# Patient Record
Sex: MALE | Employment: OTHER | URBAN - METROPOLITAN AREA
[De-identification: names, ages, dates, MRNs, and addresses within clinical notes are randomized per-mention and may not be internally consistent; named-entity substitution may affect disease eponyms.]

---

## 2017-01-27 ENCOUNTER — OFFICE VISIT (OUTPATIENT)
Dept: FAMILY MEDICINE | Facility: CLINIC | Age: 67
End: 2017-01-27
Payer: COMMERCIAL

## 2017-01-27 ENCOUNTER — TELEPHONE (OUTPATIENT)
Dept: INTERNAL MEDICINE | Facility: CLINIC | Age: 67
End: 2017-01-27

## 2017-01-27 VITALS
DIASTOLIC BLOOD PRESSURE: 70 MMHG | OXYGEN SATURATION: 98 % | HEART RATE: 68 BPM | WEIGHT: 235 LBS | SYSTOLIC BLOOD PRESSURE: 118 MMHG | HEIGHT: 67 IN | TEMPERATURE: 97.1 F | BODY MASS INDEX: 36.88 KG/M2

## 2017-01-27 DIAGNOSIS — E11.8 TYPE 2 DIABETES MELLITUS WITH COMPLICATION, WITH LONG-TERM CURRENT USE OF INSULIN (H): Primary | ICD-10-CM

## 2017-01-27 DIAGNOSIS — I25.10 CAD, MULTIPLE VESSEL: ICD-10-CM

## 2017-01-27 DIAGNOSIS — Z79.4 TYPE 2 DIABETES MELLITUS WITH COMPLICATION, WITH LONG-TERM CURRENT USE OF INSULIN (H): Primary | ICD-10-CM

## 2017-01-27 DIAGNOSIS — E11.8 TYPE 2 DIABETES MELLITUS WITH COMPLICATION, WITH LONG-TERM CURRENT USE OF INSULIN (H): ICD-10-CM

## 2017-01-27 DIAGNOSIS — E78.5 HYPERLIPIDEMIA LDL GOAL <100: ICD-10-CM

## 2017-01-27 DIAGNOSIS — K21.9 GASTROESOPHAGEAL REFLUX DISEASE WITHOUT ESOPHAGITIS: ICD-10-CM

## 2017-01-27 DIAGNOSIS — Z79.4 TYPE 2 DIABETES MELLITUS WITH COMPLICATION, WITH LONG-TERM CURRENT USE OF INSULIN (H): ICD-10-CM

## 2017-01-27 DIAGNOSIS — I10 ESSENTIAL HYPERTENSION WITH GOAL BLOOD PRESSURE LESS THAN 140/90: ICD-10-CM

## 2017-01-27 DIAGNOSIS — Z23 NEED FOR PROPHYLACTIC VACCINATION AND INOCULATION AGAINST INFLUENZA: Primary | ICD-10-CM

## 2017-01-27 PROCEDURE — 90686 IIV4 VACC NO PRSV 0.5 ML IM: CPT | Performed by: FAMILY MEDICINE

## 2017-01-27 PROCEDURE — 90471 IMMUNIZATION ADMIN: CPT | Performed by: FAMILY MEDICINE

## 2017-01-27 PROCEDURE — 99204 OFFICE O/P NEW MOD 45 MIN: CPT | Mod: 25 | Performed by: FAMILY MEDICINE

## 2017-01-27 RX ORDER — ATORVASTATIN CALCIUM 80 MG/1
80 TABLET, FILM COATED ORAL DAILY
Qty: 90 TABLET | Refills: 3 | Status: SHIPPED | OUTPATIENT
Start: 2017-01-27 | End: 2018-04-03

## 2017-01-27 RX ORDER — INSULIN GLARGINE 100 [IU]/ML
INJECTION, SOLUTION SUBCUTANEOUS
Qty: 27 ML | Refills: 11 | Status: SHIPPED | OUTPATIENT
Start: 2017-01-27 | End: 2018-03-18

## 2017-01-27 RX ORDER — FLURBIPROFEN SODIUM 0.3 MG/ML
SOLUTION/ DROPS OPHTHALMIC
Refills: 2 | COMMUNITY
Start: 2016-06-22 | End: 2017-01-27

## 2017-01-27 RX ORDER — ISOSORBIDE MONONITRATE 30 MG/1
TABLET, EXTENDED RELEASE ORAL
Refills: 3 | COMMUNITY
Start: 2016-12-18 | End: 2017-01-27

## 2017-01-27 RX ORDER — LANCETS
1 EACH MISCELLANEOUS 3 TIMES DAILY
Qty: 6 BOX | Refills: 3 | Status: SHIPPED | OUTPATIENT
Start: 2017-01-27 | End: 2019-01-07

## 2017-01-27 RX ORDER — METOPROLOL SUCCINATE 100 MG/1
TABLET, EXTENDED RELEASE ORAL
Refills: 2 | COMMUNITY
Start: 2016-12-12 | End: 2017-01-27

## 2017-01-27 RX ORDER — BLOOD SUGAR DIAGNOSTIC
STRIP MISCELLANEOUS
Refills: 3 | COMMUNITY
Start: 2016-12-23 | End: 2017-01-27

## 2017-01-27 RX ORDER — LANCETS
EACH MISCELLANEOUS
Refills: 11 | COMMUNITY
Start: 2016-12-21 | End: 2017-01-27

## 2017-01-27 RX ORDER — BLOOD SUGAR DIAGNOSTIC
1 STRIP MISCELLANEOUS 3 TIMES DAILY
Qty: 300 STRIP | Refills: 3 | Status: SHIPPED | OUTPATIENT
Start: 2017-01-27 | End: 2018-03-07

## 2017-01-27 RX ORDER — AMLODIPINE BESYLATE 10 MG/1
TABLET ORAL
Refills: 3 | COMMUNITY
Start: 2016-12-18 | End: 2017-01-27

## 2017-01-27 RX ORDER — ISOSORBIDE MONONITRATE 30 MG/1
30 TABLET, EXTENDED RELEASE ORAL DAILY
Qty: 90 TABLET | Refills: 3 | Status: SHIPPED | OUTPATIENT
Start: 2017-01-27 | End: 2018-04-03

## 2017-01-27 RX ORDER — INSULIN ASPART 100 [IU]/ML
INJECTION, SOLUTION INTRAVENOUS; SUBCUTANEOUS
Qty: 54 ML | Refills: 11 | Status: SHIPPED | OUTPATIENT
Start: 2017-01-27 | End: 2018-01-29

## 2017-01-27 RX ORDER — METFORMIN HCL 500 MG
500 TABLET, EXTENDED RELEASE 24 HR ORAL
Qty: 360 TABLET | Refills: 3 | Status: SHIPPED | OUTPATIENT
Start: 2017-01-27 | End: 2017-01-27

## 2017-01-27 RX ORDER — INSULIN ASPART 100 [IU]/ML
INJECTION, SOLUTION INTRAVENOUS; SUBCUTANEOUS
Refills: 11 | COMMUNITY
Start: 2016-12-08 | End: 2017-01-27

## 2017-01-27 RX ORDER — LIRAGLUTIDE 6 MG/ML
0.6 INJECTION SUBCUTANEOUS DAILY
Qty: 9 ML | Refills: 11 | Status: SHIPPED | OUTPATIENT
Start: 2017-01-27 | End: 2018-04-03

## 2017-01-27 RX ORDER — FLURBIPROFEN SODIUM 0.3 MG/ML
SOLUTION/ DROPS OPHTHALMIC
Qty: 100 EACH | Refills: 2 | Status: SHIPPED | OUTPATIENT
Start: 2017-01-27 | End: 2017-06-12

## 2017-01-27 RX ORDER — ATORVASTATIN CALCIUM 80 MG/1
TABLET, FILM COATED ORAL
Refills: 3 | COMMUNITY
Start: 2016-12-28 | End: 2017-01-27

## 2017-01-27 RX ORDER — INSULIN GLARGINE 100 [IU]/ML
INJECTION, SOLUTION SUBCUTANEOUS
Refills: 11 | COMMUNITY
Start: 2016-12-08 | End: 2017-01-27

## 2017-01-27 RX ORDER — LOSARTAN POTASSIUM 50 MG/1
50 TABLET ORAL DAILY
Qty: 90 TABLET | Refills: 3 | Status: SHIPPED | OUTPATIENT
Start: 2017-01-27 | End: 2018-04-10

## 2017-01-27 RX ORDER — AMLODIPINE BESYLATE 10 MG/1
10 TABLET ORAL DAILY
Qty: 90 TABLET | Refills: 3 | Status: SHIPPED | OUTPATIENT
Start: 2017-01-27 | End: 2018-06-18

## 2017-01-27 RX ORDER — METOPROLOL SUCCINATE 100 MG/1
100 TABLET, EXTENDED RELEASE ORAL DAILY
Qty: 90 TABLET | Refills: 3 | Status: SHIPPED | OUTPATIENT
Start: 2017-01-27 | End: 2018-07-22

## 2017-01-27 RX ORDER — METFORMIN HCL 500 MG
2000 TABLET, EXTENDED RELEASE 24 HR ORAL
Qty: 360 TABLET | Refills: 3 | Status: SHIPPED | OUTPATIENT
Start: 2017-01-27 | End: 2018-01-06

## 2017-01-27 RX ORDER — NITROGLYCERIN 0.4 MG/1
TABLET SUBLINGUAL
Qty: 25 TABLET | Refills: 0 | Status: SHIPPED | OUTPATIENT
Start: 2017-01-27 | End: 2019-04-30

## 2017-01-27 RX ORDER — BUPROPION HYDROCHLORIDE 300 MG/1
TABLET ORAL
Refills: 3 | COMMUNITY
Start: 2017-01-15 | End: 2018-06-07

## 2017-01-27 RX ORDER — LIRAGLUTIDE 6 MG/ML
INJECTION SUBCUTANEOUS
Refills: 11 | COMMUNITY
Start: 2017-01-18 | End: 2017-01-27

## 2017-01-27 RX ORDER — LOSARTAN POTASSIUM 50 MG/1
TABLET ORAL
Refills: 3 | COMMUNITY
Start: 2016-12-18 | End: 2017-01-27

## 2017-01-27 RX ORDER — METFORMIN HCL 500 MG
TABLET, EXTENDED RELEASE 24 HR ORAL
Refills: 3 | COMMUNITY
Start: 2016-11-06 | End: 2017-01-27

## 2017-01-27 ASSESSMENT — PAIN SCALES - GENERAL: PAINLEVEL: NO PAIN (0)

## 2017-01-27 NOTE — PROGRESS NOTES
SUBJECTIVE:     CC: Darian Engle is an 66 year old male who presents for preventative health visit.     Insurance change and so he is changing clinic     Healthy Habits:    Do you get at least three servings of calcium containing foods daily (dairy, green leafy vegetables, etc.)? yes    Amount of exercise or daily activities, outside of work: 1-2  day(s) per week for 20 min    Problems taking medications regularly No    Medication side effects: No    Have you had an eye exam in the past two years? yes    Do you see a dentist twice per year? yes    Do you have sleep apnea, excessive snoring or daytime drowsiness?yes- Sleep Apnea          Today's PHQ-2 Score: No flowsheet data found.    Abuse: Current or Past(Physical, Sexual or Emotional)- No  Do you feel safe in your environment - Yes    Social History   Substance Use Topics     Smoking status: Not on file     Smokeless tobacco: Not on file     Alcohol Use: Not on file     Does not drink    Last PSA: No results found for: PSA    No results for input(s): CHOL, HDL, LDL, TRIG, CHOLHDLRATIO, NHDL in the last 52166 hours.        All Histories reviewed and updated in [facility].      Current Outpatient Prescriptions   Medication Sig Dispense Refill     metFORMIN (GLUCOPHAGE-XR) 500 MG 24 hr tablet TAKE 4 TABS BY MOUTH DAILY WITH BREAKFAST. PENDING MAILORDER DELIVERY  3     losartan (COZAAR) 50 MG tablet TAKE 1 TAB BY MOUTH DAILY.  3     amLODIPine (NORVASC) 10 MG tablet TAKE 1 TABLET BY MOUTH DAILY. PENDING MAILORDER DELIVERY  3     metoprolol (TOPROL-XL) 100 MG 24 hr tablet TAKE 1 TAB BY MOUTH DAILY.  2     isosorbide mononitrate (IMDUR) 30 MG 24 hr tablet TAKE 1 TAB BY MOUTH ONCE DAILY.  3     atorvastatin (LIPITOR) 80 MG tablet TAKE 1 TAB BY MOUTH DAILY.  3     omeprazole (PRILOSEC) 20 MG CR capsule TAKE 1 CAP BY MOUTH DAILY. TAKE 1 HOUR BEFORE A MEAL.  3     buPROPion (WELLBUTRIN XL) 300 MG 24 hr tablet TAKE 1 TAB BY MOUTH DAILY. START AFTER 2 WEEKS ON  MG  "DOSE  3     NOVOLOG FLEXPEN 100 UNIT/ML soln INJECT 3 TIMES DAILY. DOSE: 2UNITS/15 GRAMS AND 1 UNIT FOR EVERY 25 OVER 150.  11     VICTOZA PEN 18 MG/3ML soln INJECT 1.8 MG SUBCUTANEOUSLY DAILY.  11     LANTUS SOLOSTAR 100 UNIT/ML soln INJECT 41 UNITS SUBCUTANEOUSLY. DAILY  11     blood glucose monitoring (ACCU-CHEK FASTCLIX) lancets TESTING BLOOD SUGARS 3 TIME(S) A DAY.  11     B-D U/F insulin pen needle WITH VICTOZA AND INSULIN 5 TIMES DAILY.  2     ACCU-CHEK SMARTVIEW test strip THREE TIMES A DAY. USE AS DIRECTED. PHARMACY DISPENSE BRAND BASED ON INSURANCE.  3         ROS:  C: NEGATIVE for fever, chills, change in weight  I: NEGATIVE for worrisome rashes, moles or lesions  E: NEGATIVE for vision changes or irritation  ENT: NEGATIVE for ear, mouth and throat problems  R: NEGATIVE for significant cough or SOB  CV: NEGATIVE for chest pain, palpitations or peripheral edema  Several visits for chest pain   2004 had CABG   Last stent done for heart 3 years ago   GI: NEGATIVE for nausea, abdominal pain, heartburn, or change in bowel habits   male: negative for dysuria, hematuria, decreased urinary stream, erectile dysfunction, urethral discharge  History of GERD   Had endoscopy with biopsies and was normal ; [facility]   M: NEGATIVE for significant arthralgias or myalgia  N: NEGATIVE for weakness, dizziness or paresthesias  P: NEGATIVE for changes in mood or affect    Last Hemoglobin A1C was 7.2\    No physical examination was done today.  We had to renew all the patient's medications and go through them and go through his basic past medical history which was updated in chart.  As a result of this patient will have to come back for a physical at another time.  His blood pressure is normal.      OBJECTIVE:     /70 mmHg  Pulse 68  Temp(Src) 97.1  F (36.2  C) (Oral)  Ht 5' 6.5\" (1.689 m)  Wt 235 lb (106.595 kg)  BMI 37.37 kg/m2  SpO2 98%  EXAM:      ASSESSMENT/PLAN:         ICD-10-CM    1. Need for " prophylactic vaccination and inoculation against influenza Z23 FLU VAC, SPLIT VIRUS IM > 3 YO (QUADRIVALENT) [33032]     Vaccine Administration, Initial [78991]   2. CAD, multiple vessel I25.10 metoprolol (TOPROL-XL) 100 MG 24 hr tablet     isosorbide mononitrate (IMDUR) 30 MG 24 hr tablet     atorvastatin (LIPITOR) 80 MG tablet     nitroglycerin (NITROSTAT) 0.4 MG sublingual tablet   3. Essential hypertension with goal blood pressure less than 140/90 I10 losartan (COZAAR) 50 MG tablet     amLODIPine (NORVASC) 10 MG tablet     metoprolol (TOPROL-XL) 100 MG 24 hr tablet   4. Type 2 diabetes mellitus with complication, with long-term current use of insulin (H) E11.8 metFORMIN (GLUCOPHAGE-XR) 500 MG 24 hr tablet    Z79.4 NOVOLOG FLEXPEN 100 UNIT/ML soln     VICTOZA PEN 18 MG/3ML soln     LANTUS SOLOSTAR 100 UNIT/ML soln     blood glucose monitoring (ACCU-CHEK FASTCLIX) lancets     B-D U/F insulin pen needle     ACCU-CHEK SMARTVIEW test strip   5. Hyperlipidemia LDL goal <100 E78.5 atorvastatin (LIPITOR) 80 MG tablet   6. Gastroesophageal reflux disease without esophagitis K21.9 omeprazole (PRILOSEC) 20 MG CR capsule       We don't have access the [facility].  As a result of that I do not know when to set up his next testing.  At this point I told him that any minimal malleolar do these in 3 months and that he would have tablet basic testing that we would do we have no records.  All meds were renewed as noted.  Currently patient is not active with any of his symptoms of chest pain he has no GERD symptoms right now with his current dose of medication he says were very well controlled on his current doses of penicillin therapy.      Total time spent face-to-face with the patient: 45  minutes.  Greater than 50% of time was spent in counseling.       Counseling Resources:  ATP IV Guidelines  Pooled Cohorts Equation Calculator  FRAX Risk Assessment  ICSI Preventive Guidelines  Dietary Guidelines for  Americans, 2010  USDA's MyPlate  ASA Prophylaxis  Lung CA Screening    Eh Matos MD  [facility]

## 2017-01-27 NOTE — TELEPHONE ENCOUNTER
Called pharmacy. Patient used to take Metformin 4 x a day.    Ordered for 1 x day.  quantity 360.    Routed to provider to please clarify.    Shelly Jones RN [facility] Triage.

## 2017-01-27 NOTE — TELEPHONE ENCOUNTER
Reason for Call:  Other prescription    Detailed comments: metFORMIN (GLUCOPHAGE-XR) 500 MG 24 hr tablet, dosage questions    Phone Number Patient can be reached at: Other phone number:  [facility], [phone]    Best Time: any    Can we leave a detailed message on this number? YES    Call taken on 1/27/2017 at 11:30 AM by Evelyn Salgado

## 2017-01-27 NOTE — PROGRESS NOTES
Injectable Influenza Immunization Documentation    1.  Is the person to be vaccinated sick today?  No    2. Does the person to be vaccinated have an allergy to eggs or to a component of the vaccine?  No    3. Has the person to be vaccinated today ever had a serious reaction to influenza vaccine in the past?  No    4. Has the person to be vaccinated ever had Guillain-Houston syndrome?  No     Form completed by Vania SCHUMACHER    Per orders of Dr. MCCLAIN, injection of Influenza given by [facility]. Patient instructed to remain in clinic for 20 minutes afterwards, and to report any adverse reaction to me immediately.

## 2017-01-27 NOTE — MR AVS SNAPSHOT
After Visit Summary   1/27/2017    Darian Engle    MRN: [id number]           Patient Information     Date Of Birth          1950        Visit Information        Provider Department      1/27/2017 9:20 AM Eh Matos MD [facility]        Today's Diagnoses     CAD, multiple vessel         Essential hypertension with goal blood pressure less than 140/90         Type 2 diabetes mellitus with complication, with long-term current use of insulin (H)         Hyperlipidemia LDL goal <100         Gastroesophageal reflux disease without esophagitis           Care Instructions      Preventive Health Recommendations  Male Ages 50 - 64    Yearly exam:             See your health care provider every year in order to  o   Review health changes.   o   Discuss preventive care.    o   Review your medicines if your doctor has prescribed any.     Have a cholesterol test every 5 years, or more frequently if you are at risk for high cholesterol/heart disease.     Have a diabetes test (fasting glucose) every three years. If you are at risk for diabetes, you should have this test more often.     Have a colonoscopy at age 50, or have a yearly FIT test (stool test). These exams will check for colon cancer.      Talk with your health care provider about whether or not a prostate cancer screening test (PSA) is right for you.    You should be tested each year for STDs (sexually transmitted diseases), if you re at risk.     Shots: Get a flu shot each year. Get a tetanus shot every 10 years.     Nutrition:    Eat at least 5 servings of fruits and vegetables daily.     Eat whole-grain bread, whole-wheat pasta and brown rice instead of white grains and rice.     Talk to your provider about Calcium and Vitamin D.     Lifestyle    Exercise for at least 150 minutes a week (30 minutes a day, 5 days a week). This will help you control your weight and prevent disease.     Limit alcohol to one drink  "per day.     No smoking.     Wear sunscreen to prevent skin cancer.     See your dentist every six months for an exam and cleaning.     See your eye doctor every 1 to 2 years.            Follow-ups after your visit        Who to contact     If you have questions or need follow up information about today's clinic visit or your schedule please contact [facility] directly at [phone].  Normal or non-critical lab and imaging results will be communicated to you by MyChart, letter or phone within 4 business days after the clinic has received the results. If you do not hear from us within 7 days, please contact the clinic through [facility] or phone. If you have a critical or abnormal lab result, we will notify you by phone as soon as possible.  Submit refill requests through [facility] or call your pharmacy and they will forward the refill request to us. Please allow 3 business days for your refill to be completed.          Additional Information About Your Visit        [email or web address] Information     Bluetest lets you send messages to your doctor, view your test results, renew your prescriptions, schedule appointments and more. To sign up, go to [email or web address] . Click on \"Log in\" on the left side of the screen, which will take you to the Welcome page. Then click on \"Sign up Now\" on the right side of the page.     You will be asked to enter the access code listed below, as well as some personal information. Please follow the directions to create your username and password.     Your access code is: [email or web address]  Expires: 2017 10:36 AM     Your access code will  in 90 days. If you need help or a new code, please call your [facility] or [phone].        Care EveryWhere ID     This is your Care EveryWhere ID. This could be used by other organizations to access your [facility] medical records  [id number]        Your Vitals Were     Pulse Temperature Height BMI (Body Mass Index) Pulse " "Oximetry       68 97.1  F (36.2  C) (Oral) 5' 6.5\" (1.689 m) 37.37 kg/m2 98%        Blood Pressure from Last 3 Encounters:   01/27/17 118/70    Weight from Last 3 Encounters:   01/27/17 235 lb (106.595 kg)              Today, you had the following     No orders found for display         Today's Medication Changes          These changes are accurate as of: 1/27/17 10:36 AM.  If you have any questions, ask your nurse or doctor.               Start taking these medicines.        Dose/Directions    nitroglycerin 0.4 MG sublingual tablet   Commonly known as:  NITROSTAT   Used for:  CAD, multiple vessel   Started by:  Eh Matos MD        For chest pain place 1 tablet under the tongue every 5 minutes for 3 doses. If symptoms persist 5 minutes after 1st dose call 911.   Quantity:  25 tablet   Refills:  0         These medicines have changed or have updated prescriptions.        Dose/Directions    ACCU-CHEK SMARTVIEW test strip   This may have changed:  See the new instructions.   Used for:  Type 2 diabetes mellitus with complication, with long-term current use of insulin (H)   Generic drug:  blood glucose monitoring   Changed by:  Eh Matos MD        Dose:  1 strip   1 strip by In Vitro route 3 times daily Use to test blood sugar 3 times daily or as directed.   Quantity:  300 strip   Refills:  3       amLODIPine 10 MG tablet   Commonly known as:  NORVASC   This may have changed:  See the new instructions.   Used for:  Essential hypertension with goal blood pressure less than 140/90   Changed by:  Eh Matos MD        Dose:  10 mg   Take 1 tablet (10 mg) by mouth daily   Quantity:  90 tablet   Refills:  3       atorvastatin 80 MG tablet   Commonly known as:  LIPITOR   This may have changed:  See the new instructions.   Used for:  CAD, multiple vessel, Hyperlipidemia LDL goal <100   Changed by:  Eh Matos MD        Dose:  80 mg   Take 1 tablet (80 mg) by mouth daily   Quantity:  " 90 tablet   Refills:  3       B-D U/F 31G X 5 MM   This may have changed:  See the new instructions.   Used for:  Type 2 diabetes mellitus with complication, with long-term current use of insulin (H)   Generic drug:  insulin pen needle   Changed by:  Eh Matos MD        Use tid pen needles daily or as directed.   Quantity:  100 each   Refills:  2       blood glucose monitoring lancets   This may have changed:  See the new instructions.   Used for:  Type 2 diabetes mellitus with complication, with long-term current use of insulin (H)   Changed by:  Eh Matos MD        Dose:  1 each   1 each by In Vitro route 3 times daily Use to test blood sugar tid times daily or as directed.   Quantity:  6 Box   Refills:  3       isosorbide mononitrate 30 MG 24 hr tablet   Commonly known as:  IMDUR   This may have changed:  See the new instructions.   Used for:  CAD, multiple vessel   Changed by:  Eh Matos MD        Dose:  30 mg   Take 1 tablet (30 mg) by mouth daily   Quantity:  90 tablet   Refills:  3       LANTUS SOLOSTAR 100 UNIT/ML injection   This may have changed:  See the new instructions.   Used for:  Type 2 diabetes mellitus with complication, with long-term current use of insulin (H)   Generic drug:  insulin glargine   Changed by:  Eh Matos MD        Patient takes 50 units daily   Quantity:  27 mL   Refills:  11       losartan 50 MG tablet   Commonly known as:  COZAAR   This may have changed:  See the new instructions.   Used for:  Essential hypertension with goal blood pressure less than 140/90   Changed by:  Eh Matos MD        Dose:  50 mg   Take 1 tablet (50 mg) by mouth daily   Quantity:  90 tablet   Refills:  3       metFORMIN 500 MG 24 hr tablet   Commonly known as:  GLUCOPHAGE-XR   This may have changed:  See the new instructions.   Used for:  Type 2 diabetes mellitus with complication, with long-term current use of insulin (H)   Changed by:   Eh Matos MD        Dose:  500 mg   Take 1 tablet (500 mg) by mouth daily (with breakfast)   Quantity:  360 tablet   Refills:  3       metoprolol 100 MG 24 hr tablet   Commonly known as:  TOPROL-XL   This may have changed:  See the new instructions.   Used for:  CAD, multiple vessel, Essential hypertension with goal blood pressure less than 140/90   Changed by:  Eh Matos MD        Dose:  100 mg   Take 1 tablet (100 mg) by mouth daily   Quantity:  90 tablet   Refills:  3       NovoLOG FLEXPEN 100 UNIT/ML injection   This may have changed:  See the new instructions.   Used for:  Type 2 diabetes mellitus with complication, with long-term current use of insulin (H)   Generic drug:  insulin aspart   Changed by:  Eh Matos MD        Patient uses 60 units per day   Quantity:  54 mL   Refills:  11       omeprazole 20 MG CR capsule   Commonly known as:  priLOSEC   This may have changed:  See the new instructions.   Used for:  Gastroesophageal reflux disease without esophagitis   Changed by:  Eh Matos MD        Dose:  20 mg   Take 1 capsule (20 mg) by mouth daily   Quantity:  90 capsule   Refills:  3       VICTOZA PEN 18 MG/3ML soln   This may have changed:  See the new instructions.   Used for:  Type 2 diabetes mellitus with complication, with long-term current use of insulin (H)   Generic drug:  liraglutide   Changed by:  Eh Matos MD        Dose:  0.6 mg   Inject 0.6 mg Subcutaneous daily   Quantity:  9 mL   Refills:  11            Where to get your medicines      These medications were sent to [facility]/pharmacy #[id number] - [facility] [address]  [address] [facility] [address]     Phone:  [phone]    - ACCU-CHEK SMARTVIEW test strip  - amLODIPine 10 MG tablet  - atorvastatin 80 MG tablet  - B-D U/F 31G X 5 MM  - blood glucose monitoring lancets  - isosorbide mononitrate 30 MG 24 hr tablet  - LANTUS SOLOSTAR 100 UNIT/ML  injection  - losartan 50 MG tablet  - metFORMIN 500 MG 24 hr tablet  - metoprolol 100 MG 24 hr tablet  - nitroglycerin 0.4 MG sublingual tablet  - NovoLOG FLEXPEN 100 UNIT/ML injection  - omeprazole 20 MG CR capsule  - VICTOZA PEN 18 MG/3ML soln             Primary Care Provider    None Specified       No primary provider on file.        Thank you!     Thank you for choosing [facility]  for your care. Our goal is always to provide you with excellent care. Hearing back from our patients is one way we can continue to improve our services. Please take a few minutes to complete the written survey that you may receive in the mail after your visit with us. Thank you!             Your Updated Medication List - Protect others around you: Learn how to safely use, store and throw away your medicines at www.disposemymeds.org.          This list is accurate as of: 1/27/17 10:36 AM.  Always use your most recent med list.                   Brand Name Dispense Instructions for use    ACCU-CHEK SMARTVIEW test strip   Generic drug:  blood glucose monitoring     300 strip    1 strip by In Vitro route 3 times daily Use to test blood sugar 3 times daily or as directed.       amLODIPine 10 MG tablet    NORVASC    90 tablet    Take 1 tablet (10 mg) by mouth daily       atorvastatin 80 MG tablet    LIPITOR    90 tablet    Take 1 tablet (80 mg) by mouth daily       B-D U/F 31G X 5 MM   Generic drug:  insulin pen needle     100 each    Use tid pen needles daily or as directed.       blood glucose monitoring lancets     6 Box    1 each by In Vitro route 3 times daily Use to test blood sugar tid times daily or as directed.       buPROPion 300 MG 24 hr tablet    WELLBUTRIN XL     TAKE 1 TAB BY MOUTH DAILY. START AFTER 2 WEEKS ON  MG DOSE       isosorbide mononitrate 30 MG 24 hr tablet    IMDUR    90 tablet    Take 1 tablet (30 mg) by mouth daily       LANTUS SOLOSTAR 100 UNIT/ML injection   Generic drug:  insulin  glargine     27 mL    Patient takes 50 units daily       losartan 50 MG tablet    COZAAR    90 tablet    Take 1 tablet (50 mg) by mouth daily       metFORMIN 500 MG 24 hr tablet    GLUCOPHAGE-XR    360 tablet    Take 1 tablet (500 mg) by mouth daily (with breakfast)       metoprolol 100 MG 24 hr tablet    TOPROL-XL    90 tablet    Take 1 tablet (100 mg) by mouth daily       nitroglycerin 0.4 MG sublingual tablet    NITROSTAT    25 tablet    For chest pain place 1 tablet under the tongue every 5 minutes for 3 doses. If symptoms persist 5 minutes after 1st dose call 911.       NovoLOG FLEXPEN 100 UNIT/ML injection   Generic drug:  insulin aspart     54 mL    Patient uses 60 units per day       omeprazole 20 MG CR capsule    priLOSEC    90 capsule    Take 1 capsule (20 mg) by mouth daily       VICTOZA PEN 18 MG/3ML soln   Generic drug:  liraglutide     9 mL    Inject 0.6 mg Subcutaneous daily

## 2017-01-27 NOTE — NURSING NOTE
"Chief Complaint   Patient presents with     Physical       Initial /70 mmHg  Pulse 68  Temp(Src) 97.1  F (36.2  C) (Oral)  Ht 5' 6.5\" (1.689 m)  Wt 235 lb (106.595 kg)  BMI 37.37 kg/m2  SpO2 98% Estimated body mass index is 37.37 kg/(m^2) as calculated from the following:    Height as of this encounter: 5' 6.5\" (1.689 m).    Weight as of this encounter: 235 lb (106.595 kg).  BP completed using cuff size: danuta Caro MA      "

## 2017-02-03 ENCOUNTER — OFFICE VISIT (OUTPATIENT)
Dept: FAMILY MEDICINE | Facility: CLINIC | Age: 67
End: 2017-02-03
Payer: COMMERCIAL

## 2017-02-03 VITALS
TEMPERATURE: 97 F | HEIGHT: 67 IN | DIASTOLIC BLOOD PRESSURE: 71 MMHG | HEART RATE: 61 BPM | SYSTOLIC BLOOD PRESSURE: 141 MMHG | OXYGEN SATURATION: 99 % | WEIGHT: 229 LBS | BODY MASS INDEX: 35.94 KG/M2

## 2017-02-03 DIAGNOSIS — E78.5 HYPERLIPIDEMIA LDL GOAL <100: ICD-10-CM

## 2017-02-03 DIAGNOSIS — Z00.00 ROUTINE GENERAL MEDICAL EXAMINATION AT A HEALTH CARE FACILITY: Primary | ICD-10-CM

## 2017-02-03 DIAGNOSIS — I25.10 CAD, MULTIPLE VESSEL: ICD-10-CM

## 2017-02-03 DIAGNOSIS — Z79.4 TYPE 2 DIABETES MELLITUS WITH COMPLICATION, WITH LONG-TERM CURRENT USE OF INSULIN (H): ICD-10-CM

## 2017-02-03 DIAGNOSIS — I10 ESSENTIAL HYPERTENSION WITH GOAL BLOOD PRESSURE LESS THAN 140/90: ICD-10-CM

## 2017-02-03 DIAGNOSIS — Z11.59 NEED FOR HEPATITIS C SCREENING TEST: ICD-10-CM

## 2017-02-03 DIAGNOSIS — H90.8 MIXED HEARING LOSS: ICD-10-CM

## 2017-02-03 DIAGNOSIS — Z23 NEED FOR PROPHYLACTIC VACCINATION WITH TETANUS-DIPHTHERIA (TD): ICD-10-CM

## 2017-02-03 DIAGNOSIS — Z12.11 SCREEN FOR COLON CANCER: ICD-10-CM

## 2017-02-03 DIAGNOSIS — E11.8 TYPE 2 DIABETES MELLITUS WITH COMPLICATION, WITH LONG-TERM CURRENT USE OF INSULIN (H): ICD-10-CM

## 2017-02-03 DIAGNOSIS — Z13.89 SCREENING FOR DIABETIC PERIPHERAL NEUROPATHY: ICD-10-CM

## 2017-02-03 LAB
ANION GAP SERPL CALCULATED.3IONS-SCNC: 11 MMOL/L (ref 3–14)
BASOPHILS # BLD AUTO: 0 10E9/L (ref 0–0.2)
BASOPHILS NFR BLD AUTO: 0.4 %
BUN SERPL-MCNC: 19 MG/DL (ref 7–30)
CALCIUM SERPL-MCNC: 9.3 MG/DL (ref 8.5–10.1)
CHLORIDE SERPL-SCNC: 106 MMOL/L (ref 94–109)
CHOLEST SERPL-MCNC: 99 MG/DL
CO2 SERPL-SCNC: 24 MMOL/L (ref 20–32)
CREAT SERPL-MCNC: 1.15 MG/DL (ref 0.66–1.25)
CREAT UR-MCNC: 224 MG/DL
DIFFERENTIAL METHOD BLD: NORMAL
EOSINOPHIL # BLD AUTO: 0.2 10E9/L (ref 0–0.7)
EOSINOPHIL NFR BLD AUTO: 1.8 %
ERYTHROCYTE [DISTWIDTH] IN BLOOD BY AUTOMATED COUNT: 12.5 % (ref 10–15)
GFR SERPL CREATININE-BSD FRML MDRD: 64 ML/MIN/1.7M2
GLUCOSE SERPL-MCNC: 83 MG/DL (ref 70–99)
HCT VFR BLD AUTO: 41.3 % (ref 40–53)
HDLC SERPL-MCNC: 28 MG/DL
HGB BLD-MCNC: 13.9 G/DL (ref 13.3–17.7)
LDLC SERPL CALC-MCNC: 46 MG/DL
LYMPHOCYTES # BLD AUTO: 2.6 10E9/L (ref 0.8–5.3)
LYMPHOCYTES NFR BLD AUTO: 26.7 %
MCH RBC QN AUTO: 30.2 PG (ref 26.5–33)
MCHC RBC AUTO-ENTMCNC: 33.7 G/DL (ref 31.5–36.5)
MCV RBC AUTO: 90 FL (ref 78–100)
MICROALBUMIN UR-MCNC: 744 MG/L
MICROALBUMIN/CREAT UR: 332.14 MG/G CR (ref 0–17)
MONOCYTES # BLD AUTO: 0.9 10E9/L (ref 0–1.3)
MONOCYTES NFR BLD AUTO: 9.6 %
NEUTROPHILS # BLD AUTO: 6 10E9/L (ref 1.6–8.3)
NEUTROPHILS NFR BLD AUTO: 61.5 %
NONHDLC SERPL-MCNC: 71 MG/DL
PLATELET # BLD AUTO: 256 10E9/L (ref 150–450)
POTASSIUM SERPL-SCNC: 4.5 MMOL/L (ref 3.4–5.3)
RBC # BLD AUTO: 4.6 10E12/L (ref 4.4–5.9)
SODIUM SERPL-SCNC: 141 MMOL/L (ref 133–144)
TRIGL SERPL-MCNC: 124 MG/DL
TSH SERPL DL<=0.005 MIU/L-ACNC: 3.59 MU/L (ref 0.4–4)
WBC # BLD AUTO: 9.8 10E9/L (ref 4–11)

## 2017-02-03 PROCEDURE — 36415 COLL VENOUS BLD VENIPUNCTURE: CPT | Performed by: FAMILY MEDICINE

## 2017-02-03 PROCEDURE — 85025 COMPLETE CBC W/AUTO DIFF WBC: CPT | Performed by: FAMILY MEDICINE

## 2017-02-03 PROCEDURE — 84443 ASSAY THYROID STIM HORMONE: CPT | Performed by: FAMILY MEDICINE

## 2017-02-03 PROCEDURE — 92552 PURE TONE AUDIOMETRY AIR: CPT | Performed by: FAMILY MEDICINE

## 2017-02-03 PROCEDURE — 80061 LIPID PANEL: CPT | Performed by: FAMILY MEDICINE

## 2017-02-03 PROCEDURE — 82043 UR ALBUMIN QUANTITATIVE: CPT | Performed by: FAMILY MEDICINE

## 2017-02-03 PROCEDURE — 80048 BASIC METABOLIC PNL TOTAL CA: CPT | Performed by: FAMILY MEDICINE

## 2017-02-03 PROCEDURE — 86803 HEPATITIS C AB TEST: CPT | Performed by: FAMILY MEDICINE

## 2017-02-03 PROCEDURE — 99396 PREV VISIT EST AGE 40-64: CPT | Mod: 25 | Performed by: FAMILY MEDICINE

## 2017-02-03 PROCEDURE — 99207 C FOOT EXAM  NO CHARGE: CPT | Mod: 25 | Performed by: FAMILY MEDICINE

## 2017-02-03 ASSESSMENT — PAIN SCALES - GENERAL: PAINLEVEL: NO PAIN (0)

## 2017-02-03 NOTE — PROGRESS NOTES
SUBJECTIVE:     CC: Darian Engle is an 66 year old male who presents for preventative health visit.     Healthy Habits:    Do you get at least three servings of calcium containing foods daily (dairy, green leafy vegetables, etc.)? No    Amount of exercise or daily activities, outside of work: 3 day(s) per week 20 min each    Problems taking medications regularly No    Medication side effects: No    Have you had an eye exam in the past two years? yes    Do you see a dentist twice per year? yes    Do you have sleep apnea, excessive snoring or daytime drowsiness?yes        Today's PHQ-2 Score:   PHQ-2 ( 1999 Pfizer) 1/27/2017   Q1: Little interest or pleasure in doing things 0   Q2: Feeling down, depressed or hopeless 0   PHQ-2 Score 0       Abuse: Current or Past(Physical, Sexual or Emotional)- No  Do you feel safe in your environment - Yes    Social History   Substance Use Topics     Smoking status: Former Smoker     Types: Cigarettes     Quit date: 07/15/2016     Smokeless tobacco: Not on file     Alcohol Use: No     The patient does not drink >3 drinks per day nor >7 drinks per week. Does not drink    Last PSA: No results found for: PSA    No results for input(s): CHOL, HDL, LDL, TRIG, CHOLHDLRATIO, NHDL in the last 54331 hours.    Reviewed orders with patient. Reviewed health maintenance and updated orders accordingly - Yes    All Histories reviewed and updated in Epic.  History reviewed. No pertinent past medical history.   History reviewed. No pertinent past surgical history.    ROS:  C: NEGATIVE for fever, chills, change in weight  I: NEGATIVE for worrisome rashes, moles or lesions  E: NEGATIVE for vision changes or irritation  ENT: NEGATIVE for ear, mouth and throat problems  R: NEGATIVE for significant cough or SOB  CV: NEGATIVE for chest pain, palpitations or peripheral edema  GI: NEGATIVE for nausea, abdominal pain, heartburn, or change in bowel habits   male: negative for dysuria, hematuria,  decreased urinary stream, erectile dysfunction, urethral discharge  M: NEGATIVE for significant arthralgias or myalgia  N: NEGATIVE for weakness, dizziness or paresthesias  P: NEGATIVE for changes in mood or affect    Problem list, Medication list, Allergies, and Medical/Social/Surgical histories reviewed in [facility] and updated as appropriate.  Labs reviewed in EPIC  BP Readings from Last 3 Encounters:   02/03/17 141/71   01/27/17 118/70    Wt Readings from Last 3 Encounters:   02/03/17 229 lb (103.874 kg)   01/27/17 235 lb (106.595 kg)                  Patient Active Problem List   Diagnosis     CAD, multiple vessel     Essential hypertension with goal blood pressure less than 140/90     Type 2 diabetes mellitus with complication, with long-term current use of insulin (H)     Hyperlipidemia LDL goal <100     Gastroesophageal reflux disease without esophagitis     History reviewed. No pertinent past surgical history.    Social History   Substance Use Topics     Smoking status: Former Smoker     Types: Cigarettes     Quit date: 07/15/2016     Smokeless tobacco: Not on file     Alcohol Use: No     History reviewed. No pertinent family history.      Current Outpatient Prescriptions   Medication Sig Dispense Refill     buPROPion (WELLBUTRIN XL) 300 MG 24 hr tablet TAKE 1 TAB BY MOUTH DAILY. START AFTER 2 WEEKS ON  MG DOSE  3     losartan (COZAAR) 50 MG tablet Take 1 tablet (50 mg) by mouth daily 90 tablet 3     amLODIPine (NORVASC) 10 MG tablet Take 1 tablet (10 mg) by mouth daily 90 tablet 3     metoprolol (TOPROL-XL) 100 MG 24 hr tablet Take 1 tablet (100 mg) by mouth daily 90 tablet 3     isosorbide mononitrate (IMDUR) 30 MG 24 hr tablet Take 1 tablet (30 mg) by mouth daily 90 tablet 3     atorvastatin (LIPITOR) 80 MG tablet Take 1 tablet (80 mg) by mouth daily 90 tablet 3     omeprazole (PRILOSEC) 20 MG CR capsule Take 1 capsule (20 mg) by mouth daily 90 capsule 3     NOVOLOG FLEXPEN 100 UNIT/ML soln Patient  uses 60 units per day 54 mL 11     VICTOZA PEN 18 MG/3ML soln Inject 0.6 mg Subcutaneous daily 9 mL 11     LANTUS SOLOSTAR 100 UNIT/ML soln Patient takes 50 units daily 27 mL 11     blood glucose monitoring (ACCU-CHEK FASTCLIX) lancets 1 each by In Vitro route 3 times daily Use to test blood sugar tid times daily or as directed. 6 Box 3     B-D U/F insulin pen needle Use tid pen needles daily or as directed. 100 each 2     ACCU-CHEK SMARTVIEW test strip 1 strip by In Vitro route 3 times daily Use to test blood sugar 3 times daily or as directed. 300 strip 3     nitroglycerin (NITROSTAT) 0.4 MG sublingual tablet For chest pain place 1 tablet under the tongue every 5 minutes for 3 doses. If symptoms persist 5 minutes after 1st dose call 911. 25 tablet 0     metFORMIN (GLUCOPHAGE-XR) 500 MG 24 hr tablet Take 4 tablets (2,000 mg) by mouth daily (with breakfast) 360 tablet 3     No Known Allergies  OBJECTIVE:     There were no vitals taken for this visit.  EXAM:  GENERAL: healthy, alert and no distress; overweight   EYES: Eyes grossly normal to inspection, PERRL and conjunctivae and sclerae normal  HENT: ear canals and TM's normal, nose and mouth without ulcers or lesions; no bruit in the neck   NECK: no adenopathy, no asymmetry, masses, or scars and thyroid normal to palpation  RESP: lungs clear to auscultation - no rales, rhonchi or wheezes  CV: regular rate and rhythm, normal S1 S2, no S3 or S4, no murmur, click or rub, no peripheral edema and peripheral pulses strong  ABDOMEN: soft, nontender, no hepatosplenomegaly, no masses and bowel sounds normal  MS: no gross musculoskeletal defects noted, no edema  SKIN: no suspicious lesions or rashes  NEURO: Normal strength and tone, mentation intact and speech normal  PSYCH: mentation appears normal, affect normal/bright    ASSESSMENT/PLAN:         ICD-10-CM    1. Routine general medical examination at a health care facility Z00.00    2. Essential hypertension with goal blood  "pressure less than 140/90 I10    3. Type 2 diabetes mellitus with complication, with long-term current use of insulin (H) E11.8 Albumin Random Urine Quantitative    Z79.4 TSH WITH FREE T4 REFLEX   4. Hyperlipidemia LDL goal <100 E78.5 Lipid panel reflex to direct LDL   5. CAD, multiple vessel I25.10    6. Screen for colon cancer Z12.11    7. Need for hepatitis C screening test Z11.59 Hepatitis C Screen Reflex to HCV RNA Quant and Genotype   8. Screening for diabetic peripheral neuropathy Z13.89 FOOT EXAM  NO CHARGE [75074.114]   9. Need for prophylactic vaccination with tetanus-diphtheria (TD) Z23        COUNSELING:  Reviewed preventive health counseling, as reflected in patient instructions       Regular exercise       Healthy diet/nutrition         reports that he quit smoking about 6 months ago. His smoking use included Cigarettes. He does not have any smokeless tobacco history on file.    Estimated body mass index is 37.37 kg/(m^2) as calculated from the following:    Height as of 1/27/17: 5' 6.5\" (1.689 m).    Weight as of 1/27/17: 235 lb (106.595 kg).   Weight management plan: Discussed healthy diet and exercise guidelines and patient will follow up in 12 months in clinic to re-evaluate.    Counseling Resources:  ATP IV Guidelines  Pooled Cohorts Equation Calculator  FRAX Risk Assessment  ICSI Preventive Guidelines  Dietary Guidelines for Americans, 2010  USDA's MyPlate  ASA Prophylaxis  Lung CA Screening    Eh Matos MD  [facility]  "

## 2017-02-03 NOTE — NURSING NOTE
"Chief Complaint   Patient presents with     Physical       Initial /71 mmHg  Pulse 61  Temp(Src) 97  F (36.1  C) (Oral)  Ht 5' 6.5\" (1.689 m)  Wt 229 lb (103.874 kg)  BMI 36.41 kg/m2  SpO2 99% Estimated body mass index is 36.41 kg/(m^2) as calculated from the following:    Height as of this encounter: 5' 6.5\" (1.689 m).    Weight as of this encounter: 229 lb (103.874 kg).  BP completed using cuff size: danuta Caro MA      "

## 2017-02-03 NOTE — MR AVS SNAPSHOT
After Visit Summary   2/3/2017    Darian Engle    MRN: [id number]           Patient Information     Date Of Birth          1950        Visit Information        Provider Department      2/3/2017 2:40 PM Eh Matos MD [facility]        Today's Diagnoses     Routine general medical examination at a health care facility    -  1     Essential hypertension with goal blood pressure less than 140/90         Type 2 diabetes mellitus with complication, with long-term current use of insulin (H)         Hyperlipidemia LDL goal <100         CAD, multiple vessel         Screen for colon cancer         Need for hepatitis C screening test         Screening for diabetic peripheral neuropathy         Need for prophylactic vaccination with tetanus-diphtheria (TD)         Mixed hearing loss           Care Instructions      Preventive Health Recommendations  Male Ages 50 - 64    Yearly exam:             See your health care provider every year in order to  o   Review health changes.   o   Discuss preventive care.    o   Review your medicines if your doctor has prescribed any.     Have a cholesterol test every 5 years, or more frequently if you are at risk for high cholesterol/heart disease.     Have a diabetes test (fasting glucose) every three years. If you are at risk for diabetes, you should have this test more often.     Have a colonoscopy at age 50, or have a yearly FIT test (stool test). These exams will check for colon cancer.      Talk with your health care provider about whether or not a prostate cancer screening test (PSA) is right for you.    You should be tested each year for STDs (sexually transmitted diseases), if you re at risk.     Shots: Get a flu shot each year. Get a tetanus shot every 10 years.     Nutrition:    Eat at least 5 servings of fruits and vegetables daily.     Eat whole-grain bread, whole-wheat pasta and brown rice instead of white grains and rice.      Talk to your provider about Calcium and Vitamin D.     Lifestyle    Exercise for at least 150 minutes a week (30 minutes a day, 5 days a week). This will help you control your weight and prevent disease.     Limit alcohol to one drink per day.     No smoking.     Wear sunscreen to prevent skin cancer.     See your dentist every six months for an exam and cleaning.     See your eye doctor every 1 to 2 years.      Hearing program is called [employer]             Follow-ups after your visit        Additional Services     AUDIOLOGY ADULT REFERRAL       Your provider has referred you to: [facility]: [facility] [phone]   [email or web address]    Treatment:  Evaluation & Treatment  Specialty Testing:  Audiogram only    Please be aware that coverage of these services is subject to the terms and limitations of your health insurance plan.  Call member services at your health plan with any benefit or coverage questions.      Please bring the following to your appointment:  >>   Any x-rays, CTs or MRIs which have been performed.  Contact the facility where they were done to arrange for  prior to your scheduled appointment.   >>   List of current medications  >>   This referral request   >>   Any documents/labs given to you for this referral                  Who to contact     If you have questions or need follow up information about today's clinic visit or your schedule please contact [facility] directly at [phone].  Normal or non-critical lab and imaging results will be communicated to you by MyChart, letter or phone within 4 business days after the clinic has received the results. If you do not hear from us within 7 days, please contact the clinic through MyChart or phone. If you have a critical or abnormal lab result, we will notify you by phone as soon as possible.  Submit refill requests through [employer] or call your pharmacy and they will forward  "the refill request to us. Please allow 3 business days for your refill to be completed.          Additional Information About Your Visit        MyChart Information     [facility] lets you send messages to your doctor, view your test results, renew your prescriptions, schedule appointments and more. To sign up, go to [email or web address] . Click on \"Log in\" on the left side of the screen, which will take you to the Welcome page. Then click on \"Sign up Now\" on the right side of the page.     You will be asked to enter the access code listed below, as well as some personal information. Please follow the directions to create your username and password.     Your access code is: [email or web address]  Expires: 2017 10:36 AM     Your access code will  in 90 days. If you need help or a new code, please call your [facility] clinic or [phone].        Care EveryWhere ID     This is your Care EveryWhere ID. This could be used by other organizations to access your [facility] medical records  [id number]        Your Vitals Were     Pulse Temperature Height BMI (Body Mass Index) Pulse Oximetry       61 97  F (36.1  C) (Oral) 5' 6.5\" (1.689 m) 36.41 kg/m2 99%        Blood Pressure from Last 3 Encounters:   17 141/71   17 118/70    Weight from Last 3 Encounters:   17 229 lb (103.874 kg)   17 235 lb (106.595 kg)              We Performed the Following     Albumin Random Urine Quantitative     AUDIOLOGY ADULT REFERRAL     Basic metabolic panel     CBC with platelets differential     FOOT EXAM  NO CHARGE [29656.114]     Hepatitis C Screen Reflex to HCV RNA Quant and Genotype     Lipid panel reflex to direct LDL     PURE TONE AUDIOMETRY,AIR     TSH WITH FREE T4 REFLEX        Primary Care Provider    None Specified       No primary provider on file.        Thank you!     Thank you for choosing [facility]  for your care. Our goal is always to provide you with excellent care. Hearing back " from our patients is one way we can continue to improve our services. Please take a few minutes to complete the written survey that you may receive in the mail after your visit with us. Thank you!             Your Updated Medication List - Protect others around you: Learn how to safely use, store and throw away your medicines at www.disposemymeds.org.          This list is accurate as of: 2/3/17  4:08 PM.  Always use your most recent med list.                   Brand Name Dispense Instructions for use    ACCU-CHEK SMARTVIEW test strip   Generic drug:  blood glucose monitoring     300 strip    1 strip by In Vitro route 3 times daily Use to test blood sugar 3 times daily or as directed.       amLODIPine 10 MG tablet    NORVASC    90 tablet    Take 1 tablet (10 mg) by mouth daily       atorvastatin 80 MG tablet    LIPITOR    90 tablet    Take 1 tablet (80 mg) by mouth daily       B-D U/F 31G X 5 MM   Generic drug:  insulin pen needle     100 each    Use tid pen needles daily or as directed.       blood glucose monitoring lancets     6 Box    1 each by In Vitro route 3 times daily Use to test blood sugar tid times daily or as directed.       buPROPion 300 MG 24 hr tablet    WELLBUTRIN XL     TAKE 1 TAB BY MOUTH DAILY. START AFTER 2 WEEKS ON  MG DOSE       isosorbide mononitrate 30 MG 24 hr tablet    IMDUR    90 tablet    Take 1 tablet (30 mg) by mouth daily       LANTUS SOLOSTAR 100 UNIT/ML injection   Generic drug:  insulin glargine     27 mL    Patient takes 50 units daily       losartan 50 MG tablet    COZAAR    90 tablet    Take 1 tablet (50 mg) by mouth daily       metFORMIN 500 MG 24 hr tablet    GLUCOPHAGE-XR    360 tablet    Take 4 tablets (2,000 mg) by mouth daily (with breakfast)       metoprolol 100 MG 24 hr tablet    TOPROL-XL    90 tablet    Take 1 tablet (100 mg) by mouth daily       nitroglycerin 0.4 MG sublingual tablet    NITROSTAT    25 tablet    For chest pain place 1 tablet under the tongue  every 5 minutes for 3 doses. If symptoms persist 5 minutes after 1st dose call 911.       NovoLOG FLEXPEN 100 UNIT/ML injection   Generic drug:  insulin aspart     54 mL    Patient uses 60 units per day       omeprazole 20 MG CR capsule    priLOSEC    90 capsule    Take 1 capsule (20 mg) by mouth daily       VICTOZA PEN 18 MG/3ML soln   Generic drug:  liraglutide     9 mL    Inject 0.6 mg Subcutaneous daily

## 2017-02-03 NOTE — PATIENT INSTRUCTIONS
Preventive Health Recommendations  Male Ages 50   64    Yearly exam:             See your health care provider every year in order to  o   Review health changes.   o   Discuss preventive care.    o   Review your medicines if your doctor has prescribed any.     Have a cholesterol test every 5 years, or more frequently if you are at risk for high cholesterol/heart disease.     Have a diabetes test (fasting glucose) every three years. If you are at risk for diabetes, you should have this test more often.     Have a colonoscopy at age 50, or have a yearly FIT test (stool test). These exams will check for colon cancer.      Talk with your health care provider about whether or not a prostate cancer screening test (PSA) is right for you.    You should be tested each year for STDs (sexually transmitted diseases), if you re at risk.     Shots: Get a flu shot each year. Get a tetanus shot every 10 years.     Nutrition:    Eat at least 5 servings of fruits and vegetables daily.     Eat whole-grain bread, whole-wheat pasta and brown rice instead of white grains and rice.     Talk to your provider about Calcium and Vitamin D.     Lifestyle    Exercise for at least 150 minutes a week (30 minutes a day, 5 days a week). This will help you control your weight and prevent disease.     Limit alcohol to one drink per day.     No smoking.     Wear sunscreen to prevent skin cancer.     See your dentist every six months for an exam and cleaning.     See your eye doctor every 1 to 2 years.      Hearing program is called [facility]

## 2017-02-05 LAB — HCV AB SERPL QL IA: NORMAL

## 2017-02-07 ENCOUNTER — OFFICE VISIT (OUTPATIENT)
Dept: AUDIOLOGY | Facility: CLINIC | Age: 67
End: 2017-02-07
Payer: COMMERCIAL

## 2017-02-07 DIAGNOSIS — H93.13 TINNITUS, BILATERAL: ICD-10-CM

## 2017-02-07 DIAGNOSIS — H90.3 SENSORINEURAL HEARING LOSS, BILATERAL: Primary | ICD-10-CM

## 2017-02-07 PROCEDURE — 92557 COMPREHENSIVE HEARING TEST: CPT | Performed by: AUDIOLOGIST

## 2017-02-07 PROCEDURE — 92567 TYMPANOMETRY: CPT | Performed by: AUDIOLOGIST

## 2017-02-07 PROCEDURE — 99207 ZZC NO CHARGE LOS: CPT | Performed by: AUDIOLOGIST

## 2017-02-07 NOTE — PROGRESS NOTES
AUDIOLOGY REPORT    SUBJECTIVE:  Darian Engle is a 66 year old male who was seen in the Audiology Clinic at [facility] for an audiologic evaluation, referred by Dr. Matos. The patient reports that he recently failed a hearing screening with his primary care physician. He also reports that his wife has concerns regarding Darian's hearing sensitivity. Finally, Darian reports bilateral tinnitus that lateralizes to the left ear. The patient denies bilateral hearing loss, bilateral otalgia, bilateral drainage, bilateral aural fullness, history of noise exposure, tracy-surgeries, and vertigo.     OBJECTIVE:  Otoscopic exam indicates ears are clear of cerumen bilaterally     Pure Tone Thresholds assessed using conventional audiometry with good reliability from 250-8000 Hz bilaterally using insert earphones and circumaural headphones     RIGHT:  mild to severe sensorineural hearing loss    LEFT:    mild to severe sensorineural hearing loss    Tympanogram:    RIGHT: hypercompliant eardrum mobility (Type Ad)    LEFT:   hypercompliant eardrum mobility (Type Ad)    Reflexes (reported by stimulus ear): could not accurately test due to unstable pneumatic baseline     Speech Reception Threshold:    RIGHT: 35 dB HL    LEFT:   40 dB HL    Word Recognition Score:     RIGHT: 100% at 70 dB HL using NU-6 recorded word list.    LEFT:   96% at 70 dB HL using NU-6 recorded word list.      ASSESSMENT:   Mild to severe sensorineural hearing loss, bilaterally.     Today s results were discussed with the patient in detail.     PLAN: It is recommended that the patient follow up with ENT regarding tinnitus, and schedule a hearing aid evaluation if interested. Retest per ENT, if symptoms worsen, or in 1-2 years. Patient was counseled regarding hearing loss and impact on communication. Patient is a good candidate for amplification at this time. A [facility] information packet was given to the patient. Please call this clinic  with questions regarding these results or recommendations.      Lance Pritchard, F-AAA   Clinical Audiologist, [place] #[id number]   2/7/2017

## 2017-02-15 ENCOUNTER — OFFICE VISIT (OUTPATIENT)
Dept: OTOLARYNGOLOGY | Facility: CLINIC | Age: 67
End: 2017-02-15
Payer: COMMERCIAL

## 2017-02-15 VITALS — HEIGHT: 67 IN | RESPIRATION RATE: 18 BRPM | BODY MASS INDEX: 36.51 KG/M2 | WEIGHT: 232.6 LBS

## 2017-02-15 DIAGNOSIS — H90.5 SNHL (SENSORINEURAL HEARING LOSS): ICD-10-CM

## 2017-02-15 DIAGNOSIS — H93.19 TINNITUS, UNSPECIFIED LATERALITY: Primary | ICD-10-CM

## 2017-02-15 PROCEDURE — 99203 OFFICE O/P NEW LOW 30 MIN: CPT | Performed by: OTOLARYNGOLOGY

## 2017-02-15 ASSESSMENT — PAIN SCALES - GENERAL: PAINLEVEL: NO PAIN (0)

## 2017-02-15 NOTE — PROGRESS NOTES
Chief Complaint - Tinnitus and hearing loss    History of Present Illness - Darian Engle is a 66 year old male who presents to me today with ringing in the ears. Also has hearing loss. It has been present and noticeable for approximately quite some time.  It was not sudden in onset.  Listening to wife is difficult and TV is louder. There is no history of chronic ear disease or ear surgery. With regards to recreational, , and work-related noise exposure has none. No family history of hearing loss at a young age.    Past Medical History -   Patient Active Problem List   Diagnosis     CAD, multiple vessel     Essential hypertension with goal blood pressure less than 140/90     Type 2 diabetes mellitus with complication, with long-term current use of insulin (H)     Hyperlipidemia LDL goal <100     Gastroesophageal reflux disease without esophagitis       Current Medications -   Current Outpatient Prescriptions:      buPROPion (WELLBUTRIN XL) 300 MG 24 hr tablet, TAKE 1 TAB BY MOUTH DAILY. START AFTER 2 WEEKS ON  MG DOSE, Disp: , Rfl: 3     losartan (COZAAR) 50 MG tablet, Take 1 tablet (50 mg) by mouth daily, Disp: 90 tablet, Rfl: 3     amLODIPine (NORVASC) 10 MG tablet, Take 1 tablet (10 mg) by mouth daily, Disp: 90 tablet, Rfl: 3     metoprolol (TOPROL-XL) 100 MG 24 hr tablet, Take 1 tablet (100 mg) by mouth daily, Disp: 90 tablet, Rfl: 3     isosorbide mononitrate (IMDUR) 30 MG 24 hr tablet, Take 1 tablet (30 mg) by mouth daily, Disp: 90 tablet, Rfl: 3     atorvastatin (LIPITOR) 80 MG tablet, Take 1 tablet (80 mg) by mouth daily, Disp: 90 tablet, Rfl: 3     omeprazole (PRILOSEC) 20 MG CR capsule, Take 1 capsule (20 mg) by mouth daily, Disp: 90 capsule, Rfl: 3     NOVOLOG FLEXPEN 100 UNIT/ML soln, Patient uses 60 units per day, Disp: 54 mL, Rfl: 11     VICTOZA PEN 18 MG/3ML soln, Inject 0.6 mg Subcutaneous daily, Disp: 9 mL, Rfl: 11     LANTUS SOLOSTAR 100 UNIT/ML soln, Patient takes 50 units  "daily, Disp: 27 mL, Rfl: 11     blood glucose monitoring (ACCU-CHEK FASTCLIX) lancets, 1 each by In Vitro route 3 times daily Use to test blood sugar tid times daily or as directed., Disp: 6 Box, Rfl: 3     B-D U/F insulin pen needle, Use tid pen needles daily or as directed., Disp: 100 each, Rfl: 2     ACCU-CHEK SMARTVIEW test strip, 1 strip by In Vitro route 3 times daily Use to test blood sugar 3 times daily or as directed., Disp: 300 strip, Rfl: 3     nitroglycerin (NITROSTAT) 0.4 MG sublingual tablet, For chest pain place 1 tablet under the tongue every 5 minutes for 3 doses. If symptoms persist 5 minutes after 1st dose call 911., Disp: 25 tablet, Rfl: 0     metFORMIN (GLUCOPHAGE-XR) 500 MG 24 hr tablet, Take 4 tablets (2,000 mg) by mouth daily (with breakfast), Disp: 360 tablet, Rfl: 3    Allergies - No Known Allergies    Social History -   Social History     Social History     Marital status:      Spouse name: N/A     Number of children: N/A     Years of education: N/A     Social History Main Topics     Smoking status: Former Smoker     Types: Cigarettes     Quit date: 7/15/2016     Smokeless tobacco: Not on file     Alcohol use No     Drug use: No     Sexual activity: Not on file     Other Topics Concern     Not on file     Social History Narrative       Family History - No family history on file.    Review of Systems - As per HPI and PMHx, otherwise 7 system review of the head and neck negative.    Physical Exam  Resp 18  Ht 1.689 m (5' 6.5\")  Wt 105.5 kg (232 lb 9.6 oz)  BMI 36.98 kg/m2  General - The patient is in no distress. Alert and oriented to person and place, answers questions and cooperates with examination appropriately.   Neurologic - CN II-XII are grossly intact. No focal neurologic deficits.   Voice and Breathing - The patient was breathing comfortably without the use of accessory muscles. There was no wheezing, stridor, or stertor.  The patients voice was clear and strong.  Ears - The " tympanic membranes are normal in appearance, bony landmarks are intact.  No retraction, perforation, or masses. No fluid or purulence was seen in the external canal or the middle ear. No evidence of infection of the middle ear or external canal, cerumen was normal in appearance.  Eyes - Extraocular movements intact, and the pupils were reactive to light.  Sclera were not icteric or injected, conjunctiva were pink and moist.  Mouth - Examination of the oral cavity showed pink, healthy oral mucosa. No lesions or ulcerations noted.  The tongue was mobile and midline.  Throat - The walls of the oropharynx were smooth, symmetric, and had no lesions or ulcerations.  The uvula was midline on elevation.    Neck - Palpation of the occipital, submental, submandibular, internal jugular chain, and supraclavicular nodes did not demonstrate any abnormal lymph nodes or masses. No parotid masses. Palpation of the thyroid was soft and smooth, with no nodules or goiter appreciated.  The trachea was mobile and midline.      Audiologic Studies - An audiogram and tympanogram were performed 2/7/2017 as part of the evaluation and personally reviewed. The tympanogram shows a normal Type A curve, with normal canal volume and middle ear pressure.  There is no sign of eustachian tube dysfunction or middle ear effusion.  The audiogram showed a significant symmetric high frequency sensorineural hearing loss.  There was no evidence of conductive hearing loss or significant asymmetry.      Assessment and Plan - Darian Engle is a 66 year old male who presents to me today with subjective tinnitus and hearing loss.  I can find no evidence of serious CNS disorders or other complicating factors that could be causing this.  We spent the remainder of today's visit on education. Discussed were steps that can be taken to mask the noise, such as a low volume de-tuned radio, a fan in the background, and/or hearing aids. I have also recommended yearly  audiograms, and consideration of a hearing aid evaluation. He will pursue this.      Lewis Grande MD  Otolaryngology  [facility]

## 2017-02-15 NOTE — NURSING NOTE
"Chief Complaint   Patient presents with     Tinnitus     Discuss recent hearing test       Initial Resp 18  Ht 1.689 m (5' 6.5\")  Wt 105.5 kg (232 lb 9.6 oz)  BMI 36.98 kg/m2 Estimated body mass index is 36.98 kg/(m^2) as calculated from the following:    Height as of this encounter: 1.689 m (5' 6.5\").    Weight as of this encounter: 105.5 kg (232 lb 9.6 oz).  Medication Reconciliation: complete     Vernell Honeycutt MA    "

## 2017-02-15 NOTE — PATIENT INSTRUCTIONS
General Scheduling Information  To schedule your CT/MRI scan, please contact [place] [facility] at [phone]   [address] [place] [address]    To schedule your Surgery, please contact our Specialty Schedulers at [phone]    ENT Clinic Locations Clinic Hours Telephone Number     [facility]  [address] [place] [address]   Tuesday:       8:00am -- 4:00pm    Wednesday:  8:00am - 4:00pm   To schedule an appointment with   Dr. Grande,   please contact our   Specialty Scheduling Department at:     [phone]       [facility]astian [facility]reia  [address] [place] [address]   Friday:          8:00am - 4:00pm         Urgent Care Locations Clinic Hours Telephone Numbers     Sebastian Carbajal  [address] [place] [address]     Monday-Friday:     11:00pm - 9:00pm    Saturday-Sunday:  9:00am - 5:00pm   [phone]     Sebastian Correia  [address] [place] [address]     Monday-Friday:      5:00pm - 9:00pm     Saturday-Sunday:  9:00am - 5:00pm   [phone]

## 2017-02-15 NOTE — MR AVS SNAPSHOT
After Visit Summary   2/15/2017    Darian Engle    MRN: [id number]           Patient Information     Date Of Birth          1950        Visit Information        Provider Department      2/15/2017 11:30 AM Lewis Grande MD [facility]        Today's Diagnoses     Tinnitus, unspecified laterality    -  1    SNHL (sensorineural hearing loss)          Care Instructions    General Scheduling Information  To schedule your CT/MRI scan, please contact [place] [facility] at [phone] [address] [place] [address]    To schedule your Surgery, please contact our Specialty Schedulers at [phone]    ENT Clinic Locations Clinic Hours Telephone Number     Sebastian Chun  [address]  [place] [address]   Tuesday:       8:00am -- 4:00pm    Wednesday:  8:00am - 4:00pm   To schedule an appointment with   Dr. Grande,   please contact our   Specialty Scheduling Department at:     [phone]       Sebastian Correia  [address] [facility] [place] [address]   Friday:          8:00am - 4:00pm         Urgent Care Locations Clinic Hours Telephone Numbers     Sebastian Carbajal  [address] [facility] [place] [address]     Monday-Friday:     11:00pm - 9:00pm    Saturday-Sunday:  9:00am - 5:00pm   [phone]     Sebastian Correia  [address]   [place] [address]     Monday-Friday:      5:00pm - 9:00pm     Saturday-Sunday:  9:00am - 5:00pm   [phone]             Follow-ups after your visit        Your next 10 appointments already scheduled     Mar 08, 2017  8:30 AM CST   New Visit with Lance Pritchard   [facility] [place] ([facility])    [address] [place] [address]  [place] [address] [phone]              Who to contact     If you have questions or need follow up information about today's clinic visit or your schedule please contact [facility] directly at [phone].  Normal or non-critical lab and  "imaging results will be communicated to you by MyChart, letter or phone within 4 business days after the clinic has received the results. If you do not hear from us within 7 days, please contact the clinic through [employer] or phone. If you have a critical or abnormal lab result, we will notify you by phone as soon as possible.  Submit refill requests through [employer] or call your pharmacy and they will forward the refill request to us. Please allow 3 business days for your refill to be completed.          Additional Information About Your Visit        [employer] Information     [employer] gives you secure access to your electronic health record. If you see a primary care provider, you can also send messages to your care team and make appointments. If you have questions, please call your primary care clinic.  If you do not have a primary care provider, please call [phone] and they will assist you.        Care EveryWhere ID     This is your Care EveryWhere ID. This could be used by other organizations to access your [facility] medical records  [id number]        Your Vitals Were     Respirations Height BMI (Body Mass Index)             18 1.689 m (5' 6.5\") 36.98 kg/m2          Blood Pressure from Last 3 Encounters:   02/03/17 141/71   01/27/17 118/70    Weight from Last 3 Encounters:   02/15/17 105.5 kg (232 lb 9.6 oz)   02/03/17 103.9 kg (229 lb)   01/27/17 106.6 kg (235 lb)              Today, you had the following     No orders found for display       Primary Care Provider    None Specified       No primary provider on file.        Thank you!     Thank you for choosing [facility]  for your care. Our goal is always to provide you with excellent care. Hearing back from our patients is one way we can continue to improve our services. Please take a few minutes to complete the written survey that you may receive in the mail after your visit with us. Thank you!             Your Updated Medication List - Protect " others around you: Learn how to safely use, store and throw away your medicines at www.disposemymeds.org.          This list is accurate as of: 2/15/17 12:47 PM.  Always use your most recent med list.                   Brand Name Dispense Instructions for use    ACCU-CHEK SMARTVIEW test strip   Generic drug:  blood glucose monitoring     300 strip    1 strip by In Vitro route 3 times daily Use to test blood sugar 3 times daily or as directed.       amLODIPine 10 MG tablet    NORVASC    90 tablet    Take 1 tablet (10 mg) by mouth daily       atorvastatin 80 MG tablet    LIPITOR    90 tablet    Take 1 tablet (80 mg) by mouth daily       B-D U/F 31G X 5 MM   Generic drug:  insulin pen needle     100 each    Use tid pen needles daily or as directed.       blood glucose monitoring lancets     6 Box    1 each by In Vitro route 3 times daily Use to test blood sugar tid times daily or as directed.       buPROPion 300 MG 24 hr tablet    WELLBUTRIN XL     TAKE 1 TAB BY MOUTH DAILY. START AFTER 2 WEEKS ON  MG DOSE       isosorbide mononitrate 30 MG 24 hr tablet    IMDUR    90 tablet    Take 1 tablet (30 mg) by mouth daily       LANTUS SOLOSTAR 100 UNIT/ML injection   Generic drug:  insulin glargine     27 mL    Patient takes 50 units daily       losartan 50 MG tablet    COZAAR    90 tablet    Take 1 tablet (50 mg) by mouth daily       metFORMIN 500 MG 24 hr tablet    GLUCOPHAGE-XR    360 tablet    Take 4 tablets (2,000 mg) by mouth daily (with breakfast)       metoprolol 100 MG 24 hr tablet    TOPROL-XL    90 tablet    Take 1 tablet (100 mg) by mouth daily       nitroglycerin 0.4 MG sublingual tablet    NITROSTAT    25 tablet    For chest pain place 1 tablet under the tongue every 5 minutes for 3 doses. If symptoms persist 5 minutes after 1st dose call 911.       NovoLOG FLEXPEN 100 UNIT/ML injection   Generic drug:  insulin aspart     54 mL    Patient uses 60 units per day       omeprazole 20 MG CR capsule    priLOSEC     90 capsule    Take 1 capsule (20 mg) by mouth daily       VICTOZA PEN 18 MG/3ML soln   Generic drug:  liraglutide     9 mL    Inject 0.6 mg Subcutaneous daily

## 2017-03-08 ENCOUNTER — OFFICE VISIT (OUTPATIENT)
Dept: AUDIOLOGY | Facility: CLINIC | Age: 67
End: 2017-03-08
Payer: COMMERCIAL

## 2017-03-08 DIAGNOSIS — H90.3 SENSORINEURAL HEARING LOSS, BILATERAL: Primary | ICD-10-CM

## 2017-03-08 PROCEDURE — 92591 HC HEARING AID EXAM BINAURAL: CPT | Performed by: AUDIOLOGIST

## 2017-03-08 PROCEDURE — 99207 ZZC NO CHARGE LOS: CPT | Performed by: AUDIOLOGIST

## 2017-03-08 NOTE — PROGRESS NOTES
AUDIOLOGY REPORT    SUBJECTIVE: Darian Engle is a 66 year old male was seen in the Audiology Clinic at  [facility] on 3/08/17 to discuss concerns with hearing and functional communication difficulties. Darian has been seen previously on 2/7/2017, and results revealed a mild to severe sensorineural hearing loss. The patient was medically evaluated and determined to be cleared for binaural hearing aids by Dr. Grande. Darian notes difficulty with communication in a variety of listening situations.    OBJECTIVE:  Patient is a hearing aid candidate. Patient would like to move forward with a hearing aid evaluation today. Therefore, the patient was presented with different options for amplification to help aid in communication. Discussed styles, levels of technology, monaural vs. binaural fitting, and 45-day trial period.     After our discussion the patient decided he would like to wait on purchasing hearing aids until a later time.       ASSESSMENT:   Bilateral sensorineural hearing loss.       PLAN: Darian will return in 1-2 years for an updated audiogram.       Lance Pritchard, F-AAA   Clinical Audiologist, [place] #6299   3/8/2017

## 2017-03-08 NOTE — MR AVS SNAPSHOT
After Visit Summary   3/8/2017    aDrian Engle    MRN: [id number]           Patient Information     Date Of Birth          1950        Visit Information        Provider Department      3/8/2017 8:30 AM Toya Gonsales AuD [facility] [place]        Today's Diagnoses     Sensorineural hearing loss, bilateral    -  1       Follow-ups after your visit        Who to contact     If you have questions or need follow up information about today's clinic visit or your schedule please [facility] at [phone].  Normal or non-critical lab and imaging results will be communicated to you by [employer], letter or phone within 4 business days after the clinic has received the results. If you do not hear from us within 7 days, please contact the clinic through [employer] or phone. If you have a critical or abnormal lab result, we will notify you by phone as soon as possible.  Submit refill requests through [employer] or call your pharmacy and they will forward the refill request to us. Please allow 3 business days for your refill to be completed.          Additional Information About Your Visit        MyChart Information     [employer] gives you secure access to your electronic health record. If you see a primary care provider, you can also send messages to your care team and make appointments. If you have questions, please call your primary care clinic.  If you do not have a primary care provider, please call [phone] and they will assist you.        Care EveryWhere ID     This is your Care EveryWhere ID. This could be used by other organizations to access your [facility] medical records  [id number]         Blood Pressure from Last 3 Encounters:   02/03/17 141/71   01/27/17 118/70    Weight from Last 3 Encounters:   02/15/17 232 lb 9.6 oz (105.5 kg)   02/03/17 229 lb (103.9 kg)   01/27/17 235 lb (106.6 kg)              We Performed the Following     HEARING AID EXAM  BINAURAL        Primary Care Provider    None Specified       No primary provider on file.        Thank you!     [facility]. Our goal is always to provide you with excellent care. Hearing back from our patients is one way we can continue to improve our services. Please take a few minutes to complete the written survey that you may receive in the mail after your visit with us. Thank you!             Your Updated Medication List - Protect others around you: Learn how to safely use, store and throw away your medicines at www.disposemymeds.org.          This list is accurate as of: 3/8/17  8:56 AM.  Always use your most recent med list.                   Brand Name Dispense Instructions for use    ACCU-CHEK SMARTVIEW test strip   Generic drug:  blood glucose monitoring     300 strip    1 strip by In Vitro route 3 times daily Use to test blood sugar 3 times daily or as directed.       amLODIPine 10 MG tablet    NORVASC    90 tablet    Take 1 tablet (10 mg) by mouth daily       atorvastatin 80 MG tablet    LIPITOR    90 tablet    Take 1 tablet (80 mg) by mouth daily       B-D U/F 31G X 5 MM   Generic drug:  insulin pen needle     100 each    Use tid pen needles daily or as directed.       blood glucose monitoring lancets     6 Box    1 each by In Vitro route 3 times daily Use to test blood sugar tid times daily or as directed.       buPROPion 300 MG 24 hr tablet    WELLBUTRIN XL     TAKE 1 TAB BY MOUTH DAILY. START AFTER 2 WEEKS ON  MG DOSE       isosorbide mononitrate 30 MG 24 hr tablet    IMDUR    90 tablet    Take 1 tablet (30 mg) by mouth daily       LANTUS SOLOSTAR 100 UNIT/ML injection   Generic drug:  insulin glargine     27 mL    Patient takes 50 units daily       losartan 50 MG tablet    COZAAR    90 tablet    Take 1 tablet (50 mg) by mouth daily       metFORMIN 500 MG 24 hr tablet    GLUCOPHAGE-XR    360 tablet    Take 4 tablets (2,000 mg) by mouth daily  (with breakfast)       metoprolol 100 MG 24 hr tablet    TOPROL-XL    90 tablet    Take 1 tablet (100 mg) by mouth daily       nitroglycerin 0.4 MG sublingual tablet    NITROSTAT    25 tablet    For chest pain place 1 tablet under the tongue every 5 minutes for 3 doses. If symptoms persist 5 minutes after 1st dose call 911.       NovoLOG FLEXPEN 100 UNIT/ML injection   Generic drug:  insulin aspart     54 mL    Patient uses 60 units per day       omeprazole 20 MG CR capsule    priLOSEC    90 capsule    Take 1 capsule (20 mg) by mouth daily       VICTOZA PEN 18 MG/3ML soln   Generic drug:  liraglutide     9 mL    Inject 0.6 mg Subcutaneous daily

## 2017-03-13 ENCOUNTER — TELEPHONE (OUTPATIENT)
Dept: OTHER | Facility: CLINIC | Age: 67
End: 2017-03-13

## 2017-03-13 NOTE — TELEPHONE ENCOUNTER
3/13/2017    Call Regarding Onboarding P1 [employer]    Attempt 1    Message on voicemail     Comments: 0 dep          Outreach   maryjane

## 2017-05-19 ENCOUNTER — OFFICE VISIT (OUTPATIENT)
Dept: FAMILY MEDICINE | Facility: CLINIC | Age: 67
End: 2017-05-19
Payer: COMMERCIAL

## 2017-05-19 VITALS
WEIGHT: 230 LBS | BODY MASS INDEX: 36.57 KG/M2 | HEART RATE: 72 BPM | SYSTOLIC BLOOD PRESSURE: 99 MMHG | TEMPERATURE: 97.5 F | DIASTOLIC BLOOD PRESSURE: 54 MMHG | OXYGEN SATURATION: 98 %

## 2017-05-19 DIAGNOSIS — E11.8 TYPE 2 DIABETES MELLITUS WITH COMPLICATION, WITH LONG-TERM CURRENT USE OF INSULIN (H): ICD-10-CM

## 2017-05-19 DIAGNOSIS — I21.3 ST ELEVATION MYOCARDIAL INFARCTION (STEMI), UNSPECIFIED ARTERY (H): Primary | ICD-10-CM

## 2017-05-19 DIAGNOSIS — I10 ESSENTIAL HYPERTENSION WITH GOAL BLOOD PRESSURE LESS THAN 140/90: ICD-10-CM

## 2017-05-19 DIAGNOSIS — Z79.4 TYPE 2 DIABETES MELLITUS WITH COMPLICATION, WITH LONG-TERM CURRENT USE OF INSULIN (H): ICD-10-CM

## 2017-05-19 PROCEDURE — 99214 OFFICE O/P EST MOD 30 MIN: CPT | Performed by: FAMILY MEDICINE

## 2017-05-19 RX ORDER — NICOTINE 21 MG/24HR
1 PATCH, TRANSDERMAL 24 HOURS TRANSDERMAL
COMMUNITY
Start: 2017-04-19 | End: 2020-09-16

## 2017-05-19 RX ORDER — ATORVASTATIN CALCIUM 80 MG/1
80 TABLET, FILM COATED ORAL
COMMUNITY
End: 2017-06-30

## 2017-05-19 ASSESSMENT — PAIN SCALES - GENERAL: PAINLEVEL: NO PAIN (0)

## 2017-05-19 NOTE — MR AVS SNAPSHOT
After Visit Summary   5/19/2017    Darian Engle    MRN: [id number]           Patient Information     Date Of Birth          1950        Visit Information        Provider Department      5/19/2017 10:20 AM Eh Matos MD [facility]         Follow-ups after your visit        Who to contact     If you have questions or need follow up information about today's clinic visit or your schedule please contact [facility] directly at [phone].  Normal or non-critical lab and imaging results will be communicated to you by MyChart, letter or phone within 4 business days after the clinic has received the results. If you do not hear from us within 7 days, please contact the clinic through [employer] or phone. If you have a critical or abnormal lab result, we will notify you by phone as soon as possible.  Submit refill requests through [employer] or call your pharmacy and they will forward the refill request to us. Please allow 3 business days for your refill to be completed.          Additional Information About Your Visit        MyChart Information     [employer] gives you secure access to your electronic health record. If you see a primary care provider, you can also send messages to your care team and make appointments. If you have questions, please call your primary care clinic.  If you do not have a primary care provider, please call [phone] and they will assist you.        Care EveryWhere ID     This is your Care EveryWhere ID. This could be used by other organizations to access your [place] medical records  [id number]        Your Vitals Were     Pulse Temperature Pulse Oximetry BMI (Body Mass Index)          72 97.5  F (36.4  C) (Oral) 98% 36.57 kg/m2         Blood Pressure from Last 3 Encounters:   05/19/17 99/54   02/03/17 141/71   01/27/17 118/70    Weight from Last 3 Encounters:   05/19/17 230 lb (104.3 kg)   02/15/17 232 lb 9.6 oz (105.5  kg)   02/03/17 229 lb (103.9 kg)              Today, you had the following     No orders found for display       Primary Care Provider    None Specified       No primary provider on file.        Thank you!     Thank you for choosing [facility]  for your care. Our goal is always to provide you with excellent care. Hearing back from our patients is one way we can continue to improve our services. Please take a few minutes to complete the written survey that you may receive in the mail after your visit with us. Thank you!             Your Updated Medication List - Protect others around you: Learn how to safely use, store and throw away your medicines at www.disposemymeds.org.          This list is accurate as of: 5/19/17 11:30 AM.  Always use your most recent med list.                   Brand Name Dispense Instructions for use    ACCU-CHEK SMARTVIEW test strip   Generic drug:  blood glucose monitoring     300 strip    1 strip by In Vitro route 3 times daily Use to test blood sugar 3 times daily or as directed.       amLODIPine 10 MG tablet    NORVASC    90 tablet    Take 1 tablet (10 mg) by mouth daily       * atorvastatin 80 MG tablet    LIPITOR     Take 80 mg by mouth       * atorvastatin 80 MG tablet    LIPITOR    90 tablet    Take 1 tablet (80 mg) by mouth daily       B-D U/F 31G X 5 MM   Generic drug:  insulin pen needle     100 each    Use tid pen needles daily or as directed.       blood glucose monitoring lancets     6 Box    1 each by In Vitro route 3 times daily Use to test blood sugar tid times daily or as directed.       buPROPion 300 MG 24 hr tablet    WELLBUTRIN XL     TAKE 1 TAB BY MOUTH DAILY. START AFTER 2 WEEKS ON  MG DOSE       isosorbide mononitrate 30 MG 24 hr tablet    IMDUR    90 tablet    Take 1 tablet (30 mg) by mouth daily       LANTUS SOLOSTAR 100 UNIT/ML injection   Generic drug:  insulin glargine     27 mL    Patient takes 50 units daily       losartan 50 MG  tablet    COZAAR    90 tablet    Take 1 tablet (50 mg) by mouth daily       metFORMIN 500 MG 24 hr tablet    GLUCOPHAGE-XR    360 tablet    Take 4 tablets (2,000 mg) by mouth daily (with breakfast)       metoprolol 100 MG 24 hr tablet    TOPROL-XL    90 tablet    Take 1 tablet (100 mg) by mouth daily       nicotine 14 MG/24HR 24 hr patch    NICODERM CQ     1 patch       nitroglycerin 0.4 MG sublingual tablet    NITROSTAT    25 tablet    For chest pain place 1 tablet under the tongue every 5 minutes for 3 doses. If symptoms persist 5 minutes after 1st dose call 911.       NovoLOG FLEXPEN 100 UNIT/ML injection   Generic drug:  insulin aspart     54 mL    Patient uses 60 units per day       omeprazole 20 MG CR capsule    priLOSEC    90 capsule    Take 1 capsule (20 mg) by mouth daily       ticagrelor 90 MG tablet    BRILINTA     Take 90 mg by mouth       VICTOZA PEN 18 MG/3ML soln   Generic drug:  liraglutide     9 mL    Inject 0.6 mg Subcutaneous daily       * Notice:  This list has 2 medication(s) that are the same as other medications prescribed for you. Read the directions carefully, and ask your doctor or other care provider to review them with you.

## 2017-05-19 NOTE — NURSING NOTE
"Chief Complaint   Patient presents with     Hospital F/U       Initial BP 99/54  Pulse 72  Temp 97.5  F (36.4  C) (Oral)  Wt 230 lb (104.3 kg)  SpO2 98%  BMI 36.57 kg/m2 Estimated body mass index is 36.57 kg/(m^2) as calculated from the following:    Height as of 2/15/17: 5' 6.5\" (1.689 m).    Weight as of this encounter: 230 lb (104.3 kg).  Medication Reconciliation: complete   Vania SCHUMACHER      "

## 2017-05-19 NOTE — PROGRESS NOTES
SUBJECTIVE:                                                    Darian Engle is a 67 year old male who presents to clinic today for the following health issues:      Hospital Follow-up Visit:    Hospital/Nursing Home/IP Rehab Facility: [facility]  Date of Admission: 5/8/17  Date of Discharge: 5/10/17  Reason(s) for Admission: Chest Pain    Took morning pills and got nauseated and chest pain    Transported to [facility]   He had a 90% blocked   They stated they thought his nausea related to his meds   They thought it was the Victoza   He stopped the naltrexone          He eats with his Victoza     Patient had a STEMI  Diabetes has been good control   bp is around 130-170    Checking blood sugar three to four times a day   He uses Accucheck Rubi machine     Only new med is the Brilinta which is not on the formulary   Follow up appointment with Cardiology is in 1 week     Ros: no chest pain, chest tightness   No sob   No leg edema   No abdominal pain     Denies fever or chills   Weight stable        Wt Readings from Last 2 Encounters:   05/19/17 230 lb (104.3 kg)   02/15/17 232 lb 9.6 oz (105.5 kg)                Problems taking medications regularly:  None       Medication changes since discharge: None       Problems adhering to non-medication therapy:  None    Summary of hospitalization:  [facility] discharge summary reviewed  Diagnostic Tests/Treatments reviewed.  Follow up needed: none  Other Healthcare Providers Involved in Patient s Care:         None  Update since discharge: improved.     Post Discharge Medication Reconciliation: discharge medications reconciled, continue medications without change.  Plan of care communicated with patient and family     Coding guidelines for this visit:  Type of Medical   Decision Making Face-to-Face Visit       within 7 Days of discharge Face-to-Face Visit        within 14 days of discharge   Moderate Complexity [id number]   High Complexity [id number]                 Problem list and histories reviewed & adjusted, as indicated.  Additional history: as documented    Patient Active Problem List   Diagnosis     CAD, multiple vessel     Essential hypertension with goal blood pressure less than 140/90     Type 2 diabetes mellitus with complication, with long-term current use of insulin (H)     Hyperlipidemia LDL goal <100     Gastroesophageal reflux disease without esophagitis     History reviewed. No pertinent surgical history.    Social History   Substance Use Topics     Smoking status: Former Smoker     Types: Cigarettes     Quit date: 7/15/2016     Smokeless tobacco: Not on file     Alcohol use No     History reviewed. No pertinent family history.      Current Outpatient Prescriptions   Medication Sig Dispense Refill     ticagrelor (BRILINTA) 90 MG tablet Take 90 mg by mouth       atorvastatin (LIPITOR) 80 MG tablet Take 80 mg by mouth       nicotine (NICODERM CQ) 14 MG/24HR 24 hr patch 1 patch       buPROPion (WELLBUTRIN XL) 300 MG 24 hr tablet TAKE 1 TAB BY MOUTH DAILY. START AFTER 2 WEEKS ON  MG DOSE  3     losartan (COZAAR) 50 MG tablet Take 1 tablet (50 mg) by mouth daily 90 tablet 3     amLODIPine (NORVASC) 10 MG tablet Take 1 tablet (10 mg) by mouth daily 90 tablet 3     metoprolol (TOPROL-XL) 100 MG 24 hr tablet Take 1 tablet (100 mg) by mouth daily 90 tablet 3     isosorbide mononitrate (IMDUR) 30 MG 24 hr tablet Take 1 tablet (30 mg) by mouth daily 90 tablet 3     atorvastatin (LIPITOR) 80 MG tablet Take 1 tablet (80 mg) by mouth daily 90 tablet 3     omeprazole (PRILOSEC) 20 MG CR capsule Take 1 capsule (20 mg) by mouth daily 90 capsule 3     NOVOLOG FLEXPEN 100 UNIT/ML soln Patient uses 60 units per day 54 mL 11     VICTOZA PEN 18 MG/3ML soln Inject 0.6 mg Subcutaneous daily 9 mL 11     LANTUS SOLOSTAR 100 UNIT/ML soln Patient takes 50 units daily 27 mL 11     blood glucose monitoring (ACCU-CHEK FASTCLIX) lancets 1 each by In Vitro route 3 times daily  Use to test blood sugar tid times daily or as directed. 6 Box 3     B-D U/F insulin pen needle Use tid pen needles daily or as directed. 100 each 2     ACCU-CHEK SMARTVIEW test strip 1 strip by In Vitro route 3 times daily Use to test blood sugar 3 times daily or as directed. 300 strip 3     nitroglycerin (NITROSTAT) 0.4 MG sublingual tablet For chest pain place 1 tablet under the tongue every 5 minutes for 3 doses. If symptoms persist 5 minutes after 1st dose call 911. 25 tablet 0     metFORMIN (GLUCOPHAGE-XR) 500 MG 24 hr tablet Take 4 tablets (2,000 mg) by mouth daily (with breakfast) 360 tablet 3     Allergies   Allergen Reactions     Lidocaine Other (See Comments)     Lungs filled with fluid. ? Anaphylaxis     Lisinopril Cough     cough     Recent Labs   Lab Test  02/03/17   1551   LDL  46   HDL  28*   TRIG  124   CR  1.15   GFRESTIMATED  64   GFRESTBLACK  77   POTASSIUM  4.5   TSH  3.59      BP Readings from Last 3 Encounters:   05/19/17 99/54   02/03/17 141/71   01/27/17 118/70    Wt Readings from Last 3 Encounters:   05/19/17 230 lb (104.3 kg)   02/15/17 232 lb 9.6 oz (105.5 kg)   02/03/17 229 lb (103.9 kg)          patient is still taking nicotine patch   Currently on 14 mg             Reviewed and updated as needed this visit by clinical staff       Reviewed and updated as needed this visit by Provider         ICD-10-CM    1. ST elevation myocardial infarction (STEMI), unspecified artery (H) I21.3    2. Essential hypertension with goal blood pressure less than 140/90 I10    3. Type 2 diabetes mellitus with complication, with long-term current use of insulin (H) E11.8     Z79.4

## 2017-06-05 ENCOUNTER — TRANSFERRED RECORDS (OUTPATIENT)
Dept: HEALTH INFORMATION MANAGEMENT | Facility: CLINIC | Age: 67
End: 2017-06-05

## 2017-06-05 LAB — EJECTION FRACTION: 63

## 2017-06-12 DIAGNOSIS — Z79.4 TYPE 2 DIABETES MELLITUS WITH COMPLICATION, WITH LONG-TERM CURRENT USE OF INSULIN (H): ICD-10-CM

## 2017-06-12 DIAGNOSIS — E11.8 TYPE 2 DIABETES MELLITUS WITH COMPLICATION, WITH LONG-TERM CURRENT USE OF INSULIN (H): ICD-10-CM

## 2017-06-12 NOTE — TELEPHONE ENCOUNTER
No results found for: A1C  Creatinine   Date Value Ref Range Status   02/03/2017 1.15 0.66 - 1.25 mg/dL Final       Routing refill request to provider for review/approval because:  Labs not completed for diabetes.    Shelly Jones, IFEOMA [facility] Triage.

## 2017-06-12 NOTE — TELEPHONE ENCOUNTER
B-D U/F insulin pen needle         Last Written Prescription Date: 1/27/17  Last Fill Quantity: 100, # refills: 2  Last Office Visit with [facility] or [facility] prescribing provider:  5/19/17        BP Readings from Last 3 Encounters:   05/19/17 99/54   02/03/17 141/71   01/27/17 118/70     Lab Results   Component Value Date    MICROL 744 02/03/2017     Lab Results   Component Value Date    UMALCR 332.14 02/03/2017     Creatinine   Date Value Ref Range Status   02/03/2017 1.15 0.66 - 1.25 mg/dL Final   ]  GFR Estimate   Date Value Ref Range Status   02/03/2017 64 >60 mL/min/1.7m2 Final     Comment:     Non  GFR Calc     GFR Estimate If Black   Date Value Ref Range Status   02/03/2017 77 >60 mL/min/1.7m2 Final     Comment:      GFR Calc     Lab Results   Component Value Date    CHOL 99 02/03/2017     Lab Results   Component Value Date    HDL 28 02/03/2017     Lab Results   Component Value Date    LDL 46 02/03/2017     Lab Results   Component Value Date    TRIG 124 02/03/2017     No results found for: CHOLHDLRATIO  No results found for: AST  No results found for: ALT  No results found for: A1C  Potassium   Date Value Ref Range Status   02/03/2017 4.5 3.4 - 5.3 mmol/L Final

## 2017-06-13 RX ORDER — FLURBIPROFEN SODIUM 0.3 MG/ML
SOLUTION/ DROPS OPHTHALMIC
Qty: 270 EACH | Refills: 3 | Status: SHIPPED | OUTPATIENT
Start: 2017-06-13 | End: 2018-10-07

## 2017-06-22 ENCOUNTER — OFFICE VISIT (OUTPATIENT)
Dept: OPTOMETRY | Facility: CLINIC | Age: 67
End: 2017-06-22
Payer: COMMERCIAL

## 2017-06-22 DIAGNOSIS — H26.9 CATARACTS, BOTH EYES: ICD-10-CM

## 2017-06-22 DIAGNOSIS — E11.9 TYPE 2 DIABETES MELLITUS WITHOUT RETINOPATHY (H): ICD-10-CM

## 2017-06-22 DIAGNOSIS — H52.4 ASTIGMATISM OF RIGHT EYE WITH PRESBYOPIA: Primary | ICD-10-CM

## 2017-06-22 DIAGNOSIS — H52.02 HYPEROPIA WITH PRESBYOPIA OF LEFT EYE: ICD-10-CM

## 2017-06-22 DIAGNOSIS — H52.4 HYPEROPIA WITH PRESBYOPIA OF LEFT EYE: ICD-10-CM

## 2017-06-22 DIAGNOSIS — H52.201 ASTIGMATISM OF RIGHT EYE WITH PRESBYOPIA: Primary | ICD-10-CM

## 2017-06-22 PROCEDURE — 92015 DETERMINE REFRACTIVE STATE: CPT | Performed by: OPTOMETRIST

## 2017-06-22 PROCEDURE — 92004 COMPRE OPH EXAM NEW PT 1/>: CPT | Performed by: OPTOMETRIST

## 2017-06-22 ASSESSMENT — VISUAL ACUITY
OD_SC: 20/40
OD_CC: 20/20
CORRECTION_TYPE: GLASSES
OD_SC: 20/40
OS_CC: 20/20
OS_CC: 20/60
OS_SC: 20/70
OD_CC: 20/60
METHOD: SNELLEN - LINEAR
OS_SC: 20/200

## 2017-06-22 ASSESSMENT — REFRACTION_WEARINGRX
OD_CYLINDER: +1.00
OD_SPHERE: -1.25
OS_CYLINDER: +0.50
OD_ADD: +2.75
OS_ADD: +2.75
OS_AXIS: 163
OS_SPHERE: +1.00
OD_AXIS: 172
SPECS_TYPE: PAL

## 2017-06-22 ASSESSMENT — SLIT LAMP EXAM - LIDS
COMMENTS: NORMAL
COMMENTS: NORMAL

## 2017-06-22 ASSESSMENT — CUP TO DISC RATIO
OS_RATIO: 0.3
OD_RATIO: 0.3

## 2017-06-22 ASSESSMENT — TONOMETRY
OS_IOP_MMHG: 16
IOP_METHOD: APPLANATION
OD_IOP_MMHG: 18

## 2017-06-22 ASSESSMENT — EXTERNAL EXAM - RIGHT EYE: OD_EXAM: NORMAL

## 2017-06-22 ASSESSMENT — REFRACTION_MANIFEST
OD_SPHERE: -1.00
OS_SPHERE: +2.00
OD_CYLINDER: +1.00
OS_ADD: +2.50
OS_CYLINDER: SPHERE
OD_AXIS: 175
OD_ADD: +2.50

## 2017-06-22 ASSESSMENT — EXTERNAL EXAM - LEFT EYE: OS_EXAM: NORMAL

## 2017-06-22 ASSESSMENT — CONF VISUAL FIELD
OD_NORMAL: 1
OS_NORMAL: 1

## 2017-06-22 NOTE — LETTER
[facility]  Optometry Department      6/22/2017    Re:  Darian Engle  1950   [id number]    Dear Dr. Matos,    Your patient was seen in our office on 6/22/2017 for a dilated diabetic eye exam.      Findings:    No Retinopathy  OU - Both  Mild Cataracts both eyes     Comments:  Darian should return for a comprehensive eye exam in 1 year.    Sincerely,        Lillian Rock O.D  [address]  [place]  [address]    [phone]

## 2017-06-22 NOTE — PROGRESS NOTES
Chief Complaint   Patient presents with     Diabetic Eye Exam     Accompanied by self  No results found for: A1C    Last Eye Exam: 1 year ago  Dilated Previously: Yes    What are you currently using to see?  glasses    Distance Vision Acuity: Satisfied with vision    Near Vision Acuity: Satisfied with vision while reading  with glasses    Eye Comfort: dry and itchy  Do you use eye drops? : Yes: refresh when needed  Occupation or Hobbies: financial case aide    Rosa Isela Alegre, Optometric Tech     Medical, surgical and family histories reviewed and updated 6/22/2017.       OBJECTIVE: See Ophthalmology exam    ASSESSMENT:    ICD-10-CM    1. Astigmatism of right eye with presbyopia H52.201 EYE EXAM (SIMPLE-NONBILLABLE)    H52.4 REFRACTION   2. Hyperopia with presbyopia of left eye H52.02 EYE EXAM (SIMPLE-NONBILLABLE)    H52.4 REFRACTION   3. Type 2 diabetes mellitus without retinopathy (H) E11.9 EYE EXAM (SIMPLE-NONBILLABLE)   4. Cataracts, both eyes H26.9 EYE EXAM (SIMPLE-NONBILLABLE)    Mild      PLAN:  Monitor, Keep blood sugar under control  Sent letter to primary care provider regarding diabetes.  Darian Engle aware  eye exam results will be sent to Dr. Matos  Monitor cataracts by having yearly exams.  Wear sunglasses when outside.  A final glasses prescription was given.  Allow time for adaptation.  The glasses may cause dizziness and affect depth perception for awhile.  Return to clinic 1 year for Comprehensive Vision Exam      Lillian Rock O.D  [address] [place]  [address]    [phone]

## 2017-06-22 NOTE — MR AVS SNAPSHOT
After Visit Summary   6/22/2017    Darian Engle    MRN: [id number]           Patient Information     Date Of Birth          1950        Visit Information        Provider Department      6/22/2017 8:30 AM Lillian Rock OD [facility]        Today's Diagnoses     Astigmatism of right eye with presbyopia    -  1    Hyperopia with presbyopia of left eye        Type 2 diabetes mellitus without retinopathy (H)        Cataracts, both eyes          Care Instructions        Monitor, Keep blood sugar under control  Sent letter to primary care provider regarding diabetes  Monitor cataracts by having yearly exams.  Wear sunglasses when outside.  A final glasses prescription was given.  Allow time for adaptation.  The glasses may cause dizziness and affect depth perception for awhile.  Return to clinic 1 year for Comprehensive Vision Exam      Lillian Rock O.D  [address] [place]  [id number] [phone]                Follow-ups after your visit        Follow-up notes from your care team     Return in about 1 year (around 6/22/2018) for Eye Exam.      Your next 10 appointments already scheduled     Jun 30, 2017  7:15 AM CDT   [facility] with Rosendo Redding DPM   [facility]    [address] [place] [id number] [phone]              Who to contact     If you have questions or need follow up information about today's clinic visit or your schedule please contact [facility] directly at [phone].  Normal or non-critical lab and imaging results will be communicated to you by MyChart, letter or phone within 4 business days after the clinic has received the results. If you do not hear from us within 7 days, please contact the clinic through MyChart or phone. If you have a critical or abnormal lab result, we will notify you by  phone as soon as possible.  Submit refill requests through [employer] or call your pharmacy and they will forward the refill request to us. Please allow 3 business days for your refill to be completed.          Additional Information About Your Visit        [employer] Information     [employer] gives you secure access to your electronic health record. If you see a primary care provider, you can also send messages to your care team and make appointments. If you have questions, please call your primary care clinic.  If you do not have a primary care provider, please call [phone] and they will assist you.        Care EveryWhere ID     This is your Care EveryWhere ID. This could be used by other organizations to access your [place] medical records  [id number]         Blood Pressure from Last 3 Encounters:   05/19/17 99/54   02/03/17 141/71   01/27/17 118/70    Weight from Last 3 Encounters:   05/19/17 104.3 kg (230 lb)   02/15/17 105.5 kg (232 lb 9.6 oz)   02/03/17 103.9 kg (229 lb)              We Performed the Following     EYE EXAM (SIMPLE-NONBILLABLE)     REFRACTION        Primary Care Provider    None Specified       No primary provider on file.        Equal Access to Services     [facility] Center[facility] : Hadii maria esther Gallagher, wafaithda jolanta, qacristinata camelia beckford, kristian zacarias . So [facility] [phone].    ATENCIÓN: Si habla español, tiene a velasco disposición servicios gratuitos de asistencia lingüística. Llame al [phone].    We comply with applicable federal civil rights laws and [place] laws. We do not discriminate on the basis of race, color, national origin, age, disability sex, sexual orientation or gender identity.            Thank you!     Thank you for choosing [facility]  for your care. Our goal is always to provide you with excellent care. Hearing back from our patients is one way we can continue to improve our services. Please take a few minutes to complete  the written survey that you may receive in the mail after your visit with us. Thank you!             Your Updated Medication List - Protect others around you: Learn how to safely use, store and throw away your medicines at www.disposemymeds.org.          This list is accurate as of: 6/22/17  9:40 AM.  Always use your most recent med list.                   Brand Name Dispense Instructions for use Diagnosis    ACCU-CHEK SMARTVIEW test strip   Generic drug:  blood glucose monitoring     300 strip    1 strip by In Vitro route 3 times daily Use to test blood sugar 3 times daily or as directed.    Type 2 diabetes mellitus with complication, with long-term current use of insulin (H)       amLODIPine 10 MG tablet    NORVASC    90 tablet    Take 1 tablet (10 mg) by mouth daily    Essential hypertension with goal blood pressure less than 140/90       * atorvastatin 80 MG tablet    LIPITOR     Take 80 mg by mouth        * atorvastatin 80 MG tablet    LIPITOR    90 tablet    Take 1 tablet (80 mg) by mouth daily    CAD, multiple vessel, Hyperlipidemia LDL goal <100       B-D U/F 31G X 5 MM   Generic drug:  insulin pen needle     270 each    USE 3 TIMES A DAY WITH INSULIN    Type 2 diabetes mellitus with complication, with long-term current use of insulin (H)       blood glucose monitoring lancets     6 Box    1 each by In Vitro route 3 times daily Use to test blood sugar tid times daily or as directed.    Type 2 diabetes mellitus with complication, with long-term current use of insulin (H)       buPROPion 300 MG 24 hr tablet    WELLBUTRIN XL     TAKE 1 TAB BY MOUTH DAILY. START AFTER 2 WEEKS ON  MG DOSE        isosorbide mononitrate 30 MG 24 hr tablet    IMDUR    90 tablet    Take 1 tablet (30 mg) by mouth daily    CAD, multiple vessel       LANTUS SOLOSTAR 100 UNIT/ML injection   Generic drug:  insulin glargine     27 mL    Patient takes 50 units daily    Type 2 diabetes mellitus with complication, with long-term  current use of insulin (H)       losartan 50 MG tablet    COZAAR    90 tablet    Take 1 tablet (50 mg) by mouth daily    Essential hypertension with goal blood pressure less than 140/90       metFORMIN 500 MG 24 hr tablet    GLUCOPHAGE-XR    360 tablet    Take 4 tablets (2,000 mg) by mouth daily (with breakfast)    Type 2 diabetes mellitus with complication, with long-term current use of insulin (H)       metoprolol 100 MG 24 hr tablet    TOPROL-XL    90 tablet    Take 1 tablet (100 mg) by mouth daily    CAD, multiple vessel, Essential hypertension with goal blood pressure less than 140/90       nicotine 14 MG/24HR 24 hr patch    NICODERM CQ     1 patch        nitroglycerin 0.4 MG sublingual tablet    NITROSTAT    25 tablet    For chest pain place 1 tablet under the tongue every 5 minutes for 3 doses. If symptoms persist 5 minutes after 1st dose call 911.    CAD, multiple vessel       NovoLOG FLEXPEN 100 UNIT/ML injection   Generic drug:  insulin aspart     54 mL    Patient uses 60 units per day    Type 2 diabetes mellitus with complication, with long-term current use of insulin (H)       omeprazole 20 MG CR capsule    priLOSEC    90 capsule    Take 1 capsule (20 mg) by mouth daily    Gastroesophageal reflux disease without esophagitis       ticagrelor 90 MG tablet    BRILINTA     Take 90 mg by mouth        VICTOZA PEN 18 MG/3ML soln   Generic drug:  liraglutide     9 mL    Inject 0.6 mg Subcutaneous daily    Type 2 diabetes mellitus with complication, with long-term current use of insulin (H)       * Notice:  This list has 2 medication(s) that are the same as other medications prescribed for you. Read the directions carefully, and ask your doctor or other care provider to review them with you.

## 2017-06-22 NOTE — PATIENT INSTRUCTIONS
Monitor, Keep blood sugar under control  Sent letter to primary care provider regarding diabetes  Monitor cataracts by having yearly exams.  Wear sunglasses when outside.  A final glasses prescription was given.  Allow time for adaptation.  The glasses may cause dizziness and affect depth perception for awhile.  Return to clinic 1 year for Comprehensive Vision Exam      Lillian Rock O.D  [address] [place]  [address]    [phone]

## 2017-06-30 ENCOUNTER — OFFICE VISIT (OUTPATIENT)
Dept: PODIATRY | Facility: CLINIC | Age: 67
End: 2017-06-30
Payer: COMMERCIAL

## 2017-06-30 VITALS — HEART RATE: 71 BPM | BODY MASS INDEX: 36.57 KG/M2 | WEIGHT: 230 LBS | OXYGEN SATURATION: 99 %

## 2017-06-30 DIAGNOSIS — L60.0 INGROWING NAIL: Primary | ICD-10-CM

## 2017-06-30 PROCEDURE — 99203 OFFICE O/P NEW LOW 30 MIN: CPT | Mod: 25 | Performed by: PODIATRIST

## 2017-06-30 PROCEDURE — 11730 AVULSION NAIL PLATE SIMPLE 1: CPT | Mod: T5 | Performed by: PODIATRIST

## 2017-06-30 RX ORDER — CLOPIDOGREL BISULFATE 75 MG/1
75 TABLET ORAL
Refills: 11 | COMMUNITY
Start: 2017-05-26 | End: 2022-03-15

## 2017-06-30 NOTE — MR AVS SNAPSHOT
After Visit Summary   6/30/2017    Darian Engle    MRN: [id number]           Patient Information     Date Of Birth          1950        Visit Information        Provider Department      6/30/2017 7:15 AM Rosendo Redding, S[facility]        Today's Diagnoses     Ingrowing nail    -  1      Care Instructions    We wish you continued good healing. If you have any questions or concerns, please do not hesitate to contact us at [phone]      Please remember to call and schedule a follow up appointment if one was recommended at your earliest convenience.   PODIATRY CLINIC HOURS  TELEPHONE NUMBER    Dr. Rosendo MARREROPGENEVIEVE FAC FAS    Clinics:  [facility]        Gayle Holcomb MA  Medical Assistant  Tuesday 1PM-6PM  [place]  Wednesday 7AM-2PM  [place]  Thursday 10AM-6PM  [place] Friday 7AM-345PM  [place]  Specialty schedulers:   [phone] to make an appointment with any Specialty Provider.        Urgent Care locations:    [place] Monday-Friday 5 pm - 9 pm. Saturday-Sunday 9 am -5pm    Monday-Friday 11 am - 9 pm Saturday 9 am - 5 pm     Monday-Sunday 12 noon-8PM [phone]     If you need a medication refill, please contact us you may need lab work and/or a follow up visit prior to your refill (i.e. Antifungal medications).    [employer] (secure e-mail communication and access to your chart) to send a message or to make an appointment.    If MRI needed please call [place] [facility] at [phone]        Weight management plan: Patient was referred to their PCP to discuss a diet and exercise plan.            Follow-ups after your visit        Who to contact     If you have questions or need follow up information about today's clinic visit or your schedule please contact [facility] directly at  [phone].  Normal or non-critical lab and imaging results will be communicated to you by MyChart, letter or phone within 4 business days after the clinic has received the results. If you do not hear from us within 7 days, please contact the clinic through [employer] or phone. If you have a critical or abnormal lab result, we will notify you by phone as soon as possible.  Submit refill requests through [employer] or call your pharmacy and they will forward the refill request to us. Please allow 3 business days for your refill to be completed.          Additional Information About Your Visit        [employer] Information     [employer] gives you secure access to your electronic health record. If you see a primary care provider, you can also send messages to your care team and make appointments. If you have questions, please call your primary care clinic.  If you do not have a primary care provider, please call [phone] and they will assist you.        Care EveryWhere ID     This is your Care EveryWhere ID. This could be used by other organizations to access your [facility] medical records  [id number]        Your Vitals Were     Pulse Pulse Oximetry BMI (Body Mass Index)             71 99% 36.57 kg/m2          Blood Pressure from Last 3 Encounters:   05/19/17 99/54   02/03/17 141/71   01/27/17 118/70    Weight from Last 3 Encounters:   06/30/17 104.3 kg (230 lb)   05/19/17 104.3 kg (230 lb)   02/15/17 105.5 kg (232 lb 9.6 oz)              We Performed the Following     REMOVAL OF NAIL PLATE SIMPLE SINGLE        Primary Care Provider    None Specified       No primary provider on file.        Equal Access to Services     ANAYELI CROW : Hadleo kirby Somirela, waaxda luqadaha, qaybta kaalmada kristian beckford. So [facility] [phone].    ATENCIÓN: Si habla español, tiene a velasco disposición servicios gratuitos de asistencia lingüística. Llame al [phone].    We comply with applicable  federal civil rights laws and [place] laws. We do not discriminate on the basis of race, color, national origin, age, disability sex, sexual orientation or gender identity.            Thank you!     Thank you for choosing [facility]  for your care. Our goal is always to provide you with excellent care. Hearing back from our patients is one way we can continue to improve our services. Please take a few minutes to complete the written survey that you may receive in the mail after your visit with us. Thank you!             Your Updated Medication List - Protect others around you: Learn how to safely use, store and throw away your medicines at www.disposemymeds.org.          This list is accurate as of: 6/30/17  7:56 AM.  Always use your most recent med list.                   Brand Name Dispense Instructions for use Diagnosis    ACCU-CHEK SMARTVIEW test strip   Generic drug:  blood glucose monitoring     300 strip    1 strip by In Vitro route 3 times daily Use to test blood sugar 3 times daily or as directed.    Type 2 diabetes mellitus with complication, with long-term current use of insulin (H)       amLODIPine 10 MG tablet    NORVASC    90 tablet    Take 1 tablet (10 mg) by mouth daily    Essential hypertension with goal blood pressure less than 140/90       atorvastatin 80 MG tablet    LIPITOR    90 tablet    Take 1 tablet (80 mg) by mouth daily    CAD, multiple vessel, Hyperlipidemia LDL goal <100       B-D U/F 31G X 5 MM   Generic drug:  insulin pen needle     270 each    USE 3 TIMES A DAY WITH INSULIN    Type 2 diabetes mellitus with complication, with long-term current use of insulin (H)       blood glucose monitoring lancets     6 Box    1 each by In Vitro route 3 times daily Use to test blood sugar tid times daily or as directed.    Type 2 diabetes mellitus with complication, with long-term current use of insulin (H)       buPROPion 300 MG 24 hr tablet    WELLBUTRIN XL     TAKE 1 TAB  BY MOUTH DAILY. START AFTER 2 WEEKS ON  MG DOSE        clopidogrel 75 MG tablet    PLAVIX     75 mg        isosorbide mononitrate 30 MG 24 hr tablet    IMDUR    90 tablet    Take 1 tablet (30 mg) by mouth daily    CAD, multiple vessel       LANTUS SOLOSTAR 100 UNIT/ML injection   Generic drug:  insulin glargine     27 mL    Patient takes 50 units daily    Type 2 diabetes mellitus with complication, with long-term current use of insulin (H)       losartan 50 MG tablet    COZAAR    90 tablet    Take 1 tablet (50 mg) by mouth daily    Essential hypertension with goal blood pressure less than 140/90       metFORMIN 500 MG 24 hr tablet    GLUCOPHAGE-XR    360 tablet    Take 4 tablets (2,000 mg) by mouth daily (with breakfast)    Type 2 diabetes mellitus with complication, with long-term current use of insulin (H)       metoprolol 100 MG 24 hr tablet    TOPROL-XL    90 tablet    Take 1 tablet (100 mg) by mouth daily    CAD, multiple vessel, Essential hypertension with goal blood pressure less than 140/90       nicotine 14 MG/24HR 24 hr patch    NICODERM CQ     1 patch        nitroGLYcerin 0.4 MG sublingual tablet    NITROSTAT    25 tablet    For chest pain place 1 tablet under the tongue every 5 minutes for 3 doses. If symptoms persist 5 minutes after 1st dose call 911.    CAD, multiple vessel       NovoLOG FLEXPEN 100 UNIT/ML injection   Generic drug:  insulin aspart     54 mL    Patient uses 60 units per day    Type 2 diabetes mellitus with complication, with long-term current use of insulin (H)       omeprazole 20 MG CR capsule    priLOSEC    90 capsule    Take 1 capsule (20 mg) by mouth daily    Gastroesophageal reflux disease without esophagitis       ticagrelor 90 MG tablet    BRILINTA     Take 90 mg by mouth        VICTOZA PEN 18 MG/3ML soln   Generic drug:  liraglutide     9 mL    Inject 0.6 mg Subcutaneous daily    Type 2 diabetes mellitus with complication, with long-term current use of insulin (H)

## 2017-06-30 NOTE — PATIENT INSTRUCTIONS
We wish you continued good healing. If you have any questions or concerns, please do not hesitate to contact us at [phone]      Please remember to call and schedule a follow up appointment if one was recommended at your earliest convenience.   PODIATRY CLINIC HOURS  TELEPHONE NUMBER    Dr. Rosendo Redding D.P.M [facility]    Clinics:  [facility]        Gayle Holcomb MA  Medical Assistant  Tuesday 1PM-6PM  [facility]  Wednesday 7AM-2PM  [place]  Thursday 10AM-6PM  [place] Friday 7AM-345PM  [place]  Specialty schedulers:   [phone] to make an appointment with any Specialty Provider.        Urgent Care locations:    [facility] Monday-Friday 5 pm - 9 pm. Saturday-Sunday 9 am -5pm    Monday-Friday 11 am - 9 pm Saturday 9 am - 5 pm     Monday-Sunday 12 noon-8PM [phone]     If you need a medication refill, please contact us you may need lab work and/or a follow up visit prior to your refill (i.e. Antifungal medications).    [employer] (secure e-mail communication and access to your chart) to send a message or to make an appointment.    If MRI needed please call [facility] at [phone]        Weight management plan: Patient was referred to their PCP to discuss a diet and exercise plan.

## 2017-06-30 NOTE — PROGRESS NOTES
Subjective:    Pt is seen today as a new pt referral w/ the c/c of a painful ingrown right great nail distal medial border.  This has been problematic for 1 month(s).  negativehistory of drainage from the site. This is slowly getting worse.  Aggravated by activity and relieved by rest.  Has tried soaking which has not helped.   denies history of trauma to the area.  He has diabetes.  Retired.  Quit smoking 1 year ago.      ROS:  Denies fever and chill, denies numbness and tingling, denies erythema on dorsum of foot.    Past Medical History:   Diagnosis Date     Diabetes (H)      Hypertension        Past Surgical History:   Procedure Laterality Date     ENHANCE LASER REFRACTIVE BILATERAL EXISTING PT IN PARAMETERS  2000       Family History   Problem Relation Age of Onset     Glaucoma Mother      Macular Degeneration Mother      Macular Degeneration Other        Social History   Substance Use Topics     Smoking status: Former Smoker     Types: Cigarettes     Quit date: 7/15/2016     Smokeless tobacco: Not on file     Alcohol use No         Current Outpatient Prescriptions:      clopidogrel (PLAVIX) 75 MG tablet, 75 mg, Disp: , Rfl: 11     B-D U/F insulin pen needle, USE 3 TIMES A DAY WITH INSULIN, Disp: 270 each, Rfl: 3     ticagrelor (BRILINTA) 90 MG tablet, Take 90 mg by mouth, Disp: , Rfl:      nicotine (NICODERM CQ) 14 MG/24HR 24 hr patch, 1 patch, Disp: , Rfl:      buPROPion (WELLBUTRIN XL) 300 MG 24 hr tablet, TAKE 1 TAB BY MOUTH DAILY. START AFTER 2 WEEKS ON  MG DOSE, Disp: , Rfl: 3     losartan (COZAAR) 50 MG tablet, Take 1 tablet (50 mg) by mouth daily, Disp: 90 tablet, Rfl: 3     amLODIPine (NORVASC) 10 MG tablet, Take 1 tablet (10 mg) by mouth daily, Disp: 90 tablet, Rfl: 3     metoprolol (TOPROL-XL) 100 MG 24 hr tablet, Take 1 tablet (100 mg) by mouth daily, Disp: 90 tablet, Rfl: 3     isosorbide mononitrate (IMDUR) 30 MG 24 hr tablet, Take 1 tablet (30 mg) by mouth daily, Disp: 90 tablet, Rfl: 3      atorvastatin (LIPITOR) 80 MG tablet, Take 1 tablet (80 mg) by mouth daily, Disp: 90 tablet, Rfl: 3     omeprazole (PRILOSEC) 20 MG CR capsule, Take 1 capsule (20 mg) by mouth daily, Disp: 90 capsule, Rfl: 3     NOVOLOG FLEXPEN 100 UNIT/ML soln, Patient uses 60 units per day, Disp: 54 mL, Rfl: 11     VICTOZA PEN 18 MG/3ML soln, Inject 0.6 mg Subcutaneous daily, Disp: 9 mL, Rfl: 11     LANTUS SOLOSTAR 100 UNIT/ML soln, Patient takes 50 units daily, Disp: 27 mL, Rfl: 11     blood glucose monitoring (ACCU-CHEK FASTCLIX) lancets, 1 each by In Vitro route 3 times daily Use to test blood sugar tid times daily or as directed., Disp: 6 Box, Rfl: 3     ACCU-CHEK SMARTVIEW test strip, 1 strip by In Vitro route 3 times daily Use to test blood sugar 3 times daily or as directed., Disp: 300 strip, Rfl: 3     nitroglycerin (NITROSTAT) 0.4 MG sublingual tablet, For chest pain place 1 tablet under the tongue every 5 minutes for 3 doses. If symptoms persist 5 minutes after 1st dose call 911., Disp: 25 tablet, Rfl: 0     metFORMIN (GLUCOPHAGE-XR) 500 MG 24 hr tablet, Take 4 tablets (2,000 mg) by mouth daily (with breakfast), Disp: 360 tablet, Rfl: 3     [DISCONTINUED] atorvastatin (LIPITOR) 80 MG tablet, Take 80 mg by mouth, Disp: , Rfl:        Allergies   Allergen Reactions     Lidocaine Other (See Comments)     Lungs filled with fluid. ? Anaphylaxis     Lisinopril Cough     cough       Pulse 71  Wt 104.3 kg (230 lb)  SpO2 99%  BMI 36.57 kg/m2.  A&O X 3.  Good historian.  Pulses DP 1/4, PT 0/4 b/l.  CRT < 3 seconds X 10 digits.  Mild ankle edema or varicosities noted.  Sensation to light touch intact b/l.  Reflexes 2/4 b/l.  Skin has normal texture and turgor b/l.  Normal arch with weightbearing.  No forefoot or rear foot deformities noted.  MS 5/5 all compartments.  Normal ROM all fore foot and rearfoot joints.  No equinus.  right great toe nail distal and medial border shows soft tissue impingement with localized erythema.   Nail mycotic here   negative active drainage/purulence at this time.  No sinus tracts.  No nailbed masses or exostosis.  No pain with range of motion of IPJ or MTPJ.  No ascending cellulitis.    ASSESSMENT:    Onychocryptosis with paronychia right toe.    Discussed etiology and treatment options in detail w/ the pt.  The potential causes and nature of an ingrown toenail were discussed with the patient.  We reviewed the natural history/prognosis of the condition and potential risks if no treatment is provided.      Treatment options discussed included conservative management (oral antibiotics, soaking of foot, adequate width shoes)  as well as surgical management (partial or total nail removal).  The pros and cons of both forms of treatment were reviewed.  Handout given to patient.      After thorough discussion and answering all questions, the patient elected to have nail avulsion.  Obtained consent, used 3cc of 1% lidocaine plain to block right first toe.  Sterile prep, then avulsed the affected border(s).  No evidence of deep abscess noted.  Pt tolerated procedure well.  Sterile bandage placed, gave wound care instruction.  Return to clinic prn.    Rosendo Redding DPM, FACFAS

## 2017-07-17 ENCOUNTER — TRANSFERRED RECORDS (OUTPATIENT)
Dept: HEALTH INFORMATION MANAGEMENT | Facility: CLINIC | Age: 67
End: 2017-07-17

## 2017-08-02 ENCOUNTER — TRANSFERRED RECORDS (OUTPATIENT)
Dept: HEALTH INFORMATION MANAGEMENT | Facility: CLINIC | Age: 67
End: 2017-08-02

## 2017-08-14 ENCOUNTER — TRANSFERRED RECORDS (OUTPATIENT)
Dept: HEALTH INFORMATION MANAGEMENT | Facility: CLINIC | Age: 67
End: 2017-08-14

## 2017-08-23 ENCOUNTER — TRANSFERRED RECORDS (OUTPATIENT)
Dept: HEALTH INFORMATION MANAGEMENT | Facility: CLINIC | Age: 67
End: 2017-08-23

## 2017-08-30 ENCOUNTER — TRANSFERRED RECORDS (OUTPATIENT)
Dept: HEALTH INFORMATION MANAGEMENT | Facility: CLINIC | Age: 67
End: 2017-08-30

## 2017-09-22 ENCOUNTER — TRANSFERRED RECORDS (OUTPATIENT)
Dept: HEALTH INFORMATION MANAGEMENT | Facility: CLINIC | Age: 67
End: 2017-09-22

## 2017-10-05 ENCOUNTER — TELEPHONE (OUTPATIENT)
Dept: FAMILY MEDICINE | Facility: CLINIC | Age: 67
End: 2017-10-05

## 2017-10-05 NOTE — TELEPHONE ENCOUNTER
Panel Management Review      Patient has the following on his problem list:     Diabetes    ASA: Failed    Last A1C  No results found for: A1C  A1C tested: No results found    Last LDL:    Lab Results   Component Value Date    CHOL 99 02/03/2017     Lab Results   Component Value Date    HDL 28 02/03/2017     Lab Results   Component Value Date    LDL 46 02/03/2017     Lab Results   Component Value Date    TRIG 124 02/03/2017     No results found for: CHOLNATALYO  Lab Results   Component Value Date    NHDL 71 02/03/2017       Is the patient on a Statin? YES             Is the patient on Aspirin? NO    Medications     HMG CoA Reductase Inhibitors    atorvastatin (LIPITOR) 80 MG tablet          Last three blood pressure readings:  BP Readings from Last 3 Encounters:   05/19/17 99/54   02/03/17 141/71   01/27/17 118/70       Date of last diabetes office visit: 5/19/17     Tobacco History:     History   Smoking Status     Former Smoker     Types: Cigarettes     Quit date: 7/15/2016   Smokeless Tobacco     Not on file           IVD   ASA: FAILED    Last LDL:    Lab Results   Component Value Date    CHOL 99 02/03/2017     Lab Results   Component Value Date    HDL 28 02/03/2017     Lab Results   Component Value Date    LDL 46 02/03/2017     Lab Results   Component Value Date    TRIG 124 02/03/2017      No results found for: CHOLHDLRIZABELO     Is the patient on a Statin? YES   Is the patient on Aspirin? NO                  Medications     HMG CoA Reductase Inhibitors    atorvastatin (LIPITOR) 80 MG tablet          Last three blood pressure readings:  BP Readings from Last 3 Encounters:   05/19/17 99/54   02/03/17 141/71   01/27/17 118/70        Tobacco History:     History   Smoking Status     Former Smoker     Types: Cigarettes     Quit date: 7/15/2016   Smokeless Tobacco     Not on file       Hypertension   Last three blood pressure readings:  BP Readings from Last 3 Encounters:   05/19/17 99/54   02/03/17 141/71    01/27/17 118/70     Blood pressure: Passed    HTN Guidelines:  Age 18-59 BP range:  Less than 140/90  Age 60-85 with Diabetes:  Less than 140/90  Age 60-85 without Diabetes:  less than 150/90            Composite cancer screening  Chart review shows that this patient is due/due soon for the following Colonoscopy  Summary:    Patient is due/failing the following:   A1C and COLONOSCOPY    Action needed:   Patient needs office visit for HTN, diabetes and hyperlipidemia and colonoscopy.    Type of outreach:    Sent frooly message.    Questions for provider review:    None                                                                                                                                    Hawa Brandt MA     Chart routed to Care Team .

## 2017-10-19 ENCOUNTER — TRANSFERRED RECORDS (OUTPATIENT)
Dept: HEALTH INFORMATION MANAGEMENT | Facility: CLINIC | Age: 67
End: 2017-10-19

## 2017-11-02 NOTE — TELEPHONE ENCOUNTER
Called pt and spoke to him about scheduling an office visit appointment for his HTN, diabetes, hyperlipidemia. Pt states he is not able to pull up his schedule on his computer and will call later to schedule.  Hawa See WINSOME Brandt

## 2017-12-06 ENCOUNTER — TRANSFERRED RECORDS (OUTPATIENT)
Dept: HEALTH INFORMATION MANAGEMENT | Facility: CLINIC | Age: 67
End: 2017-12-06

## 2017-12-18 ENCOUNTER — OFFICE VISIT (OUTPATIENT)
Dept: FAMILY MEDICINE | Facility: CLINIC | Age: 67
End: 2017-12-18
Payer: COMMERCIAL

## 2017-12-18 VITALS
BODY MASS INDEX: 36.41 KG/M2 | HEART RATE: 79 BPM | TEMPERATURE: 98.6 F | DIASTOLIC BLOOD PRESSURE: 64 MMHG | OXYGEN SATURATION: 98 % | WEIGHT: 229 LBS | SYSTOLIC BLOOD PRESSURE: 118 MMHG

## 2017-12-18 DIAGNOSIS — Z12.11 ENCOUNTER FOR SCREENING COLONOSCOPY: ICD-10-CM

## 2017-12-18 DIAGNOSIS — I73.9 PAD (PERIPHERAL ARTERY DISEASE) (H): ICD-10-CM

## 2017-12-18 DIAGNOSIS — F17.200 SMOKER: ICD-10-CM

## 2017-12-18 DIAGNOSIS — E11.8 TYPE 2 DIABETES MELLITUS WITH COMPLICATION, WITH LONG-TERM CURRENT USE OF INSULIN (H): ICD-10-CM

## 2017-12-18 DIAGNOSIS — I25.10 CAD, MULTIPLE VESSEL: Primary | ICD-10-CM

## 2017-12-18 DIAGNOSIS — Z23 NEED FOR PNEUMOCOCCAL VACCINATION: ICD-10-CM

## 2017-12-18 DIAGNOSIS — Z79.4 TYPE 2 DIABETES MELLITUS WITH COMPLICATION, WITH LONG-TERM CURRENT USE OF INSULIN (H): ICD-10-CM

## 2017-12-18 LAB
ANION GAP SERPL CALCULATED.3IONS-SCNC: 11 MMOL/L (ref 3–14)
BUN SERPL-MCNC: 14 MG/DL (ref 7–30)
CALCIUM SERPL-MCNC: 9.7 MG/DL (ref 8.5–10.1)
CHLORIDE SERPL-SCNC: 107 MMOL/L (ref 94–109)
CO2 SERPL-SCNC: 21 MMOL/L (ref 20–32)
CREAT SERPL-MCNC: 0.99 MG/DL (ref 0.66–1.25)
GFR SERPL CREATININE-BSD FRML MDRD: 76 ML/MIN/1.7M2
GLUCOSE SERPL-MCNC: 163 MG/DL (ref 70–99)
HBA1C MFR BLD: 8.3 % (ref 4.3–6)
POTASSIUM SERPL-SCNC: 4.5 MMOL/L (ref 3.4–5.3)
SODIUM SERPL-SCNC: 139 MMOL/L (ref 133–144)

## 2017-12-18 PROCEDURE — 90670 PCV13 VACCINE IM: CPT | Performed by: FAMILY MEDICINE

## 2017-12-18 PROCEDURE — 80048 BASIC METABOLIC PNL TOTAL CA: CPT | Performed by: FAMILY MEDICINE

## 2017-12-18 PROCEDURE — 36415 COLL VENOUS BLD VENIPUNCTURE: CPT | Performed by: FAMILY MEDICINE

## 2017-12-18 PROCEDURE — 83036 HEMOGLOBIN GLYCOSYLATED A1C: CPT | Performed by: FAMILY MEDICINE

## 2017-12-18 PROCEDURE — 90471 IMMUNIZATION ADMIN: CPT | Performed by: FAMILY MEDICINE

## 2017-12-18 PROCEDURE — 99214 OFFICE O/P EST MOD 30 MIN: CPT | Mod: 25 | Performed by: FAMILY MEDICINE

## 2017-12-18 RX ORDER — CILOSTAZOL 100 MG/1
100 TABLET ORAL 2 TIMES DAILY
COMMUNITY

## 2017-12-18 NOTE — NURSING NOTE
"Chief Complaint   Patient presents with     Smoking Cessation     Diabetes     Health Maintenance     A1C       Initial /64 (BP Location: Right arm, Patient Position: Chair, Cuff Size: Adult Large)  Pulse 79  Temp 98.6  F (37  C) (Oral)  Wt 229 lb (103.9 kg)  SpO2 98%  BMI 36.41 kg/m2 Estimated body mass index is 36.41 kg/(m^2) as calculated from the following:    Height as of 2/15/17: 5' 6.5\" (1.689 m).    Weight as of this encounter: 229 lb (103.9 kg).  Medication Reconciliation: complete   Hawa See WINSOME Brandt      "

## 2017-12-18 NOTE — PROGRESS NOTES
SUBJECTIVE:   Darian Engle is a 67 year old male who presents to clinic today for the following health issues:      Diabetes Follow-up    Patient is checking blood sugars: 5-6 times daily.    Blood sugar testing frequency justification: Uncontrolled diabetes  Results are as follows:       This morning was 209    Diabetic concerns: None     Symptoms of hypoglycemia (low blood sugar): shaky     Paresthesias (numbness or burning in feet) or sores: No   Date of last diabetic eye exam: Unknown    Current Outpatient Prescriptions   Medication Sig Dispense Refill     cilostazol (PLETAL) 50 MG tablet Take 50 mg by mouth 2 times daily       clopidogrel (PLAVIX) 75 MG tablet 75 mg  11     B-D U/F insulin pen needle USE 3 TIMES A DAY WITH INSULIN 270 each 3     nicotine (NICODERM CQ) 14 MG/24HR 24 hr patch 1 patch       buPROPion (WELLBUTRIN XL) 300 MG 24 hr tablet TAKE 1 TAB BY MOUTH DAILY. START AFTER 2 WEEKS ON  MG DOSE  3     losartan (COZAAR) 50 MG tablet Take 1 tablet (50 mg) by mouth daily 90 tablet 3     amLODIPine (NORVASC) 10 MG tablet Take 1 tablet (10 mg) by mouth daily 90 tablet 3     metoprolol (TOPROL-XL) 100 MG 24 hr tablet Take 1 tablet (100 mg) by mouth daily 90 tablet 3     isosorbide mononitrate (IMDUR) 30 MG 24 hr tablet Take 1 tablet (30 mg) by mouth daily 90 tablet 3     atorvastatin (LIPITOR) 80 MG tablet Take 1 tablet (80 mg) by mouth daily 90 tablet 3     omeprazole (PRILOSEC) 20 MG CR capsule Take 1 capsule (20 mg) by mouth daily 90 capsule 3     NOVOLOG FLEXPEN 100 UNIT/ML soln Patient uses 60 units per day 54 mL 11     VICTOZA PEN 18 MG/3ML soln Inject 0.6 mg Subcutaneous daily 9 mL 11     LANTUS SOLOSTAR 100 UNIT/ML soln Patient takes 50 units daily 27 mL 11     blood glucose monitoring (ACCU-CHEK FASTCLIX) lancets 1 each by In Vitro route 3 times daily Use to test blood sugar tid times daily or as directed. 6 Box 3     ACCU-CHEK SMARTVIEW test strip 1 strip by In Vitro route 3 times  daily Use to test blood sugar 3 times daily or as directed. 300 strip 3     nitroglycerin (NITROSTAT) 0.4 MG sublingual tablet For chest pain place 1 tablet under the tongue every 5 minutes for 3 doses. If symptoms persist 5 minutes after 1st dose call 911. 25 tablet 0     metFORMIN (GLUCOPHAGE-XR) 500 MG 24 hr tablet Take 4 tablets (2,000 mg) by mouth daily (with breakfast) 360 tablet 3             Another stent in 6/2017     No results found for: A1C        Hyperlipidemia Follow-Up      Rate your low fat/cholesterol diet?: Unsure    Taking statin?  Yes, no muscle aches from statin    Other lipid medications/supplements?:  none    Hypertension Follow-up      Outpatient blood pressures are not being checked.    Low Salt Diet: low salt  BP Readings from Last 2 Encounters:   12/18/17 118/64   05/19/17 99/54     LDL Cholesterol Calculated (mg/dL)   Date Value   02/03/2017 46            Amount of exercise or physical activity: 6-7 days/week    Problems taking medications regularly: No    Medication side effects: none  Diet: low salt    Ros: no chest pain, chest tightness   No sob   No leg edema   No abdominal pain     Denies fever or chills   Weight stable     Ran out of patches       O; /64 (BP Location: Right arm, Patient Position: Chair, Cuff Size: Adult Large)  Pulse 79  Temp 98.6  F (37  C) (Oral)  Wt 229 lb (103.9 kg)  SpO2 98%  BMI 36.41 kg/m2    Wt Readings from Last 4 Encounters:   12/18/17 229 lb (103.9 kg)   06/30/17 230 lb (104.3 kg)   05/19/17 230 lb (104.3 kg)   02/15/17 232 lb 9.6 oz (105.5 kg)     Patient has had a stent in his right leg   He is still having rehab for this     He can walk unlimited amount now.   Pain is almost gone     Head: Normocephalic, atraumatic.  Eyes: Conjunctiva clear, non icteric. PERRLA.  Ears: External ears and TMs normal BL.  Nose: Septum midline, nasal mucosa pink and moist. No discharge.  Mouth / Throat: Normal dentition.  No oral lesions. Pharynx non  erythematous, tonsils without hypertrophy.  Neck: Supple, no enlarged LN, trachea midline.    Chest wall normal to inspection and palpation. Good excursion bilaterally. Lungs clear to auscultation. Good air movement bilaterally without rales, wheezes, or rhonchi.   Regular rate and  rhythm. S1 and S2 normal, no murmurs, clicks, gallops or rubs. No edema or JVD.    The abdomen is soft without tenderness, guarding, mass, rebound or organomegaly. Bowel sounds are normal. No CVA tenderness or inguinal adenopathy noted.      ICD-10-CM    1. CAD, multiple vessel I25.10 aspirin 81 MG tablet   2. Type 2 diabetes mellitus with complication, with long-term current use of insulin (H) E11.8 Hemoglobin A1c    Z79.4 Basic metabolic panel   3. PAD (peripheral artery disease) (H) I73.9          Patient is switching to a new pharmacy so they can call us when he needs rx   Probably next month   No change in meds

## 2017-12-18 NOTE — NURSING NOTE
Screening Questionnaire for Adult Immunization    Are you sick today?   Yes   Do you have allergies to medications, food, a vaccine component or latex?   No   Have you ever had a serious reaction after receiving a vaccination?   No   Do you have a long-term health problem with heart disease, lung disease, asthma, kidney disease, metabolic disease (e.g. diabetes), anemia, or other blood disorder?   Yes   Do you have cancer, leukemia, HIV/AIDS, or any other immune system problem?   No   In the past 3 months, have you taken medications that affect  your immune system, such as prednisone, other steroids, or anticancer drugs; drugs for the treatment of rheumatoid arthritis, Crohn s disease, or psoriasis; or have you had radiation treatments?   No   Have you had a seizure, or a brain or other nervous system problem?   No   During the past year, have you received a transfusion of blood or blood     products, or been given immune (gamma) globulin or antiviral drug?   No   For women: Are you pregnant or is there a chance you could become        pregnant during the next month?   No   Have you received any vaccinations in the past 4 weeks?   No     Immunization questionnaire was positive for at least one answer.  Notified Dr. MCCLAIN.        Per orders of Dr. MCCLAIN, injection of Prevnar 13 given by Hawa Brandt. Patient instructed to remain in clinic for 15 minutes afterwards, and to report any adverse reaction to me immediately.  Prior to injection verified patient identity using patient's name and date of birth.  Vaccine information supplied.     Screening performed by Hawa Brandt on 12/18/2017 at 11:43 AM.

## 2017-12-18 NOTE — MR AVS SNAPSHOT
After Visit Summary   12/18/2017    Darian Engle    MRN: [id number]           Patient Information     Date Of Birth          1950        Visit Information        Provider Department      12/18/2017 10:20 AM Eh Matos MD [facility]        Today's Diagnoses     CAD, multiple vessel    -  1    Type 2 diabetes mellitus with complication, with long-term current use of insulin (H)        PAD (peripheral artery disease) (H)        Smoker        Encounter for screening colonoscopy           Follow-ups after your visit        Future tests that were ordered for you today     Open Future Orders        Priority Expected Expires Ordered    Fecal colorectal cancer screen (FIT) Routine 1/8/2018 3/12/2018 12/18/2017            Who to contact     If you have questions or need follow up information about today's clinic visit or your schedule please contact [facility] directly at [phone].  Normal or non-critical lab and imaging results will be communicated to you by MyChart, letter or phone within 4 business days after the clinic has received the results. If you do not hear from us within 7 days, please contact the clinic through MyVRhart or phone. If you have a critical or abnormal lab result, we will notify you by phone as soon as possible.  Submit refill requests through [employer] or call your pharmacy and they will forward the refill request to us. Please allow 3 business days for your refill to be completed.          Additional Information About Your Visit        MyChart Information     [employer] gives you secure access to your electronic health record. If you see a primary care provider, you can also send messages to your care team and make appointments. If you have questions, please call your primary care clinic.  If you do not have a primary care provider, please call [phone] and they will assist you.        Care EveryWhere ID     This is  your Care EveryWhere ID. This could be used by other organizations to access your [place] medical records  [id number]        Your Vitals Were     Pulse Temperature Pulse Oximetry BMI (Body Mass Index)          79 98.6  F (37  C) (Oral) 98% 36.41 kg/m2         Blood Pressure from Last 3 Encounters:   12/18/17 118/64   05/19/17 99/54   02/03/17 141/71    Weight from Last 3 Encounters:   12/18/17 229 lb (103.9 kg)   06/30/17 230 lb (104.3 kg)   05/19/17 230 lb (104.3 kg)              We Performed the Following     Basic metabolic panel     Hemoglobin A1c          Today's Medication Changes          These changes are accurate as of: 12/18/17 11:31 AM.  If you have any questions, ask your nurse or doctor.               These medicines have changed or have updated prescriptions.        Dose/Directions    * nicotine 14 MG/24HR 24 hr patch   Commonly known as:  NICODERM CQ   This may have changed:  Another medication with the same name was added. Make sure you understand how and when to take each.   Changed by:  Eh Matos MD        Dose:  1 patch   1 patch   Refills:  0       * nicotine 10 MG Inhaler   Commonly known as:  NICOTROL   This may have changed:  You were already taking a medication with the same name, and this prescription was added. Make sure you understand how and when to take each.   Used for:  CAD, multiple vessel, PAD (peripheral artery disease) (H), Smoker   Changed by:  Eh Matos MD        Dose:  6-16 Cartridge   Inhale 6-16 Cartridges into the lungs daily as needed for smoking cessation   Quantity:  180 each   Refills:  1       * Notice:  This list has 2 medication(s) that are the same as other medications prescribed for you. Read the directions carefully, and ask your doctor or other care provider to review them with you.         Where to get your medicines      These medications were sent to [facility]/pharmacy #[id number] - [place] [id number]  [facility] [address]     Phone:  [phone]     nicotine 10 MG Inhaler                Primary Care Provider Office Phone # Fax #    Eh Matos [phone] [address] [facility] [address]        Equal Access to Services     ANAYELI CROW : Hadii aad ku hadasho Soomaali, waaxda luqadaha, qaybta kaalmada adeegyada, waxay idiin hayaan ademyesha nicoagapito lacielo wilson. So [facility] [phone].    ATENCIÓN: Si habla español, tiene a velasco disposición servicios gratuitos de asistencia lingüística. Llame al [phone].    We comply with applicable federal civil rights laws and [place] laws. We do not discriminate on the basis of race, color, national origin, age, disability, sex, sexual orientation, or gender identity.            Thank you!     Thank you for choosing [facility]  for your care. Our goal is always to provide you with excellent care. Hearing back from our patients is one way we can continue to improve our services. Please take a few minutes to complete the written survey that you may receive in the mail after your visit with us. Thank you!             Your Updated Medication List - Protect others around you: Learn how to safely use, store and throw away your medicines at www.disposemymeds.org.          This list is accurate as of: 12/18/17 11:31 AM.  Always use your most recent med list.                   Brand Name Dispense Instructions for use Diagnosis    ACCU-CHEK SMARTVIEW test strip   Generic drug:  blood glucose monitoring     300 strip    1 strip by In Vitro route 3 times daily Use to test blood sugar 3 times daily or as directed.    Type 2 diabetes mellitus with complication, with long-term current use of insulin (H)       amLODIPine 10 MG tablet    NORVASC    90 tablet    Take 1 tablet (10 mg) by mouth daily    Essential hypertension with goal blood pressure less than 140/90       aspirin 81 MG tablet     30 tablet    Take by  mouth daily    CAD, multiple vessel       atorvastatin 80 MG tablet    LIPITOR    90 tablet    Take 1 tablet (80 mg) by mouth daily    CAD, multiple vessel, Hyperlipidemia LDL goal <100       B-D U/F 31G X 5 MM   Generic drug:  insulin pen needle     270 each    USE 3 TIMES A DAY WITH INSULIN    Type 2 diabetes mellitus with complication, with long-term current use of insulin (H)       blood glucose monitoring lancets     6 Box    1 each by In Vitro route 3 times daily Use to test blood sugar tid times daily or as directed.    Type 2 diabetes mellitus with complication, with long-term current use of insulin (H)       buPROPion 300 MG 24 hr tablet    WELLBUTRIN XL     TAKE 1 TAB BY MOUTH DAILY. START AFTER 2 WEEKS ON  MG DOSE        cilostazol 50 MG tablet    PLETAL     Take 50 mg by mouth 2 times daily        clopidogrel 75 MG tablet    PLAVIX     75 mg        isosorbide mononitrate 30 MG 24 hr tablet    IMDUR    90 tablet    Take 1 tablet (30 mg) by mouth daily    CAD, multiple vessel       LANTUS SOLOSTAR 100 UNIT/ML injection   Generic drug:  insulin glargine     27 mL    Patient takes 50 units daily    Type 2 diabetes mellitus with complication, with long-term current use of insulin (H)       losartan 50 MG tablet    COZAAR    90 tablet    Take 1 tablet (50 mg) by mouth daily    Essential hypertension with goal blood pressure less than 140/90       metFORMIN 500 MG 24 hr tablet    GLUCOPHAGE-XR    360 tablet    Take 4 tablets (2,000 mg) by mouth daily (with breakfast)    Type 2 diabetes mellitus with complication, with long-term current use of insulin (H)       metoprolol 100 MG 24 hr tablet    TOPROL-XL    90 tablet    Take 1 tablet (100 mg) by mouth daily    CAD, multiple vessel, Essential hypertension with goal blood pressure less than 140/90       * nicotine 14 MG/24HR 24 hr patch    NICODERM CQ     1 patch        * nicotine 10 MG Inhaler    NICOTROL    180 each    Inhale 6-16 Cartridges into the  lungs daily as needed for smoking cessation    CAD, multiple vessel, PAD (peripheral artery disease) (H), Smoker       nitroGLYcerin 0.4 MG sublingual tablet    NITROSTAT    25 tablet    For chest pain place 1 tablet under the tongue every 5 minutes for 3 doses. If symptoms persist 5 minutes after 1st dose call 911.    CAD, multiple vessel       NovoLOG FLEXPEN 100 UNIT/ML injection   Generic drug:  insulin aspart     54 mL    Patient uses 60 units per day    Type 2 diabetes mellitus with complication, with long-term current use of insulin (H)       omeprazole 20 MG CR capsule    priLOSEC    90 capsule    Take 1 capsule (20 mg) by mouth daily    Gastroesophageal reflux disease without esophagitis       ticagrelor 90 MG tablet    BRILINTA     Take 90 mg by mouth        VICTOZA PEN 18 MG/3ML soln   Generic drug:  liraglutide     9 mL    Inject 0.6 mg Subcutaneous daily    Type 2 diabetes mellitus with complication, with long-term current use of insulin (H)       * Notice:  This list has 2 medication(s) that are the same as other medications prescribed for you. Read the directions carefully, and ask your doctor or other care provider to review them with you.

## 2017-12-22 DIAGNOSIS — Z12.11 ENCOUNTER FOR SCREENING COLONOSCOPY: ICD-10-CM

## 2017-12-22 PROCEDURE — 82274 ASSAY TEST FOR BLOOD FECAL: CPT | Performed by: FAMILY MEDICINE

## 2017-12-22 NOTE — PROGRESS NOTES
Your Hemoglobin A1C is elevated moderately     .Your basic metabolic panel which includes electrolytes,kidney function is normal and  -Glucose (diabetic screening test) is elevated but better     Follow up in 3 month(s)

## 2017-12-24 LAB — HEMOCCULT STL QL IA: NEGATIVE

## 2018-01-06 DIAGNOSIS — Z79.4 TYPE 2 DIABETES MELLITUS WITH COMPLICATION, WITH LONG-TERM CURRENT USE OF INSULIN (H): ICD-10-CM

## 2018-01-06 DIAGNOSIS — E11.8 TYPE 2 DIABETES MELLITUS WITH COMPLICATION, WITH LONG-TERM CURRENT USE OF INSULIN (H): ICD-10-CM

## 2018-01-08 RX ORDER — METFORMIN HCL 500 MG
TABLET, EXTENDED RELEASE 24 HR ORAL
Qty: 360 TABLET | Refills: 0 | Status: SHIPPED | OUTPATIENT
Start: 2018-01-08 | End: 2018-05-23

## 2018-01-08 NOTE — TELEPHONE ENCOUNTER
Requested Prescriptions   Pending Prescriptions Disp Refills     metFORMIN (GLUCOPHAGE-XR) 500 MG 24 hr tablet [Pharmacy Med Name: METFORMIN ER 500MG 24HR TABS] 360 tablet 0    Last Written Prescription Date:  1/27/17  Last Fill Quantity: 360,  # refills: 3   Last Office Visit with FMBLANCA, JOSE ANGEL or [facility] prescribing provider:  12/18/17   Future Office Visit:      Sig: TAKE 4 TABLETS BY MOUTH DAILY WITH BREAKFAST    Biguanide Agents Passed    1/6/2018  2:41 PM       Passed - Patient's BP is less than 140/90    BP Readings from Last 3 Encounters:   12/18/17 118/64   05/19/17 99/54   02/03/17 141/71                Passed - Patient has documented LDL within the past 12 mos.    Recent Labs   Lab Test  02/03/17   1551   LDL  46            Passed - Patient has had a Microalbumin in the past 12 mos.    Recent Labs   Lab Test  02/03/17   1557   MICROL  744   UMALCR  332.14*            Passed - Patient is age 10 or older       Passed - Patient has documented A1c within the specified period of time.    Recent Labs   Lab Test  12/18/17   1142   A1C  8.3*            Passed - Patient's CR is NOT>1.4 OR Patient's EGFR is NOT<45 within past 12 mos.    Recent Labs   Lab Test  12/18/17   1142   GFRESTIMATED  76   GFRESTBLACK  >90       Recent Labs   Lab Test  12/18/17   1142   CR  0.99            Passed - Patient does NOT have a diagnosis of CHF.       Passed - Recent (6 mos) or future visit with authorizing provider's specialty    Patient had office visit in the last 6 months or has a visit in the next 30 days with authorizing provider.  See chart review.

## 2018-01-08 NOTE — TELEPHONE ENCOUNTER
3 month follow up advised at 12/18/17 visit.    Prescription approved per [facility] Refill Protocol.    Marichuy Barnett RN  [facility]

## 2018-01-29 DIAGNOSIS — E11.8 TYPE 2 DIABETES MELLITUS WITH COMPLICATION, WITH LONG-TERM CURRENT USE OF INSULIN (H): ICD-10-CM

## 2018-01-29 DIAGNOSIS — Z79.4 TYPE 2 DIABETES MELLITUS WITH COMPLICATION, WITH LONG-TERM CURRENT USE OF INSULIN (H): ICD-10-CM

## 2018-01-29 NOTE — TELEPHONE ENCOUNTER
Requested Prescriptions   Pending Prescriptions Disp Refills     NOVOLOG FLEXPEN 100 UNIT/ML soln 54 mL 11     Sig: Patient uses 60 units per day    There is no refill protocol information for this order   Last Written Prescription Date:  1-27-17  Last Fill Quantity: 54ml,  # refills: 11   [facility] Visit with [facility]:  12-18-17   Future Office Visit:

## 2018-02-01 RX ORDER — INSULIN ASPART 100 [IU]/ML
INJECTION, SOLUTION INTRAVENOUS; SUBCUTANEOUS
Qty: 54 ML | Refills: 11 | Status: SHIPPED | OUTPATIENT
Start: 2018-02-01 | End: 2019-08-27

## 2018-02-01 NOTE — TELEPHONE ENCOUNTER
Lab Results   Component Value Date    A1C 8.3 12/18/2017     Recent Labs   Lab Test  02/03/17   1551   CHOL  99   HDL  28*   LDL  46   TRIG  124     Creatinine   Date Value Ref Range Status   12/18/2017 0.99 0.66 - 1.25 mg/dL Final       BP Readings from Last 3 Encounters:   12/18/17 118/64   05/19/17 99/54   02/03/17 141/71

## 2018-03-07 DIAGNOSIS — Z79.4 TYPE 2 DIABETES MELLITUS WITH COMPLICATION, WITH LONG-TERM CURRENT USE OF INSULIN (H): ICD-10-CM

## 2018-03-07 DIAGNOSIS — E11.8 TYPE 2 DIABETES MELLITUS WITH COMPLICATION, WITH LONG-TERM CURRENT USE OF INSULIN (H): ICD-10-CM

## 2018-03-07 RX ORDER — BLOOD SUGAR DIAGNOSTIC
1 STRIP MISCELLANEOUS 3 TIMES DAILY
Qty: 300 STRIP | Refills: 0 | Status: SHIPPED | OUTPATIENT
Start: 2018-03-07 | End: 2018-04-10

## 2018-03-07 NOTE — TELEPHONE ENCOUNTER
Requested Prescriptions   Pending Prescriptions Disp Refills     ACCU-CHEK SMARTVIEW test strip 300 strip 3     Si strip by In Vitro route 3 times daily Use to test blood sugar 3 times daily or as directed.    There is no refill protocol information for this order   Last Written Prescription Date:  17  Last Fill Quantity: 300,  # refills: 3   Last office visit: 2017 with prescribing provider:     Future Office Visit:

## 2018-03-07 NOTE — TELEPHONE ENCOUNTER
Prescription approved per [facility] [place].  Shelly Jones, RN [facility] Triage.

## 2018-04-02 ENCOUNTER — DOCUMENTATION ONLY (OUTPATIENT)
Dept: LAB | Facility: CLINIC | Age: 68
End: 2018-04-02

## 2018-04-02 DIAGNOSIS — E78.5 HYPERLIPIDEMIA LDL GOAL <100: ICD-10-CM

## 2018-04-02 DIAGNOSIS — I10 ESSENTIAL HYPERTENSION WITH GOAL BLOOD PRESSURE LESS THAN 140/90: ICD-10-CM

## 2018-04-02 DIAGNOSIS — E11.8 TYPE 2 DIABETES MELLITUS WITH COMPLICATION, WITH LONG-TERM CURRENT USE OF INSULIN (H): Primary | ICD-10-CM

## 2018-04-02 DIAGNOSIS — Z79.4 TYPE 2 DIABETES MELLITUS WITH COMPLICATION, WITH LONG-TERM CURRENT USE OF INSULIN (H): Primary | ICD-10-CM

## 2018-04-03 DIAGNOSIS — I25.10 CAD, MULTIPLE VESSEL: ICD-10-CM

## 2018-04-03 DIAGNOSIS — E78.5 HYPERLIPIDEMIA LDL GOAL <100: ICD-10-CM

## 2018-04-03 DIAGNOSIS — E11.8 TYPE 2 DIABETES MELLITUS WITH COMPLICATION, WITH LONG-TERM CURRENT USE OF INSULIN (H): ICD-10-CM

## 2018-04-03 DIAGNOSIS — Z79.4 TYPE 2 DIABETES MELLITUS WITH COMPLICATION, WITH LONG-TERM CURRENT USE OF INSULIN (H): ICD-10-CM

## 2018-04-03 LAB
CHOLEST SERPL-MCNC: 82 MG/DL
CREAT UR-MCNC: 115 MG/DL
HBA1C MFR BLD: 7.8 % (ref 0–6.4)
HDLC SERPL-MCNC: 27 MG/DL
LDLC SERPL CALC-MCNC: 37 MG/DL
MICROALBUMIN UR-MCNC: 1140 MG/L
MICROALBUMIN/CREAT UR: 991.3 MG/G CR (ref 0–17)
NONHDLC SERPL-MCNC: 55 MG/DL
TRIGL SERPL-MCNC: 89 MG/DL

## 2018-04-03 PROCEDURE — 36415 COLL VENOUS BLD VENIPUNCTURE: CPT | Performed by: FAMILY MEDICINE

## 2018-04-03 PROCEDURE — 83036 HEMOGLOBIN GLYCOSYLATED A1C: CPT | Performed by: FAMILY MEDICINE

## 2018-04-03 PROCEDURE — 80061 LIPID PANEL: CPT | Performed by: FAMILY MEDICINE

## 2018-04-03 PROCEDURE — 82043 UR ALBUMIN QUANTITATIVE: CPT | Performed by: FAMILY MEDICINE

## 2018-04-03 RX ORDER — LIRAGLUTIDE 6 MG/ML
INJECTION SUBCUTANEOUS
Qty: 3 ML | Refills: 0 | Status: SHIPPED | OUTPATIENT
Start: 2018-04-03 | End: 2018-04-10

## 2018-04-03 RX ORDER — ATORVASTATIN CALCIUM 80 MG/1
TABLET, FILM COATED ORAL
Qty: 30 TABLET | Refills: 0 | Status: SHIPPED | OUTPATIENT
Start: 2018-04-03 | End: 2018-04-10

## 2018-04-03 RX ORDER — ISOSORBIDE MONONITRATE 30 MG/1
TABLET, EXTENDED RELEASE ORAL
Qty: 90 TABLET | Refills: 0 | Status: SHIPPED | OUTPATIENT
Start: 2018-04-03 | End: 2018-04-10

## 2018-04-03 NOTE — TELEPHONE ENCOUNTER
"Requested Prescriptions   Pending Prescriptions Disp Refills     VICTOZA PEN 18 MG/3ML soln [Pharmacy Med Name: VICTOZA 18MG/3ML INJ PEN 3 X 3ML] 9 mL 0    Last Written Prescription Date:  1/27/17  Last Fill Quantity: 9,  # refills: 11   Last office visit: 12/18/2017 with prescribing provider:     Future Office Visit:   Next 5 appointments (look out 90 days)     Apr 10, 2018  8:40 AM CDT   Long Office Call with Eh Matos MD   [facility]    [address] [place] [id number] [phone]                  Sig: INJECT 0.6 MG UNDER THE SKIN DAILY    GLP-1 Agonists Protocol Failed    4/3/2018 10:17 AM       Failed - LDL on file in past 12 months    Recent Labs   Lab Test  02/03/17   1551   LDL  46            Failed - Microalbumin on file in past 12 months    Recent Labs   Lab Test  02/03/17   1557   MICROL  744   UMALCR  332.14*            Passed - Blood pressure less than 140/90 in past 6 months    BP Readings from Last 3 Encounters:   12/18/17 118/64   05/19/17 99/54   02/03/17 141/71                Passed - HgbA1C in past 3 or 6 months    Recent Labs   Lab Test  04/03/18   0710   A1C  7.8*            Passed - Patient is age 18 or older       Passed - Normal serum creatinine on file in past 12 months    Recent Labs   Lab Test  12/18/17   1142   CR  0.99            Passed - Recent (6 mo) or future (30 days) visit within the authorizing provider's specialty    Patient had office visit in the last 6 months or has a visit in the next 30 days with authorizing provider.  See \"Patient Info\" tab in inbasket, or \"Choose Columns\" in Meds & Orders section of the refill encounter.            atorvastatin (LIPITOR) 80 MG tablet [Pharmacy Med Name: ATORVASTATIN 80MG TABLETS] 90 tablet 0    Last Written Prescription Date:  1/27/17  Last Fill Quantity: 90,  # refills: 3   Last office visit: 12/18/2017 with prescribing provider:   " "  Future Office Visit:   Next 5 appointments (look out 90 days)     Apr 10, 2018  8:40 AM CDT   Long Office Call with Eh Matos MD   [facility]    [address] [place] [id number]   [phone]                  Sig: TAKE 1 TABLET BY MOUTH DAILY    Statins Protocol Failed    4/3/2018 10:17 AM       Failed - LDL on file in past 12 months    Recent Labs   Lab Test  02/03/17   1551   LDL  46            Passed - No abnormal creatine kinase in past 12 months    No lab results found.            Passed - Recent (12 mo) or future (30 days) visit within the authorizing provider's specialty    Patient had office visit in the last 12 months or has a visit in the next 30 days with authorizing provider or within the authorizing provider's specialty.  See \"Patient Info\" tab in inbasket, or \"Choose Columns\" in Meds & Orders section of the refill encounter.           Passed - Patient is age 18 or older        isosorbide mononitrate (IMDUR) 30 MG 24 hr tablet [Pharmacy Med Name: ISOSORBIDE MONONITRATE 30MG ER TABS] 90 tablet 0    Last Written Prescription Date:  1/27/17  Last Fill Quantity: 90,  # refills: 3   Last office visit: 12/18/2017 with prescribing provider:     Future Office Visit:   Next 5 appointments (look out 90 days)     Apr 10, 2018  8:40 AM CDT   Long Office Call with Eh Matos MD   [facility] ([facility])    [address] [place] [id number]   [phone]                  Sig: TAKE 1 TABLET BY MOUTH DAILY    Nitrates Passed    4/3/2018 10:17 AM       Passed - Blood pressure under 140/90 in past 12 months    BP Readings from Last 3 Encounters:   12/18/17 118/64   05/19/17 99/54   02/03/17 141/71                Passed - Pt is not on erectile dysfunction medications       Passed - Recent (12 mo) or future (30 days) visit within the " "authorizing provider's specialty    Patient had office visit in the last 12 months or has a visit in the next 30 days with authorizing provider or within the authorizing provider's specialty.  See \"Patient Info\" tab in inbasket, or \"Choose Columns\" in Meds & Orders section of the refill encounter.           Passed - Patient is age 18 or older      \    "

## 2018-04-03 NOTE — TELEPHONE ENCOUNTER
Mercedes to 4/10 appt. Due for fasting labs. Imdur prescription approved per [facility] Refill Protocol.    Noreen Newsome RN

## 2018-04-06 NOTE — PROGRESS NOTES
Your Hemoglobin A1C is mildly elevated and has improved   Your cholesterols are normal except for the HDL     Your cholesterols show that your HDL is low.  Eating olive oil and fish , eating a spoon full of almonds and increasing exercise and 1 glass of red wine help raise this.    Your microalbumin had gone up quie a bit.    Increasing the losartan would help to bring this down will discuss at your upcoming visit

## 2018-04-09 NOTE — PROGRESS NOTES
"  SUBJECTIVE:   Darian Engle is a 68 year old male who presents to clinic today for the following health issues:    Diabetes Follow-up    Patient is checking blood sugars: three times daily.   Blood sugar testing frequency justification:   Results are as follows:         am - 211/Average         suppertime - 180/Average         bedtime - 140/Average    Diabetic concerns: None     Symptoms of hypoglycemia (low blood sugar): shaky, weak - twice a mo     Paresthesias (numbness or burning in feet) or sores: Yes Slight tingly in left foot on/off     Date of last diabetic eye exam: 6/22/17    BP Readings from Last 2 Encounters:   12/18/17 118/64   05/19/17 99/54     Hemoglobin A1C (%)   Date Value   04/03/2018 7.8 (H)   12/18/2017 8.3 (H)     LDL Cholesterol Calculated (mg/dL)   Date Value   04/03/2018 37   02/03/2017 46       Amount of exercise or physical activity: 4 days/week for an average of 20 minutes    Problems taking medications regularly: No    Medication side effects: none    Diet: regular (no restrictions)    Right ring finger and bilat index finger locks up in the morning    Problem list and histories reviewed & adjusted, as indicated.    Patient gets heartburn   Has had problems with zantac not working   Omeprazole does work     Ros: no chest pain, chest tightness   No sob   No leg edema   No abdominal pain     Denies fever or chills   Weight stable       O: /72 (BP Location: Right arm, Patient Position: Sitting, Cuff Size: Adult Large)  Pulse 100  Temp 96.9  F (36.1  C) (Oral)  Ht 5' 7.75\" (1.721 m)  Wt 221 lb (100.2 kg)  SpO2 95%  BMI 33.85 kg/m2    Wt Readings from Last 4 Encounters:   04/10/18 221 lb (100.2 kg)   12/18/17 229 lb (103.9 kg)   06/30/17 230 lb (104.3 kg)   05/19/17 230 lb (104.3 kg)     Patient is working on losing weight     Chest wall normal to inspection and palpation. Good excursion bilaterally. Lungs clear to auscultation. Good air movement bilaterally without rales, " wheezes, or rhonchi.   Regular rate and  rhythm. S1 and S2 normal, no murmurs, clicks, gallops or rubs. No edema or JVD.      ICD-10-CM    1. Type 2 diabetes mellitus with complication, with long-term current use of insulin (H) E11.8     Z79.4    2. Essential hypertension with goal blood pressure less than 140/90 I10 losartan (COZAAR) 50 MG tablet   3. Trigger finger, acquired M65.30 XR Hand Right G/E 3 Views     XR Hand Left G/E 3 Views   4. Gastroesophageal reflux disease without esophagitis K21.9 famotidine (PEPCID) 20 MG tablet     Patient to call in a month about bp   Recheck microalbumin in 3 months   Increase losartan   To ortho for trigger fingers

## 2018-04-10 ENCOUNTER — RADIANT APPOINTMENT (OUTPATIENT)
Dept: GENERAL RADIOLOGY | Facility: CLINIC | Age: 68
End: 2018-04-10
Attending: FAMILY MEDICINE
Payer: COMMERCIAL

## 2018-04-10 ENCOUNTER — OFFICE VISIT (OUTPATIENT)
Dept: FAMILY MEDICINE | Facility: CLINIC | Age: 68
End: 2018-04-10
Payer: COMMERCIAL

## 2018-04-10 VITALS
TEMPERATURE: 96.9 F | BODY MASS INDEX: 33.49 KG/M2 | OXYGEN SATURATION: 95 % | DIASTOLIC BLOOD PRESSURE: 72 MMHG | HEART RATE: 100 BPM | HEIGHT: 68 IN | WEIGHT: 221 LBS | SYSTOLIC BLOOD PRESSURE: 129 MMHG

## 2018-04-10 DIAGNOSIS — M65.30 TRIGGER FINGER, ACQUIRED: ICD-10-CM

## 2018-04-10 DIAGNOSIS — E78.5 HYPERLIPIDEMIA LDL GOAL <100: ICD-10-CM

## 2018-04-10 DIAGNOSIS — I25.10 CAD, MULTIPLE VESSEL: ICD-10-CM

## 2018-04-10 DIAGNOSIS — E11.8 TYPE 2 DIABETES MELLITUS WITH COMPLICATION, WITH LONG-TERM CURRENT USE OF INSULIN (H): Primary | ICD-10-CM

## 2018-04-10 DIAGNOSIS — K21.9 GASTROESOPHAGEAL REFLUX DISEASE WITHOUT ESOPHAGITIS: ICD-10-CM

## 2018-04-10 DIAGNOSIS — I10 ESSENTIAL HYPERTENSION WITH GOAL BLOOD PRESSURE LESS THAN 140/90: ICD-10-CM

## 2018-04-10 DIAGNOSIS — Z79.4 TYPE 2 DIABETES MELLITUS WITH COMPLICATION, WITH LONG-TERM CURRENT USE OF INSULIN (H): Primary | ICD-10-CM

## 2018-04-10 PROCEDURE — 73130 X-RAY EXAM OF HAND: CPT | Mod: LT

## 2018-04-10 PROCEDURE — 99214 OFFICE O/P EST MOD 30 MIN: CPT | Performed by: FAMILY MEDICINE

## 2018-04-10 RX ORDER — LOSARTAN POTASSIUM 50 MG/1
100 TABLET ORAL DAILY
Qty: 180 TABLET | Refills: 3 | Status: SHIPPED | OUTPATIENT
Start: 2018-04-10 | End: 2018-04-10

## 2018-04-10 RX ORDER — ISOSORBIDE MONONITRATE 30 MG/1
30 TABLET, EXTENDED RELEASE ORAL DAILY
Qty: 90 TABLET | Refills: 3 | Status: SHIPPED | OUTPATIENT
Start: 2018-04-10 | End: 2019-06-23

## 2018-04-10 RX ORDER — LIRAGLUTIDE 6 MG/ML
INJECTION SUBCUTANEOUS
Qty: 3 ML | Refills: 3 | Status: SHIPPED | OUTPATIENT
Start: 2018-04-10 | End: 2019-05-19

## 2018-04-10 RX ORDER — FAMOTIDINE 20 MG/1
20 TABLET, FILM COATED ORAL 2 TIMES DAILY
Qty: 60 TABLET | Refills: 1 | Status: SHIPPED | OUTPATIENT
Start: 2018-04-10 | End: 2018-04-10

## 2018-04-10 RX ORDER — ATORVASTATIN CALCIUM 80 MG/1
80 TABLET, FILM COATED ORAL DAILY
Qty: 90 TABLET | Refills: 3 | Status: SHIPPED | OUTPATIENT
Start: 2018-04-10 | End: 2019-04-11

## 2018-04-10 RX ORDER — BLOOD SUGAR DIAGNOSTIC
1 STRIP MISCELLANEOUS 3 TIMES DAILY
Qty: 300 STRIP | Refills: 3 | Status: SHIPPED | OUTPATIENT
Start: 2018-04-10 | End: 2019-12-03

## 2018-04-10 RX ORDER — FAMOTIDINE 20 MG/1
20 TABLET, FILM COATED ORAL 2 TIMES DAILY
Qty: 60 TABLET | Refills: 1 | Status: SHIPPED | OUTPATIENT
Start: 2018-04-10 | End: 2018-06-07

## 2018-04-10 RX ORDER — LOSARTAN POTASSIUM 50 MG/1
100 TABLET ORAL DAILY
Qty: 180 TABLET | Refills: 3 | Status: SHIPPED | OUTPATIENT
Start: 2018-04-10 | End: 2019-05-19

## 2018-04-10 NOTE — MR AVS SNAPSHOT
After Visit Summary   4/10/2018    Darian Engle    MRN: [id number]           Patient Information     Date Of Birth          1950        Visit Information        Provider Department      4/10/2018 8:40 AM Eh Matos MD [facility]        Today's Diagnoses     Type 2 diabetes mellitus with complication, with long-term current use of insulin (H)    -  1    Essential hypertension with goal blood pressure less than 140/90        Trigger finger, acquired        Gastroesophageal reflux disease without esophagitis          Care Instructions    Call in 1 month to inform of bp readings and tolerance of higher dose of losartan           Follow-ups after your visit        Your next 10 appointments already scheduled     Apr 19, 2018   Procedure with Alvaro Sims MD   [facility] (--)    [address] [place] [address] [phone]              Future tests that were ordered for you today     Open Future Orders        Priority Expected Expires Ordered    XR Hand Right G/E 3 Views Routine 4/10/2018 4/10/2019 4/10/2018    XR Hand Left G/E 3 Views Routine 4/10/2018 4/10/2019 4/10/2018            Who to contact     If you have questions or need follow up information about today's clinic visit or your schedule please contact [facility] directly at [phone].  Normal or non-critical lab and imaging results will be communicated to you by MyChart, letter or phone within 4 business days after the clinic has received the results. If you do not hear from us within 7 days, please contact the clinic through MyChart or phone. If you have a critical or abnormal lab result, we will notify you by phone as soon as possible.  Submit refill requests through [employer] or call your pharmacy and they will forward the refill request to us. Please allow 3 business days for your refill to be completed.          Additional Information  "About Your Visit        [email or web address] Information     [employer] gives you secure access to your electronic health record. If you see a primary care provider, you can also send messages to your care team and make appointments. If you have questions, please call your primary care clinic.  If you do not have a primary care provider, please call [phone] and they will assist you.        Care EveryWhere ID     This is your Care EveryWhere ID. This could be used by other organizations to access your [facility] medical records  [id number]        Your Vitals Were     Pulse Temperature Height Pulse Oximetry BMI (Body Mass Index)       100 96.9  F (36.1  C) (Oral) 5' 7.75\" (1.721 m) 95% 33.85 kg/m2        Blood Pressure from Last 3 Encounters:   04/10/18 129/72   12/18/17 118/64   05/19/17 99/54    Weight from Last 3 Encounters:   04/10/18 221 lb (100.2 kg)   12/18/17 229 lb (103.9 kg)   06/30/17 230 lb (104.3 kg)                 Today's Medication Changes          These changes are accurate as of 4/10/18  9:33 AM.  If you have any questions, ask your nurse or doctor.               Start taking these medicines.        Dose/Directions    famotidine 20 MG tablet   Commonly known as:  PEPCID   Used for:  Gastroesophageal reflux disease without esophagitis   Started by:  Eh Matos MD        Dose:  20 mg   Take 1 tablet (20 mg) by mouth 2 times daily   Quantity:  60 tablet   Refills:  1         These medicines have changed or have updated prescriptions.        Dose/Directions    losartan 50 MG tablet   Commonly known as:  COZAAR   This may have changed:  how much to take   Used for:  Essential hypertension with goal blood pressure less than 140/90   Changed by:  Eh Matos MD        Dose:  100 mg   Take 2 tablets (100 mg) by mouth daily   Quantity:  180 tablet   Refills:  3         Stop taking these medicines if you haven't already. Please contact your care team if you have questions.     ticagrelor 90 MG tablet "   Commonly known as:  BRILINTA   Stopped by:  Eh Matos MD                Where to get your medicines      These medications were sent to [id number] - [place] [address] AT [facility] [address], [facility] [address]     Phone:  [phone]     famotidine 20 MG tablet    losartan 50 MG tablet                Primary Care Provider Office Phone # Fax #    Eh Matos [phone] [address] [facility] [address]        Equal Access to Services     [facility]: Hadii aad ku hadasho Soomaali, waaxda luqadaha, qaybta kaalmada adeegyada, waxay idiin hayaan adeeg kharash lacielo . So [facility] [phone].    ATENCIÓN: Si habla español, tiene a velasco disposición servicios gratuitos de asistencia lingüística. [facility] [phone].    We comply with applicable federal civil rights laws and [place] laws. We do not discriminate on the basis of race, color, national origin, age, disability, sex, sexual orientation, or gender identity.            Thank you!     Thank you for choosing [facility]  for your care. Our goal is always to provide you with excellent care. Hearing back from our patients is one way we can continue to improve our services. Please take a few minutes to complete the written survey that you may receive in the mail after your visit with us. Thank you!             Your Updated Medication List - Protect others around you: Learn how to safely use, store and throw away your medicines at www.disposemymeds.org.          This list is accurate as of 4/10/18  9:33 AM.  Always use your most recent med list.                   Brand Name Dispense Instructions for use Diagnosis    ACCU-CHEK SMARTVIEW test strip   Generic drug:  blood glucose monitoring     300 strip    1 strip by In Vitro route 3 times daily Use to test blood sugar 3 times daily or as directed.    Type 2 diabetes mellitus with  complication, with long-term current use of insulin (H)       amLODIPine 10 MG tablet    NORVASC    90 tablet    Take 1 tablet (10 mg) by mouth daily    Essential hypertension with goal blood pressure less than 140/90       aspirin 81 MG tablet     30 tablet    Take by mouth daily    CAD, multiple vessel       atorvastatin 80 MG tablet    LIPITOR    30 tablet    TAKE 1 TABLET BY MOUTH DAILY    CAD, multiple vessel, Hyperlipidemia LDL goal <100       B-D U/F 31G X 5 MM   Generic drug:  insulin pen needle     270 each    USE 3 TIMES A DAY WITH INSULIN    Type 2 diabetes mellitus with complication, with long-term current use of insulin (H)       blood glucose monitoring lancets     6 Box    1 each by In Vitro route 3 times daily Use to test blood sugar tid times daily or as directed.    Type 2 diabetes mellitus with complication, with long-term current use of insulin (H)       buPROPion 300 MG 24 hr tablet    WELLBUTRIN XL     TAKE 1 TAB BY MOUTH DAILY. START AFTER 2 WEEKS ON  MG DOSE        cilostazol 50 MG tablet    PLETAL     Take 50 mg by mouth 2 times daily        clopidogrel 75 MG tablet    PLAVIX     75 mg        famotidine 20 MG tablet    PEPCID    60 tablet    Take 1 tablet (20 mg) by mouth 2 times daily    Gastroesophageal reflux disease without esophagitis       isosorbide mononitrate 30 MG 24 hr tablet    IMDUR    90 tablet    TAKE 1 TABLET BY MOUTH DAILY    CAD, multiple vessel       LANTUS SOLOSTAR 100 UNIT/ML injection   Generic drug:  insulin glargine     30 mL    INJECT 50 UNITS UNDER THE SKIN DAILY    Type 2 diabetes mellitus with complication, with long-term current use of insulin (H)       losartan 50 MG tablet    COZAAR    180 tablet    Take 2 tablets (100 mg) by mouth daily    Essential hypertension with goal blood pressure less than 140/90       metFORMIN 500 MG 24 hr tablet    GLUCOPHAGE-XR    360 tablet    TAKE 4 TABLETS BY MOUTH DAILY WITH BREAKFAST    Type 2 diabetes mellitus with  complication, with long-term current use of insulin (H)       metoprolol succinate 100 MG 24 hr tablet    TOPROL-XL    90 tablet    Take 1 tablet (100 mg) by mouth daily    CAD, multiple vessel, Essential hypertension with goal blood pressure less than 140/90       * nicotine 14 MG/24HR 24 hr patch    NICODERM CQ     1 patch        * nicotine 10 MG Inhaler    NICOTROL    180 each    Inhale 6-16 Cartridges into the lungs daily as needed for smoking cessation    CAD, multiple vessel, PAD (peripheral artery disease) (H), Smoker       nitroGLYcerin 0.4 MG sublingual tablet    NITROSTAT    25 tablet    For chest pain place 1 tablet under the tongue every 5 minutes for 3 doses. If symptoms persist 5 minutes after 1st dose call 911.    CAD, multiple vessel       NovoLOG FLEXPEN 100 UNIT/ML injection   Generic drug:  insulin aspart     54 mL    Patient uses 60 units per day    Type 2 diabetes mellitus with complication, with long-term current use of insulin (H)       omeprazole 20 MG CR capsule    priLOSEC    90 capsule    Take 1 capsule (20 mg) by mouth daily    Gastroesophageal reflux disease without esophagitis       VICTOZA PEN 18 MG/3ML soln   Generic drug:  liraglutide     3 mL    INJECT 0.6 MG UNDER THE SKIN DAILY    Type 2 diabetes mellitus with complication, with long-term current use of insulin (H)       * Notice:  This list has 2 medication(s) that are the same as other medications prescribed for you. Read the directions carefully, and ask your doctor or other care provider to review them with you.

## 2018-04-10 NOTE — NURSING NOTE
"Chief Complaint   Patient presents with     Diabetes     Follow up       Initial /72 (BP Location: Right arm, Patient Position: Sitting, Cuff Size: Adult Large)  Pulse 100  Temp 96.9  F (36.1  C) (Oral)  Ht 5' 7.75\" (1.721 m)  Wt 221 lb (100.2 kg)  SpO2 95%  BMI 33.85 kg/m2 Estimated body mass index is 33.85 kg/(m^2) as calculated from the following:    Height as of this encounter: 5' 7.75\" (1.721 m).    Weight as of this encounter: 221 lb (100.2 kg).  Medication Reconciliation: complete   Kaycee Ashford MA      "

## 2018-04-10 NOTE — PROGRESS NOTES
No evidence of arthritis in the joints   Possible old fracture seen in left index finger   You have calcium in the blood vessels which is not uncommon and really has no significance it terms of your hands.  There is nothing to treat with this condition.

## 2018-04-19 ENCOUNTER — SURGERY (OUTPATIENT)
Age: 68
End: 2018-04-19

## 2018-04-19 ENCOUNTER — HOSPITAL ENCOUNTER (OUTPATIENT)
Facility: AMBULATORY SURGERY CENTER | Age: 68
Discharge: HOME | End: 2018-04-19
Attending: SURGERY | Admitting: SURGERY
Payer: COMMERCIAL

## 2018-04-19 VITALS
RESPIRATION RATE: 16 BRPM | OXYGEN SATURATION: 96 % | DIASTOLIC BLOOD PRESSURE: 76 MMHG | HEART RATE: 87 BPM | SYSTOLIC BLOOD PRESSURE: 150 MMHG | TEMPERATURE: 97.8 F

## 2018-04-19 LAB — GLUCOSE BLDC GLUCOMTR-MCNC: 176 MG/DL (ref 70–99)

## 2018-04-19 RX ORDER — NYSTATIN 100000 U/G
CREAM TOPICAL
Refills: 0 | COMMUNITY
Start: 2017-09-27 | End: 2020-01-30

## 2018-04-19 RX ORDER — PNV NO.95/FERROUS FUM/FOLIC AC 28MG-0.8MG
1000 TABLET ORAL DAILY
COMMUNITY

## 2018-04-19 NOTE — PROGRESS NOTES
Patient is on plavix and pletal for groin and cardiac stents.  Groin was 6 months ago  Told to see if is able to come off the blood thinner for 7 days if able then can do the colonoscopy.    He is here for his 10 year colon screening.   Alvaro Sims MD

## 2018-04-19 NOTE — PROGRESS NOTES
Pt colonoscopy procedure canceled due to patient not stopping Plavix and Pletel. Pt care time was 25 mins.

## 2018-05-23 DIAGNOSIS — Z79.4 TYPE 2 DIABETES MELLITUS WITH COMPLICATION, WITH LONG-TERM CURRENT USE OF INSULIN (H): ICD-10-CM

## 2018-05-23 DIAGNOSIS — E11.8 TYPE 2 DIABETES MELLITUS WITH COMPLICATION, WITH LONG-TERM CURRENT USE OF INSULIN (H): ICD-10-CM

## 2018-05-23 NOTE — TELEPHONE ENCOUNTER
Routing refill request to provider for review/approval because:  Elevated BP.    Shelly Jones RN [facility] Triage.

## 2018-05-24 RX ORDER — METFORMIN HCL 500 MG
TABLET, EXTENDED RELEASE 24 HR ORAL
Qty: 360 TABLET | Refills: 0 | Status: SHIPPED | OUTPATIENT
Start: 2018-05-24 | End: 2018-09-02

## 2018-06-07 ENCOUNTER — OFFICE VISIT (OUTPATIENT)
Dept: FAMILY MEDICINE | Facility: CLINIC | Age: 68
End: 2018-06-07
Payer: COMMERCIAL

## 2018-06-07 VITALS
OXYGEN SATURATION: 96 % | SYSTOLIC BLOOD PRESSURE: 121 MMHG | DIASTOLIC BLOOD PRESSURE: 69 MMHG | HEART RATE: 80 BPM | TEMPERATURE: 97.7 F | BODY MASS INDEX: 34.31 KG/M2 | WEIGHT: 224 LBS

## 2018-06-07 DIAGNOSIS — L60.0 INGROWN TOENAIL: ICD-10-CM

## 2018-06-07 DIAGNOSIS — I25.10 CAD, MULTIPLE VESSEL: ICD-10-CM

## 2018-06-07 DIAGNOSIS — E11.8 TYPE 2 DIABETES MELLITUS WITH COMPLICATION, WITH LONG-TERM CURRENT USE OF INSULIN (H): Primary | ICD-10-CM

## 2018-06-07 DIAGNOSIS — F17.200 SMOKER: ICD-10-CM

## 2018-06-07 DIAGNOSIS — I10 ESSENTIAL HYPERTENSION WITH GOAL BLOOD PRESSURE LESS THAN 140/90: ICD-10-CM

## 2018-06-07 DIAGNOSIS — Z79.4 TYPE 2 DIABETES MELLITUS WITH COMPLICATION, WITH LONG-TERM CURRENT USE OF INSULIN (H): Primary | ICD-10-CM

## 2018-06-07 DIAGNOSIS — I73.9 PAD (PERIPHERAL ARTERY DISEASE) (H): ICD-10-CM

## 2018-06-07 DIAGNOSIS — E78.5 HYPERLIPIDEMIA LDL GOAL <100: ICD-10-CM

## 2018-06-07 LAB — HBA1C MFR BLD: 7.8 % (ref 0–5.6)

## 2018-06-07 PROCEDURE — 99214 OFFICE O/P EST MOD 30 MIN: CPT | Performed by: FAMILY MEDICINE

## 2018-06-07 PROCEDURE — 83036 HEMOGLOBIN GLYCOSYLATED A1C: CPT | Performed by: FAMILY MEDICINE

## 2018-06-07 PROCEDURE — 36415 COLL VENOUS BLD VENIPUNCTURE: CPT | Performed by: FAMILY MEDICINE

## 2018-06-07 RX ORDER — NICOTINE 21 MG/24HR
1 PATCH, TRANSDERMAL 24 HOURS TRANSDERMAL EVERY 24 HOURS
Qty: 30 PATCH | Refills: 0 | Status: SHIPPED | OUTPATIENT
Start: 2018-06-07 | End: 2020-09-16

## 2018-06-07 NOTE — PROGRESS NOTES
SUBJECTIVE:   Darian Engle is a 68 year old male who presents to clinic today for the following health issues:    Blood pressure follow up    Diabetes Follow-up    Patient is checking blood sugars: three - four times daily.   Blood sugar testing frequency justification:   Results are as follows: Does not know Average readings         Am,  9am, 1pm and 6pm    Diabetic concerns: None     Symptoms of hypoglycemia (low blood sugar): none    They are unusual      Paresthesias (numbness or burning in feet) or sores: No     Date of last diabetic eye exam: 6/22/17    Patient has an Aysha rony meter       BP Readings from Last 2 Encounters:   04/19/18 150/76   04/10/18 129/72     Hemoglobin A1C (%)   Date Value   04/03/2018 7.8 (H)   12/18/2017 8.3 (H)     LDL Cholesterol Calculated (mg/dL)   Date Value   04/03/2018 37   02/03/2017 46       Amount of exercise or physical activity: 3-4 days/week for an average of 30 minutes    Problems taking medications regularly: No    Medication side effects: none    Diet: regular (no restrictions)    Takes 50 units of glargine and 20-24 units per dose per day     Current Outpatient Prescriptions   Medication Sig Dispense Refill     ACCU-CHEK SMARTVIEW test strip 1 strip by In Vitro route 3 times daily Use to test blood sugar 3 times daily or as directed. 300 strip 3     amLODIPine (NORVASC) 10 MG tablet Take 1 tablet (10 mg) by mouth daily 90 tablet 3     aspirin 81 MG tablet Take by mouth daily 30 tablet      atorvastatin (LIPITOR) 80 MG tablet Take 1 tablet (80 mg) by mouth daily 90 tablet 3     B-D U/F insulin pen needle USE 3 TIMES A DAY WITH INSULIN 270 each 3     blood glucose monitoring (ACCU-CHEK FASTCLIX) lancets 1 each by In Vitro route 3 times daily Use to test blood sugar tid times daily or as directed. 6 Box 3     cilostazol (PLETAL) 50 MG tablet Take 50 mg by mouth 2 times daily       clopidogrel (PLAVIX) 75 MG tablet 75 mg  11     insulin glargine (LANTUS SOLOSTAR)  100 UNIT/ML injection INJECT 50 UNITS UNDER THE SKIN DAILY 30 mL 3     isosorbide mononitrate (IMDUR) 30 MG 24 hr tablet Take 1 tablet (30 mg) by mouth daily 90 tablet 3     liraglutide (VICTOZA PEN) 18 MG/3ML soln INJECT 0.6 MG UNDER THE SKIN DAILY 3 mL 3     losartan (COZAAR) 50 MG tablet Take 2 tablets (100 mg) by mouth daily 180 tablet 3     metFORMIN (GLUCOPHAGE-XR) 500 MG 24 hr tablet TAKE 4 TABLETS BY MOUTH DAILY WITH BREAKFAST 360 tablet 0     metoprolol (TOPROL-XL) 100 MG 24 hr tablet Take 1 tablet (100 mg) by mouth daily 90 tablet 3     nicotine (NICODERM CQ) 14 MG/24HR 24 hr patch 1 patch       nicotine (NICOTROL) 10 MG Inhaler Inhale 6-16 Cartridges into the lungs daily as needed for smoking cessation (Patient not taking: Reported on 4/10/2018) 180 each 1     nitroglycerin (NITROSTAT) 0.4 MG sublingual tablet For chest pain place 1 tablet under the tongue every 5 minutes for 3 doses. If symptoms persist 5 minutes after 1st dose call 911. 25 tablet 0     NOVOLOG FLEXPEN 100 UNIT/ML soln Patient uses 60 units per day 54 mL 11     nystatin (MYCOSTATIN) cream APLY TO AFFECTED AREA TWICE DAILY FOR 2 WEEKS  0     Omega-3 Fatty Acids (FISH OIL OMEGA-3) 1000 MG CAPS Take 1,200 mg by mouth daily       omeprazole (PRILOSEC) 20 MG CR capsule Take 1 capsule (20 mg) by mouth daily 90 capsule 3       Ros: no chest pain, chest tightness   No sob   No leg edema   No abdominal pain     Denies fever or chills   Weight stable   Hands are feeling stiff   He has an appointment 6/18/2018 in [place]      ROS: 10 point ROS neg other than the symptoms noted above in the HPI.  Patient is not exercising as much as usual   Low grade headache for about a week     Patient has GERD   Tried to stop the omeprazole and could not tolerate this Pepcid did not help   Buying it over the counter     Denies claudication   Had stent in right calf one year ago   Patient had an abdominal angiogram 1 year ago at [facility] and no aneurysm was seen      O: /69 (BP Location: Left arm, Patient Position: Sitting, Cuff Size: Adult Large)  Pulse 80  Temp 97.7  F (36.5  C) (Oral)  Wt 224 lb (101.6 kg)  SpO2 96%  BMI 34.31 kg/m2    Wt Readings from Last 4 Encounters:   06/07/18 224 lb (101.6 kg)   04/10/18 221 lb (100.2 kg)   12/18/17 229 lb (103.9 kg)   06/30/17 230 lb (104.3 kg)       Head: Normocephalic, atraumatic.  Eyes: Conjunctiva clear, non icteric. PERRLA.  Ears: External ears and TMs normal BL.; hearing aids   Wax in right ear ; patient will take care of it at home   Nose: Septum midline, nasal mucosa pink and moist. No discharge.  Mouth / Throat: Normal dentition.  No oral lesions. Pharynx non erythematous, tonsils without hypertrophy.  Neck: Supple, no enlarged LN, trachea midline.      Chest wall normal to inspection and palpation. Good excursion bilaterally. Lungs clear to auscultation. Good air movement bilaterally without rales, wheezes, or rhonchi.   Regular rate and  rhythm. S1 and S2 normal, no murmurs, clicks, gallops or rubs. No edema or JVD.    The abdomen is soft without tenderness, guarding, mass, rebound or organomegaly. Bowel sounds are normal. No CVA tenderness or inguinal adenopathy noted.    Ingrown toenail in the right first toenail       ICD-10-CM    1. Type 2 diabetes mellitus with complication, with long-term current use of insulin (H) E11.8     Z79.4    2. Hyperlipidemia LDL goal <100 E78.5    3. Essential hypertension with goal blood pressure less than 140/90 I10    4. PAD (peripheral artery disease) (H) I73.9    5. CAD, multiple vessel I25.10    6. Ingrown toenail L60.0      Discussed possible stopping the Pletal and the Plavix   Meds up to date

## 2018-06-07 NOTE — MR AVS SNAPSHOT
After Visit Summary   6/7/2018    Darian Engle    MRN: [id number]           Patient Information     Date Of Birth          1950        Visit Information        Provider Department      6/7/2018 8:40 AM Eh Matos MD [facility]        Today's Diagnoses     Type 2 diabetes mellitus with complication, with long-term current use of insulin (H)    -  1    Hyperlipidemia LDL goal <100        Essential hypertension with goal blood pressure less than 140/90        PAD (peripheral artery disease) (H)        CAD, multiple vessel        Ingrown toenail        Smoker          Care Instructions    Remember to use Vicks on your toenail an put cotton underneath the right first toenail.     Work on quitting cigarette smoking     Ask your specialists about stopping the Plavix and the Pletal           Follow-ups after your visit        Your next 10 appointments already scheduled     Jun 18, 2018  8:00 AM CDT   (Arrive by 7:45 AM)   SELVIN [place] with Shobha Ugalde   [facility] [place] ([facility] [place])    [address]  [place] [address] [phone]              Who to contact     If you have questions or need follow up information about today's clinic visit or your schedule please contact [facility] directly at [phone].  Normal or non-critical lab and imaging results will be communicated to you by MyChart, letter or phone within 4 business days after the clinic has received the results. If you do not hear from us within 7 days, please contact the clinic through MyChart or phone. If you have a critical or abnormal lab result, we will notify you by phone as soon as possible.  Submit refill requests through [email or web address] or call your pharmacy and they will forward the refill request to us. Please allow 3 business days for your refill to be completed.          Additional Information About Your  Visit        T[employer] Information     [employer] gives you secure access to your electronic health record. If you see a primary care provider, you can also send messages to your care team and make appointments. If you have questions, please call your primary care clinic.  If you do not have a primary care provider, please call [phone] and they will assist you.        Care EveryWhere ID     This is your Care EveryWhere ID. This could be used by other organizations to access your [place] medical records  [id number]        Your Vitals Were     Pulse Temperature Pulse Oximetry BMI (Body Mass Index)          80 97.7  F (36.5  C) (Oral) 96% 34.31 kg/m2         Blood Pressure from Last 3 Encounters:   06/07/18 121/69   04/19/18 150/76   04/10/18 129/72    Weight from Last 3 Encounters:   06/07/18 224 lb (101.6 kg)   04/10/18 221 lb (100.2 kg)   12/18/17 229 lb (103.9 kg)              We Performed the Following     Hemoglobin A1c          Today's Medication Changes          These changes are accurate as of 6/7/18  9:39 AM.  If you have any questions, ask your nurse or doctor.               These medicines have changed or have updated prescriptions.        Dose/Directions    * nicotine 14 MG/24HR 24 hr patch   Commonly known as:  NICODERM CQ   This may have changed:  Another medication with the same name was added. Make sure you understand how and when to take each.   Changed by:  Eh Matos MD        Dose:  1 patch   1 patch   Refills:  0       * nicotine 10 MG Inhaler   Commonly known as:  NICOTROL   This may have changed:  Another medication with the same name was added. Make sure you understand how and when to take each.   Used for:  CAD, multiple vessel, PAD (peripheral artery disease) (H), Smoker   Changed by:  Eh Matos MD        Dose:  6-16 Cartridge   Inhale 6-16 Cartridges into the lungs daily as needed for smoking cessation   Quantity:  180 each   Refills:  1       * nicotine 21 MG/24HR  24 hr patch   Commonly known as:  EQL NICOTINE   This may have changed:  You were already taking a medication with the same name, and this prescription was added. Make sure you understand how and when to take each.   Used for:  Smoker   Changed by:  Eh Matos MD        Dose:  1 patch   Place 1 patch onto the skin every 24 hours   Quantity:  30 patch   Refills:  0       * Notice:  This list has 3 medication(s) that are the same as other medications prescribed for you. Read the directions carefully, and ask your doctor or other care provider to review them with you.      Stop taking these medicines if you haven't already. Please contact your care team if you have questions.     buPROPion 300 MG 24 hr tablet   Commonly known as:  WELLBUTRIN XL   Stopped by:  Eh Matos MD           famotidine 20 MG tablet   Commonly known as:  PEPCID   Stopped by:  Eh Matos MD                Where to get your medicines      These medications were sent to [facility] [address] [place] [address] AT [place] [id number]  [address] [address]     Phone:  [phone]     nicotine 21 MG/24HR 24 hr patch                Primary Care Provider Office Phone # Fax #    Eh Matos [phone] [address] [facility] [address]        Equal Access to Services     ANAYELI CROW AH: Hadii maria esther ku hadasho Soomaali, waaxda luqadaha, qaybta kaalmada adeegyada, kristian zacarias ah. So [facility] [phone].    ATENCIÓN: Si habla español, tiene a velasco disposición servicios gratuitos de asistencia lingüística. Toney lim [phone].    We comply with applicable federal civil rights laws and [place] laws. We do not discriminate on the basis of race, color, national origin, age, disability, sex, sexual orientation, or gender identity.            Thank you!     Thank you for choosing [facility]   for your care. Our goal is always to provide you with excellent care. Hearing back from our patients is one way we can continue to improve our services. Please take a few minutes to complete the written survey that you may receive in the mail after your visit with us. Thank you!             Your Updated Medication List - Protect others around you: Learn how to safely use, store and throw away your medicines at www.disposemymeds.org.          This list is accurate as of 6/7/18  9:39 AM.  Always use your most recent med list.                   Brand Name Dispense Instructions for use Diagnosis    ACCU-CHEK SMARTVIEW test strip   Generic drug:  blood glucose monitoring     300 strip    1 strip by In Vitro route 3 times daily Use to test blood sugar 3 times daily or as directed.    Type 2 diabetes mellitus with complication, with long-term current use of insulin (H)       amLODIPine 10 MG tablet    NORVASC    90 tablet    Take 1 tablet (10 mg) by mouth daily    Essential hypertension with goal blood pressure less than 140/90       aspirin 81 MG tablet     30 tablet    Take by mouth daily    CAD, multiple vessel       atorvastatin 80 MG tablet    LIPITOR    90 tablet    Take 1 tablet (80 mg) by mouth daily    CAD, multiple vessel, Hyperlipidemia LDL goal <100       B-D U/F 31G X 5 MM   Generic drug:  insulin pen needle     270 each    USE 3 TIMES A DAY WITH INSULIN    Type 2 diabetes mellitus with complication, with long-term current use of insulin (H)       blood glucose monitoring lancets     6 Box    1 each by In Vitro route 3 times daily Use to test blood sugar tid times daily or as directed.    Type 2 diabetes mellitus with complication, with long-term current use of insulin (H)       cilostazol 50 MG tablet    PLETAL     Take 50 mg by mouth 2 times daily        clopidogrel 75 MG tablet    PLAVIX     75 mg        FISH OIL OMEGA-3 1000 MG Caps      Take 1,200 mg by mouth daily        insulin glargine 100 UNIT/ML  injection    LANTUS SOLOSTAR    30 mL    INJECT 50 UNITS UNDER THE SKIN DAILY    Type 2 diabetes mellitus with complication, with long-term current use of insulin (H)       isosorbide mononitrate 30 MG 24 hr tablet    IMDUR    90 tablet    Take 1 tablet (30 mg) by mouth daily    CAD, multiple vessel       liraglutide 18 MG/3ML soln    VICTOZA PEN    3 mL    INJECT 0.6 MG UNDER THE SKIN DAILY    Type 2 diabetes mellitus with complication, with long-term current use of insulin (H)       losartan 50 MG tablet    COZAAR    180 tablet    Take 2 tablets (100 mg) by mouth daily    Essential hypertension with goal blood pressure less than 140/90       metFORMIN 500 MG 24 hr tablet    GLUCOPHAGE-XR    360 tablet    TAKE 4 TABLETS BY MOUTH DAILY WITH BREAKFAST    Type 2 diabetes mellitus with complication, with long-term current use of insulin (H)       metoprolol succinate 100 MG 24 hr tablet    TOPROL-XL    90 tablet    Take 1 tablet (100 mg) by mouth daily    CAD, multiple vessel, Essential hypertension with goal blood pressure less than 140/90       * nicotine 14 MG/24HR 24 hr patch    NICODERM CQ     1 patch        * nicotine 10 MG Inhaler    NICOTROL    180 each    Inhale 6-16 Cartridges into the lungs daily as needed for smoking cessation    CAD, multiple vessel, PAD (peripheral artery disease) (H), Smoker       * nicotine 21 MG/24HR 24 hr patch    EQL NICOTINE    30 patch    Place 1 patch onto the skin every 24 hours    Smoker       nitroGLYcerin 0.4 MG sublingual tablet    NITROSTAT    25 tablet    For chest pain place 1 tablet under the tongue every 5 minutes for 3 doses. If symptoms persist 5 minutes after 1st dose call 911.    CAD, multiple vessel       NovoLOG FLEXPEN 100 UNIT/ML injection   Generic drug:  insulin aspart     54 mL    Patient uses 60 units per day    Type 2 diabetes mellitus with complication, with long-term current use of insulin (H)       nystatin cream    MYCOSTATIN     APLY TO AFFECTED AREA  TWICE DAILY FOR 2 WEEKS        omeprazole 20 MG CR capsule    priLOSEC    90 capsule    Take 1 capsule (20 mg) by mouth daily    Gastroesophageal reflux disease without esophagitis       * Notice:  This list has 3 medication(s) that are the same as other medications prescribed for you. Read the directions carefully, and ask your doctor or other care provider to review them with you.

## 2018-06-07 NOTE — PATIENT INSTRUCTIONS
Remember to use Vicks on your toenail an put cotton underneath the right first toenail.     Work on quitting cigarette smoking     Ask your specialists about stopping the Plavix and the Pletal

## 2018-06-08 ENCOUNTER — TRANSFERRED RECORDS (OUTPATIENT)
Dept: HEALTH INFORMATION MANAGEMENT | Facility: CLINIC | Age: 68
End: 2018-06-08

## 2018-06-18 ENCOUNTER — THERAPY VISIT (OUTPATIENT)
Dept: OCCUPATIONAL THERAPY | Facility: CLINIC | Age: 68
End: 2018-06-18
Payer: COMMERCIAL

## 2018-06-18 DIAGNOSIS — I10 ESSENTIAL HYPERTENSION WITH GOAL BLOOD PRESSURE LESS THAN 140/90: ICD-10-CM

## 2018-06-18 DIAGNOSIS — M25.649 STIFFNESS OF HAND JOINT, UNSPECIFIED LATERALITY: Primary | ICD-10-CM

## 2018-06-18 DIAGNOSIS — M65.30 TRIGGER FINGER, ACQUIRED: ICD-10-CM

## 2018-06-18 PROCEDURE — 97110 THERAPEUTIC EXERCISES: CPT | Mod: GO | Performed by: OCCUPATIONAL THERAPIST

## 2018-06-18 PROCEDURE — 97760 ORTHOTIC MGMT&TRAING 1ST ENC: CPT | Mod: GO | Performed by: OCCUPATIONAL THERAPIST

## 2018-06-18 PROCEDURE — 97165 OT EVAL LOW COMPLEX 30 MIN: CPT | Mod: GO | Performed by: OCCUPATIONAL THERAPIST

## 2018-06-18 NOTE — TELEPHONE ENCOUNTER
Requested Prescriptions   Pending Prescriptions Disp Refills     amLODIPine (NORVASC) 10 MG tablet 90 tablet 3     Sig: Take 1 tablet (10 mg) by mouth daily    There is no refill protocol information for this order        Last Written Prescription Date:  1/27/17  Last Fill Quantity: 90,  # refills: 3  Last office visit: 6/7/2018 with prescribing provider:  Shawna   Future Office Visit:

## 2018-06-18 NOTE — MR AVS SNAPSHOT
After Visit Summary   6/18/2018    Darian Engle    MRN: [id number]           Patient Information     Date Of Birth          1950        Visit Information        Provider Department      6/18/2018 8:00 AM Shobha Ugalde [facility] [place]        Today's Diagnoses     Stiffness of hand joint, unspecified laterality    -  1    Trigger finger, acquired           Follow-ups after your visit        Who to contact     If you have questions or need follow up information about today's clinic visit or your schedule please contact [facility] [place] directly at [phone].  Normal or non-critical lab and imaging results will be communicated to you by Embuehart, letter or phone within 4 business days after the clinic has received the results. If you do not hear from us within 7 days, please contact the clinic through Embuehart or phone. If you have a critical or abnormal lab result, we will notify you by phone as soon as possible.  Submit refill requests through [employer] or call your pharmacy and they will forward the refill request to us. Please allow 3 business days for your refill to be completed.          Additional Information About Your Visit        MyChart Information     [employer] gives you secure access to your electronic health record. If you see a primary care provider, you can also send messages to your care team and make appointments. If you have questions, please call your primary care clinic.  If you do not have a primary care provider, please call [phone] and they will assist you.        Care EveryWhere ID     This is your Care EveryWhere ID. This could be used by other organizations to access your [place] medical records  [id number]         Blood Pressure from Last 3 Encounters:   06/07/18 121/69   04/19/18 150/76   04/10/18 129/72    Weight from Last 3 Encounters:   06/07/18 101.6 kg (224 lb)   04/10/18 100.2 kg  (221 lb)   12/18/17 103.9 kg (229 lb)              We Performed the Following     HC OT EVAL, LOW COMPLEXITY     SELVIN INITIAL EVAL REPORT     ORTHOTIC MGMT AND TRAINING, EACH 15 MIN     THERAPEUTIC EXERCISES        Primary Care Provider Office Phone # Fax #    Eh Matos [phone]       [address] [facility] [address]        Equal Access to Services     [facility] : Hadii aad ku hadasho Soomaali, waaxda luqadaha, qaybta kaalmada adeegyada, waxay idiin hayaan adeeg kharash la'aan ah. So [facility] [phone].    ATENCIÓN: Si habla espariane, tiene a velasco disposición servicios gratuitos de asistencia lingüística. Llame al [phone].    We comply with applicable federal civil rights laws and [place] laws. We do not discriminate on the basis of race, color, national origin, age, disability, sex, sexual orientation, or gender identity.            Thank you!     Thank you for choosing [facility]  for your care. Our goal is always to provide you with excellent care. Hearing back from our patients is one way we can continue to improve our services. Please take a few minutes to complete the written survey that you may receive in the mail after your visit with us. Thank you!             Your Updated Medication List - Protect others around you: Learn how to safely use, store and throw away your medicines at www.disposemymeds.org.          This list is accurate as of 6/18/18  9:08 AM.  Always use your most recent med list.                   Brand Name Dispense Instructions for use Diagnosis    ACCU-CHEK SMARTVIEW test strip   Generic drug:  blood glucose monitoring     300 strip    1 strip by In Vitro route 3 times daily Use to test blood sugar 3 times daily or as directed.    Type 2 diabetes mellitus with complication, with long-term current use of insulin (H)       amLODIPine 10 MG tablet    NORVASC    90 tablet    Take 1 tablet (10 mg) by  mouth daily    Essential hypertension with goal blood pressure less than 140/90       aspirin 81 MG tablet     30 tablet    Take by mouth daily    CAD, multiple vessel       atorvastatin 80 MG tablet    LIPITOR    90 tablet    Take 1 tablet (80 mg) by mouth daily    CAD, multiple vessel, Hyperlipidemia LDL goal <100       B-D U/F 31G X 5 MM   Generic drug:  insulin pen needle     270 each    USE 3 TIMES A DAY WITH INSULIN    Type 2 diabetes mellitus with complication, with long-term current use of insulin (H)       blood glucose monitoring lancets     6 Box    1 each by In Vitro route 3 times daily Use to test blood sugar tid times daily or as directed.    Type 2 diabetes mellitus with complication, with long-term current use of insulin (H)       cilostazol 50 MG tablet    PLETAL     Take 50 mg by mouth 2 times daily        clopidogrel 75 MG tablet    PLAVIX     75 mg        FISH OIL OMEGA-3 1000 MG Caps      Take 1,200 mg by mouth daily        insulin glargine 100 UNIT/ML injection    LANTUS SOLOSTAR    30 mL    INJECT 50 UNITS UNDER THE SKIN DAILY    Type 2 diabetes mellitus with complication, with long-term current use of insulin (H)       isosorbide mononitrate 30 MG 24 hr tablet    IMDUR    90 tablet    Take 1 tablet (30 mg) by mouth daily    CAD, multiple vessel       liraglutide 18 MG/3ML soln    VICTOZA PEN    3 mL    INJECT 0.6 MG UNDER THE SKIN DAILY    Type 2 diabetes mellitus with complication, with long-term current use of insulin (H)       losartan 50 MG tablet    COZAAR    180 tablet    Take 2 tablets (100 mg) by mouth daily    Essential hypertension with goal blood pressure less than 140/90       metFORMIN 500 MG 24 hr tablet    GLUCOPHAGE-XR    360 tablet    TAKE 4 TABLETS BY MOUTH DAILY WITH BREAKFAST    Type 2 diabetes mellitus with complication, with long-term current use of insulin (H)       metoprolol succinate 100 MG 24 hr tablet    TOPROL-XL    90 tablet    Take 1 tablet (100 mg) by mouth  daily    CAD, multiple vessel, Essential hypertension with goal blood pressure less than 140/90       * nicotine 14 MG/24HR 24 hr patch    NICODERM CQ     1 patch        * nicotine 10 MG Inhaler    NICOTROL    180 each    Inhale 6-16 Cartridges into the lungs daily as needed for smoking cessation    CAD, multiple vessel, PAD (peripheral artery disease) (H), Smoker       * nicotine 21 MG/24HR 24 hr patch    EQL NICOTINE    30 patch    Place 1 patch onto the skin every 24 hours    Smoker       nitroGLYcerin 0.4 MG sublingual tablet    NITROSTAT    25 tablet    For chest pain place 1 tablet under the tongue every 5 minutes for 3 doses. If symptoms persist 5 minutes after 1st dose call 911.    CAD, multiple vessel       NovoLOG FLEXPEN 100 UNIT/ML injection   Generic drug:  insulin aspart     54 mL    Patient uses 60 units per day    Type 2 diabetes mellitus with complication, with long-term current use of insulin (H)       nystatin cream    MYCOSTATIN     APLY TO AFFECTED AREA TWICE DAILY FOR 2 WEEKS        omeprazole 20 MG CR capsule    priLOSEC    90 capsule    Take 1 capsule (20 mg) by mouth daily    Gastroesophageal reflux disease without esophagitis       * Notice:  This list has 3 medication(s) that are the same as other medications prescribed for you. Read the directions carefully, and ask your doctor or other care provider to review them with you.

## 2018-06-18 NOTE — PROGRESS NOTES
"Hand Therapy Initial Evaluation    Current Date:  6/18/2018    Subjective:  Darian (\"Frandy\") SERGIO [place] is a 68 year old right hand dominant male.    Diagnosis:   Right index and ring finger and left index finger pain and stiffness  DOI:  February 2018    Patient reports symptoms of pain, stiffness/loss of motion and weakness/loss of strength of the right index and ring fingers and left index finger which occurred due to gradual onset over the past 4-5 months with unknown etiology. Since onset symptoms are unchanged.  Special tests:  none.  Previous treatment: none.    General health as reported by patient is fair.  Pertinent medical history includes:Diabetes, Heart Problems, High Blood Pressure, Overweight, Smoking  Medical allergies:see EMR.  Surgical history: heart: bypass 2004.  Medication history: High Blood Pressure, Diabetes.    Occupational Profile Information:  Current occupation is Case aide   Currently working in normal job without restrictions  Job Tasks: Computer Work, Prolonged Sitting  Prior functional level:  no limitations  Barriers include:none  Mobility: No difficulty  Transportation: drives    Functional Outcome Measure:  Upper Extremity Functional Index  SCORE:   Column Totals: 40/80  (A lower score indicates greater disability.)    Objective:  ROM:  Index Finger 6/18/18   AROM(PROM) Right   MCP 0/65   PIP 0/95   DIP 0/50     Index Finger 6/18/18   AROM(PROM) Left   MCP 0/75   PIP 0/90   DIP 0/60     Ring Finger 6/18/18   AROM(PROM) Right   MCP 0/75   PIP -35/100 (-5/)   DIP 0/35     Strength:   (Measured in pounds)   6/18/18    Trials Right Left   1  2  3 55-  60-  55- 65-  73-  60-   Average: 57 65     3 Pt Pinch 6/18/18    Trials Right Left   1  2  3 10+  14+  13+ 15+  16+  18+   Average: 12 16     Edema: Mild of fingers     Sensation:  WNL throughout all nerve distributions; per patient report    Stage of Stenosing Tenosynovitis (SST):  Stage 1:  Normal  Stage 2:  Uneven motion of " tendon  Stage 3:  Triggering, clicking, catching  Stage 4:  Locking in extension or flexion; unlocked by active motion  Stage 5:  Locking in extension or flexion; unlocked by passive motion  Stage 6:  Finger locked in extension or flexion   6/18/18   Triggering of right finger Stage 4   Right ring Stage 4   Left index Stage 4     Palpation:   6/18/18   A1 pulley right index finger +   Ring ring +   Left index +     Pain Report:  VAS(0-10) 6/18/18   At Rest: 0/10   With Use: 1-2/10   Location:  Bilateral index fingers and right ring finger  Pain Quality:  Soreness and stiffness  Frequency: intermittent    Pain is worst:  daytime  Exacerbated by:  Gripping, pulling  Relieved by:  rest  Progression:  Unchanged  Assessment:  Patient presents with symptoms consistent with diagnosis of trigger fingers, with conservative intervention.     Patient's limitations or Problem List includes:  Pain, Decreased ROM/motion, Increased edema, Weakness, Decreased  and Decreased pinch of the right ring finger and bilateral index fingers which interferes with the patient's ability to perform Self Care Tasks (dressing, eating, bathing), Work Tasks, Recreational Activities, Household Chores and Driving  as compared to previous level of function.    Rehab Potential:  Good - Return to full activity, some limitations    Patient will benefit from skilled Occupational Therapy to increase ROM, flexibility, overall strength,  strength and pinch strength and decrease pain and edema to return to previous activity level and resume normal daily tasks and to reach their rehab potential.    Barriers to Learning:  No barrier    Communication Issues:  Patient appears to be able to clearly communicate and understand verbal and written communication and follow directions correctly.    Assessment of Occupational Performance:  5 or more Performance Deficits  Identified Performance Deficits: bathing/showering, dressing, feeding, hygiene and grooming,  driving and community mobility, home establishment and management, meal preparation and cleanup, work and leisure activities      Clinical Decision Making (Complexity): Low complexity    Treatment Explanation:  The following has been discussed with the patient:  RX ordered/plan of care  Anticipated outcomes  Possible risks and side effects    Plan:  Frequency:  1 X week, once daily  Duration:  for 6 weeks  Treatment Plan:    Modalities:  US and Paraffin  Therapeutic Exercise:  AROM, PROM, Tendon Gliding, Blocking, Extensor Tracking, Isotonics and Isometrics  Manual Techniques:  Friction massage and decongestive   Orthotic Fabrication:  Finger based orthoses  Self Care:  Self Care Tasks, Ergonomic Considerations and Diagnostic Education     Discharge Plan:  Achieve all LTG.  Independent in home treatment program.  Reach maximal therapeutic benefit.    Home Exercise Program:  Warmth for stiffness  Ice to A1 pulley/palm for inflammation  Decongestive and TFM to palm and A1 pulley  Tracking on table for PIP extension stretch right ring finger  PROM finger flexion, avoid triggering  Extension gutter orthoses right index and ring fingers sleeping  Avoid triggering with ADL's, consider bandaids to PIP joints to limit end range flexion and prevent triggering    Next Visit:  See in 1-2 weeks  Assess response to HEP and night orthoses right hand, fit with left index orthosis as indicated  Consider Oval 8 or ring orthoses day as needed to prevent triggering

## 2018-06-19 RX ORDER — AMLODIPINE BESYLATE 10 MG/1
10 TABLET ORAL DAILY
Qty: 90 TABLET | Refills: 1 | Status: SHIPPED | OUTPATIENT
Start: 2018-06-19 | End: 2018-11-26

## 2018-06-19 NOTE — TELEPHONE ENCOUNTER
"Requested Prescriptions   Pending Prescriptions Disp Refills     amLODIPine (NORVASC) 10 MG tablet 90 tablet 3     Sig: Take 1 tablet (10 mg) by mouth daily    Calcium Channel Blockers Protocol  Passed    6/18/2018 11:21 AM       Passed - Blood pressure under 140/90 in past 12 months    BP Readings from Last 3 Encounters:   06/07/18 121/69   04/19/18 150/76   04/10/18 129/72                Passed - Recent (12 mo) or future (30 days) visit within the authorizing provider's specialty    Patient had office visit in the last 12 months or has a visit in the next 30 days with authorizing provider or within the authorizing provider's specialty.  See \"Patient Info\" tab in inbasket, or \"Choose Columns\" in Meds & Orders section of the refill encounter.           Passed - Patient is age 18 or older       Passed - Normal serum creatinine on file in past 12 months    Recent Labs   Lab Test  12/18/17   1142   CR  0.99               "

## 2018-06-19 NOTE — TELEPHONE ENCOUNTER
Due for office visit in September, Prescription approved per [facility] Refill Protocol.  Marichuy Barnett RN  [facility]

## 2018-06-27 ENCOUNTER — OFFICE VISIT (OUTPATIENT)
Dept: OPTOMETRY | Facility: CLINIC | Age: 68
End: 2018-06-27
Payer: COMMERCIAL

## 2018-06-27 DIAGNOSIS — E11.9 TYPE 2 DIABETES MELLITUS WITHOUT RETINOPATHY (H): Primary | ICD-10-CM

## 2018-06-27 DIAGNOSIS — H52.4 HYPEROPIA WITH PRESBYOPIA OF LEFT EYE: ICD-10-CM

## 2018-06-27 DIAGNOSIS — H52.4 ASTIGMATISM OF RIGHT EYE WITH PRESBYOPIA: ICD-10-CM

## 2018-06-27 DIAGNOSIS — H52.201 ASTIGMATISM OF RIGHT EYE WITH PRESBYOPIA: ICD-10-CM

## 2018-06-27 DIAGNOSIS — H26.9 BILATERAL INCIPIENT CATARACTS: ICD-10-CM

## 2018-06-27 DIAGNOSIS — H52.02 HYPEROPIA WITH PRESBYOPIA OF LEFT EYE: ICD-10-CM

## 2018-06-27 PROCEDURE — 92015 DETERMINE REFRACTIVE STATE: CPT | Performed by: OPTOMETRIST

## 2018-06-27 PROCEDURE — 92014 COMPRE OPH EXAM EST PT 1/>: CPT | Performed by: OPTOMETRIST

## 2018-06-27 ASSESSMENT — CUP TO DISC RATIO
OD_RATIO: 0.4
OS_RATIO: 0.4

## 2018-06-27 ASSESSMENT — REFRACTION_MANIFEST
OS_CYLINDER: +1.50
OS_SPHERE: +0.50
OS_CYLINDER: +1.50
METHOD_AUTOREFRACTION: 1
OS_AXIS: 084
OD_CYLINDER: +1.00
OS_SPHERE: +1.25
OD_SPHERE: +0.25
OD_AXIS: 165
OD_AXIS: 174
OD_ADD: +2.75
OS_ADD: +2.75
OS_AXIS: 175
OD_SPHERE: -1.25
OD_CYLINDER: +0.50

## 2018-06-27 ASSESSMENT — REFRACTION_WEARINGRX
OD_ADD: +2.75
OD_CYLINDER: +1.00
OD_AXIS: 172
OS_AXIS: 163
OD_SPHERE: -1.25
OS_SPHERE: +1.00
SPECS_TYPE: PAL
OS_CYLINDER: +0.50
OS_ADD: +2.75

## 2018-06-27 ASSESSMENT — VISUAL ACUITY
OD_CC: 20/60
OS_SC: 20/40
OD_SC: 20/40
OS_CC+: -2
OS_CC: 20/25
OD_CC+: -2
CORRECTION_TYPE: GLASSES
OS_CC: 20/125
OD_CC: 20/20
METHOD: SNELLEN - LINEAR

## 2018-06-27 ASSESSMENT — SLIT LAMP EXAM - LIDS
COMMENTS: NORMAL
COMMENTS: NORMAL

## 2018-06-27 ASSESSMENT — TONOMETRY
OS_IOP_MMHG: 16
IOP_METHOD: APPLANATION
OD_IOP_MMHG: 17

## 2018-06-27 ASSESSMENT — CONF VISUAL FIELD
OS_NORMAL: 1
OD_NORMAL: 1

## 2018-06-27 ASSESSMENT — EXTERNAL EXAM - LEFT EYE: OS_EXAM: NORMAL

## 2018-06-27 ASSESSMENT — EXTERNAL EXAM - RIGHT EYE: OD_EXAM: NORMAL

## 2018-06-27 NOTE — PROGRESS NOTES
Chief Complaint   Patient presents with     COMPREHENSIVE EYE EXAM     Diabetic     Diabetic Eye Exam   x16 y    Lab Results   Component Value Date    A1C 7.8 06/07/2018    A1C 7.8 04/03/2018    A1C 8.3 12/18/2017       Last Eye Exam: 1yr  Dilated Previously: Yes    What are you currently using to see?  glasses    Distance Vision Acuity: Satisfied with vision    Near Vision Acuity: Satisfied with vision while reading  with glasses    Eye Comfort: good  Do you use eye drops? : Yes: OTC rewetting  Occupation or Hobbies: Retired       Medical, surgical and family histories reviewed and updated 6/27/2018.       OBJECTIVE: See Ophthalmology exam    ASSESSMENT:    ICD-10-CM    1. Type 2 diabetes mellitus without retinopathy (H) E11.9 EYE EXAM (SIMPLE-NONBILLABLE)     REFRACTION   2. Astigmatism of right eye with presbyopia H52.201 REFRACTION    H52.4    3. Hyperopia with presbyopia of left eye H52.02 REFRACTION    H52.4    4. Bilateral incipient cataracts H26.9       PLAN:  Single vision for computer  L lens change  Darian Engle aware  eye exam results will be sent to Eh Matos  Recommend better control    Ashley Scott OD

## 2018-06-27 NOTE — LETTER
6/27/2018         RE: Darian Engle  [address] [facility] [address]        Dear Colleague,    Thank you for referring your patient, Darian Engle, [facility]. Please see a copy of my visit note below.    Chief Complaint   Patient presents with     COMPREHENSIVE EYE EXAM     Diabetic     Diabetic Eye Exam   x16 y    Lab Results   Component Value Date    A1C 7.8 06/07/2018    A1C 7.8 04/03/2018    A1C 8.3 12/18/2017       Last Eye Exam: 1yr  Dilated Previously: Yes    What are you currently using to see?  glasses    Distance Vision Acuity: Satisfied with vision    Near Vision Acuity: Satisfied with vision while reading  with glasses    Eye Comfort: good  Do you use eye drops? : Yes: OTC rewetting  Occupation or Hobbies: Retired       Medical, surgical and family histories reviewed and updated 6/27/2018.       OBJECTIVE: See Ophthalmology exam    ASSESSMENT:    ICD-10-CM    1. Type 2 diabetes mellitus without retinopathy (H) E11.9 EYE EXAM (SIMPLE-NONBILLABLE)     REFRACTION   2. Astigmatism of right eye with presbyopia H52.201 REFRACTION    H52.4    3. Hyperopia with presbyopia of left eye H52.02 REFRACTION    H52.4    4. Bilateral incipient cataracts H26.9       PLAN:  Single vision for computer  L lens change  Darian HILL Kadie aware  eye exam results will be sent to Eh [facility]Mtos  Recommend better control    Ashley Scott OD         Again, thank you for allowing me to participate in the care of your patient.        Sincerely,        Ashley Scott, OD

## 2018-06-27 NOTE — MR AVS SNAPSHOT
After Visit Summary   6/27/2018    Darian Engle    MRN: [id number]           Patient Information     Date Of Birth          1950        Visit Information        Provider Department      6/27/2018 1:00 PM Ashley Scott OD [facility]        Today's Diagnoses     Type 2 diabetes mellitus without retinopathy (H)    -  1    Astigmatism of right eye with presbyopia        Hyperopia with presbyopia of left eye        Bilateral incipient cataracts          Care Instructions    Change in L eye   Consider single vision computer prescription for work  Update progressive addition lens     Diabetes weakens the blood vessels all over the body, including the eyes. Damage to the blood vessels in the eyes can cause swelling or bleeding into part of the eye (called the retina). This is called diabetic retinopathy (TSERING-tin-AH-puh-thee). If not treated, this disease can cause vision loss or blindness.   Symptoms may include blurred or distorted vision, but many people have no symptoms. It's important to see your eye doctor regularly to check for problems.   Early treatment and good control can help protect your vision. Here are the things you can do to help prevent vision loss:      1. Keep your blood sugar levels under tight control.      2. Bring high blood pressure under control.      3. No smoking.      4. Have yearly dilated eye exams.     Early start of cataracts L>R  Vision change likely due to cataract  Both eyes correct to 20/20    No diabetic complications          Follow-ups after your visit        Follow-up notes from your care team     Return in about 1 year (around 6/27/2019).      Your next 10 appointments already scheduled     Jul 05, 2018  8:00 AM PEYTON Varela with Shobha Ugalde   [facility] [place] (SELVIN Varela)    [address] [address]  [place] [id number] [phone]              Who to contact     If you  have questions or need follow up information about today's clinic visit or your schedule please contact [facility] directly at [phone].  Normal or non-critical lab and imaging results will be communicated to you by MyChart, letter or phone within 4 business days after the clinic has received the results. If you do not hear from us within 7 days, please contact the clinic through MyChart or phone. If you have a critical or abnormal lab result, we will notify you by phone as soon as possible.  Submit refill requests through [employer] or call your pharmacy and they will forward the refill request to us. Please allow 3 business days for your refill to be completed.          Additional Information About Your Visit        [email or web address] Information     [employer] gives you secure access to your electronic health record. If you see a primary care provider, you can also send messages to your care team and make appointments. If you have questions, please call your primary care clinic.  If you do not have a primary care provider, please call [phone] and they will assist you.        Care EveryWhere ID     This is your Care EveryWhere ID. This could be used by other organizations to access your [place] medical records  [id number]         Blood Pressure from Last 3 Encounters:   06/07/18 121/69   04/19/18 150/76   04/10/18 129/72    Weight from Last 3 Encounters:   06/07/18 101.6 kg (224 lb)   04/10/18 100.2 kg (221 lb)   12/18/17 103.9 kg (229 lb)              We Performed the Following     EYE EXAM (SIMPLE-NONBILLABLE)     REFRACTION        Primary Care Provider Office Phone # Fax #    Eh Matos [phone]       [address] [facility] [address]        Equal Access to Services     COLE [place]OW : Hadleo Gallagher, walizett stover, qaybta kristian james. So [facility] [phone].    ATENCIÓN: roque Hatfield  velasco disposición servicios gratuitos de asistencia lingüística. Toney lim [phone].    We comply with applicable federal civil rights laws and [place] laws. We do not discriminate on the basis of race, color, national origin, age, disability, sex, sexual orientation, or gender identity.            Thank you!     Thank you for choosing [facility]  for your care. Our goal is always to provide you with excellent care. Hearing back from our patients is one way we can continue to improve our services. Please take a few minutes to complete the written survey that you may receive in the mail after your visit with us. Thank you!             Your Updated Medication List - Protect others around you: Learn how to safely use, store and throw away your medicines at www.disposemymeds.org.          This list is accurate as of 6/27/18  2:04 PM.  Always use your most recent med list.                   Brand Name Dispense Instructions for use Diagnosis    ACCU-CHEK SMARTVIEW test strip   Generic drug:  blood glucose monitoring     300 strip    1 strip by In Vitro route 3 times daily Use to test blood sugar 3 times daily or as directed.    Type 2 diabetes mellitus with complication, with long-term current use of insulin (H)       amLODIPine 10 MG tablet    NORVASC    90 tablet    Take 1 tablet (10 mg) by mouth daily    Essential hypertension with goal blood pressure less than 140/90       aspirin 81 MG tablet     30 tablet    Take by mouth daily    CAD, multiple vessel       atorvastatin 80 MG tablet    LIPITOR    90 tablet    Take 1 tablet (80 mg) by mouth daily    CAD, multiple vessel, Hyperlipidemia LDL goal <100       B-D U/F 31G X 5 MM   Generic drug:  insulin pen needle     270 each    USE 3 TIMES A DAY WITH INSULIN    Type 2 diabetes mellitus with complication, with long-term current use of insulin (H)       blood glucose monitoring lancets     6 Box    1 each by In Vitro route 3 times daily Use to test blood  sugar tid times daily or as directed.    Type 2 diabetes mellitus with complication, with long-term current use of insulin (H)       cilostazol 50 MG tablet    PLETAL     Take 50 mg by mouth 2 times daily        clopidogrel 75 MG tablet    PLAVIX     75 mg        FISH OIL OMEGA-3 1000 MG Caps      Take 1,200 mg by mouth daily        insulin glargine 100 UNIT/ML injection    LANTUS SOLOSTAR    30 mL    INJECT 50 UNITS UNDER THE SKIN DAILY    Type 2 diabetes mellitus with complication, with long-term current use of insulin (H)       isosorbide mononitrate 30 MG 24 hr tablet    IMDUR    90 tablet    Take 1 tablet (30 mg) by mouth daily    CAD, multiple vessel       liraglutide 18 MG/3ML soln    VICTOZA PEN    3 mL    INJECT 0.6 MG UNDER THE SKIN DAILY    Type 2 diabetes mellitus with complication, with long-term current use of insulin (H)       losartan 50 MG tablet    COZAAR    180 tablet    Take 2 tablets (100 mg) by mouth daily    Essential hypertension with goal blood pressure less than 140/90       metFORMIN 500 MG 24 hr tablet    GLUCOPHAGE-XR    360 tablet    TAKE 4 TABLETS BY MOUTH DAILY WITH BREAKFAST    Type 2 diabetes mellitus with complication, with long-term current use of insulin (H)       metoprolol succinate 100 MG 24 hr tablet    TOPROL-XL    90 tablet    Take 1 tablet (100 mg) by mouth daily    CAD, multiple vessel, Essential hypertension with goal blood pressure less than 140/90       * nicotine 14 MG/24HR 24 hr patch    NICODERM CQ     1 patch        * nicotine 10 MG Inhaler    NICOTROL    180 each    Inhale 6-16 Cartridges into the lungs daily as needed for smoking cessation    CAD, multiple vessel, PAD (peripheral artery disease) (H), Smoker       * nicotine 21 MG/24HR 24 hr patch    EQL NICOTINE    30 patch    Place 1 patch onto the skin every 24 hours    Smoker       nitroGLYcerin 0.4 MG sublingual tablet    NITROSTAT    25 tablet    For chest pain place 1 tablet under the tongue every 5 minutes  for 3 doses. If symptoms persist 5 minutes after 1st dose call 911.    CAD, multiple vessel       NovoLOG FLEXPEN 100 UNIT/ML injection   Generic drug:  insulin aspart     54 mL    Patient uses 60 units per day    Type 2 diabetes mellitus with complication, with long-term current use of insulin (H)       nystatin cream    MYCOSTATIN     APLY TO AFFECTED AREA TWICE DAILY FOR 2 WEEKS        omeprazole 20 MG CR capsule    priLOSEC    90 capsule    Take 1 capsule (20 mg) by mouth daily    Gastroesophageal reflux disease without esophagitis       * Notice:  This list has 3 medication(s) that are the same as other medications prescribed for you. Read the directions carefully, and ask your doctor or other care provider to review them with you.

## 2018-06-27 NOTE — PATIENT INSTRUCTIONS
Change in L eye   Consider single vision computer prescription for work  Update progressive addition lens     Diabetes weakens the blood vessels all over the body, including the eyes. Damage to the blood vessels in the eyes can cause swelling or bleeding into part of the eye (called the retina). This is called diabetic retinopathy (TSERING-tin--pu-thee). If not treated, this disease can cause vision loss or blindness.   Symptoms may include blurred or distorted vision, but many people have no symptoms. It's important to see your eye doctor regularly to check for problems.   Early treatment and good control can help protect your vision. Here are the things you can do to help prevent vision loss:      1. Keep your blood sugar levels under tight control.      2. Bring high blood pressure under control.      3. No smoking.      4. Have yearly dilated eye exams.     Early start of cataracts L>R  Vision change likely due to cataract  Both eyes correct to 20/20    No diabetic complications

## 2018-07-05 ENCOUNTER — THERAPY VISIT (OUTPATIENT)
Dept: OCCUPATIONAL THERAPY | Facility: CLINIC | Age: 68
End: 2018-07-05
Payer: COMMERCIAL

## 2018-07-05 DIAGNOSIS — M65.30 TRIGGER FINGER, ACQUIRED: ICD-10-CM

## 2018-07-05 DIAGNOSIS — M25.649 STIFFNESS OF HAND JOINT, UNSPECIFIED LATERALITY: ICD-10-CM

## 2018-07-05 PROCEDURE — 97763 ORTHC/PROSTC MGMT SBSQ ENC: CPT | Mod: GO | Performed by: OCCUPATIONAL THERAPIST

## 2018-07-05 PROCEDURE — 97140 MANUAL THERAPY 1/> REGIONS: CPT | Mod: GO | Performed by: OCCUPATIONAL THERAPIST

## 2018-07-05 PROCEDURE — 97110 THERAPEUTIC EXERCISES: CPT | Mod: GO | Performed by: OCCUPATIONAL THERAPIST

## 2018-07-05 NOTE — PROGRESS NOTES
SOAP note objective information for 7/5/2018:    ROM:  Index Finger 6/18/18 7/5/18   AROM(PROM) Right Right   MCP 0/65 0/70   PIP 0/95 0/95   DIP 0/50 0/50     Index Finger 6/18/18 7/5/18   AROM(PROM) Left Left   MCP 0/75 0/80   PIP 0/90 0/95   DIP 0/60 0/60     Ring Finger 6/18/18 7/5/18   AROM(PROM) Right Right   MCP 0/75 0/75   PIP -35/100 (-5/) -25/105   DIP 0/35 0/45     Stage of Stenosing Tenosynovitis (SST):  Stage 1:  Normal  Stage 2:  Uneven motion of tendon  Stage 3:  Triggering, clicking, catching  Stage 4:  Locking in extension or flexion; unlocked by active motion  Stage 5:  Locking in extension or flexion; unlocked by passive motion  Stage 6:  Finger locked in extension or flexion   6/18/18 7/5/18   Triggering of right finger Stage 4 Stage 4   Right ring Stage 4 Stage 4   Left index Stage 4 Stage 4     Palpation:   6/18/18 7/5/18   A1 pulley right index finger + +   Ring ring + +   Left index + +     Home Exercise Program:  Warmth for stiffness  Ice to A1 pulley/palm for inflammation  Decongestive and TFM to palm and A1 pulley  Tracking on table for PIP extension stretch right ring finger  PROM finger flexion, avoid triggering  Extension gutter orthoses right index and ring fingers and left index finger sleeping  Avoid triggering with ADL's, consider bandaids to PIP joints to limit end range flexion and prevent triggering    Next Visit:  See in 2-3 weeks  Assess response to left index orthosis  Consider Oval 8 or ring orthoses day as needed to prevent triggering  Recommend MD lawson for possible injections if continued triggering    Please refer to the daily flowsheet for treatment today, total treatment time and time spent performing 1:1 timed codes.

## 2018-07-05 NOTE — MR AVS SNAPSHOT
After Visit Summary   7/5/2018    Darian Engle    MRN: [id number]           Patient Information     Date Of Birth          1950        Visit Information        Provider Department      7/5/2018 8:00 AM Shobha Ugalde [facility] [place]        Today's Diagnoses     Stiffness of hand joint, unspecified laterality        Trigger finger, acquired           Follow-ups after your visit        Who to contact     If you have questions or need follow up information about today's clinic visit or your schedule please contact [facility] [place] directly at [phone].  Normal or non-critical lab and imaging results will be communicated to you by Business Texter[employer], letter or phone within 4 business days after the clinic has received the results. If you do not hear from us within 7 days, please contact the clinic through [employer] or phone. If you have a critical or abnormal lab result, we will notify you by phone as soon as possible.  Submit refill requests through [employer] or call your pharmacy and they will forward the refill request to us. Please allow 3 business days for your refill to be completed.          Additional Information About Your Visit        MyChart Information     [employer] gives you secure access to your electronic health record. If you see a primary care provider, you can also send messages to your care team and make appointments. If you have questions, please call your primary care clinic.  If you do not have a primary care provider, please call [phone] and they will assist you.        Care EveryWhere ID     This is your Care EveryWhere ID. This could be used by other organizations to access your [place] medical records  [id number]         Blood Pressure from Last 3 Encounters:   06/07/18 121/69   04/19/18 150/76   04/10/18 129/72    Weight from Last 3 Encounters:   06/07/18 101.6 kg (224 lb)   04/10/18 100.2 kg (221 lb)    12/18/17 103.9 kg (229 lb)              We Performed the Following     C OT ORTHOTICS/PROSTH MGMT &/TRAING SBSQ ENCTR, EA 15 MIN     MANUAL THER TECH,1+REGIONS,EA 15 MIN     THERAPEUTIC EXERCISES        Primary Care Provider Office Phone # Fax #    Eh Matos [phone]       [address] [facility] [address]        Equal Access to Services     [facility]: Hadii aad ku hadasho Soomaali, waaxda luqadaha, qaybta kaalmada adeegyada, waxay idiin hayaan adeeg kharash la'aan . So [facility] [phone].    ATENCIÓN: Si thiernola zully, tiene a velasco disposición servicios gratuitos de asistencia lingüística. Llame al [phone].    We comply with applicable federal civil rights laws and [place] laws. We do not discriminate on the basis of race, color, national origin, age, disability, sex, sexual orientation, or gender identity.            Thank you!     Thank you for choosing [facility]  for your care. Our goal is always to provide you with excellent care. Hearing back from our patients is one way we can continue to improve our services. Please take a few minutes to complete the written survey that you may receive in the mail after your visit with us. Thank you!             Your Updated Medication List - Protect others around you: Learn how to safely use, store and throw away your medicines at www.disposemymeds.org.          This list is accurate as of 7/5/18  8:24 AM.  Always use your most recent med list.                   Brand Name Dispense Instructions for use Diagnosis    ACCU-CHEK SMARTVIEW test strip   Generic drug:  blood glucose monitoring     300 strip    1 strip by In Vitro route 3 times daily Use to test blood sugar 3 times daily or as directed.    Type 2 diabetes mellitus with complication, with long-term current use of insulin (H)       amLODIPine 10 MG tablet    NORVASC    90 tablet    Take 1 tablet (10 mg) by mouth daily     Essential hypertension with goal blood pressure less than 140/90       aspirin 81 MG tablet     30 tablet    Take by mouth daily    CAD, multiple vessel       atorvastatin 80 MG tablet    LIPITOR    90 tablet    Take 1 tablet (80 mg) by mouth daily    CAD, multiple vessel, Hyperlipidemia LDL goal <100       B-D U/F 31G X 5 MM   Generic drug:  insulin pen needle     270 each    USE 3 TIMES A DAY WITH INSULIN    Type 2 diabetes mellitus with complication, with long-term current use of insulin (H)       blood glucose monitoring lancets     6 Box    1 each by In Vitro route 3 times daily Use to test blood sugar tid times daily or as directed.    Type 2 diabetes mellitus with complication, with long-term current use of insulin (H)       cilostazol 50 MG tablet    PLETAL     Take 50 mg by mouth 2 times daily        clopidogrel 75 MG tablet    PLAVIX     75 mg        FISH OIL OMEGA-3 1000 MG Caps      Take 1,200 mg by mouth daily        insulin glargine 100 UNIT/ML injection    LANTUS SOLOSTAR    30 mL    INJECT 50 UNITS UNDER THE SKIN DAILY    Type 2 diabetes mellitus with complication, with long-term current use of insulin (H)       isosorbide mononitrate 30 MG 24 hr tablet    IMDUR    90 tablet    Take 1 tablet (30 mg) by mouth daily    CAD, multiple vessel       liraglutide 18 MG/3ML soln    VICTOZA PEN    3 mL    INJECT 0.6 MG UNDER THE SKIN DAILY    Type 2 diabetes mellitus with complication, with long-term current use of insulin (H)       losartan 50 MG tablet    COZAAR    180 tablet    Take 2 tablets (100 mg) by mouth daily    Essential hypertension with goal blood pressure less than 140/90       metFORMIN 500 MG 24 hr tablet    GLUCOPHAGE-XR    360 tablet    TAKE 4 TABLETS BY MOUTH DAILY WITH BREAKFAST    Type 2 diabetes mellitus with complication, with long-term current use of insulin (H)       metoprolol succinate 100 MG 24 hr tablet    TOPROL-XL    90 tablet    Take 1 tablet (100 mg) by mouth daily    CAD,  multiple vessel, Essential hypertension with goal blood pressure less than 140/90       * nicotine 14 MG/24HR 24 hr patch    NICODERM CQ     1 patch        * nicotine 10 MG Inhaler    NICOTROL    180 each    Inhale 6-16 Cartridges into the lungs daily as needed for smoking cessation    CAD, multiple vessel, PAD (peripheral artery disease) (H), Smoker       * nicotine 21 MG/24HR 24 hr patch    EQL NICOTINE    30 patch    Place 1 patch onto the skin every 24 hours    Smoker       nitroGLYcerin 0.4 MG sublingual tablet    NITROSTAT    25 tablet    For chest pain place 1 tablet under the tongue every 5 minutes for 3 doses. If symptoms persist 5 minutes after 1st dose call 911.    CAD, multiple vessel       NovoLOG FLEXPEN 100 UNIT/ML injection   Generic drug:  insulin aspart     54 mL    Patient uses 60 units per day    Type 2 diabetes mellitus with complication, with long-term current use of insulin (H)       nystatin cream    MYCOSTATIN     APLY TO AFFECTED AREA TWICE DAILY FOR 2 WEEKS        omeprazole 20 MG CR capsule    priLOSEC    90 capsule    Take 1 capsule (20 mg) by mouth daily    Gastroesophageal reflux disease without esophagitis       * Notice:  This list has 3 medication(s) that are the same as other medications prescribed for you. Read the directions carefully, and ask your doctor or other care provider to review them with you.

## 2018-07-22 DIAGNOSIS — I10 ESSENTIAL HYPERTENSION WITH GOAL BLOOD PRESSURE LESS THAN 140/90: ICD-10-CM

## 2018-07-22 DIAGNOSIS — I25.10 CAD, MULTIPLE VESSEL: ICD-10-CM

## 2018-07-23 ENCOUNTER — OFFICE VISIT (OUTPATIENT)
Dept: ORTHOPEDICS | Facility: CLINIC | Age: 68
End: 2018-07-23
Payer: COMMERCIAL

## 2018-07-23 ENCOUNTER — THERAPY VISIT (OUTPATIENT)
Dept: OCCUPATIONAL THERAPY | Facility: CLINIC | Age: 68
End: 2018-07-23
Payer: COMMERCIAL

## 2018-07-23 VITALS
SYSTOLIC BLOOD PRESSURE: 144 MMHG | DIASTOLIC BLOOD PRESSURE: 70 MMHG | WEIGHT: 225 LBS | OXYGEN SATURATION: 96 % | HEART RATE: 88 BPM | BODY MASS INDEX: 34.46 KG/M2

## 2018-07-23 DIAGNOSIS — M65.341 TRIGGER FINGER, RIGHT RING FINGER: ICD-10-CM

## 2018-07-23 DIAGNOSIS — M65.322 TRIGGER INDEX FINGER OF LEFT HAND: Primary | ICD-10-CM

## 2018-07-23 DIAGNOSIS — M65.30 TRIGGER FINGER, ACQUIRED: ICD-10-CM

## 2018-07-23 DIAGNOSIS — M25.649 STIFFNESS OF HAND JOINT, UNSPECIFIED LATERALITY: ICD-10-CM

## 2018-07-23 DIAGNOSIS — M65.321 TRIGGER INDEX FINGER OF RIGHT HAND: ICD-10-CM

## 2018-07-23 PROCEDURE — 99203 OFFICE O/P NEW LOW 30 MIN: CPT | Mod: 25 | Performed by: ORTHOPAEDIC SURGERY

## 2018-07-23 PROCEDURE — 20550 NJX 1 TENDON SHEATH/LIGAMENT: CPT | Mod: F1 | Performed by: ORTHOPAEDIC SURGERY

## 2018-07-23 PROCEDURE — 97535 SELF CARE MNGMENT TRAINING: CPT | Mod: GO | Performed by: OCCUPATIONAL THERAPIST

## 2018-07-23 PROCEDURE — 97110 THERAPEUTIC EXERCISES: CPT | Mod: GO | Performed by: OCCUPATIONAL THERAPIST

## 2018-07-23 RX ORDER — TRIAMCINOLONE ACETONIDE 40 MG/ML
20 INJECTION, SUSPENSION INTRA-ARTICULAR; INTRAMUSCULAR
Status: DISCONTINUED | OUTPATIENT
Start: 2018-07-23 | End: 2019-05-30

## 2018-07-23 RX ORDER — LIDOCAINE HYDROCHLORIDE 10 MG/ML
0.5 INJECTION, SOLUTION INFILTRATION; PERINEURAL
Status: DISCONTINUED | OUTPATIENT
Start: 2018-07-23 | End: 2019-05-30

## 2018-07-23 RX ADMIN — LIDOCAINE HYDROCHLORIDE 0.5 ML: 10 INJECTION, SOLUTION INFILTRATION; PERINEURAL at 09:55

## 2018-07-23 RX ADMIN — TRIAMCINOLONE ACETONIDE 20 MG: 40 INJECTION, SUSPENSION INTRA-ARTICULAR; INTRAMUSCULAR at 09:55

## 2018-07-23 RX ADMIN — TRIAMCINOLONE ACETONIDE 20 MG: 40 INJECTION, SUSPENSION INTRA-ARTICULAR; INTRAMUSCULAR at 09:58

## 2018-07-23 RX ADMIN — LIDOCAINE HYDROCHLORIDE 0.5 ML: 10 INJECTION, SOLUTION INFILTRATION; PERINEURAL at 09:58

## 2018-07-23 NOTE — PROGRESS NOTES
Hand Therapy Progress/Discharge Note    Current Date:  7/23/2018    Reporting period is 6/18/2018 to 7/23/2018    Diagnosis:   Right index and ring finger and left index finger pain and stiffness  DOI:  February 2018    Subjective:   Subjective changes noted by patient:  The splints at night help some but the fingers are still catching.  Functional changes noted by patient:  Improvement in Self Care Tasks (dressing)  Patient has noted adverse reaction to:  None    Functional Outcome Measure:  Upper Extremity Functional Index  SCORE:   Column Totals: 68/80  (A lower score indicates greater disability.)    Objective:  ROM:  Index Finger 6/18/18 7/23/18   AROM(PROM) Right Right   MCP 0/65 /70   PIP 0/95 /95   DIP 0/50 /50     Index Finger 6/18/18 7/23/18   AROM(PROM) Left Left   MCP 0/75 /75   PIP 0/90 /100   DIP 0/60 /60     Ring Finger 6/18/18 7/23/18   AROM(PROM) Right Right   MCP 0/75 0/70   PIP -35/100 (-5/) -40/105 (-10/)   DIP 0/35 0/45     Strength:   (Measured in pounds)   6/18/18 7/23/18    Trials Right Left Right Left   1  2  3 55-  60-  55- 65-  73-  60- 54-  50-  54- 62-  68-  60-   Average: 57 65 53 63     3 Pt Pinch 6/18/18 7/23/18    Trials Right Left Right Left   1  2  3 10+  14+  13+ 15+  16+  18+ 16-  15-  13- 17+  14+  15+   Average: 12 16 15 15     Edema: Continued mild of fingers     Sensation:  WNL throughout all nerve distributions; per patient report    Stage of Stenosing Tenosynovitis (SST):  Stage 1:  Normal  Stage 2:  Uneven motion of tendon  Stage 3:  Triggering, clicking, catching  Stage 4:  Locking in extension or flexion; unlocked by active motion  Stage 5:  Locking in extension or flexion; unlocked by passive motion  Stage 6:  Finger locked in extension or flexion   6/18/18 7/23/18   Triggering of right finger Stage 4 Stage 4   Right ring Stage 4 Stage 4   Left index Stage 4 Stage 4     Palpation:   6/18/18 7/23/18   A1 pulley right index finger + +   Ring ring + +   Left index +  +     Pain Report:  VAS(0-10) 6/18/18 7/23/18   At Rest: 0/10    With Use: 1-2/10 2-3/10   Location:  Bilateral index fingers and right ring finger    Please refer to the daily flowsheet for treatment provided today.     Assessment:  Response to therapy has been improvement to:  ROM of Fingers: flexion  Response to therapy has been lack of progress in:  Flexibility:  Continued triggering of bilateral index and right ring fingers    Overall Assessment:  Patient is independent in home exercise program.  Patient would benefit from further evaluation of ongoing trigger fingers.  STG/LTG:  STGoals have been reviewed and progress or achievement has occurred;  see goal sheet for details and updates.  I have re-evaluated this patient and find that the nature, scope, duration and intensity of the therapy is appropriate for the medical condition of the patient.    Plan:  Frequency/Duration:  Discharge from Hand Therapy; continue home program.  Patient to contact MD for further evaluation of trigger fingers.    Recommendations for Continued Therapy  Home Exercise Program:  Warmth for stiffness  Ice to A1 pulley/palm for inflammation  Decongestive and TFM to palm and A1 pulley  Tracking on table for PIP extension stretch right ring finger  PROM finger flexion, avoid triggering  Extension gutter orthoses right index and ring fingers and left index finger sleeping  Avoid triggering with ADL's, consider bandaids to PIP joints to limit end range flexion and prevent triggering

## 2018-07-23 NOTE — TELEPHONE ENCOUNTER
"Requested Prescriptions   Pending Prescriptions Disp Refills     metoprolol succinate (TOPROL-XL) 100 MG 24 hr tablet [Pharmacy Med Name: METOPROLOL ER SUCCINATE 100MG TABS] 90 tablet 0    Last Written Prescription Date:  1-27-17  Last Fill Quantity: 90,  # refills: 3   Last office visit: 6/7/2018 with prescribing provider:  6-7-18   Future Office Visit:     Sig: TAKE 1 TABLET BY MOUTH DAILY    Beta-Blockers Protocol Passed    7/22/2018  1:32 PM       Passed - Blood pressure under 140/90 in past 12 months    BP Readings from Last 3 Encounters:   07/23/18 144/70   06/07/18 121/69   04/19/18 150/76                Passed - Patient is age 6 or older       Passed - Recent (12 mo) or future (30 days) visit within the authorizing provider's specialty    Patient had office visit in the last 12 months or has a visit in the next 30 days with authorizing provider or within the authorizing provider's specialty.  See \"Patient Info\" tab in inbasket, or \"Choose Columns\" in Meds & Orders section of the refill encounter.              "

## 2018-07-23 NOTE — PROGRESS NOTES
"CHIEF COMPLAINT:   Chief Complaint   Patient presents with     Consult     Click in right index and ring finger for 2-3 months.  Slight pain.  Left index finger also clicks with slight pain     Darian Engle is seen today in the [facility] for evaluation of bilateral index and right ring finger snapping at the request of Shobha Ugalde OT    HISTORY:  Darian Engle is a 68 year old male with \"lricks\" in the right ring finger and bilateral index fingers. Symptoms started about 2-3 months ago. Since onset, symptoms have remained unchanged. He has no pain at rest without the snapping. 0/10. With snapping, pain is increased to 3/10. He has tried splints and exercises , hand therapy with improvements. Denies numbness and tingling.       Suspected cause: Due to unknown factors.    Pain severity: 0/10, 3/10 with snapping.  Pain quality: sharp  Frequency of symptoms: intermittent.  Aggravating Factors: with using the hands, finger flexion.  Relieving Factors: at rest, with finger exercises.  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  Patient Active Problem List    Diagnosis Date Noted     Type 2 diabetes mellitus without retinopathy (H) 06/27/2018     Priority: Medium     Bilateral incipient cataracts 06/27/2018     Priority: Medium     PAD (peripheral artery disease) (H) 12/18/2017     Priority: Medium     CAD, multiple vessel 01/27/2017     Priority: Medium     Essential hypertension with goal blood pressure less than 140/90 01/27/2017     Priority: Medium     Type 2 diabetes mellitus with complication, with long-term current use of insulin (H) 01/27/2017     Priority: Medium     Hyperlipidemia LDL goal <100 01/27/2017     Priority: Medium "     Gastroesophageal reflux disease without esophagitis 01/27/2017     Priority: Medium       Surgical Hx:  has a past surgical history that includes Enhance Laser Refractive Bilateral Existing Pt In Parameters (2000).    Medications:   Current Outpatient Prescriptions:      ACCU-CHEK SMARTVIEW test strip, 1 strip by In Vitro route 3 times daily Use to test blood sugar 3 times daily or as directed., Disp: 300 strip, Rfl: 3     amLODIPine (NORVASC) 10 MG tablet, Take 1 tablet (10 mg) by mouth daily, Disp: 90 tablet, Rfl: 1     aspirin 81 MG tablet, Take by mouth daily, Disp: 30 tablet, Rfl:      atorvastatin (LIPITOR) 80 MG tablet, Take 1 tablet (80 mg) by mouth daily, Disp: 90 tablet, Rfl: 3     blood glucose monitoring (ACCU-CHEK FASTCLIX) lancets, 1 each by In Vitro route 3 times daily Use to test blood sugar tid times daily or as directed., Disp: 6 Box, Rfl: 3     cilostazol (PLETAL) 50 MG tablet, Take 50 mg by mouth 2 times daily, Disp: , Rfl:      clopidogrel (PLAVIX) 75 MG tablet, 75 mg, Disp: , Rfl: 11     insulin glargine (LANTUS SOLOSTAR) 100 UNIT/ML injection, INJECT 50 UNITS UNDER THE SKIN DAILY, Disp: 30 mL, Rfl: 3     isosorbide mononitrate (IMDUR) 30 MG 24 hr tablet, Take 1 tablet (30 mg) by mouth daily, Disp: 90 tablet, Rfl: 3     liraglutide (VICTOZA PEN) 18 MG/3ML soln, INJECT 0.6 MG UNDER THE SKIN DAILY, Disp: 3 mL, Rfl: 3     losartan (COZAAR) 50 MG tablet, Take 2 tablets (100 mg) by mouth daily, Disp: 180 tablet, Rfl: 3     metFORMIN (GLUCOPHAGE-XR) 500 MG 24 hr tablet, TAKE 4 TABLETS BY MOUTH DAILY WITH BREAKFAST, Disp: 360 tablet, Rfl: 0     metoprolol (TOPROL-XL) 100 MG 24 hr tablet, Take 1 tablet (100 mg) by mouth daily, Disp: 90 tablet, Rfl: 3     nicotine (EQL NICOTINE) 21 MG/24HR 24 hr patch, Place 1 patch onto the skin every 24 hours, Disp: 30 patch, Rfl: 0     nitroglycerin (NITROSTAT) 0.4 MG sublingual tablet, For chest pain place 1 tablet under the tongue every 5 minutes for 3 doses.  If symptoms persist 5 minutes after 1st dose call 911., Disp: 25 tablet, Rfl: 0     NOVOLOG FLEXPEN 100 UNIT/ML soln, Patient uses 60 units per day, Disp: 54 mL, Rfl: 11     nystatin (MYCOSTATIN) cream, APLY TO AFFECTED AREA TWICE DAILY FOR 2 WEEKS, Disp: , Rfl: 0     Omega-3 Fatty Acids (FISH OIL OMEGA-3) 1000 MG CAPS, Take 1,200 mg by mouth daily, Disp: , Rfl:      omeprazole (PRILOSEC) 20 MG CR capsule, Take 1 capsule (20 mg) by mouth daily, Disp: 90 capsule, Rfl: 3     B-D U/F insulin pen needle, USE 3 TIMES A DAY WITH INSULIN, Disp: 270 each, Rfl: 3     nicotine (NICODERM CQ) 14 MG/24HR 24 hr patch, 1 patch, Disp: , Rfl:      nicotine (NICOTROL) 10 MG Inhaler, Inhale 6-16 Cartridges into the lungs daily as needed for smoking cessation (Patient not taking: Reported on 4/10/2018), Disp: 180 each, Rfl: 1    Allergies:   Allergies   Allergen Reactions     Lidocaine Other (See Comments)     Lungs filled with fluid. ? Anaphylaxis     Lisinopril Cough     cough       Social Hx: computer work.  reports that he has been smoking Cigarettes.  He has never used smokeless tobacco. He reports that he does not drink alcohol or use illicit drugs.    Family Hx: family history includes Glaucoma in his mother; Macular Degeneration in his mother and another family member.. Negative for bleeding/clotting disorders. Negative for adverse anesthesia reactions.    REVIEW OF SYSTEMS: 10 point ROS neg other than the symptoms noted above in the HPI and PMH. Notables include  CONSTITUTIONAL:NEGATIVE for fever, chills, change in weight  INTEGUMENTARY/SKIN: NEGATIVE for worrisome rashes, moles or lesions  MUSCULOSKELETAL:See HPI above  Neurology: see HPI above.    This document serves as a record of the services and decisions personally performed and made by Haile Carter MD. It was created on his behalf by Haley Sorenson, a trained medical scribe. The creation of this document is based the provider's statements to the medical scribe.    Scribe  Haley Sorenson 9:22 AM 7/23/2018    EXAM:  GENERAL APPEARANCE: healthy, alert and no distress   GAIT: NORMAL  SKIN: no suspicious lesions or rashes  RESPIRATORY: No increased work of breathing.  NEURO:     strength: normal,    thenar fasiculations: negative    Thenar atrophy: negative .    Sensation intact in all fingers,    reflexes normal in upper extremities.   PSYCH:  mentation appears normal and affect normal, not anxious.    MUSCULOSKELETAL:    RIGHT HAND/FINGERS:    Skin intact. No abnormal skin discoloration, erythema or ecchymosis.   No nail pitting or clubbing.  Normal wear pattern, color and tone.  No observable or palpable masses of the fingers or palm or wrist.  Triggering of the right index and ring fingers  No observable or palpable cords or nodules of the fingers or palm.    There is no swelling in the fingers or hand.  There is mild tenderness of the A1 pulley of the right index and ring fingers.  There is no ecchymosis.  There is no erythema of the surrounding skin.  There is no maceration of the skin.  There is no deformity in the area.  Intact extensors. No extensor lag.    Intact sensation light touch median, radial, ulnar nerves of the hand  Intact sensation to the radial and ulnar digital nerves of the fingers, as well as the finger tips.  Intact epl fpl fdp edc wrist flexion/extension biceps/triceps deltoid  Brisk capillary refill to all fingers.   Palpable radial pulse, 2+.      LEFT HAND/FINGERS:    Skin intact. No abnormal skin discoloration, erythema or ecchymosis.   No nail pitting or clubbing.  Normal wear pattern, color and tone.  No observable or palpable masses of the fingers or palm or wrist.  Triggering of the left index fingr  No observable or palpable cords or nodules of the fingers or palm.    There is no swelling in the fingers or hand.  There is mild tenderness of the A1 pulley of the left index finger  There is no ecchymosis.  There is no erythema of the surrounding  skin.  There is no maceration of the skin.  There is no deformity in the area.  Intact extensors. No extensor lag.    Intact sensation light touch median, radial, ulnar nerves of the hand  Intact sensation to the radial and ulnar digital nerves of the fingers, as well as the finger tips.  Intact epl fpl fdp edc wrist flexion/extension biceps/triceps deltoid  Brisk capillary refill to all fingers.   Palpable radial pulse, 2+.      XRAYS: none obtained today.      ASSESSMENT: 68 year old male with right index finger, right ring finger and left index finger trigger fingers.    PLAN: We talked about the options: taping the PIP, splinting the PIP, corticosteroid injection, hand therapy and surgery. I explained the risks and benefits of each, including recurrence. I have explained the nature of the surgical procedure, the risks and recovery time with the patient.  * At this time, patient would like to proceed with: cortisone injection  * A right index, right ring and left index finger trigger injections were offered and patient elected to proceed. Tolerated well without apparent complication. See procedure notes below.  * Return to clinic as needed.    PROCEDURE NOTE:  The risks, perceived benefits and potential complications (including but not limited to: bleeding, infection, pain, scar, damage to adjacent structures, atrophy or necrosis of soft tissue, skin blanching, failure to relieve symptoms, worsening of symptoms, allergic reaction) of injection were discussed with the patient. Questions were addressed and answered.The patient elected to proceed. Written informed consent was obtained. The correct procedural site was identified and confirmed. A RIGHT index finger trigger injection was performed using .5 cc  Kenalog-40 40mg per mL and .5 cc   of local anesthetic after sterile prep, to the correct procedural site. Sterile bandaid applied. This was tolerated well by the patient. No apparent complications. Did also  discuss that if diabetic, recommend close monitoring of blood sugars over the next week as cortisone injections can temporarily elevate blood sugars.    The risks, perceived benefits and potential complications (including but not limited to: bleeding, infection, pain, scar, damage to adjacent structures, atrophy or necrosis of soft tissue, skin blanching, failure to relieve symptoms, worsening of symptoms, allergic reaction) of injection were discussed with the patient. Questions were addressed and answered.The patient elected to proceed. Written informed consent was obtained. The correct procedural site was identified and confirmed. A RIGHT ring finger trigger injection was performed using .5 cc  Kenalog-40 40mg per mL and .5 cc   of local anesthetic after sterile prep, to the correct procedural site. Sterile bandaid applied. This was tolerated well by the patient. No apparent complications. Did also discuss that if diabetic, recommend close monitoring of blood sugars over the next week as cortisone injections can temporarily elevate blood sugars.    The risks, perceived benefits and potential complications (including but not limited to: bleeding, infection, pain, scar, damage to adjacent structures, atrophy or necrosis of soft tissue, skin blanching, failure to relieve symptoms, worsening of symptoms, allergic reaction) of injection were discussed with the patient. Questions were addressed and answered.The patient elected to proceed. Written informed consent was obtained. The correct procedural site was identified and confirmed. A LEFT index finger trigger injection was performed using .5 cc  Kenalog-40 40mg per mL and .5 cc   of local anesthetic after sterile prep, to the correct procedural site. Sterile bandaid applied. This was tolerated well by the patient. No apparent complications. Did also discuss that if diabetic, recommend close monitoring of blood sugars over the next week as cortisone injections can  temporarily elevate blood sugars.         The information in this document, created by a scribe for me, accurately reflects the services I personally performed and the decisions made by me. I have reviewed and approved this document for accuracy.     Haile Carter M.D., M.S.  Dept. of Orthopaedic Surgery  [facility]    Hand / Upper Extremity Injection/Arthrocentesis  Date/Time: 7/23/2018 9:55 AM  Performed by: MARCO ANTONIO MCCRARY  Authorized by: HAILE CARTER     Indications:  Pain  Indications comment:  Trigger finger   Needle Size:  25 G  Guidance: landmark    Approach:  Volar  Condition: trigger finger    Laterality:  Bilateral  Location:  Index finger  Site:  Bilateral index A1  Medications (Right):  20 mg triamcinolone acetonide 40 MG/ML; 0.5 mL lidocaine 1 %  Medications (Left):  20 mg triamcinolone acetonide 40 MG/ML; 0.5 mL lidocaine 1 %  Outcome:  Tolerated well, no immediate complications  Procedure discussed: discussed risks, benefits, and alternatives    Consent Given by:  Patient  Prep: patient was prepped and draped in usual sterile fashion      Hand / Upper Extremity Injection/Arthrocentesis  Date/Time: 7/23/2018 9:58 AM  Performed by: MARCO ANTONIO MCCRARY  Authorized by: HAILE CARTER     Indications:  Pain  Needle Size:  25 G  Guidance: landmark    Approach:  Volar  Condition: trigger finger    Location:  Ring finger  Site:  R ring A1  Medications:  20 mg triamcinolone acetonide 40 MG/ML; 0.5 mL lidocaine 1 %  Outcome:  Tolerated well, no immediate complications  Procedure discussed: discussed risks, benefits, and alternatives    Consent Given by:  Patient  Prep: patient was prepped and draped in usual sterile fashion

## 2018-07-23 NOTE — LETTER
"    7/23/2018         RE: Darian Engle  [address] [facility] [address]        Dear Colleague,    Thank you for referring your patient, Darian Engle, to the [facility] [place]. Please see a copy of my visit note below.    CHIEF COMPLAINT:   Chief Complaint   Patient presents with     Consult     Click in right index and ring finger for 2-3 months.  Slight pain.  Left index finger also clicks with slight pain     Darian Engle is seen today in the [facility] for evaluation of bilateral index and right ring finger snapping at the request of Shobha Ugalde OT    HISTORY:  Darian Engle is a 68 year old male with \"lricks\" in the right ring finger and bilateral index fingers. Symptoms started about 2-3 months ago. Since onset, symptoms have remained unchanged. He has no pain at rest without the snapping. 0/10. With snapping, pain is increased to 3/10. He has tried splints and exercises , hand therapy with improvements. Denies numbness and tingling.       Suspected cause: Due to unknown factors.    Pain severity: 0/10, 3/10 with snapping.  Pain quality: sharp  Frequency of symptoms: intermittent.  Aggravating Factors: with using the hands, finger flexion.  Relieving Factors: at rest, with finger exercises.  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  Patient Active Problem List    Diagnosis Date Noted     Type 2 diabetes mellitus without retinopathy (H) 06/27/2018     Priority: Medium     Bilateral incipient cataracts 06/27/2018     Priority: Medium     PAD (peripheral artery disease) (H) 12/18/2017     Priority: Medium     CAD, multiple vessel 01/27/2017     Priority: Medium     Essential hypertension " with goal blood pressure less than 140/90 01/27/2017     Priority: Medium     Type 2 diabetes mellitus with complication, with long-term current use of insulin (H) 01/27/2017     Priority: Medium     Hyperlipidemia LDL goal <100 01/27/2017     Priority: Medium     Gastroesophageal reflux disease without esophagitis 01/27/2017     Priority: Medium       Surgical Hx:  has a past surgical history that includes Enhance Laser Refractive Bilateral Existing Pt In Parameters (2000).    Medications:   Current Outpatient Prescriptions:      ACCU-CHEK SMARTVIEW test strip, 1 strip by In Vitro route 3 times daily Use to test blood sugar 3 times daily or as directed., Disp: 300 strip, Rfl: 3     amLODIPine (NORVASC) 10 MG tablet, Take 1 tablet (10 mg) by mouth daily, Disp: 90 tablet, Rfl: 1     aspirin 81 MG tablet, Take by mouth daily, Disp: 30 tablet, Rfl:      atorvastatin (LIPITOR) 80 MG tablet, Take 1 tablet (80 mg) by mouth daily, Disp: 90 tablet, Rfl: 3     blood glucose monitoring (ACCU-CHEK FASTCLIX) lancets, 1 each by In Vitro route 3 times daily Use to test blood sugar tid times daily or as directed., Disp: 6 Box, Rfl: 3     cilostazol (PLETAL) 50 MG tablet, Take 50 mg by mouth 2 times daily, Disp: , Rfl:      clopidogrel (PLAVIX) 75 MG tablet, 75 mg, Disp: , Rfl: 11     insulin glargine (LANTUS SOLOSTAR) 100 UNIT/ML injection, INJECT 50 UNITS UNDER THE SKIN DAILY, Disp: 30 mL, Rfl: 3     isosorbide mononitrate (IMDUR) 30 MG 24 hr tablet, Take 1 tablet (30 mg) by mouth daily, Disp: 90 tablet, Rfl: 3     liraglutide (VICTOZA PEN) 18 MG/3ML soln, INJECT 0.6 MG UNDER THE SKIN DAILY, Disp: 3 mL, Rfl: 3     losartan (COZAAR) 50 MG tablet, Take 2 tablets (100 mg) by mouth daily, Disp: 180 tablet, Rfl: 3     metFORMIN (GLUCOPHAGE-XR) 500 MG 24 hr tablet, TAKE 4 TABLETS BY MOUTH DAILY WITH BREAKFAST, Disp: 360 tablet, Rfl: 0     metoprolol (TOPROL-XL) 100 MG 24 hr tablet, Take 1 tablet (100 mg) by mouth daily, Disp: 90  tablet, Rfl: 3     nicotine (EQL NICOTINE) 21 MG/24HR 24 hr patch, Place 1 patch onto the skin every 24 hours, Disp: 30 patch, Rfl: 0     nitroglycerin (NITROSTAT) 0.4 MG sublingual tablet, For chest pain place 1 tablet under the tongue every 5 minutes for 3 doses. If symptoms persist 5 minutes after 1st dose call 911., Disp: 25 tablet, Rfl: 0     NOVOLOG FLEXPEN 100 UNIT/ML soln, Patient uses 60 units per day, Disp: 54 mL, Rfl: 11     nystatin (MYCOSTATIN) cream, APLY TO AFFECTED AREA TWICE DAILY FOR 2 WEEKS, Disp: , Rfl: 0     Omega-3 Fatty Acids (FISH OIL OMEGA-3) 1000 MG CAPS, Take 1,200 mg by mouth daily, Disp: , Rfl:      omeprazole (PRILOSEC) 20 MG CR capsule, Take 1 capsule (20 mg) by mouth daily, Disp: 90 capsule, Rfl: 3     B-D U/F insulin pen needle, USE 3 TIMES A DAY WITH INSULIN, Disp: 270 each, Rfl: 3     nicotine (NICODERM CQ) 14 MG/24HR 24 hr patch, 1 patch, Disp: , Rfl:      nicotine (NICOTROL) 10 MG Inhaler, Inhale 6-16 Cartridges into the lungs daily as needed for smoking cessation (Patient not taking: Reported on 4/10/2018), Disp: 180 each, Rfl: 1    Allergies:   Allergies   Allergen Reactions     Lidocaine Other (See Comments)     Lungs filled with fluid. ? Anaphylaxis     Lisinopril Cough     cough       Social Hx: computer work.  reports that he has been smoking Cigarettes.  He has never used smokeless tobacco. He reports that he does not drink alcohol or use illicit drugs.    Family Hx: family history includes Glaucoma in his mother; Macular Degeneration in his mother and another family member.. Negative for bleeding/clotting disorders. Negative for adverse anesthesia reactions.    REVIEW OF SYSTEMS: 10 point ROS neg other than the symptoms noted above in the HPI and PMH. Notables include  CONSTITUTIONAL:NEGATIVE for fever, chills, change in weight  INTEGUMENTARY/SKIN: NEGATIVE for worrisome rashes, moles or lesions  MUSCULOSKELETAL:See HPI above  Neurology: see HPI above.    This document  serves as a record of the services and decisions personally performed and made by Haile Carter MD. It was created on his behalf by Haley Sorenson, a trained medical scribe. The creation of this document is based the provider's statements to the medical scribe.    Theodora Sorenson 9:22 AM 7/23/2018    EXAM:  GENERAL APPEARANCE: healthy, alert and no distress   GAIT: NORMAL  SKIN: no suspicious lesions or rashes  RESPIRATORY: No increased work of breathing.  NEURO:     strength: normal,    thenar fasiculations: negative    Thenar atrophy: negative .    Sensation intact in all fingers,    reflexes normal in upper extremities.   PSYCH:  mentation appears normal and affect normal, not anxious.    MUSCULOSKELETAL:    RIGHT HAND/FINGERS:    Skin intact. No abnormal skin discoloration, erythema or ecchymosis.   No nail pitting or clubbing.  Normal wear pattern, color and tone.  No observable or palpable masses of the fingers or palm or wrist.  Triggering of the right index and ring fingers  No observable or palpable cords or nodules of the fingers or palm.    There is no swelling in the fingers or hand.  There is mild tenderness of the A1 pulley of the right index and ring fingers.  There is no ecchymosis.  There is no erythema of the surrounding skin.  There is no maceration of the skin.  There is no deformity in the area.  Intact extensors. No extensor lag.    Intact sensation light touch median, radial, ulnar nerves of the hand  Intact sensation to the radial and ulnar digital nerves of the fingers, as well as the finger tips.  Intact epl fpl fdp edc wrist flexion/extension biceps/triceps deltoid  Brisk capillary refill to all fingers.   Palpable radial pulse, 2+.      LEFT HAND/FINGERS:    Skin intact. No abnormal skin discoloration, erythema or ecchymosis.   No nail pitting or clubbing.  Normal wear pattern, color and tone.  No observable or palpable masses of the fingers or palm or wrist.  Triggering of the left  index fingr  No observable or palpable cords or nodules of the fingers or palm.    There is no swelling in the fingers or hand.  There is mild tenderness of the A1 pulley of the left index finger  There is no ecchymosis.  There is no erythema of the surrounding skin.  There is no maceration of the skin.  There is no deformity in the area.  Intact extensors. No extensor lag.    Intact sensation light touch median, radial, ulnar nerves of the hand  Intact sensation to the radial and ulnar digital nerves of the fingers, as well as the finger tips.  Intact epl fpl fdp edc wrist flexion/extension biceps/triceps deltoid  Brisk capillary refill to all fingers.   Palpable radial pulse, 2+.      XRAYS: none obtained today.      ASSESSMENT: 68 year old male with right index finger, right ring finger and left index finger trigger fingers.    PLAN: We talked about the options: taping the PIP, splinting the PIP, corticosteroid injection, hand therapy and surgery. I explained the risks and benefits of each, including recurrence. I have explained the nature of the surgical procedure, the risks and recovery time with the patient.  * At this time, patient would like to proceed with: cortisone injection  * A right index, right ring and left index finger trigger injections were offered and patient elected to proceed. Tolerated well without apparent complication. See procedure notes below.  * Return to clinic as needed.    PROCEDURE NOTE:  The risks, perceived benefits and potential complications (including but not limited to: bleeding, infection, pain, scar, damage to adjacent structures, atrophy or necrosis of soft tissue, skin blanching, failure to relieve symptoms, worsening of symptoms, allergic reaction) of injection were discussed with the patient. Questions were addressed and answered.The patient elected to proceed. Written informed consent was obtained. The correct procedural site was identified and confirmed. A RIGHT index  finger trigger injection was performed using .5 cc  Kenalog-40 40mg per mL and .5 cc   of local anesthetic after sterile prep, to the correct procedural site. Sterile bandaid applied. This was tolerated well by the patient. No apparent complications. Did also discuss that if diabetic, recommend close monitoring of blood sugars over the next week as cortisone injections can temporarily elevate blood sugars.    The risks, perceived benefits and potential complications (including but not limited to: bleeding, infection, pain, scar, damage to adjacent structures, atrophy or necrosis of soft tissue, skin blanching, failure to relieve symptoms, worsening of symptoms, allergic reaction) of injection were discussed with the patient. Questions were addressed and answered.The patient elected to proceed. Written informed consent was obtained. The correct procedural site was identified and confirmed. A RIGHT ring finger trigger injection was performed using .5 cc  Kenalog-40 40mg per mL and .5 cc   of local anesthetic after sterile prep, to the correct procedural site. Sterile bandaid applied. This was tolerated well by the patient. No apparent complications. Did also discuss that if diabetic, recommend close monitoring of blood sugars over the next week as cortisone injections can temporarily elevate blood sugars.    The risks, perceived benefits and potential complications (including but not limited to: bleeding, infection, pain, scar, damage to adjacent structures, atrophy or necrosis of soft tissue, skin blanching, failure to relieve symptoms, worsening of symptoms, allergic reaction) of injection were discussed with the patient. Questions were addressed and answered.The patient elected to proceed. Written informed consent was obtained. The correct procedural site was identified and confirmed. A LEFT index finger trigger injection was performed using .5 cc  Kenalog-40 40mg per mL and .5 cc   of local anesthetic after  sterile prep, to the correct procedural site. Sterile bandaid applied. This was tolerated well by the patient. No apparent complications. Did also discuss that if diabetic, recommend close monitoring of blood sugars over the next week as cortisone injections can temporarily elevate blood sugars.         The information in this document, created by a scribe for me, accurately reflects the services I personally performed and the decisions made by me. I have reviewed and approved this document for accuracy.     Haile Carter M.D., M.S.  Dept. of Orthopaedic Surgery  [facility]    Hand / Upper Extremity Injection/Arthrocentesis  Date/Time: 7/23/2018 9:55 AM  Performed by: MARCO ANTONIO MCCRARY  Authorized by: HAILE CARTER     Indications:  Pain  Indications comment:  Trigger finger   Needle Size:  25 G  Guidance: landmark    Approach:  Volar  Condition: trigger finger    Laterality:  Bilateral  Location:  Index finger  Site:  Bilateral index A1  Medications (Right):  20 mg triamcinolone acetonide 40 MG/ML; 0.5 mL lidocaine 1 %  Medications (Left):  20 mg triamcinolone acetonide 40 MG/ML; 0.5 mL lidocaine 1 %  Outcome:  Tolerated well, no immediate complications  Procedure discussed: discussed risks, benefits, and alternatives    Consent Given by:  Patient  Prep: patient was prepped and draped in usual sterile fashion      Hand / Upper Extremity Injection/Arthrocentesis  Date/Time: 7/23/2018 9:58 AM  Performed by: MARCO ANTONIO MCCRARY  Authorized by: HAILE CARTER     Indications:  Pain  Needle Size:  25 G  Guidance: landmark    Approach:  Volar  Condition: trigger finger    Location:  Ring finger  Site:  R ring A1  Medications:  20 mg triamcinolone acetonide 40 MG/ML; 0.5 mL lidocaine 1 %  Outcome:  Tolerated well, no immediate complications  Procedure discussed: discussed risks, benefits, and alternatives    Consent Given by:  Patient  Prep: patient was prepped and draped in usual sterile fashion             Again, thank you for allowing me to participate in the care of your patient.        Sincerely,        Haile Carter MD

## 2018-07-23 NOTE — MR AVS SNAPSHOT
After Visit Summary   7/23/2018    Darian Engle    MRN: [id number]           Patient Information     Date Of Birth          1950        Visit Information        Provider Department      7/23/2018 9:45 AM Haile Carter MD [facility]        Today's Diagnoses     Trigger index finger of left hand    -  1    Trigger index finger of right hand        Trigger finger, right ring finger           Follow-ups after your visit        Follow-up notes from your care team     Return if symptoms worsen or fail to improve.      Who to contact     If you have questions or need follow up information about today's clinic visit or your schedule please contact [facility] directly at [phone].  Normal or non-critical lab and imaging results will be communicated to you by MyChart, letter or phone within 4 business days after the clinic has received the results. If you do not hear from us within 7 days, please contact the clinic through [employer] or phone. If you have a critical or abnormal lab result, we will notify you by phone as soon as possible.  Submit refill requests through [employer] or call your pharmacy and they will forward the refill request to us. Please allow 3 business days for your refill to be completed.          Additional Information About Your Visit        MyChart Information     [employer] gives you secure access to your electronic health record. If you see a primary care provider, you can also send messages to your care team and make appointments. If you have questions, please call your primary care clinic.  If you do not have a primary care provider, please call [phone] and they will assist you.        Care EveryWhere ID     This is your Care EveryWhere ID. This could be used by other organizations to access your [facility] medical records  [id number]        Your Vitals Were     Pulse Pulse Oximetry BMI (Body Mass Index)              88 96% 34.46 kg/m2          Blood Pressure from Last 3 Encounters:   07/23/18 144/70   06/07/18 121/69   04/19/18 150/76    Weight from Last 3 Encounters:   07/23/18 225 lb (102.1 kg)   06/07/18 224 lb (101.6 kg)   04/10/18 221 lb (100.2 kg)              We Performed the Following     Hand / Upper Extremity Injection/Arthrocentesis     Hand / Upper Extremity Injection/Arthrocentesis        Primary Care Provider Office Phone # Fax #    Eh Matos [phone]       [address] [facility] [address]        Equal Access to Services     COLE CROW : Hadii maria esther chuo Somirela, waaxda luqadaha, qaybta kaalmada ademyeshayada, kristian zacarias . So [facility] [phone].    ATENCIÓN: Si habla español, tiene a velasco disposición servicios gratuitos de asistencia lingüística. Llame al [phone].    We comply with applicable federal civil rights laws and [place] laws. We do not discriminate on the basis of race, color, national origin, age, disability, sex, sexual orientation, or gender identity.            Thank you!     Thank you for choosing [facility]  for your care. Our goal is always to provide you with excellent care. Hearing back from our patients is one way we can continue to improve our services. Please take a few minutes to complete the written survey that you may receive in the mail after your visit with us. Thank you!             Your Updated Medication List - Protect others around you: Learn how to safely use, store and throw away your medicines at www.disposemymeds.org.          This list is accurate as of 7/23/18 11:44 AM.  Always use your most recent med list.                   Brand Name Dispense Instructions for use Diagnosis    ACCU-CHEK SMARTVIEW test strip   Generic drug:  blood glucose monitoring     300 strip    1 strip by In Vitro route 3 times daily Use to test blood sugar 3 times daily or as  directed.    Type 2 diabetes mellitus with complication, with long-term current use of insulin (H)       amLODIPine 10 MG tablet    NORVASC    90 tablet    Take 1 tablet (10 mg) by mouth daily    Essential hypertension with goal blood pressure less than 140/90       aspirin 81 MG tablet     30 tablet    Take by mouth daily    CAD, multiple vessel       atorvastatin 80 MG tablet    LIPITOR    90 tablet    Take 1 tablet (80 mg) by mouth daily    CAD, multiple vessel, Hyperlipidemia LDL goal <100       B-D U/F 31G X 5 MM   Generic drug:  insulin pen needle     270 each    USE 3 TIMES A DAY WITH INSULIN    Type 2 diabetes mellitus with complication, with long-term current use of insulin (H)       blood glucose monitoring lancets     6 Box    1 each by In Vitro route 3 times daily Use to test blood sugar tid times daily or as directed.    Type 2 diabetes mellitus with complication, with long-term current use of insulin (H)       cilostazol 50 MG tablet    PLETAL     Take 50 mg by mouth 2 times daily        clopidogrel 75 MG tablet    PLAVIX     75 mg        FISH OIL OMEGA-3 1000 MG Caps      Take 1,200 mg by mouth daily        insulin glargine 100 UNIT/ML injection    LANTUS SOLOSTAR    30 mL    INJECT 50 UNITS UNDER THE SKIN DAILY    Type 2 diabetes mellitus with complication, with long-term current use of insulin (H)       isosorbide mononitrate 30 MG 24 hr tablet    IMDUR    90 tablet    Take 1 tablet (30 mg) by mouth daily    CAD, multiple vessel       liraglutide 18 MG/3ML soln    VICTOZA PEN    3 mL    INJECT 0.6 MG UNDER THE SKIN DAILY    Type 2 diabetes mellitus with complication, with long-term current use of insulin (H)       losartan 50 MG tablet    COZAAR    180 tablet    Take 2 tablets (100 mg) by mouth daily    Essential hypertension with goal blood pressure less than 140/90       metFORMIN 500 MG 24 hr tablet    GLUCOPHAGE-XR    360 tablet    TAKE 4 TABLETS BY MOUTH DAILY WITH BREAKFAST    Type 2 diabetes  mellitus with complication, with long-term current use of insulin (H)       metoprolol succinate 100 MG 24 hr tablet    TOPROL-XL    90 tablet    Take 1 tablet (100 mg) by mouth daily    CAD, multiple vessel, Essential hypertension with goal blood pressure less than 140/90       * nicotine 14 MG/24HR 24 hr patch    NICODERM CQ     1 patch        * nicotine 10 MG Inhaler    NICOTROL    180 each    Inhale 6-16 Cartridges into the lungs daily as needed for smoking cessation    CAD, multiple vessel, PAD (peripheral artery disease) (H), Smoker       * nicotine 21 MG/24HR 24 hr patch    EQL NICOTINE    30 patch    Place 1 patch onto the skin every 24 hours    Smoker       nitroGLYcerin 0.4 MG sublingual tablet    NITROSTAT    25 tablet    For chest pain place 1 tablet under the tongue every 5 minutes for 3 doses. If symptoms persist 5 minutes after 1st dose call 911.    CAD, multiple vessel       NovoLOG FLEXPEN 100 UNIT/ML injection   Generic drug:  insulin aspart     54 mL    Patient uses 60 units per day    Type 2 diabetes mellitus with complication, with long-term current use of insulin (H)       nystatin cream    MYCOSTATIN     APLY TO AFFECTED AREA TWICE DAILY FOR 2 WEEKS        omeprazole 20 MG CR capsule    priLOSEC    90 capsule    Take 1 capsule (20 mg) by mouth daily    Gastroesophageal reflux disease without esophagitis       * Notice:  This list has 3 medication(s) that are the same as other medications prescribed for you. Read the directions carefully, and ask your doctor or other care provider to review them with you.

## 2018-07-23 NOTE — MR AVS SNAPSHOT
After Visit Summary   7/23/2018    Darian Engle    MRN: [id number]           Patient Information     Date Of Birth          1950        Visit Information        Provider Department      7/23/2018 8:30 AM Shobha Ugalde [facility] [place]        Today's Diagnoses     Stiffness of hand joint, unspecified laterality        Trigger finger, acquired           Follow-ups after your visit        Your next 10 appointments already scheduled     Jul 23, 2018  9:45 AM CDT   New Visit with Haile Carter MD   [facility] [place] ([facility] [place])    [address] [facility] [place] [place] [address]  [place] [id number] [phone]              Who to contact     If you have questions or need follow up information about today's clinic visit or your schedule please contact [facility] [place] directly at [phone].  Normal or non-critical lab and imaging results will be communicated to you by MyChart, letter or phone within 4 business days after the clinic has received the results. If you do not hear from us within 7 days, please contact the clinic through [facility] or phone. If you have a critical or abnormal lab result, we will notify you by phone as soon as possible.  Submit refill requests through [employer] or call your pharmacy and they will forward the refill request to us. Please allow 3 business days for your refill to be completed.          Additional Information About Your Visit        MyChart Information     [facility] gives you secure access to your electronic health record. If you see a primary care provider, you can also send messages to your care team and make appointments. If you have questions, please call your primary care clinic.  If you do not have a primary care provider, please call [phone] and they will assist you.        Care EveryWhere ID     This is your Care EveryWhere ID. This could  be used by other organizations to access your [place] medical records  [id number]         Blood Pressure from Last 3 Encounters:   06/07/18 121/69   04/19/18 150/76   04/10/18 129/72    Weight from Last 3 Encounters:   06/07/18 101.6 kg (224 lb)   04/10/18 100.2 kg (221 lb)   12/18/17 103.9 kg (229 lb)              We Performed the Following     SELVIN PROGRESS NOTES REPORT     SELF CARE MNGMENT TRAINING     THERAPEUTIC EXERCISES        Primary Care Provider Office Phone # Fax #    Eh Matos [phone]       [address] [facility] [address]        Equal Access to Services     ANAYELI CROW : Hadii maria esther chou hadasho Soomaali, waaxda luqadaha, qaybta kaalmada adeegyada, kristian zacarias . So [facility] [phone].    ATENCIÓN: Si habla español, tiene a velasco disposición servicios gratuitos de asistencia lingüística. Llame al [phone].    We comply with applicable federal civil rights laws and [place] laws. We do not discriminate on the basis of race, color, national origin, age, disability, sex, sexual orientation, or gender identity.            Thank you!     Thank you for choosing [facility]  for your care. Our goal is always to provide you with excellent care. Hearing back from our patients is one way we can continue to improve our services. Please take a few minutes to complete the written survey that you may receive in the mail after your visit with us. Thank you!             Your Updated Medication List - Protect others around you: Learn how to safely use, store and throw away your medicines at www.disposemymeds.org.          This list is accurate as of 7/23/18  8:58 AM.  Always use your most recent med list.                   Brand Name Dispense Instructions for use Diagnosis    ACCU-CHEK SMARTVIEW test strip   Generic drug:  blood glucose monitoring     300 strip    1 strip by In Vitro route 3 times daily  Use to test blood sugar 3 times daily or as directed.    Type 2 diabetes mellitus with complication, with long-term current use of insulin (H)       amLODIPine 10 MG tablet    NORVASC    90 tablet    Take 1 tablet (10 mg) by mouth daily    Essential hypertension with goal blood pressure less than 140/90       aspirin 81 MG tablet     30 tablet    Take by mouth daily    CAD, multiple vessel       atorvastatin 80 MG tablet    LIPITOR    90 tablet    Take 1 tablet (80 mg) by mouth daily    CAD, multiple vessel, Hyperlipidemia LDL goal <100       B-D U/F 31G X 5 MM   Generic drug:  insulin pen needle     270 each    USE 3 TIMES A DAY WITH INSULIN    Type 2 diabetes mellitus with complication, with long-term current use of insulin (H)       blood glucose monitoring lancets     6 Box    1 each by In Vitro route 3 times daily Use to test blood sugar tid times daily or as directed.    Type 2 diabetes mellitus with complication, with long-term current use of insulin (H)       cilostazol 50 MG tablet    PLETAL     Take 50 mg by mouth 2 times daily        clopidogrel 75 MG tablet    PLAVIX     75 mg        FISH OIL OMEGA-3 1000 MG Caps      Take 1,200 mg by mouth daily        insulin glargine 100 UNIT/ML injection    LANTUS SOLOSTAR    30 mL    INJECT 50 UNITS UNDER THE SKIN DAILY    Type 2 diabetes mellitus with complication, with long-term current use of insulin (H)       isosorbide mononitrate 30 MG 24 hr tablet    IMDUR    90 tablet    Take 1 tablet (30 mg) by mouth daily    CAD, multiple vessel       liraglutide 18 MG/3ML soln    VICTOZA PEN    3 mL    INJECT 0.6 MG UNDER THE SKIN DAILY    Type 2 diabetes mellitus with complication, with long-term current use of insulin (H)       losartan 50 MG tablet    COZAAR    180 tablet    Take 2 tablets (100 mg) by mouth daily    Essential hypertension with goal blood pressure less than 140/90       metFORMIN 500 MG 24 hr tablet    GLUCOPHAGE-XR    360 tablet    TAKE 4 TABLETS BY  MOUTH DAILY WITH BREAKFAST    Type 2 diabetes mellitus with complication, with long-term current use of insulin (H)       metoprolol succinate 100 MG 24 hr tablet    TOPROL-XL    90 tablet    Take 1 tablet (100 mg) by mouth daily    CAD, multiple vessel, Essential hypertension with goal blood pressure less than 140/90       * nicotine 14 MG/24HR 24 hr patch    NICODERM CQ     1 patch        * nicotine 10 MG Inhaler    NICOTROL    180 each    Inhale 6-16 Cartridges into the lungs daily as needed for smoking cessation    CAD, multiple vessel, PAD (peripheral artery disease) (H), Smoker       * nicotine 21 MG/24HR 24 hr patch    EQL NICOTINE    30 patch    Place 1 patch onto the skin every 24 hours    Smoker       nitroGLYcerin 0.4 MG sublingual tablet    NITROSTAT    25 tablet    For chest pain place 1 tablet under the tongue every 5 minutes for 3 doses. If symptoms persist 5 minutes after 1st dose call 911.    CAD, multiple vessel       NovoLOG FLEXPEN 100 UNIT/ML injection   Generic drug:  insulin aspart     54 mL    Patient uses 60 units per day    Type 2 diabetes mellitus with complication, with long-term current use of insulin (H)       nystatin cream    MYCOSTATIN     APLY TO AFFECTED AREA TWICE DAILY FOR 2 WEEKS        omeprazole 20 MG CR capsule    priLOSEC    90 capsule    Take 1 capsule (20 mg) by mouth daily    Gastroesophageal reflux disease without esophagitis       * Notice:  This list has 3 medication(s) that are the same as other medications prescribed for you. Read the directions carefully, and ask your doctor or other care provider to review them with you.

## 2018-07-24 RX ORDER — METOPROLOL SUCCINATE 100 MG/1
TABLET, EXTENDED RELEASE ORAL
Qty: 90 TABLET | Refills: 0 | Status: SHIPPED | OUTPATIENT
Start: 2018-07-24 | End: 2018-11-09

## 2018-07-24 NOTE — TELEPHONE ENCOUNTER
Due for visit 12/7/18 per plan at 6/7/18 visit.    Prescription approved per [facility] Ref[place] Protocol.    Marichuy Barnett RN  [facility]

## 2018-09-02 DIAGNOSIS — E11.8 TYPE 2 DIABETES MELLITUS WITH COMPLICATION, WITH LONG-TERM CURRENT USE OF INSULIN (H): ICD-10-CM

## 2018-09-02 DIAGNOSIS — Z79.4 TYPE 2 DIABETES MELLITUS WITH COMPLICATION, WITH LONG-TERM CURRENT USE OF INSULIN (H): ICD-10-CM

## 2018-09-04 NOTE — TELEPHONE ENCOUNTER
"Requested Prescriptions   Pending Prescriptions Disp Refills     metFORMIN (GLUCOPHAGE-XR) 500 MG 24 hr tablet [Pharmacy Med Name: METFORMIN ER 500MG 24HR TABS] 360 tablet 0    Last Written Prescription Date:  5/24/18  Last Fill Quantity: 360,  # refills: 0   Last office visit: 6/7/2018 with prescribing provider:     Future Office Visit:     Sig: TAKE 4 TABLETS BY MOUTH DAILY WITH BREAKFAST    Biguanide Agents Failed    9/2/2018  5:32 AM       Failed - Blood pressure less than 140/90 in past 6 months    BP Readings from Last 3 Encounters:   07/23/18 144/70   06/07/18 121/69   04/19/18 150/76                Passed - Patient has documented LDL within the past 12 mos.    Recent Labs   Lab Test  04/03/18   0710   LDL  37            Passed - Patient has had a Microalbumin in the past 15 mos.    Recent Labs   Lab Test  04/03/18   0722   MICROL  1140   UMALCR  991.30*            Passed - Patient is age 10 or older       Passed - Patient has documented A1c within the specified period of time.    If HgbA1C is 8 or greater, it needs to be on file within the past 3 months.  If less than 8, must be on file within the past 6 months.     Recent Labs   Lab Test  06/07/18   0944   A1C  7.8*            Passed - Patient's CR is NOT>1.4 OR Patient's EGFR is NOT<45 within past 12 mos.    Recent Labs   Lab Test  12/18/17   1142   GFRESTIMATED  76   GFRESTBLACK  >90       Recent Labs   Lab Test  12/18/17   1142   CR  0.99            Passed - Patient does NOT have a diagnosis of CHF.       Passed - Recent (6 mo) or future (30 days) visit within the authorizing provider's specialty    Patient had office visit in the last 6 months or has a visit in the next 30 days with authorizing provider or within the authorizing provider's specialty.  See \"Patient Info\" tab in inbasket, or \"Choose Columns\" in Meds & Orders section of the refill encounter.              "

## 2018-09-06 RX ORDER — METFORMIN HCL 500 MG
TABLET, EXTENDED RELEASE 24 HR ORAL
Qty: 360 TABLET | Refills: 0 | Status: SHIPPED | OUTPATIENT
Start: 2018-09-06 | End: 2018-11-26

## 2018-10-07 DIAGNOSIS — E11.8 TYPE 2 DIABETES MELLITUS WITH COMPLICATION, WITH LONG-TERM CURRENT USE OF INSULIN (H): ICD-10-CM

## 2018-10-07 DIAGNOSIS — Z79.4 TYPE 2 DIABETES MELLITUS WITH COMPLICATION, WITH LONG-TERM CURRENT USE OF INSULIN (H): ICD-10-CM

## 2018-10-08 RX ORDER — FLURBIPROFEN SODIUM 0.3 MG/ML
SOLUTION/ DROPS OPHTHALMIC
Qty: 300 EACH | Refills: 0 | Status: SHIPPED | OUTPATIENT
Start: 2018-10-08 | End: 2019-01-07

## 2018-10-08 NOTE — TELEPHONE ENCOUNTER
Prescription approved per [facility] Refill Protocol.  Shelly Jones, RN [facility] Triage.

## 2018-10-08 NOTE — TELEPHONE ENCOUNTER
"Requested Prescriptions   Pending Prescriptions Disp Refills     B-D U/F insulin pen needle [Pharmacy Med Name: B-D PEN NDL MINI [id number](3/16)PRPL] 300 each 0    Last Written Prescription Date:  6/13/17  Last Fill Quantity: 270,  # refills: 3   Last office visit: 6/7/2018 with prescribing provider:     Future Office Visit:     Sig: USE WITH INSULIN THREE TIMES DAILY    Diabetic Supplies Protocol Passed    10/7/2018  9:35 AM       Passed - Patient is 18 years of age or older       Passed - Recent (6 mo) or future (30 days) visit within the authorizing provider's specialty    Patient had office visit in the last 6 months or has a visit in the next 30 days with authorizing provider.  See \"Patient Info\" tab in inbasket, or \"Choose Columns\" in Meds & Orders section of the refill encounter.              "

## 2018-10-31 ENCOUNTER — TRANSFERRED RECORDS (OUTPATIENT)
Dept: HEALTH INFORMATION MANAGEMENT | Facility: CLINIC | Age: 68
End: 2018-10-31

## 2018-10-31 LAB
EJECTION FRACTION: 68
EJECTION FRACTION: 74

## 2018-11-09 DIAGNOSIS — I10 ESSENTIAL HYPERTENSION WITH GOAL BLOOD PRESSURE LESS THAN 140/90: ICD-10-CM

## 2018-11-09 DIAGNOSIS — I25.10 CAD, MULTIPLE VESSEL: ICD-10-CM

## 2018-11-09 RX ORDER — METOPROLOL SUCCINATE 100 MG/1
TABLET, EXTENDED RELEASE ORAL
Qty: 90 TABLET | Refills: 0 | Status: SHIPPED | OUTPATIENT
Start: 2018-11-09 | End: 2018-11-26

## 2018-11-09 NOTE — TELEPHONE ENCOUNTER
Routing refill request to provider for review/approval because:  Labs out of range:  BP  Please review.  Thank you.  Hedy Love RN

## 2018-11-09 NOTE — TELEPHONE ENCOUNTER
"Requested Prescriptions   Pending Prescriptions Disp Refills     metoprolol succinate (TOPROL-XL) 100 MG 24 hr tablet [Pharmacy Med Name: METOPROLOL ER SUCCINATE 100MG TABS] 90 tablet 0    Last Written Prescription Date:  7/24/18  Last Fill Quantity: 90,  # refills: 0   Last office visit: 6/7/2018 with prescribing provider:     Future Office Visit:     Sig: TAKE 1 TABLET BY MOUTH DAILY    Beta-Blockers Protocol Failed    11/9/2018 10:25 AM       Failed - Blood pressure under 140/90 in past 12 months    BP Readings from Last 3 Encounters:   07/23/18 144/70   06/07/18 121/69   04/19/18 150/76                Passed - Patient is age 6 or older       Passed - Recent (12 mo) or future (30 days) visit within the authorizing provider's specialty    Patient had office visit in the last 12 months or has a visit in the next 30 days with authorizing provider or within the authorizing provider's specialty.  See \"Patient Info\" tab in inbasket, or \"Choose Columns\" in Meds & Orders section of the refill encounter.                "

## 2018-11-26 ENCOUNTER — OFFICE VISIT (OUTPATIENT)
Dept: FAMILY MEDICINE | Facility: CLINIC | Age: 68
End: 2018-11-26
Payer: COMMERCIAL

## 2018-11-26 VITALS
TEMPERATURE: 97.5 F | WEIGHT: 207 LBS | BODY MASS INDEX: 31.71 KG/M2 | SYSTOLIC BLOOD PRESSURE: 116 MMHG | OXYGEN SATURATION: 97 % | DIASTOLIC BLOOD PRESSURE: 67 MMHG | HEART RATE: 84 BPM

## 2018-11-26 DIAGNOSIS — I73.9 PAD (PERIPHERAL ARTERY DISEASE) (H): ICD-10-CM

## 2018-11-26 DIAGNOSIS — I25.10 CAD, MULTIPLE VESSEL: ICD-10-CM

## 2018-11-26 DIAGNOSIS — Z79.4 TYPE 2 DIABETES MELLITUS WITH COMPLICATION, WITH LONG-TERM CURRENT USE OF INSULIN (H): Primary | ICD-10-CM

## 2018-11-26 DIAGNOSIS — I10 ESSENTIAL HYPERTENSION WITH GOAL BLOOD PRESSURE LESS THAN 140/90: ICD-10-CM

## 2018-11-26 DIAGNOSIS — E78.5 HYPERLIPIDEMIA LDL GOAL <100: ICD-10-CM

## 2018-11-26 DIAGNOSIS — Z12.11 SCREEN FOR COLON CANCER: ICD-10-CM

## 2018-11-26 DIAGNOSIS — E11.8 TYPE 2 DIABETES MELLITUS WITH COMPLICATION, WITH LONG-TERM CURRENT USE OF INSULIN (H): Primary | ICD-10-CM

## 2018-11-26 LAB
ANION GAP SERPL CALCULATED.3IONS-SCNC: 6 MMOL/L (ref 3–14)
BUN SERPL-MCNC: 16 MG/DL (ref 7–30)
CALCIUM SERPL-MCNC: 9.2 MG/DL (ref 8.5–10.1)
CHLORIDE SERPL-SCNC: 110 MMOL/L (ref 94–109)
CO2 SERPL-SCNC: 25 MMOL/L (ref 20–32)
CREAT SERPL-MCNC: 0.87 MG/DL (ref 0.66–1.25)
GFR SERPL CREATININE-BSD FRML MDRD: 87 ML/MIN/1.7M2
GLUCOSE SERPL-MCNC: 163 MG/DL (ref 70–99)
HBA1C MFR BLD: 7.6 % (ref 0–5.6)
POTASSIUM SERPL-SCNC: 4.4 MMOL/L (ref 3.4–5.3)
SODIUM SERPL-SCNC: 141 MMOL/L (ref 133–144)

## 2018-11-26 PROCEDURE — 36415 COLL VENOUS BLD VENIPUNCTURE: CPT | Performed by: FAMILY MEDICINE

## 2018-11-26 PROCEDURE — 80048 BASIC METABOLIC PNL TOTAL CA: CPT | Performed by: FAMILY MEDICINE

## 2018-11-26 PROCEDURE — 83036 HEMOGLOBIN GLYCOSYLATED A1C: CPT | Performed by: FAMILY MEDICINE

## 2018-11-26 PROCEDURE — 99214 OFFICE O/P EST MOD 30 MIN: CPT | Performed by: FAMILY MEDICINE

## 2018-11-26 RX ORDER — METFORMIN HCL 500 MG
TABLET, EXTENDED RELEASE 24 HR ORAL
Qty: 360 TABLET | Refills: 3 | Status: SHIPPED | OUTPATIENT
Start: 2018-11-26 | End: 2019-12-03

## 2018-11-26 RX ORDER — AMLODIPINE BESYLATE 10 MG/1
10 TABLET ORAL DAILY
Qty: 90 TABLET | Refills: 3 | Status: SHIPPED | OUTPATIENT
Start: 2018-11-26 | End: 2019-12-03

## 2018-11-26 RX ORDER — METOPROLOL SUCCINATE 100 MG/1
100 TABLET, EXTENDED RELEASE ORAL DAILY
Qty: 90 TABLET | Refills: 0 | Status: SHIPPED | OUTPATIENT
Start: 2018-11-26 | End: 2019-06-10

## 2018-11-26 NOTE — MR AVS SNAPSHOT
After Visit Summary   11/26/2018    Darian Engle    MRN: [id number]           Patient Information     Date Of Birth          1950        Visit Information        Provider Department      11/26/2018 10:20 AM Eh Matos MD [facility]        Today's Diagnoses     Type 2 diabetes mellitus with complication, with long-term current use of insulin (H)    -  1    CAD, multiple vessel        PAD (peripheral artery disease) (H)        Hyperlipidemia LDL goal <100        Screen for colon cancer        Essential hypertension with goal blood pressure less than 140/90           Follow-ups after your visit        Follow-up notes from your care team     Return in about 6 months (around 5/26/2019) for diabetes.      Future tests that were ordered for you today     Open Future Orders        Priority Expected Expires Ordered    Fecal colorectal cancer screen (FIT) Routine 12/17/2018 2/18/2019 11/26/2018            Who to contact     If you have questions or need follow up information about today's clinic visit or your schedule please contact [facility] directly at [phone].  Normal or non-critical lab and imaging results will be communicated to you by [place] [facility], letter or phone within 4 business days after the clinic has received the results. If you do not hear from us within 7 days, please contact the clinic through [place] [facility] or phone. If you have a critical or abnormal lab result, we will notify you by phone as soon as possible.  Submit refill requests through [employer] or call your pharmacy and they will forward the refill request to us. Please allow 3 business days for your refill to be completed.          Additional Information About Your Visit        MyChart Information     [employer] gives you secure access to your electronic health record. If you see a primary care provider, you can also send messages to your care team and make appointments. If you have  questions, please call your primary care clinic.  If you do not have a primary care provider, please call [phone] and they will assist you.        Care EveryWhere ID     This is your Care EveryWhere ID. This could be used by other organizations to access your [place] medical records  [id number]        Your Vitals Were     Pulse Temperature Pulse Oximetry BMI (Body Mass Index)          84 97.5  F (36.4  C) (Oral) 97% 31.71 kg/m2         Blood Pressure from Last 3 Encounters:   11/26/18 116/67   07/23/18 144/70   06/07/18 121/69    Weight from Last 3 Encounters:   11/26/18 207 lb (93.9 kg)   07/23/18 225 lb (102.1 kg)   06/07/18 224 lb (101.6 kg)              We Performed the Following     Basic metabolic panel     Hemoglobin A1c          Today's Medication Changes          These changes are accurate as of 11/26/18 11:22 AM.  If you have any questions, ask your nurse or doctor.               These medicines have changed or have updated prescriptions.        Dose/Directions    metoprolol succinate 100 MG 24 hr tablet   Commonly known as:  TOPROL-XL   This may have changed:  See the new instructions.   Used for:  CAD, multiple vessel, Essential hypertension with goal blood pressure less than 140/90   Changed by:  Eh Matos MD        Dose:  100 mg   Take 1 tablet (100 mg) by mouth daily   Quantity:  90 tablet   Refills:  0            Where to get your medicines      These medications were sent to [id number] - [address] [place] AT [address] [place] [place] [address]     Phone:  [phone]     amLODIPine 10 MG tablet    metFORMIN 500 MG 24 hr tablet    metoprolol succinate 100 MG 24 hr tablet                Primary Care Provider Office Phone # Fax #    Eh Matos [phone]       [address] [facility] [place] [address]        Equal Access to Services     ANAYELI CROW AH: Yogi chou  mirta Gallagher, wafaithda luqadaha, qaybta kaalmada analy, kristian shaveryulissa katie. So [facility] [phone].    ATENCIÓN: Si habla zully, tiene a velasco disposición servicios gratuitos de asistencia lingüística. Toney al [phone].    We comply with applicable federal civil rights laws and [place] laws. We do not discriminate on the basis of race, color, national origin, age, disability, sex, sexual orientation, or gender identity.            Thank you!     Thank you for choosing [facility]  for your care. Our goal is always to provide you with excellent care. Hearing back from our patients is one way we can continue to improve our services. Please take a few minutes to complete the written survey that you may receive in the mail after your visit with us. Thank you!             Your Updated Medication List - Protect others around you: Learn how to safely use, store and throw away your medicines at www.disposemymeds.org.          This list is accurate as of 11/26/18 11:22 AM.  Always use your most recent med list.                   Brand Name Dispense Instructions for use Diagnosis    ACCU-CHEK SMARTVIEW test strip   Generic drug:  blood glucose monitoring     300 strip    1 strip by In Vitro route 3 times daily Use to test blood sugar 3 times daily or as directed.    Type 2 diabetes mellitus with complication, with long-term current use of insulin (H)       amLODIPine 10 MG tablet    NORVASC    90 tablet    Take 1 tablet (10 mg) by mouth daily    Essential hypertension with goal blood pressure less than 140/90       aspirin 81 MG tablet    ASA    30 tablet    Take by mouth daily    CAD, multiple vessel       atorvastatin 80 MG tablet    LIPITOR    90 tablet    Take 1 tablet (80 mg) by mouth daily    CAD, multiple vessel, Hyperlipidemia LDL goal <100       B-D U/F 31G X 5 MM miscellaneous   Generic drug:  insulin pen needle     300 each    USE WITH INSULIN THREE TIMES DAILY     Type 2 diabetes mellitus with complication, with long-term current use of insulin (H)       blood glucose monitoring lancets     6 Box    1 each by In Vitro route 3 times daily Use to test blood sugar tid times daily or as directed.    Type 2 diabetes mellitus with complication, with long-term current use of insulin (H)       cilostazol 50 MG tablet    PLETAL     Take 50 mg by mouth 2 times daily        clopidogrel 75 MG tablet    PLAVIX     75 mg        FISH OIL OMEGA-3 1000 MG Caps      Take 1,200 mg by mouth daily        insulin glargine 100 UNIT/ML pen    LANTUS SOLOSTAR    30 mL    INJECT 50 UNITS UNDER THE SKIN DAILY    Type 2 diabetes mellitus with complication, with long-term current use of insulin (H)       isosorbide mononitrate 30 MG 24 hr tablet    IMDUR    90 tablet    Take 1 tablet (30 mg) by mouth daily    CAD, multiple vessel       liraglutide 18 MG/3ML solution    VICTOZA PEN    3 mL    INJECT 0.6 MG UNDER THE SKIN DAILY    Type 2 diabetes mellitus with complication, with long-term current use of insulin (H)       losartan 50 MG tablet    COZAAR    180 tablet    Take 2 tablets (100 mg) by mouth daily    Essential hypertension with goal blood pressure less than 140/90       metFORMIN 500 MG 24 hr tablet    GLUCOPHAGE-XR    360 tablet    TAKE 4 TABLETS BY MOUTH DAILY WITH BREAKFAST    Type 2 diabetes mellitus with complication, with long-term current use of insulin (H)       metoprolol succinate 100 MG 24 hr tablet    TOPROL-XL    90 tablet    Take 1 tablet (100 mg) by mouth daily    CAD, multiple vessel, Essential hypertension with goal blood pressure less than 140/90       * nicotine 14 MG/24HR 24 hr patch    NICODERM CQ     1 patch        * nicotine 10 MG Inhaler    NICOTROL    180 each    Inhale 6-16 Cartridges into the lungs daily as needed for smoking cessation    CAD, multiple vessel, PAD (peripheral artery disease) (H), Smoker       * nicotine 21 MG/24HR 24 hr patch    EQL NICOTINE    30  patch    Place 1 patch onto the skin every 24 hours    Smoker       nitroGLYcerin 0.4 MG sublingual tablet    NITROSTAT    25 tablet    For chest pain place 1 tablet under the tongue every 5 minutes for 3 doses. If symptoms persist 5 minutes after 1st dose call 911.    CAD, multiple vessel       NovoLOG FLEXPEN 100 UNIT/ML pen   Generic drug:  insulin aspart     54 mL    Patient uses 60 units per day    Type 2 diabetes mellitus with complication, with long-term current use of insulin (H)       nystatin cream    MYCOSTATIN     APLY TO AFFECTED AREA TWICE DAILY FOR 2 WEEKS        omeprazole 20 MG CR capsule    priLOSEC    90 capsule    Take 1 capsule (20 mg) by mouth daily    Gastroesophageal reflux disease without esophagitis       * Notice:  This list has 3 medication(s) that are the same as other medications prescribed for you. Read the directions carefully, and ask your doctor or other care provider to review them with you.

## 2018-11-26 NOTE — PROGRESS NOTES
SUBJECTIVE:   Darian Engle is a 68 year old male who presents to clinic today for the following health issues:      Diabetes Follow-up    Patient is checking blood sugars: 3-4 times daily.    Blood sugar testing frequency justification   Results are as follows:         am - 230 this morning    Pm - 220 last night    Diabetic concerns: None     Symptoms of hypoglycemia (low blood sugar): none     Paresthesias (numbness or burning in feet) or sores: No     Date of last diabetic eye exam: August    Current Outpatient Prescriptions   Medication Sig Dispense Refill     ACCU-CHEK SMARTVIEW test strip 1 strip by In Vitro route 3 times daily Use to test blood sugar 3 times daily or as directed. 300 strip 3     amLODIPine (NORVASC) 10 MG tablet Take 1 tablet (10 mg) by mouth daily 90 tablet 1     aspirin 81 MG tablet Take by mouth daily 30 tablet      atorvastatin (LIPITOR) 80 MG tablet Take 1 tablet (80 mg) by mouth daily 90 tablet 3     B-D U/F insulin pen needle USE WITH INSULIN THREE TIMES DAILY 300 each 0     blood glucose monitoring (ACCU-CHEK FASTCLIX) lancets 1 each by In Vitro route 3 times daily Use to test blood sugar tid times daily or as directed. 6 Box 3     cilostazol (PLETAL) 50 MG tablet Take 50 mg by mouth 2 times daily       clopidogrel (PLAVIX) 75 MG tablet 75 mg  11     insulin glargine (LANTUS SOLOSTAR) 100 UNIT/ML injection INJECT 50 UNITS UNDER THE SKIN DAILY 30 mL 3     isosorbide mononitrate (IMDUR) 30 MG 24 hr tablet Take 1 tablet (30 mg) by mouth daily 90 tablet 3     liraglutide (VICTOZA PEN) 18 MG/3ML soln INJECT 0.6 MG UNDER THE SKIN DAILY 3 mL 3     losartan (COZAAR) 50 MG tablet Take 2 tablets (100 mg) by mouth daily 180 tablet 3     metFORMIN (GLUCOPHAGE-XR) 500 MG 24 hr tablet TAKE 4 TABLETS BY MOUTH DAILY WITH BREAKFAST 360 tablet 0     metoprolol succinate (TOPROL-XL) 100 MG 24 hr tablet TAKE 1 TABLET BY MOUTH DAILY 90 tablet 0     nicotine (NICOTROL) 10 MG Inhaler Inhale 6-16  Cartridges into the lungs daily as needed for smoking cessation 180 each 1     nitroglycerin (NITROSTAT) 0.4 MG sublingual tablet For chest pain place 1 tablet under the tongue every 5 minutes for 3 doses. If symptoms persist 5 minutes after 1st dose call 911. 25 tablet 0     NOVOLOG FLEXPEN 100 UNIT/ML soln Patient uses 60 units per day 54 mL 11     Omega-3 Fatty Acids (FISH OIL OMEGA-3) 1000 MG CAPS Take 1,200 mg by mouth daily       omeprazole (PRILOSEC) 20 MG CR capsule Take 1 capsule (20 mg) by mouth daily 90 capsule 3     nicotine (EQL NICOTINE) 21 MG/24HR 24 hr patch Place 1 patch onto the skin every 24 hours (Patient not taking: Reported on 11/26/2018) 30 patch 0     nicotine (NICODERM CQ) 14 MG/24HR 24 hr patch 1 patch       nystatin (MYCOSTATIN) cream APLY TO AFFECTED AREA TWICE DAILY FOR 2 WEEKS  0          Taking 41 units or up to 50 units of lantus  He has rarely had low blood sugars   He had taken novolog   He was working and he felt it was coming on   He was late for lunch that day     No loss of consciousness       BP Readings from Last 2 Encounters:   07/23/18 144/70   06/07/18 121/69     Hemoglobin A1C (%)   Date Value   06/07/2018 7.8 (H)   04/03/2018 7.8 (H)     LDL Cholesterol Calculated (mg/dL)   Date Value   04/03/2018 37   02/03/2017 46       Diabetes Management Resources    Amount of exercise or physical activity: 3-4 times/ week 20 minutes on treadmill    Problems taking medications regularly: No    Medication side effects: none    Diet: regular (no restrictions)            Problem list and histories reviewed & adjusted, as indicated.  Additional history: as documented    Current Outpatient Prescriptions   Medication Sig Dispense Refill     ACCU-CHEK SMARTVIEW test strip 1 strip by In Vitro route 3 times daily Use to test blood sugar 3 times daily or as directed. 300 strip 3     amLODIPine (NORVASC) 10 MG tablet Take 1 tablet (10 mg) by mouth daily 90 tablet 1     aspirin 81 MG tablet Take  by mouth daily 30 tablet      atorvastatin (LIPITOR) 80 MG tablet Take 1 tablet (80 mg) by mouth daily 90 tablet 3     B-D U/F insulin pen needle USE WITH INSULIN THREE TIMES DAILY 300 each 0     blood glucose monitoring (ACCU-CHEK FASTCLIX) lancets 1 each by In Vitro route 3 times daily Use to test blood sugar tid times daily or as directed. 6 Box 3     cilostazol (PLETAL) 50 MG tablet Take 50 mg by mouth 2 times daily       clopidogrel (PLAVIX) 75 MG tablet 75 mg  11     insulin glargine (LANTUS SOLOSTAR) 100 UNIT/ML injection INJECT 50 UNITS UNDER THE SKIN DAILY 30 mL 3     isosorbide mononitrate (IMDUR) 30 MG 24 hr tablet Take 1 tablet (30 mg) by mouth daily 90 tablet 3     liraglutide (VICTOZA PEN) 18 MG/3ML soln INJECT 0.6 MG UNDER THE SKIN DAILY 3 mL 3     losartan (COZAAR) 50 MG tablet Take 2 tablets (100 mg) by mouth daily 180 tablet 3     metFORMIN (GLUCOPHAGE-XR) 500 MG 24 hr tablet TAKE 4 TABLETS BY MOUTH DAILY WITH BREAKFAST 360 tablet 0     metoprolol succinate (TOPROL-XL) 100 MG 24 hr tablet TAKE 1 TABLET BY MOUTH DAILY 90 tablet 0     nicotine (NICOTROL) 10 MG Inhaler Inhale 6-16 Cartridges into the lungs daily as needed for smoking cessation 180 each 1     nitroglycerin (NITROSTAT) 0.4 MG sublingual tablet For chest pain place 1 tablet under the tongue every 5 minutes for 3 doses. If symptoms persist 5 minutes after 1st dose call 911. 25 tablet 0     NOVOLOG FLEXPEN 100 UNIT/ML soln Patient uses 60 units per day 54 mL 11     Omega-3 Fatty Acids (FISH OIL OMEGA-3) 1000 MG CAPS Take 1,200 mg by mouth daily       omeprazole (PRILOSEC) 20 MG CR capsule Take 1 capsule (20 mg) by mouth daily 90 capsule 3        0             nystatin (MYCOSTATIN) cream APLY TO AFFECTED AREA TWICE DAILY FOR 2 WEEKS  0     Not using the nicotine patches any more     Allergies   Allergen Reactions     Lidocaine Other (See Comments)     Lungs filled with fluid. ? Anaphylaxis     Lisinopril Cough     cough     Recent Labs    Lab Test  06/07/18   0944  04/03/18   0710  12/18/17   1142  02/03/17   1551   A1C  7.8*  7.8*  8.3*   --    LDL   --   37   --   46   HDL   --   27*   --   28*   TRIG   --   89   --   124   CR   --    --   0.99  1.15   GFRESTIMATED   --    --   76  64   GFRESTBLACK   --    --   >90  77   POTASSIUM   --    --   4.5  4.5   TSH   --    --    --   3.59      BP Readings from Last 3 Encounters:   11/26/18 116/67   07/23/18 144/70   06/07/18 121/69    Wt Readings from Last 3 Encounters:   11/26/18 207 lb (93.9 kg)   07/23/18 225 lb (102.1 kg)   06/07/18 224 lb (101.6 kg)               Ros: no chest pain, chest tightness   No sob   No leg edema   No abdominal pain     Denies fever or chills   Weight is dropping       Patient has not quit smoking   He enjoys it          Reviewed and updated as needed this visit by clinical staff  Tobacco  Allergies  Meds  Med Hx  Surg Hx  Fam Hx  Soc Hx      Reviewed and updated as needed this visit by Provider         O: /67 (BP Location: Right arm, Patient Position: Chair, Cuff Size: Adult Large)  Pulse 84  Temp 97.5  F (36.4  C) (Oral)  Wt 207 lb (93.9 kg)  SpO2 97%  BMI 31.71 kg/m2    Wt Readings from Last 4 Encounters:   11/26/18 207 lb (93.9 kg)   07/23/18 225 lb (102.1 kg)   06/07/18 224 lb (101.6 kg)   04/10/18 221 lb (100.2 kg)         Head: Normocephalic, atraumatic.  Eyes: Conjunctiva clear, non icteric. PERRLA.  Ears: External ears and TMs normal BL.  Nose: Septum midline, nasal mucosa pink and moist. No discharge.  Mouth / Throat: Normal dentition.  No oral lesions. Pharynx non erythematous, tonsils without hypertrophy.  Neck: Supple, no enlarged LN, trachea midline.    Chest wall normal to inspection and palpation. Good excursion bilaterally. Lungs clear to auscultation. Good air movement bilaterally without rales, wheezes, or rhonchi.   Regular rate and  rhythm. S1 and S2 normal, no murmurs, clicks, gallops or rubs. No edema or JVD.    The abdomen is soft  without tenderness, guarding, mass, rebound or organomegaly. Bowel sounds are normal. No CVA tenderness or inguinal adenopathy noted.      ICD-10-CM    1. Type 2 diabetes mellitus with complication, with long-term current use of insulin (H) E11.8     Z79.4    2. CAD, multiple vessel I25.10    3. PAD (peripheral artery disease) (H) I73.9    4. Hyperlipidemia LDL goal <100 E78.5    5. Screen for colon cancer Z12.11 Fecal colorectal cancer screen (FIT)     Renewed meds   Diabetes is at goal so control is good   Follow up in 6 months for his diabetes     Coronary artery disease history, stable   No current symptoms   No change in his dose of his meds     Hypertension shows good control   No change in meds

## 2018-11-27 NOTE — PROGRESS NOTES
Your Hemoglobin A1C is stable     Your basic metabolic panel which includes electrolytes,kidney function is normal and  -Glucose (diabetic screening test) is within normal limits    Follow up in 6 month(s)    No change in meds

## 2018-12-21 DIAGNOSIS — I25.10 CAD, MULTIPLE VESSEL: ICD-10-CM

## 2018-12-21 DIAGNOSIS — E11.8 TYPE 2 DIABETES MELLITUS WITH COMPLICATION, WITH LONG-TERM CURRENT USE OF INSULIN (H): ICD-10-CM

## 2018-12-21 DIAGNOSIS — E78.5 HYPERLIPIDEMIA LDL GOAL <100: ICD-10-CM

## 2018-12-21 DIAGNOSIS — Z79.4 TYPE 2 DIABETES MELLITUS WITH COMPLICATION, WITH LONG-TERM CURRENT USE OF INSULIN (H): ICD-10-CM

## 2018-12-21 RX ORDER — METFORMIN HCL 500 MG
TABLET, EXTENDED RELEASE 24 HR ORAL
Qty: 360 TABLET | Refills: 0 | OUTPATIENT
Start: 2018-12-21

## 2018-12-21 RX ORDER — ATORVASTATIN CALCIUM 80 MG/1
TABLET, FILM COATED ORAL
Qty: 30 TABLET | Refills: 0 | OUTPATIENT
Start: 2018-12-21

## 2018-12-21 NOTE — TELEPHONE ENCOUNTER
"Requested Prescriptions   Pending Prescriptions Disp Refills     atorvastatin (LIPITOR) 80 MG tablet [Pharmacy Med Name: ATORVASTATIN 80MG TABLETS]  Last Written Prescription Date:  4/10/18  Last Fill Quantity: 90,  # refills: 3   Last office visit: 11/26/2018 with prescribing provider:  Shawna   Future Office Visit:     30 tablet 0     Sig: TAKE 1 TABLET BY MOUTH DAILY    Statins Protocol Passed - 12/21/2018  4:39 AM       Passed - LDL on file in past 12 months    Recent Labs   Lab Test 04/03/18  0710   LDL 37            Passed - No abnormal creatine kinase in past 12 months    No lab results found.            Passed - Recent (12 mo) or future (30 days) visit within the authorizing provider's specialty    Patient had office visit in the last 12 months or has a visit in the next 30 days with authorizing provider or within the authorizing provider's specialty.  See \"Patient Info\" tab in inbasket, or \"Choose Columns\" in Meds & Orders section of the refill encounter.             Passed - Patient is age 18 or older          "

## 2018-12-21 NOTE — TELEPHONE ENCOUNTER
Year supply sent to [facility], [facility] sent request. Refill denied note to requesting pharmacy to transfer prescription to their location.     Casandra Powell RN

## 2018-12-21 NOTE — TELEPHONE ENCOUNTER
"Requested Prescriptions   Pending Prescriptions Disp Refills     metFORMIN (GLUCOPHAGE-XR) 500 MG 24 hr tablet [Pharmacy Med Name: METFORMIN ER 500MG 24HR TABS] 360 tablet 0    Last Written Prescription Date:  11/26/18  Last Fill Quantity: 360,  # refills: 3   Last office visit: 11/26/2018 with prescribing provider:     Future Office Visit:     Sig: TAKE 4 TABLETS BY MOUTH DAILY WITH BREAKFAST    Biguanide Agents Passed - 12/21/2018 10:05 AM       Passed - Blood pressure less than 140/90 in past 6 months    BP Readings from Last 3 Encounters:   11/26/18 116/67   07/23/18 144/70   06/07/18 121/69                Passed - Patient has documented LDL within the past 12 mos.    Recent Labs   Lab Test 04/03/18  0710   LDL 37            Passed - Patient has had a Microalbumin in the past 15 mos.    Recent Labs   Lab Test 04/03/18  0722   MICROL 1,140   UMALCR 991.30*            Passed - Patient is age 10 or older       Passed - Patient has documented A1c within the specified period of time.    If HgbA1C is 8 or greater, it needs to be on file within the past 3 months.  If less than 8, must be on file within the past 6 months.     Recent Labs   Lab Test 11/26/18  1125   A1C 7.6*            Passed - Patient's CR is NOT>1.4 OR Patient's EGFR is NOT<45 within past 12 mos.    Recent Labs   Lab Test 11/26/18  1125   GFRESTIMATED 87   GFRESTBLACK >90       Recent Labs   Lab Test 11/26/18  1125   CR 0.87            Passed - Patient does NOT have a diagnosis of CHF.       Passed - Recent (6 mo) or future (30 days) visit within the authorizing provider's specialty    Patient had office visit in the last 6 months or has a visit in the next 30 days with authorizing provider or within the authorizing provider's specialty.  See \"Patient Info\" tab in inbasket, or \"Choose Columns\" in Meds & Orders section of the refill encounter.              "

## 2019-01-07 DIAGNOSIS — E11.8 TYPE 2 DIABETES MELLITUS WITH COMPLICATION, WITH LONG-TERM CURRENT USE OF INSULIN (H): ICD-10-CM

## 2019-01-07 DIAGNOSIS — Z79.4 TYPE 2 DIABETES MELLITUS WITH COMPLICATION, WITH LONG-TERM CURRENT USE OF INSULIN (H): ICD-10-CM

## 2019-01-07 NOTE — TELEPHONE ENCOUNTER
"Requested Prescriptions   Pending Prescriptions Disp Refills     blood glucose monitoring (ACCU-CHEK FASTCLIX) lancets [Pharmacy Med Name: ACCU-CHEK FASTCLIX LANCETS 102'S] 306 each 0    Last Written Prescription Date:  12/7/17  Last Fill Quantity: 6,  # refills: 3   Last office visit: 11/26/2018 with prescribing provider:     Future Office Visit:     Sig: USE TO TEST THREE TIMES DAILY OR AS DIRECTED    Diabetic Supplies Protocol Passed - 1/7/2019 10:06 AM       Passed - Medication is active on med list       Passed - Patient is 18 years of age or older       Passed - Recent (6 mo) or future (30 days) visit within the authorizing provider's specialty    Patient had office visit in the last 6 months or has a visit in the next 30 days with authorizing provider.  See \"Patient Info\" tab in inbasket, or \"Choose Columns\" in Meds & Orders section of the refill encounter.            B-D U/F insulin pen needle [Pharmacy Med Name: B-D PEN NDL MINI [id number](3/16)PRPL] 300 each 0    Last Written Prescription Date:  10/8/18  Last Fill Quantity: 300,  # refills: 0   Last office visit: 11/26/2018 with prescribing provider:     Future Office Visit:     Sig: USE WITH INSULIN THREE TIMES DAILY    Diabetic Supplies Protocol Passed - 1/7/2019 10:06 AM       Passed - Medication is active on med list       Passed - Patient is 18 years of age or older       Passed - Recent (6 mo) or future (30 days) visit within the authorizing provider's specialty    Patient had office visit in the last 6 months or has a visit in the next 30 days with authorizing provider.  See \"Patient Info\" tab in inbasket, or \"Choose Columns\" in Meds & Orders section of the refill encounter.              "

## 2019-01-09 RX ORDER — LANCETS
EACH MISCELLANEOUS
Qty: 306 EACH | Refills: 0 | Status: SHIPPED | OUTPATIENT
Start: 2019-01-09 | End: 2019-12-03

## 2019-01-09 RX ORDER — FLURBIPROFEN SODIUM 0.3 MG/ML
SOLUTION/ DROPS OPHTHALMIC
Qty: 300 EACH | Refills: 0 | Status: SHIPPED | OUTPATIENT
Start: 2019-01-09 | End: 2019-12-03

## 2019-01-09 NOTE — TELEPHONE ENCOUNTER
Prescription approved per [facility] Refill Protocol.  Shelly Jones, RN CPC Triage.

## 2019-04-11 DIAGNOSIS — I25.10 CAD, MULTIPLE VESSEL: ICD-10-CM

## 2019-04-11 DIAGNOSIS — E78.5 HYPERLIPIDEMIA LDL GOAL <100: ICD-10-CM

## 2019-04-11 RX ORDER — ATORVASTATIN CALCIUM 80 MG/1
TABLET, FILM COATED ORAL
Qty: 90 TABLET | Refills: 0 | Status: SHIPPED | OUTPATIENT
Start: 2019-04-11 | End: 2019-08-02

## 2019-04-11 NOTE — TELEPHONE ENCOUNTER
"Requested Prescriptions   Pending Prescriptions Disp Refills     atorvastatin (LIPITOR) 80 MG tablet [Pharmacy Med Name: ATORVASTATIN 80MG TABLETS] 90 tablet 0     Sig: TAKE 1 TABLET(80 MG) BY MOUTH DAILY   Last Written Prescription Date:  4/10/18  Last Fill Quantity: 90,  # refills: 3   Last office visit: 11/26/2018 with prescribing provider:     Future Office Visit:        Statins Protocol Failed - 4/11/2019 11:01 AM        Failed - LDL on file in past 12 months     Recent Labs   Lab Test 04/03/18  0710   LDL 37             Passed - No abnormal creatine kinase in past 12 months     No lab results found.             Passed - Recent (12 mo) or future (30 days) visit within the authorizing provider's specialty     Patient had office visit in the last 12 months or has a visit in the next 30 days with authorizing provider or within the authorizing provider's specialty.  See \"Patient Info\" tab in inbasket, or \"Choose Columns\" in Meds & Orders section of the refill encounter.              Passed - Medication is active on med list        Passed - Patient is age 18 or older          "

## 2019-04-11 NOTE — TELEPHONE ENCOUNTER
Medication is being filled for 1 time refill only due to:  Patient needs to be seen because per copy below.    11-26-18  Follow up in 6 month(s)

## 2019-04-15 DIAGNOSIS — E11.8 TYPE 2 DIABETES MELLITUS WITH COMPLICATION, WITH LONG-TERM CURRENT USE OF INSULIN (H): ICD-10-CM

## 2019-04-15 DIAGNOSIS — Z79.4 TYPE 2 DIABETES MELLITUS WITH COMPLICATION, WITH LONG-TERM CURRENT USE OF INSULIN (H): ICD-10-CM

## 2019-04-15 NOTE — TELEPHONE ENCOUNTER
"Requested Prescriptions   Pending Prescriptions Disp Refills     insulin glargine (LANTUS SOLOSTAR PEN) 100 UNIT/ML pen [Pharmacy Med Name: LANTUS SOLOSTAR PEN INJ 3ML] 30 mL 0     Sig: ADMINISTER 50 UNITS UNDER THE SKIN DAILY   Last Written Prescription Date:  4-10-18  Last Fill Quantity: 30ml,  # refills: 3   Last office visit: 11/26/2018 with prescribing provider:  11-26-18   Future Office Visit:        Long Acting Insulin Protocol Failed - 4/15/2019  4:00 PM        Failed - LDL on file in past 12 months     Recent Labs   Lab Test 04/03/18  0710   LDL 37             Passed - Blood pressure less than 140/90 in past 6 months     BP Readings from Last 3 Encounters:   11/26/18 116/67   07/23/18 144/70   06/07/18 121/69                 Passed - Microalbumin on file in past 12 months     Recent Labs   Lab Test 04/03/18  0722   MICROL 1,140   UMALCR 991.30*             Passed - Serum creatinine on file in past 12 months     Recent Labs   Lab Test 11/26/18  1125   CR 0.87             Passed - HgbA1C in past 3 or 6 months     If HgbA1C is 8 or greater, it needs to be on file within the past 3 months.  If less than 8, must be on file within the past 6 months.     Recent Labs   Lab Test 11/26/18  1125   A1C 7.6*             Passed - Medication is active on med list        Passed - Patient is age 18 or older        Passed - Recent (6 mo) or future (30 days) visit within the authorizing provider's specialty     Patient had office visit in the last 6 months or has a visit in the next 30 days with authorizing provider or within the authorizing provider's specialty.  See \"Patient Info\" tab in inbasket, or \"Choose Columns\" in Meds & Orders section of the refill encounter.              "

## 2019-04-16 NOTE — TELEPHONE ENCOUNTER
Routing refill request to provider for review/approval because:  RX Protocol Failed.  Please review refill.  Thank you.  Hedy Love RN

## 2019-04-30 ENCOUNTER — TELEPHONE (OUTPATIENT)
Dept: FAMILY MEDICINE | Facility: CLINIC | Age: 69
End: 2019-04-30

## 2019-04-30 DIAGNOSIS — I25.10 CAD, MULTIPLE VESSEL: ICD-10-CM

## 2019-04-30 RX ORDER — NITROGLYCERIN 0.4 MG/1
TABLET SUBLINGUAL
Qty: 25 TABLET | Refills: 0 | Status: SHIPPED | OUTPATIENT
Start: 2019-04-30 | End: 2019-12-03

## 2019-04-30 NOTE — TELEPHONE ENCOUNTER
Reason for Call:  Other     Detailed comments: the patient wanted to know if he needed to be seen again to get a refill of the medication nitroglycerin (NITROSTAT) 0.4 MG sublingual tablet, if not please send to [id number]  [place], [place] - [address] AT [facility]  Patient said that he thought all he prescriptions would ne refill when he was seen in clinic in November the patient will be going out of town and would like to get this refill today he is leaving tomorrow     Phone Number Patient can be reached at: Home number on file [phone] (home)    Best Time: any    Can we leave a detailed message on this number? YES    Call taken on 4/30/2019 at 3:04 PM by Holly Blue

## 2019-04-30 NOTE — TELEPHONE ENCOUNTER
Requested Prescriptions   Pending Prescriptions Disp Refills     nitroGLYcerin (NITROSTAT) 0.4 MG sublingual tablet 25 tablet 0     Sig: For chest pain place 1 tablet under the tongue every 5 minutes for 3 doses. If symptoms persist 5 minutes after 1st dose call 911.       There is no refill protocol information for this order        Last refill 2017  Last OV 11/26/19.    BP Readings from Last 3 Encounters:   11/26/18 116/67   07/23/18 144/70   06/07/18 121/69         Routing refill request to provider for review/approval because:  A break in medication  Plan not noted.    Shelly Jones, RN CPC Triage.

## 2019-05-19 DIAGNOSIS — E11.8 TYPE 2 DIABETES MELLITUS WITH COMPLICATION, WITH LONG-TERM CURRENT USE OF INSULIN (H): ICD-10-CM

## 2019-05-19 DIAGNOSIS — Z79.4 TYPE 2 DIABETES MELLITUS WITH COMPLICATION, WITH LONG-TERM CURRENT USE OF INSULIN (H): ICD-10-CM

## 2019-05-19 DIAGNOSIS — I10 ESSENTIAL HYPERTENSION WITH GOAL BLOOD PRESSURE LESS THAN 140/90: ICD-10-CM

## 2019-05-20 NOTE — TELEPHONE ENCOUNTER
"Requested Prescriptions   Pending Prescriptions Disp Refills     liraglutide (VICTOZA PEN) 18 MG/3ML solution [Pharmacy Med Name: VICTOZA 18MG/3ML INJ PEN 3ML] 6 mL 0     Sig: INJECT 0.6 MG UNDER THE SKIN DAILY   Last Written Prescription Date:  4/10/18  Last Fill Quantity: 3,  # refills: 3   Last office visit: 11/26/2018 with prescribing provider:     Future Office Visit:        GLP-1 Agonists Protocol Failed - 5/19/2019 11:24 AM        Failed - LDL on file in past 12 months     Recent Labs   Lab Test 04/03/18  0710   LDL 37             Passed - Blood pressure less than 140/90 in past 6 months     BP Readings from Last 3 Encounters:   11/26/18 116/67   07/23/18 144/70   06/07/18 121/69                 Passed - Microalbumin on file in past 12 months     Recent Labs   Lab Test 04/03/18  0722   MICROL 1,140   UMALCR 991.30*             Passed - HgbA1C in past 3 or 6 months     If HgbA1C is 8 or greater, it needs to be on file within the past 3 months.  If less than 8, must be on file within the past 6 months.     Recent Labs   Lab Test 11/26/18  1125   A1C 7.6*             Passed - Medication is active on med list        Passed - Patient is age 18 or older        Passed - Normal serum creatinine on file in past 12 months     Recent Labs   Lab Test 11/26/18  1125   CR 0.87             Passed - Recent (6 mo) or future (30 days) visit within the authorizing provider's specialty     Patient had office visit in the last 6 months or has a visit in the next 30 days with authorizing provider.  See \"Patient Info\" tab in inbasket, or \"Choose Columns\" in Meds & Orders section of the refill encounter.            losartan (COZAAR) 50 MG tablet [Pharmacy Med Name: LOSARTAN 50MG TABLETS] 180 tablet 0     Sig: TAKE 2 TABLETS(100 MG) BY MOUTH DAILY   Last Written Prescription Date:  4/10/18  Last Fill Quantity: 180,  # refills: 3   Last office visit: 11/26/2018 with prescribing provider:     Future Office Visit:        Angiotensin-II " "Receptors Passed - 5/19/2019 11:24 AM        Passed - Blood pressure under 140/90 in past 12 months     BP Readings from Last 3 Encounters:   11/26/18 116/67   07/23/18 144/70   06/07/18 121/69                 Passed - Recent (12 mo) or future (30 days) visit within the authorizing provider's specialty     Patient had office visit in the last 12 months or has a visit in the next 30 days with authorizing provider or within the authorizing provider's specialty.  See \"Patient Info\" tab in inbasket, or \"Choose Columns\" in Meds & Orders section of the refill encounter.              Passed - Medication is active on med list        Passed - Patient is age 18 or older        Passed - Normal serum creatinine on file in past 12 months     Recent Labs   Lab Test 11/26/18  1125   CR 0.87             Passed - Normal serum potassium on file in past 12 months     Recent Labs   Lab Test 11/26/18  1125   POTASSIUM 4.4                      "

## 2019-05-21 RX ORDER — LOSARTAN POTASSIUM 50 MG/1
TABLET ORAL
Qty: 180 TABLET | Refills: 0 | Status: SHIPPED | OUTPATIENT
Start: 2019-05-21 | End: 2019-08-02

## 2019-05-21 RX ORDER — LIRAGLUTIDE 6 MG/ML
INJECTION SUBCUTANEOUS
Qty: 6 ML | Refills: 0 | Status: SHIPPED | OUTPATIENT
Start: 2019-05-21 | End: 2019-07-18

## 2019-05-30 ENCOUNTER — OFFICE VISIT (OUTPATIENT)
Dept: FAMILY MEDICINE | Facility: CLINIC | Age: 69
End: 2019-05-30
Payer: COMMERCIAL

## 2019-05-30 VITALS — WEIGHT: 211 LBS | BODY MASS INDEX: 32.32 KG/M2
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
## 2019-05-30 PROCEDURE — 99213 OFFICE O/P EST LOW 20 MIN: CPT | Performed by: FAMILY MEDICINE

## 2019-05-30 RX ORDER — METHYLPREDNISOLONE 4 MG
TABLET, DOSE PACK ORAL
Qty: 21 TABLET | Refills: 0 | Status: SHIPPED | OUTPATIENT
Start: 2019-05-30 | End: 2019-08-02

## 2019-05-30 NOTE — PROGRESS NOTES
Subjective     Darian Engle is a 69 year old male who presents to clinic today for the following health issues:    HPI   Rash  Onset: 3 days ago    Description:   Location: Right side of body7  Character: round, blotchy, raised, red  Itching (Pruritis): YES    Progression of Symptoms:  Improving a little    Accompanying Signs & Symptoms:  Fever: YES  Body aches or joint pain: no   Sore throat symptoms: no   Recent cold symptoms: no     History:   Previous similar rash: no     Precipitating factors:   Exposure to similar rash: no   New exposures: Slept at a Motel Sunday    Recent travel: no     Alleviating factors:  Some kind of lotion?    Therapies Tried and outcome: Some kind of lotion?            Reviewed and updated as needed this visit by Provider           Review of Systems     Objective    Pulse (P) 77   Temp (P) 97.6  F (36.4  C) (Oral)   Wt 95.7 kg (211 lb)   SpO2 (P) 97%   BMI 32.32 kg/m    Body mass index is 32.32 kg/m .  Physical Exam     Chest wall normal to inspection and palpation. Good excursion bilaterally. Lungs clear to auscultation. Good air movement bilaterally without rales, wheezes, or rhonchi.   Regular rate and  rhythm. S1 and S2 normal, no murmurs, clicks, gallops or rubs. No edema or JVD.    Patient has red splotches along his right forearm that are slightly raised   They are absent where the short sleeve line stops   The rest of the body has really no spots       ICD-10-CM    1. Bug bite, initial encounter W57.XXXA methylPREDNISolone (MEDROL DOSEPAK) 4 MG tablet therapy pack     Patient has odd distribution due to contact with mattress when sleeping   Suggested he did not need meds to treat, but he  Prefers to treat.  OK to use otc lotions for itching   Handout given.  He was in a hotel when this happened

## 2019-05-30 NOTE — PATIENT INSTRUCTIONS
Patient Education     Bedbugs    After years of being very rare in the , bedbugs are making a comeback. These bugs are small, about the size of an apple seed. They are reddish-brown, oval, and look slightly flattened. Bedbugs feed on human and animal blood, usually at night during sleep. Bedbugs are a nuisance. But they are not a major threat to your health.  Facts about bedbugs    Bedbugs are active mainly at night. During the day, they hide in dark places, often in and around where people or animals sleep. They are commonly found on mattresses and boxsprings and behind headboards. But they can hide anywhere.    Bedbugs are small and hard to see. They are often carried from place to place in items like luggage, furniture, and clothing. This is why they spread so easily.    Bedbugs are not attracted to dirt. Even the cleanest house or hotel can have bedbugs.    Unlike mosquitoes, bedbugs do not transmit disease. If you are bitten, you do not have to worry about catching a blood-borne illness.    Insect repellents have little effect on bedbugs.    Adult bedbugs can live for several months without a blood feeding.    Bedbugs are very hard to get rid of. If an infestation is suspected, it is recommended that a professional  be called.  Signs of bedbugs  Bites can be the first sign of a bedbug infestation. When inspecting for the bugs, look in crevices of mattresses and box springs, behind the headboard, and in and on objects near or under the bed. You may see the bugs themselves. Or, you may see tiny dark stains on fabric or carpets. Smears of blood on sheets and nightclothes upon awakening are another sign. In some cases, the bugs are so well hidden they can t be found unless items are taken apart.  Bedbug bites  Bedbugs look for food at night. They bite while people or animals are sleeping. The bites are most often painless. Many people never know they ve been bitten. But some people develop an itchy  red welt or swelling. And if a person has an allergic reaction, severe itching, blisters, or hives can develop. Bites are often on areas that are exposed, such as the head, neck, arms, and hands. Bedbug bites are not dangerous and don t spread illness. But if the bite is scratched and the skin is broken and irritated, there is a chance that a skin infection can develop.  Treating bites  Bite symptoms usually go away on their own within a week or two. During this time, over-the-counter (OTC) hydrocortisone ointment or cream can help relieve itching and swelling. If itching is bad, an OTC antihistamine that s taken by mouth (oral) can help. If an infection develops from scratching the bites, your healthcare provider can prescribe an antibiotic.  If you were bitten by bedbugs in your home, talk to a licensed pest-control professional or company. They can inspect your home and help you get rid of the bugs safely.  When to call your healthcare provider   If you have bites, call your healthcare provider if you develop any of the following:    A fever of 100.4 F (38 C) or higher, or as directed by your provider    Signs of infection of the bites, such as increased swelling and pain, warmth, or oozing    Signs of allergic reaction, such as hives, spreading rash, throat itching or swelling, or wheezing   Avoiding bedbugs    Avoid buying used beds. But if you do buy used bed frames, mattresses, box springs, or other furniture, check them carefully for bedbugs before bringing them into your home.    If bedbugs are found or suspected in the bed, use mattress and box spring encasement covers that can seal in bedbugs so they will eventually die there.    When traveling, remove linens from the top of the bed and check the mattress and headboard for signs of the bugs. Place luggage on a hard surface such as a table or on a luggage rack and not on the floor.    If you think you were exposed to bedbugs while traveling, wash all  clothing in hot water as soon as you get home. Washing alone will not kill the bugs. Clothing must be put in a dryer at high temperatures, at least 113  F (45  C) for 1 hour.     Never  items discarded on the street for use in your home. These include bed frames, mattresses, box springs, or upholstered furniture. These items may carry bedbugs.     Date Last Reviewed: 3/1/2017    5208-3064 [employer]. [address] [place] [address]. All rights reserved. This information is not intended as a substitute for professional medical care. Always follow your healthcare professional's instructions.

## 2019-06-06 NOTE — NURSING NOTE
"Chief Complaint   Patient presents with     Ingrown Toenail     right big toe       Initial Pulse 71  Wt 104.3 kg (230 lb)  SpO2 99%  BMI 36.57 kg/m2 Estimated body mass index is 36.57 kg/(m^2) as calculated from the following:    Height as of 2/15/17: 1.689 m (5' 6.5\").    Weight as of this encounter: 104.3 kg (230 lb).  Medication Reconciliation: complete  " ACP

## 2019-06-10 DIAGNOSIS — I25.10 CAD, MULTIPLE VESSEL: ICD-10-CM

## 2019-06-10 DIAGNOSIS — I10 ESSENTIAL HYPERTENSION WITH GOAL BLOOD PRESSURE LESS THAN 140/90: ICD-10-CM

## 2019-06-10 NOTE — TELEPHONE ENCOUNTER
"Requested Prescriptions   Pending Prescriptions Disp Refills     metoprolol succinate ER (TOPROL-XL) 100 MG 24 hr tablet [Pharmacy Med Name: METOPROLOL ER SUCCINATE 100MG TABS] 90 tablet 0     Sig: TAKE 1 TABLET(100 MG) BY MOUTH DAILY   Last Written Prescription Date:  11/26/18  Last Fill Quantity: 90,  # refills: 0   Last office visit: 5/30/2019 with prescribing provider:     Future Office Visit:          Beta-Blockers Protocol Passed - 6/10/2019  6:07 AM        Passed - Blood pressure under 140/90 in past 12 months     BP Readings from Last 3 Encounters:   11/26/18 116/67   07/23/18 144/70   06/07/18 121/69                 Passed - Patient is age 6 or older        Passed - Recent (12 mo) or future (30 days) visit within the authorizing provider's specialty     Patient had office visit in the last 12 months or has a visit in the next 30 days with authorizing provider or within the authorizing provider's specialty.  See \"Patient Info\" tab in inbasket, or \"Choose Columns\" in Meds & Orders section of the refill encounter.              Passed - Medication is active on med list          "

## 2019-06-12 RX ORDER — METOPROLOL SUCCINATE 100 MG/1
TABLET, EXTENDED RELEASE ORAL
Qty: 90 TABLET | Refills: 0 | Status: SHIPPED | OUTPATIENT
Start: 2019-06-12 | End: 2019-08-02

## 2019-06-12 NOTE — TELEPHONE ENCOUNTER
Prescription approved per [facility]..  Hedy Love RN

## 2019-06-23 DIAGNOSIS — I25.10 CAD, MULTIPLE VESSEL: ICD-10-CM

## 2019-06-24 NOTE — TELEPHONE ENCOUNTER
"Requested Prescriptions   Pending Prescriptions Disp Refills     isosorbide mononitrate (IMDUR) 30 MG 24 hr tablet [Pharmacy Med Name: ISOSORBIDE MONONITRATE 30MG ER TABS] 90 tablet 0     Sig: TAKE 1 TABLET(30 MG) BY MOUTH DAILY   Last Written Prescription Date:  4/10/18  Last Fill Quantity: 90,  # refills: 3   Last office visit: 5/30/2019 with prescribing provider:     Future Office Visit:        Nitrates Passed - 6/23/2019  7:21 PM        Passed - Blood pressure under 140/90 in past 12 months     BP Readings from Last 3 Encounters:   11/26/18 116/67   07/23/18 144/70   06/07/18 121/69                 Passed - Pt is not on erectile dysfunction medications        Passed - Recent (12 mo) or future (30 days) visit within the authorizing provider's specialty     Patient had office visit in the last 12 months or has a visit in the next 30 days with authorizing provider or within the authorizing provider's specialty.  See \"Patient Info\" tab in inbasket, or \"Choose Columns\" in Meds & Orders section of the refill encounter.              Passed - Medication is active on med list        Passed - Patient is age 18 or older          "

## 2019-06-26 RX ORDER — ISOSORBIDE MONONITRATE 30 MG/1
TABLET, EXTENDED RELEASE ORAL
Qty: 90 TABLET | Refills: 0 | Status: SHIPPED | OUTPATIENT
Start: 2019-06-26 | End: 2019-08-02

## 2019-06-27 ENCOUNTER — OFFICE VISIT (OUTPATIENT)
Dept: FAMILY MEDICINE | Facility: CLINIC | Age: 69
End: 2019-06-27
Payer: COMMERCIAL

## 2019-06-27 VITALS
WEIGHT: 214 LBS | SYSTOLIC BLOOD PRESSURE: 131 MMHG | TEMPERATURE: 97.2 F | HEART RATE: 76 BPM | BODY MASS INDEX: 32.78 KG/M2 | OXYGEN SATURATION: 97 % | DIASTOLIC BLOOD PRESSURE: 65 MMHG

## 2019-06-27 DIAGNOSIS — L50.9 HIVES: Primary | ICD-10-CM

## 2019-06-27 PROCEDURE — 99213 OFFICE O/P EST LOW 20 MIN: CPT | Performed by: FAMILY MEDICINE

## 2019-06-27 RX ORDER — PREDNISONE 20 MG/1
TABLET ORAL
Qty: 20 TABLET | Refills: 0 | Status: SHIPPED | OUTPATIENT
Start: 2019-06-27 | End: 2019-08-02

## 2019-06-27 RX ORDER — CETIRIZINE HYDROCHLORIDE 10 MG/1
TABLET ORAL
Refills: 0 | COMMUNITY
Start: 2019-06-24 | End: 2019-08-02

## 2019-06-27 NOTE — PROGRESS NOTES
Subjective     Darian Engle is a 69 year old male who presents to clinic today for the following health issues:    HPI     ED/UC Followup:    Facility:  [facility]  Date of visit: 19  Reason for visit: Hives  Current Status: Intermittent       Patient is not taking aleve or naprosyn   He got a cough with lisinopril   His losartan is not new   He does  at home         Past Medical History:   Diagnosis Date     Diabetes (H)      Hypertension        Past Surgical History:   Procedure Laterality Date     ENHANCE LASER REFRACTIVE BILATERAL EXISTING PT IN PARAMETERS         Family History   Problem Relation Age of Onset     Glaucoma Mother      Macular Degeneration Mother      Macular Degeneration Other        Social History     Tobacco Use     Smoking status: Current Every Day Smoker     Types: Cigarettes     Last attempt to quit: 7/15/2016     Years since quittin.9     Smokeless tobacco: Never Used   Substance Use Topics     Alcohol use: No       He has no new exposure to foods   He does not remember anything new at [facility]   He was in the pet store and he got nothing new there   He bought new cat treats   He threw those out     O: /65 (BP Location: Right arm, Patient Position: Sitting, Cuff Size: Adult Large)   Pulse 76   Temp 97.2  F (36.2  C) (Oral)   Wt 97.1 kg (214 lb)   SpO2 97%   BMI 32.78 kg/m        Head: Normocephalic, atraumatic.  Eyes: Conjunctiva clear, non icteric. PERRLA.  Ears: External ears and TMs normal BL.  Nose: Septum midline, nasal mucosa pink and moist. No discharge.  Mouth / Throat: Normal dentition.  No oral lesions. Pharynx non erythematous, tonsils without hypertrophy.  Neck: Supple, no enlarged LN, trachea midline.    Chest wall normal to inspection and palpation. Good excursion bilaterally. Lungs clear to auscultation. Good air movement bilaterally without rales, wheezes, or rhonchi.   Regular rate and  rhythm. S1 and S2 normal, no murmurs, clicks,  gallops or rubs. No edema or JVD.      ICD-10-CM    1. Hives L50.9 ALLERGY/ASTHMA ADULT REFERRAL     predniSONE (DELTASONE) 20 MG tablet     Patient should start the zantac   In addition restart prednisone with a taper

## 2019-07-09 ENCOUNTER — TRANSFERRED RECORDS (OUTPATIENT)
Dept: HEALTH INFORMATION MANAGEMENT | Facility: CLINIC | Age: 69
End: 2019-07-09

## 2019-07-18 DIAGNOSIS — Z79.4 TYPE 2 DIABETES MELLITUS WITH COMPLICATION, WITH LONG-TERM CURRENT USE OF INSULIN (H): ICD-10-CM

## 2019-07-18 DIAGNOSIS — E11.8 TYPE 2 DIABETES MELLITUS WITH COMPLICATION, WITH LONG-TERM CURRENT USE OF INSULIN (H): ICD-10-CM

## 2019-07-18 NOTE — TELEPHONE ENCOUNTER
"Requested Prescriptions   Pending Prescriptions Disp Refills     liraglutide (VICTOZA PEN) 18 MG/3ML solution [Pharmacy Med Name: VICTOZA 18MG/3ML INJ PEN 3ML]  Last Written Prescription Date:  5/21/19  Last Fill Quantity: 6mL,  # refills: 0   Last office visit: 6/27/2019 with prescribing provider:  Shawna   Future Office Visit:   Next 5 appointments (look out 90 days)    Jul 18, 2019  8:40 AM CDT  SHORT with Eh Matos MD  [facility] ([facility]) [address] [place] [id number]  [phone]          6 mL 0     Sig: ADMINISTER 0.6 MG UNDER THE SKIN DAILY       GLP-1 Agonists Protocol Failed - 7/18/2019  6:59 AM        Failed - LDL on file in past 12 months     Recent Labs   Lab Test 04/03/18  0710   LDL 37             Failed - Microalbumin on file in past 12 months     Recent Labs   Lab Test 04/03/18  0722   MICROL 1,140   UMALCR 991.30*             Failed - HgbA1C in past 3 or 6 months     If HgbA1C is 8 or greater, it needs to be on file within the past 3 months.  If less than 8, must be on file within the past 6 months.     Recent Labs   Lab Test 11/26/18  1125   A1C 7.6*             Passed - Blood pressure less than 140/90 in past 6 months     BP Readings from Last 3 Encounters:   06/27/19 131/65   11/26/18 116/67   07/23/18 144/70                 Passed - Medication is active on med list        Passed - Patient is age 18 or older        Passed - Normal serum creatinine on file in past 12 months     Recent Labs   Lab Test 11/26/18  1125   CR 0.87             Passed - Recent (6 mo) or future (30 days) visit within the authorizing provider's specialty     Patient had office visit in the last 6 months or has a visit in the next 30 days with authorizing provider.  See \"Patient Info\" tab in inbasket, or \"Choose Columns\" in Meds & Orders section of the refill encounter.              "

## 2019-07-22 RX ORDER — LIRAGLUTIDE 6 MG/ML
INJECTION SUBCUTANEOUS
Qty: 6 ML | Refills: 0 | Status: SHIPPED | OUTPATIENT
Start: 2019-07-22 | End: 2019-08-02

## 2019-07-22 NOTE — TELEPHONE ENCOUNTER
Due to high volume in refills and request from [facility], routing 10 refills to each provider.    Lenard Rajan RN

## 2019-08-02 ENCOUNTER — OFFICE VISIT (OUTPATIENT)
Dept: FAMILY MEDICINE | Facility: CLINIC | Age: 69
End: 2019-08-02
Payer: COMMERCIAL

## 2019-08-02 ENCOUNTER — TELEPHONE (OUTPATIENT)
Dept: FAMILY MEDICINE | Facility: CLINIC | Age: 69
End: 2019-08-02

## 2019-08-02 VITALS
HEIGHT: 68 IN | HEART RATE: 73 BPM | SYSTOLIC BLOOD PRESSURE: 133 MMHG | BODY MASS INDEX: 33.04 KG/M2 | WEIGHT: 218 LBS | TEMPERATURE: 98.5 F | DIASTOLIC BLOOD PRESSURE: 67 MMHG

## 2019-08-02 DIAGNOSIS — I10 ESSENTIAL HYPERTENSION WITH GOAL BLOOD PRESSURE LESS THAN 140/90: ICD-10-CM

## 2019-08-02 DIAGNOSIS — E78.5 HYPERLIPIDEMIA LDL GOAL <100: ICD-10-CM

## 2019-08-02 DIAGNOSIS — Z23 NEED FOR SHINGLES VACCINE: ICD-10-CM

## 2019-08-02 DIAGNOSIS — Z79.4 TYPE 2 DIABETES MELLITUS WITH COMPLICATION, WITH LONG-TERM CURRENT USE OF INSULIN (H): Primary | ICD-10-CM

## 2019-08-02 DIAGNOSIS — E11.8 TYPE 2 DIABETES MELLITUS WITH COMPLICATION, WITH LONG-TERM CURRENT USE OF INSULIN (H): Primary | ICD-10-CM

## 2019-08-02 DIAGNOSIS — I25.10 CAD, MULTIPLE VESSEL: ICD-10-CM

## 2019-08-02 DIAGNOSIS — R82.90 NONSPECIFIC FINDING ON EXAMINATION OF URINE: ICD-10-CM

## 2019-08-02 DIAGNOSIS — K13.70 MOUTH LESION: ICD-10-CM

## 2019-08-02 DIAGNOSIS — I73.9 PERIPHERAL ARTERY DISEASE (H): ICD-10-CM

## 2019-08-02 LAB
ALBUMIN UR-MCNC: 100 MG/DL
ANION GAP SERPL CALCULATED.3IONS-SCNC: 7 MMOL/L (ref 3–14)
APPEARANCE UR: CLEAR
BACTERIA #/AREA URNS HPF: ABNORMAL /HPF
BILIRUB UR QL STRIP: NEGATIVE
BUN SERPL-MCNC: 26 MG/DL (ref 7–30)
CALCIUM SERPL-MCNC: 9.5 MG/DL (ref 8.5–10.1)
CHLORIDE SERPL-SCNC: 109 MMOL/L (ref 94–109)
CHOLEST SERPL-MCNC: 103 MG/DL
CO2 SERPL-SCNC: 25 MMOL/L (ref 20–32)
COLOR UR AUTO: YELLOW
CREAT SERPL-MCNC: 0.99 MG/DL (ref 0.66–1.25)
GFR SERPL CREATININE-BSD FRML MDRD: 78 ML/MIN/{1.73_M2}
GLUCOSE SERPL-MCNC: 231 MG/DL (ref 70–99)
GLUCOSE UR STRIP-MCNC: 250 MG/DL
HBA1C MFR BLD: 9.5 % (ref 0–5.6)
HDLC SERPL-MCNC: 30 MG/DL
HGB UR QL STRIP: ABNORMAL
KETONES UR STRIP-MCNC: NEGATIVE MG/DL
LDLC SERPL CALC-MCNC: 57 MG/DL
LEUKOCYTE ESTERASE UR QL STRIP: NEGATIVE
NITRATE UR QL: POSITIVE
NON-SQ EPI CELLS #/AREA URNS LPF: ABNORMAL /LPF
NONHDLC SERPL-MCNC: 73 MG/DL
PH UR STRIP: 6 PH (ref 5–7)
POTASSIUM SERPL-SCNC: 4.8 MMOL/L (ref 3.4–5.3)
RBC #/AREA URNS AUTO: ABNORMAL /HPF
SODIUM SERPL-SCNC: 141 MMOL/L (ref 133–144)
SOURCE: ABNORMAL
SP GR UR STRIP: >1.03 (ref 1–1.03)
TRIGL SERPL-MCNC: 79 MG/DL
TSH SERPL DL<=0.005 MIU/L-ACNC: 1.56 MU/L (ref 0.4–4)
UROBILINOGEN UR STRIP-ACNC: 0.2 EU/DL (ref 0.2–1)
WBC #/AREA URNS AUTO: ABNORMAL /HPF

## 2019-08-02 PROCEDURE — 80061 LIPID PANEL: CPT | Performed by: FAMILY MEDICINE

## 2019-08-02 PROCEDURE — 99207 C FOOT EXAM  NO CHARGE: CPT | Performed by: FAMILY MEDICINE

## 2019-08-02 PROCEDURE — 81001 URINALYSIS AUTO W/SCOPE: CPT | Performed by: FAMILY MEDICINE

## 2019-08-02 PROCEDURE — 99214 OFFICE O/P EST MOD 30 MIN: CPT | Performed by: FAMILY MEDICINE

## 2019-08-02 PROCEDURE — 36415 COLL VENOUS BLD VENIPUNCTURE: CPT | Performed by: FAMILY MEDICINE

## 2019-08-02 PROCEDURE — 83036 HEMOGLOBIN GLYCOSYLATED A1C: CPT | Performed by: FAMILY MEDICINE

## 2019-08-02 PROCEDURE — 87086 URINE CULTURE/COLONY COUNT: CPT | Performed by: FAMILY MEDICINE

## 2019-08-02 PROCEDURE — 87186 SC STD MICRODIL/AGAR DIL: CPT | Performed by: FAMILY MEDICINE

## 2019-08-02 PROCEDURE — 84443 ASSAY THYROID STIM HORMONE: CPT | Performed by: FAMILY MEDICINE

## 2019-08-02 PROCEDURE — 80048 BASIC METABOLIC PNL TOTAL CA: CPT | Performed by: FAMILY MEDICINE

## 2019-08-02 PROCEDURE — 87088 URINE BACTERIA CULTURE: CPT | Performed by: FAMILY MEDICINE

## 2019-08-02 RX ORDER — ISOSORBIDE MONONITRATE 30 MG/1
30 TABLET, EXTENDED RELEASE ORAL DAILY
Qty: 90 TABLET | Refills: 3 | Status: SHIPPED | OUTPATIENT
Start: 2019-08-02 | End: 2020-09-16

## 2019-08-02 RX ORDER — ATORVASTATIN CALCIUM 80 MG/1
TABLET, FILM COATED ORAL
Qty: 90 TABLET | Refills: 3 | Status: SHIPPED | OUTPATIENT
Start: 2019-08-02 | End: 2020-09-16

## 2019-08-02 RX ORDER — LOSARTAN POTASSIUM 50 MG/1
TABLET ORAL
Qty: 180 TABLET | Refills: 3 | Status: SHIPPED | OUTPATIENT
Start: 2019-08-02 | End: 2020-09-16

## 2019-08-02 RX ORDER — METOPROLOL SUCCINATE 100 MG/1
100 TABLET, EXTENDED RELEASE ORAL DAILY
Qty: 90 TABLET | Refills: 3 | Status: SHIPPED | OUTPATIENT
Start: 2019-08-02 | End: 2020-09-16

## 2019-08-02 RX ORDER — LIRAGLUTIDE 6 MG/ML
INJECTION SUBCUTANEOUS
Qty: 6 ML | Refills: 3 | Status: SHIPPED | OUTPATIENT
Start: 2019-08-02 | End: 2019-08-05

## 2019-08-02 ASSESSMENT — MIFFLIN-ST. JEOR: SCORE: 1734.37

## 2019-08-02 NOTE — TELEPHONE ENCOUNTER
Reason for Call:  Other prescription    Detailed comments: Rashi with [facility] requesting for nurse to call to verify dosage on liraglutide (VICTOZA PEN). Please advise.     Phone Number Patient can be reached at: Other phone number:  [phone]    Best Time: Anytime    Can we leave a detailed message on this number? YES    Call taken on 8/2/2019 at 2:52 PM by Stephanie Taylor

## 2019-08-02 NOTE — RESULT ENCOUNTER NOTE
Urine has some sugar and signs of bacteria   Patient sure you drink plenty of fluids to keep hydrated   Thyroid is normal   Cholesterols are normal   Electrolytes are normal.  Blood sugar is moderately elevated on the day of the exam.  Kidney function tests are normal    Your A1c is gone up considerably at 9.5 you are showing moderate to poor control  Probably need to increase the dose of your insulin gradually to keep your morning blood sugar under 140.  You may want to check your blood sugars 2 hours after you eat and bring those under control also.  Recheck in 3 months.

## 2019-08-02 NOTE — TELEPHONE ENCOUNTER
Called and talked to pharmacist.  He stated that the dose of 0.6 mg is not clinically effective at this dosage.  Patient would not be getting any benefits of the meds at this dosage.  It needs to be at least 1.2 mg for an effective dosage.    If patient has had problems on this dosage and had to be lowered to the 0.6 mg, then if would be more beneficial to try something else like Trulicitly.    Shelly Jones RN [facility] Triage.

## 2019-08-02 NOTE — PROGRESS NOTES
Subjective     Darian Engle is a 69 year old male who presents to clinic today for the following health issues:    HPI     Check dark patch on roof of mouth    Diabetes Follow-up      How often are you checking your blood sugar? Four or more times daily    What time of day are you checking your blood sugars (select all that apply)?  Before meals    Have you had any blood sugars above 200?  Yes - 260    Have you had any blood sugars below 70?  No    What symptoms do you notice when your blood sugar is low?  Shaky    What concerns do you have today about your diabetes? None     Do you have any of these symptoms? (Select all that apply)  No numbness or tingling in feet.  No redness, sores or blisters on feet.  No complaints of excessive thirst.  No reports of blurry vision.  No significant changes to weight.     Have you had a diabetic eye exam in the last 12 months? No - DUE    BP Readings from Last 2 Encounters:   06/27/19 131/65   11/26/18 116/67     Hemoglobin A1C (%)   Date Value   11/26/2018 7.6 (H)   06/07/2018 7.8 (H)     LDL Cholesterol Calculated (mg/dL)   Date Value   04/03/2018 37   02/03/2017 46     No chest pain   No sob   Occasional heaviness of legs   Has angio scheduled for PAD     Diabetes Management Resources    Current Outpatient Medications   Medication Sig Dispense Refill     ACCU-CHEK SMARTVIEW test strip 1 strip by In Vitro route 3 times daily Use to test blood sugar 3 times daily or as directed. 300 strip 3     amLODIPine (NORVASC) 10 MG tablet Take 1 tablet (10 mg) by mouth daily 90 tablet 3     aspirin 81 MG tablet Take by mouth daily 30 tablet      atorvastatin (LIPITOR) 80 MG tablet TAKE 1 TABLET(80 MG) BY MOUTH DAILY 90 tablet 0     B-D U/F insulin pen needle USE WITH INSULIN THREE TIMES DAILY 300 each 0     blood glucose monitoring (ACCU-CHEK FASTCLIX) lancets USE TO TEST THREE TIMES DAILY OR AS DIRECTED 306 each 0     cilostazol (PLETAL) 50 MG tablet Take 50 mg by mouth 2 times  daily       clopidogrel (PLAVIX) 75 MG tablet 75 mg  11     insulin glargine (LANTUS SOLOSTAR PEN) 100 UNIT/ML pen ADMINISTER 50 UNITS UNDER THE SKIN DAILY 30 mL 0     isosorbide mononitrate (IMDUR) 30 MG 24 hr tablet TAKE 1 TABLET(30 MG) BY MOUTH DAILY 90 tablet 0     liraglutide (VICTOZA PEN) 18 MG/3ML solution ADMINISTER 0.6 MG UNDER THE SKIN DAILY 6 mL 0     losartan (COZAAR) 50 MG tablet TAKE 2 TABLETS(100 MG) BY MOUTH DAILY 180 tablet 0     metFORMIN (GLUCOPHAGE-XR) 500 MG 24 hr tablet TAKE 4 TABLETS BY MOUTH DAILY WITH BREAKFAST 360 tablet 3     metoprolol succinate ER (TOPROL-XL) 100 MG 24 hr tablet TAKE 1 TABLET(100 MG) BY MOUTH DAILY 90 tablet 0     NOVOLOG FLEXPEN 100 UNIT/ML soln Patient uses 60 units per day 54 mL 11     nystatin (MYCOSTATIN) cream APLY TO AFFECTED AREA TWICE DAILY FOR 2 WEEKS  0     Omega-3 Fatty Acids (FISH OIL OMEGA-3) 1000 MG CAPS Take 1,200 mg by mouth daily       omeprazole (PRILOSEC) 20 MG CR capsule Take 1 capsule (20 mg) by mouth daily 90 capsule 3     zoster vaccine recombinant adjuvanted (SHINGRIX) injection Inject 0.5 mLs into the muscle once for 1 dose 0.5 mL 1     nicotine (EQL NICOTINE) 21 MG/24HR 24 hr patch Place 1 patch onto the skin every 24 hours (Patient not taking: Reported on 8/2/2019) 30 patch 0     nicotine (NICODERM CQ) 14 MG/24HR 24 hr patch 1 patch       nicotine (NICOTROL) 10 MG Inhaler Inhale 6-16 Cartridges into the lungs daily as needed for smoking cessation (Patient not taking: Reported on 8/2/2019) 180 each 1     nitroGLYcerin (NITROSTAT) 0.4 MG sublingual tablet For chest pain place 1 tablet under the tongue every 5 minutes for 3 doses. If symptoms persist 5 minutes after 1st dose call 911. (Patient not taking: Reported on 8/2/2019) 25 tablet 0     Allergies   Allergen Reactions     Lidocaine Other (See Comments)     Lungs filled with fluid. ? Anaphylaxis     Lisinopril Cough     cough     Recent Labs   Lab Test 11/26/18  1125 06/07/18  0971  "04/03/18  0710 12/18/17  1142  02/03/17  1551   A1C 7.6* 7.8* 7.8* 8.3*   < >  --    LDL  --   --  37  --   --  46   HDL  --   --  27*  --   --  28*   TRIG  --   --  89  --   --  124   CR 0.87  --   --  0.99  --  1.15   GFRESTIMATED 87  --   --  76  --  64   GFRESTBLACK >90  --   --  >90  --  77   POTASSIUM 4.4  --   --  4.5  --  4.5   TSH  --   --   --   --   --  3.59    < > = values in this interval not displayed.          Reviewed and updated as needed this visit by Provider         Review of Systems   ROS COMP: CONSTITUTIONAL: NEGATIVE for fever, chills, change in weight  INTEGUMENTARY/SKIN: NEGATIVE for worrisome rashes, moles or lesions  EYES: NEGATIVE for vision changes or irritation  ENT/MOUTH: NEGATIVE for ear, mouth and throat problems  RESP: NEGATIVE for significant cough or SOB  BREAST: NEGATIVE for masses, tenderness or discharge  CV: NEGATIVE for chest pain, palpitations or peripheral edema  GI: NEGATIVE for nausea, abdominal pain, heartburn, or change in bowel habits  : NEGATIVE for frequency, dysuria, or hematuria  MUSCULOSKELETAL: NEGATIVE for significant arthralgias or myalgia  NEURO: NEGATIVE for weakness, dizziness or paresthesias  ENDOCRINE: NEGATIVE for temperature intolerance, skin/hair changes  HEME: NEGATIVE for bleeding problems  PSYCHIATRIC: NEGATIVE for changes in mood or affect      Objective    /67 (BP Location: Left arm, Patient Position: Sitting, Cuff Size: Adult Regular)   Pulse 73   Temp 98.5  F (36.9  C) (Oral)   Ht 1.721 m (5' 7.75\")   Wt 98.9 kg (218 lb)   BMI 33.39 kg/m    There is no height or weight on file to calculate BMI.  Physical Exam   GENERAL: healthy, alert and no distress  EYES: Eyes grossly normal to inspection, PERRL and conjunctivae and sclerae normal  HENT: ear canals and TM's normal, nose and mouth with small blue lesion top of mouth noted by Dentist   Flat   Regular   2 mm     NECK: no adenopathy, no asymmetry, masses, or scars and thyroid normal to " palpation  RESP: lungs clear to auscultation - no rales, rhonchi or wheezes  CV: regular rate and rhythm, normal S1 S2, no S3 or S4, no murmur, click or rub, no peripheral edema and peripheral pulses strong  ABDOMEN: soft, nontender, no hepatosplenomegaly, no masses and bowel sounds normal  MS: no gross musculoskeletal defects noted, no edema  SKIN: no suspicious lesions or rashes  NEURO: Normal strength and tone, mentation intact and speech normal  PSYCH: mentation appears normal, affect normal/bright     unable to feel  DP and PT pulses,;  no trophic changes or ulcerative lesions, normal sensory exam and normal monofilament exam            ICD-10-CM    1. Type 2 diabetes mellitus with complication, with long-term current use of insulin (H) E11.8 Basic metabolic panel    Z79.4 Hemoglobin A1c     UA reflex to Microscopic and Culture     TSH with free T4 reflex     C FOOT EXAM  NO CHARGE     Lipid panel reflex to direct LDL Fasting     insulin glargine (LANTUS SOLOSTAR) 100 UNIT/ML pen     liraglutide (VICTOZA PEN) 18 MG/3ML solution   2. Mouth lesion K13.70 OTOLARYNGOLOGY REFERRAL   3. Need for shingles vaccine Z23 zoster vaccine recombinant adjuvanted (SHINGRIX) injection   4. Essential hypertension with goal blood pressure less than 140/90 I10 losartan (COZAAR) 50 MG tablet     metoprolol succinate ER (TOPROL-XL) 100 MG 24 hr tablet   5. Peripheral artery disease (H) I73.9 Lipid panel reflex to direct LDL Fasting   6. CAD, multiple vessel I25.10 atorvastatin (LIPITOR) 80 MG tablet     isosorbide mononitrate (IMDUR) 30 MG 24 hr tablet     metoprolol succinate ER (TOPROL-XL) 100 MG 24 hr tablet   7. Hyperlipidemia LDL goal <100 E78.5 atorvastatin (LIPITOR) 80 MG tablet       Refer to ENT  \discussed Kraig testing since test 3-4 times a day and blood sugars still running a little high   On insulin and victoza     Recheck in 6 months if blood sugar between 7-8 with history of heart disease   Followed by cardiology    No change in meds

## 2019-08-05 ENCOUNTER — TELEPHONE (OUTPATIENT)
Dept: FAMILY MEDICINE | Facility: CLINIC | Age: 69
End: 2019-08-05

## 2019-08-05 DIAGNOSIS — Z79.4 TYPE 2 DIABETES MELLITUS WITH COMPLICATION, WITH LONG-TERM CURRENT USE OF INSULIN (H): ICD-10-CM

## 2019-08-05 DIAGNOSIS — E11.8 TYPE 2 DIABETES MELLITUS WITH COMPLICATION, WITH LONG-TERM CURRENT USE OF INSULIN (H): ICD-10-CM

## 2019-08-05 LAB
BACTERIA SPEC CULT: ABNORMAL
SPECIMEN SOURCE: ABNORMAL

## 2019-08-05 RX ORDER — LIRAGLUTIDE 6 MG/ML
1.2 INJECTION SUBCUTANEOUS DAILY
Qty: 6 ML | Refills: 11 | Status: SHIPPED | OUTPATIENT
Start: 2019-08-05 | End: 2019-12-11

## 2019-08-05 NOTE — RESULT ENCOUNTER NOTE
Usually when we find this organism it is a contaminant   Irregegardless,  The antibiotic we have you on should work for it.  Follow up as planned in a week.

## 2019-08-05 NOTE — TELEPHONE ENCOUNTER
After reviewing the chart and the increase in the Hemoglobin A1C I think what I would do would be to try to increase the Victoza to 1.2 mg per day. I have changed the order so with the next refill the quantity will increase.  The increase sometimes will cause nausea and diarrhea for a few days.  If he does not tolerate this increase we may need to change this out with a more effective dose.

## 2019-08-05 NOTE — TELEPHONE ENCOUNTER
Patient was seen 8/2/19.  3 mos re-check advised per result note.    Result QuickNote        Urine has some sugar and signs of bacteria   Patient sure you drink plenty of fluids to keep hydrated   Thyroid is normal   Cholesterols are normal   Electrolytes are normal.  Blood sugar is moderately elevated on the day of the exam.  Kidney function tests are normal     Your A1c is gone up considerably at 9.5 you are showing moderate to poor control  Probably need to increase the dose of your insulin gradually to keep your morning blood sugar under 140.  You may want to check your blood sugars 2 hours after you eat and bring those under control also.  Recheck in 3 months.               See provider advice in this encounter regarding Victoza dose increase.    Attempted to call patient at home number, left message on voicemail; patient was instructed to return call to [facility] RN directly on the RN call back line at [phone].  We do have consent to communicate with wife if she calls back (Angelica).  Marichuy Barnett, IFEOMA  [facility]

## 2019-08-06 NOTE — TELEPHONE ENCOUNTER
Notified patient of the orders below per PCP.Patient stated understanding and agreeable with the plan of care. Shelly Jones,DARRENN,RN, [facility] Triage.

## 2019-08-07 DIAGNOSIS — E78.5 HYPERLIPIDEMIA LDL GOAL <100: ICD-10-CM

## 2019-08-07 DIAGNOSIS — I25.10 CAD, MULTIPLE VESSEL: ICD-10-CM

## 2019-08-07 NOTE — TELEPHONE ENCOUNTER
"Requested Prescriptions   Pending Prescriptions Disp Refills     atorvastatin (LIPITOR) 80 MG tablet [Pharmacy Med Name: ATORVASTATIN 80MG TABLETS] 90 tablet 0     Sig: TAKE 1 TABLET BY MOUTH DAILY   Last Written Prescription Date:  8-2-19  Last Fill Quantity: 90,  # refills: 3   Last office visit: 8/2/2019 with prescribing provider:  8-2-19   Future Office Visit:        Statins Protocol Passed - 8/7/2019 11:20 AM        Passed - LDL on file in past 12 months     Recent Labs   Lab Test 08/02/19  0824   LDL 57             Passed - No abnormal creatine kinase in past 12 months     No lab results found.             Passed - Recent (12 mo) or future (30 days) visit within the authorizing provider's specialty     Patient had office visit in the last 12 months or has a visit in the next 30 days with authorizing provider or within the authorizing provider's specialty.  See \"Patient Info\" tab in inbasket, or \"Choose Columns\" in Meds & Orders section of the refill encounter.              Passed - Medication is active on med list        Passed - Patient is age 18 or older          "

## 2019-08-08 RX ORDER — ATORVASTATIN CALCIUM 80 MG/1
TABLET, FILM COATED ORAL
Qty: 90 TABLET | Refills: 0 | OUTPATIENT
Start: 2019-08-08

## 2019-08-22 ENCOUNTER — TELEPHONE (OUTPATIENT)
Dept: FAMILY MEDICINE | Facility: CLINIC | Age: 69
End: 2019-08-22

## 2019-08-22 NOTE — TELEPHONE ENCOUNTER
Called patent and he stated that this has been taken care of already.  Shelly Jones RN [facility] Triage.

## 2019-08-22 NOTE — TELEPHONE ENCOUNTER
Patient left voice mail stating is returning a call regarding changing of medical plan. Will not be home until after noon today, states please call or he will call back.      Christiane Mariscal RN  [facility]

## 2019-08-27 DIAGNOSIS — E11.8 TYPE 2 DIABETES MELLITUS WITH COMPLICATION, WITH LONG-TERM CURRENT USE OF INSULIN (H): ICD-10-CM

## 2019-08-27 DIAGNOSIS — Z79.4 TYPE 2 DIABETES MELLITUS WITH COMPLICATION, WITH LONG-TERM CURRENT USE OF INSULIN (H): ICD-10-CM

## 2019-08-27 RX ORDER — LIRAGLUTIDE 6 MG/ML
INJECTION SUBCUTANEOUS
Qty: 6 ML | Refills: 0 | Status: SHIPPED | OUTPATIENT
Start: 2019-08-27 | End: 2019-12-11

## 2019-08-27 RX ORDER — INSULIN ASPART 100 [IU]/ML
INJECTION, SOLUTION INTRAVENOUS; SUBCUTANEOUS
Qty: 60 ML | Refills: 0 | Status: SHIPPED | OUTPATIENT
Start: 2019-08-27 | End: 2019-12-03

## 2019-08-27 NOTE — TELEPHONE ENCOUNTER
Routing refill request to provider for review/approval because:  Failed protocol below.  Shelly Jones RN [facility] Triage.

## 2019-08-27 NOTE — TELEPHONE ENCOUNTER
"Requested Prescriptions   Pending Prescriptions Disp Refills     NOVOLOG FLEXPEN 100 UNIT/ML soln [Pharmacy Med Name: NOVOLOG FLEXPEN INJ 3ML (ORANGE)] 60 mL 0     Sig: INJECT 60 UNITS UNDER THE SKIN EVERY DAY   Last Written Prescription Date:  2/1/18  Last Fill Quantity: 54ml,  # refills: 11   Last office visit: 8/2/2019 with prescribing provider:     Future Office Visit:        Short Acting Insulin Protocol Failed - 8/27/2019 11:28 AM        Failed - Microalbumin on file in past 12 months     Recent Labs   Lab Test 04/03/18  0722   MICROL 1,140   UMALCR 991.30*             Passed - Blood pressure less than 140/90 in past 6 months     BP Readings from Last 3 Encounters:   08/02/19 133/67   06/27/19 131/65   11/26/18 116/67                 Passed - LDL on file in past 12 months     Recent Labs   Lab Test 08/02/19  0824   LDL 57             Passed - Serum creatinine on file in past 12 months     Recent Labs   Lab Test 08/02/19  0824   CR 0.99             Passed - HgbA1C in past 3 or 6 months     If HgbA1C is 8 or greater, it needs to be on file within the past 3 months.  If less than 8, must be on file within the past 6 months.     Recent Labs   Lab Test 08/02/19  0824   A1C 9.5*             Passed - Medication is active on med list        Passed - Patient is age 18 or older        Passed - Recent (6 mo) or future (30 days) visit within the authorizing provider's specialty     Patient had office visit in the last 6 months or has a visit in the next 30 days with authorizing provider or within the authorizing provider's specialty.  See \"Patient Info\" tab in inbasket, or \"Choose Columns\" in Meds & Orders section of the refill encounter.            liraglutide (VICTOZA PEN) 18 MG/3ML solution [Pharmacy Med Name: VICTOZA 18MG/3ML INJ PEN 3ML] 6 mL 0     Sig: ADMINISTER 0.6 MG UNDER THE SKIN DAILY   Last Written Prescription Date:  8/5/19  Last Fill Quantity: 6ml,  # refills: 11   Last office visit: 8/2/2019 with " "prescribing provider:     Future Office Visit:        GLP-1 Agonists Protocol Failed - 8/27/2019 11:28 AM        Failed - Microalbumin on file in past 12 months     Recent Labs   Lab Test 04/03/18  0722   MICROL 1,140   UMALCR 991.30*             Passed - Blood pressure less than 140/90 in past 6 months     BP Readings from Last 3 Encounters:   08/02/19 133/67   06/27/19 131/65   11/26/18 116/67                 Passed - LDL on file in past 12 months     Recent Labs   Lab Test 08/02/19  0824   LDL 57             Passed - HgbA1C in past 3 or 6 months     If HgbA1C is 8 or greater, it needs to be on file within the past 3 months.  If less than 8, must be on file within the past 6 months.     Recent Labs   Lab Test 08/02/19  0824   A1C 9.5*             Passed - Medication is active on med list        Passed - Patient is age 18 or older        Passed - Normal serum creatinine on file in past 12 months     Recent Labs   Lab Test 08/02/19  0824   CR 0.99             Passed - Recent (6 mo) or future (30 days) visit within the authorizing provider's specialty     Patient had office visit in the last 6 months or has a visit in the next 30 days with authorizing provider.  See \"Patient Info\" tab in inbasket, or \"Choose Columns\" in Meds & Orders section of the refill encounter.              "

## 2019-08-30 ENCOUNTER — OFFICE VISIT (OUTPATIENT)
Dept: OPTOMETRY | Facility: CLINIC | Age: 69
End: 2019-08-30
Payer: COMMERCIAL

## 2019-08-30 DIAGNOSIS — Z01.01 ENCOUNTER FOR EXAMINATION OF EYES AND VISION WITH ABNORMAL FINDINGS: Primary | ICD-10-CM

## 2019-08-30 DIAGNOSIS — H52.223 REGULAR ASTIGMATISM OF BOTH EYES: ICD-10-CM

## 2019-08-30 DIAGNOSIS — E11.9 TYPE 2 DIABETES MELLITUS WITHOUT RETINOPATHY (H): ICD-10-CM

## 2019-08-30 DIAGNOSIS — H52.31 ANISOMETROPIA: ICD-10-CM

## 2019-08-30 DIAGNOSIS — H25.13 NUCLEAR AGE-RELATED CATARACT, BOTH EYES: ICD-10-CM

## 2019-08-30 DIAGNOSIS — H52.4 PRESBYOPIA: ICD-10-CM

## 2019-08-30 PROCEDURE — 92015 DETERMINE REFRACTIVE STATE: CPT | Performed by: OPTOMETRIST

## 2019-08-30 PROCEDURE — 92014 COMPRE OPH EXAM EST PT 1/>: CPT | Performed by: OPTOMETRIST

## 2019-08-30 ASSESSMENT — TONOMETRY
OS_IOP_MMHG: 14
IOP_METHOD: APPLANATION
OD_IOP_MMHG: 15

## 2019-08-30 ASSESSMENT — VISUAL ACUITY
OS_CC: 20/25
OD_CC: 20/20
OD_SC: 20/40
OD_CC: 20/30 -1
OS_SC: 20/40
METHOD: SNELLEN - LINEAR
OD_SC: 20/25
OS_SC: 20/200
CORRECTION_TYPE: GLASSES
OS_CC: 20/40

## 2019-08-30 ASSESSMENT — EXTERNAL EXAM - RIGHT EYE: OD_EXAM: NORMAL

## 2019-08-30 ASSESSMENT — REFRACTION_WEARINGRX
OS_CYLINDER: +0.75
OD_AXIS: 172
SPECS_TYPE: PAL
OS_ADD: +2.75
OS_AXIS: 178
OD_ADD: +2.75
OS_SPHERE: +0.75
OD_CYLINDER: +1.00
OD_SPHERE: -1.25

## 2019-08-30 ASSESSMENT — REFRACTION_MANIFEST
OD_SPHERE: -1.25
OS_AXIS: 176
OD_CYLINDER: +1.00
OS_CYLINDER: +0.50
OS_ADD: +2.75
OS_SPHERE: +1.25
OD_ADD: +2.75
OD_AXIS: 176

## 2019-08-30 ASSESSMENT — SLIT LAMP EXAM - LIDS
COMMENTS: NORMAL
COMMENTS: NORMAL

## 2019-08-30 ASSESSMENT — CUP TO DISC RATIO
OS_RATIO: 0.4
OD_RATIO: 0.4

## 2019-08-30 ASSESSMENT — CONF VISUAL FIELD
OS_NORMAL: 1
OD_NORMAL: 1

## 2019-08-30 ASSESSMENT — EXTERNAL EXAM - LEFT EYE: OS_EXAM: NORMAL

## 2019-08-30 NOTE — PATIENT INSTRUCTIONS
Patient educated on importance of good blood sugar control.  Letter sent to primary care provider with diabetic eye exam report.     You have the start of mild cataracts.  You may notice some blurred vision or glare with night driving.  It is important that you wear good sunglasses to protect your eyes from the ultraviolet light from the sun.     Darian was advised of today's exam findings.  Optional to fill new glasses prescription, minimal change  Copy of glasses Rx provided today.  Return in 1 year for eye exam, or sooner if needed.    The effects of the dilating drops last for 4- 6 hours.  You will be more sensitive to light and vision will be blurry up close.  Mydriatic sunglasses were given if needed.    Tomi Muñiz O.D.  [facility] [place]  [address]    [phone]

## 2019-08-30 NOTE — PROGRESS NOTES
Chief Complaint   Patient presents with     Diabetic Eye Exam     Accompanied by self  Lab Results   Component Value Date    A1C 9.5 08/02/2019    A1C 7.6 11/26/2018    A1C 7.8 06/07/2018    A1C 7.8 04/03/2018    A1C 8.3 12/18/2017       Last Eye Exam: 1 year ago  Dilated Previously: Yes    What are you currently using to see?  Glasses-1 year old    Distance Vision Acuity: Satisfied with vision    Near Vision Acuity: Satisfied with vision while reading  with glasses    Eye Comfort: good  Do you use eye drops? 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       OBJECTIVE: See Ophthalmology exam    ASSESSMENT:    ICD-10-CM    1. Encounter for examination of eyes and vision with abnormal findings Z01.01 EYE EXAM (SIMPLE-NONBILLABLE)   2. Type 2 diabetes mellitus without retinopathy (H) E11.9 EYE EXAM (SIMPLE-NONBILLABLE)   3. Nuclear age-related cataract, both eyes H25.13 EYE EXAM (SIMPLE-NONBILLABLE)   4. Anisometropia H52.31 EYE EXAM (SIMPLE-NONBILLABLE)     REFRACTION   5. Regular astigmatism of both eyes H52.223 EYE EXAM (SIMPLE-NONBILLABLE)     REFRACTION   6. Presbyopia H52.4 EYE EXAM (SIMPLE-NONBILLABLE)     REFRACTION      PLAN:    Darian Engle aware  eye exam results will be sent to Eh Matos  Patient Instructions   Patient educated on importance of good blood sugar control.  Letter sent to primary care provider with diabetic eye exam report.     You have the start of mild cataracts.  You may notice some blurred vision or glare with night driving.  It is important that you wear good sunglasses to protect your eyes from the ultraviolet light from the sun.     Darian was advised of today's exam findings.  Optional to fill new glasses prescription, minimal change  Copy of glasses Rx provided today.  Return in 1 year for eye exam, or sooner if needed.    The effects of the dilating drops last  for 4- 6 hours.  You will be more sensitive to light and vision will be blurry up close.  Mydriatic sunglasses were given if needed.    Tomi Muñiz O.D.  [address] [place]  [address]    [phone]

## 2019-09-02 NOTE — PROGRESS NOTES
I am seeing this patient in consultation for mouth lesion at the request of the provider Eh Quintero.    Chief Complaint - palate lesion    History of Present Illness - Darian Engle is a 69 year old male presents with a lesion on the palate. Dentist noted this about 4 weeks ago. He cannot see it, never knew it was there. No pain, bleeding. No citrus or spicy intolerance. current smoker (5-10 per day), but no history of chewing tobacco. No lumps or swollen glands in the neck.     Past Medical History -   Patient Active Problem List   Diagnosis     CAD, multiple vessel     Essential hypertension with goal blood pressure less than 140/90     Type 2 diabetes mellitus with complication, with long-term current use of insulin (H)     Hyperlipidemia LDL goal <100     Gastroesophageal reflux disease without esophagitis     PAD (peripheral artery disease) (H)     Type 2 diabetes mellitus without retinopathy (H)     Bilateral incipient cataracts       Current Medications -   Current Outpatient Medications:      ACCU-CHEK SMARTVIEW test strip, 1 strip by In Vitro route 3 times daily Use to test blood sugar 3 times daily or as directed., Disp: 300 strip, Rfl: 3     amLODIPine (NORVASC) 10 MG tablet, Take 1 tablet (10 mg) by mouth daily, Disp: 90 tablet, Rfl: 3     aspirin 81 MG tablet, Take by mouth daily, Disp: 30 tablet, Rfl:      atorvastatin (LIPITOR) 80 MG tablet, TAKE 1 TABLET(80 MG) BY MOUTH DAILY, Disp: 90 tablet, Rfl: 3     B-D U/F insulin pen needle, USE WITH INSULIN THREE TIMES DAILY, Disp: 300 each, Rfl: 0     blood glucose monitoring (ACCU-CHEK FASTCLIX) lancets, USE TO TEST THREE TIMES DAILY OR AS DIRECTED, Disp: 306 each, Rfl: 0     cilostazol (PLETAL) 50 MG tablet, Take 50 mg by mouth 2 times daily, Disp: , Rfl:      clopidogrel (PLAVIX) 75 MG tablet, 75 mg, Disp: , Rfl: 11     insulin glargine (LANTUS SOLOSTAR) 100 UNIT/ML pen, ADMINISTER 50 UNITS UNDER THE SKIN DAILY, Disp: 30 mL, Rfl: 3      isosorbide mononitrate (IMDUR) 30 MG 24 hr tablet, Take 1 tablet (30 mg) by mouth daily, Disp: 90 tablet, Rfl: 3     liraglutide (VICTOZA PEN) 18 MG/3ML solution, ADMINISTER 0.6 MG UNDER THE SKIN DAILY, Disp: 6 mL, Rfl: 0     liraglutide (VICTOZA) 18 MG/3ML solution, Inject 1.2 mg Subcutaneous daily, Disp: 6 mL, Rfl: 11     losartan (COZAAR) 50 MG tablet, TAKE 2 TABLETS(100 MG) BY MOUTH DAILY, Disp: 180 tablet, Rfl: 3     metFORMIN (GLUCOPHAGE-XR) 500 MG 24 hr tablet, TAKE 4 TABLETS BY MOUTH DAILY WITH BREAKFAST, Disp: 360 tablet, Rfl: 3     metoprolol succinate ER (TOPROL-XL) 100 MG 24 hr tablet, Take 1 tablet (100 mg) by mouth daily, Disp: 90 tablet, Rfl: 3     nicotine (EQL NICOTINE) 21 MG/24HR 24 hr patch, Place 1 patch onto the skin every 24 hours (Patient not taking: Reported on 8/2/2019), Disp: 30 patch, Rfl: 0     nicotine (NICODERM CQ) 14 MG/24HR 24 hr patch, 1 patch, Disp: , Rfl:      nicotine (NICOTROL) 10 MG Inhaler, Inhale 6-16 Cartridges into the lungs daily as needed for smoking cessation (Patient not taking: Reported on 8/2/2019), Disp: 180 each, Rfl: 1     nitroGLYcerin (NITROSTAT) 0.4 MG sublingual tablet, For chest pain place 1 tablet under the tongue every 5 minutes for 3 doses. If symptoms persist 5 minutes after 1st dose call 911. (Patient not taking: Reported on 8/2/2019), Disp: 25 tablet, Rfl: 0     NOVOLOG FLEXPEN 100 UNIT/ML soln, INJECT 60 UNITS UNDER THE SKIN EVERY DAY, Disp: 60 mL, Rfl: 0     nystatin (MYCOSTATIN) cream, APLY TO AFFECTED AREA TWICE DAILY FOR 2 WEEKS, Disp: , Rfl: 0     Omega-3 Fatty Acids (FISH OIL OMEGA-3) 1000 MG CAPS, Take 1,200 mg by mouth daily, Disp: , Rfl:      omeprazole (PRILOSEC) 20 MG CR capsule, Take 1 capsule (20 mg) by mouth daily, Disp: 90 capsule, Rfl: 3    Allergies -   Allergies   Allergen Reactions     Lidocaine Other (See Comments)     Lungs filled with fluid. ? Anaphylaxis     Lisinopril Cough     cough       Social History -   Social History  "    Socioeconomic History     Marital status:      Spouse name: Not on file     Number of children: Not on file     Years of education: Not on file     Highest education level: Not on file   Occupational History     Not on file   Social Needs     Financial resource strain: Not on file     Food insecurity:     Worry: Not on file     Inability: Not on file     Transportation needs:     Medical: Not on file     Non-medical: Not on file   Tobacco Use     Smoking status: Current Every Day Smoker     Types: Cigarettes     Last attempt to quit: 7/15/2016     Years since quitting: 3.1     Smokeless tobacco: Never Used   Substance and Sexual Activity     Alcohol use: No     Drug use: No     Sexual activity: Not on file   Lifestyle     Physical activity:     Days per week: Not on file     Minutes per session: Not on file     Stress: Not on file   Relationships     Social connections:     Talks on phone: Not on file     Gets together: Not on file     Attends Confucianist service: Not on file     Active member of club or organization: Not on file     Attends meetings of clubs or organizations: Not on file     Relationship status: Not on file     Intimate partner violence:     Fear of current or ex partner: Not on file     Emotionally abused: Not on file     Physically abused: Not on file     Forced sexual activity: Not on file   Other Topics Concern     Parent/sibling w/ CABG, MI or angioplasty before 65F 55M? Not Asked   Social History Narrative     Not on file       Family History -   Family History   Problem Relation Age of Onset     Glaucoma Mother      Macular Degeneration Mother      Macular Degeneration Other        Review of Systems - As per HPI and PMHx, otherwise 7 system review of the head and neck negative.    Physical Exam  /64   Pulse 93   Ht 1.721 m (5' 7.75\")   Wt 98.9 kg (218 lb)   SpO2 97%   BMI 33.39 kg/m    General - The patient is in no distress. Alert and oriented x3, answers questions and " cooperates with examination appropriately.   Voice and Breathing - The patient was breathing comfortably without the use of accessory muscles. There was no wheezing, stridor, or stertor.  The patients voice was clear and strong.  Eyes - Extraocular movements intact. Sclera were not icteric or injected, conjunctiva were pink and moist.  Neurologic - Cranial nerves II-XII are grossly intact. Specifically, the facial nerve is intact, House-Brackmann grade 1 of 6.   Mouth - Examination of the oral cavity showed a 3-4 mm flat macule pigmented lesion located on the hard palate, mildly irregular shape, mostly circular. It is likely hyperpigmentation, and he has similar spots throughout his oral mucosa. The tongue was mobile and protrudes midline.   Oropharynx - The walls of the oropharynx were smooth, symmetric, and had no lesions or ulcerations.   The uvula was midline and the palate raised symmetrically.   Neck -  Palpation of the occipital, submental, submandibular, internal jugular chain, and supraclavicular nodes did not demonstrate any abnormal lymph nodes or masses. The parotid glands were without masses. Palpation of the thyroid was soft and smooth, with no nodules or goiter appreciated.  The trachea was midline.      A/P - Darian Engle is a 69 year old male with a lesion on the palate. It is likely hyperpigmentation.  He has numerous hyperpigmented spots throughout his oral cavity and I think this 1 is similar to those.  It is only approximately 3 to 4 mm.  It is a flat macule.  I do want to see him back in approximately 2 to 3 months just to make sure this is nothing that is growing or needs to be biopsied.  I explained the risk of something such as mucosal melanomas very low but that could only be ruled out with the biopsy.  Given the benign looking nature of this and other macules he has in his mouth we elected not to biopsy and just observe it.      Lewis Grande MD  Otolaryngology  [facility]

## 2019-09-03 ENCOUNTER — OFFICE VISIT (OUTPATIENT)
Dept: OTOLARYNGOLOGY | Facility: CLINIC | Age: 69
End: 2019-09-03
Payer: COMMERCIAL

## 2019-09-03 VITALS
WEIGHT: 218 LBS | SYSTOLIC BLOOD PRESSURE: 113 MMHG | OXYGEN SATURATION: 97 % | DIASTOLIC BLOOD PRESSURE: 64 MMHG | HEIGHT: 68 IN | BODY MASS INDEX: 33.04 KG/M2 | HEART RATE: 93 BPM

## 2019-09-03 DIAGNOSIS — K13.79 PALATAL LESION: Primary | ICD-10-CM

## 2019-09-03 PROCEDURE — 99213 OFFICE O/P EST LOW 20 MIN: CPT | Performed by: OTOLARYNGOLOGY

## 2019-09-03 ASSESSMENT — MIFFLIN-ST. JEOR: SCORE: 1734.37

## 2019-09-03 NOTE — PATIENT INSTRUCTIONS
General Scheduling Information  To schedule your CT/MRI scan, please contact [place] [facility] at [phone]   [address] [place] [address]    To schedule your Surgery, please contact our Specialty Schedulers at [phone]    ENT Clinic Locations Clinic Hours Telephone Number     Sebastian Chun  [address] [place] [address]   Tuesday:       8:00am -- 4:00pm    Wednesday:  8:00am - 4:00pm   To schedule an appointment with   Dr. Grande,   please contact our   Specialty Scheduling Department at:     [phone]       [facility]astian [facility]re[facility]  [address] [place] [address]   Friday:          8:00am - 4:00pm         Urgent Care Locations Clinic Hours Telephone Numbers     Sebastian Carbajal  [address] [place] [address]     Monday-Friday:     11:00pm - 9:00pm    Saturday-Sunday:  9:00am - 5:00pm   [phone]     Sebastian Correia  [address] [place] [address]     Monday-Friday:      5:00pm - 9:00pm     Saturday-Sunday:  9:00am - 5:00pm   [phone]

## 2019-09-03 NOTE — LETTER
9/3/2019         RE: Darian Engle  [address] [facility] [address] [facility] [place] [address]        Dear Colleague,    Thank you for referring your patient, Darian Engle, to the [facility]. Please see a copy of my visit note below.    I am seeing this patient in consultation for mouth lesion at the request of the provider Eh Quintero.    Chief Complaint - palate lesion    History of Present Illness - Darian Engle is a 69 year old male presents with a lesion on the palate. Dentist noted this about 4 weeks ago. He cannot see it, never knew it was there. No pain, bleeding. No citrus or spicy intolerance. current smoker (5-10 per day), but no history of chewing tobacco. No lumps or swollen glands in the neck.     Past Medical History -   Patient Active Problem List   Diagnosis     CAD, multiple vessel     Essential hypertension with goal blood pressure less than 140/90     Type 2 diabetes mellitus with complication, with long-term current use of insulin (H)     Hyperlipidemia LDL goal <100     Gastroesophageal reflux disease without esophagitis     PAD (peripheral artery disease) (H)     Type 2 diabetes mellitus without retinopathy (H)     Bilateral incipient cataracts       Current Medications -   Current Outpatient Medications:      ACCU-CHEK SMARTVIEW test strip, 1 strip by In Vitro route 3 times daily Use to test blood sugar 3 times daily or as directed., Disp: 300 strip, Rfl: 3     amLODIPine (NORVASC) 10 MG tablet, Take 1 tablet (10 mg) by mouth daily, Disp: 90 tablet, Rfl: 3     aspirin 81 MG tablet, Take by mouth daily, Disp: 30 tablet, Rfl:      atorvastatin (LIPITOR) 80 MG tablet, TAKE 1 TABLET(80 MG) BY MOUTH DAILY, Disp: 90 tablet, Rfl: 3     B-D U/F insulin pen needle, USE WITH INSULIN THREE TIMES DAILY, Disp: 300 each, Rfl: 0     blood glucose monitoring (ACCU-CHEK FASTCLIX) lancets, USE TO TEST THREE TIMES DAILY OR AS DIRECTED, Disp: 306 each, Rfl: 0     cilostazol (PLETAL) 50  MG tablet, Take 50 mg by mouth 2 times daily, Disp: , Rfl:      clopidogrel (PLAVIX) 75 MG tablet, 75 mg, Disp: , Rfl: 11     insulin glargine (LANTUS SOLOSTAR) 100 UNIT/ML pen, ADMINISTER 50 UNITS UNDER THE SKIN DAILY, Disp: 30 mL, Rfl: 3     isosorbide mononitrate (IMDUR) 30 MG 24 hr tablet, Take 1 tablet (30 mg) by mouth daily, Disp: 90 tablet, Rfl: 3     liraglutide (VICTOZA PEN) 18 MG/3ML solution, ADMINISTER 0.6 MG UNDER THE SKIN DAILY, Disp: 6 mL, Rfl: 0     liraglutide (VICTOZA) 18 MG/3ML solution, Inject 1.2 mg Subcutaneous daily, Disp: 6 mL, Rfl: 11     losartan (COZAAR) 50 MG tablet, TAKE 2 TABLETS(100 MG) BY MOUTH DAILY, Disp: 180 tablet, Rfl: 3     metFORMIN (GLUCOPHAGE-XR) 500 MG 24 hr tablet, TAKE 4 TABLETS BY MOUTH DAILY WITH BREAKFAST, Disp: 360 tablet, Rfl: 3     metoprolol succinate ER (TOPROL-XL) 100 MG 24 hr tablet, Take 1 tablet (100 mg) by mouth daily, Disp: 90 tablet, Rfl: 3     nicotine (EQL NICOTINE) 21 MG/24HR 24 hr patch, Place 1 patch onto the skin every 24 hours (Patient not taking: Reported on 8/2/2019), Disp: 30 patch, Rfl: 0     nicotine (NICODERM CQ) 14 MG/24HR 24 hr patch, 1 patch, Disp: , Rfl:      nicotine (NICOTROL) 10 MG Inhaler, Inhale 6-16 Cartridges into the lungs daily as needed for smoking cessation (Patient not taking: Reported on 8/2/2019), Disp: 180 each, Rfl: 1     nitroGLYcerin (NITROSTAT) 0.4 MG sublingual tablet, For chest pain place 1 tablet under the tongue every 5 minutes for 3 doses. If symptoms persist 5 minutes after 1st dose call 911. (Patient not taking: Reported on 8/2/2019), Disp: 25 tablet, Rfl: 0     NOVOLOG FLEXPEN 100 UNIT/ML soln, INJECT 60 UNITS UNDER THE SKIN EVERY DAY, Disp: 60 mL, Rfl: 0     nystatin (MYCOSTATIN) cream, APLY TO AFFECTED AREA TWICE DAILY FOR 2 WEEKS, Disp: , Rfl: 0     Omega-3 Fatty Acids (FISH OIL OMEGA-3) 1000 MG CAPS, Take 1,200 mg by mouth daily, Disp: , Rfl:      omeprazole (PRILOSEC) 20 MG CR capsule, Take 1 capsule (20 mg)  by mouth daily, Disp: 90 capsule, Rfl: 3    Allergies -   Allergies   Allergen Reactions     Lidocaine Other (See Comments)     Lungs filled with fluid. ? Anaphylaxis     Lisinopril Cough     cough       Social History -   Social History     Socioeconomic History     Marital status:      Spouse name: Not on file     Number of children: Not on file     Years of education: Not on file     Highest education level: Not on file   Occupational History     Not on file   Social Needs     Financial resource strain: Not on file     Food insecurity:     Worry: Not on file     Inability: Not on file     Transportation needs:     Medical: Not on file     Non-medical: Not on file   Tobacco Use     Smoking status: Current Every Day Smoker     Types: Cigarettes     Last attempt to quit: 7/15/2016     Years since quitting: 3.1     Smokeless tobacco: Never Used   Substance and Sexual Activity     Alcohol use: No     Drug use: No     Sexual activity: Not on file   Lifestyle     Physical activity:     Days per week: Not on file     Minutes per session: Not on file     Stress: Not on file   Relationships     Social connections:     Talks on phone: Not on file     Gets together: Not on file     Attends [employer] service: Not on file     Active member of club or organization: Not on file     Attends meetings of clubs or organizations: Not on file     Relationship status: Not on file     Intimate partner violence:     Fear of current or ex partner: Not on file     Emotionally abused: Not on file     Physically abused: Not on file     Forced sexual activity: Not on file   Other Topics Concern     Parent/sibling w/ CABG, MI or angioplasty before 65F 55M? Not Asked   Social History Narrative     Not on file       Family History -   Family History   Problem Relation Age of Onset     Glaucoma Mother      Macular Degeneration Mother      Macular Degeneration Other        Review of Systems - As per HPI and PMHx, otherwise 7 system review  "of the head and neck negative.    Physical Exam  /64   Pulse 93   Ht 1.721 m (5' 7.75\")   Wt 98.9 kg (218 lb)   SpO2 97%   BMI 33.39 kg/m     General - The patient is in no distress. Alert and oriented x3, answers questions and cooperates with examination appropriately.   Voice and Breathing - The patient was breathing comfortably without the use of accessory muscles. There was no wheezing, stridor, or stertor.  The patients voice was clear and strong.  Eyes - Extraocular movements intact. Sclera were not icteric or injected, conjunctiva were pink and moist.  Neurologic - Cranial nerves II-XII are grossly intact. Specifically, the facial nerve is intact, House-Brackmann grade 1 of 6.   Mouth - Examination of the oral cavity showed a 3-4 mm flat macule pigmented lesion located on the hard palate, mildly irregular shape, mostly circular. It is likely hyperpigmentation, and he has similar spots throughout his oral mucosa. The tongue was mobile and protrudes midline.   Oropharynx - The walls of the oropharynx were smooth, symmetric, and had no lesions or ulcerations.   The uvula was midline and the palate raised symmetrically.   Neck -  Palpation of the occipital, submental, submandibular, internal jugular chain, and supraclavicular nodes did not demonstrate any abnormal lymph nodes or masses. The parotid glands were without masses. Palpation of the thyroid was soft and smooth, with no nodules or goiter appreciated.  The trachea was midline.      A/P - Darian Engle is a 69 year old male with a lesion on the palate. It is likely hyperpigmentation.  He has numerous hyperpigmented spots throughout his oral cavity and I think this 1 is similar to those.  It is only approximately 3 to 4 mm.  It is a flat macule.  I do want to see him back in approximately 2 to 3 months just to make sure this is nothing that is growing or needs to be biopsied.  I explained the risk of something such as mucosal melanomas very " low but that could only be ruled out with the biopsy.  Given the benign looking nature of this and other macules he has in his mouth we elected not to biopsy and just observe it.      Lewis Grande MD  Otolaryngology  [facility]      Again, thank you for allowing me to participate in the care of your patient.        Sincerely,        Lewis Grande MD     I will START or STAY ON the medications listed below when I get home from the hospital:    Prenatal Vitamins  -- 1 tab(s) by mouth once a day  -- Indication: For Weeks of gestation of pregnancy not specified    acetaminophen 325 mg oral tablet  -- 3 tab(s) by mouth every 6 hours  -- Indication: For Weeks of gestation of pregnancy not specified    ibuprofen 600 mg oral tablet  -- 1 tab(s) by mouth every 6 hours  -- Indication: For Weeks of gestation of pregnancy not specified

## 2019-09-11 ENCOUNTER — TRANSFERRED RECORDS (OUTPATIENT)
Dept: HEALTH INFORMATION MANAGEMENT | Facility: CLINIC | Age: 69
End: 2019-09-11

## 2019-11-11 ENCOUNTER — TRANSFERRED RECORDS (OUTPATIENT)
Dept: HEALTH INFORMATION MANAGEMENT | Facility: CLINIC | Age: 69
End: 2019-11-11

## 2019-12-03 ENCOUNTER — OFFICE VISIT (OUTPATIENT)
Dept: FAMILY MEDICINE | Facility: CLINIC | Age: 69
End: 2019-12-03
Payer: COMMERCIAL

## 2019-12-03 VITALS
DIASTOLIC BLOOD PRESSURE: 65 MMHG | OXYGEN SATURATION: 98 % | SYSTOLIC BLOOD PRESSURE: 143 MMHG | TEMPERATURE: 98.4 F | WEIGHT: 217 LBS | HEART RATE: 78 BPM | BODY MASS INDEX: 33.24 KG/M2

## 2019-12-03 DIAGNOSIS — Z79.4 TYPE 2 DIABETES MELLITUS WITH COMPLICATION, WITH LONG-TERM CURRENT USE OF INSULIN (H): Primary | ICD-10-CM

## 2019-12-03 DIAGNOSIS — I25.10 CAD, MULTIPLE VESSEL: ICD-10-CM

## 2019-12-03 DIAGNOSIS — E55.9 VITAMIN D DEFICIENCY: ICD-10-CM

## 2019-12-03 DIAGNOSIS — I10 ESSENTIAL HYPERTENSION WITH GOAL BLOOD PRESSURE LESS THAN 140/90: ICD-10-CM

## 2019-12-03 DIAGNOSIS — I73.9 PAD (PERIPHERAL ARTERY DISEASE) (H): ICD-10-CM

## 2019-12-03 DIAGNOSIS — E11.8 TYPE 2 DIABETES MELLITUS WITH COMPLICATION, WITH LONG-TERM CURRENT USE OF INSULIN (H): Primary | ICD-10-CM

## 2019-12-03 LAB — HBA1C MFR BLD: 8.6 % (ref 0–5.6)

## 2019-12-03 PROCEDURE — 83036 HEMOGLOBIN GLYCOSYLATED A1C: CPT | Performed by: FAMILY MEDICINE

## 2019-12-03 PROCEDURE — 99214 OFFICE O/P EST MOD 30 MIN: CPT | Performed by: FAMILY MEDICINE

## 2019-12-03 PROCEDURE — 36415 COLL VENOUS BLD VENIPUNCTURE: CPT | Performed by: FAMILY MEDICINE

## 2019-12-03 RX ORDER — BLOOD SUGAR DIAGNOSTIC
1 STRIP MISCELLANEOUS 3 TIMES DAILY
Qty: 300 STRIP | Refills: 3 | Status: SHIPPED | OUTPATIENT
Start: 2019-12-03 | End: 2020-12-24

## 2019-12-03 RX ORDER — NITROGLYCERIN 0.4 MG/1
TABLET SUBLINGUAL
Qty: 25 TABLET | Refills: 0 | Status: SHIPPED | OUTPATIENT
Start: 2019-12-03 | End: 2021-05-12

## 2019-12-03 RX ORDER — LANCETS
EACH MISCELLANEOUS
Qty: 306 EACH | Refills: 3 | Status: SHIPPED | OUTPATIENT
Start: 2019-12-03 | End: 2021-04-29

## 2019-12-03 RX ORDER — CHOLECALCIFEROL (VITAMIN D3) 50 MCG
1 TABLET ORAL DAILY
Qty: 100 TABLET | Refills: 3 | Status: SHIPPED | OUTPATIENT
Start: 2019-12-03 | End: 2021-01-22

## 2019-12-03 RX ORDER — INSULIN ASPART 100 [IU]/ML
INJECTION, SOLUTION INTRAVENOUS; SUBCUTANEOUS
Qty: 60 ML | Refills: 1 | Status: SHIPPED | OUTPATIENT
Start: 2019-12-03 | End: 2020-12-02

## 2019-12-03 RX ORDER — METFORMIN HCL 500 MG
TABLET, EXTENDED RELEASE 24 HR ORAL
Qty: 360 TABLET | Refills: 1 | Status: SHIPPED | OUTPATIENT
Start: 2019-12-03 | End: 2020-08-03

## 2019-12-03 NOTE — PROGRESS NOTES
SUBJECTIVE:  Darian Engle, a 69 year old male scheduled an appointment to discuss the following issues:  Type 2 diabetes mellitus with complication, with long-term current use of insulin (H)  History tobacco abuse, pvd, cad, dm, gerd, obesity, htn and high cholesterol.   S/p shents/cabg in heart and shent in the leg.   Seeing vascular and more shents next month. Painful with long walking.   No chest pain. Needs nitrog - refill but not needed.   No acute feet changes. Eye exam in summer - eyes ok.   gerd stable on prilosec. No dysphagia.   Some milk. No history broken bones.   No outside blood pressure reading. No urine or hematuria. . 3 daughters, one grandson. No blood sugars <40 or LOSS OF CONSCIENCE. No >400.   flexpen average 60/day.   Medical, social, surgical, and family histories reviewed.    ROS:  All other ROS negative.    OBJECTIVE:  BP (!) 143/65 (Cuff Size: Adult Large)   Pulse 78   Temp 98.4  F (36.9  C) (Oral)   Wt 98.4 kg (217 lb)   SpO2 98%   BMI 33.24 kg/m    EXAM:  GENERAL APPEARANCE: healthy, alert and no distress  EYES: EOMI,  PERRL  HENT: ear canals and TM's normal and nose and mouth without ulcers or lesions  NECK: no adenopathy, no asymmetry, masses, or scars and thyroid normal to palpation  RESP: lungs clear to auscultation - no rales, rhonchi or wheezes  CV: regular rates and rhythm, normal S1 S2, no S3 or S4 and no murmur, click or rub -  ABDOMEN:  soft, nontender, no HSM or masses and bowel sounds normal  MS: extremities normal- no gross deformities noted, no evidence of inflammation in joints, FROM in all extremities.  PSYCH: mentation appears normal and affect normal/bright    ASSESSMENT / PLAN:  (E11.8,  Z79.4) Type 2 diabetes mellitus with complication, with long-term current use of insulin (H)  (primary encounter diagnosis)  Comment: improving but still high.  Plan: Hemoglobin A1c, ACCU-CHEK SMARTVIEW test strip,        insulin pen needle (B-D U/F) 31G X 5 MM          miscellaneous, blood glucose monitoring         (ACCU-CHEK FASTCLIX) lancets, insulin glargine         (LANTUS SOLOSTAR) 100 UNIT/ML pen, metFORMIN         (GLUCOPHAGE-XR) 500 MG 24 hr tablet, NOVOLOG         FLEXPEN 100 UNIT/ML soln        More exercise and add some weight lifting. Bump up lantus from 50 to 30 BID and continue monitor. Quit all smoking. Recheck in 4 months  Back to dm ed if not at goal    (I25.10) CAD, multiple vessel  Comment: ok  Plan: nitroGLYcerin (NITROSTAT) 0.4 MG sublingual         tablet        Cardiology follow-up. Prolonged chest pain or shortness of breath to er.     (E55.9) Vitamin D deficiency    Plan: vitamin D3 (CHOLECALCIFEROL) 2000 units (50         mcg) tablet            (I10) Essential hypertension with goal blood pressure less than 140/90  Comment: a little high  Plan: weight loss. Monitor. .mr4m      (I73.9) PAD (peripheral artery disease) (H)  Plan: per surgery. Quit all smoking.     Marcos Gonzalez MD

## 2019-12-06 DIAGNOSIS — Z79.4 TYPE 2 DIABETES MELLITUS WITH COMPLICATION, WITH LONG-TERM CURRENT USE OF INSULIN (H): ICD-10-CM

## 2019-12-06 DIAGNOSIS — E11.8 TYPE 2 DIABETES MELLITUS WITH COMPLICATION, WITH LONG-TERM CURRENT USE OF INSULIN (H): ICD-10-CM

## 2019-12-06 RX ORDER — LIRAGLUTIDE 6 MG/ML
INJECTION SUBCUTANEOUS
Qty: 6 ML | Refills: 0 | OUTPATIENT
Start: 2019-12-06

## 2019-12-11 ENCOUNTER — TELEPHONE (OUTPATIENT)
Dept: FAMILY MEDICINE | Facility: CLINIC | Age: 69
End: 2019-12-11

## 2019-12-11 DIAGNOSIS — E11.8 TYPE 2 DIABETES MELLITUS WITH COMPLICATION, WITH LONG-TERM CURRENT USE OF INSULIN (H): ICD-10-CM

## 2019-12-11 DIAGNOSIS — Z79.4 TYPE 2 DIABETES MELLITUS WITH COMPLICATION, WITH LONG-TERM CURRENT USE OF INSULIN (H): ICD-10-CM

## 2019-12-11 RX ORDER — LIRAGLUTIDE 6 MG/ML
1.2 INJECTION SUBCUTANEOUS DAILY
Qty: 18 ML | Refills: 1 | Status: SHIPPED | OUTPATIENT
Start: 2019-12-11 | End: 2020-09-16

## 2019-12-11 NOTE — TELEPHONE ENCOUNTER
Confirmed with pt that he is taking 1.2 mg daily.  Prescription sent to pharmacy.  Alla BANKSN, RN

## 2019-12-11 NOTE — TELEPHONE ENCOUNTER
Reason for Call:  Medication or medication refill:    Do you use a [facility]?  Name of the pharmacy and phone number for the current request: [id number] - [place], [place] - [address] AT [facility]  [phone]    Name of the medication requested: liraglutide (VICTOZA PEN) 18 MG/3ML solution    Other request:     Can we leave a detailed message on this number? YES    Phone number patient can be reached at: Home number on file [phone] (home)    Best Time:     Call taken on 12/11/2019 at 11:12 AM by Estelle Milligan

## 2019-12-11 NOTE — TELEPHONE ENCOUNTER
Refill request received within 30 days of last office visit with pcp.  Prescription is routed to the provider to please address refill.    Alla BANKSN, RN

## 2020-01-20 ENCOUNTER — OFFICE VISIT (OUTPATIENT)
Dept: FAMILY MEDICINE | Facility: CLINIC | Age: 70
End: 2020-01-20
Payer: COMMERCIAL

## 2020-01-20 VITALS
HEART RATE: 90 BPM | TEMPERATURE: 98.6 F | SYSTOLIC BLOOD PRESSURE: 112 MMHG | RESPIRATION RATE: 16 BRPM | DIASTOLIC BLOOD PRESSURE: 54 MMHG | BODY MASS INDEX: 32.43 KG/M2 | HEIGHT: 68 IN | WEIGHT: 214 LBS

## 2020-01-20 DIAGNOSIS — Z13.6 SCREENING FOR AAA (ABDOMINAL AORTIC ANEURYSM): Primary | ICD-10-CM

## 2020-01-20 DIAGNOSIS — K04.7 ABSCESSED TOOTH: ICD-10-CM

## 2020-01-20 PROCEDURE — 99213 OFFICE O/P EST LOW 20 MIN: CPT | Performed by: FAMILY MEDICINE

## 2020-01-20 RX ORDER — CLINDAMYCIN HCL 300 MG
300 CAPSULE ORAL 3 TIMES DAILY
Qty: 30 CAPSULE | Refills: 0 | Status: SHIPPED | OUTPATIENT
Start: 2020-01-20 | End: 2020-01-30

## 2020-01-20 ASSESSMENT — MIFFLIN-ST. JEOR: SCORE: 1716.23

## 2020-01-20 NOTE — NURSING NOTE
"Chief Complaint   Patient presents with     Mouth Problem     sore for 4 days -lower right side        Initial /54   Pulse 90   Temp 98.6  F (37  C) (Oral)   Resp 16   Ht 1.721 m (5' 7.75\")   Wt 97.1 kg (214 lb)   BMI 32.78 kg/m   Estimated body mass index is 32.78 kg/m  as calculated from the following:    Height as of this encounter: 1.721 m (5' 7.75\").    Weight as of this encounter: 97.1 kg (214 lb).    Alla Gallegos, [facility]    "

## 2020-01-20 NOTE — PROGRESS NOTES
SUBJECTIVE:  Darian Engle is a 69 year old male who presents to clinic with a chief complaint of a gum problem that started 4 day(s) ago.  It has been affecting the lower right gum line    At first there was a little white bump along the posterior lower gum line. He put his finger back there and popped it.    It is slightly better today but there is more swelling and a new white spot more anterior than the previous one.     He denies fevers, chills, nausea or vomiting     There has been no drainage except maybe when he popped the original bump.        Past Medical History:   Diagnosis Date     Arthritis March 2018    joint pain both hands     Diabetes (H)      Heart disease 1991    [place]-angioplasty     Hypertension      Current Outpatient Medications   Medication Sig Dispense Refill     ACCU-CHEK SMARTVIEW test strip 1 strip by In Vitro route 3 times daily Use to test blood sugar 3 times daily or as directed. 300 strip 3     aspirin 81 MG tablet Take by mouth daily 30 tablet      atorvastatin (LIPITOR) 80 MG tablet TAKE 1 TABLET(80 MG) BY MOUTH DAILY 90 tablet 3     blood glucose monitoring (ACCU-CHEK FASTCLIX) lancets USE TO TEST THREE TIMES DAILY OR AS DIRECTED 306 each 3     cilostazol (PLETAL) 50 MG tablet Take 50 mg by mouth 2 times daily       clopidogrel (PLAVIX) 75 MG tablet 75 mg  11     insulin glargine (LANTUS SOLOSTAR) 100 UNIT/ML pen ADMINISTER 30 UNITS UNDER THE SKIN DAILY twice daily (this is higher dosage) 30 mL 1     insulin pen needle (B-D U/F) 31G X 5 MM miscellaneous USE WITH INSULIN THREE TIMES DAILY 300 each 3     isosorbide mononitrate (IMDUR) 30 MG 24 hr tablet Take 1 tablet (30 mg) by mouth daily 90 tablet 3     liraglutide (VICTOZA) 18 MG/3ML solution Inject 1.2 mg Subcutaneous daily 18 mL 1     losartan (COZAAR) 50 MG tablet TAKE 2 TABLETS(100 MG) BY MOUTH DAILY 180 tablet 3     metFORMIN (GLUCOPHAGE-XR) 500 MG 24 hr tablet TAKE 4 TABLETS BY MOUTH DAILY WITH BREAKFAST 360 tablet 1  "    metoprolol succinate ER (TOPROL-XL) 100 MG 24 hr tablet Take 1 tablet (100 mg) by mouth daily 90 tablet 3     nicotine (EQL NICOTINE) 21 MG/24HR 24 hr patch Place 1 patch onto the skin every 24 hours 30 patch 0     nicotine (NICODERM CQ) 14 MG/24HR 24 hr patch 1 patch       nicotine (NICOTROL) 10 MG Inhaler Inhale 6-16 Cartridges into the lungs daily as needed for smoking cessation 180 each 1     nitroGLYcerin (NITROSTAT) 0.4 MG sublingual tablet For chest pain place 1 tablet under the tongue every 5 minutes for 3 doses. If symptoms persist 5 minutes after 1st dose call 911. 25 tablet 0     NOVOLOG FLEXPEN 100 UNIT/ML soln INJECT 60 UNITS UNDER THE SKIN EVERY DAY 60 mL 1     nystatin (MYCOSTATIN) cream APLY TO AFFECTED AREA TWICE DAILY FOR 2 WEEKS  0     Omega-3 Fatty Acids (FISH OIL OMEGA-3) 1000 MG CAPS Take 1,200 mg by mouth daily       omeprazole (PRILOSEC) 20 MG CR capsule Take 1 capsule (20 mg) by mouth daily 90 capsule 3     vitamin D3 (CHOLECALCIFEROL) 2000 units (50 mcg) tablet Take 1 tablet (2,000 Units) by mouth daily 100 tablet 3     Social History     Tobacco Use     Smoking status: Former Smoker     Packs/day: 0.00     Years: 0.00     Pack years: 0.00     Types: Cigarettes     Last attempt to quit: 7/15/2016     Years since quitting: 3.5     Smokeless tobacco: Never Used   Substance Use Topics     Alcohol use: Yes     Comment: binge           OBJECTIVE:   /54   Pulse 90   Temp 98.6  F (37  C) (Oral)   Resp 16   Ht 1.721 m (5' 7.75\")   Wt 97.1 kg (214 lb)   BMI 32.78 kg/m        GENERAL APPEARANCE: healthy, alert and no distress  EYES: EOMI,  PERRL, conjunctiva clear  HENT: ear canals and TM's normal.  Nose normal.    Mouth: there was edema and erythema on the lower right gums focused around his canine. There was a white pustule which I was able to pop.   NECK: supple, non-tender to palpation, no adenopathy noted      No results found for any visits on 01/20/20.      ASSESSMENT:  Suspect " 11-Oct-2019 14:44 tooth abscess    PLAN:   See orders in epic.   Start clindamycin     Patient Instructions   Call your dentist and get an appointment(s) for a few day(s) from now.      Symptomatic treatment recommended including: salt water gargles,  acetominophen, and ibuprofen.

## 2020-01-30 ENCOUNTER — TELEPHONE (OUTPATIENT)
Dept: FAMILY MEDICINE | Facility: CLINIC | Age: 70
End: 2020-01-30

## 2020-01-30 DIAGNOSIS — R21 RASH: Primary | ICD-10-CM

## 2020-01-30 DIAGNOSIS — I10 ESSENTIAL HYPERTENSION WITH GOAL BLOOD PRESSURE LESS THAN 140/90: ICD-10-CM

## 2020-01-30 RX ORDER — AMLODIPINE BESYLATE 10 MG/1
10 TABLET ORAL DAILY
Qty: 90 TABLET | Refills: 1 | Status: SHIPPED | OUTPATIENT
Start: 2020-01-30 | End: 2020-08-03

## 2020-01-30 RX ORDER — NYSTATIN 100000 U/G
CREAM TOPICAL
Qty: 30 G | Refills: 0 | Status: SHIPPED | OUTPATIENT
Start: 2020-01-30 | End: 2021-01-22

## 2020-01-30 NOTE — TELEPHONE ENCOUNTER
To provider for refills  Nystatin in epic is historical  Amlodipine discontinued in epic, patient seen 12/3/19 for htn but Amlodipine not noted in office visit note        Brooke BANKSN, RN, CPN

## 2020-03-15 ENCOUNTER — HEALTH MAINTENANCE LETTER (OUTPATIENT)
Age: 70
End: 2020-03-15

## 2020-04-09 ENCOUNTER — TELEPHONE (OUTPATIENT)
Dept: OPTOMETRY | Facility: CLINIC | Age: 70
End: 2020-04-09

## 2020-04-09 NOTE — TELEPHONE ENCOUNTER
Patient called- for the past 2-3 weeks, intermittently he is aware of the appearance of floating objects (which change is shape and size) in his right eye only, which are always located in his infero-temporal visual field.  No flashing lights, or changes in the acuity of his central vision.    Dr. Chavez will be consulted and we will call back with a treatment plan.    Patient phone:  [phone]    ____________________    Spoke to patient today. Reviewed symptoms with him - endorses description above. Discussed likely Posterior vitreous detachment (PVD) in his right eye, but advised him to call if his symptoms worsen. Signs and symptoms of retinal detachment (shower of black floaters, frequent flashes of light, curtain over part of visual field) discussed. He was agreeable to the plan.    Sharlene Chavez MD  [phone]

## 2020-08-02 DIAGNOSIS — Z79.4 TYPE 2 DIABETES MELLITUS WITH COMPLICATION, WITH LONG-TERM CURRENT USE OF INSULIN (H): ICD-10-CM

## 2020-08-02 DIAGNOSIS — I10 ESSENTIAL HYPERTENSION WITH GOAL BLOOD PRESSURE LESS THAN 140/90: ICD-10-CM

## 2020-08-02 DIAGNOSIS — E11.8 TYPE 2 DIABETES MELLITUS WITH COMPLICATION, WITH LONG-TERM CURRENT USE OF INSULIN (H): ICD-10-CM

## 2020-08-02 NOTE — LETTER
August 4, 2020    Darian Engle  [address] [place] [id number]    Dear Darian,       We recently received a refill request for metformin and amlodipine.  We have refilled this for a one time 90 day supply only because you are due for a:    Diabetes and blood pressure office visit-needed in the next 4-6 weeks, per Dr Gonzalez.      Please call at your earliest convenience so that there will not be a delay with your future refills.          Thank you,   Your [facility] Team/  [phone]

## 2020-08-03 RX ORDER — METFORMIN HCL 500 MG
TABLET, EXTENDED RELEASE 24 HR ORAL
Qty: 360 TABLET | Refills: 0 | Status: SHIPPED | OUTPATIENT
Start: 2020-08-03 | End: 2020-09-16

## 2020-08-03 RX ORDER — AMLODIPINE BESYLATE 10 MG/1
TABLET ORAL
Qty: 90 TABLET | Refills: 0 | Status: SHIPPED | OUTPATIENT
Start: 2020-08-03 | End: 2020-09-16

## 2020-08-03 NOTE — TELEPHONE ENCOUNTER
"Routing refill request to provider for review/approval because:  Requested Prescriptions   Pending Prescriptions Disp Refills    metFORMIN (GLUCOPHAGE-XR) 500 MG 24 hr tablet [Pharmacy Med Name: METFORMIN ER 500MG 24HR TABS] 360 tablet 1     Sig: TAKE 4 TABLETS BY MOUTH DAILY WITH BREAKFAST       Biguanide Agents Failed - 8/2/2020  3:01 PM        Failed - Patient has documented A1c within the specified period of time.     If HgbA1C is 8 or greater, it needs to be on file within the past 3 months.  If less than 8, must be on file within the past 6 months.     Recent Labs   Lab Test 12/03/19  1334   A1C 8.6*             Failed - Patient's CR is NOT>1.4 OR Patient's EGFR is NOT<45 within past 12 mos.     Recent Labs   Lab Test 08/02/19  0824   GFRESTIMATED 78   GFRESTBLACK >90       Recent Labs   Lab Test 08/02/19  0824   CR 0.99             Failed - Recent (6 mo) or future (30 days) visit within the authorizing provider's specialty     Patient had office visit in the last 6 months or has a visit in the next 30 days with authorizing provider or within the authorizing provider's specialty.  See \"Patient Info\" tab in inbasket, or \"Choose Columns\" in Meds & Orders section of the refill encounter.            Passed - Patient is age 10 or older        Passed - Patient does NOT have a diagnosis of CHF.        Passed - Medication is active on med list          amLODIPine (NORVASC) 10 MG tablet [Pharmacy Med Name: AMLODIPINE BESYLATE 10MG TABLETS] 90 tablet 1     Sig: TAKE 1 TABLET(10 MG) BY MOUTH DAILY       Calcium Channel Blockers Protocol  Failed - 8/2/2020  3:01 PM        Failed - Normal serum creatinine on file in past 12 months     Recent Labs   Lab Test 08/02/19  0824   CR 0.99       Ok to refill medication if creatinine is low          Passed - Blood pressure under 140/90 in past 12 months     BP Readings from Last 3 Encounters:   01/20/20 112/54   12/03/19 (!) 143/65   09/03/19 113/64                 Passed - Recent " "(12 mo) or future (30 days) visit within the authorizing provider's specialty     Patient has had an office visit with the authorizing provider or a provider within the authorizing providers department within the previous 12 mos or has a future within next 30 days. See \"Patient Info\" tab in inbasket, or \"Choose Columns\" in Meds & Orders section of the refill encounter.              Passed - Medication is active on med list        Passed - Patient is age 18 or older                   "

## 2020-08-04 NOTE — TELEPHONE ENCOUNTER
Please help set-up md appointment in next 4-6 weeks. We can do non-fasting labs at md appointment. Marcos Gonzalez MD

## 2020-08-26 ENCOUNTER — TELEPHONE (OUTPATIENT)
Dept: FAMILY MEDICINE | Facility: CLINIC | Age: 70
End: 2020-08-26

## 2020-08-26 NOTE — TELEPHONE ENCOUNTER
Patient is scheduled on 9/8/20 with Marcos Gonzalez MD.  This appointment needs to be rescheduled as the provider is going to be unavailable.  Patient was contacted by: Phone. Left message for patient to call and schedule appointment .Mari Price MA/MAILE

## 2020-08-27 ENCOUNTER — DOCUMENTATION ONLY (OUTPATIENT)
Dept: LAB | Facility: CLINIC | Age: 70
End: 2020-08-27

## 2020-08-27 DIAGNOSIS — E11.8 TYPE 2 DIABETES MELLITUS WITH COMPLICATION, WITH LONG-TERM CURRENT USE OF INSULIN (H): Primary | ICD-10-CM

## 2020-08-27 DIAGNOSIS — Z79.4 TYPE 2 DIABETES MELLITUS WITH COMPLICATION, WITH LONG-TERM CURRENT USE OF INSULIN (H): Primary | ICD-10-CM

## 2020-08-27 DIAGNOSIS — I10 ESSENTIAL HYPERTENSION WITH GOAL BLOOD PRESSURE LESS THAN 140/90: ICD-10-CM

## 2020-08-27 DIAGNOSIS — E78.5 HYPERLIPIDEMIA LDL GOAL <100: ICD-10-CM

## 2020-09-02 DIAGNOSIS — I10 ESSENTIAL HYPERTENSION WITH GOAL BLOOD PRESSURE LESS THAN 140/90: ICD-10-CM

## 2020-09-02 DIAGNOSIS — E11.8 TYPE 2 DIABETES MELLITUS WITH COMPLICATION, WITH LONG-TERM CURRENT USE OF INSULIN (H): ICD-10-CM

## 2020-09-02 DIAGNOSIS — E78.5 HYPERLIPIDEMIA LDL GOAL <100: ICD-10-CM

## 2020-09-02 DIAGNOSIS — Z79.4 TYPE 2 DIABETES MELLITUS WITH COMPLICATION, WITH LONG-TERM CURRENT USE OF INSULIN (H): ICD-10-CM

## 2020-09-02 LAB
ALBUMIN SERPL-MCNC: 3.7 G/DL (ref 3.4–5)
ANION GAP SERPL CALCULATED.3IONS-SCNC: 7 MMOL/L (ref 3–14)
BUN SERPL-MCNC: 15 MG/DL (ref 7–30)
CALCIUM SERPL-MCNC: 9.2 MG/DL (ref 8.5–10.1)
CHLORIDE SERPL-SCNC: 109 MMOL/L (ref 94–109)
CHOLEST SERPL-MCNC: 166 MG/DL
CO2 SERPL-SCNC: 25 MMOL/L (ref 20–32)
CREAT SERPL-MCNC: 0.92 MG/DL (ref 0.66–1.25)
GFR SERPL CREATININE-BSD FRML MDRD: 85 ML/MIN/{1.73_M2}
GLUCOSE SERPL-MCNC: 157 MG/DL (ref 70–99)
HBA1C MFR BLD: 8.2 % (ref 0–5.6)
HDLC SERPL-MCNC: 27 MG/DL
LDLC SERPL CALC-MCNC: 120 MG/DL
NONHDLC SERPL-MCNC: 139 MG/DL
PHOSPHATE SERPL-MCNC: 3.6 MG/DL (ref 2.5–4.5)
POTASSIUM SERPL-SCNC: 4 MMOL/L (ref 3.4–5.3)
SODIUM SERPL-SCNC: 141 MMOL/L (ref 133–144)
TRIGL SERPL-MCNC: 93 MG/DL

## 2020-09-02 PROCEDURE — 36415 COLL VENOUS BLD VENIPUNCTURE: CPT | Performed by: FAMILY MEDICINE

## 2020-09-02 PROCEDURE — 80061 LIPID PANEL: CPT | Performed by: FAMILY MEDICINE

## 2020-09-02 PROCEDURE — 80069 RENAL FUNCTION PANEL: CPT | Performed by: FAMILY MEDICINE

## 2020-09-02 PROCEDURE — 83036 HEMOGLOBIN GLYCOSYLATED A1C: CPT | Performed by: FAMILY MEDICINE

## 2020-09-16 ENCOUNTER — OFFICE VISIT (OUTPATIENT)
Dept: FAMILY MEDICINE | Facility: CLINIC | Age: 70
End: 2020-09-16
Payer: COMMERCIAL

## 2020-09-16 VITALS
SYSTOLIC BLOOD PRESSURE: 122 MMHG | DIASTOLIC BLOOD PRESSURE: 70 MMHG | OXYGEN SATURATION: 97 % | WEIGHT: 214 LBS | HEART RATE: 83 BPM | TEMPERATURE: 98.3 F | BODY MASS INDEX: 32.78 KG/M2

## 2020-09-16 DIAGNOSIS — Z79.4 TYPE 2 DIABETES MELLITUS WITH COMPLICATION, WITH LONG-TERM CURRENT USE OF INSULIN (H): ICD-10-CM

## 2020-09-16 DIAGNOSIS — Z72.0 TOBACCO ABUSE: Primary | ICD-10-CM

## 2020-09-16 DIAGNOSIS — I25.10 CAD, MULTIPLE VESSEL: ICD-10-CM

## 2020-09-16 DIAGNOSIS — E78.5 HYPERLIPIDEMIA LDL GOAL <100: ICD-10-CM

## 2020-09-16 DIAGNOSIS — E11.8 TYPE 2 DIABETES MELLITUS WITH COMPLICATION, WITH LONG-TERM CURRENT USE OF INSULIN (H): ICD-10-CM

## 2020-09-16 DIAGNOSIS — I10 ESSENTIAL HYPERTENSION WITH GOAL BLOOD PRESSURE LESS THAN 140/90: ICD-10-CM

## 2020-09-16 PROCEDURE — 99207 C FOOT EXAM  NO CHARGE: CPT | Performed by: FAMILY MEDICINE

## 2020-09-16 PROCEDURE — 99214 OFFICE O/P EST MOD 30 MIN: CPT | Mod: 25 | Performed by: FAMILY MEDICINE

## 2020-09-16 RX ORDER — AMLODIPINE BESYLATE 10 MG/1
10 TABLET ORAL DAILY
Qty: 90 TABLET | Refills: 3 | Status: SHIPPED | OUTPATIENT
Start: 2020-09-16 | End: 2021-07-27

## 2020-09-16 RX ORDER — LIRAGLUTIDE 6 MG/ML
1.8 INJECTION SUBCUTANEOUS DAILY
Qty: 27 ML | Refills: 1 | Status: SHIPPED | OUTPATIENT
Start: 2020-09-16 | End: 2021-04-09

## 2020-09-16 RX ORDER — METOPROLOL SUCCINATE 100 MG/1
100 TABLET, EXTENDED RELEASE ORAL DAILY
Qty: 90 TABLET | Refills: 3 | Status: SHIPPED | OUTPATIENT
Start: 2020-09-16 | End: 2021-07-27

## 2020-09-16 RX ORDER — METFORMIN HCL 500 MG
TABLET, EXTENDED RELEASE 24 HR ORAL
Qty: 360 TABLET | Refills: 3 | Status: SHIPPED | OUTPATIENT
Start: 2020-09-16 | End: 2021-10-04

## 2020-09-16 RX ORDER — ISOSORBIDE MONONITRATE 30 MG/1
60 TABLET, EXTENDED RELEASE ORAL DAILY
Qty: 90 TABLET | Refills: 3 | Status: SHIPPED | OUTPATIENT
Start: 2020-09-16 | End: 2020-09-17

## 2020-09-16 RX ORDER — LOSARTAN POTASSIUM 100 MG/1
100 TABLET ORAL DAILY
Qty: 90 TABLET | Refills: 3 | Status: SHIPPED | OUTPATIENT
Start: 2020-09-16 | End: 2021-10-04

## 2020-09-16 RX ORDER — VARENICLINE TARTRATE 1 MG/1
1 TABLET, FILM COATED ORAL 2 TIMES DAILY
Qty: 56 TABLET | Refills: 4 | Status: SHIPPED | OUTPATIENT
Start: 2020-09-16 | End: 2021-01-22

## 2020-09-16 RX ORDER — ATORVASTATIN CALCIUM 80 MG/1
TABLET, FILM COATED ORAL
Qty: 90 TABLET | Refills: 3 | Status: SHIPPED | OUTPATIENT
Start: 2020-09-16 | End: 2021-11-16

## 2020-09-16 ASSESSMENT — PAIN SCALES - GENERAL: PAINLEVEL: NO PAIN (0)

## 2020-09-16 NOTE — NURSING NOTE
"Chief Complaint   Patient presents with     Diabetes     Hypertension     Health Maintenance     order pended, Last eye exam, wellness visit     Flu Shot       Initial /70   Pulse 83   Temp 98.3  F (36.8  C) (Tympanic)   Wt 97.1 kg (214 lb)   SpO2 97%   BMI 32.78 kg/m   Estimated body mass index is 32.78 kg/m  as calculated from the following:    Height as of 1/20/20: 1.721 m (5' 7.75\").    Weight as of this encounter: 97.1 kg (214 lb).  Medication Reconciliation: complete  Cynthia Forbes CMA  "

## 2020-09-16 NOTE — PROGRESS NOTES
[place] diabetes resourses:  [email or web address]        To access diabetes educational materials with in EPIC use <dot>ALLISON      Put this in AFTER VISIT SUMMARY if needed for the patient to access information online [email or web address]      SUBJECTIVE:  Darian Engle is a 70 year old male who presents today for a follow up appointment for management of DIABETES MELLITUS.  He was diagnosed about 20 years ago.    Patient Active Problem List    Diagnosis Date Noted     Type 2 diabetes mellitus without retinopathy (H) 06/27/2018     Priority: Medium     Bilateral incipient cataracts 06/27/2018     Priority: Medium     PAD (peripheral artery disease) (H) 12/18/2017     Priority: Medium     CAD, multiple vessel 01/27/2017     Priority: Medium     Essential hypertension with goal blood pressure less than 140/90 01/27/2017     Priority: Medium     Type 2 diabetes mellitus with complication, with long-term current use of insulin (H) 01/27/2017     Priority: Medium     Hyperlipidemia LDL goal <100 01/27/2017     Priority: Medium     Gastroesophageal reflux disease without esophagitis 01/27/2017     Priority: Medium        The patient checks his blood sugar as follows: three times daily, once daily.   Results as follows: fasting glucose- 220, pre-lunch glucose- 140, post-lunch glucose- 150-160 and bedtime- 200    The patient reports that he IS taking the medication as prescribed. He denies side effects of medication.    ----------------------------------------------------------------------------------------------------------------------------------------    BP Readings from Last 3 Encounters:   09/16/20 122/70   01/20/20 112/54   12/03/19 (!) 143/65     The patient reports that he IS currently smoking. 3/4 ppd     History   Smoking Status     Former Smoker     Packs/day: 0.00     Years: 0.00     Types: Cigarettes     Quit date:  7/15/2016   Smokeless Tobacco     Never Used           The 10-year ASCVD risk score (Mary Carmen BRAN Jr., et al., 2013) is: 35.6%    Values used to calculate the score:      Age: 70 years      Sex: Male      Is Non- : No      Diabetic: Yes      Tobacco smoker: No      Systolic Blood Pressure: 122 mmHg      Is BP treated: Yes      HDL Cholesterol: 27 mg/dL      Total Cholesterol: 166 mg/dL        Current Outpatient Medications   Medication Sig Dispense Refill     ACCU-CHEK SMARTVIEW test strip 1 strip by In Vitro route 3 times daily Use to test blood sugar 3 times daily or as directed. 300 strip 3     amLODIPine (NORVASC) 10 MG tablet TAKE 1 TABLET(10 MG) BY MOUTH DAILY 90 tablet 0     aspirin 81 MG tablet Take by mouth daily 30 tablet      atorvastatin (LIPITOR) 80 MG tablet TAKE 1 TABLET(80 MG) BY MOUTH DAILY 90 tablet 3     blood glucose monitoring (ACCU-CHEK FASTCLIX) lancets USE TO TEST THREE TIMES DAILY OR AS DIRECTED 306 each 3     cilostazol (PLETAL) 50 MG tablet Take 50 mg by mouth 2 times daily       clopidogrel (PLAVIX) 75 MG tablet 75 mg  11     insulin glargine (LANTUS SOLOSTAR) 100 UNIT/ML pen ADMINISTER 30 UNITS UNDER THE SKIN DAILY twice daily (this is higher dosage) 30 mL 1     insulin pen needle (B-D U/F) 31G X 5 MM miscellaneous USE WITH INSULIN THREE TIMES DAILY 300 each 3     isosorbide mononitrate (IMDUR) 30 MG 24 hr tablet Take 1 tablet (30 mg) by mouth daily (Patient taking differently: Take 60 mg by mouth daily ) 90 tablet 3     liraglutide (VICTOZA) 18 MG/3ML solution Inject 1.2 mg Subcutaneous daily 18 mL 1     losartan (COZAAR) 50 MG tablet TAKE 2 TABLETS(100 MG) BY MOUTH DAILY 180 tablet 3     metFORMIN (GLUCOPHAGE-XR) 500 MG 24 hr tablet TAKE 4 TABLETS BY MOUTH DAILY WITH BREAKFAST 360 tablet 0     metoprolol succinate ER (TOPROL-XL) 100 MG 24 hr tablet Take 1 tablet (100 mg) by mouth daily 90 tablet 3     NOVOLOG FLEXPEN 100 UNIT/ML soln INJECT 60 UNITS UNDER THE SKIN  EVERY DAY 60 mL 1     Omega-3 Fatty Acids (FISH OIL OMEGA-3) 1000 MG CAPS Take 1,200 mg by mouth daily       omeprazole (PRILOSEC) 20 MG CR capsule Take 1 capsule (20 mg) by mouth daily 90 capsule 3     vitamin D3 (CHOLECALCIFEROL) 2000 units (50 mcg) tablet Take 1 tablet (2,000 Units) by mouth daily 100 tablet 3     nitroGLYcerin (NITROSTAT) 0.4 MG sublingual tablet For chest pain place 1 tablet under the tongue every 5 minutes for 3 doses. If symptoms persist 5 minutes after 1st dose call 911. (Patient not taking: Reported on 9/16/2020) 25 tablet 0     nystatin (MYCOSTATIN) 897150 UNIT/GM external cream APLY TO AFFECTED AREA TWICE DAILY FOR 2 WEEKS (Patient not taking: Reported on 9/16/2020) 30 g 0       The patient reports that he IS taking a statin.   (Reminder all diabetics with a ASCVD risk greater or equal to 7.5% should be on a high intensity statin, otherwise on a moderate intensity statin)      The patient reports that he IS taking 81mg of  aspirin daily.  (Reminder all diabetic patients with a cardiovascular risk factor and > 50 should take a daily aspirin)       The patient reports that he IS doing a self foot exam weekly.  The patient reports that he does not exercise.  The patient reports that he IS following the recommended diabetic diet. He  would give himself a low grade on his diet.    Immunization History   Administered Date(s) Administered     Flu, Unspecified 10/13/1997     HepB-Adult 01/28/2014, 12/03/2014, 10/08/2015     Influenza (H1N1) 01/12/2010     Influenza (High Dose) 3 valent vaccine 09/28/2019     Influenza (IIV3) PF 10/13/2004, 11/15/2006, 10/21/2008, 09/18/2009, 10/01/2010, 11/05/2010, 10/04/2011     Influenza (intradermal) 09/28/2019     Influenza Vaccine IM > 6 months Valent IIV4 01/07/2014, 12/03/2014, 10/08/2015, 01/27/2017     Pneumo Conj 13-V (2010&after) 12/18/2017     Pneumococcal 23 valent 01/01/2006, 03/23/2010, 12/15/2015     TD (ADULT, 7+) 04/26/2006     TDAP Vaccine  (Adacel) 01/14/2011     Td (Adult), Adsorbed 08/07/1995     Zoster vaccine, live 01/07/2014     The patient reports that he has not had the hepatitis B vaccine series in the past. (recommended for age 19-59 and can be given to age 60 or older)  The patient reports that he has had a pnuemovax in the past.  The patient reports that he has not had a flu shot for the current influenza season.  The patient would like to have a Influenza and Shingrix    Patient concerns: is concerned about     He reports that he is having difficulty falling asleep. He goes into bed at about midnight and can not fall asleep for 2 hours  He gets up at 5:30 for work and feels fine.   He has been like this forever.  He is using CPAP        EXAM:  /70   Pulse 83   Temp 98.3  F (36.8  C) (Tympanic)   Wt 97.1 kg (214 lb)   SpO2 97%   BMI 32.78 kg/m    Wt Readings from Last 4 Encounters:   09/16/20 97.1 kg (214 lb)   01/20/20 97.1 kg (214 lb)   12/03/19 98.4 kg (217 lb)   09/03/19 98.9 kg (218 lb)     Body mass index is 32.78 kg/m .    General:  Darian is awake, alert, and cooperative.  NAD.    GENERAL APPEARANCE: healthy, alert and no distress  HENT: ear canals and TM's normal and nose and mouth without ulcers or lesions  RESP: lungs clear to auscultation - no rales, rhonchi or wheezes  CV: regular rates and rhythm, normal S1 S2, no S3 or S4 and no murmur, click or rub -  EYES: EOMI, PERRL  ABDOMEN: soft, nontender, no HSM or masses and bowel sounds normal        Diabetic Foot Screen:  Any complaints of increased pain or numbness ? No  Is there a foot ulcer now or a history of foot ulcer? No  Does the foot have an abnormal shape? No  Are the nails thick, too long or ingrown? No  Are there any redness or open areas? No         Sensation Testing done at all points on the diagram with monofilament     Right Foot: Sensation Normal at all points  Left Foot: Sensation Normal at all points     Risk Category: 0- No loss of protective  sensation  Performed by Ward Randall MD                  Previsit labs drawn include:  Orders Only on 09/02/2020   Component Date Value Ref Range Status     Sodium 09/02/2020 141  133 - 144 mmol/L Final     Potassium 09/02/2020 4.0  3.4 - 5.3 mmol/L Final     Chloride 09/02/2020 109  94 - 109 mmol/L Final     Carbon Dioxide 09/02/2020 25  20 - 32 mmol/L Final     Anion Gap 09/02/2020 7  3 - 14 mmol/L Final     Glucose 09/02/2020 157* 70 - 99 mg/dL Final    Non Fasting     Urea Nitrogen 09/02/2020 15  7 - 30 mg/dL Final     Creatinine 09/02/2020 0.92  0.66 - 1.25 mg/dL Final     GFR Estimate 09/02/2020 85  >60 mL/min/[1.73_m2] Final    Comment: Non  GFR Calc  Starting 12/18/2018, serum creatinine based estimated GFR (eGFR) will be   calculated using the Chronic Kidney Disease Epidemiology Collaboration   (CKD-EPI) equation.       GFR Estimate If Black 09/02/2020 >90  >60 mL/min/[1.73_m2] Final    Comment:  GFR Calc  Starting 12/18/2018, serum creatinine based estimated GFR (eGFR) will be   calculated using the Chronic Kidney Disease Epidemiology Collaboration   (CKD-EPI) equation.       Calcium 09/02/2020 9.2  8.5 - 10.1 mg/dL Final     Phosphorus 09/02/2020 3.6  2.5 - 4.5 mg/dL Final     Albumin 09/02/2020 3.7  3.4 - 5.0 g/dL Final     Cholesterol 09/02/2020 166  <200 mg/dL Final     Triglycerides 09/02/2020 93  <150 mg/dL Final    Non Fasting     HDL Cholesterol 09/02/2020 27* >39 mg/dL Final     LDL Cholesterol Calculated 09/02/2020 120* <100 mg/dL Final    Comment: Above desirable:  100-129 mg/dl  Borderline High:  130-159 mg/dL  High:             160-189 mg/dL  Very high:       >189 mg/dl       Non HDL Cholesterol 09/02/2020 139* <130 mg/dL Final    Comment: Above Desirable:  130-159 mg/dl  Borderline high:  160-189 mg/dl  High:             190-219 mg/dl  Very high:       >219 mg/dl       Hemoglobin A1C 09/02/2020 8.2* 0 - 5.6 % Final    Comment: Results confirmed by repeat  "test  Normal <5.7% Prediabetes 5.7-6.4%  Diabetes 6.5% or higher - adopted from ADA   consensus guidelines.           ASSESSMENT and PLAN:    Type II Diabetes, slightly improving.    DIABETES Type II:                       Lab Results   Component Value Date    A1C 8.2 09/02/2020       Control   fair  Lab Results   Component Value Date    A1C 8.2 09/02/2020    A1C 8.6 12/03/2019    A1C 9.5 08/02/2019     Lab Results   Component Value Date    MICROL 1,140 04/03/2018         Recommended to take ASA  mg daily for appropriate patient, Increase the dose of Victoza  to 1.8 mg , Compliance with the recommended diabetic diet was stressed. He declined a diabetes education appointment(s) , Regular aerobic exercise was encouraged, Home glucose monitoring encouraged, Annual eye exam recommended, Self foot exam demonstrated and recommended to be done nightly, Apply moisturizer to feet with in 3 minutes of showering or bathing, Follow up in clinic in 3 months for a diabetes check and Future labs ordered and the patient was instructed to make a lab appt 1 week prior to next diabetes visit      BP/HTN:   BP Readings from Last 3 Encounters:   09/16/20 122/70   01/20/20 112/54   12/03/19 (!) 143/65     Control   good    - Medication:continue the current doses of medication.  (make sure to order \"Ace not prescribed intentionally if not on an ACE/ARB)  The patient was advised to do the following therapuetic life style changes  - Dietary sodium restriction and increase potassium and Calcium intake  - Regular aerobic exercise  - Weight loss  - Discontinue smoking if applicable  - Avoid regular NSAID use if applicable  - Avoid regular decongestant use if applicable  - Follow up in clinic in 3 months for a recheck  - Check a basic metabolic panel today if needed    Patient Education: Reviewed risks of hypertension and principles of   Treatment.    HYPERCHOLESTEROLEMIA:     The 10-year ASCVD risk score ([place] DC Jr., et al., 2013) is: " 35.6%    Values used to calculate the score:      Age: 70 years      Sex: Male      Is Non- : No      Diabetic: Yes      Tobacco smoker: No      Systolic Blood Pressure: 122 mmHg      Is BP treated: Yes      HDL Cholesterol: 27 mg/dL      Total Cholesterol: 166 mg/dL    Lab Results   Component Value Date     09/02/2020      Control   poor  Cholesterol   Date Value Ref Range Status   09/02/2020 166 <200 mg/dL Final   08/02/2019 103 <200 mg/dL Final     HDL Cholesterol   Date Value Ref Range Status   09/02/2020 27 (L) >39 mg/dL Final   08/02/2019 30 (L) >39 mg/dL Final     LDL Cholesterol Calculated   Date Value Ref Range Status   09/02/2020 120 (H) <100 mg/dL Final     Comment:     Above desirable:  100-129 mg/dl  Borderline High:  130-159 mg/dL  High:             160-189 mg/dL  Very high:       >189 mg/dl     08/02/2019 57 <100 mg/dL Final     Comment:     Desirable:       <100 mg/dl     Triglycerides   Date Value Ref Range Status   09/02/2020 93 <150 mg/dL Final     Comment:     Non Fasting   08/02/2019 79 <150 mg/dL Final     Comment:     Fasting specimen     No results found for: CHOLHDLRATIO      The patient is on a high intensity statin and this is the appropriate treatment based on the ASCVD risk.  I verified that he is taking his atorvastatin daily    The patient's HDL is abnormal  The patient's triglycerides are normal.    We have discussed the results and we will continue the current management.   I am not sure why his LDL increased so much but we will recheck at the next appointment(s).

## 2020-09-17 RX ORDER — ISOSORBIDE MONONITRATE 30 MG/1
TABLET, EXTENDED RELEASE ORAL
Qty: 180 TABLET | Refills: 3 | Status: SHIPPED | OUTPATIENT
Start: 2020-09-17 | End: 2022-02-23

## 2020-12-02 ENCOUNTER — TELEPHONE (OUTPATIENT)
Dept: FAMILY MEDICINE | Facility: CLINIC | Age: 70
End: 2020-12-02

## 2020-12-02 DIAGNOSIS — E11.8 TYPE 2 DIABETES MELLITUS WITH COMPLICATION, WITH LONG-TERM CURRENT USE OF INSULIN (H): ICD-10-CM

## 2020-12-02 DIAGNOSIS — Z79.4 TYPE 2 DIABETES MELLITUS WITH COMPLICATION, WITH LONG-TERM CURRENT USE OF INSULIN (H): ICD-10-CM

## 2020-12-02 RX ORDER — INSULIN ASPART 100 [IU]/ML
INJECTION, SOLUTION INTRAVENOUS; SUBCUTANEOUS
Qty: 60 ML | Refills: 0 | Status: SHIPPED | OUTPATIENT
Start: 2020-12-02 | End: 2021-01-22

## 2020-12-02 NOTE — TELEPHONE ENCOUNTER
Medication is being filled for 1 time refill only due to:  pt needs lab and provider appt for diabetes          - please send letter    Brooke BANKSN, RN, CPN

## 2020-12-02 NOTE — LETTER
December 2, 2020    Darian Engle  [address] [place] [id number]    Dear Darian,       We recently received a refill request for NOVOLOG FLEXPEN 100 UNIT/ML soln.  We have refilled this for a one time supply only because you are due for a:    Diabetic office visit and a Fasting lab appointment for further refills.      Please schedule this lab appointment 4-5 days prior to the office visit.     Please call at your earliest convenience so that there will not be a delay with your future refills.          Thank you,   Your [facility] Team/[facility]  [phone]

## 2020-12-24 DIAGNOSIS — E11.8 TYPE 2 DIABETES MELLITUS WITH COMPLICATION, WITH LONG-TERM CURRENT USE OF INSULIN (H): ICD-10-CM

## 2020-12-24 DIAGNOSIS — Z79.4 TYPE 2 DIABETES MELLITUS WITH COMPLICATION, WITH LONG-TERM CURRENT USE OF INSULIN (H): ICD-10-CM

## 2020-12-24 RX ORDER — BLOOD SUGAR DIAGNOSTIC
STRIP MISCELLANEOUS
Qty: 300 STRIP | Refills: 3 | Status: SHIPPED | OUTPATIENT
Start: 2020-12-24 | End: 2021-04-29

## 2020-12-31 NOTE — PROGRESS NOTES
Subjective     Darian Engle is a 70 year old male who presents to clinic today for the following health issues:    HPI         Diabetes Follow-up    How often are you checking your blood sugar? Four or more times daily  What time of day are you checking your blood sugars (select all that apply)?  Before and after meals  Have you had any blood sugars above 200?  Yes   Have you had any blood sugars below 70?  No    What symptoms do you notice when your blood sugar is low?  Shaky and Dizzy    What concerns do you have today about your diabetes? None     Do you have any of these symptoms? (Select all that apply)  Weight loss    Have you had a diabetic eye exam in the last 12 months? yes  Patient doesnot remember the medication he is taking   Medication :  Lantus: medication list reflect he is taking 30 units two times but he states he takes Lantus 30 units at night   Wife is here wit him she does not know what medication he is taking .     Victoza 1.8 mg   Metformin : 4 tablets x 500 mg daily               Hyperlipidemia Follow-Up      Are you regularly taking any medication or supplement to lower your cholesterol?   Yes- Lipitor    Are you having muscle aches or other side effects that you think could be caused by your cholesterol lowering medication?  No    Hypertension Follow-up  Well controlled   Taking Losartan and Metoprolol     Do you check your blood pressure regularly outside of the clinic? Yes     Are you following a low salt diet? No    Are your blood pressures ever more than 140 on the top number (systolic) OR more   than 90 on the bottom number (diastolic), for example 140/90? Yes    BP Readings from Last 2 Encounters:   09/16/20 122/70   01/20/20 112/54     Hemoglobin A1C (%)   Date Value   09/02/2020 8.2 (H)   12/03/2019 8.6 (H)     LDL Cholesterol Calculated (mg/dL)   Date Value   09/02/2020 120 (H)   08/02/2019 57       Memory loss and insomnia is getting bad    Review of Systems   Constitutional,  HEENT, cardiovascular, pulmonary, gi and gu systems are negative, except as otherwise noted.      Objective    There were no vitals taken for this visit.  There is no height or weight on file to calculate BMI.  Physical Exam  Vitals signs and nursing note reviewed.   Constitutional:       General: He is not in acute distress.     Appearance: Normal appearance. He is not ill-appearing, toxic-appearing or diaphoretic.   HENT:      Head: Normocephalic and atraumatic.   Cardiovascular:      Rate and Rhythm: Normal rate and regular rhythm.      Pulses: Normal pulses.      Heart sounds: Normal heart sounds. No murmur. No friction rub. No gallop.    Neurological:      Mental Status: He is alert.                    Assessment & Plan     Type 2 diabetes mellitus with complication, with long-term current use of insulin (H)    - Albumin Random Urine Quantitative with Creat Ratio  - OPTOMETRY REFERRAL  - **A1C FUTURE anytime    Hyperlipidemia LDL goal <100    Essential hypertension with goal blood pressure less than 140/90    Memory problem  - MEMORY CLINIC REFERRAL     Labs and urine test  Follow up with Memory clinic   Follow up with ophthalmology   Return in 2 weeks for follow up bring all medicaiton with you next visity        Return in about 2 weeks (around 1/18/2021) for bring all medicaiton with you next visity .    Jose Tobias MD  [facility]

## 2021-01-04 ENCOUNTER — OFFICE VISIT (OUTPATIENT)
Dept: FAMILY MEDICINE | Facility: CLINIC | Age: 71
End: 2021-01-04
Payer: COMMERCIAL

## 2021-01-04 VITALS
TEMPERATURE: 98.6 F | HEIGHT: 68 IN | BODY MASS INDEX: 31.22 KG/M2 | OXYGEN SATURATION: 97 % | DIASTOLIC BLOOD PRESSURE: 60 MMHG | SYSTOLIC BLOOD PRESSURE: 128 MMHG | HEART RATE: 75 BPM | WEIGHT: 206 LBS

## 2021-01-04 DIAGNOSIS — Z79.4 TYPE 2 DIABETES MELLITUS WITH COMPLICATION, WITH LONG-TERM CURRENT USE OF INSULIN (H): Primary | ICD-10-CM

## 2021-01-04 DIAGNOSIS — E78.5 HYPERLIPIDEMIA LDL GOAL <100: ICD-10-CM

## 2021-01-04 DIAGNOSIS — E11.8 TYPE 2 DIABETES MELLITUS WITH COMPLICATION, WITH LONG-TERM CURRENT USE OF INSULIN (H): Primary | ICD-10-CM

## 2021-01-04 DIAGNOSIS — I10 ESSENTIAL HYPERTENSION WITH GOAL BLOOD PRESSURE LESS THAN 140/90: ICD-10-CM

## 2021-01-04 DIAGNOSIS — R41.3 MEMORY PROBLEM: ICD-10-CM

## 2021-01-04 LAB
CREAT UR-MCNC: 90 MG/DL
HBA1C MFR BLD: 8.1 % (ref 0–5.6)
MICROALBUMIN UR-MCNC: 1520 MG/L
MICROALBUMIN/CREAT UR: 1683.28 MG/G CR (ref 0–17)

## 2021-01-04 PROCEDURE — 83036 HEMOGLOBIN GLYCOSYLATED A1C: CPT | Performed by: FAMILY MEDICINE

## 2021-01-04 PROCEDURE — 82043 UR ALBUMIN QUANTITATIVE: CPT | Performed by: FAMILY MEDICINE

## 2021-01-04 PROCEDURE — 99213 OFFICE O/P EST LOW 20 MIN: CPT | Performed by: FAMILY MEDICINE

## 2021-01-04 PROCEDURE — 36415 COLL VENOUS BLD VENIPUNCTURE: CPT | Performed by: FAMILY MEDICINE

## 2021-01-04 ASSESSMENT — MIFFLIN-ST. JEOR: SCORE: 1678.91

## 2021-01-04 NOTE — PATIENT INSTRUCTIONS
Labs and urine test  Follow up with Memory clinic   Follow up with ophthalmology   Return in 2 weeks for follow up bring all medicaiton with you next visity

## 2021-01-13 DIAGNOSIS — Z79.4 TYPE 2 DIABETES MELLITUS WITH COMPLICATION, WITH LONG-TERM CURRENT USE OF INSULIN (H): ICD-10-CM

## 2021-01-13 DIAGNOSIS — E11.8 TYPE 2 DIABETES MELLITUS WITH COMPLICATION, WITH LONG-TERM CURRENT USE OF INSULIN (H): ICD-10-CM

## 2021-01-14 RX ORDER — FLURBIPROFEN SODIUM 0.3 MG/ML
SOLUTION/ DROPS OPHTHALMIC
Qty: 300 EACH | Refills: 1 | Status: SHIPPED | OUTPATIENT
Start: 2021-01-14 | End: 2023-01-01

## 2021-01-20 ENCOUNTER — VIRTUAL VISIT (OUTPATIENT)
Dept: NEUROLOGY | Facility: CLINIC | Age: 71
End: 2021-01-20
Attending: FAMILY MEDICINE
Payer: COMMERCIAL

## 2021-01-20 ENCOUNTER — TELEPHONE (OUTPATIENT)
Dept: NEUROLOGY | Facility: CLINIC | Age: 71
End: 2021-01-20

## 2021-01-20 DIAGNOSIS — R41.89 COGNITIVE DECLINE: Primary | ICD-10-CM

## 2021-01-20 DIAGNOSIS — G47.00 INSOMNIA, UNSPECIFIED TYPE: ICD-10-CM

## 2021-01-20 DIAGNOSIS — G47.33 OBSTRUCTIVE SLEEP APNEA SYNDROME: ICD-10-CM

## 2021-01-20 DIAGNOSIS — R41.89 SEDATED DUE TO MULTIPLE MEDICATIONS: ICD-10-CM

## 2021-01-20 RX ORDER — TRAZODONE HYDROCHLORIDE 50 MG/1
50 TABLET, FILM COATED ORAL AT BEDTIME
Qty: 30 TABLET | Refills: 4 | Status: SHIPPED | OUTPATIENT
Start: 2021-01-20 | End: 2021-06-03

## 2021-01-20 NOTE — TELEPHONE ENCOUNTER
What is the concern that needs to be addressed by a nurse? Pt needs MRI referral, currently in chart as Ordered. Please call when ready.     May a detailed message be left on voicemail? yes    Date of last office visit: 1/20/20    Message routed to: mincep rn pool

## 2021-01-20 NOTE — PROGRESS NOTES
"CC:   Chief Complaint   Patient presents with     New Patient       HPI:  Mr. Darian Engle is a 70 year old man with medical history of peripheral arterial disease, CAD with previous STEMI, HTN, bilateral sensorineural hearing loss, insomnia, dyslipedmia, moderate TALA (EDNA 23.7) T2DM presenting due to cognitive symptoms.     His wife is present. She performed all the pre-visit computer connection tasks. We ran into trouble with the connection and Mr. Engle was unable to provide assistance, while is wife did all the troubleshooting.    Mr. Engle notes that his 'short term and longterm recall seem to be fading a bit'. He thinks it started 3-5 years ago, and perhaps even before that. He is not sure whether it has been progressively worsening over the time.     His wife states that he has been forgetting to take medications and forgetting to refill medications. He has left lunch meat unrefrigerated and on the counter top. He forgets what exit to take home when driving. He gets up to cook dinner, then forgets what he got up to do. He asks repetitive questions, such as \"what will we do today; is there an appointment today\". He forgets that he and his wife went to a doctor's visit, and not remember why they went. He will still not recall even after his wife reminds him. His wife thinks symptoms have been progressing even before 5 years. He had a diagnosis of mild cognitive impairment several years near [address] at [facility].    They state that he has been wearing his CPAP consistently. They deny any discomfort or leaks. He continues to have insomnia, staying up until 2 am and getting up at 6 am. He takes over the counter generic sleeping medications (Unisom) 3-4 times per week. He naps during the day 1-1.5 hours. They deny any dream enactment behavior. No dream enactment behavior and he has a witness. No visual hallucination. No paranoia. No fluctuations. No orthostasis, early satiety, or constipation. " No anosmia.    He denies any depression or anxiety.    No balance trouble, using hands, or complex movements. No tremors.    He has lost 10-15 pounds over the last 6 months. No headaches, seizures, fevers/chills.    MEDS:  Current Outpatient Medications   Medication Sig Dispense Refill     ACCU-CHEK SMARTVIEW test strip TEST THREE TIMES DAILY OR AS DIRECTED 300 strip 3     amLODIPine (NORVASC) 10 MG tablet Take 1 tablet (10 mg) by mouth daily 90 tablet 3     aspirin 81 MG tablet Take by mouth daily 30 tablet      atorvastatin (LIPITOR) 80 MG tablet TAKE 1 TABLET(80 MG) BY MOUTH DAILY 90 tablet 3     blood glucose monitoring (ACCU-CHEK FASTCLIX) lancets USE TO TEST THREE TIMES DAILY OR AS DIRECTED 306 each 3     cilostazol (PLETAL) 50 MG tablet Take 50 mg by mouth 2 times daily       clopidogrel (PLAVIX) 75 MG tablet 75 mg  11     insulin glargine (LANTUS SOLOSTAR) 100 UNIT/ML pen ADMINISTER 30 UNITS UNDER THE SKIN DAILY twice daily (this is higher dosage) 30 mL 1     insulin pen needle (B-D U/F) 31G X 5 MM miscellaneous USE THREE TIMES DAILY 300 each 1     isosorbide mononitrate (IMDUR) 30 MG 24 hr tablet TAKE 2 TABLETS(60 MG) BY MOUTH DAILY 180 tablet 3     liraglutide (VICTOZA) 18 MG/3ML solution Inject 1.8 mg Subcutaneous daily 27 mL 1     losartan (COZAAR) 100 MG tablet Take 1 tablet (100 mg) by mouth daily 90 tablet 3     metFORMIN (GLUCOPHAGE-XR) 500 MG 24 hr tablet TAKE 4 TABLETS BY MOUTH DAILY WITH BREAKFAST 360 tablet 3     metoprolol succinate ER (TOPROL-XL) 100 MG 24 hr tablet Take 1 tablet (100 mg) by mouth daily 90 tablet 3     nitroGLYcerin (NITROSTAT) 0.4 MG sublingual tablet For chest pain place 1 tablet under the tongue every 5 minutes for 3 doses. If symptoms persist 5 minutes after 1st dose call 911. 25 tablet 0     NOVOLOG FLEXPEN 100 UNIT/ML soln ADMINISTER 60 UNITS UNDER THE SKIN EVERY DAY 60 mL 0     nystatin (MYCOSTATIN) 507433 UNIT/GM external cream APLY TO AFFECTED AREA TWICE DAILY FOR 2  WEEKS 30 g 0     Omega-3 Fatty Acids (FISH OIL OMEGA-3) 1000 MG CAPS Take 1,200 mg by mouth daily       omeprazole (PRILOSEC) 20 MG CR capsule Take 1 capsule (20 mg) by mouth daily 90 capsule 3     varenicline (CHANTIX STARTING MONTH PAK) 0.5 MG X 11 & 1 MG X 42 tablet 0.5 mg tablets qd for 3 days, then bid for 4 days, then 1.0 mg bid thereafter (Patient not taking: Reported on 1/4/2021) 60 tablet 0     varenicline (CHANTIX) 1 MG tablet Take 1 tablet (1 mg) by mouth 2 times daily (Patient not taking: Reported on 1/4/2021) 56 tablet 4     vitamin D3 (CHOLECALCIFEROL) 2000 units (50 mcg) tablet Take 1 tablet (2,000 Units) by mouth daily 100 tablet 3    Unisom 3-4 times per night.    PROBLEM LIST:  Patient Active Problem List   Diagnosis     CAD, multiple vessel     Essential hypertension with goal blood pressure less than 140/90     Type 2 diabetes mellitus with complication, with long-term current use of insulin (H)     Hyperlipidemia LDL goal <100     Gastroesophageal reflux disease without esophagitis     PAD (peripheral artery disease) (H)     Type 2 diabetes mellitus without retinopathy (H)     Bilateral incipient cataracts     Obstructive sleep apnea syndrome        PMHx:  Past Medical History:   Diagnosis Date     Arthritis March 2018    joint pain both hands     Diabetes (H)      Heart disease 1991    [place]-angioplasty     Hypertension        FHx:  He is unsure of his family history; he hasn't seen his family in the last 10-15 years. No known history of neurodegeneration in the family.    SHx:  He played football, basketball and track during highschool. No known concussions.     He smokes marijuana several times per week. He uses a pipe. He takes 2-3 hits during the evening. He uses it to help sleep. No CBD. No other recreational drugs.     Physical Exam:  MMSE: -1 calendar day; -1 pentagons; -1 delayed recall (27/30).    Neuro: Normal voice, speech, and logical flow of thinking. No hypomimia. Alert and  awake and asks normal questions. No trouble following the conversation. He has a normal gait, stance, and posture. Arm swing is normal. Able to walk on heels and toes. No dystonic posturing. Normal eye movements with normal saccadic initiation, velocity and amplitude in all directions. No ideomotor apraxia. Normal rapid alternating movements of the fingers.    Objective Testing:  Normal BMP (2020)  HbA1c 8.1  TSH normal  Gastric mucosa with chronic inactive inflammation (2000's via biopsy)  Normal CBC    Assessment/Plan:  Mr. Darian Engle is a 70 year old man with progressive decline in short term recall and attention with a background of insomnia, sedating medication use (Unisom, marijuana), and cardiovascular risk factors.     The wife's report is quite concerning, for instance Mr. Engle entirely forgets about being part of an experience. His wife's presence is required to fulfill iADLs. While his MMSE testing was not as bad as his wife's report would have indicated, he missed 1 on delayed recall and aracelis overlapping squares rather than pentagons, which could suggest medial temporal and parietal involvement, two areas affected by Alzheimer's disease.     However, his sedating medication use and insomnia could potentially explain the degree of cognitive symptoms, and thus stopping Unisom and marijuana, and obtaining adequate sleep, will be of utmost importance.    We will also proceed with a standard cognitive decline work-up.    [ ] MRI brain  [ ] Vitamin B12  [ ] Defer Neuropsych until off of Unisom and marijuana  [ ] Stop marijuana  [ ] No Unisom; can taper off over 2 weeks if this medication is required for achieving sleep  [ ] Trazodone 50 mg 1 hour before bed for sleep  [ ] His wife brought up the issue of simplifying his diabetes regimen, for instance using an insulin pump. Because of his forgetfulness, any simplifications to his medication regimen can help with compliance.   [ ] We talked about  driving and that he needs to discontinue driving. This can be re-addressed should his symptoms be due to a reversible process.    I spent 60 minutes directly with the patient in the current encounter, over half of which was spent counseling on the potential diagnosis, possible treatments, and importance of healthy lifestyle including exercise.

## 2021-01-20 NOTE — TELEPHONE ENCOUNTER
Chart reviewed, order is in place    Call placed to Angelica.  She had called to schedule the MRI and the  did not see an order  I recommended she call again as she had called immediately after talking with Celestine Vallecillo this morning.  If they still do not see the order, I suggested tht call us for further discussion.    No other question at this time

## 2021-01-20 NOTE — LETTER
"1/20/2021       RE: Darian Engle  [address] [facility] [place] [address]     Dear Colleague,    Thank you for referring your patient, Darian Engle, to the [facility] at [facility]. Please see a copy of my visit note below.    CC:   Chief Complaint   Patient presents with     New Patient       HPI:  Mr. Darian Engle is a 70 year old man with medical history of peripheral arterial disease, CAD with previous STEMI, HTN, bilateral sensorineural hearing loss, insomnia, dyslipedmia, moderate TALA (EDNA 23.7) T2DM presenting due to cognitive symptoms.     His wife is present. She performed all the pre-visit computer connection tasks. We ran into trouble with the connection and Mr. Engle was unable to provide assistance, while is wife did all the troubleshooting.    Mr. Engle notes that his 'short term and longterm recall seem to be fading a bit'. He thinks it started 3-5 years ago, and perhaps even before that. He is not sure whether it has been progressively worsening over the time.     His wife states that he has been forgetting to take medications and forgetting to refill medications. He has left lunch meat unrefrigerated and on the counter top. He forgets what exit to take home when driving. He gets up to cook dinner, then forgets what he got up to do. He asks repetitive questions, such as \"what will we do today; is there an appointment today\". He forgets that he and his wife went to a doctor's visit, and not remember why they went. He will still not recall even after his wife reminds him. His wife thinks symptoms have been progressing even before 5 years. He had a diagnosis of mild cognitive impairment several years near [address] at [employer].    They state that he has been wearing his CPAP consistently. They deny any discomfort or leaks. He continues to have insomnia, staying up until 2 am and getting up at 6 am. He takes over the counter " generic sleeping medications (Unisom) 3-4 times per week. He naps during the day 1-1.5 hours. They deny any dream enactment behavior. No dream enactment behavior and he has a witness. No visual hallucination. No paranoia. No fluctuations. No orthostasis, early satiety, or constipation. No anosmia.    He denies any depression or anxiety.    No balance trouble, using hands, or complex movements. No tremors.    He has lost 10-15 pounds over the last 6 months. No headaches, seizures, fevers/chills.    MEDS:  Current Outpatient Medications   Medication Sig Dispense Refill     ACCU-CHEK SMARTVIEW test strip TEST THREE TIMES DAILY OR AS DIRECTED 300 strip 3     amLODIPine (NORVASC) 10 MG tablet Take 1 tablet (10 mg) by mouth daily 90 tablet 3     aspirin 81 MG tablet Take by mouth daily 30 tablet      atorvastatin (LIPITOR) 80 MG tablet TAKE 1 TABLET(80 MG) BY MOUTH DAILY 90 tablet 3     blood glucose monitoring (ACCU-CHEK FASTCLIX) lancets USE TO TEST THREE TIMES DAILY OR AS DIRECTED 306 each 3     cilostazol (PLETAL) 50 MG tablet Take 50 mg by mouth 2 times daily       clopidogrel (PLAVIX) 75 MG tablet 75 mg  11     insulin glargine (LANTUS SOLOSTAR) 100 UNIT/ML pen ADMINISTER 30 UNITS UNDER THE SKIN DAILY twice daily (this is higher dosage) 30 mL 1     insulin pen needle (B-D U/F) 31G X 5 MM miscellaneous USE THREE TIMES DAILY 300 each 1     isosorbide mononitrate (IMDUR) 30 MG 24 hr tablet TAKE 2 TABLETS(60 MG) BY MOUTH DAILY 180 tablet 3     liraglutide (VICTOZA) 18 MG/3ML solution Inject 1.8 mg Subcutaneous daily 27 mL 1     losartan (COZAAR) 100 MG tablet Take 1 tablet (100 mg) by mouth daily 90 tablet 3     metFORMIN (GLUCOPHAGE-XR) 500 MG 24 hr tablet TAKE 4 TABLETS BY MOUTH DAILY WITH BREAKFAST 360 tablet 3     metoprolol succinate ER (TOPROL-XL) 100 MG 24 hr tablet Take 1 tablet (100 mg) by mouth daily 90 tablet 3     nitroGLYcerin (NITROSTAT) 0.4 MG sublingual tablet For chest pain place 1 tablet under the  tongue every 5 minutes for 3 doses. If symptoms persist 5 minutes after 1st dose call 911. 25 tablet 0     NOVOLOG FLEXPEN 100 UNIT/ML soln ADMINISTER 60 UNITS UNDER THE SKIN EVERY DAY 60 mL 0     nystatin (MYCOSTATIN) 472222 UNIT/GM external cream APLY TO AFFECTED AREA TWICE DAILY FOR 2 WEEKS 30 g 0     Omega-3 Fatty Acids (FISH OIL OMEGA-3) 1000 MG CAPS Take 1,200 mg by mouth daily       omeprazole (PRILOSEC) 20 MG CR capsule Take 1 capsule (20 mg) by mouth daily 90 capsule 3     varenicline (CHANTIX STARTING MONTH RAHUL) 0.5 MG X 11 & 1 MG X 42 tablet 0.5 mg tablets qd for 3 days, then bid for 4 days, then 1.0 mg bid thereafter (Patient not taking: Reported on 1/4/2021) 60 tablet 0     varenicline (CHANTIX) 1 MG tablet Take 1 tablet (1 mg) by mouth 2 times daily (Patient not taking: Reported on 1/4/2021) 56 tablet 4     vitamin D3 (CHOLECALCIFEROL) 2000 units (50 mcg) tablet Take 1 tablet (2,000 Units) by mouth daily 100 tablet 3    Unisom 3-4 times per night.    PROBLEM LIST:  Patient Active Problem List   Diagnosis     CAD, multiple vessel     Essential hypertension with goal blood pressure less than 140/90     Type 2 diabetes mellitus with complication, with long-term current use of insulin (H)     Hyperlipidemia LDL goal <100     Gastroesophageal reflux disease without esophagitis     PAD (peripheral artery disease) (H)     Type 2 diabetes mellitus without retinopathy (H)     Bilateral incipient cataracts     Obstructive sleep apnea syndrome        PMHx:  Past Medical History:   Diagnosis Date     Arthritis March 2018    joint pain both hands     Diabetes (H)      Heart disease 1991    [place]-angioplasty     Hypertension        FHx:  He is unsure of his family history; he hasn't seen his family in the last 10-15 years. No known history of neurodegeneration in the family.    SHx:  He played football, basketball and track during highschool. No known concussions.     He smokes marijuana several times per week.  He uses a pipe. He takes 2-3 hits during the evening. He uses it to help sleep. No CBD. No other recreational drugs.     Physical Exam:  MMSE: -1 calendar day; -1 pentagons; -1 delayed recall (27/30).    Neuro: Normal voice, speech, and logical flow of thinking. No hypomimia. Alert and awake and asks normal questions. No trouble following the conversation. He has a normal gait, stance, and posture. Arm swing is normal. Able to walk on heels and toes. No dystonic posturing. Normal eye movements with normal saccadic initiation, velocity and amplitude in all directions. No ideomotor apraxia. Normal rapid alternating movements of the fingers.    Objective Testing:  Normal BMP (2020)  HbA1c 8.1  TSH normal  Gastric mucosa with chronic inactive inflammation (2000's via biopsy)  Normal CBC    Assessment/Plan:  Mr. Darian Engle is a 70 year old man with progressive decline in short term recall and attention with a background of insomnia, sedating medication use (Unisom, marijuana), and cardiovascular risk factors.     The wife's report is quite concerning, for instance Mr. Engle entirely forgets about being part of an experience. His wife's presence is required to fulfill iADLs. While his MMSE testing was not as bad as his wife's report would have indicated, he missed 1 on delayed recall and aracelis overlapping squares rather than pentagons, which could suggest medial temporal and parietal involvement, two areas affected by Alzheimer's disease.     However, his sedating medication use and insomnia could potentially explain the degree of cognitive symptoms, and thus stopping Unisom and marijuana, and obtaining adequate sleep, will be of utmost importance.    We will also proceed with a standard cognitive decline work-up.    [ ] MRI brain  [ ] Vitamin B12  [ ] Defer Neuropsych until off of Unisom and marijuana  [ ] Stop marijuana  [ ] No Unisom; can taper off over 2 weeks if this medication is required for achieving  sleep  [ ] Trazodone 50 mg 1 hour before bed for sleep  [ ] His wife brought up the issue of simplifying his diabetes regimen, for instance using an insulin pump. Because of his forgetfulness, any simplifications to his medication regimen can help with compliance.   [ ] We talked about driving and that he needs to discontinue driving. This can be re-addressed should his symptoms be due to a reversible process.    I spent 60 minutes directly with the patient in the current encounter, over half of which was spent counseling on the potential diagnosis, possible treatments, and importance of healthy lifestyle including exercise.        Frandy is a 70 year old who is being evaluated via a billable video visit.      How would you like to obtain your AVS? MyChart  If the video visit is dropped, the invitation should be resent by: Send to e-mail at: [email or web address]  Will anyone else be joining your video visit? Yes: wife. How would they like to receive their invitation? Text to cell phone: [phone]      Video Start Time: 10:30  Video-Visit Details    Type of service:  Video Visit    Video End Time:11:30    Originating Location (pt. Location): Home    Distant Location (provider location):  [facility]OSPECIALT[facility]     Platform used for Video Visit: Juan

## 2021-01-20 NOTE — PATIENT INSTRUCTIONS
Mr. Darian Engle is a 70 year old man with progressive decline in short term recall and attention with a background of insomnia, sedating medication use (Unisom, marijuana), and cardiovascular risk factors.     The wife's report is quite concerning, for instance Mr. Engle entirely forgets about being part of an experience. His wife's presence is required to fulfill iADLs. While his MMSE testing was not as bad as his wife's report would have indicated, he missed 1 on delayed recall and aracelis overlapping squares rather than pentagons, which could suggest medial temporal and parietal involvement, two areas affected by Alzheimer's disease.     However, his sedating medication use and insomnia could potentially explain the degree of cognitive symptoms, and thus stopping Unisom and marijuana, and obtaining adequate sleep, will be of utmost importance.    We will also proceed with a standard cognitive decline work-up.    [ ] MRI brain  [ ] Vitamin B12  [ ] Defer Neuropsych until off of Unisom and marijuana  [ ] Stop marijuana  [ ] No Unisom; can taper off over 2 weeks if this medication is required for achieving sleep  [ ] Trazodone 50 mg 1 hour before bed for sleep  [ ] His wife brought up the issue of simplifying his diabetes regimen, for instance using an insulin pump. Because of his forgetfulness, any simplifications to his medication regimen can help with compliance.   [ ] We talked about driving and that he needs to discontinue driving. This can be re-addressed should his symptoms be due to a reversible process.

## 2021-01-20 NOTE — PROGRESS NOTES
Frandy is a 70 year old who is being evaluated via a billable video visit.      How would you like to obtain your AVS? MyChart  If the video visit is dropped, the invitation should be resent by: Send to e-mail at: [email or web address]  Will anyone else be joining your video visit? Yes: wife. How would they like to receive their invitation? Text to cell phone: [phone]      Video Start Time: 10:30  Video-Visit Details    Type of service:  Video Visit    Video End Time:11:30    Originating Location (pt. Location): Home    Distant Location (provider location):  [facility]UROSPECIALT[facility]     Platform used for Video Visit: Juan

## 2021-01-21 NOTE — PROGRESS NOTES
"  Assessment & Plan     Type 2 diabetes mellitus with complication, with long-term current use of insulin (H)  A1c is 8 ,   Due to cogntivie decline I think a1c is at good goal    Medications:  Lantus   Advised to take 30 units BID   Novolog :  He was Prescribed 60 units perd day   Patient is takng sliding scale -  unclear dose   Advised to to take 10 units 3 times daily with meals     Victoza:  1.8 mg daily  Continue the same dose   Metformin :  4 tablet x 500 mg daily   Continue the same dose     Lab Results   Component Value Date    A1C 8.1 01/04/2021    A1C 8.2 09/02/2020    A1C 8.6 12/03/2019    A1C 9.5 08/02/2019    A1C 7.6 11/26/2018         - NOVOLOG FLEXPEN 100 UNIT/ML soln; Inject 10 Units Subcutaneous 3 times daily (with meals)    Essential hypertension with goal blood pressure less than 140/90  Patient has not been taking Losartan which could be the cause that BP is not well controlled   He is going to start taking Losartan 100mg , follow up in 3 months for BP check     Gastroesophageal reflux disease without esophagitis  Refill PPI  - omeprazole (PRILOSEC) 20 MG DR capsule; Take 1 capsule (20 mg) by mouth daily    Cognitive decline  Follow up with neurology   scheduled for MRI cristina                          Tobacco Cessation:   reports that he has been smoking cigarettes. He started smoking about 53 years ago. He has a 5.00 pack-year smoking history. He uses smokeless tobacco.      BMI:   Estimated body mass index is 30.41 kg/m  as calculated from the following:    Height as of 1/4/21: 1.727 m (5' 8\").    Weight as of this encounter: 90.7 kg (200 lb).             Return in about 3 months (around 4/22/2021).    Jose Tobias MD  [facility]    Terrance Wise is a 70 year old who presents to clinic today for the following health issues     HPI       Review medications and FMLA paperwork for his wife to accompany him in his appointment   He takes medicatin by himslef   He has a " dispenser     Diabetes:    Lantus   Prescribed: 30 units 2 times daily  Patient taking : he think he taking 40 units   Advised to take 30 units BID   Novolog :  Prescribed 60 units perd day   Patient is takng sliding scale unclear dose   Advised to to take 10 units 3 times daily with meals     Victoza:  1.8 mg daily  Continue the same dose   Metformin :  4 tablet x 500 mg daily   Continue the same dose     Hypertension:    Amlodipine  10 mg  Takes 1 tablet daily    Losartan 100 mg , he has not been taking it for a while , just received a refill and will start taking it     Metoprolol 100 mg : takes 1 tablet daily     Isosorbide mononitrate : 30 mg  Takes 2 tablets 60 mg       Heart -PAD   Plavix 75 mg takes one time daily   Cilostazole 50 mg daily       Wt Readings from Last 4 Encounters:   01/22/21 90.7 kg (200 lb)   01/04/21 93.4 kg (206 lb)   09/16/20 97.1 kg (214 lb)   01/20/20 97.1 kg (214 lb)       Review of Systems   Constitutional, HEENT, cardiovascular, pulmonary, gi and gu systems are negative, except as otherwise noted.      Objective    BP (!) 167/76   Pulse 95   Temp 98.3  F (36.8  C) (Oral)   Wt 90.7 kg (200 lb)   SpO2 98%   BMI 30.41 kg/m    Body mass index is 30.41 kg/m .  Physical Exam  Vitals signs and nursing note reviewed.   Constitutional:       General: He is not in acute distress.     Appearance: Normal appearance. He is obese. He is not ill-appearing, toxic-appearing or diaphoretic.   Neurological:      Mental Status: He is alert.

## 2021-01-22 ENCOUNTER — OFFICE VISIT (OUTPATIENT)
Dept: FAMILY MEDICINE | Facility: CLINIC | Age: 71
End: 2021-01-22
Payer: COMMERCIAL

## 2021-01-22 ENCOUNTER — TELEPHONE (OUTPATIENT)
Dept: FAMILY MEDICINE | Facility: CLINIC | Age: 71
End: 2021-01-22

## 2021-01-22 VITALS
DIASTOLIC BLOOD PRESSURE: 76 MMHG | TEMPERATURE: 98.3 F | BODY MASS INDEX: 30.41 KG/M2 | SYSTOLIC BLOOD PRESSURE: 167 MMHG | HEART RATE: 95 BPM | WEIGHT: 200 LBS | OXYGEN SATURATION: 98 %

## 2021-01-22 DIAGNOSIS — E11.8 TYPE 2 DIABETES MELLITUS WITH COMPLICATION, WITH LONG-TERM CURRENT USE OF INSULIN (H): Primary | ICD-10-CM

## 2021-01-22 DIAGNOSIS — K21.9 GASTROESOPHAGEAL REFLUX DISEASE WITHOUT ESOPHAGITIS: ICD-10-CM

## 2021-01-22 DIAGNOSIS — I10 ESSENTIAL HYPERTENSION WITH GOAL BLOOD PRESSURE LESS THAN 140/90: ICD-10-CM

## 2021-01-22 DIAGNOSIS — Z79.4 TYPE 2 DIABETES MELLITUS WITH COMPLICATION, WITH LONG-TERM CURRENT USE OF INSULIN (H): Primary | ICD-10-CM

## 2021-01-22 DIAGNOSIS — R41.89 COGNITIVE DECLINE: ICD-10-CM

## 2021-01-22 PROCEDURE — 99214 OFFICE O/P EST MOD 30 MIN: CPT | Performed by: FAMILY MEDICINE

## 2021-01-22 RX ORDER — INSULIN ASPART 100 [IU]/ML
10 INJECTION, SOLUTION INTRAVENOUS; SUBCUTANEOUS
Qty: 60 ML | Refills: 0
Start: 2021-01-22 | End: 2021-04-09

## 2021-01-22 RX ORDER — MULTIVIT WITH MINERALS/LUTEIN
1 TABLET ORAL DAILY
COMMUNITY

## 2021-01-22 NOTE — PATIENT INSTRUCTIONS
Diabetes:    Lantus    30 units 2 times daily  Pat    Novolog :   take 10 units 3 times daily with meals     Victoza:  1.8 mg daily    Metformin :  4 tablet x 500 mg daily       Hypertension:    Amlodipine  10 mg  Takes 1 tablet daily    Losartan 100 mg , he has not been taking it for a while , just received a refill and will start taking it     Metoprolol 100 mg : takes 1 tablet daily     Isosorbide mononitrate : 30 mg  Takes 2 tablets 60 mg       Heart and arteries:    Plavix 75 mg takes one time daily     Cilostazole 50 mg daily

## 2021-01-22 NOTE — TELEPHONE ENCOUNTER
Sent by my chart CRM request, regarding forms patient seen Dr Tobias 01/22/21.  MAILE Milligan         Topic: Procedural Question     Darian Engle's wife needs [place] papers completed and faxed to her Human Resources Department. The fax number is [phone]. Mrs. Angelica Engle who works for the [address] [employer] [address] [place] [address]. Thank you.

## 2021-01-23 DIAGNOSIS — E55.9 VITAMIN D DEFICIENCY: ICD-10-CM

## 2021-01-25 RX ORDER — CHOLECALCIFEROL (VITAMIN D3) 50 MCG
TABLET ORAL
Qty: 100 TABLET | Refills: 3 | OUTPATIENT
Start: 2021-01-25

## 2021-01-25 NOTE — TELEPHONE ENCOUNTER
Vit D3 removed from med list per Dr. Tobias this past month.  States patient not taking.  I called patient to confirm this.  He states is not taking the prescription form of this med.  States is now using an otc Vit D3 supplement.  States does not need med refilled.  Edna DIA

## 2021-01-25 NOTE — TELEPHONE ENCOUNTER
Called the patient's wife, Angelica @ [phone]. Left a VM asking if she had left the FMLA forms with Dr. Tobias on Friday, the 22nd or do we yet to receive them. Dr. Tobias does not have the forms. Asked to call back and let us know.    Kathrin Carrero TC

## 2021-01-26 ENCOUNTER — ANCILLARY PROCEDURE (OUTPATIENT)
Dept: MRI IMAGING | Facility: CLINIC | Age: 71
End: 2021-01-26
Payer: COMMERCIAL

## 2021-01-26 DIAGNOSIS — R41.89 COGNITIVE DECLINE: ICD-10-CM

## 2021-01-26 PROCEDURE — 70551 MRI BRAIN STEM W/O DYE: CPT | Mod: TC | Performed by: RADIOLOGY

## 2021-01-26 NOTE — TELEPHONE ENCOUNTER
Called Angelica. She stated she was able to get the FMLA forms to her HR Department.  MAILE Sparks

## 2021-01-27 ENCOUNTER — TELEPHONE (OUTPATIENT)
Dept: NEUROLOGY | Facility: CLINIC | Age: 71
End: 2021-01-27

## 2021-01-27 DIAGNOSIS — I63.9 CEREBROVASCULAR ACCIDENT (CVA), UNSPECIFIED MECHANISM (H): Primary | ICD-10-CM

## 2021-01-27 NOTE — TELEPHONE ENCOUNTER
Called but went to voicemail on both home and cell numbers for his wife Angelica. 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    Either way, he requires a stroke work-up. He is on an aspirin currently and a statin. He has several vascular risk factors and I would greatly appreciated the advice of our stroke colleagues in optimizing his antithrombotic therapy.

## 2021-02-01 ENCOUNTER — TELEPHONE (OUTPATIENT)
Dept: LAB | Facility: CLINIC | Age: 71
End: 2021-02-01

## 2021-02-01 ASSESSMENT — ENCOUNTER SYMPTOMS
WEAKNESS: 0
ABDOMINAL PAIN: 0
POLYPHAGIA: 0
HEARTBURN: 0
WEIGHT GAIN: 0
ALTERED TEMPERATURE REGULATION: 0
BOWEL INCONTINENCE: 0
CHILLS: 0
BLOATING: 0
POLYDIPSIA: 0
TREMORS: 0
INCREASED ENERGY: 0
LOSS OF CONSCIOUSNESS: 0
VOMITING: 0
DECREASED APPETITE: 1
DIARRHEA: 1
SEIZURES: 0
MEMORY LOSS: 1
FATIGUE: 0
NIGHT SWEATS: 0
CONSTIPATION: 0
BLOOD IN STOOL: 0
HEADACHES: 0
NUMBNESS: 0
SPEECH CHANGE: 0
DISTURBANCES IN COORDINATION: 0
FEVER: 0
WEIGHT LOSS: 1
DIZZINESS: 0
TINGLING: 0
JAUNDICE: 0
RECTAL PAIN: 0
HALLUCINATIONS: 0
NAUSEA: 0
PARALYSIS: 0

## 2021-02-01 NOTE — TELEPHONE ENCOUNTER
Your patient has a lab appointment on 2/2/21 for pre-visit labs. At this time there are no orders placed for there lab appointment. Please review and sign orders prior to there appointment time. Thank you.    [facility]    Kathleen Hernandez CMA (Lab)

## 2021-02-02 ENCOUNTER — DOCUMENTATION ONLY (OUTPATIENT)
Dept: CARE COORDINATION | Facility: CLINIC | Age: 71
End: 2021-02-02

## 2021-02-02 DIAGNOSIS — R41.89 COGNITIVE DECLINE: ICD-10-CM

## 2021-02-02 PROCEDURE — 36415 COLL VENOUS BLD VENIPUNCTURE: CPT | Performed by: PSYCHIATRY & NEUROLOGY

## 2021-02-02 PROCEDURE — 82607 VITAMIN B-12: CPT | Performed by: PSYCHIATRY & NEUROLOGY

## 2021-02-02 NOTE — TELEPHONE ENCOUNTER
Was it a Vitamin B12 lab order? That's a lab order that I placed at the end of my last visit, and I placed that order already. Did I place the order incorrectly? Please let me know so I can avoid this issue in the future. Thank you.

## 2021-02-03 LAB — VIT B12 SERPL-MCNC: 386 PG/ML (ref 193–986)

## 2021-02-11 ENCOUNTER — OFFICE VISIT (OUTPATIENT)
Dept: OPTOMETRY | Facility: CLINIC | Age: 71
End: 2021-02-11
Payer: COMMERCIAL

## 2021-02-11 DIAGNOSIS — H52.4 PRESBYOPIA: ICD-10-CM

## 2021-02-11 DIAGNOSIS — H25.13 NUCLEAR AGE-RELATED CATARACT, BOTH EYES: ICD-10-CM

## 2021-02-11 DIAGNOSIS — E11.9 TYPE 2 DIABETES MELLITUS WITHOUT RETINOPATHY (H): ICD-10-CM

## 2021-02-11 DIAGNOSIS — H52.31 ANISOMETROPIA: ICD-10-CM

## 2021-02-11 DIAGNOSIS — Z01.01 ENCOUNTER FOR EXAMINATION OF EYES AND VISION WITH ABNORMAL FINDINGS: Primary | ICD-10-CM

## 2021-02-11 DIAGNOSIS — H52.223 REGULAR ASTIGMATISM OF BOTH EYES: ICD-10-CM

## 2021-02-11 PROCEDURE — 92015 DETERMINE REFRACTIVE STATE: CPT | Performed by: OPTOMETRIST

## 2021-02-11 PROCEDURE — 92014 COMPRE OPH EXAM EST PT 1/>: CPT | Performed by: OPTOMETRIST

## 2021-02-11 ASSESSMENT — TONOMETRY
IOP_METHOD: APPLANATION
OD_IOP_MMHG: 18
OS_IOP_MMHG: 18

## 2021-02-11 ASSESSMENT — SLIT LAMP EXAM - LIDS
COMMENTS: NORMAL
COMMENTS: NORMAL

## 2021-02-11 ASSESSMENT — REFRACTION_WEARINGRX
OS_AXIS: 175
OS_SPHERE: +2.75
OD_CYLINDER: +1.25
OD_AXIS: 168
OS_CYLINDER: +1.25
OD_SPHERE: PLANO
OS_ADD: +1.25
OD_ADD: +1.25

## 2021-02-11 ASSESSMENT — REFRACTION_MANIFEST
OS_SPHERE: +2.00
METHOD_AUTOREFRACTION: 1
OS_AXIS: 180
OD_AXIS: 023
OS_SPHERE: +2.25
OD_ADD: +2.50
OD_AXIS: 165
OS_ADD: +2.50
OD_CYLINDER: +1.00
OD_SPHERE: -0.25
OS_CYLINDER: +1.00
OD_SPHERE: 0.00
OD_CYLINDER: +0.50

## 2021-02-11 ASSESSMENT — KERATOMETRY
OD_AXISANGLE2_DEGREES: 175
OS_K2POWER_DIOPTERS: 41.50
OD_K2POWER_DIOPTERS: 43.25
OD_K1POWER_DIOPTERS: 43.75
OS_K1POWER_DIOPTERS: 42.00
OS_AXISANGLE2_DEGREES: 34

## 2021-02-11 ASSESSMENT — EXTERNAL EXAM - LEFT EYE: OS_EXAM: NORMAL

## 2021-02-11 ASSESSMENT — CUP TO DISC RATIO
OD_RATIO: 0.4
OS_RATIO: 0.4

## 2021-02-11 ASSESSMENT — EXTERNAL EXAM - RIGHT EYE: OD_EXAM: NORMAL

## 2021-02-11 ASSESSMENT — VISUAL ACUITY
METHOD: SNELLEN - LINEAR
OD_SC: 20/30
OS_SC: 20/80
OS_CC: 20/20-1
OS_PH_SC+: -1
OD_CC: 20/20-2
OS_PH_SC: 20/50

## 2021-02-11 ASSESSMENT — CONF VISUAL FIELD
OD_NORMAL: 1
OS_NORMAL: 1

## 2021-02-11 NOTE — PROGRESS NOTES
Chief Complaint   Patient presents with     Diabetic Eye Exam     Annual Eye Exam     Accompanied by wife, out in waiting area     Diabetes 2 uses insulin    Hemoglobin A1C   Date Value Ref Range Status   01/04/2021 8.1 (H) 0 - 5.6 % Final     Comment:     Normal <5.7% Prediabetes 5.7-6.4%  Diabetes 6.5% or higher - adopted from ADA   consensus guidelines.     09/02/2020 8.2 (H) 0 - 5.6 % Final     Comment:     Results confirmed by repeat test  Normal <5.7% Prediabetes 5.7-6.4%  Diabetes 6.5% or higher - adopted from ADA   consensus guidelines.     12/03/2019 8.6 (H) 0 - 5.6 % Final     Comment:     Normal <5.7% Prediabetes 5.7-6.4%  Diabetes 6.5% or higher - adopted from ADA   consensus guidelines.           Last Eye Exam: 8/30/2019  Dilated Previously: Yes    What are you currently using to see?  Glasses, has several pairs. Ones that he uses daily he brought- they are the ones he uses on the computer     Distance Vision Acuity: Satisfied with vision, no changes that he is aware of. Usually wears glasses to drive, but said that he doesn't think that he needs to     Near Vision Acuity: Satisfied with vision while reading  unaided and Satisfied with vision while using computer with glasses    Eye Comfort: good  Do you use eye drops? : No  Occupation or Hobbies: In process of Retiring     Andressa Apple Optometric Assistant      Medical, surgical and family histories reviewed and updated 2/11/2021.       OBJECTIVE: See Ophthalmology exam    ASSESSMENT:    ICD-10-CM    1. Encounter for examination of eyes and vision with abnormal findings  Z01.01 EYE EXAM (SIMPLE-NONBILLABLE)     REFRACTION   2. Type 2 diabetes mellitus without retinopathy (H)  E11.9 EYE EXAM (SIMPLE-NONBILLABLE)     REFRACTION   3. Nuclear age-related cataract, both eyes  H25.13 EYE EXAM (SIMPLE-NONBILLABLE)     REFRACTION   4. Regular astigmatism of both eyes  H52.223 EYE EXAM (SIMPLE-NONBILLABLE)     REFRACTION   5. Presbyopia  H52.4 EYE EXAM  (SIMPLE-NONBILLABLE)     REFRACTION   6. Anisometropia  H52.31 EYE EXAM (SIMPLE-NONBILLABLE)     REFRACTION      PLAN:    Darian Engle aware  eye exam results will be sent to Marcos Gonzalez.  Patient Instructions   Patient was advised of today's exam findings.  Fill glasses prescription  Allow 2 weeks to adapt to change in glasses  Monitor mild cataracts yearly   Keep blood sugar under good control  Return in 1 year for diabetic eye exam      Diabetes weakens the blood vessels all over the body, including the eyes. Damage to the blood vessels in the eyes can cause swelling or bleeding into part of the eye (called the retina). This is called diabetic retinopathy (TSERING-tin-AH-puh-thee). If not treated, this disease can cause vision loss or blindness.   Symptoms may include blurred or distorted vision, but many people have no symptoms. It's important to see your eye doctor regularly to check for problems.   Early treatment and good control can help protect your vision. Here are the things you can do to help prevent vision loss:      1. Keep your blood sugar levels under tight control.      2. Bring high blood pressure under control.      3. No smoking.      4. Have yearly dilated eye exams.      Ami Zavala O.D.  [facility]   [address] [place] [address] [phone]

## 2021-02-11 NOTE — LETTER
2/11/2021         RE: Darian Engle  [address] [place] [id number]        Dear Colleague,    Thank you for referring your patient, Darian Engle, to the [facility]. No diabetic retinopathy was found at eye exam.       Again, thank you for allowing me to participate in the care of your patient.        Sincerely,        Ami Zavala OD

## 2021-02-12 NOTE — TELEPHONE ENCOUNTER
RECORDS RECEIVED FROM: Internal   Date of Appt: 2/15/21   NOTES (FOR ALL VISITS) STATUS DETAILS   OFFICE NOTE from referring provider Internal Dr Ward Sprague @ [facility]:  1/27/21 telephone encounter  1/20/21   OFFICE NOTE from other specialist Care Everywhere Dr Chio Novoa @ [facility] Neurology:  4/21/15  1/26/15   DISCHARGE SUMMARY from hospital N/A    DISCHARGE REPORT from the ER N/A    OPERATIVE REPORT N/A    MEDICATION LIST Internal    IMAGING  (FOR ALL VISITS)     EMG N/A    EEG N/A    LUMBAR PUNCTURE N/A    BRUCE SCAN N/A    ULTRASOUND (CAROTID BILAT) *VASCULAR* N/A    MRI (HEAD, NECK, SPINE) In process [facility]:  MRI Brain 1/26/21    [facility]:  MRI Brain 2/3/15   CT (HEAD, NECK, SPINE) N/A       Action 2/12/21 MV 9.19am   Action Taken Imaging request faxed to  for:  MRI Brain 2/3/15       2/15/2021-Spoke with [facility] Radiology to have images pushed ASAP-MR @ 548am    2/16/2021-[facility] now in PACS-MR @ 527am

## 2021-02-15 ENCOUNTER — OFFICE VISIT (OUTPATIENT)
Dept: NEUROLOGY | Facility: CLINIC | Age: 71
End: 2021-02-15
Attending: PSYCHIATRY & NEUROLOGY
Payer: COMMERCIAL

## 2021-02-15 ENCOUNTER — PRE VISIT (OUTPATIENT)
Dept: NEUROLOGY | Facility: CLINIC | Age: 71
End: 2021-02-15

## 2021-02-15 VITALS
OXYGEN SATURATION: 97 % | HEART RATE: 76 BPM | BODY MASS INDEX: 29.55 KG/M2 | RESPIRATION RATE: 16 BRPM | WEIGHT: 195 LBS | SYSTOLIC BLOOD PRESSURE: 149 MMHG | HEIGHT: 68 IN | DIASTOLIC BLOOD PRESSURE: 74 MMHG

## 2021-02-15 DIAGNOSIS — G31.84 MCI (MILD COGNITIVE IMPAIRMENT) WITH MEMORY LOSS: Primary | ICD-10-CM

## 2021-02-15 DIAGNOSIS — I63.9 CEREBROVASCULAR ACCIDENT (CVA), UNSPECIFIED MECHANISM (H): ICD-10-CM

## 2021-02-15 PROCEDURE — 99215 OFFICE O/P EST HI 40 MIN: CPT | Performed by: PSYCHIATRY & NEUROLOGY

## 2021-02-15 PROCEDURE — 99417 PROLNG OP E/M EACH 15 MIN: CPT | Performed by: PSYCHIATRY & NEUROLOGY

## 2021-02-15 RX ORDER — DONEPEZIL HYDROCHLORIDE 5 MG/1
5 TABLET, FILM COATED ORAL AT BEDTIME
Qty: 120 TABLET | Refills: 1 | Status: SHIPPED | OUTPATIENT
Start: 2021-02-15 | End: 2021-04-09

## 2021-02-15 ASSESSMENT — PAIN SCALES - GENERAL: PAINLEVEL: NO PAIN (0)

## 2021-02-15 ASSESSMENT — MIFFLIN-ST. JEOR: SCORE: 1629.01
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
## 2021-02-15 NOTE — PATIENT INSTRUCTIONS
I think two things are happening to you.    1. Mild to moderate cognitive impairment, amnestic type.  This is how I would describe your cognitive symptoms now and further testing is needed to define your condition (why is this occurring).  - Neuropsychological testing  - Occupational therapy assessment with CPT  - Donepezil 5 mg at bedtime for 30 days, then consider going up to 10 mg at bedtime (please contact me at 30 days to discuss)    2. Prior L-frontal and occipital stroke.  - CTA head and neck  - Echocardiogram w/bubble study  - Camitch (heart monitor) 30 days

## 2021-02-26 ENCOUNTER — HOSPITAL ENCOUNTER (OUTPATIENT)
Dept: OCCUPATIONAL THERAPY | Facility: CLINIC | Age: 71
Setting detail: THERAPIES SERIES
End: 2021-02-26
Attending: PSYCHIATRY & NEUROLOGY
Payer: COMMERCIAL

## 2021-02-26 DIAGNOSIS — G31.84 MCI (MILD COGNITIVE IMPAIRMENT) WITH MEMORY LOSS: ICD-10-CM

## 2021-02-26 PROCEDURE — 97535 SELF CARE MNGMENT TRAINING: CPT | Mod: GO | Performed by: OCCUPATIONAL THERAPIST

## 2021-02-26 PROCEDURE — 96125 COGNITIVE TEST BY HC PRO: CPT | Mod: GO,59 | Performed by: OCCUPATIONAL THERAPIST

## 2021-02-26 PROCEDURE — 97165 OT EVAL LOW COMPLEX 30 MIN: CPT | Mod: GO | Performed by: OCCUPATIONAL THERAPIST
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
## 2021-02-26 NOTE — PROGRESS NOTES
"Cognitive Performance Test    SUMMARY OF TEST:    The Cognitive Performance Test (CPT) is a standardized performance-based assessment to measure working memory/executive function processing capacities that underlie functional performance. Subtasks include common basic and instrumental activities of daily living (ADL/IADL) which are rated based on the manner in which patients respond to task demands of varying complexity. The total CPT score describes a level of functioning that indicates how information is processed, implications for functional activities, potential safety risks and a recommended level of supervision or assist based on cognitive function. The highest total score on this test is in the range of 5.6 to 5.8.    DATE OF TESTIN2021    RESULTS OF TESTING:                                                                                         CPT Subtest Results    MEDBOX: 5.0/6 SHOP/GLOVES: 5.0/6 PHONE: NT/6   WASH:  4.0/5 TRAVEL: 6.0/6 TOAST: 5.0/5   DRESS: 5.0/5   TOTAL CPT SCORE:  30/33     Average CPT Score  5.0/5.5    INTERPRETATION OF TEST RESULTS:    Based on the Cognitive Performance Test, this patient scored at CPT Level 5.0.  See CPT Levels reference below.    Summary of functional cognitive status:   Medbox: Got Fluidia and Flomaxafen correct.  Able to correct Arthrix and Thinifa with general cues.  Shop:  Pt tried on the gloves, found money in the wallet, realized he did not have enough funds.  Therapist cued \"look at the other gloves\".  Pt started trying on all the gloves again and then forgot the initial instructions.  With repeat of initial instructions, pt able to pay the exact amount for appropriate size gloces.   Wash: Pt given the instructions.  Pt got up and used the hand  in the room.  This required specfic instructions.  Pt completed the task, accurately, after specific instructions.   Toast: Pt realized the toasted needed to be plugged in, independently. Performed " task accurately.  Dress:  Pt located the men's clothing and an umbrella accurately.  Travel: Pt, initially, walked past the stairs with the map but able to self-correct.    Factors affecting performance:    No additional problems noted    Recommendations:    Pt can be alone for an extended amount of time if needed.  Recommend assistance with new medications or change in medications.  Establish a routine with taking medications so pt does not forget them.   Recommend sharing finance management/bill paying with spouse.                                                       TIME ADMINISTERING TEST: 35    TIME FOR INTERPRETATION AND PREPARATION OF REPORT: *5 minutes    TOTAL TIME: 40 minutes      CPT Levels Reference:    Patient's Average CPT Score:  5.0                                                                                                                                                  Individual scores range along a continuum as outlined below.  In addition to cognitive status, other factors may affect safety in a home environment.  Please refer to specific recommendations for this patient.    ___5.6-5.8  Normal functioning (absence of cognitive-functional disability).  Independent in managing personal affairs, monitors and directs own behavior.  Uses complex information to carry out daily activities with safety and accuracy.    Proficient with instrumental activities of daily living (IADL) and learning new activity.  Problems are anticipated, errors are avoided, and consequences of actions are considered.      _X__5.0   Mild cognitive-functional disability; deficits in working memory and executive thought processes. Difficulty using complex information. Problems may be observed with recent memory, judgment, reasoning and planning ahead. May be impulsive or have difficulty anticipating consequences.  Safety:  May require assistance to plan ahead; or to manage complex medication schedules, appointments or  "finances.  Hazardous activities may need to be monitored or limited.  ADL:  Mild functional decline.  Able to complete basic self-care and routine household tasks.  May have difficulty with complex daily tasks such as reading, writing, meal preparation, shopping or driving.   Learns through hands on teaching. Self-centered behavior or difficulty considering the needs of others may be seen related to trouble seeing the  whole picture\". Can appear disorganized or uninhibited.    ___4.5  Mild to moderate cognitive-functional disability. Significant deficits in working memory and executive thought processes. Judgment, reasoning and planning show obvious impairment.  Distractible with inability to shift attention/actions given competing stimuli.  Difficulty with problem solving and managing details. Complex daily tasks performed with inconsistency, difficulty, or error.     Safety:  Medications should be monitored, stove use may require supervision, and driving ability may be affected.  Impaired safety awareness with inability to anticipate potential problems.  May not recognize or respond to emergent situations. Requires frequent check-in support.   ADL:  Mild difficulty with simple everyday self-care tasks. Benefits from structured, routine activity.  Will likely need reminders to complete tasks outside of the routine. Requires assistance with planning and IADL tasks like shopping and finances. Learns concrete tasks through repetition, but performance may not generalize. Tends to be impulsive with poor insight. Self centered behavior or inability to consider the needs of others is common.    ___4.0  Moderate cognitive-functional disability; abstract to concrete thought processes. Working memory and executive function impairments are obvious. Difficulty with planning and problem solving.  Behavior is goal-directed, but unable to follow multi-step directions, is easily distracted, and may not recognize mistakes.  " Inability to anticipate hazards or understand precautions.  Safety:  Recommend 24-hour supervision for safety. Supervision needed for medication management and for hazardous activities. May not be able to follow a restricted diet. Can get lost in unfamiliar surroundings. Generally, persons functioning at level 4 should not be driving.   ADL:  Some decline in quality or frequency of ADL.  Wells enhanced by use of a routine, simple concrete directions, and caregiver set-up of needed items. Complex tasks such as money or home management typically requires assistance.  Relies heavily on vision to guide behavior; will ignore objects/hazards not in plain sight and can be distracted by irrelevant objects. Often has poor insight.  Able to carry out social conversation and may verbally  cover  for deficits leading caregivers to believe they are capable of functioning independently.       ___3.5  Moderate cognitive-functional disability; increased cues needed for task completion. Aware of concrete task steps but needs prompting or cues to initiate and complete simple tasks. Attention span is limited, simple directions may need to be repeated, and re-focus to a topic or task may be required.  Safety:  24-hour supervision required for safety and for assistance with daily tasks. Assistance required with medications, and access to medication should be limited. Meals, nutrition and dietary restrictions need to be monitored.  All hazardous activities should be restricted or supervised. Should not drive. Prone to wandering and can become lost.  ADL:  Moderate functional decline. Familiar tasks usually requires set-up of supplies and directions to complete steps. May need objects handed to them for task initiation. Function best with a set schedule in familiar surroundings with familiar people. All complex tasks must be done by others. Vocabulary is diminished and speech often unfocused.

## 2021-03-01 ENCOUNTER — ANCILLARY PROCEDURE (OUTPATIENT)
Dept: CARDIOLOGY | Facility: CLINIC | Age: 71
End: 2021-03-01
Attending: PSYCHIATRY & NEUROLOGY
Payer: COMMERCIAL

## 2021-03-01 ENCOUNTER — ANCILLARY PROCEDURE (OUTPATIENT)
Dept: CT IMAGING | Facility: CLINIC | Age: 71
End: 2021-03-01
Attending: PSYCHIATRY & NEUROLOGY
Payer: COMMERCIAL

## 2021-03-01 DIAGNOSIS — I63.9 CEREBROVASCULAR ACCIDENT (CVA), UNSPECIFIED MECHANISM (H): ICD-10-CM

## 2021-03-01 LAB
CREAT BLD-MCNC: 0.7 MG/DL (ref 0.66–1.25)
GFR SERPL CREATININE-BSD FRML MDRD: >90 ML/MIN/{1.73_M2}

## 2021-03-01 PROCEDURE — 36415 COLL VENOUS BLD VENIPUNCTURE: CPT | Performed by: PATHOLOGY

## 2021-03-01 PROCEDURE — 70498 CT ANGIOGRAPHY NECK: CPT | Performed by: STUDENT IN AN ORGANIZED HEALTH CARE EDUCATION/TRAINING PROGRAM

## 2021-03-01 PROCEDURE — 82565 ASSAY OF CREATININE: CPT | Performed by: PATHOLOGY

## 2021-03-01 PROCEDURE — 70496 CT ANGIOGRAPHY HEAD: CPT | Performed by: STUDENT IN AN ORGANIZED HEALTH CARE EDUCATION/TRAINING PROGRAM

## 2021-03-01 PROCEDURE — 93306 TTE W/DOPPLER COMPLETE: CPT | Performed by: INTERNAL MEDICINE

## 2021-03-01 RX ORDER — IOPAMIDOL 755 MG/ML
100 INJECTION, SOLUTION INTRAVASCULAR ONCE
Status: DISCONTINUED | OUTPATIENT
Start: 2021-03-01 | End: 2021-03-01 | Stop reason: CLARIF

## 2021-03-01 RX ORDER — ACYCLOVIR 200 MG/1
16 CAPSULE ORAL ONCE
Status: COMPLETED | OUTPATIENT
Start: 2021-03-01 | End: 2021-03-01

## 2021-03-01 RX ORDER — IOPAMIDOL 755 MG/ML
75 INJECTION, SOLUTION INTRAVASCULAR ONCE
Status: COMPLETED | OUTPATIENT
Start: 2021-03-01 | End: 2021-03-01

## 2021-03-01 RX ADMIN — ACYCLOVIR 14 ML: 200 CAPSULE ORAL at 10:32

## 2021-03-01 RX ADMIN — IOPAMIDOL 75 ML: 755 INJECTION, SOLUTION INTRAVASCULAR at 09:59

## 2021-04-06 RX ORDER — CLOPIDOGREL BISULFATE 75 MG/1
75 TABLET ORAL DAILY
Qty: 90 TABLET | Refills: 0 | OUTPATIENT
Start: 2021-04-06

## 2021-04-06 NOTE — TELEPHONE ENCOUNTER
Can I get a video visit to discuss plavix refill and why taking and med follow-up. Marcos Gonzalez MD

## 2021-04-06 NOTE — TELEPHONE ENCOUNTER
"Requested Prescriptions   Pending Prescriptions Disp Refills    clopidogrel (PLAVIX) 75 MG tablet 90 tablet 0     Sig: Take 1 tablet (75 mg) by mouth daily       Plavix Failed - 4/3/2021  5:38 PM        Failed - No active PPI on record unless is Protonix        Failed - Normal HGB on file in past 12 months     Recent Labs   Lab Test 02/03/17  1551   HGB 13.9               Failed - Normal Platelets on file in past 12 months     Recent Labs   Lab Test 02/03/17  1551                  Passed - Recent (12 mo) or future (30 days) visit within the authorizing provider's specialty     Patient has had an office visit with the authorizing provider or a provider within the authorizing providers department within the previous 12 mos or has a future within next 30 days. See \"Patient Info\" tab in inbasket, or \"Choose Columns\" in Meds & Orders section of the refill encounter.              Passed - Medication is active on med list        Passed - Patient is age 18 or older           Corine Yip RN    "

## 2021-04-09 ENCOUNTER — OFFICE VISIT (OUTPATIENT)
Dept: FAMILY MEDICINE | Facility: CLINIC | Age: 71
End: 2021-04-09
Payer: COMMERCIAL

## 2021-04-09 VITALS
HEART RATE: 71 BPM | SYSTOLIC BLOOD PRESSURE: 139 MMHG | WEIGHT: 200 LBS | BODY MASS INDEX: 30.41 KG/M2 | DIASTOLIC BLOOD PRESSURE: 62 MMHG | OXYGEN SATURATION: 98 % | TEMPERATURE: 97 F

## 2021-04-09 DIAGNOSIS — E11.9 TYPE 2 DIABETES MELLITUS WITHOUT RETINOPATHY (H): Primary | ICD-10-CM

## 2021-04-09 DIAGNOSIS — I10 ESSENTIAL HYPERTENSION WITH GOAL BLOOD PRESSURE LESS THAN 140/90: ICD-10-CM

## 2021-04-09 DIAGNOSIS — E11.8 TYPE 2 DIABETES MELLITUS WITH COMPLICATION, WITH LONG-TERM CURRENT USE OF INSULIN (H): ICD-10-CM

## 2021-04-09 DIAGNOSIS — Z79.4 TYPE 2 DIABETES MELLITUS WITH COMPLICATION, WITH LONG-TERM CURRENT USE OF INSULIN (H): ICD-10-CM

## 2021-04-09 DIAGNOSIS — G31.84 MCI (MILD COGNITIVE IMPAIRMENT) WITH MEMORY LOSS: ICD-10-CM

## 2021-04-09 LAB — HBA1C MFR BLD: 7 % (ref 0–5.6)

## 2021-04-09 PROCEDURE — 99214 OFFICE O/P EST MOD 30 MIN: CPT | Performed by: FAMILY MEDICINE

## 2021-04-09 PROCEDURE — 36415 COLL VENOUS BLD VENIPUNCTURE: CPT | Performed by: FAMILY MEDICINE

## 2021-04-09 PROCEDURE — 83036 HEMOGLOBIN GLYCOSYLATED A1C: CPT | Performed by: FAMILY MEDICINE

## 2021-04-09 RX ORDER — DONEPEZIL HYDROCHLORIDE 10 MG/1
10 TABLET, FILM COATED ORAL AT BEDTIME
Qty: 90 TABLET | Refills: 1 | Status: SHIPPED | OUTPATIENT
Start: 2021-04-09 | End: 2022-01-24

## 2021-04-09 RX ORDER — INSULIN ASPART 100 [IU]/ML
10 INJECTION, SOLUTION INTRAVENOUS; SUBCUTANEOUS
Qty: 60 ML | Refills: 0 | Status: SHIPPED | OUTPATIENT
Start: 2021-04-09 | End: 2021-12-28

## 2021-04-09 RX ORDER — LIRAGLUTIDE 6 MG/ML
1.8 INJECTION SUBCUTANEOUS DAILY
Qty: 27 ML | Refills: 1 | Status: SHIPPED | OUTPATIENT
Start: 2021-04-09 | End: 2023-02-21

## 2021-04-09 NOTE — PROGRESS NOTES
SUBJECTIVE:  Darian Engle, a 71 year old male scheduled an appointment to discuss the following issues:  History, pvd, cad, dm, gerd, obesity, htn and high cholesterol.  Seen neurology for memory difficulties.   No blood sugars <80. No feet changes. Eye exam recently.  Here with wife. 3 daughters in [place].   Sleep better with aricept and trazodone.   Limited exercise -will do more now with warmer weather.   No urine changes or hematuria. No gerd concerns.   No nausea, vomiting or diarrhea or black/bloody stools.   Emotionally some sadness at times.   novolog 10units TID and lantus 20 units TID. No ALCOHOL.  Drinking coffee. Still smoking. Balanced diet and taking fish oil.  Medical, social, surgical, and family histories reviewed.    ROS:  All other ROS negative.   OBJECTIVE:  /62   Pulse 71   Temp 97  F (36.1  C) (Tympanic)   Wt 90.7 kg (200 lb)   SpO2 98%   BMI 30.41 kg/m    EXAM:  GENERAL APPEARANCE: healthy, alert and no distress  EYES: EOMI,  PERRL  NECK: no adenopathy, no asymmetry, masses, or scars and thyroid normal to palpation  RESP: lungs clear to auscultation - no rales, rhonchi or wheezes  CV: regular rates and rhythm, normal S1 S2, no S3 or S4 and no murmur, click or rub -  ABDOMEN:  soft, nontender, no HSM or masses and bowel sounds normal  MS: extremities normal- no gross deformities noted, no evidence of inflammation in joints, FROM in all extremities.  NEURO: Normal strength and tone, sensory exam grossly normal, mentation a little slow and speech normal  PSYCH: mentation appears normal and affect normal/bright    ASSESSMENT / PLAN:  (G31.84) MCI (mild cognitive impairment) with memory loss  Comment: needs help  Plan: donepezil (ARICEPT) 10 MG tablet        Wife would cailin to double aricept because believes helpful. Quit smoking. Increase exercise and keep mentally active. .Reveiwed risks and side effects of medication  Expected course and warning signs reviewed. Recheck in 6  months      (I10) Essential hypertension with goal blood pressure less than 140/90  Comment: stable  Plan: continue meds and self-monitor. Recheck in 6 months  Sooner if worse    (E11.8,  Z79.4) Type 2 diabetes mellitus with complication, with long-term current use of insulin (H)  Comment: stable  Plan: insulin glargine (LANTUS SOLOSTAR) 100 UNIT/ML         pen, liraglutide (VICTOZA) 18 MG/3ML solution,         NOVOLOG FLEXPEN 100 UNIT/ML soln        Continue meds/closely monitor. Quit smoking. Recheck in 6 months  Sooner if worse/new issues. Exercise.    Marcos Gonzalez MD

## 2021-04-21 RX ORDER — CLOPIDOGREL BISULFATE 75 MG/1
75 TABLET ORAL DAILY
Qty: 90 TABLET | Refills: 3 | OUTPATIENT
Start: 2021-04-21

## 2021-04-21 NOTE — TELEPHONE ENCOUNTER
clopidogrel (PLAVIX) 75 MG tablet      Last Written Prescription Date:  5/26/17  Last Fill Quantity: n/a,   # refills: 11  Last Office Visit : 2/15/21  Future Office visit:  7/12/21    Routing refill request to provider for review/approval because:  Medication is reported/historical

## 2021-04-29 DIAGNOSIS — Z79.4 TYPE 2 DIABETES MELLITUS WITH COMPLICATION, WITH LONG-TERM CURRENT USE OF INSULIN (H): ICD-10-CM

## 2021-04-29 DIAGNOSIS — E11.8 TYPE 2 DIABETES MELLITUS WITH COMPLICATION, WITH LONG-TERM CURRENT USE OF INSULIN (H): ICD-10-CM

## 2021-04-29 RX ORDER — LANCETS
EACH MISCELLANEOUS
Qty: 306 EACH | Refills: 3 | Status: SHIPPED | OUTPATIENT
Start: 2021-04-29 | End: 2023-01-01

## 2021-04-29 RX ORDER — BLOOD SUGAR DIAGNOSTIC
STRIP MISCELLANEOUS
Qty: 300 STRIP | Refills: 3 | Status: SHIPPED | OUTPATIENT
Start: 2021-04-29

## 2021-05-09 ENCOUNTER — HEALTH MAINTENANCE LETTER (OUTPATIENT)
Age: 71
End: 2021-05-09

## 2021-05-12 DIAGNOSIS — I25.10 CAD, MULTIPLE VESSEL: ICD-10-CM

## 2021-05-12 RX ORDER — NITROGLYCERIN 0.4 MG/1
TABLET SUBLINGUAL
Qty: 25 TABLET | Refills: 0 | Status: SHIPPED | OUTPATIENT
Start: 2021-05-12 | End: 2022-09-08

## 2021-05-12 NOTE — TELEPHONE ENCOUNTER
"Requested Prescriptions   Pending Prescriptions Disp Refills    nitroGLYcerin (NITROSTAT) 0.4 MG sublingual tablet [Pharmacy Med Name: NITROGLYCERIN 0.4MG SUB TAB 25] 25 tablet 0     Sig: PLACE 1 TABLET UNDER THE TONGUE EVERY 5 MINUTES FOR 3 DOSES, IF SYMPTOMS PERSIST 5 MINUTES AFTER 1ST DOSE CALL 911       Nitrates Failed - 5/12/2021  3:18 PM        Failed - Sublingual nitro order needs review     If refill exceeds 1 bottle per month, please forward request to provider.           Passed - Blood pressure under 140/90 in past 12 months     BP Readings from Last 3 Encounters:   04/09/21 139/62   02/15/21 (!) 149/74   01/22/21 (!) 167/76                 Passed - Pt is not on erectile dysfunction medications        Passed - Recent (12 mo) or future (30 days) visit within the authorizing provider's specialty     Patient has had an office visit with the authorizing provider or a provider within the authorizing providers department within the previous 12 mos or has a future within next 30 days. See \"Patient Info\" tab in inbasket, or \"Choose Columns\" in Meds & Orders section of the refill encounter.              Passed - Medication is active on med list        Passed - Patient is age 18 or older           Corine Yip RN      "

## 2021-06-02 DIAGNOSIS — G47.00 INSOMNIA, UNSPECIFIED TYPE: ICD-10-CM

## 2021-06-03 ENCOUNTER — OFFICE VISIT (OUTPATIENT)
Dept: FAMILY MEDICINE | Facility: CLINIC | Age: 71
End: 2021-06-03
Payer: MEDICARE

## 2021-06-03 VITALS
DIASTOLIC BLOOD PRESSURE: 77 MMHG | WEIGHT: 205 LBS | OXYGEN SATURATION: 98 % | HEART RATE: 73 BPM | TEMPERATURE: 97.2 F | SYSTOLIC BLOOD PRESSURE: 137 MMHG | BODY MASS INDEX: 31.17 KG/M2

## 2021-06-03 DIAGNOSIS — R68.89 COLD INTOLERANCE: ICD-10-CM

## 2021-06-03 DIAGNOSIS — I25.10 CAD, MULTIPLE VESSEL: ICD-10-CM

## 2021-06-03 DIAGNOSIS — E83.42 HYPOMAGNESEMIA: ICD-10-CM

## 2021-06-03 DIAGNOSIS — I48.92 ATRIAL FLUTTER, UNSPECIFIED TYPE (H): Primary | ICD-10-CM

## 2021-06-03 LAB
ERYTHROCYTE [DISTWIDTH] IN BLOOD BY AUTOMATED COUNT: 12.2 % (ref 10–15)
HCT VFR BLD AUTO: 40.8 % (ref 40–53)
HGB BLD-MCNC: 13.8 G/DL (ref 13.3–17.7)
MCH RBC QN AUTO: 30.1 PG (ref 26.5–33)
MCHC RBC AUTO-ENTMCNC: 33.8 G/DL (ref 31.5–36.5)
MCV RBC AUTO: 89 FL (ref 78–100)
PLATELET # BLD AUTO: 279 10E9/L (ref 150–450)
RBC # BLD AUTO: 4.59 10E12/L (ref 4.4–5.9)
WBC # BLD AUTO: 7.1 10E9/L (ref 4–11)

## 2021-06-03 PROCEDURE — 36415 COLL VENOUS BLD VENIPUNCTURE: CPT | Performed by: FAMILY MEDICINE

## 2021-06-03 PROCEDURE — 83735 ASSAY OF MAGNESIUM: CPT | Performed by: FAMILY MEDICINE

## 2021-06-03 PROCEDURE — 84443 ASSAY THYROID STIM HORMONE: CPT | Performed by: FAMILY MEDICINE

## 2021-06-03 PROCEDURE — 85027 COMPLETE CBC AUTOMATED: CPT | Performed by: FAMILY MEDICINE

## 2021-06-03 PROCEDURE — 99214 OFFICE O/P EST MOD 30 MIN: CPT | Performed by: FAMILY MEDICINE

## 2021-06-03 RX ORDER — TRAZODONE HYDROCHLORIDE 50 MG/1
50 TABLET, FILM COATED ORAL AT BEDTIME
Qty: 30 TABLET | Refills: 4 | Status: SHIPPED | OUTPATIENT
Start: 2021-06-03 | End: 2021-11-10

## 2021-06-03 NOTE — PROGRESS NOTES
SUBJECTIVE:  Darian Engle, a 71 year old male scheduled an appointment to discuss the following issues:  Recheck irregular heartbeat - was in ER on 5.12 , has not improve since then. Denies chest pain   Had a.flutter. negative troponin but low magnesium  History cad. Seeing cardiology next week at [facility].   No chest pain or shortness of breath. Feels fluttering and was sweating. No issues since.   Coffee 4 cups/day. Needs more water. Stress overall ok.   Sleep overall ok. Wearing cpap for geronimo.   No nausea, vomiting or diarrhea or black/bloody stools.   Magnesium pills. Balanced diet. Nuts/meat/fruits.   Medical, social, surgical, and family histories reviewed.    ROS:  All other ROS negative  OBJECTIVE:  /77   Pulse 73   Temp 97.2  F (36.2  C) (Tympanic)   Wt 93 kg (205 lb)   SpO2 98%   BMI 31.17 kg/m    EXAM:  GENERAL APPEARANCE: healthy, alert and no distress  EYES: EOMI,  PERRL  HENT: ear canals and TM's normal and nose and mouth without ulcers or lesions  NECK: no adenopathy, no asymmetry, masses, or scars and thyroid normal to palpation  RESP: lungs clear to auscultation - no rales, rhonchi or wheezes  CV: regular rates and rhythm, normal S1 S2, no S3 or S4 and no murmur, click or rub -  ABDOMEN:  soft, nontender, no HSM or masses and bowel sounds normal  MS: extremities normal- no gross deformities noted, no evidence of inflammation in joints, FROM in all extremities.  NEURO: Normal strength and tone, sensory exam grossly normal, mentation intact and speech normal  PSYCH: mentation appears normal and affect normal/bright    ASSESSMENT / PLAN:  (I48.92) Atrial flutter, unspecified type (H)  (primary encounter diagnosis)  Comment: resolved after electroconversion  Plan: fit bit and observation. Follow-up cardiology next week. To ER if reoccurs. Wear cpap and decrease coffee/increase water. Expected course and warning signs reviewed. Call/email with questions/concerns     (E83.42)  Hypomagnesemia  Comment: magnesium supplement - increased diarrhea  Plan: Magnesium        More in diet. Await labs.     (R68.89) Cold intolerance  Comment: ?  Plan: TSH with free T4 reflex, CBC with platelets        Check labs. Return to clinic if worse.     (I25.10) CAD, multiple vessel  Comment: stable?  Plan: CBC with platelets        Seeing cardiology next week. Chest pain or shortness of breath to er.     Marcos Gonzalez MD

## 2021-06-03 NOTE — TELEPHONE ENCOUNTER
Rx Authorization:    Requested Medication/ Dose traZODone (DESYREL) 50 MG tablet    Date last refill ordered: 1/20/21    Quantity ordered: 30 tabs    # refills: 4    Date of last clinic visit with ordering provider: 2/15/21    Date of next clinic visit with ordering provider: 6/12/21    All pertinent protocol data (lab date/result):     Include pertinent information from patients message:

## 2021-06-04 LAB
MAGNESIUM SERPL-MCNC: 1.8 MG/DL (ref 1.6–2.3)
TSH SERPL DL<=0.005 MIU/L-ACNC: 3.26 MU/L (ref 0.4–4)

## 2021-06-08 ENCOUNTER — TELEPHONE (OUTPATIENT)
Dept: FAMILY MEDICINE | Facility: CLINIC | Age: 71
End: 2021-06-08

## 2021-06-08 NOTE — TELEPHONE ENCOUNTER
Reason for call:  Patient spouse called stating spouse is needing [employer] forms filled out and requesting a call back to discuss on how patient spouse can have these forms filled out.     Phone number to reach patient:  Home number on file [phone] (home)    Best Time:  Any time     Can we leave a detailed message on this number?  YES    T

## 2021-06-11 ENCOUNTER — TELEPHONE (OUTPATIENT)
Dept: FAMILY MEDICINE | Facility: CLINIC | Age: 71
End: 2021-06-11

## 2021-06-11 NOTE — TELEPHONE ENCOUNTER
Reason for Call:  Form, our goal is to have forms completed with 72 hours, however, some forms may require a visit or additional information.    Type of letter, form or note:  FMLA    Who is the form from?: Patient    Where did the form come from: Patient or family brought in       What clinic location was the form placed at?: [facility]    Where the form was placed: [employer] Box/Folder    What number is listed as a contact on the form?: [phone]       Additional comments: Call when complete    Call taken on 6/11/2021 at 2:38 PM by Raymundo Navarro

## 2021-06-11 NOTE — TELEPHONE ENCOUNTER
I wasn't aware patient is still working. I will need a quick telephone visit next week to complete form/date/etc. Marcos Gonzalez MD

## 2021-06-11 NOTE — TELEPHONE ENCOUNTER
Called and informed patient that they can drop the forms off. They are for his wife's FMLA.Mari Price MA/MAILE

## 2021-06-15 ENCOUNTER — VIRTUAL VISIT (OUTPATIENT)
Dept: FAMILY MEDICINE | Facility: CLINIC | Age: 71
End: 2021-06-15
Payer: COMMERCIAL

## 2021-06-15 DIAGNOSIS — R41.3 MEMORY DIFFICULTIES: Primary | ICD-10-CM

## 2021-06-15 PROCEDURE — 99213 OFFICE O/P EST LOW 20 MIN: CPT | Mod: TEL | Performed by: FAMILY MEDICINE

## 2021-06-15 NOTE — PROGRESS NOTES
Frandy is a 71 year old who is being evaluated via a billable telephone visit.    Angelica wife needs form for FMLA.   Memory concerns on/off past couple years. Forgetful both short term and distance past.   What phone number would you like to be contacted at? [phone], patient will log into [employer] first, if that why does not work patient will wait for a text message from Provider.     How would you like to obtain your AVS? Exegy    ASSESSMENT / PLAN:  (R41.3) Memory difficulties  (primary encounter diagnosis)  Comment: likely vascular or alzhemiers  Plan: continue aricept and tight control of blood pressure/lipids. Seeing neurology.   FMLA forms for wife Rimma [id number] prn done        Subjective   Frandy is a 71 year old who presents for the following health [id number]}    HPI     FMLA forms         Review of Systems   All other RANGE OF MOTION negative      Objective           Vitals:  No vitals were obtained today due to virtual visit.    Physical Exam   healthy, alert and no distress  PSYCH: Alert and oriented times 3; coherent speech, normal   rate and volume, able to articulate logical thoughts, able   to abstract reason, no tangential thoughts, no hallucinations   or delusions  His affect is normal  RESP: No cough, no audible wheezing, able to talk in full sentences  Remainder of exam unable to be completed due to telephone visits            Phone call duration: 11 minutes

## 2021-06-21 ENCOUNTER — OFFICE VISIT (OUTPATIENT)
Dept: NEUROPSYCHOLOGY | Facility: CLINIC | Age: 71
End: 2021-06-21
Attending: PSYCHIATRY & NEUROLOGY
Payer: MEDICARE

## 2021-06-21 DIAGNOSIS — G31.84 MILD COGNITIVE IMPAIRMENT, SO STATED: Primary | ICD-10-CM

## 2021-06-21 DIAGNOSIS — F06.8 OTHER SPECIFIED MENTAL DISORDERS DUE TO KNOWN PHYSIOLOGICAL CONDITION: ICD-10-CM

## 2021-06-21 DIAGNOSIS — R41.3 MEMORY LOSS: ICD-10-CM

## 2021-06-21 PROCEDURE — 96139 PSYCL/NRPSYC TST TECH EA: CPT | Performed by: CLINICAL NEUROPSYCHOLOGIST

## 2021-06-21 PROCEDURE — 96116 NUBHVL XM PHYS/QHP 1ST HR: CPT | Performed by: CLINICAL NEUROPSYCHOLOGIST

## 2021-06-21 PROCEDURE — 96138 PSYCL/NRPSYC TECH 1ST: CPT | Performed by: CLINICAL NEUROPSYCHOLOGIST

## 2021-06-21 PROCEDURE — 96132 NRPSYC TST EVAL PHYS/QHP 1ST: CPT | Performed by: CLINICAL NEUROPSYCHOLOGIST

## 2021-06-21 PROCEDURE — 96133 NRPSYC TST EVAL PHYS/QHP EA: CPT | Performed by: CLINICAL NEUROPSYCHOLOGIST

## 2021-06-21 NOTE — PROGRESS NOTES
Pt was seen for neuropsychological evaluation at the request of Schuyler Franco DO for the purposes of diagnostic clarification and treatment planning. 173 minutes of test administration and scoring were provided by this writer. Please see Dr. Eduardo Gtz's report for a full interpretation of the findings.      Julia Dumont  Psychometrist

## 2021-06-21 NOTE — PROGRESS NOTES
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 He was accompanied during the interview by his wife, Angelica.    The patient was in-clinic for the entirety of this evaluation. The interview for this evaluation was completed via a Zoom meeting. Testing was completed face-to-face.     BACKGROUND INFORMATION / INTERVIEW FINDINGS    Records indicate that Mr. Engle's medical history includes obstructive sleep apnea, type 2 diabetes mellitus, bilateral incipient cataracts, peripheral artery disease, coronary artery disease, ST elevated myocardial infarction, hypertension, hyperlipidemia, and gastroesophageal reflux disease without esophagitis.  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  The scan also noted mild small vessel ischemic disease.  Concerns have been expressed about his cognition, and the possibility of a mild cognitive impairment or Alzheimer's disease dementia syndrome.  The current evaluation was requested by Dr. Schuyler Franco, in this context.    On interview, Mr. Engle and his wife confirmed the above history.  The patient reported that he began noticing changes in his brain functioning 3 or 4 years ago.  Both the patient and his wife denied that there was a particular trigger for onset of the symptoms.  His wife reported that the symptoms came on slowly and gradually, but seem to have been declining in a more precipitous manner recently.  The patient reported that he has more difficulties with memory and recall.  His wife stated that he initially would forget names of actresses in films.  She noted that there were instances recently in  "which he was driving a motorcycle, and forgot to look for traffic before turning.  He subsequently sold his motorcycle.  She noted that he recently had to retire as he struggled to learn how to operate the computer at his work.  His wife reported that he misplaces items, and has put lunch meat in the cabinet.  She noted that he also forgets names, where he is driving, and how to start the car.  The patient acknowledged that he lets his wife drive more now than he used to, as he has forgotten to look for traffic, and also struggles with focus and paying attention when driving.  He reported that he may panic and press the wrong pedal when driving.  He stated that he has become more cautious when driving, and does not want to hurt himself or others.  He is concerned for safety.  He also noted that he struggled with completing tasks at work.  When asked about personality changes, his wife reported that he is easily frustrated, and has \"temper tantrums.\"  She stated that if he is having trouble closing a  her kitchen, he may slam the doors.  She stated that he broke a spatula.  She noted that he is more irritable, and does not solve problems like he did in the past.  She noted less patience and that he has a shorter temper.    With respect to mental health, Mr. Engle reported that his mood is generally good.  He denied prior mental health diagnoses or treatments.  He denied prior psychiatric hospitalization or hallucinations.  He denied suicidal ideation or past suicide attempts.    With regard to other medical background, Mr. Engle and his wife both denied that they have identified changes in his fine motor or gross motor functioning.  He denied prior concussion.  He stated that he is aware that small strokes were noted in his past MRI, although he indicated that he did not identify the strokes when they occurred.  He denied prior seizure.  He stated that he typically sleeps about 7 hours per night, and " will occasionally take a short nap.  He slept about 6 hours the night before the exam.  He uses a CPAP at night.  He did note more fatigue these days.  His wife stated that he tends to go to bed earlier these days.  He denied pain.  Per records, his current medications include amlodipine, aspirin, atorvastatin, cilostazol, clopidogrel, donepezil, insulin glargine, isosorbide, liraglutide, losartan, Metformin, metoprolol, multivitamin, nitroglycerin, NovoLog FlexPen, omega-3 fatty acids, omeprazole, and trazodone.  He reported that about once or twice per month, he will have a pint of ara and 2 or 3 beers.  He also smokes half a pack a day of cigarettes.  He reported that he drank more heavily in the past, and started drinking at age 16 or 17.  He noted that he drank more heavily in his 20s or 30s, and cut back gradually over the last 10 years.  He noted that he binge drank in the past, and would drink 5 or 6 times per month.  He would drink a 12 pack of beer.  He stated that he drank heavily for 20 or 30 years.  He went to treatment a couple of times.  He denied a change in his sense of taste or smell.  He never had COVID-19.    Regarding family neurologic history, the patient reported that his younger sister has epilepsy, and a second younger sister has mild intellectual disability.    Mr. Engle lives at home with his wife.  He manages his own basic daily activities.  His wife is now overseeing his medications.  She reported that a recent evaluation suggested that he should not manage his medicines any longer.  They share management of their finances.  He prepares their meals.  As alluded to above, he is still driving, but drives less than he did in the past.  He drives locally.    By way of background, Mr. Engle and his wife have been  since 1989.  This is his second marriage.  He has 3 adult daughters, and 1 grandson.  One of his daughters lives in [place], with the other 2 living locally.   Regarding educational background, he graduated from high school with good grades.  He stated that he had to repeat one high school English class because he was having too much fun in high school and using a lot of marijuana at the time.  He completed about 50 credits worth of class work at [employer], but did not earn a degree or certificate.  Professionally, he worked in  throughout his career, and worked for [employer] for 35 years.  He worked in a variety of roles, in childcare, and also helping people obtain benefits.  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  He retired on April 30, 2021, in part due to difficulties with learning a new computer system.    BEHAVIORAL OBSERVATIONS  Mr. Engle was pleasant and cooperative with the exam.  There were couple of instances during testing in which he appeared to space out, and required some redirection to the tasks.  He engaged in limited spontaneous conversation.  His speech was normal. His comprehension was normal. His thought processes were notable for mild forgetting. His mood was mildly depressed with flattened affect. His effort was rated as adequate, as he was noted to give up quickly. The current results are felt to be a broadly accurate depiction of his cognitive functioning.      RESULTS OF EXAM  His performances on standardized measures of neuropsychological functioning were as follows.     He was fully oriented to time, place, and various aspects of personal information.  He was able to state the names of 4 out of the 6 most recent past presidents.  Performance on a measure of single word reading was high average.  He obtained passing scores on stand-alone and embedded metrics of cognitive performance validity.  Auditory attention for digits was average.  Mental calculations were average.  Learning of words in a list format was average.  Delayed recall of list words was borderline impaired.  Percent  retention of list words was impaired.  Delayed recognition of list words, however, was average.  Learning of story information was average.  Delayed recall of story information was impaired.  Delayed recognition of story information was low average.  Learning of simple geometric shapes and their spatial locations was superior.  Delayed recall of the shapes and their locations was borderline impaired.  Percent retention of the shapes was impaired.  Delayed recognition of the shapes was normal, and performed without error.  Visuospatial judgments for variably oriented lines were high average.  His drawing of a complicated geometric figure was normal.  Visual problem-solving with blocks was superior.  Comprehension of phrases and short stories was average.  Verbal associative fluency was low average.  Animal fluency was impaired (on this task, he appeared to space out for about 15 seconds).  Naming to confrontation was average.  Verbal abstract reasoning was average.  Speeded visual sequencing under focused attention was superior.  A similar measure with a divided attention component was average.  Speeded word reading was average.  Speeded color naming was average.  Speeded inhibition of an overlearned response was average.  Speeded visual motor coding was average.  Speeded fine motor dexterity was low average for the dominant, right hand, and borderline impaired for the left hand.    He endorsed items consistent with minimal symptoms of depression, and minimal symptoms of anxiety on self-report measures.    IMPRESSIONS  Mr. 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  These findings are worrisome for the early stages of an Alzheimer's disease dementia syndrome.  There may also be contributions from cerebrovascular disease, although I do not think his strokes can explain all of his weaknesses.  He has a notable weakness in verbal and  nonverbal anterograde recall, but with relatively preserved recognition memory.  There are also isolated weaknesses in verbal fluency, and some aspects of speeded psychomotor processing.  The remainder of his cognitive abilities are normal and performed in keeping with his average to above average range cognitive baseline.  He has strengths in visually mediated processing.  He is not reporting elevated symptoms of depression or anxiety.    RECOMMENDATIONS  Preliminary results and recommendations were provided to the patient over the telephone on the date of the evaluation, and all questions were answered.     1.  If medically indicated, consideration might be given to an FDG-PET study to aid in diagnostic clarification.    2.  He described having spacing out episodes, and some of these episodes were observed during testing.  Consideration might be given to an EEG study to rule out seizure activity.    3.  He will continue to benefit from some degree of support and supervision of his daily activities.    4.  His memory benefits from recognition cues.  He could also benefit from the use of a memory notebook or other assistive device.    5.  He is encouraged to limit his driving to optimal conditions and eliminate distractions when driving.    6. His memory could benefit from reduced alcohol consumption.     7. Follow-up neuropsychological evaluation is recommended in 1 year in order to assess and update recommendations as appropriate.  The current results can be used as a baseline at that time.    Eduardo Gtz, Ph.D., L.P., ABPP  Board Certified in Clinical Neuropsychology   / Licensed Psychologist [id number]    Time spent:  One unit (50 minutes) neurobehavioral status exam including interview and clinical assessment by licensed and board-certified neuropsychologist (CPT 94960). One unit (60 minutes) neuropsychological testing evaluation by licensed and board-certified neuropsychologist, including  integration of patient data, interpretation of standardized test results and clinical data, clinical decision-making, treatment planning, and report, first hour (CPT 18213). One unit(s) (80 minutes) of neuropsychological testing evaluation by licensed and board-certified neuropsychologist, including integration of patient data, interpretation of standardized test results and clinical data, clinical decision-making, treatment planning, and report, subsequent hours (CPT 60497). One unit (30 minutes) of psychological and neuropsychological test administration and scoring by technician, first 30 minutes (CPT 73113). Five units (143 minutes) psychological or neuropsychological test administration and scoring by technician, subsequent 30 minutes (CPT 91017). Diagnoses: G31.84, R41.3, F06.8.

## 2021-06-21 NOTE — LETTER
6/21/2021      RE: Darian Engle  [address] [place] [id number]       Name: Darian Engle  MR#: [id number]  YOB: 1950  Date of Exam: Jun 21, 2021    NEUROPSYCHOLOGICAL EVALUATION    IDENTIFYING INFORMATION  Darian Engle is a 71 year old year old, right handed, retired financial case aide, with 13 years of formal education. He was accompanied during the interview by his wife, Angelica.    The patient was in-clinic for the entirety of this evaluation. The interview for this evaluation was completed via a Zoom meeting. Testing was completed face-to-face.     BACKGROUND INFORMATION / INTERVIEW FINDINGS    Records indicate that Mr. Engle's medical history includes obstructive sleep apnea, type 2 diabetes mellitus, bilateral incipient cataracts, peripheral artery disease, coronary artery disease, ST elevated myocardial infarction, hypertension, hyperlipidemia, and gastroesophageal reflux disease without esophagitis.  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  The scan also noted mild small vessel ischemic disease.  Concerns have been expressed about his cognition, and the possibility of a mild cognitive impairment or Alzheimer's disease dementia syndrome.  The current evaluation was requested by Dr. Schuyler Franco, in this context.    On interview, Mr. Engle and his wife confirmed the above history.  The patient reported that he began noticing changes in his brain functioning 3 or 4 years ago.  Both the patient and his wife denied that there was a particular trigger for onset of the symptoms.  His wife reported that the symptoms came on slowly and gradually, but seem to have been declining in a more precipitous manner recently.  The patient reported that he has more difficulties with memory and recall.  His wife stated that he initially would  "forget names of actresses in films.  She noted that there were instances recently in which he was driving a motorcycle, and forgot to look for traffic before turning.  He subsequently sold his motorcycle.  She noted that he recently had to retire as he struggled to learn how to operate the computer at his work.  His wife reported that he misplaces items, and has put lunch meat in the cabinet.  She noted that he also forgets names, where he is driving, and how to start the car.  The patient acknowledged that he lets his wife drive more now than he used to, as he has forgotten to look for traffic, and also struggles with focus and paying attention when driving.  He reported that he may panic and press the wrong pedal when driving.  He stated that he has become more cautious when driving, and does not want to hurt himself or others.  He is concerned for safety.  He also noted that he struggled with completing tasks at work.  When asked about personality changes, his wife reported that he is easily frustrated, and has \"temper tantrums.\"  She stated that if he is having trouble closing a  her kitchen, he may slam the doors.  She stated that he broke a spatula.  She noted that he is more irritable, and does not solve problems like he did in the past.  She noted less patience and that he has a shorter temper.    With respect to mental health, Mr. Engle reported that his mood is generally good.  He denied prior mental health diagnoses or treatments.  He denied prior psychiatric hospitalization or hallucinations.  He denied suicidal ideation or past suicide attempts.    With regard to other medical background, Mr. Engle and his wife both denied that they have identified changes in his fine motor or gross motor functioning.  He denied prior concussion.  He stated that he is aware that small strokes were noted in his past MRI, although he indicated that he did not identify the strokes when they occurred.  He " denied prior seizure.  He stated that he typically sleeps about 7 hours per night, and will occasionally take a short nap.  He slept about 6 hours the night before the exam.  He uses a CPAP at night.  He did note more fatigue these days.  His wife stated that he tends to go to bed earlier these days.  He denied pain.  Per records, his current medications include amlodipine, aspirin, atorvastatin, cilostazol, clopidogrel, donepezil, insulin glargine, isosorbide, liraglutide, losartan, Metformin, metoprolol, multivitamin, nitroglycerin, NovoLog FlexPen, omega-3 fatty acids, omeprazole, and trazodone.  He reported that about once or twice per month, he will have a pint of ara and 2 or 3 beers.  He also smokes half a pack a day of cigarettes.  He reported that he drank more heavily in the past, and started drinking at age 16 or 17.  He noted that he drank more heavily in his 20s or 30s, and cut back gradually over the last 10 years.  He noted that he binge drank in the past, and would drink 5 or 6 times per month.  He would drink a 12 pack of beer.  He stated that he drank heavily for 20 or 30 years.  He went to treatment a couple of times.  He denied a change in his sense of taste or smell.  He never had COVID-19.    Regarding family neurologic history, the patient reported that his younger sister has epilepsy, and a second younger sister has mild intellectual disability.    Mr. Engle lives at home with his wife.  He manages his own basic daily activities.  His wife is now overseeing his medications.  She reported that a recent evaluation suggested that he should not manage his medicines any longer.  They share management of their finances.  He prepares their meals.  As alluded to above, he is still driving, but drives less than he did in the past.  He drives locally.    By way of background, Mr. Engle and his wife have been  since 1989.  This is his second marriage.  He has 3 adult daughters, and 1  grandson.  One of his daughters lives in [place], with the other 2 living locally.  Regarding educational background, he graduated from high school with good grades.  He stated that he had to repeat one high school English class because he was having too much fun in high school and using a lot of marijuana at the time.  He completed about 50 credits worth of class work at [employer], but did not earn a degree or certificate.  Professionally, he worked in  throughout his career, and worked for [employer] for 35 years.  He worked in a variety of roles, in childcare, and also helping people obtain benefits.  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  He retired on April 30, 2021, in part due to difficulties with learning a new computer system.    BEHAVIORAL OBSERVATIONS  Mr. Engle was pleasant and cooperative with the exam.  There were couple of instances during testing in which he appeared to space out, and required some redirection to the tasks.  He engaged in limited spontaneous conversation.  His speech was normal. His comprehension was normal. His thought processes were notable for mild forgetting. His mood was mildly depressed with flattened affect. His effort was rated as adequate, as he was noted to give up quickly. The current results are felt to be a broadly accurate depiction of his cognitive functioning.      RESULTS OF EXAM  His performances on standardized measures of neuropsychological functioning were as follows.     He was fully oriented to time, place, and various aspects of personal information.  He was able to state the names of 4 out of the 6 most recent past presidents.  Performance on a measure of single word reading was high average.  He obtained passing scores on stand-alone and embedded metrics of cognitive performance validity.  Auditory attention for digits was average.  Mental calculations were average.  Learning of words in a list  format was average.  Delayed recall of list words was borderline impaired.  Percent retention of list words was impaired.  Delayed recognition of list words, however, was average.  Learning of story information was average.  Delayed recall of story information was impaired.  Delayed recognition of story information was low average.  Learning of simple geometric shapes and their spatial locations was superior.  Delayed recall of the shapes and their locations was borderline impaired.  Percent retention of the shapes was impaired.  Delayed recognition of the shapes was normal, and performed without error.  Visuospatial judgments for variably oriented lines were high average.  His drawing of a complicated geometric figure was normal.  Visual problem-solving with blocks was superior.  Comprehension of phrases and short stories was average.  Verbal associative fluency was low average.  Animal fluency was impaired (on this task, he appeared to space out for about 15 seconds).  Naming to confrontation was average.  Verbal abstract reasoning was average.  Speeded visual sequencing under focused attention was superior.  A similar measure with a divided attention component was average.  Speeded word reading was average.  Speeded color naming was average.  Speeded inhibition of an overlearned response was average.  Speeded visual motor coding was average.  Speeded fine motor dexterity was low average for the dominant, right hand, and borderline impaired for the left hand.    He endorsed items consistent with minimal symptoms of depression, and minimal symptoms of anxiety on self-report measures.    IMPRESSIONS  Mr. 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  These findings are worrisome for the early stages of an Alzheimer's disease dementia syndrome.  There may also be contributions from cerebrovascular disease, although I do not think his strokes  can explain all of his weaknesses.  He has a notable weakness in verbal and nonverbal anterograde recall, but with relatively preserved recognition memory.  There are also isolated weaknesses in verbal fluency, and some aspects of speeded psychomotor processing.  The remainder of his cognitive abilities are normal and performed in keeping with his average to above average range cognitive baseline.  He has strengths in visually mediated processing.  He is not reporting elevated symptoms of depression or anxiety.    RECOMMENDATIONS  Preliminary results and recommendations were provided to the patient over the telephone on the date of the evaluation, and all questions were answered.     1.  If medically indicated, consideration might be given to an FDG-PET study to aid in diagnostic clarification.    2.  He described having spacing out episodes, and some of these episodes were observed during testing.  Consideration might be given to an EEG study to rule out seizure activity.    3.  He will continue to benefit from some degree of support and supervision of his daily activities.    4.  His memory benefits from recognition cues.  He could also benefit from the use of a memory notebook or other assistive device.    5.  He is encouraged to limit his driving to optimal conditions and eliminate distractions when driving.    6. His memory could benefit from reduced alcohol consumption.     7. Follow-up neuropsychological evaluation is recommended in 1 year in order to assess and update recommendations as appropriate.  The current results can be used as a baseline at that time.    Eduardo Gtz, Ph.D., L.P., ABPP  Board Certified in Clinical Neuropsychology   / Licensed Psychologist [id number]    Time spent:  One unit (50 minutes) neurobehavioral status exam including interview and clinical assessment by licensed and board-certified neuropsychologist (CPT 95035). One unit (60 minutes) neuropsychological  testing evaluation by licensed and board-certified neuropsychologist, including integration of patient data, interpretation of standardized test results and clinical data, clinical decision-making, treatment planning, and report, first hour (CPT 50639). One unit(s) (80 minutes) of neuropsychological testing evaluation by licensed and board-certified neuropsychologist, including integration of patient data, interpretation of standardized test results and clinical data, clinical decision-making, treatment planning, and report, subsequent hours (CPT 71223). One unit (30 minutes) of psychological and neuropsychological test administration and scoring by technician, first 30 minutes (CPT 88435). Five units (143 minutes) psychological or neuropsychological test administration and scoring by technician, subsequent 30 minutes (CPT 69036). Diagnoses: G31.84, R41.3, F06.8.              Pt was seen for neuropsychological evaluation at the request of Schuyler Franco DO for the purposes of diagnostic clarification and treatment planning. 173 minutes of test administration and scoring were provided by this writer. Please see Dr. Eduardo Gtz's report for a full interpretation of the findings.      Julia Dumont  Psychometrist      Eduardo Gtz, PhD LP

## 2021-06-22 NOTE — PROGRESS NOTES
NAME  Darian Engle  23     MRN  [id number]   PROVIDER  ALAYNA       54    TECH  MC      AGE  71    STATION  OP     SEX  Male           HANDEDNESS Right           EDUCATION 13                        ORIENTATION      MAX   H   Time  0    Raw 28      Personal Info. 4 /4   %ile 83-89      Place  2 /2          Presidents  5 /6   GROOVED PEGBOARD             Raw Drops SS T   WAIS-IV      RH 91 0 5 41   FSIQ: 0 WMI: 100    0 4 36   WILTON: 0 RDS: 11                YVONNE         Raw SS   Raw 7      Similarities 26 11   Interp. Minimal      Block Design 53 15          Digit Span  28 11   GDS       Arithmetic  13 9   Raw 8      Coding  46 8   Interp. Minimal                   HARSH-O COMPLEX FIGURE     WMS-IV LOGICAL MEMORY OA     Raw T %ile   Raw SSa/%ile     Copy  33  >16  LM I 34 11     Time to Copy 175  >16  LM II 4 3           Recog. 17 17-25     WRAT-4               SS %ile GE   HVLT   1   Reading  113 81 >12.9    Raw T          Trial 1  5     COWAT FAS     Trial 2  8     Raw 26     Trial 3  9     SS 7     Learning  4     T 39     Total Recall 22 46          Delayed Recall 4 33    ANIMAL FLUENCY     Percent Retention 45% 29    Raw 7     True Positives 12     SS 2     False Positives 2     T 18     Disc. Index  10 47                  BOSTON NAMING TEST     BVMT   1   Raw 53 /60      Raw T/%ile    SS 9     Trial 1  4     %ile 26-32     Trial 2  6           Trial 3  9     COMPLEX IDEATIONAL MATERIAL   Learning  5     Raw 11     Total Recall 29 64    SS 9     Delayed Recall 4 32    T 44     Percent Retention 45% <1          Recognition Hits 6     TRAIL MAKING TEST     Recognition F.P 0      Time Errors SSa %ile  Disc. Index  6 >16    A 27 0 13          B 87 1 10 38-67  DCT             E-Score 10      STROOP              Raw %ile           Word 88 38-67           Color 69 52-62           C/W 30 33-48

## 2021-06-27 DIAGNOSIS — I25.10 CAD, MULTIPLE VESSEL: ICD-10-CM

## 2021-06-27 DIAGNOSIS — E78.5 HYPERLIPIDEMIA LDL GOAL <100: ICD-10-CM

## 2021-06-28 RX ORDER — ATORVASTATIN CALCIUM 80 MG/1
TABLET, FILM COATED ORAL
Qty: 90 TABLET | Refills: 3 | OUTPATIENT
Start: 2021-06-28

## 2021-07-12 ENCOUNTER — OFFICE VISIT (OUTPATIENT)
Dept: NEUROLOGY | Facility: CLINIC | Age: 71
End: 2021-07-12
Payer: COMMERCIAL

## 2021-07-12 VITALS
HEART RATE: 96 BPM | RESPIRATION RATE: 16 BRPM | OXYGEN SATURATION: 97 % | BODY MASS INDEX: 31.63 KG/M2 | WEIGHT: 208 LBS

## 2021-07-12 DIAGNOSIS — I63.9 CEREBROVASCULAR ACCIDENT (CVA), UNSPECIFIED MECHANISM (H): Primary | ICD-10-CM

## 2021-07-12 DIAGNOSIS — G31.84 MCI (MILD COGNITIVE IMPAIRMENT) WITH MEMORY LOSS: ICD-10-CM

## 2021-07-12 PROCEDURE — 99215 OFFICE O/P EST HI 40 MIN: CPT | Performed by: PSYCHIATRY & NEUROLOGY

## 2021-07-12 NOTE — PATIENT INSTRUCTIONS
You have had an old stroke in the left frontal region of the brain.  It is unclear what caused it, but given your results (imaging, lab testing, echocardiogram) you are on the appropriate stroke prevention medications.  I would recommend adding a lipid (cholesterol) test to see if your statin therapy needs to be altered.  You also need prolonged heart monitoring to look for odd rhythms of the heart.  - Ziopatch for 14 days  - Lipid level    You have an MCI, and this is likely in part due to some of your stroke and in some part due to another process.  I would recommend continuing you donepezil, and having an EEG.

## 2021-07-12 NOTE — LETTER
7/12/2021       RE: Darian Engle  [address]   [place] [address]     Dear Colleague,    Thank you for referring your patient, Darian Engle, to the [facility] [place] [facility]. Please see a copy of my visit note below.    [facility] Neurology Follow Up Visit    Darian Engle MRN# [id number]   Age: 71 year old YOB: 1950     Brief history of symptoms: The patient was initially seen in neurologic consultation on 1/20/2021 with dementia specialist 1/20/2021 and with myself 2/15/2021 for evaluation of MCI, and chronic cortical infarction in the mid-left frontal lobe and left occipital lobe. Please see the comprehensive neurologic consultation notes from those dates in the Epic records for details.  Te patient was taking ASA for 30 years, was using CPAP for TALA, has HTN, HLD, DMII, actively smokes and reported prior binge drinking.  Neuropsychology, OT for CPT, and trial of Donepezil was to be done for his MCI-amnestic condition, and the patient was to switch to Plavix, have echocardiogram, have ziopatch monitoring, and have CTA head and neck for his now known stroke.    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  During the evaluation, the patient reported having spacing out episodes.    OT assessment 2/26/2021 led to CPT score of 30/33, with 5.0/5.5.  The patient's wife has taken over his medication management since test.    Interval history: At night, the patient is fighting something in his sleep. This is only recently been noticed by his wife.  The patient reports no visual hallucinations now, no slowing with walking, no dysarthria or slowed speech, no imbalance.  In the past, the patient's wife does note he was seeing a cat in  the corner of his eye repeatedly for a few months (doesn't happen anymore).  He is still drinking and smoking at this      Physical Exam:   Vitals: Pulse 96   Resp 16   Wt 94.3 kg (208 lb)   SpO2 97%   BMI 31.63 kg/m     General: Seated comfortably in no acute distress.  HEENT: Neck supple with normal range of motion.   Skin: No rashes  Neurologic:     Mental Status: Fully alert, attentive and oriented. Speech clear and fluent, no paraphasic errors. Luria to 3 repeats. Repeats full sentences without error, reads appropriately and without error, can copy hand gestures equally well b/l.  Noral blink rate. No hypomimia. No glabellar +.     Cranial Nerves: EOMI with normal smooth pursuit. Facial movements symmetric. Hearing not formally tested but intact to conversation.  No dysarthria.     Motor: No tremors or other abnormal movements observed. No pronator drift. No spasticity or rigidity or paratonia.      Sensory: Negative Romberg.      Coordination: Finger-nose-finger without dysmetria. Some incoordination with rapid hand motions and alternative finger tapping and with dysdiadochokinesia on the left hand.     Gait: Normal, steady casual gait. Limited arm swing. Full stride, easy turns with one step. Stars and stops easily. Pull back negative x3.          Assessment and Plan:   Assessment:  MCI amnestic type  CVA - unclear etiology    The patient is well managed for his stroke prophylaxis at this time but still needs ziopatch monitoring.  He can otherwise continue on Plavix for stroke prophylaxis, but new testing for LDL monitoring.  If his LDL remain elevated, his PCP can alter/adjust his statin therapy accordingly.      Given his MCI, and recent neuropsychological testing, the patient likely has a multifactorial MCI caused both by his CVA, along with another dementing process (vascular and Alzheimer's are highest on differential).  He can remain on his donepezil at this time, and will follow up with his  dementia specialist for consideration of FDG-PET if needed.  In his prior visits with neuropsychology the patient reported staring off episodes.  He doesn't note these episodes on today's interview, no does his wife.  Given his stroke, I think is appropriate to obtain EEG to investigate a possible seizure etiology which could in part cause deficits in memory if left untreated.     Plan:  - LDL  - EEG (3 hour, video, sleep deprived)  - Ziopatch (14 days)    Follow up in Neurology clinic in 6 months or earlier as needed should new concerns arise.    ANA Franco D.O.   of Neurology    Total time today (45 min) in this patient encounter was spent on pre-charting, counseling and/or coordination of care.         Again, thank you for allowing me to participate in the care of your patient.      Sincerely,    Schuyler Franco, DO

## 2021-07-12 NOTE — PROGRESS NOTES
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 Please see the comprehensive neurologic consultation notes from those dates in the Epic records for details.  Te patient was taking ASA for 30 years, was using CPAP for TALA, has HTN, HLD, DMII, actively smokes and reported prior binge drinking.  Neuropsychology, OT for CPT, and trial of Donepezil was to be done for his MCI-amnestic condition, and the patient was to switch to Plavix, have echocardiogram, have ziopatch monitoring, and have CTA head and neck for his now known stroke.    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  During the evaluation, the patient reported having spacing out episodes.    OT assessment 2/26/2021 led to CPT score of 30/33, with 5.0/5.5.  The patient's wife has taken over his medication management since test.    Interval history: At night, the patient is fighting something in his sleep. This is only recently been noticed by his wife.  The patient reports no visual hallucinations now, no slowing with walking, no dysarthria or slowed speech, no imbalance.  In the past, the patient's wife does note he was seeing a cat in the corner of his eye repeatedly for a few months (doesn't happen anymore).  He is still drinking and smoking at this      Physical Exam:   Vitals: Pulse 96   Resp 16   Wt 94.3 kg (208 lb)   SpO2 97%   BMI 31.63 kg/m     General: Seated comfortably in no acute distress.  HEENT: Neck supple with normal range of  motion.   Skin: No rashes  Neurologic:     Mental Status: Fully alert, attentive and oriented. Speech clear and fluent, no paraphasic errors. Luria to 3 repeats. Repeats full sentences without error, reads appropriately and without error, can copy hand gestures equally well b/l.  Noral blink rate. No hypomimia. No glabellar +.     Cranial Nerves: EOMI with normal smooth pursuit. Facial movements symmetric. Hearing not formally tested but intact to conversation.  No dysarthria.     Motor: No tremors or other abnormal movements observed. No pronator drift. No spasticity or rigidity or paratonia.      Sensory: Negative Romberg.      Coordination: Finger-nose-finger without dysmetria. Some incoordination with rapid hand motions and alternative finger tapping and with dysdiadochokinesia on the left hand.     Gait: Normal, steady casual gait. Limited arm swing. Full stride, easy turns with one step. Stars and stops easily. Pull back negative x3.          Assessment and Plan:   Assessment:  MCI amnestic type  CVA - unclear etiology    The patient is well managed for his stroke prophylaxis at this time but still needs ziopatch monitoring.  He can otherwise continue on Plavix for stroke prophylaxis, but new testing for LDL monitoring.  If his LDL remain elevated, his PCP can alter/adjust his statin therapy accordingly.      Given his MCI, and recent neuropsychological testing, the patient likely has a multifactorial MCI caused both by his CVA, along with another dementing process (vascular and Alzheimer's are highest on differential).  He can remain on his donepezil at this time, and will follow up with his dementia specialist for consideration of FDG-PET if needed.  In his prior visits with neuropsychology the patient reported staring off episodes.  He doesn't note these episodes on today's interview, no does his wife.  Given his stroke, I think is appropriate to obtain EEG to investigate a possible seizure etiology which  could in part cause deficits in memory if left untreated.     Plan:  - LDL  - EEG (3 hour, video, sleep deprived)  - Ziopatch (14 days)    Follow up in Neurology clinic in 6 months or earlier as needed should new concerns arise.    ANA Franco D.O.   of Neurology    Total time today (45 min) in this patient encounter was spent on pre-charting, counseling and/or coordination of care.

## 2021-07-13 ENCOUNTER — ANCILLARY PROCEDURE (OUTPATIENT)
Dept: CARDIOLOGY | Facility: CLINIC | Age: 71
End: 2021-07-13
Attending: PSYCHIATRY & NEUROLOGY
Payer: COMMERCIAL

## 2021-07-13 ENCOUNTER — LAB (OUTPATIENT)
Dept: LAB | Facility: CLINIC | Age: 71
End: 2021-07-13
Payer: COMMERCIAL

## 2021-07-13 DIAGNOSIS — I63.9 CEREBROVASCULAR ACCIDENT (CVA), UNSPECIFIED MECHANISM (H): ICD-10-CM

## 2021-07-13 LAB
CHOLEST SERPL-MCNC: 114 MG/DL
FASTING STATUS PATIENT QL REPORTED: NO
HDLC SERPL-MCNC: 36 MG/DL
LDLC SERPL CALC-MCNC: 60 MG/DL
NONHDLC SERPL-MCNC: 78 MG/DL
TRIGL SERPL-MCNC: 90 MG/DL

## 2021-07-13 PROCEDURE — 93242 EXT ECG>48HR<7D RECORDING: CPT

## 2021-07-13 PROCEDURE — 36415 COLL VENOUS BLD VENIPUNCTURE: CPT | Performed by: PATHOLOGY

## 2021-07-13 PROCEDURE — 93248 EXT ECG>7D<15D REV&INTERPJ: CPT | Performed by: INTERNAL MEDICINE

## 2021-07-13 PROCEDURE — 80061 LIPID PANEL: CPT | Performed by: PATHOLOGY

## 2021-07-13 NOTE — PROGRESS NOTES
Per Dr. Franco, patient to have 14 Day Zio monitor placed.  Diagnosis: I63.9- CVA  Monitor placed: Yes  Patient Instructed: Yes  Patient verbalized understanding: Yes  Holter # [id number]    Monitor placed by Fabrizio Jesus CMA  7:24 AM

## 2021-07-20 ENCOUNTER — ANCILLARY PROCEDURE (OUTPATIENT)
Dept: NEUROLOGY | Facility: CLINIC | Age: 71
End: 2021-07-20
Attending: PSYCHIATRY & NEUROLOGY
Payer: COMMERCIAL

## 2021-07-20 DIAGNOSIS — I63.9 CEREBROVASCULAR ACCIDENT (CVA), UNSPECIFIED MECHANISM (H): ICD-10-CM

## 2021-07-20 DIAGNOSIS — G93.40 ENCEPHALOPATHY, UNSPECIFIED: ICD-10-CM

## 2021-07-20 DIAGNOSIS — G31.84 MCI (MILD COGNITIVE IMPAIRMENT) WITH MEMORY LOSS: ICD-10-CM

## 2021-07-20 PROCEDURE — 95713 VEEG 2-12 HR CONT MNTR: CPT | Performed by: PSYCHIATRY & NEUROLOGY

## 2021-07-20 PROCEDURE — 95700 EEG CONT REC W/VID EEG TECH: CPT | Performed by: PSYCHIATRY & NEUROLOGY

## 2021-07-20 PROCEDURE — 95718 EEG PHYS/QHP 2-12 HR W/VEEG: CPT | Performed by: PSYCHIATRY & NEUROLOGY

## 2021-07-24 DIAGNOSIS — E11.8 TYPE 2 DIABETES MELLITUS WITH COMPLICATION, WITH LONG-TERM CURRENT USE OF INSULIN (H): ICD-10-CM

## 2021-07-24 DIAGNOSIS — I10 ESSENTIAL HYPERTENSION WITH GOAL BLOOD PRESSURE LESS THAN 140/90: ICD-10-CM

## 2021-07-24 DIAGNOSIS — Z79.4 TYPE 2 DIABETES MELLITUS WITH COMPLICATION, WITH LONG-TERM CURRENT USE OF INSULIN (H): ICD-10-CM

## 2021-07-24 DIAGNOSIS — I25.10 CAD, MULTIPLE VESSEL: ICD-10-CM

## 2021-07-26 ENCOUNTER — TELEPHONE (OUTPATIENT)
Dept: FAMILY MEDICINE | Facility: CLINIC | Age: 71
End: 2021-07-26

## 2021-07-26 NOTE — TELEPHONE ENCOUNTER
Prior Authorization Retail Medication Request    Medication/Dose: isosorbide mononitrate (IMDUR) 30 MG 24 hr tablet  ICD code (if different than what is on RX):  CAD, multiple vessel [I25.10]   Previously Tried and Failed:    Rationale:      Insurance Phone #:  [phone]  Insurance ID:  [id number]      Pharmacy Information (if different than what is on RX)  Name:  [facility]  Phone:  [phone]

## 2021-07-26 NOTE — TELEPHONE ENCOUNTER
Central Prior Authorization Team   Phone: [phone]      Central Prior Authorization Team   Phone: [phone]    PA Initiation    Medication: isosorbide mononitrate (IMDUR) 30 MG 24 hr tablet  Insurance Company: [employer] - Phone [phone] Fax [phone]  Pharmacy Filling the Rx: [id number] - [place] - [address] AT [facility]  Filling Pharmacy Phone: [phone]  Filling Pharmacy Fax:    Start Date: 7/26/2021

## 2021-07-27 RX ORDER — AMLODIPINE BESYLATE 10 MG/1
TABLET ORAL
Qty: 90 TABLET | Refills: 0 | Status: SHIPPED | OUTPATIENT
Start: 2021-07-27 | End: 2021-10-05

## 2021-07-27 RX ORDER — METOPROLOL SUCCINATE 100 MG/1
TABLET, EXTENDED RELEASE ORAL
Qty: 90 TABLET | Refills: 0 | Status: SHIPPED | OUTPATIENT
Start: 2021-07-27 | End: 2021-12-28

## 2021-07-27 RX ORDER — METFORMIN HCL 500 MG
TABLET, EXTENDED RELEASE 24 HR ORAL
Qty: 360 TABLET | Refills: 3 | OUTPATIENT
Start: 2021-07-27

## 2021-07-27 NOTE — TELEPHONE ENCOUNTER
Too early to refill on Metformin, should have pills till 9/20/21. Due for labs in September/October.  Prescriptions for Metoprolol and Amlodipine approved per [facility] Refill Protocol.      Kayley Sanderson RN  [facility]

## 2021-08-17 DIAGNOSIS — I48.0 PAROXYSMAL ATRIAL FIBRILLATION (H): Primary | ICD-10-CM

## 2021-08-17 NOTE — PROGRESS NOTES
Ziopatch monitor showed 16% burden of A-fib, in the setting of unknown CVA etiology.  I will place cardiology consultation given this new finding.    -Cardiology referral for A-fib work up    ANA Franco D.O.  [facility]

## 2021-09-21 ENCOUNTER — OFFICE VISIT (OUTPATIENT)
Dept: FAMILY MEDICINE | Facility: CLINIC | Age: 71
End: 2021-09-21
Payer: COMMERCIAL

## 2021-09-21 VITALS
OXYGEN SATURATION: 97 % | WEIGHT: 205 LBS | SYSTOLIC BLOOD PRESSURE: 129 MMHG | TEMPERATURE: 97.2 F | DIASTOLIC BLOOD PRESSURE: 59 MMHG | BODY MASS INDEX: 31.17 KG/M2 | HEART RATE: 71 BPM

## 2021-09-21 DIAGNOSIS — Z79.4 TYPE 2 DIABETES MELLITUS WITH COMPLICATION, WITH LONG-TERM CURRENT USE OF INSULIN (H): ICD-10-CM

## 2021-09-21 DIAGNOSIS — E11.8 TYPE 2 DIABETES MELLITUS WITH COMPLICATION, WITH LONG-TERM CURRENT USE OF INSULIN (H): ICD-10-CM

## 2021-09-21 DIAGNOSIS — I73.9 PVD (PERIPHERAL VASCULAR DISEASE) (H): ICD-10-CM

## 2021-09-21 DIAGNOSIS — Z01.818 PREOP GENERAL PHYSICAL EXAM: Primary | ICD-10-CM

## 2021-09-21 DIAGNOSIS — Z23 NEED FOR PROPHYLACTIC VACCINATION AND INOCULATION AGAINST INFLUENZA: ICD-10-CM

## 2021-09-21 LAB
HBA1C MFR BLD: 10.5 % (ref 0–5.6)
POTASSIUM BLD-SCNC: 4.5 MMOL/L (ref 3.4–5.3)

## 2021-09-21 PROCEDURE — G0008 ADMIN INFLUENZA VIRUS VAC: HCPCS | Performed by: FAMILY MEDICINE

## 2021-09-21 PROCEDURE — 90662 IIV NO PRSV INCREASED AG IM: CPT | Performed by: FAMILY MEDICINE

## 2021-09-21 PROCEDURE — 83036 HEMOGLOBIN GLYCOSYLATED A1C: CPT | Performed by: FAMILY MEDICINE

## 2021-09-21 PROCEDURE — 99214 OFFICE O/P EST MOD 30 MIN: CPT | Mod: 25 | Performed by: FAMILY MEDICINE

## 2021-09-21 PROCEDURE — 36415 COLL VENOUS BLD VENIPUNCTURE: CPT | Performed by: FAMILY MEDICINE

## 2021-09-21 PROCEDURE — 84132 ASSAY OF SERUM POTASSIUM: CPT | Performed by: FAMILY MEDICINE

## 2021-09-21 NOTE — Clinical Note
Please call patient. Unfortuneatly blood sugar are very high and I was not not expecting this so he will need a tune up with dm ed - referral placed - please give #. We can't ok the surgery until hgba1c <8.5 and it's over 10. Recheck in 3 months  Marcos Gonzalez MD

## 2021-09-21 NOTE — PROGRESS NOTES
[facility]  [address] [facility] [address]  Phone: [phone]  Primary Provider: Stan Shell  Pre-op Performing Provider: STAN SHELL      PREOPERATIVE EVALUATION:  Today's date: 9/21/2021    Darian Engle is a 71 year old male who presents for a preoperative evaluation.  History  PVD - worsening pain with activity.  History, pvd, cad, dm, gerd, obesity, htn and high cholesterol.  Seen neurology for memory difficulties.      No chest pain or shortness of breath. No nausea, vomiting or diarrhea or black/bloody stools.  No urine changes or hematuria.    Sleep overall ok. Prn trazodone. No fevers or chills. No cold symptoms recently. No cough.    No rashes.  Emotionally doing ok.   Outside blood pressure not taking. Blood sugars doing ok overall. No too low or LOSS OF CONSCIENCE.   Exercise trying to be active.   Surgical Information:  Surgery/Procedure: PV LE angiography w/PTA  Surgery Location: [facility]   Surgeon: Merle Mckeon  Surgery Date: 9/29/21  Time of Surgery: TBD  Where patient plans to recover: At home with family  Fax number for surgical facility: [phone]    Type of Anesthesia Anticipated: to be determined    ASSESSMENT / PLAN:  (Z01.818) Preop general physical exam  (primary encounter diagnosis)  Comment: generally normal exam. Blood pressure ok. Denies chest pain or shortness of breath. No cold symptoms   Plan: on hold for tighter sugar control.     (I73.9) PVD (peripheral vascular disease) (H)    Plan: exercise. Continue meds.     (E11.8,  Z79.4) Type 2 diabetes mellitus with complication, with long-term current use of insulin (H)  Uncontrolled and not sure why. Will need dm ed tune up before surgery.   Plan: Potassium, Hemoglobin A1c      (Z23) Need for prophylactic vaccination and inoculation against influenza  Plan: INFLUENZA, QUAD, HIGH DOSE, PF, 65YR + (FLUZONE        HD)                Subjective     HPI related to  upcoming procedure: PVD    Preop Questions 9/20/2021   1. Have you ever had a heart attack or stroke? YES - stroke   2. Have you ever had surgery on your heart or blood vessels, such as a stent placement, a coronary artery bypass, or surgery on an artery in your head, neck, heart, or legs? YES -    3. Do you have chest pain with activity? No   4. Do you have a history of  heart failure? No   5. Do you currently have a cold, bronchitis or symptoms of other infection? No   6. Do you have a cough, shortness of breath, or wheezing? No   7. Do you or anyone in your family have previous history of blood clots? No   8. Do you or does anyone in your family have a serious bleeding problem such as prolonged bleeding following surgeries or cuts? UNKNOWN -    9. Have you ever had problems with anemia or been told to take iron pills? No   10. Have you had any abnormal blood loss such as black, tarry or bloody stools? No   11. Have you ever had a blood transfusion? No   12. Are you willing to have a blood transfusion if it is medically needed before, during, or after your surgery? Yes   13. Have you or any of your relatives ever had problems with anesthesia? No   14. Do you have sleep apnea, excessive snoring or daytime drowsiness? YES - Sleep apnea   14a. Do you have a CPAP machine? Yes   15. Do you have any artifical heart valves or other implanted medical devices like a pacemaker, defibrillator, or continuous glucose monitor? No   16. Do you have artificial joints? No   17. Are you allergic to latex? No       Health Care Directive:  Patient does not have a Health Care Directive or Living Will:     Preoperative Review of :   reviewed - controlled substances reflected in medication list.      Review of Systems  All other ROS negative     Patient Active Problem List    Diagnosis Date Noted     Obstructive sleep apnea syndrome 01/20/2021     Priority: Medium     Seen at [facility]; diagnosed with  moderate TALA by Dr. Herve Louise; Sept 2019.       Type 2 diabetes mellitus without retinopathy (H) 06/27/2018     Priority: Medium     Bilateral incipient cataracts 06/27/2018     Priority: Medium     PAD (peripheral artery disease) (H) 12/18/2017     Priority: Medium     CAD, multiple vessel 01/27/2017     Priority: Medium     Essential hypertension with goal blood pressure less than 140/90 01/27/2017     Priority: Medium     Type 2 diabetes mellitus with complication, with long-term current use of insulin (H) 01/27/2017     Priority: Medium     Hyperlipidemia LDL goal <100 01/27/2017     Priority: Medium     Gastroesophageal reflux disease without esophagitis 01/27/2017     Priority: Medium      Past Medical History:   Diagnosis Date     Arthritis March 2018    joint pain both hands     Diabetes (H)      Heart disease 1991    [place]-angioplasty     Hypertension      Past Surgical History:   Procedure Laterality Date     CARDIAC SURGERY  2004    [facility]     ENHANCE LASER REFRACTIVE BILATERAL EXISTING PT IN PARAMETERS  2000     EYE SURGERY  2000    [facility]     VASCULAR SURGERY  2017    [facility]     Current Outpatient Medications   Medication Sig Dispense Refill     ACCU-CHEK SMARTVIEW test strip TEST THREE TIMES DAILY OR AS DIRECTED 300 strip 3     amLODIPine (NORVASC) 10 MG tablet TAKE 1 TABLET(10 MG) BY MOUTH DAILY 90 tablet 0     aspirin 81 MG tablet Take by mouth daily 30 tablet      atorvastatin (LIPITOR) 80 MG tablet TAKE 1 TABLET(80 MG) BY MOUTH DAILY 90 tablet 3     blood glucose monitoring (ACCU-CHEK FASTCLIX) lancets USE TO TEST THREE TIMES DAILY OR AS DIRECTED 306 each 3     cilostazol (PLETAL) 50 MG tablet Take 50 mg by mouth 2 times daily       clopidogrel (PLAVIX) 75 MG tablet 75 mg  11     donepezil (ARICEPT) 10 MG tablet Take 1 tablet (10 mg) by mouth At Bedtime For memory. This is double dosage 90 tablet 1     insulin  glargine (LANTUS SOLOSTAR) 100 UNIT/ML pen ADMINISTER 30 UNITS UNDER THE SKIN DAILY twice daily (this is higher dosage) 30 mL 1     insulin pen needle (B-D U/F) 31G X 5 MM miscellaneous USE THREE TIMES DAILY 300 each 1     isosorbide mononitrate (IMDUR) 30 MG 24 hr tablet TAKE 2 TABLETS(60 MG) BY MOUTH DAILY 180 tablet 3     liraglutide (VICTOZA) 18 MG/3ML solution Inject 1.8 mg Subcutaneous daily 27 mL 1     losartan (COZAAR) 100 MG tablet Take 1 tablet (100 mg) by mouth daily 90 tablet 3     metFORMIN (GLUCOPHAGE-XR) 500 MG 24 hr tablet TAKE 4 TABLETS BY MOUTH DAILY WITH BREAKFAST 360 tablet 3     metoprolol succinate ER (TOPROL-XL) 100 MG 24 hr tablet TAKE 1 TABLET(100 MG) BY MOUTH DAILY 90 tablet 0     multivitamin (CENTRUM SILVER) tablet Take 1 tablet by mouth daily       nitroGLYcerin (NITROSTAT) 0.4 MG sublingual tablet PLACE 1 TABLET UNDER THE TONGUE EVERY 5 MINUTES FOR 3 DOSES, IF SYMPTOMS PERSIST 5 MINUTES AFTER 1ST DOSE CALL 911 25 tablet 0     NOVOLOG FLEXPEN 100 UNIT/ML soln Inject 10 Units Subcutaneous 3 times daily (with meals) 60 mL 0     Omega-3 Fatty Acids (FISH OIL OMEGA-3) 1000 MG CAPS Take 1,200 mg by mouth daily       omeprazole (PRILOSEC) 20 MG DR capsule Take 1 capsule (20 mg) by mouth daily 90 capsule 3     traZODone (DESYREL) 50 MG tablet Take 1 tablet (50 mg) by mouth At Bedtime 30 tablet 4       Allergies   Allergen Reactions     Lidocaine Other (See Comments)     Lungs filled with fluid. ? Anaphylaxis     Lisinopril Cough     cough     Naltrexone Nausea and Vomiting        Social History     Tobacco Use     Smoking status: Current Some Day Smoker     Packs/day: 1.00     Years: 5.00     Pack years: 5.00     Types: Cigarettes     Start date: 1968     Last attempt to quit: 7/15/2016     Years since quittin.1     Smokeless tobacco: Current User   Substance Use Topics     Alcohol use: Not Currently     Comment: binge, two  months sober       History   Drug Use No         Objective      /59   Pulse 71   Temp 97.2  F (36.2  C) (Tympanic)   Wt 93 kg (205 lb)   SpO2 97%   BMI 31.17 kg/m     Physical Exam  GENERAL APPEARANCE: healthy, alert and no distress  EYES: Eyes grossly normal to inspection, PERRL and conjunctivae and sclerae normal  HENT: ear canals and TM's normal and nose and mouth without ulcers or lesions  NECK: no adenopathy, no asymmetry, masses, or scars and thyroid normal to palpation  RESP: lungs clear to auscultation - no rales, rhonchi or wheezes  CV: regular rate and rhythm, normal S1 S2, no S3 or S4 and no murmur, click or rub   ABDOMEN: soft, nontender, no HSM or masses and bowel sounds normal  MS: extremities normal- no gross deformities noted  NEURO: Normal strength and tone, sensory exam grossly normal, mentation intact and speech normal  PSYCH: mentation a little slow and affect normal/bright    Recent Labs   Lab Test 06/03/21  1747 04/09/21  0809 01/04/21  0848 09/02/20  0825 09/02/20  0825   HGB 13.8  --   --   --   --      --   --   --   --    NA  --   --   --   --  141   POTASSIUM  --   --   --   --  4.0   CR  --   --   --   --  0.92   A1C  --  7.0* 8.1*   < > 8.2*    < > = values in this interval not displayed.        Diagnostics:  Recent Results (from the past 24 hour(s))   Hemoglobin A1c    Collection Time: 09/21/21  8:55 AM   Result Value Ref Range    Hemoglobin A1C 10.5 (H) 0.0 - 5.6 %      Had echo in spring.     Revised Cardiac Risk Index (RCRI):  The patient has the following serious cardiovascular risks for perioperative complications:   - Coronary Artery Disease (MI, positive stress test, angina, Qs on EKG) = 1 point     RCRI Interpretation: 1 point: Class II (low risk - 0.9% complication rate)           Signed Electronically by: Marcos Gonzalez MD  Copy of this evaluation report is provided to requesting physician.

## 2021-09-21 NOTE — LETTER
September 22, 2021      Darian Engle  [address] [place] [address]  [facility] [facility] [address]        Dear ,    We are writing to inform you of your test results.    Resulted Orders   Hemoglobin A1c   Result Value Ref Range    Hemoglobin A1C 10.5 (H) 0.0 - 5.6 %      Comment:      Normal <5.7%   Prediabetes 5.7-6.4%    Diabetes 6.5% or higher     Note: Adopted from ADA consensus guidelines.   Potassium   Result Value Ref Range    Potassium 4.5 3.4 - 5.3 mmol/L       If you have any questions or concerns, please call the clinic at the number listed above.       Sincerely,      Marcos Gonzalez MD

## 2021-09-22 ENCOUNTER — TELEPHONE (OUTPATIENT)
Dept: FAMILY MEDICINE | Facility: CLINIC | Age: 71
End: 2021-09-22

## 2021-09-22 NOTE — TELEPHONE ENCOUNTER
Left message on answering machine for patient to call back to [phone].    RN please give patient provider message as written.  Alla BANKSN, RN

## 2021-09-22 NOTE — TELEPHONE ENCOUNTER
Diabetes Education Scheduling Outreach #1:    Call to patient to schedule. Left message with phone number to call to schedule.    Plan for 2nd outreach attempt within 1 week.    Alla Livingston  [facility]  Diabetes and Nutrition Scheduling

## 2021-09-22 NOTE — TELEPHONE ENCOUNTER
Chelita Honeycutt   9/22/2021  9:25 AM CDT Back to Top        Letter sent, please call patient. DM ed scheduling line is [phone]. We are unable to schedule these appointments. Chelita MANCIA -     Marcos Gonzalez MD   9/22/2021  9:23 AM CDT         Please call patient. Blood sugars very high and I wasn't excepting this. Patient will need to postpone surgery because they won't do surgery if hgba1c >8.5. needs to see diabetic education. Please help set-up. Recheck in 3 months.        Patient:  Darian Engle  [phone] (home) [phone] (work)     Provider:  Marcos Gonzalez MD MD  Please call/evisit with questions or concerns.

## 2021-09-23 NOTE — TELEPHONE ENCOUNTER
Left message on answering machine for patient to call back and ask to speak with RN at his primary care clinic per his doctor's request, [phone].  Left this message on both home and mobile numbers.  Edna Lee RN

## 2021-09-24 NOTE — TELEPHONE ENCOUNTER
Left message on answering machine for patient/parent to call back.   [phone]  Shirley Lai RN     .

## 2021-09-27 NOTE — TELEPHONE ENCOUNTER
Comment: If you have not heard from the scheduling office within 2 business days, please call [phone] for [facility] or [phone] for [facility].    Patient notified of provider's message as written below. He was given the number to diabetic education to schedule and he was assisted in making a follow up appointment in December. He will call to make sure surgery was cancelled. Patient verbalized good understanding, had no further questions and needed no further support.Safia Noriega R.N.

## 2021-10-04 DIAGNOSIS — E11.8 TYPE 2 DIABETES MELLITUS WITH COMPLICATION, WITH LONG-TERM CURRENT USE OF INSULIN (H): ICD-10-CM

## 2021-10-04 DIAGNOSIS — I10 ESSENTIAL HYPERTENSION WITH GOAL BLOOD PRESSURE LESS THAN 140/90: ICD-10-CM

## 2021-10-04 DIAGNOSIS — Z79.4 TYPE 2 DIABETES MELLITUS WITH COMPLICATION, WITH LONG-TERM CURRENT USE OF INSULIN (H): ICD-10-CM

## 2021-10-04 RX ORDER — LOSARTAN POTASSIUM 100 MG/1
TABLET ORAL
Qty: 90 TABLET | Refills: 1 | Status: SHIPPED | OUTPATIENT
Start: 2021-10-04 | End: 2022-05-15

## 2021-10-04 RX ORDER — METFORMIN HCL 500 MG
TABLET, EXTENDED RELEASE 24 HR ORAL
Qty: 360 TABLET | Refills: 0 | Status: SHIPPED | OUTPATIENT
Start: 2021-10-04 | End: 2021-12-28

## 2021-10-04 NOTE — TELEPHONE ENCOUNTER
"Requested Prescriptions   Pending Prescriptions Disp Refills    metFORMIN (GLUCOPHAGE-XR) 500 MG 24 hr tablet [Pharmacy Med Name: METFORMIN ER 500MG 24HR TABS] 360 tablet 3     Sig: TAKE 4 TABLETS BY MOUTH DAILY WITH BREAKFAST        Biguanide Agents Failed - 10/4/2021 10:34 AM        Failed - Patient's CR is NOT>1.4 OR Patient's EGFR is NOT<45 within past 12 mos.       Recent Labs   Lab Test 03/01/21  0958   GFRESTIMATED >90   GFRESTBLACK >90       Recent Labs   Lab Test 09/02/20  0825   CR 0.92             Passed - Patient is age 10 or older        Passed - Patient has documented A1c within the specified period of time.     If HgbA1C is 8 or greater, it needs to be on file within the past 3 months.  If less than 8, must be on file within the past 6 months.     Recent Labs   Lab Test 09/21/21  0855   A1C 10.5*             Passed - Patient does NOT have a diagnosis of CHF.        Passed - Medication is active on med list        Passed - Recent (6 mo) or future (30 days) visit within the authorizing provider's specialty     Patient had office visit in the last 6 months or has a visit in the next 30 days with authorizing provider or within the authorizing provider's specialty.  See \"Patient Info\" tab in inbasket, or \"Choose Columns\" in Meds & Orders section of the refill encounter.               losartan (COZAAR) 100 MG tablet [Pharmacy Med Name: LOSARTAN 100MG TABLETS] 90 tablet 3     Sig: TAKE 1 TABLET(100 MG) BY MOUTH DAILY        Angiotensin-II Receptors Passed - 10/4/2021 10:34 AM        Passed - Last blood pressure under 140/90 in past 12 months       BP Readings from Last 3 Encounters:   09/21/21 129/59   06/03/21 137/77   04/09/21 139/62                 Passed - Recent (12 mo) or future (30 days) visit within the authorizing provider's specialty     Patient has had an office visit with the authorizing provider or a provider within the authorizing providers department within the previous 12 mos or has a future " "within next 30 days. See \"Patient Info\" tab in inbasket, or \"Choose Columns\" in Meds & Orders section of the refill encounter.              Passed - Medication is active on med list        Passed - Patient is age 18 or older        Passed - Normal serum creatinine on file in past 12 months     Recent Labs   Lab Test 03/01/21  0958 09/02/20  0825   CR  --  0.92   CREAT 0.7  --        Ok to refill medication if creatinine is low          Passed - Normal serum potassium on file in past 12 months       Recent Labs   Lab Test 09/21/21  0855   POTASSIUM 4.5                          "

## 2021-10-05 DIAGNOSIS — I10 ESSENTIAL HYPERTENSION WITH GOAL BLOOD PRESSURE LESS THAN 140/90: ICD-10-CM

## 2021-10-05 RX ORDER — AMLODIPINE BESYLATE 10 MG/1
TABLET ORAL
Qty: 90 TABLET | Refills: 0 | Status: SHIPPED | OUTPATIENT
Start: 2021-10-05 | End: 2022-03-28

## 2021-10-05 NOTE — TELEPHONE ENCOUNTER
Refill request received within 30 days of last office visit with pcp.  Prescription is routed to the provider to please address refill.   Shirley Lai RN

## 2021-10-07 ENCOUNTER — VIRTUAL VISIT (OUTPATIENT)
Dept: EDUCATION SERVICES | Facility: CLINIC | Age: 71
End: 2021-10-07
Attending: FAMILY MEDICINE
Payer: COMMERCIAL

## 2021-10-07 DIAGNOSIS — E11.8 TYPE 2 DIABETES MELLITUS WITH COMPLICATION, WITH LONG-TERM CURRENT USE OF INSULIN (H): ICD-10-CM

## 2021-10-07 DIAGNOSIS — Z79.4 TYPE 2 DIABETES MELLITUS WITH COMPLICATION, WITH LONG-TERM CURRENT USE OF INSULIN (H): ICD-10-CM

## 2021-10-07 PROCEDURE — G0108 DIAB MANAGE TRN  PER INDIV: HCPCS | Mod: 95

## 2021-10-07 NOTE — LETTER
"    10/7/2021         RE: Darian Engle  [address] [place] [id number]        Dear Colleague,    Thank you for referring your patient, Darian Engle, to the [facility]. Please see a copy of my visit note below.    Diabetes Self-Management Education & Support    Presents for: Individual review    Type of Service: Telephone Visit    How would patient like to obtain AVS? MyChart      SUBJECTIVE/OBJECTIVE:  Presents for: Individual review  Accompanied by: Self, Spouse  Diabetes education in the past 24mo: No  Diabetes type: Type 2  Date of diagnosis: 2000  Disease course: Worsening  Difficulty affording diabetes medication?: No  Difficulty affording diabetes testing supplies?: No  Other concerns:: Cognitive impairment  Cultural Influences/Ethnic Background:  Other    Diabetes Symptoms & Complications:  Fatigue: No  Neuropathy: No  Polydipsia: No  Polyphagia: No  Polyuria: No  Visual change: No  Slow healing wounds: No  Symptom course: Stable  Weight trend: Stable  Complications assessed today?: No    Patient Problem List and Family Medical History reviewed for relevant medical history, current medical status, and diabetes risk factors.    Vitals:  There were no vitals taken for this visit.  Estimated body mass index is 31.17 kg/m  as calculated from the following:    Height as of 2/15/21: 1.727 m (5' 8\").    Weight as of 9/21/21: 93 kg (205 lb).   Last 3 BP:   BP Readings from Last 3 Encounters:   09/21/21 129/59   06/03/21 137/77   04/09/21 139/62       History   Smoking Status     Current Some Day Smoker     Packs/day: 1.00     Years: 5.00     Types: Cigarettes     Start date: 1/1/1968     Last attempt to quit: 7/15/2016   Smokeless Tobacco     Current User       Labs:  Lab Results   Component Value Date    A1C 10.5 09/21/2021    A1C 7.0 04/09/2021     Lab Results   Component Value Date     09/02/2020     Lab Results   Component Value Date    LDL 60 07/13/2021     " 09/02/2020     HDL Cholesterol   Date Value Ref Range Status   09/02/2020 27 (L) >39 mg/dL Final     Direct Measure HDL   Date Value Ref Range Status   07/13/2021 36 (L) >=40 mg/dL Final     Comment:     0-19 years:       Greater than or equal to 45 mg/dL   Low: Less than 40 mg/dL   Borderline low: 40-44 mg/dL     20 years and older:   Female: Greater than or equal to 50 mg/dL   Male:   Greater than or equal to 40 mg/dL        ]  GFR Estimate   Date Value Ref Range Status   03/01/2021 >90 >60 mL/min/[1.73_m2] Final     GFR Estimate If Black   Date Value Ref Range Status   03/01/2021 >90 >60 mL/min/[1.73_m2] Final     Lab Results   Component Value Date    CR 0.92 09/02/2020     No results found for: MICROALBUMIN    Healthy Eating:  Healthy Eating Assessed Today: Yes  Cultural/Pentecostal diet restrictions?: No  Meals include: Breakfast, Lunch, Dinner  Breakfast: coffee (1/2 and 1/2 and equal), occasional soup (chicken noodle)  Lunch: sandwich (ham and cheese, miravle whip) with coffee to drink OR 1 can of chicken noodle soup  Dinner: 1 pot of soup (12 bean soup with ham) OR chili OR hamburger and mashed potatoes OR eggs and arroyo --all with water, sometimes diet soda  Snacks: 1 bag of halloween candy, usually doesn't snack. evening will have popcorn  Beverages: Milk, Coffee, Diet soda  Has patient met with a dietitian in the past?: Yes (long ago when he was first diagnosed)    Being Active:  Being Active Assessed Today: Yes  Exercise:: Currently not exercising    Monitoring:  Monitoring Assessed Today: Yes  Did patient bring glucose meter to appointment? : Yes  Times checking blood sugar at home (number): 1  Times checking blood sugar at home (per): Day    Date Breakfast  Lunch  Dinner  Bedtime    Before After Before After Before After    10/7 329         10/6 240         10/5 311         10/4    211      10/3       307   10/2 255         10/1          9/30       200   9/29       367   9/28 256         9/27     197          Taking Medications:  Diabetes Medication(s)     Biguanides       metFORMIN (GLUCOPHAGE-XR) 500 MG 24 hr tablet    TAKE 4 TABLETS BY MOUTH DAILY WITH BREAKFAST    Insulin       insulin glargine (LANTUS SOLOSTAR) 100 UNIT/ML pen    ADMINISTER 30 UNITS UNDER THE SKIN DAILY twice daily (this is higher dosage)     NOVOLOG FLEXPEN 100 UNIT/ML soln    Inject 10 Units Subcutaneous 3 times daily (with meals)    Incretin Mimetic Agents (GLP-1 Receptor Agonists)       liraglutide (VICTOZA) 18 MG/3ML solution    Inject 1.8 mg Subcutaneous daily          Taking Medication Assessed Today: Yes  Problems taking diabetes medications regularly?: No  Diabetes medication side effects?: No    Problem Solving:  Problem Solving Assessed Today: Yes  Is the patient at risk for hypoglycemia?: Yes  Hypoglycemia Frequency: Rarely              Reducing Risks:  Reducing Risks Assessed Today: Yes    Healthy Coping:  Healthy Coping Assessed Today: Yes  Patient Activation Measure Survey Score:  WAN Score (Last Two) 1/27/2017   WAN Raw Score 32   Activation Score 62.6   WAN Level 3       Diabetes knowledge and skills assessment:   Patient is knowledgeable in diabetes management concepts related to: none at this time    Patient needs further education on the following diabetes management concepts: Healthy Eating, Being Active, Monitoring, Taking Medication, Problem Solving, Reducing Risks and Healthy Coping    Based on learning assessment above, most appropriate setting for further diabetes education would be: Individual setting.      INTERVENTIONS:    Education provided today on:  AADE Self-Care Behaviors:  Monitoring: purpose, log and interpret results, individual blood glucose targets and frequency of monitoring  Taking Medication: action of prescribed medication and when to take medications  Problem Solving: high blood glucose - causes, signs/symptoms, treatment and prevention    Opportunities for ongoing education and support in  "diabetes-self management were discussed.    Pt verbalized understanding of concepts discussed and recommendations provided today.       Education Materials Provided:  No new materials provided today      ASSESSMENT:  Frandy mentions that he has some memory issues and his wife is on the telephone call as well. Frandy isn't sure why his A1C took such a big jump in a few months. He mentions that he ate a whole bag of halloween candy and thinks that's why his BG was so high when it was tested at the clinic. He states that he has days where he \"just doesn't care\" and will eat \"foods that he shouldn't eat.\" He mentions that he tests his BG 4-5 times/day, but when reading his BG off his meter, he has just been testing once/day.     Frandy has only been taking his Lantus once/day. He takes 30 units at bedtime. He mentions that sometimes if his BG is really elevated, he will take a little bit more, up to 40 units. He has not been taking it twice/day as prescribed. We discussed adding in the morning dose, but in a smaller amount. Recommended starting with 15 units in the morning and continuing with 30 units at bedtime.     Recommended professional CGM to get a bigger picture of BG control. Frandy and Violet are in agreement and we scheduled follow-up.     Patient's most recent   Lab Results   Component Value Date    A1C 10.5 09/21/2021    A1C 7.0 04/09/2021    is not meeting goal of <7.0    PLAN  See Patient Instructions for co-developed, patient-stated behavior change goals.  AVS printed and provided to patient today. See Follow-Up section for recommended follow-up.    Start taking 15 units of Lantus in the morning.   Follow-up with telephone visit in 1.5 weeks and professional CGM application in 3 weeks.     Lenora Hughes, SEFERINO, LD, CDE  Time Spent: 40 minutes  Encounter Type: Individual    Any diabetes medication dose changes were made via the CDE Protocol and Collaborative Practice Agreement with the patient's referring provider. A " copy of this encounter was shared with the provider.

## 2021-10-07 NOTE — PROGRESS NOTES
"Diabetes Self-Management Education & Support    Presents for: Individual review    Type of Service: Telephone Visit    How would patient like to obtain AVS? Farzana      SUBJECTIVE/OBJECTIVE:  Presents for: Individual review  Accompanied by: Self, Spouse  Diabetes education in the past 24mo: No  Diabetes type: Type 2  Date of diagnosis: 2000  Disease course: Worsening  Difficulty affording diabetes medication?: No  Difficulty affording diabetes testing supplies?: No  Other concerns:: Cognitive impairment  Cultural Influences/Ethnic Background:  Other    Diabetes Symptoms & Complications:  Fatigue: No  Neuropathy: No  Polydipsia: No  Polyphagia: No  Polyuria: No  Visual change: No  Slow healing wounds: No  Symptom course: Stable  Weight trend: Stable  Complications assessed today?: No    Patient Problem List and Family Medical History reviewed for relevant medical history, current medical status, and diabetes risk factors.    Vitals:  There were no vitals taken for this visit.  Estimated body mass index is 31.17 kg/m  as calculated from the following:    Height as of 2/15/21: 1.727 m (5' 8\").    Weight as of 9/21/21: 93 kg (205 lb).   Last 3 BP:   BP Readings from Last 3 Encounters:   09/21/21 129/59   06/03/21 137/77   04/09/21 139/62       History   Smoking Status     Current Some Day Smoker     Packs/day: 1.00     Years: 5.00     Types: Cigarettes     Start date: 1/1/1968     Last attempt to quit: 7/15/2016   Smokeless Tobacco     Current User       Labs:  Lab Results   Component Value Date    A1C 10.5 09/21/2021    A1C 7.0 04/09/2021     Lab Results   Component Value Date     09/02/2020     Lab Results   Component Value Date    LDL 60 07/13/2021     09/02/2020     HDL Cholesterol   Date Value Ref Range Status   09/02/2020 27 (L) >39 mg/dL Final     Direct Measure HDL   Date Value Ref Range Status   07/13/2021 36 (L) >=40 mg/dL Final     Comment:     0-19 years:       Greater than or equal to 45 " mg/dL   Low: Less than 40 mg/dL   Borderline low: 40-44 mg/dL     20 years and older:   Female: Greater than or equal to 50 mg/dL   Male:   Greater than or equal to 40 mg/dL        ]  GFR Estimate   Date Value Ref Range Status   03/01/2021 >90 >60 mL/min/[1.73_m2] Final     GFR Estimate If Black   Date Value Ref Range Status   03/01/2021 >90 >60 mL/min/[1.73_m2] Final     Lab Results   Component Value Date    CR 0.92 09/02/2020     No results found for: MICROALBUMIN    Healthy Eating:  Healthy Eating Assessed Today: Yes  Cultural/Yarsanism diet restrictions?: No  Meals include: Breakfast, Lunch, Dinner  Breakfast: coffee (1/2 and 1/2 and equal), occasional soup (chicken noodle)  Lunch: sandwich (ham and cheese, miravle whip) with coffee to drink OR 1 can of chicken noodle soup  Dinner: 1 pot of soup (12 bean soup with ham) OR chili OR hamburger and mashed potatoes OR eggs and arroyo --all with water, sometimes diet soda  Snacks: 1 bag of halloween candy, usually doesn't snack. evening will have popcorn  Beverages: Milk, Coffee, Diet soda  Has patient met with a dietitian in the past?: Yes (long ago when he was first diagnosed)    Being Active:  Being Active Assessed Today: Yes  Exercise:: Currently not exercising    Monitoring:  Monitoring Assessed Today: Yes  Did patient bring glucose meter to appointment? : Yes  Times checking blood sugar at home (number): 1  Times checking blood sugar at home (per): Day    Date Breakfast  Lunch  Dinner  Bedtime    Before After Before After Before After    10/7 329         10/6 240         10/5 311         10/4    211      10/3       307   10/2 255         10/1          9/30       200   9/29       367   9/28 256         9/27     197         Taking Medications:  Diabetes Medication(s)     Biguanides       metFORMIN (GLUCOPHAGE-XR) 500 MG 24 hr tablet    TAKE 4 TABLETS BY MOUTH DAILY WITH BREAKFAST    Insulin       insulin glargine (LANTUS SOLOSTAR) 100 UNIT/ML pen    ADMINISTER 30  UNITS UNDER THE SKIN DAILY twice daily (this is higher dosage)     NOVOLOG FLEXPEN 100 UNIT/ML soln    Inject 10 Units Subcutaneous 3 times daily (with meals)    Incretin Mimetic Agents (GLP-1 Receptor Agonists)       liraglutide (VICTOZA) 18 MG/3ML solution    Inject 1.8 mg Subcutaneous daily          Taking Medication Assessed Today: Yes  Problems taking diabetes medications regularly?: No  Diabetes medication side effects?: No    Problem Solving:  Problem Solving Assessed Today: Yes  Is the patient at risk for hypoglycemia?: Yes  Hypoglycemia Frequency: Rarely              Reducing Risks:  Reducing Risks Assessed Today: Yes    Healthy Coping:  Healthy Coping Assessed Today: Yes  Patient Activation Measure Survey Score:  WAN Score (Last Two) 1/27/2017   WAN Raw Score 32   Activation Score 62.6   WAN Level 3       Diabetes knowledge and skills assessment:   Patient is knowledgeable in diabetes management concepts related to: none at this time    Patient needs further education on the following diabetes management concepts: Healthy Eating, Being Active, Monitoring, Taking Medication, Problem Solving, Reducing Risks and Healthy Coping    Based on learning assessment above, most appropriate setting for further diabetes education would be: Individual setting.      INTERVENTIONS:    Education provided today on:  AADE Self-Care Behaviors:  Monitoring: purpose, log and interpret results, individual blood glucose targets and frequency of monitoring  Taking Medication: action of prescribed medication and when to take medications  Problem Solving: high blood glucose - causes, signs/symptoms, treatment and prevention    Opportunities for ongoing education and support in diabetes-self management were discussed.    Pt verbalized understanding of concepts discussed and recommendations provided today.       Education Materials Provided:  No new materials provided today      ASSESSMENT:  Frandy mentions that he has some memory issues  "and his wife is on the telephone call as well. Frandy isn't sure why his A1C took such a big jump in a few months. He mentions that he ate a whole bag of halloween candy and thinks that's why his BG was so high when it was tested at the clinic. He states that he has days where he \"just doesn't care\" and will eat \"foods that he shouldn't eat.\" He mentions that he tests his BG 4-5 times/day, but when reading his BG off his meter, he has just been testing once/day.     Frandy has only been taking his Lantus once/day. He takes 30 units at bedtime. He mentions that sometimes if his BG is really elevated, he will take a little bit more, up to 40 units. He has not been taking it twice/day as prescribed. We discussed adding in the morning dose, but in a smaller amount. Recommended starting with 15 units in the morning and continuing with 30 units at bedtime.     Recommended professional CGM to get a bigger picture of BG control. Frandy and Violet are in agreement and we scheduled follow-up.     Patient's most recent   Lab Results   Component Value Date    A1C 10.5 09/21/2021    A1C 7.0 04/09/2021    is not meeting goal of <7.0    PLAN  See Patient Instructions for co-developed, patient-stated behavior change goals.  AVS printed and provided to patient today. See Follow-Up section for recommended follow-up.    Start taking 15 units of Lantus in the morning.   Follow-up with telephone visit in 1.5 weeks and professional CGM application in 3 weeks.     Lenora Hughes, SEFERINO, LD, CDE  Time Spent: 40 minutes  Encounter Type: Individual    Any diabetes medication dose changes were made via the CDE Protocol and Collaborative Practice Agreement with the patient's referring provider. A copy of this encounter was shared with the provider.      "

## 2021-10-07 NOTE — Clinical Note
Dr. oGnzalez,  Just JANIE Wise has only been taking his Lantus once/day. Recommended that he start taking it in the morning as well, recommended 15 units instead of the 30 units that was prescribed to prevent hypoglycemia. We have a telephone follow-up in 1.5 weeks and will start a professional CGM in 3 weeks in clinic.   Lenora Hughes RD, LD, CDE

## 2021-10-07 NOTE — PATIENT INSTRUCTIONS
Start taking 15 units of Lantus in the morning, continue taking 30 units of Lantus at bedtime.    Continue taking 10 units of Novolog before each meal      Diabetes Support Resources:  Websites: American Association of Diabetes Educators Participant Resources: www.diabeteseducator.org/living-with-diabetes   American Diabetes Association: www.diabetes.org  Diabetes Together 2 Goal: http://jwfcityk9piyz.org     Bring blood glucose meter and logbook with you to all doctor and follow-up appointments.    Diabetes Education Telephone Visit Follow-up:    We realize your time is valuable and your health is important! We offer a convenient Telephone Visit follow up! It s a quick way to check in for a medication dose adjustment without having to come back to clinic as soon.    Telephone Visits are often covered by insurance. Please check with your insurance plan to see if this type of visit is covered. If not, the cost is less expensive than an office visit:      Up to 10 minutes (Code 22191): $30    11-20 minutes (Code 88360): $59    More than 20 minutes (Code 61644): $85    Talk with your Diabetes Educator if you want to learn more.      [facility]:  For Your Diabetes Education and Nutrition Appointments Call:  [phone]   For Diabetes Education or Nutrition Related Questions:   Phone: [phone]  Send Poshly Message   If you need a medication refill please contact your pharmacy. Please allow 3 business days for your refills to be completed.

## 2021-10-19 ENCOUNTER — VIRTUAL VISIT (OUTPATIENT)
Dept: EDUCATION SERVICES | Facility: CLINIC | Age: 71
End: 2021-10-19
Payer: COMMERCIAL

## 2021-10-19 DIAGNOSIS — Z79.4 TYPE 2 DIABETES MELLITUS WITH COMPLICATION, WITH LONG-TERM CURRENT USE OF INSULIN (H): Primary | ICD-10-CM

## 2021-10-19 DIAGNOSIS — E11.8 TYPE 2 DIABETES MELLITUS WITH COMPLICATION, WITH LONG-TERM CURRENT USE OF INSULIN (H): Primary | ICD-10-CM

## 2021-10-19 NOTE — PROGRESS NOTES
Diabetes Follow-up    Subjective/Objective:    Telephone visit for BG review. Last date of communication was: 10/7.    Diabetes is being managed with   Diabetes Medications   Diabetes Medication(s)     Biguanides       metFORMIN (GLUCOPHAGE-XR) 500 MG 24 hr tablet    TAKE 4 TABLETS BY MOUTH DAILY WITH BREAKFAST    Insulin       insulin glargine (LANTUS SOLOSTAR) 100 UNIT/ML pen    ADMINISTER 30 UNITS UNDER THE SKIN DAILY twice daily (this is higher dosage)     NOVOLOG FLEXPEN 100 UNIT/ML soln    Inject 10 Units Subcutaneous 3 times daily (with meals)    Incretin Mimetic Agents (GLP-1 Receptor Agonists)       liraglutide (VICTOZA) 18 MG/3ML solution    Inject 1.8 mg Subcutaneous daily          BG/Food Log:   Date Breakfast  Lunch  Dinner  Bedtime    Before After Before After Before After    10/19 208         10/18          10/17          10/16  244        10/15          10/14 194         10/13 222, 199         10/12           307             Assessment:  0% of BG in goal  Frandy admits that he has forgotten to take the morning Lantus dose as we discussed at our previous visit. He isn't able to tell writer if he took a morning Lantus dose at all.     Plan/Response:  Continue with current plan, work on taking the morning dose of Lantus.   Follow-up next week in clinic to place Kraig Pro sensor    Lenora Hughes, SEFERINO, LD, CDE  Time spent: 4 minutes    Any diabetes medication dose changes were made via the CDE Protocol and Collaborative Practice Agreement with the patient's referring provider. A copy of this encounter was shared with the provider.

## 2021-10-27 ENCOUNTER — ALLIED HEALTH/NURSE VISIT (OUTPATIENT)
Dept: EDUCATION SERVICES | Facility: CLINIC | Age: 71
End: 2021-10-27
Payer: COMMERCIAL

## 2021-10-27 DIAGNOSIS — E11.8 TYPE 2 DIABETES MELLITUS WITH COMPLICATION, WITH LONG-TERM CURRENT USE OF INSULIN (H): Primary | ICD-10-CM

## 2021-10-27 DIAGNOSIS — Z79.4 TYPE 2 DIABETES MELLITUS WITH COMPLICATION, WITH LONG-TERM CURRENT USE OF INSULIN (H): Primary | ICD-10-CM

## 2021-10-27 PROCEDURE — 95250 CONT GLUC MNTR PHYS/QHP EQP: CPT

## 2021-10-27 NOTE — PROGRESS NOTES
Diabetes Self-Management Education & Support  Professional Continuous Glucose Monitor Insertion    SUBJECTIVE/OBJECTIVE:     Darian Salazarzie presents for professional Continuous Glucose Monitor Insertion.     Patient comments/concerns: no questions/concerns    Lab Results:  Lab Results   Component Value Date    A1C 10.5 09/21/2021    A1C 7.0 04/09/2021      Lab Results   Component Value Date     09/02/2020       Medication:  Diabetes Medication(s)     Biguanides       metFORMIN (GLUCOPHAGE-XR) 500 MG 24 hr tablet    TAKE 4 TABLETS BY MOUTH DAILY WITH BREAKFAST    Insulin       insulin glargine (LANTUS SOLOSTAR) 100 UNIT/ML pen    ADMINISTER 30 UNITS UNDER THE SKIN DAILY twice daily (this is higher dosage)     NOVOLOG FLEXPEN 100 UNIT/ML soln    Inject 10 Units Subcutaneous 3 times daily (with meals)    Incretin Mimetic Agents (GLP-1 Receptor Agonists)       liraglutide (VICTOZA) 18 MG/3ML solution    Inject 1.8 mg Subcutaneous daily          ASSESSMENT:    CGM study indicated for: Difficult to manage hypoglycemia and/or hyperglycemia, Unexplained fluctuations in glucose values     INTERVENTION:   Sensor started today.     Sensor Type: LibrePro  Lot #: [id number]  Serial #: [id number]  Expiration Date: 11/30/21  Diabetes management related comments/concerns: Lack of concern    Sensor was inserted with no resistance or bleeding at insertion site.      Pt will plan to wear the sensor through 11/10/2021.    WRITTEN AND VERBAL INFORMATION GIVEN TO SUPPORT UNDERSTANDING OF:  LibrePro CGM: Sensor insertion, intention of monitoring for 14 days. Keep records of BG, food intake, exercise, and medication dosing during wear.       Patient verbalizes understanding of how to remove sensor, if needed, and all instructions provided.     Educational and other materials:  Food/exercise/medication log sheets  Contact information    PLAN:  Pt was given instructions for tracking BG, medications, food intake and activity.  Patient  to return all items associated with the professional Continuous Glucose Monitor System.  See Patient Instructions, AVS printed and provided to patient today.    Follow-up:    Follow up on 11/10/2021.    Lenora Hughes RD, LD, CDE  Time spent in DSMT: 0 minutes   Time spent in CGM insertion: 15 minutes, in addition to time spent in DSMT  Encounter Type: Individual

## 2021-11-10 ENCOUNTER — ALLIED HEALTH/NURSE VISIT (OUTPATIENT)
Dept: EDUCATION SERVICES | Facility: CLINIC | Age: 71
End: 2021-11-10
Payer: COMMERCIAL

## 2021-11-10 ENCOUNTER — TELEPHONE (OUTPATIENT)
Dept: EDUCATION SERVICES | Facility: CLINIC | Age: 71
End: 2021-11-10

## 2021-11-10 DIAGNOSIS — E11.8 TYPE 2 DIABETES MELLITUS WITH COMPLICATION, WITH LONG-TERM CURRENT USE OF INSULIN (H): ICD-10-CM

## 2021-11-10 DIAGNOSIS — E11.8 TYPE 2 DIABETES MELLITUS WITH COMPLICATION, WITH LONG-TERM CURRENT USE OF INSULIN (H): Primary | ICD-10-CM

## 2021-11-10 DIAGNOSIS — G47.00 INSOMNIA, UNSPECIFIED TYPE: ICD-10-CM

## 2021-11-10 DIAGNOSIS — Z79.4 TYPE 2 DIABETES MELLITUS WITH COMPLICATION, WITH LONG-TERM CURRENT USE OF INSULIN (H): ICD-10-CM

## 2021-11-10 DIAGNOSIS — Z79.4 TYPE 2 DIABETES MELLITUS WITH COMPLICATION, WITH LONG-TERM CURRENT USE OF INSULIN (H): Primary | ICD-10-CM

## 2021-11-10 PROCEDURE — G0108 DIAB MANAGE TRN  PER INDIV: HCPCS | Performed by: DIETITIAN, REGISTERED

## 2021-11-10 RX ORDER — TRAZODONE HYDROCHLORIDE 50 MG/1
50 TABLET, FILM COATED ORAL AT BEDTIME
Qty: 30 TABLET | Refills: 4 | Status: SHIPPED | OUTPATIENT
Start: 2021-11-10 | End: 2022-05-16

## 2021-11-10 RX ORDER — INSULIN GLARGINE 100 [IU]/ML
45 INJECTION, SOLUTION SUBCUTANEOUS DAILY
Qty: 30 ML | Refills: 1 | Status: SHIPPED | OUTPATIENT
Start: 2021-11-10 | End: 2022-01-14

## 2021-11-10 NOTE — PROGRESS NOTES
Diabetes Self-Management Education & Support    Presents for: CGM Review    Type of Visit: In Person    How would patient like to obtain AVS? Farzana    ASSESSMENT:  Toma wasn't able to keep his food/medication record for the [employer] study. He states that he kept a log for one day and then forgot about it.  Toma's memory seems to be a big factor in his elevated A1C. He doesn't think that he is taking his Lantus twice/day, he thinks that he is taking 30 units once/day. He also doesn't think that he is taking his Novolog with his meals. He states that he was taking Victoza but he cannot take it at the same time as Lantus because he gets sick, so he stopped taking it. His wife does state that he can take the Victoza about 1 hour after the Lantus with no problem. Toma states that he is not shocked that his blood sugars are so high. We reviewed the importance of good blood sugar control. Discussed some ideas to help toma remember to take his diabetes medications.  We discussed a plan to take the Lantus only once/day instead of twice.  Recommended starting at 45 units once/day (instead of 60) to see how the blood sugars respond, will route to Dr. Gonzalez for approval.  We also discussed taking the Victoza with lunch or evening meds to minimize the side effects that he was experiencing. Reminded Toma to take the Victoza at a consistent time.  We also discussed holding off on the Novolog for now since he has not been taking this. Toma also asked his wife to help him remember to take his medications.  HIs wife states that she will ask him if he has taken his meds and he will say yes, even though she is sure he has not. Discussed keeping his Lantus and Victoza in a place that will be easy to remember.     We also discussed the Omnipod. This could be a useful took in Toma's diabetes management which will at least guarantee that he will get his basal dose of insulin and might make his mealtime insulin a little bit easier. Will  discuss more at next visit     Patient's most recent   Lab Results   Component Value Date    A1C 10.5 09/21/2021    A1C 7.0 04/09/2021    is not meeting goal of <7.0    Diabetes knowledge and skills assessment:   Patient is knowledgeable in diabetes management concepts related to: unsure at this time, Frandy has poor memory    Continue education with the following diabetes management concepts: Healthy Eating, Being Active, Monitoring, Taking Medication, Problem Solving, Reducing Risks and Healthy Coping    Based on learning assessment above, most appropriate setting for further diabetes education would be: Individual setting.    INTERVENTIONS:    Education provided today on:  AADE Self-Care Behaviors:  Monitoring: purpose, log and interpret results and individual blood glucose targets  Taking Medication: action of prescribed medication, drawing up, administering and storing injectable diabetes medications, side effects of prescribed medications and when to take medications  Problem Solving: high blood glucose - causes, signs/symptoms, treatment and prevention  Reducing Risks: major complications of diabetes, prevention, early diagnostic measures and treatment of complications and A1C - goals, relating to blood glucose levels, how often to check    Opportunities for ongoing education and support in diabetes-self management were discussed. Pt verbalized understanding of concepts discussed and recommendations provided today.       Education Materials Provided:  No new materials provided today    PLAN    Take Lantus once/day: 45 units (down from split dose of 30-0-0-30)  Take Victoza with lunch or with evening meds- be consistent  Hold Novolog for now- Frandy has not been taking, we can add back in once we see how BG respond to higher Lantus dose.   Routing to provider for approval    Topics to cover at upcoming visits: Taking Medication and Problem Solving  Follow-up: 12/6    See Goals Section for co-developed,  "patient-stated behavior change goals.  AVS provided to patient today.          SUBJECTIVE / OBJECTIVE:  Presents for: CGM Review  Accompanied by: Self,Spouse  Diabetes education in the past 24mo: No  Focus of Visit: CGM  Type of CGM visit: Professional CGM  Diabetes type: Type 2  Disease course: Stable  Diabetes management related comments/concerns: Lack of concern  Cultural Influences/Ethnic Background:  Other    Diabetes Symptoms & Complications:  Fatigue: No  Neuropathy: No  Polydipsia: No  Polyphagia: No  Polyuria: No  Visual change: No  Slow healing wounds: No  Complications assessed today?: No  Autonomic neuropathy: No  CVA: Yes  Heart disease: Yes  Nephropathy: No  Peripheral neuropathy: No  Peripheral Vascular Disease: No  Retinopathy: No  Sexual dysfunction: No    Patient Problem List and Family Medical History reviewed for relevant medical history, current medical status, and diabetes risk factors.    Vitals:  There were no vitals taken for this visit.  Estimated body mass index is 31.17 kg/m  as calculated from the following:    Height as of 2/15/21: 1.727 m (5' 8\").    Weight as of 9/21/21: 93 kg (205 lb).   Last 3 BP:   BP Readings from Last 3 Encounters:   09/21/21 129/59   06/03/21 137/77   04/09/21 139/62       History   Smoking Status     Current Some Day Smoker     Packs/day: 1.00     Years: 5.00     Types: Cigarettes     Start date: 1/1/1968     Last attempt to quit: 7/15/2016   Smokeless Tobacco     Current User       Labs:  Lab Results   Component Value Date    A1C 10.5 09/21/2021    A1C 7.0 04/09/2021     Lab Results   Component Value Date     09/02/2020     Lab Results   Component Value Date    LDL 60 07/13/2021     09/02/2020     HDL Cholesterol   Date Value Ref Range Status   09/02/2020 27 (L) >39 mg/dL Final     Direct Measure HDL   Date Value Ref Range Status   07/13/2021 36 (L) >=40 mg/dL Final     Comment:     0-19 years:       Greater than or equal to 45 mg/dL   Low: Less " than 40 mg/dL   Borderline low: 40-44 mg/dL     20 years and older:   Female: Greater than or equal to 50 mg/dL   Male:   Greater than or equal to 40 mg/dL        ]  GFR Estimate   Date Value Ref Range Status   03/01/2021 >90 >60 mL/min/[1.73_m2] Final     GFR Estimate If Black   Date Value Ref Range Status   03/01/2021 >90 >60 mL/min/[1.73_m2] Final     Lab Results   Component Value Date    CR 0.92 09/02/2020     No results found for: MICROALBUMIN    Healthy Eating:  Healthy Eating Assessed Today: Yes  Cultural/Pentecostal diet restrictions?: No  Meals include: Breakfast,Lunch,Dinner  Breakfast: coffee (1/2 and 1/2 and equal), occasional soup (chicken noodle)  Lunch: sandwich (ham and cheese, miravle whip) with coffee to drink OR 1 can of chicken noodle soup  Dinner: 1 pot of soup (12 bean soup with ham) OR chili OR hamburger and mashed potatoes OR eggs and arroyo --all with water, sometimes diet soda  Snacks: 1 bag of halloween candy, usually doesn't snack. evening will have popcorn  Beverages: Milk,Coffee,Diet soda  Has patient met with a dietitian in the past?: Yes (long ago when he was first diagnosed)    Being Active:  Being Active Assessed Today: Yes  Exercise:: Currently not exercising    Monitoring:  Monitoring Assessed Today: Yes  Did patient bring glucose meter to appointment? : Yes  Times checking blood sugar at home (number): 1  Times checking blood sugar at home (per): Day    Glucose data:          Taking Medications:  Diabetes Medication(s)     Biguanides       metFORMIN (GLUCOPHAGE-XR) 500 MG 24 hr tablet    TAKE 4 TABLETS BY MOUTH DAILY WITH BREAKFAST    Insulin       insulin glargine (LANTUS SOLOSTAR) 100 UNIT/ML pen    ADMINISTER 30 UNITS UNDER THE SKIN DAILY twice daily (this is higher dosage)     NOVOLOG FLEXPEN 100 UNIT/ML soln    Inject 10 Units Subcutaneous 3 times daily (with meals)    Incretin Mimetic Agents (GLP-1 Receptor Agonists)       liraglutide (VICTOZA) 18 MG/3ML solution    Inject 1.8  mg Subcutaneous daily          Taking Medication Assessed Today: Yes  Problems taking diabetes medications regularly?: No  Diabetes medication side effects?: No    Problem Solving:  Problem Solving Assessed Today: Yes  Is the patient at risk for hypoglycemia?: Yes  Hypoglycemia Frequency: Rarely    Reducing Risks:  Reducing Risks Assessed Today: Yes    Healthy Coping:  Healthy Coping Assessed Today: Yes  Patient Activation Measure Survey Score:  WAN Score (Last Two) 1/27/2017   WAN Raw Score 32   Activation Score 62.6   WAN Level 3       Lenora Hughes RD, LD, CDE  Time Spent: 30 minutes  Encounter Type: Individual        Any diabetes medication dose changes were made via the Certified Diabetes Care & Education Protocol in collaboration with the patient's referring provider. A copy of this encounter was shared with the provider.

## 2021-11-10 NOTE — TELEPHONE ENCOUNTER
Agree with easiest plan for frandy. He is seeing neurology but might need care coordination help?   MD Dr. Carlos Lopez,    Today we downloaded the data from Frandy's [employer] pro 14 day sensor. As expected his BG were quite elevated with an average of 225 mg/dl over the last 2 weeks.  I believe the biggest contributing factor to his elevated BG is his memory. He wasn't able to keep a log and really can't tell me what/if he is taking for diabetes medications. He thinks he is taking his Lantus once/day (30 units) and is pretty sure he is not taking Novolog. He also has not been taking his Victoza.      We discussed a plan as follows:    Take Lantus once/day: 45 units (down from split dose of 30-0-0-30 to simplify insulin regimen)  Take Victoza with lunch or with evening meds- be consistent  Hold Novolog for now- Frandy has not been taking, we can add back in once we see how BG respond to higher Lantus dose.    Please let me know if you agree with plan    Lenora Hughes, RD, LD, CDE

## 2021-11-10 NOTE — LETTER
11/10/2021         RE: Darian Engle  [address]   [place] [address]        Dear Colleague,    Thank you for referring your patient, Darian Engle, to the [facility]. Please see a copy of my visit note below.    Diabetes Self-Management Education & Support    Presents for: CGM Review    Type of Visit: In Person    How would patient like to obtain AVS? MyChart    ASSESSMENT:  Frandy wasn't able to keep his food/medication record for the [employer] study. He states that he kept a log for one day and then forgot about it.  Frandy's memory seems to be a big factor in his elevated A1C. He doesn't think that he is taking his Lantus twice/day, he thinks that he is taking 30 units once/day. He also doesn't think that he is taking his Novolog with his meals. He states that he was taking Victoza but he cannot take it at the same time as Lantus because he gets sick, so he stopped taking it. His wife does state that he can take the Victoza about 1 hour after the Lantus with no problem. Frandy states that he is not shocked that his blood sugars are so high. We reviewed the importance of good blood sugar control. Discussed some ideas to help frandy remember to take his diabetes medications.  We discussed a plan to take the Lantus only once/day instead of twice.  Recommended starting at 45 units once/day (instead of 60) to see how the blood sugars respond, will route to Dr. Gonzalez for approval.  We also discussed taking the Victoza with lunch or evening meds to minimize the side effects that he was experiencing. Reminded Frandy to take the Victoza at a consistent time.  We also discussed holding off on the Novolog for now since he has not been taking this. Frandy also asked his wife to help him remember to take his medications.  HIs wife states that she will ask him if he has taken his meds and he will say yes, even though she is sure he has not. Discussed keeping his Lantus and Victoza in a place  that will be easy to remember.     We also discussed the Omnipod. This could be a useful took in Frandy's diabetes management which will at least guarantee that he will get his basal dose of insulin and might make his mealtime insulin a little bit easier. Will discuss more at next visit     Patient's most recent   Lab Results   Component Value Date    A1C 10.5 09/21/2021    A1C 7.0 04/09/2021    is not meeting goal of <7.0    Diabetes knowledge and skills assessment:   Patient is knowledgeable in diabetes management concepts related to: unsure at this time, Frandy has poor memory    Continue education with the following diabetes management concepts: Healthy Eating, Being Active, Monitoring, Taking Medication, Problem Solving, Reducing Risks and Healthy Coping    Based on learning assessment above, most appropriate setting for further diabetes education would be: Individual setting.    INTERVENTIONS:    Education provided today on:  AADE Self-Care Behaviors:  Monitoring: purpose, log and interpret results and individual blood glucose targets  Taking Medication: action of prescribed medication, drawing up, administering and storing injectable diabetes medications, side effects of prescribed medications and when to take medications  Problem Solving: high blood glucose - causes, signs/symptoms, treatment and prevention  Reducing Risks: major complications of diabetes, prevention, early diagnostic measures and treatment of complications and A1C - goals, relating to blood glucose levels, how often to check    Opportunities for ongoing education and support in diabetes-self management were discussed. Pt verbalized understanding of concepts discussed and recommendations provided today.       Education Materials Provided:  No new materials provided today    PLAN    Take Lantus once/day: 45 units (down from split dose of 30-0-0-30)  Take Victoza with lunch or with evening meds- be consistent  Hold Novolog for now- Frandy has not been  "taking, we can add back in once we see how BG respond to higher Lantus dose.   Routing to provider for approval    Topics to cover at upcoming visits: Taking Medication and Problem Solving  Follow-up: 12/6    See Goals Section for co-developed, patient-stated behavior change goals.  AVS provided to patient today.          SUBJECTIVE / OBJECTIVE:  Presents for: CGM Review  Accompanied by: Self,Spouse  Diabetes education in the past 24mo: No  Focus of Visit: CGM  Type of CGM visit: Professional CGM  Diabetes type: Type 2  Disease course: Stable  Diabetes management related comments/concerns: Lack of concern  Cultural Influences/Ethnic Background:  Other    Diabetes Symptoms & Complications:  Fatigue: No  Neuropathy: No  Polydipsia: No  Polyphagia: No  Polyuria: No  Visual change: No  Slow healing wounds: No  Complications assessed today?: No  Autonomic neuropathy: No  CVA: Yes  Heart disease: Yes  Nephropathy: No  Peripheral neuropathy: No  Peripheral Vascular Disease: No  Retinopathy: No  Sexual dysfunction: No    Patient Problem List and Family Medical History reviewed for relevant medical history, current medical status, and diabetes risk factors.    Vitals:  There were no vitals taken for this visit.  Estimated body mass index is 31.17 kg/m  as calculated from the following:    Height as of 2/15/21: 1.727 m (5' 8\").    Weight as of 9/21/21: 93 kg (205 lb).   Last 3 BP:   BP Readings from Last 3 Encounters:   09/21/21 129/59   06/03/21 137/77   04/09/21 139/62       History   Smoking Status     Current Some Day Smoker     Packs/day: 1.00     Years: 5.00     Types: Cigarettes     Start date: 1/1/1968     Last attempt to quit: 7/15/2016   Smokeless Tobacco     Current User       Labs:  Lab Results   Component Value Date    A1C 10.5 09/21/2021    A1C 7.0 04/09/2021     Lab Results   Component Value Date     09/02/2020     Lab Results   Component Value Date    LDL 60 07/13/2021     09/02/2020     HDL " Cholesterol   Date Value Ref Range Status   09/02/2020 27 (L) >39 mg/dL Final     Direct Measure HDL   Date Value Ref Range Status   07/13/2021 36 (L) >=40 mg/dL Final     Comment:     0-19 years:       Greater than or equal to 45 mg/dL   Low: Less than 40 mg/dL   Borderline low: 40-44 mg/dL     20 years and older:   Female: Greater than or equal to 50 mg/dL   Male:   Greater than or equal to 40 mg/dL        ]  GFR Estimate   Date Value Ref Range Status   03/01/2021 >90 >60 mL/min/[1.73_m2] Final     GFR Estimate If Black   Date Value Ref Range Status   03/01/2021 >90 >60 mL/min/[1.73_m2] Final     Lab Results   Component Value Date    CR 0.92 09/02/2020     No results found for: MICROALBUMIN    Healthy Eating:  Healthy Eating Assessed Today: Yes  Cultural/Roman Catholic diet restrictions?: No  Meals include: Breakfast,Lunch,Dinner  Breakfast: coffee (1/2 and 1/2 and equal), occasional soup (chicken noodle)  Lunch: sandwich (ham and cheese, miravle whip) with coffee to drink OR 1 can of chicken noodle soup  Dinner: 1 pot of soup (12 bean soup with ham) OR chili OR hamburger and mashed potatoes OR eggs and arroyo --all with water, sometimes diet soda  Snacks: 1 bag of halloween candy, usually doesn't snack. evening will have popcorn  Beverages: Milk,Coffee,Diet soda  Has patient met with a dietitian in the past?: Yes (long ago when he was first diagnosed)    Being Active:  Being Active Assessed Today: Yes  Exercise:: Currently not exercising    Monitoring:  Monitoring Assessed Today: Yes  Did patient bring glucose meter to appointment? : Yes  Times checking blood sugar at home (number): 1  Times checking blood sugar at home (per): Day    Glucose data:          Taking Medications:  Diabetes Medication(s)     Biguanides       metFORMIN (GLUCOPHAGE-XR) 500 MG 24 hr tablet    TAKE 4 TABLETS BY MOUTH DAILY WITH BREAKFAST    Insulin       insulin glargine (LANTUS SOLOSTAR) 100 UNIT/ML pen    ADMINISTER 30 UNITS UNDER THE SKIN  DAILY twice daily (this is higher dosage)     NOVOLOG FLEXPEN 100 UNIT/ML soln    Inject 10 Units Subcutaneous 3 times daily (with meals)    Incretin Mimetic Agents (GLP-1 Receptor Agonists)       liraglutide (VICTOZA) 18 MG/3ML solution    Inject 1.8 mg Subcutaneous daily          Taking Medication Assessed Today: Yes  Problems taking diabetes medications regularly?: No  Diabetes medication side effects?: No    Problem Solving:  Problem Solving Assessed Today: Yes  Is the patient at risk for hypoglycemia?: Yes  Hypoglycemia Frequency: Rarely    Reducing Risks:  Reducing Risks Assessed Today: Yes    Healthy Coping:  Healthy Coping Assessed Today: Yes  Patient Activation Measure Survey Score:  WAN Score (Last Two) 1/27/2017   WAN Raw Score 32   Activation Score 62.6   WAN Level 3       Lenora Hughes RD, LD, CDE  Time Spent: 30 minutes  Encounter Type: Individual        Any diabetes medication dose changes were made via the Certified Diabetes Care & Education Protocol in collaboration with the patient's referring provider. A copy of this encounter was shared with the provider.

## 2021-11-10 NOTE — PATIENT INSTRUCTIONS
Take your Lantus once/day, take 45 units in the morning     Take your Victoza once/day, you can take this with lunch or your evening meds, just be consistent with the timing.     Hold off on the Novolog for now, we will see how your blood sugars look once we are consistent with the Lantus and Victoza.

## 2021-11-15 DIAGNOSIS — I25.10 CAD, MULTIPLE VESSEL: ICD-10-CM

## 2021-11-15 DIAGNOSIS — E78.5 HYPERLIPIDEMIA LDL GOAL <100: ICD-10-CM

## 2021-11-16 RX ORDER — ATORVASTATIN CALCIUM 80 MG/1
TABLET, FILM COATED ORAL
Qty: 90 TABLET | Refills: 1 | Status: SHIPPED | OUTPATIENT
Start: 2021-11-16 | End: 2022-04-26

## 2021-12-09 ENCOUNTER — VIRTUAL VISIT (OUTPATIENT)
Dept: EDUCATION SERVICES | Facility: CLINIC | Age: 71
End: 2021-12-09
Payer: COMMERCIAL

## 2021-12-09 DIAGNOSIS — Z79.4 TYPE 2 DIABETES MELLITUS WITH COMPLICATION, WITH LONG-TERM CURRENT USE OF INSULIN (H): Primary | ICD-10-CM

## 2021-12-09 DIAGNOSIS — E11.8 TYPE 2 DIABETES MELLITUS WITH COMPLICATION, WITH LONG-TERM CURRENT USE OF INSULIN (H): Primary | ICD-10-CM

## 2021-12-09 PROCEDURE — 98966 PH1 ASSMT&MGMT NQHP 5-10: CPT | Mod: 95 | Performed by: DIETITIAN, REGISTERED

## 2021-12-09 NOTE — LETTER
"    2021         RE: Darian Engle  [address] [place] [address]  [place] [address]        Dear Colleague,    Thank you for referring your patient, Darian Engle, to the [facility]. Please see a copy of my visit note below.    Diabetes Follow-up    Subjective/Objective:    Type of Service: Telephone Visit    Originating Location (Patient Location): Home  Distant Location (Provider Location): Home  Mode of Communication:  Telephone    Telephone Visit Start Time: 9:30  Telephone Visit End Time (telephone visit stop time): 9:40    How would patient like to obtain AVS? MyChart      Follow-up visit with Frandy for diabetes follow-up.  Frandy states that he hasn't been good about checking his BG. He also mentions that he has not been consistent with his insulin intake. He isn't able to indicate how often he takes it, he just states that he takes it \"sparingly.\" We discussed tips for remembering to take the insulin.  Recommended that Frandy keep his insulin near his pill box so he can take it with his morning medications. He plans to ask his wife to help remind him as well. Could consider V-Go or Omnipod if patient remains inconsistent with insulin.     Diabetes is being managed with   Diabetes Medications   Diabetes Medication(s)     Biguanides       metFORMIN (GLUCOPHAGE-XR) 500 MG 24 hr tablet    TAKE 4 TABLETS BY MOUTH DAILY WITH BREAKFAST    Insulin       insulin glargine (LANTUS SOLOSTAR) 100 UNIT/ML pen    Inject 45 Units Subcutaneous daily     NOVOLOG FLEXPEN 100 UNIT/ML soln    Inject 10 Units Subcutaneous 3 times daily (with meals)    Incretin Mimetic Agents (GLP-1 Receptor Agonists)       liraglutide (VICTOZA) 18 MG/3ML solution    Inject 1.8 mg Subcutaneous daily          BG/Food Lo/8 321  : 391 9 am  11: 241 10 am  11: 171 8 30 pm  11: 264 7:20 pm  11: 267 8 pm  11: 367 8:15 pm  11: 242 11;05 AM  : 420 738 AM   258 7:37 AM    Assessment:  0% of BG in " target    Plan/Response:  Recommended patient work on other strategies to improve adherence to insulin regimen  Recommended to keep insulin pen and needles near his pill box and take at the same time as his morning meds.    Lenora Hughes RD, LD, CDE  Time spent: 10 minutes    Any diabetes medication dose changes were made via the CDE Protocol and Collaborative Practice Agreement with the patient's referring provider. A copy of this encounter was shared with the provider.

## 2021-12-09 NOTE — PROGRESS NOTES
"Diabetes Follow-up    Subjective/Objective:    Type of Service: Telephone Visit    Originating Location (Patient Location): Home  Distant Location (Provider Location): Home  Mode of Communication:  Telephone    Telephone Visit Start Time: 9:30  Telephone Visit End Time (telephone visit stop time): 9:40    How would patient like to obtain AVS? MyChart      Follow-up visit with Frandy for diabetes follow-up.  Frandy states that he hasn't been good about checking his BG. He also mentions that he has not been consistent with his insulin intake. He isn't able to indicate how often he takes it, he just states that he takes it \"sparingly.\" We discussed tips for remembering to take the insulin.  Recommended that Frandy keep his insulin near his pill box so he can take it with his morning medications. He plans to ask his wife to help remind him as well. Could consider V-Go or Omnipod if patient remains inconsistent with insulin.     Diabetes is being managed with   Diabetes Medications   Diabetes Medication(s)     Biguanides       metFORMIN (GLUCOPHAGE-XR) 500 MG 24 hr tablet    TAKE 4 TABLETS BY MOUTH DAILY WITH BREAKFAST    Insulin       insulin glargine (LANTUS SOLOSTAR) 100 UNIT/ML pen    Inject 45 Units Subcutaneous daily     NOVOLOG FLEXPEN 100 UNIT/ML soln    Inject 10 Units Subcutaneous 3 times daily (with meals)    Incretin Mimetic Agents (GLP-1 Receptor Agonists)       liraglutide (VICTOZA) 18 MG/3ML solution    Inject 1.8 mg Subcutaneous daily          BG/Food Lo/8 321  : 391 9 am  11: 241 10 am  : 171 8 30 pm  : 264 7:20 pm  11: 267 8 pm  : 367 8:15 pm  : 242 11;05 AM  : 420 738 AM  11 258 7:37 AM    Assessment:  0% of BG in target    Plan/Response:  Recommended patient work on other strategies to improve adherence to insulin regimen  Recommended to keep insulin pen and needles near his pill box and take at the same time as his morning meds.    Lenora Hughes, RD, LD, " CDE  Time spent: 10 minutes    Any diabetes medication dose changes were made via the CDE Protocol and Collaborative Practice Agreement with the patient's referring provider. A copy of this encounter was shared with the provider.

## 2021-12-28 ENCOUNTER — OFFICE VISIT (OUTPATIENT)
Dept: FAMILY MEDICINE | Facility: CLINIC | Age: 71
End: 2021-12-28
Payer: COMMERCIAL

## 2021-12-28 VITALS
WEIGHT: 207 LBS | BODY MASS INDEX: 31.47 KG/M2 | OXYGEN SATURATION: 98 % | SYSTOLIC BLOOD PRESSURE: 131 MMHG | RESPIRATION RATE: 22 BRPM | HEART RATE: 86 BPM | TEMPERATURE: 96.4 F | DIASTOLIC BLOOD PRESSURE: 62 MMHG

## 2021-12-28 DIAGNOSIS — E11.8 TYPE 2 DIABETES MELLITUS WITH COMPLICATION, WITH LONG-TERM CURRENT USE OF INSULIN (H): ICD-10-CM

## 2021-12-28 DIAGNOSIS — I10 ESSENTIAL HYPERTENSION WITH GOAL BLOOD PRESSURE LESS THAN 140/90: ICD-10-CM

## 2021-12-28 DIAGNOSIS — I25.10 CAD, MULTIPLE VESSEL: ICD-10-CM

## 2021-12-28 DIAGNOSIS — E11.9 TYPE 2 DIABETES MELLITUS WITHOUT RETINOPATHY (H): Primary | ICD-10-CM

## 2021-12-28 DIAGNOSIS — Z79.4 TYPE 2 DIABETES MELLITUS WITH COMPLICATION, WITH LONG-TERM CURRENT USE OF INSULIN (H): ICD-10-CM

## 2021-12-28 LAB — HBA1C MFR BLD: 9.8 % (ref 0–5.6)

## 2021-12-28 PROCEDURE — 36415 COLL VENOUS BLD VENIPUNCTURE: CPT | Performed by: FAMILY MEDICINE

## 2021-12-28 PROCEDURE — 99214 OFFICE O/P EST MOD 30 MIN: CPT | Performed by: FAMILY MEDICINE

## 2021-12-28 PROCEDURE — 83036 HEMOGLOBIN GLYCOSYLATED A1C: CPT | Performed by: FAMILY MEDICINE

## 2021-12-28 RX ORDER — METFORMIN HCL 500 MG
TABLET, EXTENDED RELEASE 24 HR ORAL
Qty: 360 TABLET | Refills: 1 | Status: SHIPPED | OUTPATIENT
Start: 2021-12-28 | End: 2022-05-15

## 2021-12-28 RX ORDER — GLUCAGON HYDROCHLORIDE 1 MG/ML
1 INJECTION, POWDER, FOR SOLUTION INTRAMUSCULAR; INTRAVENOUS; SUBCUTANEOUS ONCE
Qty: 1 EACH | Refills: 0 | Status: SHIPPED | OUTPATIENT
Start: 2021-12-28 | End: 2021-12-28

## 2021-12-28 RX ORDER — METOPROLOL SUCCINATE 100 MG/1
100 TABLET, EXTENDED RELEASE ORAL DAILY
Qty: 90 TABLET | Refills: 1 | Status: SHIPPED | OUTPATIENT
Start: 2021-12-28 | End: 2023-06-19

## 2021-12-28 NOTE — PROGRESS NOTES
SUBJECTIVE:  Darian Engle, a 71 year old male scheduled an appointment to discuss the following issues:  Type 2 diabetes mellitus without retinopathy (H)  Follow-up dm. Seen by dm ed. Improving overall. Memory issues - seen neurology.   Stopped novolog because memory issues. Needs glucogan pen. Here with wife.   Active at home -house chores. Blood pressure cuff at home. Not monitoring.   No shortness of breath or chest pain. No urine changes or hematuria. No incontince.   No nausea, vomiting or diarrhea.    No feet changes noted. Eye exam last year.   Sleep - trazodone prn. Watch tv.   Medical, social, surgical, and family histories reviewed.    ROS:  All other ROS negative.     OBJECTIVE:  /62   Pulse 86   Temp (!) 96.4  F (35.8  C) (Tympanic)   Resp 22   Wt 93.9 kg (207 lb)   SpO2 98%   BMI 31.47 kg/m    EXAM:  GENERAL APPEARANCE: healthy, alert and no distress  EYES: EOMI,  PERRL  HENT: ear canals and TM's normal and nose and mouth without ulcers or lesions  NECK: no adenopathy, no asymmetry, masses, or scars and thyroid normal to palpation  RESP: lungs clear to auscultation - no rales, rhonchi or wheezes  CV: regular rates and rhythm, normal S1 S2, no S3 or S4 and no murmur, click or rub -  ABDOMEN:  soft, nontender, no HSM or masses and bowel sounds normal  MS: extremities normal- no gross deformities noted, no evidence of inflammation in joints, FROM in all extremities.  SKIN: no suspicious lesions or rashes  NEURO: Normal strength and tone, sensory exam grossly linda  PSYCH: mentation appears a little slow and affect normal/bright    ASSESSMENT / PLAN:  (E11.9) Type 2 diabetes mellitus without retinopathy (H)  (primary encounter diagnosis)  Comment: needs help but improving  Plan: Hemoglobin A1c, glucagon (GLUCAGEN DIAGNOSTIC)         1 MG SOLR injection        Dm ed changed to just once daily lantus with memory issues. Will give prescription for emergency glucagon - discussed with wife.  Recheck in 3 months      (E11.8,  Z79.4) Type 2 diabetes mellitus with complication, with long-term current use of insulin (H)  Plan: metFORMIN (GLUCOPHAGE-XR) 500 MG 24 hr tablet            (I25.10) CAD, multiple vessel  Plan: metoprolol succinate ER (TOPROL-XL) 100 MG 24         hr tablet        Chest pain or shortness of breath to er.     (I10) Essential hypertension with goal blood pressure less than 140/90  Plan: metoprolol succinate ER (TOPROL-XL) 100 MG 24         hr tablet        Wife with self-monitor. Recheck in 3 months  Sooner if worse.     Marcos Gonzalez MD

## 2021-12-29 ENCOUNTER — TELEPHONE (OUTPATIENT)
Dept: FAMILY MEDICINE | Facility: CLINIC | Age: 71
End: 2021-12-29
Payer: COMMERCIAL

## 2021-12-29 NOTE — TELEPHONE ENCOUNTER
Prior Authorization Retail Medication Request    Medication/Dose: glucagon (GLUCAGEN DIAGNOSTIC) 1 MG SOLR injection  ICD code (if different than what is on RX):  Type 2 diabetes mellitus without retinopathy (H) [E11.9]   Previously Tried and Failed:    Rationale:      Insurance Phone #:  [phone]  Insurance ID:  [id number]      Pharmacy Information (if different than what is on RX)  Name:  [facility]  Phone:  [phone]

## 2021-12-29 NOTE — TELEPHONE ENCOUNTER
Central Prior Authorization Team   Phone: [phone]    PA Initiation    Medication: glucagon (GLUCAGEN DIAGNOSTIC) 1 MG SOLR injection  Insurance Company: [employer] - Phone [phone] Fax [phone]  Pharmacy Filling the Rx: [id number] - [facility] [place], [place] - [address] AT [facility]  Filling Pharmacy Phone: [phone]  Filling Pharmacy Fax:    Start Date: 12/29/2021    Manual form filled out, faxed to [employer] [phone] for review

## 2021-12-31 NOTE — TELEPHONE ENCOUNTER
"Prior Authorization Not Needed per Insurance    Medication: glucagon (GLUCAGEN DIAGNOSTIC) 1 MG SOLR injection  Insurance Company: [employer] - Phone [phone] Fax [phone]  Expected CoPay:      Pharmacy Filling the Rx: [id number] - [place], [place] - [address] [place] [address] [place] AT [facility]  Pharmacy Notified: Yes  Patient Notified: No    Called pharmacy, they already found a NDC that is covered and are ordering this for patient.  They will let him know when its ready for .       Called pharmacy and they will need a new Rx for \"Glucagon Emergency Kit\" sent to their pharmacy       "

## 2022-01-14 ENCOUNTER — VIRTUAL VISIT (OUTPATIENT)
Dept: EDUCATION SERVICES | Facility: CLINIC | Age: 72
End: 2022-01-14
Payer: COMMERCIAL

## 2022-01-14 DIAGNOSIS — Z79.4 TYPE 2 DIABETES MELLITUS WITH COMPLICATION, WITH LONG-TERM CURRENT USE OF INSULIN (H): ICD-10-CM

## 2022-01-14 DIAGNOSIS — E11.8 TYPE 2 DIABETES MELLITUS WITH COMPLICATION, WITH LONG-TERM CURRENT USE OF INSULIN (H): ICD-10-CM

## 2022-01-14 PROCEDURE — 98966 PH1 ASSMT&MGMT NQHP 5-10: CPT | Mod: 95 | Performed by: DIETITIAN, REGISTERED

## 2022-01-14 RX ORDER — INSULIN GLARGINE 100 [IU]/ML
48 INJECTION, SOLUTION SUBCUTANEOUS DAILY
Qty: 30 ML | Refills: 1 | Status: SHIPPED | OUTPATIENT
Start: 2022-01-14 | End: 2023-06-19

## 2022-01-14 NOTE — LETTER
1/14/2022         RE: Darian Engle  [address] [place] [id number]        Dear Colleague,    Thank you for referring your patient, Darian Engle, to the [facility]. Please see a copy of my visit note below.    Date Breakfast  Lunch  Dinner  Bedtime    Before After Before After Before After    1/14 1/13      222    1/12 204         1/11 228, 250         1/10   242 274      1/3 291         1/2       227

## 2022-01-14 NOTE — PROGRESS NOTES
Diabetes Follow-up    Subjective/Objective:    Telephone visit with Frandy and Angelica for BG review. Frandy isn't able to explain how diabetes management is going because he cannot remember. Per Angelica, it does seem that Farndy has been more consistent with taking Lantus and Victoza, which does seem to be reflected in BG. We discussed a plan to increase Lantus to 48 units.     Diabetes is being managed with   Diabetes Medications   Diabetes Medication(s)     Biguanides       metFORMIN (GLUCOPHAGE-XR) 500 MG 24 hr tablet    TAKE 4 TABLETS BY MOUTH DAILY WITH BREAKFAST    Insulin       insulin glargine (LANTUS SOLOSTAR) 100 UNIT/ML pen    Inject 48 Units Subcutaneous daily    Incretin Mimetic Agents (GLP-1 Receptor Agonists)       liraglutide (VICTOZA) 18 MG/3ML solution    Inject 1.8 mg Subcutaneous daily          BG/Food Log:   Date Breakfast  Lunch  Dinner  Bedtime    Before After Before After Before After    1/14 1/13      222    1/12 204         1/11 228, 250         1/10   242 274      1/3 291         1/2       227       Assessment:  0% of BG in goal    Plan/Response:  See Patient Instructions for co-developed, patient-stated behavior change goals.  Increase Lantus: 45-0-0-0 --> 48-0-0-0    Lenora Hughes RD, LD, CDE  Time spent: 6 minutes    Any diabetes medication dose changes were made via the CDE Protocol and Collaborative Practice Agreement with the patient's referring provider. A copy of this encounter was shared with the provider.

## 2022-01-22 DIAGNOSIS — G31.84 MCI (MILD COGNITIVE IMPAIRMENT) WITH MEMORY LOSS: ICD-10-CM

## 2022-01-24 RX ORDER — DONEPEZIL HYDROCHLORIDE 10 MG/1
TABLET, FILM COATED ORAL
Qty: 90 TABLET | Refills: 1 | Status: SHIPPED | OUTPATIENT
Start: 2022-01-24 | End: 2023-01-13

## 2022-01-27 DIAGNOSIS — K21.9 GASTROESOPHAGEAL REFLUX DISEASE WITHOUT ESOPHAGITIS: ICD-10-CM

## 2022-01-27 NOTE — TELEPHONE ENCOUNTER
"Requested Prescriptions   Pending Prescriptions Disp Refills     omeprazole (PRILOSEC) 20 MG DR capsule 90 capsule 3     Sig: Take 1 capsule (20 mg) by mouth daily       PPI Protocol Failed - 1/27/2022  2:33 PM        Failed - Not on Clopidogrel (unless Pantoprazole ordered)        Passed - No diagnosis of osteoporosis on record        Passed - Recent (12 mo) or future (30 days) visit within the authorizing provider's specialty     Patient has had an office visit with the authorizing provider or a provider within the authorizing providers department within the previous 12 mos or has a future within next 30 days. See \"Patient Info\" tab in inbasket, or \"Choose Columns\" in Meds & Orders section of the refill encounter.              Passed - Medication is active on med list        Passed - Patient is age 18 or older           Veronica LAW, RN    "

## 2022-02-10 ENCOUNTER — VIRTUAL VISIT (OUTPATIENT)
Dept: EDUCATION SERVICES | Facility: CLINIC | Age: 72
End: 2022-02-10
Payer: COMMERCIAL

## 2022-02-10 DIAGNOSIS — E11.8 TYPE 2 DIABETES MELLITUS WITH COMPLICATION, WITH LONG-TERM CURRENT USE OF INSULIN (H): Primary | ICD-10-CM

## 2022-02-10 DIAGNOSIS — Z79.4 TYPE 2 DIABETES MELLITUS WITH COMPLICATION, WITH LONG-TERM CURRENT USE OF INSULIN (H): Primary | ICD-10-CM

## 2022-02-10 NOTE — Clinical Note
Dr. Gonzalez,  Frandy has only test his BG once in the last month. He isn't able to say if he is taking his insulin/victoza, he thinks he has been forgetting that as well. I'm not sure what else to recommend to help him remember. Let me know if you have any ideas!  Lenora Hughes, RD, LD, CDE

## 2022-02-10 NOTE — LETTER
2/10/2022         RE: Darian Engle  [address]   [place] [address]        Dear Colleague,    Thank you for referring your patient, Darian Engle, to the [facility]. Please see a copy of my visit note below.    Diabetes Follow-up    Called Frandy for diabetes follow-up. Frandy states that he has only tested BG once in the last month and also states that he hasn't been taking his insulin.  Frandy's wife is on the call as well and states that she reminds him every day to take his insulin and he doesn't do it.  2 BG in the last 1+ month listed below.    Date Breakfast  Lunch  Dinner  Bedtime    Before After Before After Before After    2/10 267         1/23 235           Writer has recommended settings alarms, placing insulin pen/pen tips near pil box, having his wife remind him, placing pen/pen tips near another daily activity (toothbrush) to help him remember his insulin.  None of these strategies have worked so far, will route to provider for additional guidance.    Recommended patient continue to work on taking insulin consistently and testing BG and we will follow-up in 1 month.     Lenora Hughes, SEFERINO, LD, CDE  Time spent: 4 minutes

## 2022-02-10 NOTE — PROGRESS NOTES
Diabetes Follow-up    Called Frandy for diabetes follow-up. Frandy states that he has only tested BG once in the last month and also states that he hasn't been taking his insulin.  Frandy's wife is on the call as well and states that she reminds him every day to take his insulin and he doesn't do it.  2 BG in the last 1+ month listed below.    Date Breakfast  Lunch  Dinner  Bedtime    Before After Before After Before After    2/10 267         1/23 235           Writer has recommended settings alarms, placing insulin pen/pen tips near pil box, having his wife remind him, placing pen/pen tips near another daily activity (toothbrush) to help him remember his insulin.  None of these strategies have worked so far, will route to provider for additional guidance.    Recommended patient continue to work on taking insulin consistently and testing BG and we will follow-up in 1 month.     Lenora Hughes, SEFERINO, LD, CDE  Time spent: 4 minutes

## 2022-02-23 ENCOUNTER — VIRTUAL VISIT (OUTPATIENT)
Dept: NEUROLOGY | Facility: CLINIC | Age: 72
End: 2022-02-23
Payer: COMMERCIAL

## 2022-02-23 DIAGNOSIS — G31.84 MCI (MILD COGNITIVE IMPAIRMENT) WITH MEMORY LOSS: ICD-10-CM

## 2022-02-23 DIAGNOSIS — I63.9 CEREBROVASCULAR ACCIDENT (CVA), UNSPECIFIED MECHANISM (H): Primary | ICD-10-CM

## 2022-02-23 DIAGNOSIS — I48.0 PAROXYSMAL ATRIAL FIBRILLATION (H): ICD-10-CM

## 2022-02-23 PROCEDURE — 99214 OFFICE O/P EST MOD 30 MIN: CPT | Mod: 95 | Performed by: PSYCHIATRY & NEUROLOGY

## 2022-02-23 NOTE — PROGRESS NOTES
Frandy is a 71 year old who is being evaluated via a billable video visit.      How would you like to obtain your AVS? MyChart  If the video visit is dropped, the invitation should be resent by: Send to e-mail at: [email or web address]  Will anyone else be joining your video visit? No      Video Start Time: 330  Video-Visit Details    Type of service:  Video Visit    Video End Time:3:59 PM    Originating Location (pt. Location): Home    Distant Location (provider location):  [facility] NEUROLOGY [facility]     Platform used for Video Visit: Juan    Chief Complaint   Patient presents with     RECHECK     VIDEO VISIT RETURN      Richar Steel

## 2022-02-23 NOTE — LETTER
2/23/2022       RE: Darian Engle  [address]   [place] [address]     Dear Colleague,    Thank you for referring your patient, Darian Engle, to the [facility] NEUROLOGY CLINIC [place] [facility]. Please see a copy of my visit note below.    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 Please see the comprehensive neurologic consultation notes from those dates in the Epic records for details.     Te patient was taking ASA for 30 years, was using CPAP for TALA, has HTN, HLD, DMII, actively smokes and reported prior binge drinking.  Neuropsychology, OT for CPT, and trial of Donepezil was to be done for his MCI-amnestic condition, and the patient was to switch to Plavix.     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  During the evaluation, the patient reported having spacing out episodes so an EEG was to be done.     OT assessment 2/26/2021 led to CPT score of 30/33, with 5.0/5.5.  The patient's wife has taken over his medication management since test.  He was continued on Donepezil.     Ziopatch showed 16% A-fib burden, 8/17/2021. Cardiology was consulted, but I do no see an EP visit occurring (patient was seen 9/29/2021 for PVD related issues at [facility]).    Interval history: The patient doesn't recall having an appointment with cardiology for his A-fib.  I will place this today.  He does feel his memory is worsening to some degree and that it isn't as good as it used be.  He  feels his life is simple at this time; he prepares most dinners, cooks/cleans/laundry, he grocery shops, his wife has taken over paying bills at this time (some bills were being forgotten or there were double payments, transition occurred in 01/2022).  It may take Frandy 2-3 trips to get the right ingredients. Frandy does resist taking medications and needs a good deal of cajoling from his wife to take them correctly.           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  If he shows some further decline on CPT, I think I would change his diagnosis to Alzheimer's dementia versus mixed (Alzheimer's + vascular).    In any case, we discussed his current progression and about planning for wants in the future.  He would like to stay home, but his wife is expressing some concern with caring for him already (may need to retire early to do so).  They would benefit from a  referral to talk about community resources, and other topics (living facility options, home nursing if needed).    Regarding his infarctions, and ziopatch.  He will need EP cardiology to see him given the burden noted on his ziopatch.  I suspect he may need to be started on an anticoagulant versus his current use of plavix and aspirin.     Plan:  OT for CPT  Donepezil 10 mg at bedtime   referral  Cardiology referral for EP    Follow up in Neurology clinic in 5 months (shorter visit on video to discuss need for repeat neuropsychological testing or updating his diagnosis) or earlier as needed should new concerns arise.    ANA Franco D.O.   of Neurology    Total time today (34 min) in this patient encounter was spent on pre-charting, counseling and/or coordination of care.       Frandy is  a 71 year old who is being evaluated via a billable video visit.      How would you like to obtain your AVS? MyChart  If the video visit is dropped, the invitation should be resent by: Send to e-mail at: [email or web address]  Will anyone else be joining your video visit? No      Video Start Time: 330  Video-Visit Details    Type of service:  Video Visit    Video End Time:3:59 PM    Originating Location (pt. Location): Home    Distant Location (provider location):  [facility] NEUROLOGY [facility]     Platform used for Video Visit: Juan    Chief Complaint   Patient presents with     RECHECK     VIDEO VISIT RETURN      Richar Steel

## 2022-02-23 NOTE — PROGRESS NOTES
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 Please see the comprehensive neurologic consultation notes from those dates in the Epic records for details.     Te patient was taking ASA for 30 years, was using CPAP for TALA, has HTN, HLD, DMII, actively smokes and reported prior binge drinking.  Neuropsychology, OT for CPT, and trial of Donepezil was to be done for his MCI-amnestic condition, and the patient was to switch to Plavix.     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  During the evaluation, the patient reported having spacing out episodes so an EEG was to be done.     OT assessment 2/26/2021 led to CPT score of 30/33, with 5.0/5.5.  The patient's wife has taken over his medication management since test.  He was continued on Donepezil.     Ziopatch showed 16% A-fib burden, 8/17/2021. Cardiology was consulted, but I do no see an EP visit occurring (patient was seen 9/29/2021 for PVD related issues at [facility]).    Interval history: The patient doesn't recall having an appointment with cardiology for his A-fib.  I will place this today.  He does feel his memory is worsening to some degree and that it isn't as good as it used be.  He feels his life is simple at this time; he prepares most dinners, cooks/cleans/laundry, he grocery shops, his wife has taken over paying bills at this time (some bills were being forgotten or there were double payments, transition occurred in 01/2022).  It may take Frandy 2-3 trips to get the right ingredients. Frandy does resist  taking medications and needs a good deal of cajoling from his wife to take them correctly.           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  If he shows some further decline on CPT, I think I would change his diagnosis to Alzheimer's dementia versus mixed (Alzheimer's + vascular).    In any case, we discussed his current progression and about planning for wants in the future.  He would like to stay home, but his wife is expressing some concern with caring for him already (may need to retire early to do so).  They would benefit from a  referral to talk about community resources, and other topics (living facility options, home nursing if needed).    Regarding his infarctions, and ziopatch.  He will need EP cardiology to see him given the burden noted on his ziopatch.  I suspect he may need to be started on an anticoagulant versus his current use of plavix and aspirin.     Plan:  OT for CPT  Donepezil 10 mg at bedtime   referral  Cardiology referral for EP    Follow up in Neurology clinic in 5 months (shorter visit on video to discuss need for repeat neuropsychological testing or updating his diagnosis) or earlier as needed should new concerns arise.    ANA Franco D.O.   of Neurology    Total time today (34 min) in this patient encounter was spent on pre-charting, counseling and/or coordination of care.

## 2022-03-02 ENCOUNTER — PATIENT OUTREACH (OUTPATIENT)
Dept: CARE COORDINATION | Facility: CLINIC | Age: 72
End: 2022-03-02
Payer: COMMERCIAL

## 2022-03-03 NOTE — PROGRESS NOTES
"Social Work Intervention  [facility]    Data/Intervention:    Patient Name:  Darian Engle  /Age:  1950 (71 year old)    Visit Type: telephone  Referral Source: Dr MALACHI Franco  Reason for Referral:  Assess for services and caregiver support    Collaborated With:    -Angelica Engle    Psychosocial Information/Concerns:  Contacted Pt/spouse Angelica and spoke with Angelica with Frandy nearby.   They lives in their own home in [place]. Angelica works from home and it's becoming a big source of stress for her trying to manage work and caring for Frandy. She also needs to have a knee surgery. She is planning to retire soon and she feels her life with be more manageable.   Frandy needs medication reminders and those are more of a struggle in the evening when he is a little more \"stubborn\". She reports though that he has always been stubborn.   Frandy does the cooking, cleaning and laundry. Angelica took over managing the finances and his health care. He drives in the area. Angelica drives also but only during the daylight hours.  They have done their [place] and financial POAs. Pt does not have a health care directive.   Frandy has 3 dtrs. One is a traveling nurse, one he has no contact with and the other they don't see very often. Angelica did indicated that when she has surgery they can depend on his nearby dtr to help him as she recovers.   Angelica indicated she accepts his diagnosis and it was no surprise to her. She indicated that Frandy thinks it's wrong and that he doesn't have any issues.   They don't have financial concerns at this time.    Intervention/Education/Resources Provided:  Reviewed information and support group resources she can find on the Alzheimer's assoc website. She feels she will be much relieved when she stops working. She doesn't express need for any assistance now but wanted to have my contact info for the future.  They both would like to do health care directives so will mail " copies and offered to assist them if needed.     Assessment/Plan:  Caregiver identifies no resource needs now. Will mail health care directives and await contact if they wish further assistance.     Provided patient/family with contact information and availability.    SARAHI Simon, [facility]    [facility]  [phone]

## 2022-03-07 ENCOUNTER — VIRTUAL VISIT (OUTPATIENT)
Dept: EDUCATION SERVICES | Facility: CLINIC | Age: 72
End: 2022-03-07
Payer: COMMERCIAL

## 2022-03-07 DIAGNOSIS — Z79.4 TYPE 2 DIABETES MELLITUS WITH COMPLICATION, WITH LONG-TERM CURRENT USE OF INSULIN (H): Primary | ICD-10-CM

## 2022-03-07 DIAGNOSIS — E11.8 TYPE 2 DIABETES MELLITUS WITH COMPLICATION, WITH LONG-TERM CURRENT USE OF INSULIN (H): Primary | ICD-10-CM

## 2022-03-07 PROCEDURE — 98966 PH1 ASSMT&MGMT NQHP 5-10: CPT | Mod: 95 | Performed by: DIETITIAN, REGISTERED

## 2022-03-07 NOTE — LETTER
3/7/2022         RE: Darian Engle  [address]   [place] [address]        Dear Colleague,    Thank you for referring your patient, Darian Engle, to the [facility]. Please see a copy of my visit note below.    Diabetes Follow-up    Subjective/Objective:    Telephone visit for BG review    Diabetes is being managed with   Diabetes Medications   Diabetes Medication(s)     Biguanides       metFORMIN (GLUCOPHAGE-XR) 500 MG 24 hr tablet    TAKE 4 TABLETS BY MOUTH DAILY WITH BREAKFAST    Insulin       insulin glargine (LANTUS SOLOSTAR) 100 UNIT/ML pen    Inject 48 Units Subcutaneous daily    Incretin Mimetic Agents (GLP-1 Receptor Agonists)       liraglutide (VICTOZA) 18 MG/3ML solution    Inject 1.8 mg Subcutaneous daily          BG/Food Log:   Date Breakfast  Lunch  Dinner  Bedtime    Before After Before After Before After    3/7          3/6 218         3/5 278         3/4          3/3 138         3/2 169         3/1 125, 160           2/28: 235, 146, 124  2/27: 311  2/26: 219, 208, 185  2/25: 281  2/24: 171  2/23: 148  2/22: 295  2/21: 187, 238  2/20: 168        Assessment:  Frandy states that he thinks that he has been more consistent with taking his insulin. He had more BG in target the last few weeks. Recommended to continue with current dosing and continue working on taking insulin consistently.     Plan/Response:  Continue with current plan    Lenora Hughes, RD, LD, CDE  Time spent: 5 minutes      Any diabetes medication dose changes were made via the CDE Protocol and Collaborative Practice Agreement with the patient's referring provider. A copy of this encounter was shared with the provider.

## 2022-03-07 NOTE — PROGRESS NOTES
Diabetes Follow-up    Subjective/Objective:    Telephone visit for BG review    Diabetes is being managed with   Diabetes Medications   Diabetes Medication(s)     Biguanides       metFORMIN (GLUCOPHAGE-XR) 500 MG 24 hr tablet    TAKE 4 TABLETS BY MOUTH DAILY WITH BREAKFAST    Insulin       insulin glargine (LANTUS SOLOSTAR) 100 UNIT/ML pen    Inject 48 Units Subcutaneous daily    Incretin Mimetic Agents (GLP-1 Receptor Agonists)       liraglutide (VICTOZA) 18 MG/3ML solution    Inject 1.8 mg Subcutaneous daily          BG/Food Log:   Date Breakfast  Lunch  Dinner  Bedtime    Before After Before After Before After    3/7          3/6 218         3/5 278         3/4          3/3 138         3/2 169         3/1 125, 160           2/28: 235, 146, 124  2/27: 311  2/26: 219, 208, 185  2/25: 281  2/24: 171  2/23: 148  2/22: 295  2/21: 187, 238  2/20: 168        Assessment:  Frandy states that he thinks that he has been more consistent with taking his insulin. He had more BG in target the last few weeks. Recommended to continue with current dosing and continue working on taking insulin consistently.     Plan/Response:  Continue with current plan    Lenora Hughes, RD, LD, CDE  Time spent: 5 minutes      Any diabetes medication dose changes were made via the CDE Protocol and Collaborative Practice Agreement with the patient's referring provider. A copy of this encounter was shared with the provider.

## 2022-03-14 NOTE — PROGRESS NOTES
[facility]    Referring provider:Schuyler Franco, DO    HPI: Mr. Darian Engle is a 71 year old male with PMH significant for    -Diabetes type 2 (uncontrolled)  -Obstructive sleep apnea (Not using CPAP)  -PAD (right proximal superficial femoral artery stent, left proximal superficial femoral artery stent)  -Status post CABG 2004, and s/p stent  2/2013, 2017  -STEMI due to occlusion of RCA HCC 5/8/2017  -CVA  -Active smoker  -Alcohol use disorder  -Mild cognitive impairment-amnestic condition  -Paroxysmal atrial fibrillation documented on Zio patch 7/13/2021    Patient is being seen today for paroxysmal atrial fibrillation detected on Zio patch 7/13/2021 at request of Dr. Schuyler Franco from neurology.     The patient has a history of paroxysmal atrial fibrillation dating back to at least May of 2021 when he presented to [facility] with palpitations and was reported to be in atrial flutter.He was cardioverted in the ED, but declined anticoagulation.     Patient was then recently recommended to undergo Zio patch when he was seen in neurology clinic 7/12/2021 for cognitive impairment and unclear etiology of CVA.  I have personally reviewed the Zio patch for 2 weeks which shows 16% A. fib burden with longest episode lasting for 2 days. During the Zio patch monitoring period, the patient denies having any symptoms (palpitations, lightheadedness, chest discomfort, etc.). He overall denies any symptomatic palpitations aside from the event in May.    Overall, the patient is largely sedentary. He spends most of his day seated. He is able to perform housework without too much difficulty though. He is also able to shop and push a cart without chest pain. He does have claudication in the legs which limits his ability to do regular exercise or walking. He has been working with an  outside vascular surgeon for his PAD.    The patient continues to smoke 1 ppd, which he has done since he was a teen. He is not interested in quitting or cutting back at this time. He also reports heavy alcohol consumption. He consumes about 1 pint of ara and a 6 pack of beer 2-3 times each week. He is not interested in cutting back at this time either.    Patient is currently on aspirin, amlodipine 10, atorvastatin 80, cilostazol, clopidogrel, donepezil, insulin, Imdur 30, losartan 100, Metformin, metoprolol 100 mg.    With regards to PAD he follows at [facility].  He is on cilostazol, Plavix and aspirin.  He continues to have claudication, but is largely sedentary at this time. His wife reports that bypass surgery has been discussed, but that the patient is not currently a candidate due to his ongoing habits.    He has no history of heart failure.  He currently denies any edema, orthopnea, or PND. Most recent echocardiogram 3/1/2021 showed normal biventricular function with no significant valve disease.    Medications, personal, family, and social history reviewed with patient and revised.    PAST MEDICAL HISTORY:  Past Medical History:   Diagnosis Date     Arthritis March 2018    joint pain both hands     Diabetes (H)      Heart disease 1991    [place]-angioplasty     Hypertension        CURRENT MEDICATIONS:  Current Outpatient Medications   Medication Sig Dispense Refill     ACCU-CHEK SMARTVIEW test strip TEST THREE TIMES DAILY OR AS DIRECTED 300 strip 3     amLODIPine (NORVASC) 10 MG tablet TAKE 1 TABLET(10 MG) BY MOUTH DAILY 90 tablet 0     aspirin 81 MG tablet Take by mouth daily 30 tablet      atorvastatin (LIPITOR) 80 MG tablet TAKE 1 TABLET(80 MG) BY MOUTH DAILY 90 tablet 1     blood glucose monitoring (ACCU-CHEK FASTCLIX) lancets USE TO TEST THREE TIMES DAILY OR AS DIRECTED 306 each 3     cilostazol (PLETAL) 50 MG tablet Take 50 mg by mouth 2 times daily       clopidogrel (PLAVIX) 75 MG tablet 75  mg  11     donepezil (ARICEPT) 10 MG tablet TAKE 1 TABLET BY MOUTH AT BEDTIME FOR MEMORY 90 tablet 1     insulin glargine (LANTUS SOLOSTAR) 100 UNIT/ML pen Inject 48 Units Subcutaneous daily 30 mL 1     insulin pen needle (B-D U/F) 31G X 5 MM miscellaneous USE THREE TIMES DAILY 300 each 1     isosorbide mononitrate (IMDUR) 30 MG 24 hr tablet TAKE 2 TABLETS(60 MG) BY MOUTH DAILY 180 tablet 2     liraglutide (VICTOZA) 18 MG/3ML solution Inject 1.8 mg Subcutaneous daily 27 mL 1     losartan (COZAAR) 100 MG tablet TAKE 1 TABLET(100 MG) BY MOUTH DAILY 90 tablet 1     metFORMIN (GLUCOPHAGE-XR) 500 MG 24 hr tablet TAKE 4 TABLETS BY MOUTH DAILY WITH BREAKFAST 360 tablet 1     metoprolol succinate ER (TOPROL-XL) 100 MG 24 hr tablet Take 1 tablet (100 mg) by mouth daily For blood pressure 90 tablet 1     multivitamin (CENTRUM SILVER) tablet Take 1 tablet by mouth daily       nitroGLYcerin (NITROSTAT) 0.4 MG sublingual tablet PLACE 1 TABLET UNDER THE TONGUE EVERY 5 MINUTES FOR 3 DOSES, IF SYMPTOMS PERSIST 5 MINUTES AFTER 1ST DOSE CALL 911 25 tablet 0     Omega-3 Fatty Acids (FISH OIL OMEGA-3) 1000 MG CAPS Take 1,200 mg by mouth daily       omeprazole (PRILOSEC) 20 MG DR capsule Take 1 capsule (20 mg) by mouth daily 90 capsule 3     traZODone (DESYREL) 50 MG tablet Take 1 tablet (50 mg) by mouth At Bedtime 30 tablet 4       PAST SURGICAL HISTORY:  Past Surgical History:   Procedure Laterality Date     CARDIAC SURGERY  2004    [facility]     ENHANCE LASER REFRACTIVE BILATERAL EXISTING PT IN PARAMETERS  2000     EYE SURGERY  2000    [facility]     VASCULAR SURGERY  2017    [facility]       ALLERGIES:     Allergies   Allergen Reactions     Lidocaine Other (See Comments)     Lungs filled with fluid. ? Anaphylaxis     Lisinopril Cough     cough     Naltrexone Nausea and Vomiting       FAMILY HISTORY:  Family History   Problem Relation Age of Onset     Glaucoma Mother       Macular Degeneration Mother      Macular Degeneration Other    In addition, he reports coronary artery disease in both of his parents, beginning in their 60s. He has a sister with poorly controlled diabetes and vascular disease. He thinks that his brother has coronary disease, but is unsure of the details.       SOCIAL HISTORY:  Social History     Tobacco Use     Smoking status: Current Some Day Smoker     Packs/day: 1.00     Years: 5.00     Pack years: 5.00     Types: Cigarettes     Start date: 1968     Last attempt to quit: 7/15/2016     Years since quittin.6     Smokeless tobacco: Current User   Substance Use Topics     Alcohol use: Not Currently     Comment: binge, two  months sober     Drug use: No   Actively drinking and smoking as noted in HPI.    ROS:   Constitutional: No fever, chills, or sweats.   Cardiovascular: As per HPI.       Exam:  BP (!) 148/68   Pulse 74   Wt 91.6 kg (202 lb)   SpO2 98%   BMI 30.71 kg/m    GENERAL APPEARANCE: alert and no distress  HEENT: no icterus, pupils equal, no central cyanosis  LYMPH/NECK: no asymmetry, JVP not elevated, no carotid bruits.  RESPIRATORY: lungs clear to auscultation - no rales, rhonchi or wheezes, no use of accessory muscles, no retractions, respirations are unlabored, normal respiratory rate  CARDIOVASCULAR: regular rhythm, normal S1, S2, no S3 or S4 and no murmur, click or rub  GI: soft, non tender  EXTREMITIES: peripheral pulses normal, no edema  NEURO: alert, normal speech,and affect  SKIN: no ecchymoses, no rashes, there is lack of hair on the b/l shins     I have reviewed the labs and personally reviewed the imaging below and made my comment in the assessment and plan.    Labs:  CBC RESULTS:   Lab Results   Component Value Date    WBC 7.1 2021    RBC 4.59 2021    HGB 13.8 2021    HCT 40.8 2021    MCV 89 2021    MCH 30.1 2021    MCHC 33.8 2021    RDW 12.2 2021     2021       BMP  RESULTS:  Lab Results   Component Value Date     09/02/2020    POTASSIUM 4.5 09/21/2021    POTASSIUM 4.0 09/02/2020    CHLORIDE 109 09/02/2020    CO2 25 09/02/2020    ANIONGAP 7 09/02/2020     (H) 09/02/2020    BUN 15 09/02/2020    CR 0.92 09/02/2020    GFRESTIMATED >90 03/01/2021    GFRESTBLACK >90 03/01/2021    SHERYL 9.2 09/02/2020      Echocardiogram 3/1/2021  No cardiac source for embolus identified. The atrial septum is intact as  assessed by color Doppler and agitated saline bubble study .    Left Ventricle  Global and regional left ventricular function is normal with an EF of 55-60%.  Left ventricular size is normal. Mild concentric wall thickening consistent  with left ventricular hypertrophy is present. Grade I or early diastolic  dysfunction. No regional wall motion abnormalities are seen.     Right Ventricle  Right ventricular function, chamber size, wall motion, and thickness are  normal.      [facility] 3/21/2021:  * Patent RIGHT proximal superficial femoral artery stent with no significant  stenosis.  * A moderate (50-75%) lesion noted in the RIGHT common femoral artery,  proximal profunda with a maximum PSV of 382 cm/s.  * A moderate (50-75%, upper limits) lesion noted in the RIGHT proximal  superficial femoral artery with a PSV of 534 cm/s and a velocity ratio of 2.2.  * A severe (>75%) lesion noted in the RIGHT mid popliteal artery with a PSV  of 466 cm/s.  * A moderate (50-75%) lesion n the RIGHT posterior tibial artery,   cm/s.  * A total occlusion noted in the RIGHT distal anterior tibial artery.  * Patent LEFT proximal superficial femoral artery stent with >50% stenosis  in the mid stent with a PSV of 238 cm/s.  * A mild (30-49%, upper limits) lesion noted in the LEFT common femoral  artery and mid superficial femoral artery with a maximum PSV of 214 cm/s.  * A moderate (50-75%) lesion noted in the LEFT distal superficial femoral  artery with a maximum PSV of 345 cm/s.  * A  moderate (50-75%) lesion in the LEFT posterior tibial artery   cm/s.  * In comparison to the previous study on 8/11/20, the occlusion in the RIGHT  anterior tibial artery is new and the stenosis in the RIGHT proximal  superficial femoral artery has significantly increased in velocity (previously  215 cm/s). Otherwise no change.      Coronary angiogram 2017 [facility]     Indications:     Acute Coronary Syndrome.       Conclusions     CAD as described.     PCI to Diagonal branch with a single Drug-eluting stent; excellent result was obtained.     Recommendations     Aggressive medical therapy for coronary artery disease.       Diagnostic Summary     Left Main is a medium calibre vessel; normal.     LAD is a medium calibre vessel; occluded after the first diagonal.  The   diagonal branch has severe 95% stenosis proximal to the previously placed stent in this vessel.  Prox LAD has moderate disease.     LCx is a medium calibre vessel; OMs are occluded.     RCA is a medium calibre vessel; has been extensively stented before.  Stents are patent with mild diffuse disease seen elsewhere.     SVG to diag is known to be occluded.     SVG to OM1/OM2 is widely patent with mild disease in the distal OMs.     LIMA to LAD is widely patent; no significant disease is sen in the distal   LAD.               Assessment and Plan:   Mr. Darian Engle is a 71 year old male with and extensive PMH including CAD s/p CABG and PCI, PAD s/p b/l revascularization, DM2, TALA, CVA, HTN, HLD, among others who is referred as a new patient for management of atrial fibrillation. He was found to have a 16% Afib burden on Zio patch monitor. He has previously had an episode of symptomatic tachypalpitations in 05/2021 for which he was cardioverted. He otherwise has no symptoms at present. Previously declined anticoagulation, but patient and his wife are both open to starting anticoagulation at this time. We did discuss at length the  association of atrial fibrillation with alcohol consumption, smoking, and untreated TALA. Recommended strongly cessation of alcohol and tobacco as well as regular CPAP use, however, at this time the patient states that he is not interested in making any of those lifestyle changes. We also discussed at length the risks of anticoagulation, particularly with ongoing heavy alcohol consumption. The patient and his wife both deny any recent falls, trauma, or ongoing concerns, and both feel comfortable proceeding with anticoagulation at this time.      #Paroxysmal AFib  [id number] = 6  Will continue with a rate control strategy given lack of symptoms at present.  Discussed at length the recommendation for alcohol and tobacco cessation, CPAP compliance, and regular exercise.  - start apixaban for stroke risk reduction  - stop plavix, continue asa  - continue Toprol 100mg    #Coronary Artery Disease, s/p CABG and multiple PCI  #PAD with claudication, active smoker  #HLD  - atorvastatin 80mg  - asa 81mg daily  - stop plavix as above  - imdur 60mg daily  - SL nitroglycerin prn  - counseled on heart healthy diet and exercise  - counseled on smoking cessation    #HTN  Target <140/90. Above target in clinic today, though patient smoked a cigarette just before his clinic appointment. Previous BP checks in range per chart review.  - amlodipine 10mg  - losartan 100mg  - Metoprolol 100mg    #DM2  - on insulin and metformin    # CVA  Following with neurology    # Cognitive Decline  Taking donepezil  Following with neurology    Return to clinic as needed.    Patient was seen and discussed with the attending cardiologist, , who is in agreement with the assessment and plan.    Cesar Rose MD    Attending note 3/15/2022   I have personally seen and examined the patient in clinic today. I agree with the fellow's findings and plan of care as documented in the note.       I personally reviewed vital signs, medications, labs,  imaging, and ECG.     The history and physical findings are accurate as recorded. My additional findings, if any, have been incorporated into the body of the note. The assessment and plans outlined reflect our joint decision making.     No medication changes today.       Total time spent 68 minutes including prior charting, face-to-face clinic visit, review of outside hospital medical records and medical documentation.     Padmini BOYD MD  [facility] Division of Cardiology  Pager [phone]

## 2022-03-15 ENCOUNTER — OFFICE VISIT (OUTPATIENT)
Dept: CARDIOLOGY | Facility: CLINIC | Age: 72
End: 2022-03-15
Payer: COMMERCIAL

## 2022-03-15 VITALS
WEIGHT: 202 LBS | DIASTOLIC BLOOD PRESSURE: 68 MMHG | SYSTOLIC BLOOD PRESSURE: 148 MMHG | BODY MASS INDEX: 30.71 KG/M2 | OXYGEN SATURATION: 98 % | HEART RATE: 74 BPM

## 2022-03-15 DIAGNOSIS — I10 PRIMARY HYPERTENSION: ICD-10-CM

## 2022-03-15 DIAGNOSIS — I48.0 PAF (PAROXYSMAL ATRIAL FIBRILLATION) (H): Primary | ICD-10-CM

## 2022-03-15 DIAGNOSIS — Z95.1 S/P CABG (CORONARY ARTERY BYPASS GRAFT): ICD-10-CM

## 2022-03-15 DIAGNOSIS — I63.9 CEREBROVASCULAR ACCIDENT (CVA), UNSPECIFIED MECHANISM (H): ICD-10-CM

## 2022-03-15 DIAGNOSIS — F17.200 SMOKER: ICD-10-CM

## 2022-03-15 DIAGNOSIS — G47.33 OSA (OBSTRUCTIVE SLEEP APNEA): ICD-10-CM

## 2022-03-15 DIAGNOSIS — I73.9 PAD (PERIPHERAL ARTERY DISEASE) (H): ICD-10-CM

## 2022-03-15 PROCEDURE — 99205 OFFICE O/P NEW HI 60 MIN: CPT | Mod: GC | Performed by: INTERNAL MEDICINE

## 2022-03-15 NOTE — LETTER
3/15/2022      RE: Darian Engle  [address] [place] [id number]       Dear Colleague,    Thank you for the opportunity to participate in the care of your patient, Darian Engle, at the [facility]. Please see a copy of my visit note below.                                                                                               [facility]    Referring provider:Schuyler Franco, DO    HPI: Mr. Darian Engle is a 71 year old male with PMH significant for    -Diabetes type 2 (uncontrolled)  -Obstructive sleep apnea (Not using CPAP)  -PAD (right proximal superficial femoral artery stent, left proximal superficial femoral artery stent)  -Status post CABG 2004, and s/p stent  2/2013, 2017  -STEMI due to occlusion of RCA HCC 5/8/2017  -CVA  -Active smoker  -Alcohol use disorder  -Mild cognitive impairment-amnestic condition  -Paroxysmal atrial fibrillation documented on Zio patch 7/13/2021    Patient is being seen today for paroxysmal atrial fibrillation detected on Zio patch 7/13/2021 at request of Dr. Schuyler Franco from neurology.     The patient has a history of paroxysmal atrial fibrillation dating back to at least May of 2021 when he presented to [facility] with palpitations and was reported to be in atrial flutter.He was cardioverted in the ED, but declined anticoagulation.     Patient was then recently recommended to undergo Zio patch when he was seen in neurology clinic 7/12/2021 for cognitive impairment and unclear etiology of CVA.  I have personally reviewed the Zio patch for 2 weeks which shows 16% A. fib burden with longest episode lasting for 2 days. During the Zio patch monitoring period, the patient denies having any symptoms (palpitations, lightheadedness, chest discomfort, etc.). He overall denies any symptomatic palpitations aside from the  event in May.    Overall, the patient is largely sedentary. He spends most of his day seated. He is able to perform housework without too much difficulty though. He is also able to shop and push a cart without chest pain. He does have claudication in the legs which limits his ability to do regular exercise or walking. He has been working with an outside vascular surgeon for his PAD.    The patient continues to smoke 1 ppd, which he has done since he was a teen. He is not interested in quitting or cutting back at this time. He also reports heavy alcohol consumption. He consumes about 1 pint of ara and a 6 pack of beer 2-3 times each week. He is not interested in cutting back at this time either.    Patient is currently on aspirin, amlodipine 10, atorvastatin 80, cilostazol, clopidogrel, donepezil, insulin, Imdur 30, losartan 100, Metformin, metoprolol 100 mg.    With regards to PAD he follows at [facility].  He is on cilostazol, Plavix and aspirin.  He continues to have claudication, but is largely sedentary at this time. His wife reports that bypass surgery has been discussed, but that the patient is not currently a candidate due to his ongoing habits.    He has no history of heart failure.  He currently denies any edema, orthopnea, or PND. Most recent echocardiogram 3/1/2021 showed normal biventricular function with no significant valve disease.    Medications, personal, family, and social history reviewed with patient and revised.    PAST MEDICAL HISTORY:  Past Medical History:   Diagnosis Date     Arthritis March 2018    joint pain both hands     Diabetes (H)      Heart disease 1991    [place]-angioplasty     Hypertension        CURRENT MEDICATIONS:  Current Outpatient Medications   Medication Sig Dispense Refill     ACCU-CHEK SMARTVIEW test strip TEST THREE TIMES DAILY OR AS DIRECTED 300 strip 3     amLODIPine (NORVASC) 10 MG tablet TAKE 1 TABLET(10 MG) BY MOUTH DAILY 90 tablet 0     aspirin 81 MG tablet  Take by mouth daily 30 tablet      atorvastatin (LIPITOR) 80 MG tablet TAKE 1 TABLET(80 MG) BY MOUTH DAILY 90 tablet 1     blood glucose monitoring (ACCU-CHEK FASTCLIX) lancets USE TO TEST THREE TIMES DAILY OR AS DIRECTED 306 each 3     cilostazol (PLETAL) 50 MG tablet Take 50 mg by mouth 2 times daily       clopidogrel (PLAVIX) 75 MG tablet 75 mg  11     donepezil (ARICEPT) 10 MG tablet TAKE 1 TABLET BY MOUTH AT BEDTIME FOR MEMORY 90 tablet 1     insulin glargine (LANTUS SOLOSTAR) 100 UNIT/ML pen Inject 48 Units Subcutaneous daily 30 mL 1     insulin pen needle (B-D U/F) 31G X 5 MM miscellaneous USE THREE TIMES DAILY 300 each 1     isosorbide mononitrate (IMDUR) 30 MG 24 hr tablet TAKE 2 TABLETS(60 MG) BY MOUTH DAILY 180 tablet 2     liraglutide (VICTOZA) 18 MG/3ML solution Inject 1.8 mg Subcutaneous daily 27 mL 1     losartan (COZAAR) 100 MG tablet TAKE 1 TABLET(100 MG) BY MOUTH DAILY 90 tablet 1     metFORMIN (GLUCOPHAGE-XR) 500 MG 24 hr tablet TAKE 4 TABLETS BY MOUTH DAILY WITH BREAKFAST 360 tablet 1     metoprolol succinate ER (TOPROL-XL) 100 MG 24 hr tablet Take 1 tablet (100 mg) by mouth daily For blood pressure 90 tablet 1     multivitamin (CENTRUM SILVER) tablet Take 1 tablet by mouth daily       nitroGLYcerin (NITROSTAT) 0.4 MG sublingual tablet PLACE 1 TABLET UNDER THE TONGUE EVERY 5 MINUTES FOR 3 DOSES, IF SYMPTOMS PERSIST 5 MINUTES AFTER 1ST DOSE CALL 911 25 tablet 0     Omega-3 Fatty Acids (FISH OIL OMEGA-3) 1000 MG CAPS Take 1,200 mg by mouth daily       omeprazole (PRILOSEC) 20 MG DR capsule Take 1 capsule (20 mg) by mouth daily 90 capsule 3     traZODone (DESYREL) 50 MG tablet Take 1 tablet (50 mg) by mouth At Bedtime 30 tablet 4       PAST SURGICAL HISTORY:  Past Surgical History:   Procedure Laterality Date     CARDIAC SURGERY  2004    [facility]     ENHANCE LASER REFRACTIVE BILATERAL EXISTING PT IN PARAMETERS  2000     EYE SURGERY  2000    [facility]  [place]     VASCULAR SURGERY  2017    [facility]       ALLERGIES:     Allergies   Allergen Reactions     Lidocaine Other (See Comments)     Lungs filled with fluid. ? Anaphylaxis     Lisinopril Cough     cough     Naltrexone Nausea and Vomiting       FAMILY HISTORY:  Family History   Problem Relation Age of Onset     Glaucoma Mother      Macular Degeneration Mother      Macular Degeneration Other    In addition, he reports coronary artery disease in both of his parents, beginning in their 60s. He has a sister with poorly controlled diabetes and vascular disease. He thinks that his brother has coronary disease, but is unsure of the details.       SOCIAL HISTORY:  Social History     Tobacco Use     Smoking status: Current Some Day Smoker     Packs/day: 1.00     Years: 5.00     Pack years: 5.00     Types: Cigarettes     Start date: 1968     Last attempt to quit: 7/15/2016     Years since quittin.6     Smokeless tobacco: Current User   Substance Use Topics     Alcohol use: Not Currently     Comment: binge, two  months sober     Drug use: No   Actively drinking and smoking as noted in HPI.    ROS:   Constitutional: No fever, chills, or sweats.   Cardiovascular: As per HPI.       Exam:  BP (!) 148/68   Pulse 74   Wt 91.6 kg (202 lb)   SpO2 98%   BMI 30.71 kg/m    GENERAL APPEARANCE: alert and no distress  HEENT: no icterus, pupils equal, no central cyanosis  LYMPH/NECK: no asymmetry, JVP not elevated, no carotid bruits.  RESPIRATORY: lungs clear to auscultation - no rales, rhonchi or wheezes, no use of accessory muscles, no retractions, respirations are unlabored, normal respiratory rate  CARDIOVASCULAR: regular rhythm, normal S1, S2, no S3 or S4 and no murmur, click or rub  GI: soft, non tender  EXTREMITIES: peripheral pulses normal, no edema  NEURO: alert, normal speech,and affect  SKIN: no ecchymoses, no rashes, there is lack of hair on the b/l shins     I have reviewed the labs and  personally reviewed the imaging below and made my comment in the assessment and plan.    Labs:  CBC RESULTS:   Lab Results   Component Value Date    WBC 7.1 06/03/2021    RBC 4.59 06/03/2021    HGB 13.8 06/03/2021    HCT 40.8 06/03/2021    MCV 89 06/03/2021    MCH 30.1 06/03/2021    MCHC 33.8 06/03/2021    RDW 12.2 06/03/2021     06/03/2021       BMP RESULTS:  Lab Results   Component Value Date     09/02/2020    POTASSIUM 4.5 09/21/2021    POTASSIUM 4.0 09/02/2020    CHLORIDE 109 09/02/2020    CO2 25 09/02/2020    ANIONGAP 7 09/02/2020     (H) 09/02/2020    BUN 15 09/02/2020    CR 0.92 09/02/2020    GFRESTIMATED >90 03/01/2021    GFRESTBLACK >90 03/01/2021    SHERYL 9.2 09/02/2020      Echocardiogram 3/1/2021  No cardiac source for embolus identified. The atrial septum is intact as  assessed by color Doppler and agitated saline bubble study .    Left Ventricle  Global and regional left ventricular function is normal with an EF of 55-60%.  Left ventricular size is normal. Mild concentric wall thickening consistent  with left ventricular hypertrophy is present. Grade I or early diastolic  dysfunction. No regional wall motion abnormalities are seen.     Right Ventricle  Right ventricular function, chamber size, wall motion, and thickness are  normal.      [facility] 3/21/2021:  * Patent RIGHT proximal superficial femoral artery stent with no significant  stenosis.  * A moderate (50-75%) lesion noted in the RIGHT common femoral artery,  proximal profunda with a maximum PSV of 382 cm/s.  * A moderate (50-75%, upper limits) lesion noted in the RIGHT proximal  superficial femoral artery with a PSV of 534 cm/s and a velocity ratio of 2.2.  * A severe (>75%) lesion noted in the RIGHT mid popliteal artery with a PSV  of 466 cm/s.  * A moderate (50-75%) lesion n the RIGHT posterior tibial artery,   cm/s.  * A total occlusion noted in the RIGHT distal anterior tibial artery.  * Patent LEFT proximal  superficial femoral artery stent with >50% stenosis  in the mid stent with a PSV of 238 cm/s.  * A mild (30-49%, upper limits) lesion noted in the LEFT common femoral  artery and mid superficial femoral artery with a maximum PSV of 214 cm/s.  * A moderate (50-75%) lesion noted in the LEFT distal superficial femoral  artery with a maximum PSV of 345 cm/s.  * A moderate (50-75%) lesion in the LEFT posterior tibial artery   cm/s.  * In comparison to the previous study on 8/11/20, the occlusion in the RIGHT  anterior tibial artery is new and the stenosis in the RIGHT proximal  superficial femoral artery has significantly increased in velocity (previously  215 cm/s). Otherwise no change.      Coronary angiogram 2017 [facility]     Indications:     Acute Coronary Syndrome.       Conclusions     CAD as described.     PCI to Diagonal branch with a single Drug-eluting stent; excellent result was obtained.     Recommendations     Aggressive medical therapy for coronary artery disease.       Diagnostic Summary     Left Main is a medium calibre vessel; normal.     LAD is a medium calibre vessel; occluded after the first diagonal.  The   diagonal branch has severe 95% stenosis proximal to the previously placed stent in this vessel.  Prox LAD has moderate disease.     LCx is a medium calibre vessel; OMs are occluded.     RCA is a medium calibre vessel; has been extensively stented before.  Stents are patent with mild diffuse disease seen elsewhere.     SVG to diag is known to be occluded.     SVG to OM1/OM2 is widely patent with mild disease in the distal OMs.     LIMA to LAD is widely patent; no significant disease is sen in the distal   LAD.               Assessment and Plan:   Mr. Darian Engle is a 71 year old male with and extensive PMH including CAD s/p CABG and PCI, PAD s/p b/l revascularization, DM2, TALA, CVA, HTN, HLD, among others who is referred as a new patient for management of atrial fibrillation.  He was found to have a 16% Afib burden on Zio patch monitor. He has previously had an episode of symptomatic tachypalpitations in 05/2021 for which he was cardioverted. He otherwise has no symptoms at present. Previously declined anticoagulation, but patient and his wife are both open to starting anticoagulation at this time. We did discuss at length the association of atrial fibrillation with alcohol consumption, smoking, and untreated TALA. Recommended strongly cessation of alcohol and tobacco as well as regular CPAP use, however, at this time the patient states that he is not interested in making any of those lifestyle changes. We also discussed at length the risks of anticoagulation, particularly with ongoing heavy alcohol consumption. The patient and his wife both deny any recent falls, trauma, or ongoing concerns, and both feel comfortable proceeding with anticoagulation at this time.      #Paroxysmal AFib  [id number] = 6  Will continue with a rate control strategy given lack of symptoms at present.  Discussed at length the recommendation for alcohol and tobacco cessation, CPAP compliance, and regular exercise.  - start apixaban for stroke risk reduction  - stop plavix, continue asa  - continue Toprol 100mg    #Coronary Artery Disease, s/p CABG and multiple PCI  #PAD with claudication, active smoker  #HLD  - atorvastatin 80mg  - asa 81mg daily  - stop plavix as above  - imdur 60mg daily  - SL nitroglycerin prn  - counseled on heart healthy diet and exercise  - counseled on smoking cessation    #HTN  Target <140/90. Above target in clinic today, though patient smoked a cigarette just before his clinic appointment. Previous BP checks in range per chart review.  - amlodipine 10mg  - losartan 100mg  - Metoprolol 100mg    #DM2  - on insulin and metformin    # CVA  Following with neurology    # Cognitive Decline  Taking donepezil  Following with neurology    Return to clinic as needed.    Patient was seen and  discussed with the attending cardiologist, , who is in agreement with the assessment and plan.    Cesar Rose MD    Attending note 3/15/2022   I have personally seen and examined the patient in clinic today. I agree with the fellow's findings and plan of care as documented in the note.       I personally reviewed vital signs, medications, labs, imaging, and ECG.     The history and physical findings are accurate as recorded. My additional findings, if any, have been incorporated into the body of the note. The assessment and plans outlined reflect our joint decision making.     No medication changes today.       Total time spent 68 minutes including prior charting, face-to-face clinic visit, review of outside hospital medical records and medical documentation.      Please do not hesitate to contact me if you have any questions/concerns.     Sincerely,     Padmini Mcdaniel MD

## 2022-03-15 NOTE — NURSING NOTE
"Chief Complaint   Patient presents with     New Patient     pt reports no heart symptoms, ref for parox afib, CVA, PAD       Initial BP (!) 167/70 (BP Location: Left arm, Patient Position: Sitting, Cuff Size: Adult Regular)   Pulse 85   Wt 91.6 kg (202 lb)   SpO2 98%   BMI 30.71 kg/m   Estimated body mass index is 30.71 kg/m  as calculated from the following:    Height as of 2/15/21: 1.727 m (5' 8\").    Weight as of this encounter: 91.6 kg (202 lb)..  BP completed using cuff size: regular    Lenora Davis, Visit Facilitator    "

## 2022-03-15 NOTE — PATIENT INSTRUCTIONS
Thank you for coming to the [facility] Clinic at [place]; please note the following instructions:    1. STOP: Plavix    2. START: Eliquis 5mg twice daily    3. Other blood thinner options are Xarelto and Pradaxa      If you have any questions regarding your visit, please contact your care team:     CARDIOLOGY  TELEPHONE NUMBER   Dina FLETCHER, Registered Nurse  Rebecca CRUZ, Registered Nurse  Michelle JACK, Registered Medical Assistant  Richard MURILLO, Licensed Practical Nurse  Lenora GARVEY, Visit Facilitator [phone] (select option 1)    *After hours: [phone]   For Scheduling Appts:     [phone] (select option 1)    *After hours: [phone]   For the Device Clinic (Pacemakers and ICD's)  Re STILES, Registered Nurse   During business hours: [phone]    *After business hours:  [phone] (select option 4)      Normal test result notifications will be released via devsisters or mailed within 7 business days.  All other test results, will be communicated via telephone once reviewed by your cardiologist.    If you need a medication refill, please contact your pharmacy.  Please allow 3 business days for your refill to be completed.    As always, thank you for trusting us with your health care needs!

## 2022-03-21 ENCOUNTER — HOSPITAL ENCOUNTER (OUTPATIENT)
Dept: OCCUPATIONAL THERAPY | Facility: CLINIC | Age: 72
Setting detail: THERAPIES SERIES
Discharge: HOME | End: 2022-03-21
Attending: PSYCHIATRY & NEUROLOGY
Payer: COMMERCIAL

## 2022-03-21 DIAGNOSIS — I63.9 CEREBROVASCULAR ACCIDENT (CVA), UNSPECIFIED MECHANISM (H): ICD-10-CM

## 2022-03-21 DIAGNOSIS — G31.84 MCI (MILD COGNITIVE IMPAIRMENT) WITH MEMORY LOSS: ICD-10-CM

## 2022-03-21 PROCEDURE — 96125 COGNITIVE TEST BY HC PRO: CPT | Mod: GO | Performed by: OCCUPATIONAL THERAPIST

## 2022-03-21 NOTE — PROGRESS NOTES
"Cognitive Performance Test    SUMMARY OF TEST:    The Cognitive Performance Test (CPT) is a standardized performance-based assessment to measure working memory/executive function processing capacities that underlie functional performance. Subtasks include common basic and instrumental activities of daily living (ADL/IADL) which are rated based on the manner in which patients respond to task demands of varying complexity. The total CPT score describes a level of functioning that indicates how information is processed, implications for functional activities, potential safety risks and a recommended level of supervision or assist based on cognitive function. The highest total score on this test is in the range of 5.6 to 5.8.    DATE OF TESTING: March 21, 2022  Order date: 2/23/22  Ordering Provider: Schuyler Franco DO   Order Diagnosis: Cerebrovascular accident (CVA), unspecified mechanism (H), MCI (mild cognitive impairment) with memory loss      Occupational Profile (including diagnosis and medical history):  Followed by Dr. Franco (seen on 1/20/21, 7/12/21, and 2/23/22) for evaluation of MCI and history of strokes.      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  During the Neuropsych evaluation, the patient reported having \"spacing out\" episodes so an EEG was to be done. Pt completed CPT testing with OT 2/25/21, scoring 5.0/5.5.        Functional History Interview:    Informants: Pt and his wife Angelica     Client s perception of memory/ thinking or functional difficulties: memory is not as good as it used to be, doesn't remember why he is here today, \"not as sharp as I used to be.\"    Living situation: Lives in a one-level [facility].  Pt " described where he lives in [place], and stated he has been in his townhome for 3 years, which Angelica stated was accurate. Pt's wife works full time (but hoping to retire soon).  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     Home maintenance activities:  Frandy does majority of cooking, laundry, and household cleaning since his wife is working. Has had more difficulty in past year with leaving clothes in the washer.  When putting away groceries, he may put things in the wrong spot.      Meal preparation and grocery shopping: Tye does still go to the grocery store, pharmacy by himself.  Occasionally goes to [facility].  He recently picked out a countertop and arranged for the intallation.   He reports that he typically only goes into familiar stores and has not had any known experiences with getting lost in the stores.  His wife states that he will take a grocery list with him, but might forget to look at the list.  He sometimes comes home with things not on the list and needs to make several trips before getting all the things on the list.     Finances and paying bills: Angelica has taken over the bill paying due to Frandy's cognitive deficits.      Medication management: Angelica took over all medication management last year. She sets pills up in a pillbox and has to remind Frandy to take them every day.  Both Frandy and Angelica have a hard time remembering the medication times.  Angelica works from home full time and reports she sometimes forgets to remind him to take his meds.      Driving and transportation: Tye is still driving, states no issues with this.  Angelica reports he prefers not to drive on freeway; when they go somewhere together, he prefers that she drive instead.  Angelica reports that she has been primarily driving in the  "past year, but when he does drive he is able to follow GPS directions without difficulty. No concerns with getting lost or taking wrong exits, etc.        ADL/Mobility/Physical Functioning: walks without a device      RESULTS OF TESTING:                                                                                         CPT Subtest Results    MEDBOX: 6.0/6 SHOP/GLOVES: 6.0/6 PHONE: 5/6   WASH:  5/5 TRAVEL: 6/6 TOAST: 5/5   DRESS: 5/5   TOTAL CPT SCORE:  38/39     Average CPT Score  5.0/5.6  (Pt scored 5.4/5.6 but the reported score is always rounded down)    INTERPRETATION OF TEST RESULTS:    Based on the Cognitive Performance Test, this patient scored at CPT Level 5.0.  See CPT Levels reference below.    Summary of functional cognitive status:   Medbox task: Pt set up 4 of 4 pill bottles with good attention to detail and accuracy on all 4 bottles.  Shop task: Pt first checked the wallet to see how much money there was, then proceeded to choose a pair of gloves within that amount.  He counted change correctly.  Phone task: Pt demo'd difficulty alphabetizing to find a \"hardware store.\"  He was on the \"\" page and needed 1 cue to keep looking on that page. He then dialed the number correctly and asked a question about paint, but did not recall the specific question \"how much is a gallon of white paint.\"   Dress task: Pt appropriately selected a warm jacket, hat, and umbrella from a closet with directions to \"choose what you would wear on a cold and rainy day.\"  Wash task: Pt selected correct grooming items (soap) and was thorough in washing his hands and shutting off the water.  Toast task: Pt correctly sequenced the steps of making toast, including plugging in the toaster, and remembering to put butter and jelly on it.  Of note, he also made small talk while the toast was toasting and still returned to the task when the toast popped up.   Map/travel task:  Pt followed a map within the building to locate a " "particular set of stairs, finding the stairs without cueing.  He was also able to find his way back to the rehab clinic without any prompts.    This is not a scored part of the test, but upon returning to the OT room, Frandy was only able to describe 2 of the 4 tasks he had just completed to his wife.  He stated \"I made toast\" and \"I followed a map.\"  He needed moderate prompting, such as \"what did you put on the toast?\" or \"what were you doing when we opened the closet?\" But with this level of cueing he was able to recall more detail about what he had just done.    Factors affecting performance:  No additional problems noted    Recommendations:    Assist for ADL/IADL:  Finances and Medication management  Supervision for ADL/IADL:  Shopping  Supervision in living setting:  Weekly checks                                                       Additionally, pt and his spouse were educated on the use of phone alarms for medications, as well as a range of pillboxes that have built-in alarms. They can obtain one through [employer] and therapist demo'd how to search for this.  Therapist also provided written information on the [employer] [place], which offers the ability to trial assistive technology or speak with an Assistive Technology Representative for further advice on recommended pillboxes with built-in alarms.  Pt and spouse both seemed very interested and demo'd good understanding for pursuing this option.    TIME ADMINISTERING TEST: 70   TIME FOR INTERPRETATION AND PREPARATION OF REPORT: 15    TOTAL TIME: 85      CPT Levels Reference:    Patient's Average CPT Score:  5.0  Note: pt's overall score is in the same range that it was at in Feb 2021.  However, he did score better on 3 of the subtests today (Medbox, shop, and wash).  For this test, scores are always rounded down.  So in spite of slight improvements on individual subtests, he still scored in an overall 5.0 range.                                         " "                                                                                                          Individual scores range along a continuum as outlined below.  In addition to cognitive status, other factors may affect safety in a home environment.  Please refer to specific recommendations for this patient.    ___5.6-5.8  Normal functioning (absence of cognitive-functional disability).  Independent in managing personal affairs, monitors and directs own behavior.  Uses complex information to carry out daily activities with safety and accuracy.    Proficient with instrumental activities of daily living (IADL) and learning new activity.  Problems are anticipated, errors are avoided, and consequences of actions are considered.      _XX__5.0   Mild cognitive-functional disability; deficits in working memory and executive thought processes. Difficulty using complex information. Problems may be observed with recent memory, judgment, reasoning and planning ahead. May be impulsive or have difficulty anticipating consequences.  Safety:  May require assistance to plan ahead; or to manage complex medication schedules, appointments or finances.  Hazardous activities may need to be monitored or limited.  ADL:  Mild functional decline.  Able to complete basic self-care and routine household tasks.  May have difficulty with complex daily tasks such as reading, writing, meal preparation, shopping or driving.   Learns through hands on teaching. Self-centered behavior or difficulty considering the needs of others may be seen related to trouble seeing the  whole picture\". Can appear disorganized or uninhibited.    ___4.5  Mild to moderate cognitive-functional disability. Significant deficits in working memory and executive thought processes. Judgment, reasoning and planning show obvious impairment.  Distractible with inability to shift attention/actions given competing stimuli.  Difficulty with problem solving and managing " details. Complex daily tasks performed with inconsistency, difficulty, or error.     Safety:  Medications should be monitored, stove use may require supervision, and driving ability may be affected.  Impaired safety awareness with inability to anticipate potential problems.  May not recognize or respond to emergent situations. Requires frequent check-in support.   ADL:  Mild difficulty with simple everyday self-care tasks. Benefits from structured, routine activity.  Will likely need reminders to complete tasks outside of the routine. Requires assistance with planning and IADL tasks like shopping and finances. Learns concrete tasks through repetition, but performance may not generalize. Tends to be impulsive with poor insight. Self centered behavior or inability to consider the needs of others is common.    ___4.0  Moderate cognitive-functional disability; abstract to concrete thought processes. Working memory and executive function impairments are obvious. Difficulty with planning and problem solving.  Behavior is goal-directed, but unable to follow multi-step directions, is easily distracted, and may not recognize mistakes.  Inability to anticipate hazards or understand precautions.  Safety:  Recommend 24-hour supervision for safety. Supervision needed for medication management and for hazardous activities. May not be able to follow a restricted diet. Can get lost in unfamiliar surroundings. Generally, persons functioning at level 4 should not be driving.   ADL:  Some decline in quality or frequency of ADL.  [place] enhanced by use of a routine, simple concrete directions, and caregiver set-up of needed items. Complex tasks such as money or home management typically requires assistance.  Relies heavily on vision to guide behavior; will ignore objects/hazards not in plain sight and can be distracted by irrelevant objects. Often has poor insight.  Able to carry out social conversation and may verbally  cover   for deficits leading caregivers to believe they are capable of functioning independently.       ___3.5  Moderate cognitive-functional disability; increased cues needed for task completion. Aware of concrete task steps but needs prompting or cues to initiate and complete simple tasks. Attention span is limited, simple directions may need to be repeated, and re-focus to a topic or task may be required.  Safety:  24-hour supervision required for safety and for assistance with daily tasks. Assistance required with medications, and access to medication should be limited. Meals, nutrition and dietary restrictions need to be monitored.  All hazardous activities should be restricted or supervised. Should not drive. Prone to wandering and can become lost.  ADL:  Moderate functional decline. Familiar tasks usually requires set-up of supplies and directions to complete steps. May need objects handed to them for task initiation. Function best with a set schedule in familiar surroundings with familiar people. All complex tasks must be done by others. Vocabulary is diminished and speech often unfocused.

## 2022-03-27 DIAGNOSIS — I10 ESSENTIAL HYPERTENSION WITH GOAL BLOOD PRESSURE LESS THAN 140/90: ICD-10-CM

## 2022-03-28 RX ORDER — AMLODIPINE BESYLATE 10 MG/1
TABLET ORAL
Qty: 90 TABLET | Refills: 0 | Status: SHIPPED | OUTPATIENT
Start: 2022-03-28 | End: 2022-05-13

## 2022-03-28 NOTE — TELEPHONE ENCOUNTER
Routing refill request to provider for review/approval because:  Labs not current:  CR  BP Readings from Last 3 Encounters:   03/15/22 (!) 148/68   12/28/21 131/62   09/21/21 129/59     Kayley BANKSN, RN

## 2022-04-12 ENCOUNTER — VIRTUAL VISIT (OUTPATIENT)
Dept: EDUCATION SERVICES | Facility: CLINIC | Age: 72
End: 2022-04-12
Payer: COMMERCIAL

## 2022-04-12 DIAGNOSIS — E11.8 TYPE 2 DIABETES MELLITUS WITH COMPLICATION, WITH LONG-TERM CURRENT USE OF INSULIN (H): Primary | ICD-10-CM

## 2022-04-12 DIAGNOSIS — Z79.4 TYPE 2 DIABETES MELLITUS WITH COMPLICATION, WITH LONG-TERM CURRENT USE OF INSULIN (H): Primary | ICD-10-CM

## 2022-04-12 PROCEDURE — 98966 PH1 ASSMT&MGMT NQHP 5-10: CPT | Mod: 95 | Performed by: DIETITIAN, REGISTERED

## 2022-04-12 NOTE — LETTER
2022         RE: Darian Engle  [address]   [place] [address]        Dear Colleague,    Thank you for referring your patient, Darian Engle, to the [facility]. Please see a copy of my visit note below.    Diabetes Education Follow-up    Subjective/Objective:    Telephone visit for BG review. Frandy states that he feels he has been more consistent with taking his medications, but has not been monitoring his BG as much.     Diabetes is being managed with   Diabetes Medications   Diabetes Medication(s)     Biguanides       metFORMIN (GLUCOPHAGE-XR) 500 MG 24 hr tablet    TAKE 4 TABLETS BY MOUTH DAILY WITH BREAKFAST    Insulin       insulin glargine (LANTUS SOLOSTAR) 100 UNIT/ML pen    Inject 48 Units Subcutaneous daily    Incretin Mimetic Agents (GLP-1 Receptor Agonists)       liraglutide (VICTOZA) 18 MG/3ML solution    Inject 1.8 mg Subcutaneous daily          BG/Food Lo/12: 213  4: 130, 205, 185  3/28: 218  3/23: 198  3/21: 225  3: 99    Assessment:    BG looking better, still having some elevated readings.     Plan/Response:  Continue working on consistent monitoring and medication taking.     Lenora Hughes RD LD [facility]  Time spent: 5 minutes      Any diabetes medication dose changes were made via the CDE Protocol and Collaborative Practice Agreement with the patient's referring provider. A copy of this encounter was shared with the provider.

## 2022-04-12 NOTE — PROGRESS NOTES
Diabetes Education Follow-up    Subjective/Objective:    Telephone visit for BG review. Frandy states that he feels he has been more consistent with taking his medications, but has not been monitoring his BG as much.     Diabetes is being managed with   Diabetes Medications   Diabetes Medication(s)     Biguanides       metFORMIN (GLUCOPHAGE-XR) 500 MG 24 hr tablet    TAKE 4 TABLETS BY MOUTH DAILY WITH BREAKFAST    Insulin       insulin glargine (LANTUS SOLOSTAR) 100 UNIT/ML pen    Inject 48 Units Subcutaneous daily    Incretin Mimetic Agents (GLP-1 Receptor Agonists)       liraglutide (VICTOZA) 18 MG/3ML solution    Inject 1.8 mg Subcutaneous daily          BG/Food Lo/12: 213  : 130, 205, 185  3/28: 218  3/23: 198  3/21: 225  3/12: 99    Assessment:    BG looking better, still having some elevated readings.     Plan/Response:  Continue working on consistent monitoring and medication taking.     Lenora Hughes RD LD [facility]  Time spent: 5 minutes      Any diabetes medication dose changes were made via the CDE Protocol and Collaborative Practice Agreement with the patient's referring provider. A copy of this encounter was shared with the provider.

## 2022-04-26 ENCOUNTER — OFFICE VISIT (OUTPATIENT)
Dept: URGENT CARE | Facility: URGENT CARE | Age: 72
End: 2022-04-26
Payer: COMMERCIAL

## 2022-04-26 VITALS
SYSTOLIC BLOOD PRESSURE: 130 MMHG | HEART RATE: 86 BPM | BODY MASS INDEX: 28.16 KG/M2 | TEMPERATURE: 98.5 F | WEIGHT: 185.2 LBS | OXYGEN SATURATION: 98 % | DIASTOLIC BLOOD PRESSURE: 56 MMHG

## 2022-04-26 DIAGNOSIS — K59.00 CONSTIPATION, UNSPECIFIED CONSTIPATION TYPE: Primary | ICD-10-CM

## 2022-04-26 DIAGNOSIS — R10.84 ABDOMINAL PAIN, GENERALIZED: ICD-10-CM

## 2022-04-26 PROCEDURE — 99215 OFFICE O/P EST HI 40 MIN: CPT | Performed by: PHYSICIAN ASSISTANT

## 2022-04-26 ASSESSMENT — ENCOUNTER SYMPTOMS
PALPITATIONS: 0
HEMATURIA: 0
CARDIOVASCULAR NEGATIVE: 1
SHORTNESS OF BREATH: 0
MYALGIAS: 0
CONSTIPATION: 1
ABDOMINAL PAIN: 1
HEADACHES: 0
FEVER: 0
CHEST TIGHTNESS: 0
NEUROLOGICAL NEGATIVE: 1
WHEEZING: 0
DYSURIA: 0
COUGH: 0
ALLERGIC/IMMUNOLOGIC NEGATIVE: 1
MUSCULOSKELETAL NEGATIVE: 1
NAUSEA: 0
RESPIRATORY NEGATIVE: 1
DIARRHEA: 0
FREQUENCY: 0
SORE THROAT: 0
CHILLS: 0
VOMITING: 0
CONSTITUTIONAL NEGATIVE: 1

## 2022-04-26 NOTE — PROGRESS NOTES
Chief Complaint:    Chief Complaint   Patient presents with     Constipation     Constipation for at least 3 week, terrible stomach pain. Pt has a bm once a day. He has tried everything OTC and nothing is helping          Medical Decision Making:    Vital signs reviewed by Darian Mena PA-C  /56 (BP Location: Left arm, Patient Position: Sitting, Cuff Size: Adult Regular)   Pulse 86   Temp 98.5  F (36.9  C) (Tympanic)   Wt 84 kg (185 lb 3.2 oz)   SpO2 98%   BMI 28.16 kg/m      Differential Diagnosis:  Bowel obstruction, abdominal mass.     ASSESSMENT:     1. Constipation, unspecified constipation type    2. Abdominal pain, generalized           PLAN:    Patient appears uncomfortable in clinic today.  No focal abdominal tenderness on exam.     With ongoing symptoms, suspicious for bowel obstruction, or abdominal mass.  Patient instructed to go to the ED now for further evaluation, labs, imaging, and possible enema and disimpaction.   Patient instructed to follow up with PCP in 1 week if symptoms are not improving.  Sooner if symptoms worsen.  Worrisome symptoms discussed with instructions to go to the ED.  Patient verbalized understanding and agreed with this plan.    Labs:     No results found for any visits on 04/26/22.    Current Meds:    Current Outpatient Medications:      ACCU-CHEK SMARTVIEW test strip, TEST THREE TIMES DAILY OR AS DIRECTED, Disp: 300 strip, Rfl: 3     amLODIPine (NORVASC) 10 MG tablet, TAKE 1 TABLET(10 MG) BY MOUTH DAILY, Disp: 90 tablet, Rfl: 0     apixaban ANTICOAGULANT (ELIQUIS ANTICOAGULANT) 5 MG tablet, Take 1 tablet (5 mg) by mouth 2 times daily, Disp: 180 tablet, Rfl: 3     aspirin 81 MG tablet, Take by mouth daily, Disp: 30 tablet, Rfl:      blood glucose monitoring (ACCU-CHEK FASTCLIX) lancets, USE TO TEST THREE TIMES DAILY OR AS DIRECTED, Disp: 306 each, Rfl: 3     cilostazol (PLETAL) 50 MG tablet, Take 50 mg by mouth 2 times daily, Disp: , Rfl:      donepezil (ARICEPT)  10 MG tablet, TAKE 1 TABLET BY MOUTH AT BEDTIME FOR MEMORY, Disp: 90 tablet, Rfl: 1     insulin glargine (LANTUS SOLOSTAR) 100 UNIT/ML pen, Inject 48 Units Subcutaneous daily, Disp: 30 mL, Rfl: 1     insulin pen needle (B-D U/F) 31G X 5 MM miscellaneous, USE THREE TIMES DAILY, Disp: 300 each, Rfl: 1     isosorbide mononitrate (IMDUR) 30 MG 24 hr tablet, TAKE 2 TABLETS(60 MG) BY MOUTH DAILY, Disp: 180 tablet, Rfl: 2     liraglutide (VICTOZA) 18 MG/3ML solution, Inject 1.8 mg Subcutaneous daily, Disp: 27 mL, Rfl: 1     losartan (COZAAR) 100 MG tablet, TAKE 1 TABLET(100 MG) BY MOUTH DAILY, Disp: 90 tablet, Rfl: 1     metFORMIN (GLUCOPHAGE-XR) 500 MG 24 hr tablet, TAKE 4 TABLETS BY MOUTH DAILY WITH BREAKFAST, Disp: 360 tablet, Rfl: 1     metoprolol succinate ER (TOPROL-XL) 100 MG 24 hr tablet, Take 1 tablet (100 mg) by mouth daily For blood pressure, Disp: 90 tablet, Rfl: 1     multivitamin (CENTRUM SILVER) tablet, Take 1 tablet by mouth daily, Disp: , Rfl:      nitroGLYcerin (NITROSTAT) 0.4 MG sublingual tablet, PLACE 1 TABLET UNDER THE TONGUE EVERY 5 MINUTES FOR 3 DOSES, IF SYMPTOMS PERSIST 5 MINUTES AFTER 1ST DOSE CALL 911, Disp: 25 tablet, Rfl: 0     Omega-3 Fatty Acids (FISH OIL OMEGA-3) 1000 MG CAPS, Take 1,200 mg by mouth daily, Disp: , Rfl:      omeprazole (PRILOSEC) 20 MG DR capsule, Take 1 capsule (20 mg) by mouth daily, Disp: 90 capsule, Rfl: 3     traZODone (DESYREL) 50 MG tablet, Take 1 tablet (50 mg) by mouth At Bedtime, Disp: 30 tablet, Rfl: 4    Allergies:  Allergies   Allergen Reactions     Lidocaine Other (See Comments)     Lungs filled with fluid. ? Anaphylaxis     Lisinopril Cough     cough     Naltrexone Nausea and Vomiting       SUBJECTIVE    HPI: Darian Engle is an 72 year old male who presents for evaluation and treatment of constipation and abdominal pain.  Symptoms have been worsening over the past 3 weeks.  He has been taking Metamucil, and Miralax as well as drinking plenty of water with  no relief.  He can not remember when his last normal BM was.  He has had some associated nausea, but no reported vomiting.  No fever, chills.      ROS:      Review of Systems   Constitutional: Negative.  Negative for chills and fever.   HENT: Negative.  Negative for sore throat.    Respiratory: Negative.  Negative for cough, chest tightness, shortness of breath and wheezing.    Cardiovascular: Negative.  Negative for chest pain and palpitations.   Gastrointestinal: Positive for abdominal pain and constipation. Negative for diarrhea, nausea and vomiting.   Genitourinary: Negative for dysuria, frequency, hematuria and urgency.   Musculoskeletal: Negative.  Negative for myalgias.   Skin: Negative for rash.   Allergic/Immunologic: Negative.  Negative for immunocompromised state.   Neurological: Negative.  Negative for headaches.        Family History   Family History   Problem Relation Age of Onset     Glaucoma Mother      Macular Degeneration Mother      Macular Degeneration Other      Lung Cancer Brother        Social History  Social History     Socioeconomic History     Marital status:      Spouse name: Not on file     Number of children: Not on file     Years of education: Not on file     Highest education level: Not on file   Occupational History     Not on file   Tobacco Use     Smoking status: Current Some Day Smoker     Packs/day: 1.00     Years: 5.00     Pack years: 5.00     Types: Cigarettes     Start date: 1968     Last attempt to quit: 7/15/2016     Years since quittin.7     Smokeless tobacco: Current User   Substance and Sexual Activity     Alcohol use: Not Currently     Comment: binge, two  months sober     Drug use: No     Sexual activity: Not Currently     Partners: Female   Other Topics Concern     Parent/sibling w/ CABG, MI or angioplasty before 65F 55M? Yes     Comment: brother   Social History Narrative     Not on file     Social Determinants of Health     Financial Resource Strain:  Not on file   Food Insecurity: Not on file   Transportation Needs: Not on file   Physical Activity: Not on file   Stress: Not on file   Social Connections: Not on file   Intimate Partner Violence: Not on file   Housing Stability: Not on file        Surgical History:  Past Surgical History:   Procedure Laterality Date     CARDIAC SURGERY  2004    [facility]     ENHANCE LASER REFRACTIVE BILATERAL EXISTING PT IN PARAMETERS  2000     EYE SURGERY  2000    [facility]     VASCULAR SURGERY  2017    [facility]        Problem List:  Patient Active Problem List   Diagnosis     CAD, multiple vessel     Essential hypertension with goal blood pressure less than 140/90     Type 2 diabetes mellitus with complication, with long-term current use of insulin (H)     Hyperlipidemia LDL goal <100     Gastroesophageal reflux disease without esophagitis     PAD (peripheral artery disease) (H)     Type 2 diabetes mellitus without retinopathy (H)     Bilateral incipient cataracts     Obstructive sleep apnea syndrome           OBJECTIVE:     Vital signs noted and reviewed by Darian Mena PA-C  /56 (BP Location: Left arm, Patient Position: Sitting, Cuff Size: Adult Regular)   Pulse 86   Temp 98.5  F (36.9  C) (Tympanic)   Wt 84 kg (185 lb 3.2 oz)   SpO2 98%   BMI 28.16 kg/m       PEFR:    Physical Exam  Vitals and nursing note reviewed.   Constitutional:       General: He is not in acute distress.     Appearance: He is well-developed. He is not ill-appearing, toxic-appearing or diaphoretic.   HENT:      Head: Normocephalic and atraumatic.      Right Ear: Hearing, tympanic membrane, ear canal and external ear normal. Tympanic membrane is not perforated, erythematous, retracted or bulging.      Left Ear: Hearing, tympanic membrane, ear canal and external ear normal. Tympanic membrane is not perforated, erythematous, retracted or bulging.      Nose: Nose normal. No  mucosal edema, congestion or rhinorrhea.      Mouth/Throat:      Pharynx: No oropharyngeal exudate or posterior oropharyngeal erythema.      Tonsils: No tonsillar exudate or tonsillar abscesses. 0 on the right. 0 on the left.   Eyes:      Pupils: Pupils are equal, round, and reactive to light.   Cardiovascular:      Rate and Rhythm: Normal rate and regular rhythm.      Heart sounds: Normal heart sounds, S1 normal and S2 normal. Heart sounds not distant. No murmur heard.    No friction rub. No gallop.   Pulmonary:      Effort: Pulmonary effort is normal. No respiratory distress.      Breath sounds: Normal breath sounds. No decreased breath sounds, wheezing, rhonchi or rales.   Abdominal:      General: Bowel sounds are normal. There is no distension.      Palpations: Abdomen is soft.      Tenderness: There is generalized abdominal tenderness. There is no right CVA tenderness, left CVA tenderness, guarding or rebound.   Musculoskeletal:      Cervical back: Normal range of motion and neck supple.   Lymphadenopathy:      Cervical: No cervical adenopathy.   Skin:     General: Skin is warm and dry.      Findings: No rash.   Neurological:      Mental Status: He is alert.      Cranial Nerves: No cranial nerve deficit.   Psychiatric:         Attention and Perception: He is attentive.         Speech: Speech normal.         Behavior: Behavior normal. Behavior is cooperative.         Thought Content: Thought content normal.         Judgment: Judgment normal.             Darian Mena PA-C  4/26/2022, 5:01 PM

## 2022-05-09 NOTE — TELEPHONE ENCOUNTER
Assessment/Plan:    1  Anxiety  Will continue with the clonazepam and sertraline present dose for now  Will refer patient for psychiatry services  Probably she will get better benefit with counseling  Also advised for healthy lifestyle and healthy reading topics  2  Type 2 diabetes mellitus  Continue with present regimen  She is due for hemoglobin A1c before next visit    3  Hyperlipidemia  Continue with present dose of Lipitor along with low-fat diet    4  GERD  Continue with present regimen  Follow-up  Keep next scheduled appointment with PCP     Diagnoses and all orders for this visit:    Gastroesophageal reflux disease without esophagitis    Type 2 diabetes mellitus without complication, with long-term current use of insulin ([facility] )    Essential hypertension    Anxiety  -     Ambulatory Referral to Psychiatry; Future    Obesity, morbid ([facility] )    Hypothyroidism, unspecified type    Mixed hyperlipidemia          BMI Counseling: Body mass index is 35 85 kg/m²  The BMI is above normal  Nutrition recommendations include decreasing portion sizes, encouraging healthy choices of fruits and vegetables and decreasing fast food intake  Exercise recommendations include moderate physical activity 150 minutes/week and exercising 3-5 times per week  No pharmacotherapy was ordered  Rationale for BMI follow-up plan is due to patient being overweight or obese  Subjective:          Patient ID: Angeli Coy is a 81 y o  female  Patient is here for follow-up on anxiety  As per patient her anxiety is getting worse  About a week ago she was started on clonazepam and is helping to some extent  She still on sertraline 200 mg daily        The following portions of the patient's history were reviewed and updated as appropriate: allergies, current medications, past family history, past medical history, past social history, past surgical history and problem list     Review of Systems   Constitutional: Negative Mailed letter.  Kathrin Carrero TC   for fatigue and fever  HENT: Negative for congestion, ear discharge, ear pain, postnasal drip, sinus pressure, sore throat, tinnitus and trouble swallowing  Eyes: Negative for discharge, itching and visual disturbance  Respiratory: Negative for cough and shortness of breath  Cardiovascular: Negative for chest pain and palpitations  Gastrointestinal: Negative for abdominal pain, diarrhea, nausea and vomiting  Endocrine: Negative for cold intolerance and polyuria  Genitourinary: Negative for difficulty urinating, dysuria and urgency  Musculoskeletal: Negative for arthralgias and neck pain  Skin: Negative for rash  Allergic/Immunologic: Negative for environmental allergies  Neurological: Negative for dizziness, weakness and headaches  Psychiatric/Behavioral: Negative for agitation and behavioral problems  The patient is nervous/anxious            Past Medical History:   Diagnosis Date    Acid reflux     Anxiety     [facility]     Disease of thyroid gland Years    Gastroparesis     GERD (gastroesophageal reflux disease) Years    Hypertension     Hypothyroid     Obesity Years    Shingles 2 years ago         Current Outpatient Medications:     atorvastatin (LIPITOR) 20 mg tablet, Take 1 tablet (20 mg total) by mouth daily, Disp: 30 tablet, Rfl: 5    Cholecalciferol (VITAMIN D) 2000 units tablet, TAKE ONE TABLET BY MOUTH EVERY DAY, Disp: 30 tablet, Rfl: 0    clonazePAM (KlonoPIN) 0 5 mg tablet, Take 1 tablet (0 5 mg total) by mouth 2 (two) times a day, Disp: 60 tablet, Rfl: 0    cyanocobalamin (VITAMIN B-12) 1,000 mcg tablet, Take 1,000 mcg by mouth daily, Disp: , Rfl:     docusate sodium (COLACE) 100 mg capsule, Take 1 capsule (100 mg total) by mouth 2 (two) times a day, Disp: 30 capsule, Rfl: 3    famotidine (PEPCID) 20 mg tablet, Take 1 tablet (20 mg total) by mouth 2 (two) times a day, Disp: 60 tablet, Rfl: 11    glimepiride (AMARYL) 4 mg tablet, Take 1 tablet (4 mg total) by mouth 2 (two) times a day, Disp: 180 tablet, Rfl: 1    glucose blood (FREESTYLE LITE) test strip, Use 1 each 4 (four) times a day Use 4 times daily DM2-- free style lite test strips, Disp: 400 each, Rfl: 1    insulin aspart, w/niacinamide, (FIASP) 100 Units/mL injection pen, Inject 12 Units under the skin 4 (four) times a day Please send 5  pens with 5 refills, Disp: 3 mL, Rfl: 5    insulin glargine (Lantus SoloStar) 100 units/mL injection pen, 25 units in AM and 35 units in PM, Disp: 15 mL, Rfl: 5    Insulin Pen Needle (BD Pen Needle Kelly U/F) 32G X 4 MM MISC, Use 4 (four) times a day, Disp: 400 each, Rfl: 0    Lancets (freestyle) lancets, Use 3 (three) times a day Test blood glucose, Disp: 90 each, Rfl: 5    levothyroxine 100 mcg tablet, Take 1 tablet (100 mcg total) by mouth daily, Disp: 90 tablet, Rfl: 0    lisinopril-hydrochlorothiazide (PRINZIDE,ZESTORETIC) 20-12 5 MG per tablet, Take 0 5 tablets by mouth daily, Disp: 15 tablet, Rfl: 5    methocarbamol (ROBAXIN) 500 mg tablet, Take 1 tablet (500 mg total) by mouth 4 (four) times a day, Disp: 30 tablet, Rfl: 0    metoclopramide (Reglan) 10 mg tablet, Take 1 tablet (10 mg total) by mouth 4 (four) times a day, Disp: 60 tablet, Rfl: 1    ondansetron (ZOFRAN) 4 mg tablet, Take 1 tablet (4 mg total) by mouth every 8 (eight) hours as needed for nausea or vomiting, Disp: 20 tablet, Rfl: 0    sertraline (ZOLOFT) 100 mg tablet, Take 1 5 tablets (150 mg total) by mouth daily (Patient taking differently: Take 150 mg by mouth 2 (two) times a day  ), Disp: 90 tablet, Rfl: 2    Blood Glucose Monitoring Suppl (ACCU-CHEK MAT PLUS) w/Device KIT, Test 4 times daily (Patient not taking: Reported on 1/24/2022 ), Disp: 1 kit, Rfl: 0    magnesium citrate (CITROMA) 1 745 g/30 mL oral solution, Take 296 mL by mouth once for 1 dose (Patient not taking: Reported on 4/4/2022 ), Disp: 296 mL, Rfl: 0    omeprazole (PriLOSEC) 40 MG capsule, Take 1 capsule (40 mg total) by mouth daily (Patient not taking: Reported on 2022 ), Disp: 90 capsule, Rfl: 1    polyethylene glycol (GOLYTELY) 4000 mL solution, Take 4,000 mL by mouth once for 1 dose (Patient not taking: Reported on 2022 ), Disp: 4000 mL, Rfl: 0    Allergies   Allergen Reactions    Metformin Diarrhea       Social History   Past Surgical History:   Procedure Laterality Date     SECTION      CHOLECYSTECTOMY      COLONOSCOPY      DILATION AND CURETTAGE OF UTERUS      ESOPHAGOGASTRODUODENOSCOPY      FRACTURE SURGERY      HYSTERECTOMY      47    OOPHORECTOMY      41    WA COLONOSCOPY FLX DX W/COLLJ SPEC WHEN PFRMD N/A 2016    Procedure: EGD AND COLONOSCOPY;  Surgeon: Ayana Akers MD;  [facility]: [facility]; Service: Gastroenterology    WA INCISE FINGER TENDON SHEATH Right 2017    Procedure: LONG FINGER TRIGGER RELEASE ;  Surgeon: Felicity Cranker, MD;  [facility];  Service: Orthopedics    WA INCISE FINGER TENDON SHEATH Right 2016    Procedure: RELEASE TRIGGER FINGER - LONG FINGER;  Surgeon: Felicity Cranker, MD;  [facility];  Service: Orthopedics    WA OPEN RX FEMUR FX+INTRAMED SHERRY Left 2/3/2021    Procedure: INSERTION NAIL IM FEMUR ANTEGRADE (TROCHANTERIC);   Surgeon: Jose J Marie MD;  [facility];  Service: Orthopedics    WA REVISE MEDIAN N/CARPAL TUNNEL SURG Right 2017    Procedure: WRIST CARPAL TUNNEL RELEASE ; TENOLYSIS OF FPL, FDS 2-5, FDP 2-5;  APPLICATION OF Meggan Rude;  Surgeon: Felicity Cranker, MD;  [facility];  Service: Orthopedics    ROOT CANAL      UPPER GASTROINTESTINAL ENDOSCOPY       Family History   Problem Relation Age of Onset    Heart attack Father     Diabetes type II Father     Hearing loss Father     Vision loss Father     Diabetes Father     Heart disease Father     Arthritis Maternal Grandmother     No Known Problems Paternal Grandmother     Heart disease Paternal Rexine Sicard Stroke Paternal Grandfather     Cirrhosis Mother     No Known Problems Sister     No Known Problems Maternal Grandfather     Breast cancer Paternal Aunt 62    Colon cancer Paternal Uncle 28    No Known Problems Sister     No Known Problems Maternal Aunt     No Known Problems Maternal Aunt     Alcohol abuse Son     Diabetes Brother        Objective:  /64 (BP Location: Left arm, Patient Position: Sitting, Cuff Size: Standard)   Pulse 79   Temp (!) 97 4 °F (36 3 °C) (Temporal)   Ht 5' 2" (1 575 m)   Wt 88 9 kg (196 lb)   SpO2 96%   BMI 35 85 kg/m²   Body mass index is 35 85 kg/m²  Physical Exam  Constitutional:       Appearance: She is well-developed  HENT:      Head: Normocephalic  Right Ear: External ear normal       Left Ear: External ear normal       Nose: No rhinorrhea  Mouth/Throat:      Pharynx: No posterior oropharyngeal erythema  Eyes:      General: No scleral icterus  Pupils: Pupils are equal, round, and reactive to light  Neck:      Thyroid: No thyromegaly  Trachea: No tracheal deviation  Cardiovascular:      Rate and Rhythm: Normal rate and regular rhythm  Heart sounds: Normal heart sounds  No murmur heard  Pulmonary:      Effort: Pulmonary effort is normal  No respiratory distress  Breath sounds: Normal breath sounds  Chest:      Chest wall: No tenderness  Abdominal:      General: Bowel sounds are normal       Palpations: Abdomen is soft  There is no mass  Tenderness: There is no abdominal tenderness  Musculoskeletal:         General: Normal range of motion  Cervical back: Normal range of motion and neck supple  Right lower leg: No edema  Left lower leg: No edema  Lymphadenopathy:      Cervical: No cervical adenopathy  Skin:     General: Skin is warm  Neurological:      Mental Status: She is alert and oriented to person, place, and time  Cranial Nerves: No cranial nerve deficit     Psychiatric:         Mood and Affect: Mood normal          Behavior: Behavior normal          Thought Content:  Thought content normal

## 2022-05-11 ENCOUNTER — MYC MEDICAL ADVICE (OUTPATIENT)
Dept: FAMILY MEDICINE | Facility: CLINIC | Age: 72
End: 2022-05-11
Payer: COMMERCIAL

## 2022-05-12 NOTE — PROGRESS NOTES
[facility]  [address] [facility] [address]  Phone: [phone]  Primary Provider: Marcos Shell  Pre-op Performing Provider: MARCOS SHELL      PREOPERATIVE EVALUATION:  Today's date: 5/13/2022    Darian Engle is a 72 year old male who presents for a preoperative evaluation.  Pancreatic mass- getting endoscopy. Weight loss and blood sugars improving.   No LOSS OF CONSCIENCE. Poor appetite. Here with wife.  Pushing fluids.   Tylenol prn - not helpful.  Patient would like stronger medications.   No nausea, vomiting or diarrhea. No black/bloody stools. No constipations.   Some insomnia with pain. No shortness of breath. No chest pain.   Percocet.   Surgical Information:  Surgery/Procedure: colonoscopy and gastroscopy  Surgery Location: [facility]  Surgeon:  Surgery Date: 5/17/2022  Time of Surgery: 8AM  Where patient plans to recover: At home with family  Fax number for surgical facility:     Type of Anesthesia Anticipated: to be determined    ASSESSMENT / PLAN:  (Z01.818) Preop general physical exam  (primary encounter diagnosis)  Comment: denies chest pain or shortness of breath. Sugars improving with weight loss  Plan: Hemoglobin, Asymptomatic COVID-19 Virus         (Coronavirus) by PCR        HOLD norvasc for lower blood pressure and closely monitor sugars. Recheck in 3 months  Ok for procedure per specialsit    (K86.89) Pancreatic mass  Plan: per specialist  Will give a handful of percocet (helpful in past) for pain 1/2 tab [id number] prn.     (E11.9) Type 2 diabetes mellitus without retinopathy (H)  Comment: improving  Plan: Hemoglobin A1c, Renal panel        Recheck in 3 months  Lower insulin if needed and push fluids. Wife has glucoagon pen prn.     Subjective     HPI related to upcoming procedure: abdominal pain.     Preop Questions 5/8/2022   1. Have you ever had a heart attack or stroke? YES -    2. Have you ever had surgery on your heart or blood  vessels, such as a stent placement, a coronary artery bypass, or surgery on an artery in your head, neck, heart, or legs? YES -    3. Do you have chest pain with activity? No   4. Do you have a history of  heart failure? YES -    5. Do you currently have a cold, bronchitis or symptoms of other infection? No   6. Do you have a cough, shortness of breath, or wheezing? No   7. Do you or anyone in your family have previous history of blood clots? UNKNOWN -    8. Do you or does anyone in your family have a serious bleeding problem such as prolonged bleeding following surgeries or cuts? UNKNOWN -    9. Have you ever had problems with anemia or been told to take iron pills? No   10. Have you had any abnormal blood loss such as black, tarry or bloody stools? No   11. Have you ever had a blood transfusion? UNKNOWN -    12. Are you willing to have a blood transfusion if it is medically needed before, during, or after your surgery? Yes   13. Have you or any of your relatives ever had problems with anesthesia? No   14. Do you have sleep apnea, excessive snoring or daytime drowsiness? YES -    14a. Do you have a CPAP machine? Yes   15. Do you have any artifical heart valves or other implanted medical devices like a pacemaker, defibrillator, or continuous glucose monitor? No   16. Do you have artificial joints? No   17. Are you allergic to latex? No       Health Care Directive:  Patient does not have a Health Care Directive or Living Will: Discussed advance care planning with patient; information given to patient to review.    Preoperative Review of :   reviewed - controlled substances reflected in medication list.          Review of Systems  All other ROS negative. No chest pain or shortness of breath. Fevers or chills or cough/cold symptoms. No rashes.     Patient Active Problem List    Diagnosis Date Noted     Obstructive sleep apnea syndrome 01/20/2021     Priority: Medium     Seen at [facility];  diagnosed with moderate TALA by Dr. Herve Louise; Sept 2019.       Type 2 diabetes mellitus without retinopathy (H) 06/27/2018     Priority: Medium     Bilateral incipient cataracts 06/27/2018     Priority: Medium     PAD (peripheral artery disease) (H) 12/18/2017     Priority: Medium     CAD, multiple vessel 01/27/2017     Priority: Medium     Essential hypertension with goal blood pressure less than 140/90 01/27/2017     Priority: Medium     Type 2 diabetes mellitus with complication, with long-term current use of insulin (H) 01/27/2017     Priority: Medium     Hyperlipidemia LDL goal <100 01/27/2017     Priority: Medium     Gastroesophageal reflux disease without esophagitis 01/27/2017     Priority: Medium      Past Medical History:   Diagnosis Date     Arthritis March 2018    joint pain both hands     Diabetes (H)      Heart disease 1991    [place]-angi[place]     Hypertension      Past Surgical History:   Procedure Laterality Date     CARDIAC SURGERY  2004    [facility]     ENHANCE LASER REFRACTIVE BILATERAL EXISTING PT IN PARAMETERS  2000     EYE SURGERY  2000    [facility]     VASCULAR SURGERY  2017    [facility]     Current Outpatient Medications   Medication Sig Dispense Refill     ACCU-CHEK SMARTVIEW test strip TEST THREE TIMES DAILY OR AS DIRECTED 300 strip 3     amLODIPine (NORVASC) 10 MG tablet TAKE 1 TABLET(10 MG) BY MOUTH DAILY 90 tablet 0     apixaban ANTICOAGULANT (ELIQUIS ANTICOAGULANT) 5 MG tablet Take 1 tablet (5 mg) by mouth 2 times daily 180 tablet 3     aspirin 81 MG tablet Take by mouth daily 30 tablet      blood glucose monitoring (ACCU-CHEK FASTCLIX) lancets USE TO TEST THREE TIMES DAILY OR AS DIRECTED 306 each 3     cilostazol (PLETAL) 50 MG tablet Take 50 mg by mouth 2 times daily       donepezil (ARICEPT) 10 MG tablet TAKE 1 TABLET BY MOUTH AT BEDTIME FOR MEMORY 90 tablet 1     insulin glargine (LANTUS SOLOSTAR) 100  "UNIT/ML pen Inject 48 Units Subcutaneous daily 30 mL 1     insulin pen needle (B-D U/F) 31G X 5 MM miscellaneous USE THREE TIMES DAILY 300 each 1     isosorbide mononitrate (IMDUR) 30 MG 24 hr tablet TAKE 2 TABLETS(60 MG) BY MOUTH DAILY 180 tablet 2     liraglutide (VICTOZA) 18 MG/3ML solution Inject 1.8 mg Subcutaneous daily 27 mL 1     losartan (COZAAR) 100 MG tablet TAKE 1 TABLET(100 MG) BY MOUTH DAILY 90 tablet 1     metFORMIN (GLUCOPHAGE-XR) 500 MG 24 hr tablet TAKE 4 TABLETS BY MOUTH DAILY WITH BREAKFAST 360 tablet 1     metoprolol succinate ER (TOPROL-XL) 100 MG 24 hr tablet Take 1 tablet (100 mg) by mouth daily For blood pressure 90 tablet 1     multivitamin (CENTRUM SILVER) tablet Take 1 tablet by mouth daily       nitroGLYcerin (NITROSTAT) 0.4 MG sublingual tablet PLACE 1 TABLET UNDER THE TONGUE EVERY 5 MINUTES FOR 3 DOSES, IF SYMPTOMS PERSIST 5 MINUTES AFTER 1ST DOSE CALL 911 25 tablet 0     Omega-3 Fatty Acids (FISH OIL OMEGA-3) 1000 MG CAPS Take 1,200 mg by mouth daily       omeprazole (PRILOSEC) 20 MG DR capsule Take 1 capsule (20 mg) by mouth daily 90 capsule 3     traZODone (DESYREL) 50 MG tablet Take 1 tablet (50 mg) by mouth At Bedtime 30 tablet 4       Allergies   Allergen Reactions     Lidocaine Other (See Comments)     Lungs filled with fluid. ? Anaphylaxis     Lisinopril Cough     cough     Naltrexone Nausea and Vomiting        Social History     Tobacco Use     Smoking status: Current Some Day Smoker     Packs/day: 1.00     Years: 5.00     Pack years: 5.00     Types: Cigarettes     Start date: 1968     Last attempt to quit: 7/15/2016     Years since quittin.8     Smokeless tobacco: Current User   Substance Use Topics     Alcohol use: Not Currently     Comment: binge, two  months sober     {  History   Drug Use No         Objective     /52   Pulse 79   Temp 98.9  F (37.2  C) (Oral)   Resp 20   Ht 1.702 m (5' 7\")   Wt 79.2 kg (174 lb 9.6 oz)   SpO2 98%   BMI 27.35 kg/m   "   Physical Exam  GENERAL APPEARANCE: healthy, alert and no distress  HENT: ear canals and TM's normal and nose and mouth without ulcers or lesions  RESP: lungs clear to auscultation - no rales, rhonchi or wheezes  CV: regular rate and rhythm, normal S1 S2, no S3 or S4 and no murmur, click or rub   ABDOMEN: soft, mild epigastric tender, no HSM or masses and bowel sounds normal  MS: extremities normal- no gross deformities noted  NEURO: Normal strength and tone, sensory exam grossly normal, mentation a little slow and somewhat poor historian  PSYCH: mentation appears normal and affect normal/bright    Recent Labs   Lab Test 12/28/21  0740 09/21/21  0855 06/03/21  1747 01/04/21  0848 09/02/20  0825   HGB  --   --  13.8  --   --    PLT  --   --  279  --   --    NA  --   --   --   --  141   POTASSIUM  --  4.5  --   --  4.0   CR  --   --   --   --  0.92   A1C 9.8* 10.5*  --    < > 8.2*    < > = values in this interval not displayed.        Diagnostics:  Recent Results (from the past 24 hour(s))   Hemoglobin    Collection Time: 05/13/22  7:40 AM   Result Value Ref Range    Hemoglobin 13.8 13.3 - 17.7 g/dL   Hemoglobin A1c    Collection Time: 05/13/22  7:40 AM   Result Value Ref Range    Hemoglobin A1C 7.5 (H) 0.0 - 5.6 %    renal panel = pending  EKG from fall -no new ekg    Revised Cardiac Risk Index (RCRI):  The patient has the following serious cardiovascular risks for perioperative complications:   - No serious cardiac risks = 0 points     RCRI Interpretation: 1 point: Class II (low risk - 0.9% complication rate)           Signed Electronically by: Marcos Gonzalez MD  Copy of this evaluation report is provided to requesting physician.  Total time spend on chart/pt/frorms = 61 minutes

## 2022-05-13 ENCOUNTER — VIRTUAL VISIT (OUTPATIENT)
Dept: EDUCATION SERVICES | Facility: CLINIC | Age: 72
End: 2022-05-13
Payer: COMMERCIAL

## 2022-05-13 ENCOUNTER — OFFICE VISIT (OUTPATIENT)
Dept: FAMILY MEDICINE | Facility: CLINIC | Age: 72
End: 2022-05-13
Payer: COMMERCIAL

## 2022-05-13 VITALS
OXYGEN SATURATION: 98 % | HEIGHT: 67 IN | BODY MASS INDEX: 27.4 KG/M2 | DIASTOLIC BLOOD PRESSURE: 52 MMHG | HEART RATE: 79 BPM | RESPIRATION RATE: 20 BRPM | TEMPERATURE: 98.9 F | SYSTOLIC BLOOD PRESSURE: 100 MMHG | WEIGHT: 174.6 LBS

## 2022-05-13 DIAGNOSIS — R10.13 ABDOMINAL PAIN, EPIGASTRIC: ICD-10-CM

## 2022-05-13 DIAGNOSIS — Z01.818 PREOP GENERAL PHYSICAL EXAM: Primary | ICD-10-CM

## 2022-05-13 DIAGNOSIS — I20.9 ANGINA PECTORIS (H): ICD-10-CM

## 2022-05-13 DIAGNOSIS — Z79.4 TYPE 2 DIABETES MELLITUS WITH COMPLICATION, WITH LONG-TERM CURRENT USE OF INSULIN (H): Primary | ICD-10-CM

## 2022-05-13 DIAGNOSIS — E11.8 TYPE 2 DIABETES MELLITUS WITH COMPLICATION, WITH LONG-TERM CURRENT USE OF INSULIN (H): ICD-10-CM

## 2022-05-13 DIAGNOSIS — K86.89 PANCREATIC MASS: ICD-10-CM

## 2022-05-13 DIAGNOSIS — I10 ESSENTIAL HYPERTENSION WITH GOAL BLOOD PRESSURE LESS THAN 140/90: ICD-10-CM

## 2022-05-13 DIAGNOSIS — E11.8 TYPE 2 DIABETES MELLITUS WITH COMPLICATION, WITH LONG-TERM CURRENT USE OF INSULIN (H): Primary | ICD-10-CM

## 2022-05-13 DIAGNOSIS — E11.9 TYPE 2 DIABETES MELLITUS WITHOUT RETINOPATHY (H): ICD-10-CM

## 2022-05-13 DIAGNOSIS — Z79.4 TYPE 2 DIABETES MELLITUS WITH COMPLICATION, WITH LONG-TERM CURRENT USE OF INSULIN (H): ICD-10-CM

## 2022-05-13 LAB
ALBUMIN SERPL-MCNC: 3.2 G/DL (ref 3.4–5)
ANION GAP SERPL CALCULATED.3IONS-SCNC: 8 MMOL/L (ref 3–14)
BUN SERPL-MCNC: 31 MG/DL (ref 7–30)
CALCIUM SERPL-MCNC: 9.7 MG/DL (ref 8.5–10.1)
CHLORIDE BLD-SCNC: 111 MMOL/L (ref 94–109)
CO2 SERPL-SCNC: 21 MMOL/L (ref 20–32)
CREAT SERPL-MCNC: 2.29 MG/DL (ref 0.66–1.25)
GFR SERPL CREATININE-BSD FRML MDRD: 30 ML/MIN/1.73M2
GLUCOSE BLD-MCNC: 164 MG/DL (ref 70–99)
HBA1C MFR BLD: 7.5 % (ref 0–5.6)
HGB BLD-MCNC: 13.8 G/DL (ref 13.3–17.7)
PHOSPHATE SERPL-MCNC: 5.6 MG/DL (ref 2.5–4.5)
POTASSIUM BLD-SCNC: 5.1 MMOL/L (ref 3.4–5.3)
SODIUM SERPL-SCNC: 140 MMOL/L (ref 133–144)

## 2022-05-13 PROCEDURE — 85018 HEMOGLOBIN: CPT | Performed by: FAMILY MEDICINE

## 2022-05-13 PROCEDURE — 83036 HEMOGLOBIN GLYCOSYLATED A1C: CPT | Performed by: FAMILY MEDICINE

## 2022-05-13 PROCEDURE — 99215 OFFICE O/P EST HI 40 MIN: CPT | Performed by: FAMILY MEDICINE

## 2022-05-13 PROCEDURE — 99207 PR NO CHARGE LOS: CPT | Performed by: DIETITIAN, REGISTERED

## 2022-05-13 PROCEDURE — U0005 INFEC AGEN DETEC AMPLI PROBE: HCPCS | Performed by: FAMILY MEDICINE

## 2022-05-13 PROCEDURE — 36415 COLL VENOUS BLD VENIPUNCTURE: CPT | Performed by: FAMILY MEDICINE

## 2022-05-13 PROCEDURE — U0003 INFECTIOUS AGENT DETECTION BY NUCLEIC ACID (DNA OR RNA); SEVERE ACUTE RESPIRATORY SYNDROME CORONAVIRUS 2 (SARS-COV-2) (CORONAVIRUS DISEASE [COVID-19]), AMPLIFIED PROBE TECHNIQUE, MAKING USE OF HIGH THROUGHPUT TECHNOLOGIES AS DESCRIBED BY CMS-2020-01-R: HCPCS | Performed by: FAMILY MEDICINE

## 2022-05-13 PROCEDURE — 80069 RENAL FUNCTION PANEL: CPT | Performed by: FAMILY MEDICINE

## 2022-05-13 RX ORDER — OXYCODONE AND ACETAMINOPHEN 5; 325 MG/1; MG/1
TABLET ORAL
Qty: 12 TABLET | Refills: 0 | Status: SHIPPED | OUTPATIENT
Start: 2022-05-13 | End: 2023-01-01

## 2022-05-13 ASSESSMENT — PAIN SCALES - GENERAL: PAINLEVEL: NO PAIN (0)

## 2022-05-13 NOTE — LETTER
May 16, 2022      Frandy Engle  [address]   [place] [address]         Dear ,    We are writing to inform you of your test results.    Please call patient's wife. Blood sugars normal but kidney tests worse. Take 1/2 dosages of cozaar (losartan - 50g daily) and lower metformin from 4 to 2 tabs daily (1/2 dosage). Increase water and Recheck in 3 months       Resulted Orders   Hemoglobin   Result Value Ref Range    Hemoglobin 13.8 13.3 - 17.7 g/dL   Renal panel   Result Value Ref Range    Sodium 140 133 - 144 mmol/L    Potassium 5.1 3.4 - 5.3 mmol/L    Chloride 111 (H) 94 - 109 mmol/L    Carbon Dioxide (CO2) 21 20 - 32 mmol/L    Anion Gap 8 3 - 14 mmol/L    Urea Nitrogen 31 (H) 7 - 30 mg/dL    Creatinine 2.29 (H) 0.66 - 1.25 mg/dL    Calcium 9.7 8.5 - 10.1 mg/dL    Glucose 164 (H) 70 - 99 mg/dL    Albumin 3.2 (L) 3.4 - 5.0 g/dL    Phosphorus 5.6 (H) 2.5 - 4.5 mg/dL    GFR Estimate 30 (L) >60 mL/min/1.73m2      Comment:      Effective December 21, 2021 eGFRcr in adults is calculated using the 2021 CKD-EPI creatinine equation which includes age and gender (Pedro et al., NE, DOI: 10.1056/YJDGif9726253)   Hemoglobin A1c   Result Value Ref Range    Hemoglobin A1C 7.5 (H) 0.0 - 5.6 %      Comment:      Normal <5.7%   Prediabetes 5.7-6.4%    Diabetes 6.5% or higher     Note: Adopted from ADA consensus guidelines.   Asymptomatic COVID-19 Virus (Coronavirus) by PCR Nose   Result Value Ref Range    SARS CoV2 PCR Negative Negative, Testing sent to reference lab. Results will be returned via unsolicited result      Comment:      NEGATIVE: SARS-CoV-2 (COVID-19) RNA not detected, presumed negative.    Narrative    Testing was performed using the boo SARS-CoV-2 assay on the boo  DFMSim0 System. This test should be ordered for the detection of  SARS-CoV-2 in individuals who meet SARS-CoV-2 clinical and/or  epidemiological criteria. Test performance is unknown in asymptomatic  patients. This test is for in  vitro diagnostic use under the FDA EUA  for laboratories certified under CLIA to perform high and/or moderate  complexity testing. This test has not been FDA cleared or approved. A  negative result does not rule out the presence of PCR inhibitors in  the specimen or target RNA in concentration below the limit of  detection for the assay. The possibility of a false negative should  be considered if the patient's recent exposure or clinical  presentation suggests COVID-19. This test was validated by the [facility]. This  laboratory is certified under the Clinical Laboratory Improvement  Amendments of 1988 (CLIA-88) as qualified to perform high and/or  moderate complexity laboratory testing.       If you have any questions or concerns, please call the clinic at the number listed above.       Sincerely,      Marcos Gonzalez MD

## 2022-05-13 NOTE — LETTER
5/13/2022         RE: Darian Engle  [address] [place] [address]  [place] [address]        Dear Colleague,    Thank you for referring your patient, Darian Engle, to the [facility]. Please see a copy of my visit note below.    Diabetes Education Follow-up    Subjective/Objective:    Telephone visit for BG follow-up    Diabetes is being managed with   Diabetes Medications   Diabetes Medication(s)     Biguanides       metFORMIN (GLUCOPHAGE-XR) 500 MG 24 hr tablet    TAKE 4 TABLETS BY MOUTH DAILY WITH BREAKFAST    Insulin       insulin glargine (LANTUS SOLOSTAR) 100 UNIT/ML pen    Inject 48 Units Subcutaneous daily    Incretin Mimetic Agents (GLP-1 Receptor Agonists)       liraglutide (VICTOZA) 18 MG/3ML solution    Inject 1.8 mg Subcutaneous daily          BG/Food Log:   Date Breakfast  Lunch  Dinner  Bedtime    Before After Before After Before After    4/19 179         4/29 129         5/3   158                 Assessment:  Not enough BG available to assess trend. A1C came down in target.     Plan/Response:  No changes in the patient's current treatment plan  Follow-up with Dr. Gonzalez or call if BG are going up    Lenora Hughes RD LD [facility]  Time spent: 4 minutes      Any diabetes medication dose changes were made via the CDE Protocol and Collaborative Practice Agreement with the patient's referring provider. A copy of this encounter was shared with the provider.

## 2022-05-13 NOTE — PROGRESS NOTES
Diabetes Education Follow-up    Subjective/Objective:    Telephone visit for BG follow-up    Diabetes is being managed with   Diabetes Medications   Diabetes Medication(s)     Biguanides       metFORMIN (GLUCOPHAGE-XR) 500 MG 24 hr tablet    TAKE 4 TABLETS BY MOUTH DAILY WITH BREAKFAST    Insulin       insulin glargine (LANTUS SOLOSTAR) 100 UNIT/ML pen    Inject 48 Units Subcutaneous daily    Incretin Mimetic Agents (GLP-1 Receptor Agonists)       liraglutide (VICTOZA) 18 MG/3ML solution    Inject 1.8 mg Subcutaneous daily          BG/Food Log:   Date Breakfast  Lunch  Dinner  Bedtime    Before After Before After Before After    4/19 179         4/29 129         5/3   158                 Assessment:  Not enough BG available to assess trend. A1C came down in target.     Plan/Response:  No changes in the patient's current treatment plan  Follow-up with Dr. Gonzalez or call if BG are going up    SEFERINO Lebron [facility]  Time spent: 4 minutes      Any diabetes medication dose changes were made via the CDE Protocol and Collaborative Practice Agreement with the patient's referring provider. A copy of this encounter was shared with the provider.

## 2022-05-14 LAB — SARS-COV-2 RNA RESP QL NAA+PROBE: NEGATIVE

## 2022-05-15 RX ORDER — LOSARTAN POTASSIUM 100 MG/1
50 TABLET ORAL DAILY
Qty: 1 TABLET | Refills: 0 | COMMUNITY
Start: 2022-05-15 | End: 2022-11-22

## 2022-05-15 RX ORDER — METFORMIN HCL 500 MG
1000 TABLET, EXTENDED RELEASE 24 HR ORAL 2 TIMES DAILY WITH MEALS
Qty: 360 TABLET | Refills: 1 | COMMUNITY
Start: 2022-05-15 | End: 2022-09-08

## 2022-05-16 ENCOUNTER — TELEPHONE (OUTPATIENT)
Dept: FAMILY MEDICINE | Facility: CLINIC | Age: 72
End: 2022-05-16
Payer: COMMERCIAL

## 2022-05-16 DIAGNOSIS — G47.00 INSOMNIA, UNSPECIFIED TYPE: ICD-10-CM

## 2022-05-16 RX ORDER — TRAZODONE HYDROCHLORIDE 50 MG/1
50 TABLET, FILM COATED ORAL AT BEDTIME
Qty: 30 TABLET | Refills: 4 | Status: ON HOLD | OUTPATIENT
Start: 2022-05-16 | End: 2023-01-01

## 2022-05-16 NOTE — TELEPHONE ENCOUNTER
Certification of Health Care Provider for Family Member's Serious Health Condition faxed to Kaila Tierney @ [phone].Mari Price MA/MAILE

## 2022-05-16 NOTE — TELEPHONE ENCOUNTER
Rx Authorization:    Requested Medication/ Dose traZODone (DESYREL) 50 MG tablet    Date last refill ordered: 11/10/21    Quantity ordered: 30    # refills: 4    Date of last clinic visit with ordering provider:02/23/22    Date of next clinic visit with ordering provider: 7/22/22    All pertinent protocol data (lab date/result):     Include pertinent information from patients message:

## 2022-05-23 ENCOUNTER — TELEPHONE (OUTPATIENT)
Dept: FAMILY MEDICINE | Facility: CLINIC | Age: 72
End: 2022-05-23
Payer: COMMERCIAL

## 2022-05-23 NOTE — TELEPHONE ENCOUNTER
Patient's wife calling to report  is in the hospital as of 5/22/22, needing a Hospital Follow Up Appt, Can you fit patient in your KAVON slot on Friday 5/27/22 at 2:30pm?    Please advise Wife Angelica.  Lakeisha Correia

## 2022-05-24 NOTE — TELEPHONE ENCOUNTER
Left message for patient to call back.    Appointment made with Dr Gonzalez for [facility] F/U Friday 5/27/22 at 2:15 PM.    Left message for patient to call back.    Mari Price MA/MAILE

## 2022-05-27 ENCOUNTER — OFFICE VISIT (OUTPATIENT)
Dept: FAMILY MEDICINE | Facility: CLINIC | Age: 72
End: 2022-05-27
Payer: COMMERCIAL

## 2022-05-27 VITALS
DIASTOLIC BLOOD PRESSURE: 69 MMHG | SYSTOLIC BLOOD PRESSURE: 121 MMHG | TEMPERATURE: 98.3 F | BODY MASS INDEX: 27.47 KG/M2 | WEIGHT: 175.4 LBS | HEART RATE: 80 BPM | OXYGEN SATURATION: 98 % | RESPIRATION RATE: 16 BRPM

## 2022-05-27 DIAGNOSIS — R55 SYNCOPE, UNSPECIFIED SYNCOPE TYPE: ICD-10-CM

## 2022-05-27 DIAGNOSIS — R53.83 OTHER FATIGUE: ICD-10-CM

## 2022-05-27 DIAGNOSIS — I25.10 CAD, MULTIPLE VESSEL: ICD-10-CM

## 2022-05-27 DIAGNOSIS — E78.5 HYPERLIPIDEMIA LDL GOAL <100: Primary | ICD-10-CM

## 2022-05-27 DIAGNOSIS — R63.0 POOR APPETITE: ICD-10-CM

## 2022-05-27 DIAGNOSIS — I10 ESSENTIAL HYPERTENSION WITH GOAL BLOOD PRESSURE LESS THAN 140/90: ICD-10-CM

## 2022-05-27 PROCEDURE — 99495 TRANSJ CARE MGMT MOD F2F 14D: CPT | Performed by: FAMILY MEDICINE

## 2022-05-27 RX ORDER — ATORVASTATIN CALCIUM 40 MG/1
40 TABLET, FILM COATED ORAL DAILY
Qty: 90 TABLET | Refills: 3 | Status: SHIPPED | OUTPATIENT
Start: 2022-05-27 | End: 2023-06-19

## 2022-05-27 RX ORDER — SERTRALINE HYDROCHLORIDE 25 MG/1
TABLET, FILM COATED ORAL
Qty: 30 TABLET | Refills: 2 | Status: SHIPPED | OUTPATIENT
Start: 2022-05-27 | End: 2022-08-31

## 2022-05-27 RX ORDER — METOPROLOL SUCCINATE 50 MG/1
50 TABLET, EXTENDED RELEASE ORAL DAILY
Qty: 90 TABLET | Refills: 1 | Status: SHIPPED | OUTPATIENT
Start: 2022-05-27 | End: 2023-04-21

## 2022-05-27 RX ORDER — ATORVASTATIN CALCIUM 80 MG/1
80 TABLET, FILM COATED ORAL
COMMUNITY
End: 2022-09-08 | Stop reason: DRUGHIGH

## 2022-05-27 ASSESSMENT — PAIN SCALES - GENERAL: PAINLEVEL: NO PAIN (0)

## 2022-06-05 ENCOUNTER — HEALTH MAINTENANCE LETTER (OUTPATIENT)
Age: 72
End: 2022-06-05

## 2022-06-08 ENCOUNTER — TELEPHONE (OUTPATIENT)
Dept: FAMILY MEDICINE | Facility: CLINIC | Age: 72
End: 2022-06-08
Payer: COMMERCIAL

## 2022-06-08 NOTE — TELEPHONE ENCOUNTER
There is a consent to communicate with Angelica Engle (spouse), Yaritza Kelley (daughter-can't read the form) and Nicole Cespedes (daughter) on file dated 5/22/2017 (td-6/8/2022)    Provider:  Have you filled out these forms?  If you have, can you please get then to the TC to inform the wife and patient.  Thank you. Safia Noriega R.N.    Wife is asking for the FMLA forms that were left with Dr. Gonzalez at his 5/27/2022 OV.      Ok to leave a message with the provider's response.

## 2022-06-09 NOTE — TELEPHONE ENCOUNTER
Reason for Call:  Form, our goal is to have forms completed with 72 hours, however, some forms may require a visit or additional information.    Type of letter, form or note:  FMLA    Who is the form from?: Patient    Where did the form come from: Patient or family brought in       What clinic location was the form placed at?: [facility]    Where the form was placed: Dr Gonzalez Box/Folder    What number is listed as a contact on the form?: [phone] Angelica        Additional comments: Please complete form and fax to [phone]    Call taken on 6/9/2022 at 8:18 AM by Mayte South

## 2022-06-10 NOTE — TELEPHONE ENCOUNTER
Faxed forms  To [phone]. Left message on Angelica's voicemail that this was done.Mari Price MA/MAILE

## 2022-06-13 NOTE — TELEPHONE ENCOUNTER
Page 4 needs another part filled out, highlighted and placed in your basket.    Raymundo Correia

## 2022-06-21 NOTE — TELEPHONE ENCOUNTER
Employer calling to report they did not receive page 4 back. I was able to print form from the Right fax log and re-faxed the forms and was successful. Fax number was correct. [phone]  Lakeisha Correia

## 2022-07-22 ENCOUNTER — VIRTUAL VISIT (OUTPATIENT)
Dept: NEUROLOGY | Facility: CLINIC | Age: 72
End: 2022-07-22
Payer: COMMERCIAL

## 2022-07-22 DIAGNOSIS — R41.89 COGNITIVE DECLINE: ICD-10-CM

## 2022-07-22 DIAGNOSIS — I63.9 CEREBROVASCULAR ACCIDENT (CVA), UNSPECIFIED MECHANISM (H): Primary | ICD-10-CM

## 2022-07-22 PROCEDURE — 99215 OFFICE O/P EST HI 40 MIN: CPT | Mod: 95 | Performed by: PSYCHIATRY & NEUROLOGY

## 2022-07-22 NOTE — PROGRESS NOTES
Frandy is a 72 year old who is being evaluated via a billable video visit.      How would you like to obtain your AVS? MyChart  If the video visit is dropped, the invitation should be resent by: Send to e-mail at: [email or web address]  Will anyone else be joining your video visit? No        Video-Visit Details    Video Start Time: 115    Type of service:  Video Visit    Video End Time:1:45 PM    Originating Location (pt. Location): Home    Distant Location (provider location):  [facility]UROLOG[facility]     Platform used for Video Visit: [employer]

## 2022-07-22 NOTE — LETTER
7/22/2022       RE: Darian Engle  [address]  [place] [address]     Dear Colleague,    Thank you for referring your patient, Darian Engle, to the [facility] [place] [facility]. Please see a copy of my visit note below.    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 Please see the comprehensive neurologic consultation notes from those dates in the Epic records for details.     Neuropsychology testing and clinical picture was consistent for MCI-amnestic type.  Repeat CPT and clinical evaluations were to be done to monitor for signs of clinical progression while taking Donepezil.   Prior strokes were investigated and monitoring did show 16% Afib burden, and the patient was referred to  cardiology.      Interval history:   - CPT score on 3/21/2022 was 5.0/5.6 (truly a 5.4).  - Cardiology visit 3/15/2022 led to recommendation of cutting back alcohol consumption, smoking, and TALA (untreated).  His [id number] was 6, and he was to start apixiban for stroke risk reduction with continued use of ASA.    Today, the patient stopped drinking alcohol about 3 months. The patient doesn't really feel he has any issues from a functional standpoint.  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 He isn't paying bills, and his wife is doing this.  His wife notes that he isn't driving, not doing the house-work, and is like a different person.  He used to cook, but doesn't do this anymore.  Since his onset of stomach pain, which isn't present at this time, he has lost a great deal of weight.  Frandy indicates he doesn't have an appetite like he used to.  He doesn't  have an issue with operating utensils, or understanding that food is needed.  His wife does feel that since starting an antidepressant he has had improvement in diet but no weight gain.  Frandy feels that since starting an antidepressant he is starting to have a better drive to want to cook or do a task.         Assessment and Plan:   Assessment:  MCI, multifactorial    Frandy has a curious presentation, in that his CPT has not necessarily changed over time, and that some of his prior known deficits functionally are improving or returning with the use of an anti-depressant.  I do wonder if his prior infarction hasn't led to a depressive symptom of apathy, and that zoloft has helped in treatment for more of a post-stroke deficit compared to a neurodegenerative deficit.  I do not think the MCI impression is incorrect, but at this time, I am quite interested in seeing how Frandy does over the next 6 months.  If there is continued improvement in functionality, I think a repeat neuropsychology test would be helpful to truly determine if he needs to remain on donepzil.    The patient's wife has started to stay home more often to help with his cares, and may use some assistance with a respite service given she has taken on additional roles in the house.    Plan:  -   - Donepezil 10 mg QAM    Follow up in Neurology clinic in 6 months or earlier as needed should new concerns arise.      ANA Franco D.O.   of Neurology      Total time today (40 min) in this patient encounter was spent on pre-charting, counseling and/or coordination of care.

## 2022-07-22 NOTE — PROGRESS NOTES
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 Please see the comprehensive neurologic consultation notes from those dates in the Epic records for details.     Neuropsychology testing and clinical picture was consistent for MCI-amnestic type.  Repeat CPT and clinical evaluations were to be done to monitor for signs of clinical progression while taking Donepezil.   Prior strokes were investigated and monitoring did show 16% Afib burden, and the patient was referred to  cardiology.      Interval history:   - CPT score on 3/21/2022 was 5.0/5.6 (truly a 5.4).  - Cardiology visit 3/15/2022 led to recommendation of cutting back alcohol consumption, smoking, and TALA (untreated).  His [id number] was 6, and he was to start apixiban for stroke risk reduction with continued use of ASA.    Today, the patient stopped drinking alcohol about 3 months. The patient doesn't really feel he has any issues from a functional standpoint.  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 He isn't paying bills, and his wife is doing this.  His wife notes that he isn't driving, not doing the house-work, and is like a different person.  He used to cook, but doesn't do this anymore.  Since his onset of stomach pain, which isn't present at this time, he has lost a great deal of weight.  Frandy indicates he doesn't have an appetite like he used to.  He doesn't have an issue with operating utensils, or understanding that food is needed.  His wife does feel that since starting an antidepressant he has had improvement in diet but no weight gain.  Frandy feels that since starting an antidepressant he is starting to have a better drive to want to cook or do a task.         Assessment  and Plan:   Assessment:  MCI, multifactorial    Frandy has a curious presentation, in that his CPT has not necessarily changed over time, and that some of his prior known deficits functionally are improving or returning with the use of an anti-depressant.  I do wonder if his prior infarction hasn't led to a depressive symptom of apathy, and that zoloft has helped in treatment for more of a post-stroke deficit compared to a neurodegenerative deficit.  I do not think the MCI impression is incorrect, but at this time, I am quite interested in seeing how Frandy does over the next 6 months.  If there is continued improvement in functionality, I think a repeat neuropsychology test would be helpful to truly determine if he needs to remain on donepzil.    The patient's wife has started to stay home more often to help with his cares, and may use some assistance with a respite service given she has taken on additional roles in the house.    Plan:  -   - Donepezil 10 mg QAM    Follow up in Neurology clinic in 6 months or earlier as needed should new concerns arise.    ANA Franco D.O.   of Neurology    Total time today (40 min) in this patient encounter was spent on pre-charting, counseling and/or coordination of care.

## 2022-07-26 ENCOUNTER — PATIENT OUTREACH (OUTPATIENT)
Dept: CARE COORDINATION | Facility: CLINIC | Age: 72
End: 2022-07-26

## 2022-08-04 NOTE — PROGRESS NOTES
Social Work Telephone Message Note  [facility]    Patient Name:  Darian Engle  /Age:  1950 (72 year old)    Referral Source: Dr Franco  Reason for Referral:  Community resources, caregiver suport    Contacted Patient/wife on 2022 and 2022. Messages were left on voicemail. Will await return call and will provide assistance at that time.    SARAHI Simon, [facility]    [facility]  [phone]

## 2022-08-29 ENCOUNTER — TRANSFERRED RECORDS (OUTPATIENT)
Dept: HEALTH INFORMATION MANAGEMENT | Facility: CLINIC | Age: 72
End: 2022-08-29

## 2022-08-30 DIAGNOSIS — R63.0 POOR APPETITE: ICD-10-CM

## 2022-08-30 DIAGNOSIS — R53.83 OTHER FATIGUE: ICD-10-CM

## 2022-08-31 RX ORDER — SERTRALINE HYDROCHLORIDE 25 MG/1
TABLET, FILM COATED ORAL
Qty: 30 TABLET | Refills: 0 | Status: SHIPPED | OUTPATIENT
Start: 2022-08-31 | End: 2022-09-28

## 2022-08-31 NOTE — TELEPHONE ENCOUNTER
Prescription approved per [facility] Refill Protocol.    Yasmine Hoffman, RN, BSN

## 2022-09-06 NOTE — PROGRESS NOTES
ASSESSMENT / PLAN:  (E11.8,  Z79.4) Type 2 diabetes mellitus with complication, with long-term current use of insulin (H)  (primary encounter diagnosis)  Comment: over treatment  Plan: Hemoglobin A1c, TSH with free T4 reflex, Home         Care Referral, metFORMIN (GLUCOPHAGE XR) 500 MG        24 hr tablet        1/2 metformin dosage and continue hold victoza/insulin. Recheck in 3 months  Might stop metformin or only one daily if continue to be low and no weight gain.     (R63.4) Weight loss  Comment: from pancreatic mass vs psych/dementia?  Plan: TSH with free T4 reflex, Home Care Referral        Continue zoloft and prn THC gummy at bedtime. Recheck in 3 months  Home health help for safety. Protein shakes BID    (I73.9) PAD (peripheral artery disease) (H)  Comment: stable      (K86.89) Pancreatic mass  Comment: early cancer vs cyst  Plan: Home Care Referral        Follow-up per GI.  No pain but concerned about weight loss. Possible hospice if cancerous.     (F03.90) Dementia without behavioral disturbance, unspecified dementia type (H)  Comment: progressive  Plan: Home Care Referral        Continue aricept per neurology    (G47.00) Insomnia, unspecified type  Comment: needs help  Plan: doxepin (SINEQUAN) 10 MG capsule        Exercise. Reveiwed risks and side effects of medication  THC gummie prn.         Subjective   Frandy is a 72 year old presenting for the following health issues:  Follow-up weight loss. Seen oncology and GI for pancreatic mass/ abdominal pain- thought to be benign per biopsy but does carry risk of malignancy. GI recommended repeat ct in fall and egd in winter. Patient not a good candidate for whipple with age/risk factors.   History dm, cad (seeing cardiology), memory issues (seeing neurology), htn and high cholesterol  Not having anymore pain. Very poor appetite.   Sleep - falls asleep at 4:30 and up at 2am.   Wife taking care of patient and patient not intersted in home health aide at this  point. 2 children (one is a traveling). No nausea, vomiting or black/bloody stools.   Blood sugar can get lower. Taking metformin but not taking victoza/insulin,.  Exercise - none. Seen cardiology - heart stable.   Not always taking meds at bedtime.  zoloft can help appetite.   No urine changes or hematuria. Occasionally bowel incontinence.   Weight stable in past month.    Weight Check      History of Present Illness       Reason for visit:  Recheck on weight loss    He eats 2-3 servings of fruits and vegetables daily.He consumes 3 sweetened beverage(s) daily.He exercises with enough effort to increase his heart rate 9 or less minutes per day.  He exercises with enough effort to increase his heart rate 3 or less days per week.   He is taking medications regularly.         Review of Systems         Objective    /59   Pulse 74   Temp 98.4  F (36.9  C) (Oral)   Resp 18   Wt 67.9 kg (149 lb 12.8 oz)   SpO2 100%   BMI 23.46 kg/m    Body mass index is 23.46 kg/m .  Physical Exam   GENERAL: healthy, alert and no distress  EYES: Eyes grossly normal to inspection, PERRL and conjunctivae and sclerae normal  NECK: no adenopathy, no asymmetry, masses, or scars and thyroid normal to palpation  RESP: lungs clear to auscultation - no rales, rhonchi or wheezes  CV: regular rate and rhythm, normal S1 S2, no S3 or S4, no murmur, click or rub, no peripheral edema and peripheral pulses strong  ABDOMEN: soft, nontender, no hepatosplenomegaly, no masses and bowel sounds normal  NEURO: Normal strength and tone and speech normal  PSYCH: affect normal/bright}

## 2022-09-08 ENCOUNTER — MYC REFILL (OUTPATIENT)
Dept: FAMILY MEDICINE | Facility: CLINIC | Age: 72
End: 2022-09-08

## 2022-09-08 ENCOUNTER — OFFICE VISIT (OUTPATIENT)
Dept: FAMILY MEDICINE | Facility: CLINIC | Age: 72
End: 2022-09-08
Payer: COMMERCIAL

## 2022-09-08 ENCOUNTER — TELEPHONE (OUTPATIENT)
Dept: FAMILY MEDICINE | Facility: CLINIC | Age: 72
End: 2022-09-08

## 2022-09-08 VITALS
OXYGEN SATURATION: 100 % | DIASTOLIC BLOOD PRESSURE: 59 MMHG | RESPIRATION RATE: 18 BRPM | TEMPERATURE: 98.4 F | SYSTOLIC BLOOD PRESSURE: 110 MMHG | WEIGHT: 149.8 LBS | HEART RATE: 74 BPM | BODY MASS INDEX: 23.46 KG/M2

## 2022-09-08 DIAGNOSIS — K86.89 PANCREATIC MASS: ICD-10-CM

## 2022-09-08 DIAGNOSIS — I25.10 CAD, MULTIPLE VESSEL: ICD-10-CM

## 2022-09-08 DIAGNOSIS — R63.4 WEIGHT LOSS: ICD-10-CM

## 2022-09-08 DIAGNOSIS — I73.9 PAD (PERIPHERAL ARTERY DISEASE) (H): ICD-10-CM

## 2022-09-08 DIAGNOSIS — G47.00 INSOMNIA, UNSPECIFIED TYPE: ICD-10-CM

## 2022-09-08 DIAGNOSIS — E11.8 TYPE 2 DIABETES MELLITUS WITH COMPLICATION, WITH LONG-TERM CURRENT USE OF INSULIN (H): Primary | ICD-10-CM

## 2022-09-08 DIAGNOSIS — F03.90 DEMENTIA WITHOUT BEHAVIORAL DISTURBANCE, UNSPECIFIED DEMENTIA TYPE: ICD-10-CM

## 2022-09-08 DIAGNOSIS — Z79.4 TYPE 2 DIABETES MELLITUS WITH COMPLICATION, WITH LONG-TERM CURRENT USE OF INSULIN (H): Primary | ICD-10-CM

## 2022-09-08 LAB
HBA1C MFR BLD: 5.8 % (ref 0–5.6)
TSH SERPL DL<=0.005 MIU/L-ACNC: 1.03 MU/L (ref 0.4–4)

## 2022-09-08 PROCEDURE — G0009 ADMIN PNEUMOCOCCAL VACCINE: HCPCS | Performed by: FAMILY MEDICINE

## 2022-09-08 PROCEDURE — 99214 OFFICE O/P EST MOD 30 MIN: CPT | Mod: 25 | Performed by: FAMILY MEDICINE

## 2022-09-08 PROCEDURE — 90677 PCV20 VACCINE IM: CPT | Performed by: FAMILY MEDICINE

## 2022-09-08 PROCEDURE — 84443 ASSAY THYROID STIM HORMONE: CPT | Performed by: FAMILY MEDICINE

## 2022-09-08 PROCEDURE — 83036 HEMOGLOBIN GLYCOSYLATED A1C: CPT | Performed by: FAMILY MEDICINE

## 2022-09-08 PROCEDURE — G0008 ADMIN INFLUENZA VIRUS VAC: HCPCS | Performed by: FAMILY MEDICINE

## 2022-09-08 PROCEDURE — 90662 IIV NO PRSV INCREASED AG IM: CPT | Performed by: FAMILY MEDICINE

## 2022-09-08 PROCEDURE — 36415 COLL VENOUS BLD VENIPUNCTURE: CPT | Performed by: FAMILY MEDICINE

## 2022-09-08 RX ORDER — DOXEPIN HYDROCHLORIDE 10 MG/1
10 CAPSULE ORAL
Qty: 30 CAPSULE | Refills: 2 | Status: SHIPPED | OUTPATIENT
Start: 2022-09-08 | End: 2022-12-21

## 2022-09-08 RX ORDER — METFORMIN HCL 500 MG
500 TABLET, EXTENDED RELEASE 24 HR ORAL 2 TIMES DAILY WITH MEALS
Qty: 180 TABLET | Refills: 0 | Status: SHIPPED | OUTPATIENT
Start: 2022-09-08 | End: 2022-11-22

## 2022-09-08 RX ORDER — NITROGLYCERIN 0.4 MG/1
TABLET SUBLINGUAL
Qty: 25 TABLET | Refills: 0 | Status: ON HOLD | OUTPATIENT
Start: 2022-09-08 | End: 2023-01-01

## 2022-09-08 ASSESSMENT — PAIN SCALES - GENERAL: PAINLEVEL: NO PAIN (0)

## 2022-09-08 NOTE — NURSING NOTE
Prior to immunization administration, verified patients identity using patient s name and date of birth. Please see Immunization Activity for additional information.     Screening Questionnaire for Adult Immunization    Are you sick today?   No   Do you have allergies to medications, food, a vaccine component or latex?   No   Have you ever had a serious reaction after receiving a vaccination?   No   Do you have a long-term health problem with heart, lung, kidney, or metabolic disease (e.g., diabetes), asthma, a blood disorder, no spleen, complement component deficiency, a cochlear implant, or a spinal fluid leak?  Are you on long-term aspirin therapy?   No   Do you have cancer, leukemia, HIV/AIDS, or any other immune system problem?   No   Do you have a parent, brother, or sister with an immune system problem?   No   In the past 3 months, have you taken medications that affect  your immune system, such as prednisone, other steroids, or anticancer drugs; drugs for the treatment of rheumatoid arthritis, Crohn s disease, or psoriasis; or have you had radiation treatments?   No   Have you had a seizure, or a brain or other nervous system problem?   No   During the past year, have you received a transfusion of blood or blood    products, or been given immune (gamma) globulin or antiviral drug?   No   For women: Are you pregnant or is there a chance you could become       pregnant during the next month?   No   Have you received any vaccinations in the past 4 weeks?   No     Immunization questionnaire answers were all negative.        Per orders of Dr. Gonzalez, injection of Prevnar 20 & Influenza given by Ami Go. Patient instructed to remain in clinic for 15 minutes afterwards, and to report any adverse reaction to me immediately.       Screening performed by Ami Go on 9/8/2022 at 10:25 AM.

## 2022-09-08 NOTE — TELEPHONE ENCOUNTER
[facility] is calling to say that they can't accept the referral order for skilled nursing due to capacity.  Rashmi Nguyen [facility]  2nd Floor  Primary Care

## 2022-09-09 NOTE — TELEPHONE ENCOUNTER
Spoke with spouse, advised to check with insurance to see about coverage for another home care agency and call back with name of agency and Dr. Marcos Gonzalez can place another referral      Brooke LAW, RN

## 2022-09-27 DIAGNOSIS — R53.83 OTHER FATIGUE: ICD-10-CM

## 2022-09-27 DIAGNOSIS — R63.0 POOR APPETITE: ICD-10-CM

## 2022-09-28 RX ORDER — SERTRALINE HYDROCHLORIDE 25 MG/1
25 TABLET, FILM COATED ORAL DAILY
Qty: 90 TABLET | Refills: 1 | Status: SHIPPED | OUTPATIENT
Start: 2022-09-28 | End: 2023-03-21

## 2022-11-20 DIAGNOSIS — E11.8 TYPE 2 DIABETES MELLITUS WITH COMPLICATION, WITH LONG-TERM CURRENT USE OF INSULIN (H): ICD-10-CM

## 2022-11-20 DIAGNOSIS — Z79.4 TYPE 2 DIABETES MELLITUS WITH COMPLICATION, WITH LONG-TERM CURRENT USE OF INSULIN (H): ICD-10-CM

## 2022-11-21 ENCOUNTER — MYC MEDICAL ADVICE (OUTPATIENT)
Dept: FAMILY MEDICINE | Facility: CLINIC | Age: 72
End: 2022-11-21

## 2022-11-21 DIAGNOSIS — I10 ESSENTIAL HYPERTENSION WITH GOAL BLOOD PRESSURE LESS THAN 140/90: Primary | ICD-10-CM

## 2022-11-21 DIAGNOSIS — I10 ESSENTIAL HYPERTENSION WITH GOAL BLOOD PRESSURE LESS THAN 140/90: ICD-10-CM

## 2022-11-21 NOTE — TELEPHONE ENCOUNTER
Patient currently breaking his 100 mg of losartan tablet in half to take daily.  RN pended new order for 50 mg tablets per patient request.     Veronica Soriano BSN, RN

## 2022-11-22 RX ORDER — LOSARTAN POTASSIUM 50 MG/1
50 TABLET ORAL DAILY
Qty: 90 TABLET | Refills: 1 | Status: SHIPPED | OUTPATIENT
Start: 2022-11-22 | End: 2023-05-22

## 2022-11-22 RX ORDER — LOSARTAN POTASSIUM 100 MG/1
TABLET ORAL
Qty: 90 TABLET | Refills: 1 | Status: SHIPPED | OUTPATIENT
Start: 2022-11-22 | End: 2023-05-22 | Stop reason: ALTCHOICE

## 2022-11-22 RX ORDER — METFORMIN HCL 500 MG
TABLET, EXTENDED RELEASE 24 HR ORAL
Qty: 180 TABLET | Refills: 0 | Status: SHIPPED | OUTPATIENT
Start: 2022-11-22 | End: 2023-02-20

## 2022-12-01 ENCOUNTER — OFFICE VISIT (OUTPATIENT)
Dept: FAMILY MEDICINE | Facility: CLINIC | Age: 72
End: 2022-12-01
Payer: COMMERCIAL

## 2022-12-01 VITALS
SYSTOLIC BLOOD PRESSURE: 138 MMHG | BODY MASS INDEX: 23.96 KG/M2 | TEMPERATURE: 98.4 F | WEIGHT: 153 LBS | OXYGEN SATURATION: 99 % | RESPIRATION RATE: 22 BRPM | DIASTOLIC BLOOD PRESSURE: 79 MMHG | HEART RATE: 70 BPM

## 2022-12-01 DIAGNOSIS — Z01.818 PREOP GENERAL PHYSICAL EXAM: Primary | ICD-10-CM

## 2022-12-01 DIAGNOSIS — K86.89 PANCREATIC MASS: ICD-10-CM

## 2022-12-01 DIAGNOSIS — E11.9 TYPE 2 DIABETES MELLITUS WITHOUT RETINOPATHY (H): ICD-10-CM

## 2022-12-01 PROCEDURE — 93000 ELECTROCARDIOGRAM COMPLETE: CPT | Performed by: FAMILY MEDICINE

## 2022-12-01 PROCEDURE — 99214 OFFICE O/P EST MOD 30 MIN: CPT | Performed by: FAMILY MEDICINE

## 2022-12-01 ASSESSMENT — PAIN SCALES - GENERAL: PAINLEVEL: NO PAIN (0)

## 2022-12-01 NOTE — PROGRESS NOTES
[facility]  [address] [facility] [address]  Phone: [phone]  Primary Provider: Stan Shell  Pre-op Performing Provider: STAN SHELL      PREOPERATIVE EVALUATION:  Today's date: 12/1/2022    Darian Engle is a 72 year old male who presents for a preoperative evaluation.  Pancreas cyst. Endoscopic biopsy. ;  Sugars stable but not checking much. No LOSS OF CONSCIENCE. No chest pain or shortness of breath.   Here with wife. Exercise - walking around house. No nausea, vomiting or diarrhea. No abdominal pain. Caffeine a lot. No ALCOHOL.   History dm, cad (seeing cardiology), memory issues (seeing neurology), htn and high cholesterol.    Surgical Information:  Surgery/Procedure: ercp/bx  Surgery Location: [facility]  Surgeon: Chris Reveles  Surgery Date: 12/13/222  Time of Surgery: 7:55 AM  Where patient plans to recover: At home with family   Fax number for surgical facility:    Chris Reveles MD   Gastroenterology   NPI: [id number]   [address] [address] [place], [place] [address]&&   [facility] [address]       Phone: [phone]   Fax: [phone]      Type of Anesthesia Anticipated: to be determined    ASSESSMENT / PLAN:  (Z01.818) Preop general physical exam  (primary encounter diagnosis)  Comment: generally healthy and normal exam. ekg no acute changes and denies chest pain or shortness of breath   Plan: ok for procedure per GI    (K86.89) Pancreatic mass  Plan: await biopsy but some weight gain is promising.     (E11.9) Type 2 diabetes mellitus without retinopathy (H)  Comment: had over treatment but doing ok now  Plan: EKG 12-lead complete w/read - Clinics        Recheck in 3 months  Continue diet and meds. Closely monitor for hypoglycemia.  Advised regular exercise and limit caffiene for better sleep. Expected course and warning signs reviewed. Call/email with questions/concerns         Subjective     HPI related to upcoming procedure: mass    Preop Questions 12/1/2022    1. Have you ever had a heart attack or stroke? YES -    2. Have you ever had surgery on your heart or blood vessels, such as a stent placement, a coronary artery bypass, or surgery on an artery in your head, neck, heart, or legs? YES -    3. Do you have chest pain with activity? No   4. Do you have a history of  heart failure? No   5. Do you currently have a cold, bronchitis or symptoms of other infection? No   6. Do you have a cough, shortness of breath, or wheezing? No   7. Do you or anyone in your family have previous history of blood clots? YES -    8. Do you or does anyone in your family have a serious bleeding problem such as prolonged bleeding following surgeries or cuts? No   9. Have you ever had problems with anemia or been told to take iron pills? No   10. Have you had any abnormal blood loss such as black, tarry or bloody stools? No   11. Have you ever had a blood transfusion? No   12. Are you willing to have a blood transfusion if it is medically needed before, during, or after your surgery? Yes   13. Have you or any of your relatives ever had problems with anesthesia? YES -    14. Do you have sleep apnea, excessive snoring or daytime drowsiness? YES -    14a. Do you have a CPAP machine? Yes   15. Do you have any artifical heart valves or other implanted medical devices like a pacemaker, defibrillator, or continuous glucose monitor? No   16. Do you have artificial joints? No   17. Are you allergic to latex? No       Health Care Directive:  Patient does not have a Health Care Directive or Living Will: Advance Directive received and scanned. Click on Code in the patient header to view.    Preoperative Review of :   reviewed - controlled substances reflected in medication list.    Review of Systems  All other ROS negative. No fevers or chills or cold symptoms. No urine changes.     Patient Active Problem List    Diagnosis Date Noted     Angina pectoris (H) 05/13/2022     Priority: Medium      Obstructive sleep apnea syndrome 01/20/2021     Priority: Medium     Seen at [facility]; diagnosed with moderate TALA by Dr. Herve Louise; Sept 2019.       Type 2 diabetes mellitus without retinopathy (H) 06/27/2018     Priority: Medium     Bilateral incipient cataracts 06/27/2018     Priority: Medium     PAD (peripheral artery disease) (H) 12/18/2017     Priority: Medium     CAD, multiple vessel 01/27/2017     Priority: Medium     Essential hypertension with goal blood pressure less than 140/90 01/27/2017     Priority: Medium     Type 2 diabetes mellitus with complication, with long-term current use of insulin (H) 01/27/2017     Priority: Medium     Hyperlipidemia LDL goal <100 01/27/2017     Priority: Medium     Gastroesophageal reflux disease without esophagitis 01/27/2017     Priority: Medium      Past Medical History:   Diagnosis Date     Arthritis March 2018    joint pain both hands     Diabetes (H)      Heart disease 1991    [place]-angioplasty     Hypertension      Past Surgical History:   Procedure Laterality Date     CARDIAC SURGERY  2004    [facility]     ENHANCE LASER REFRACTIVE BILATERAL EXISTING PT IN PARAMETERS  2000     EYE SURGERY  2000    [facility]     VASCULAR SURGERY  2017    [facility]     Current Outpatient Medications   Medication Sig Dispense Refill     ACCU-CHEK SMARTVIEW test strip TEST THREE TIMES DAILY OR AS DIRECTED 300 strip 3     apixaban ANTICOAGULANT (ELIQUIS ANTICOAGULANT) 5 MG tablet Take 1 tablet (5 mg) by mouth 2 times daily 180 tablet 3     aspirin 81 MG tablet Take by mouth daily 30 tablet      atorvastatin (LIPITOR) 40 MG tablet Take 1 tablet (40 mg) by mouth daily For cholesterol. 90 tablet 3     blood glucose monitoring (ACCU-CHEK FASTCLIX) lancets USE TO TEST THREE TIMES DAILY OR AS DIRECTED 306 each 3     cilostazol (PLETAL) 50 MG tablet Take 50 mg by mouth 2 times daily        donepezil (ARICEPT) 10 MG tablet TAKE 1 TABLET BY MOUTH AT BEDTIME FOR MEMORY 90 tablet 1     doxepin (SINEQUAN) 10 MG capsule Take 1 capsule (10 mg) by mouth nightly as needed for sleep 30 capsule 2     isosorbide mononitrate (IMDUR) 30 MG 24 hr tablet TAKE 2 TABLETS(60 MG) BY MOUTH DAILY 180 tablet 2     losartan (COZAAR) 50 MG tablet Take 1 tablet (50 mg) by mouth daily 90 tablet 1     magnesium oxide 200 MG TABS Take 200 mg by mouth       metFORMIN (GLUCOPHAGE XR) 500 MG 24 hr tablet TAKE 1 TABLET(500 MG) BY MOUTH TWICE DAILY WITH MEALS 180 tablet 0     metoprolol succinate ER (TOPROL XL) 50 MG 24 hr tablet Take 1 tablet (50 mg) by mouth daily For blood pressure - this is 1/2 dosage 90 tablet 1     multivitamin (CENTRUM SILVER) tablet Take 1 tablet by mouth daily       nitroGLYcerin (NITROSTAT) 0.4 MG sublingual tablet PLACE 1 TABLET UNDER THE TONGUE EVERY 5 MINUTES FOR 3 DOSES, IF SYMPTOMS PERSIST 5 MINUTES AFTER 1ST DOSE CALL 911 25 tablet 0     Omega-3 Fatty Acids (FISH OIL OMEGA-3) 1000 MG CAPS Take 1,200 mg by mouth daily       omeprazole (PRILOSEC) 20 MG DR capsule Take 1 capsule (20 mg) by mouth daily 90 capsule 3     oxyCODONE-acetaminophen (PERCOCET) 5-325 MG tablet 1/2 tab every 8 hours as needed for pain 12 tablet 0     sertraline (ZOLOFT) 25 MG tablet Take 1 tablet (25 mg) by mouth daily One pill daily FOR DEPRESSION AND HELP APPETITE 90 tablet 1     traZODone (DESYREL) 50 MG tablet Take 1 tablet (50 mg) by mouth At Bedtime 30 tablet 4     cholecalciferol 25 MCG (1000 UT) TABS Take 25 mcg by mouth       insulin glargine (LANTUS SOLOSTAR) 100 UNIT/ML pen Inject 48 Units Subcutaneous daily (Patient not taking: Reported on 9/8/2022) 30 mL 1     insulin pen needle (B-D U/F) 31G X 5 MM miscellaneous USE THREE TIMES DAILY (Patient not taking: Reported on 9/8/2022) 300 each 1     liraglutide (VICTOZA) 18 MG/3ML solution Inject 1.8 mg Subcutaneous daily (Patient not taking: Reported on 9/8/2022) 27 mL 1      losartan (COZAAR) 100 MG tablet TAKE 1 TABLET(100 MG) BY MOUTH DAILY (Patient not taking: Reported on 2022) 90 tablet 1     metoprolol succinate ER (TOPROL-XL) 100 MG 24 hr tablet Take 1 tablet (100 mg) by mouth daily For blood pressure (Patient not taking: Reported on 2022) 90 tablet 1       Allergies   Allergen Reactions     Lidocaine Other (See Comments)     Lungs filled with fluid. ? Anaphylaxis     Lisinopril Cough     cough     Naltrexone Nausea and Vomiting        Social History     Tobacco Use     Smoking status: Every Day     Packs/day: 0.50     Years: 5.00     Pack years: 2.50     Types: Cigarettes     Start date: 1968     Last attempt to quit: 7/15/2016     Years since quittin.3     Smokeless tobacco: Former   Substance Use Topics     Alcohol use: Not Currently     Comment: binge, two  months sober       History   Drug Use No         Objective     /79   Pulse 70   Temp 98.4  F (36.9  C) (Oral)   Resp 22   Wt 69.4 kg (153 lb)   SpO2 99%   BMI 23.96 kg/m     Physical Exam  GENERAL APPEARANCE: healthy, alert and no distress  EYES: Eyes grossly normal to inspection, PERRL and conjunctivae and sclerae normal  HENT: ear canals and TM's normal and nose and mouth without ulcers or lesions  NECK: no adenopathy, no asymmetry, masses, or scars and thyroid normal to palpation  RESP: lungs clear to auscultation - no rales, rhonchi or wheezes  CV: regular rate and rhythm, normal S1 S2, no S3 or S4 and no murmur, click or rub   ABDOMEN: soft, nontender, no HSM or masses and bowel sounds normal  MS: extremities normal- no gross deformities noted  NEURO: Normal strength and tone, sensory exam grossly normal, d speech normal  PSYCH: mildly anxious    Recent Labs   Lab Test 22  1045 22  0740 21  0740 21  0855 21  1747   HGB  --  13.8  --   --  13.8   PLT  --   --   --   --  279   NA  --  140  --   --   --    POTASSIUM  --  5.1  --  4.5  --    CR  --  2.29*  --   --    --    A1C 5.8* 7.5*   < > 10.5*  --     < > = values in this interval not displayed.        Diagnostics:  No labs were ordered during this visit.   EKG: appears normal, NSR, normal axis, normal intervals, no acute ST/T changes c/w ischemia, no LVH by voltage criteria, ST depression in non-specific, unchanged from previous tracings    Revised Cardiac Risk Index (RCRI):  The patient has the following serious cardiovascular risks for perioperative complications:   - Coronary Artery Disease (MI, positive stress test, angina, Qs on EKG) = 1 point     RCRI Interpretation: 1 point: Class II (low risk - 0.9% complication rate)           Signed Electronically by: Marcos Gonzalez MD  Copy of this evaluation report is provided to requesting physician.      Answers for HPI/ROS submitted by the patient on 12/1/2022  What is the reason for your visit today? : preop  How many servings of fruits and vegetables do you eat daily?: 0-1  On average, how many sweetened beverages do you drink each day (Examples: soda, juice, sweet tea, etc.  Do NOT count diet or artificially sweetened beverages)?: 2  How many minutes a day do you exercise enough to make your heart beat faster?: 9 or less  How many days a week do you exercise enough to make your heart beat faster?: 3 or less  How many days per week do you miss taking your medication?: 1       22-Jun-2022 17:09

## 2022-12-06 ENCOUNTER — LAB (OUTPATIENT)
Dept: LAB | Facility: CLINIC | Age: 72
End: 2022-12-06
Attending: FAMILY MEDICINE
Payer: COMMERCIAL

## 2022-12-06 DIAGNOSIS — Z01.818 PRE-OPERATIVE GENERAL PHYSICAL EXAMINATION: ICD-10-CM

## 2022-12-06 LAB — SARS-COV-2 RNA RESP QL NAA+PROBE: NEGATIVE

## 2022-12-06 PROCEDURE — U0005 INFEC AGEN DETEC AMPLI PROBE: HCPCS

## 2022-12-06 PROCEDURE — U0003 INFECTIOUS AGENT DETECTION BY NUCLEIC ACID (DNA OR RNA); SEVERE ACUTE RESPIRATORY SYNDROME CORONAVIRUS 2 (SARS-COV-2) (CORONAVIRUS DISEASE [COVID-19]), AMPLIFIED PROBE TECHNIQUE, MAKING USE OF HIGH THROUGHPUT TECHNOLOGIES AS DESCRIBED BY CMS-2020-01-R: HCPCS

## 2022-12-21 DIAGNOSIS — G47.00 INSOMNIA, UNSPECIFIED TYPE: ICD-10-CM

## 2022-12-21 RX ORDER — DOXEPIN HYDROCHLORIDE 10 MG/1
CAPSULE ORAL
Qty: 30 CAPSULE | Refills: 2 | Status: SHIPPED | OUTPATIENT
Start: 2022-12-21 | End: 2023-03-21

## 2023-01-01 ENCOUNTER — PATIENT OUTREACH (OUTPATIENT)
Dept: CARE COORDINATION | Facility: CLINIC | Age: 73
End: 2023-01-01
Payer: COMMERCIAL

## 2023-01-01 ENCOUNTER — MEDICAL CORRESPONDENCE (OUTPATIENT)
Dept: HEALTH INFORMATION MANAGEMENT | Facility: CLINIC | Age: 73
End: 2023-01-01
Payer: COMMERCIAL

## 2023-01-01 ENCOUNTER — ANCILLARY PROCEDURE (OUTPATIENT)
Dept: ULTRASOUND IMAGING | Facility: CLINIC | Age: 73
End: 2023-01-01
Attending: RADIOLOGY
Payer: COMMERCIAL

## 2023-01-01 ENCOUNTER — MYC MEDICAL ADVICE (OUTPATIENT)
Dept: CARDIOLOGY | Facility: CLINIC | Age: 73
End: 2023-01-01
Payer: COMMERCIAL

## 2023-01-01 ENCOUNTER — APPOINTMENT (OUTPATIENT)
Dept: OCCUPATIONAL THERAPY | Facility: CLINIC | Age: 73
DRG: 564 | End: 2023-01-01
Attending: STUDENT IN AN ORGANIZED HEALTH CARE EDUCATION/TRAINING PROGRAM
Payer: COMMERCIAL

## 2023-01-01 ENCOUNTER — APPOINTMENT (OUTPATIENT)
Dept: ULTRASOUND IMAGING | Facility: CLINIC | Age: 73
DRG: 271 | End: 2023-01-01
Payer: COMMERCIAL

## 2023-01-01 ENCOUNTER — APPOINTMENT (OUTPATIENT)
Dept: PHYSICAL THERAPY | Facility: CLINIC | Age: 73
DRG: 239 | End: 2023-01-01
Payer: COMMERCIAL

## 2023-01-01 ENCOUNTER — APPOINTMENT (OUTPATIENT)
Dept: OCCUPATIONAL THERAPY | Facility: CLINIC | Age: 73
DRG: 239 | End: 2023-01-01
Attending: PEDIATRICS
Payer: COMMERCIAL

## 2023-01-01 ENCOUNTER — APPOINTMENT (OUTPATIENT)
Dept: OCCUPATIONAL THERAPY | Facility: CLINIC | Age: 73
DRG: 271 | End: 2023-01-01
Attending: SURGERY
Payer: COMMERCIAL

## 2023-01-01 ENCOUNTER — VIRTUAL VISIT (OUTPATIENT)
Dept: CT IMAGING | Facility: CLINIC | Age: 73
End: 2023-01-01
Attending: REGISTERED NURSE
Payer: COMMERCIAL

## 2023-01-01 ENCOUNTER — APPOINTMENT (OUTPATIENT)
Dept: CARDIOLOGY | Facility: CLINIC | Age: 73
DRG: 564 | End: 2023-01-01
Attending: STUDENT IN AN ORGANIZED HEALTH CARE EDUCATION/TRAINING PROGRAM
Payer: COMMERCIAL

## 2023-01-01 ENCOUNTER — TELEPHONE (OUTPATIENT)
Dept: FAMILY MEDICINE | Facility: CLINIC | Age: 73
End: 2023-01-01

## 2023-01-01 ENCOUNTER — APPOINTMENT (OUTPATIENT)
Dept: GENERAL RADIOLOGY | Facility: CLINIC | Age: 73
DRG: 564 | End: 2023-01-01
Attending: STUDENT IN AN ORGANIZED HEALTH CARE EDUCATION/TRAINING PROGRAM
Payer: COMMERCIAL

## 2023-01-01 ENCOUNTER — ANESTHESIA EVENT (OUTPATIENT)
Dept: SURGERY | Facility: CLINIC | Age: 73
DRG: 271 | End: 2023-01-01
Payer: COMMERCIAL

## 2023-01-01 ENCOUNTER — ANESTHESIA (OUTPATIENT)
Dept: SURGERY | Facility: CLINIC | Age: 73
DRG: 239 | End: 2023-01-01
Payer: COMMERCIAL

## 2023-01-01 ENCOUNTER — APPOINTMENT (OUTPATIENT)
Dept: PHYSICAL THERAPY | Facility: CLINIC | Age: 73
DRG: 271 | End: 2023-01-01
Attending: SURGERY
Payer: COMMERCIAL

## 2023-01-01 ENCOUNTER — OFFICE VISIT (OUTPATIENT)
Dept: VASCULAR SURGERY | Facility: CLINIC | Age: 73
End: 2023-01-01
Payer: COMMERCIAL

## 2023-01-01 ENCOUNTER — TELEPHONE (OUTPATIENT)
Dept: FAMILY MEDICINE | Facility: CLINIC | Age: 73
End: 2023-01-01
Payer: COMMERCIAL

## 2023-01-01 ENCOUNTER — ANCILLARY PROCEDURE (OUTPATIENT)
Dept: ULTRASOUND IMAGING | Facility: CLINIC | Age: 73
End: 2023-01-01
Attending: STUDENT IN AN ORGANIZED HEALTH CARE EDUCATION/TRAINING PROGRAM
Payer: COMMERCIAL

## 2023-01-01 ENCOUNTER — APPOINTMENT (OUTPATIENT)
Dept: GENERAL RADIOLOGY | Facility: CLINIC | Age: 73
DRG: 239 | End: 2023-01-01
Attending: STUDENT IN AN ORGANIZED HEALTH CARE EDUCATION/TRAINING PROGRAM
Payer: COMMERCIAL

## 2023-01-01 ENCOUNTER — TELEPHONE (OUTPATIENT)
Dept: RADIOLOGY | Facility: CLINIC | Age: 73
End: 2023-01-01

## 2023-01-01 ENCOUNTER — MYC MEDICAL ADVICE (OUTPATIENT)
Dept: PHYSICAL THERAPY | Facility: CLINIC | Age: 73
End: 2023-01-01
Payer: COMMERCIAL

## 2023-01-01 ENCOUNTER — PATIENT OUTREACH (OUTPATIENT)
Dept: CARE COORDINATION | Facility: CLINIC | Age: 73
End: 2023-01-01

## 2023-01-01 ENCOUNTER — APPOINTMENT (OUTPATIENT)
Dept: PHYSICAL THERAPY | Facility: CLINIC | Age: 73
DRG: 239 | End: 2023-01-01
Attending: STUDENT IN AN ORGANIZED HEALTH CARE EDUCATION/TRAINING PROGRAM
Payer: COMMERCIAL

## 2023-01-01 ENCOUNTER — ANESTHESIA (OUTPATIENT)
Dept: SURGERY | Facility: CLINIC | Age: 73
DRG: 271 | End: 2023-01-01
Payer: COMMERCIAL

## 2023-01-01 ENCOUNTER — TRANSFERRED RECORDS (OUTPATIENT)
Dept: HEALTH INFORMATION MANAGEMENT | Facility: CLINIC | Age: 73
End: 2023-01-01
Payer: COMMERCIAL

## 2023-01-01 ENCOUNTER — APPOINTMENT (OUTPATIENT)
Dept: OCCUPATIONAL THERAPY | Facility: CLINIC | Age: 73
DRG: 239 | End: 2023-01-01
Payer: COMMERCIAL

## 2023-01-01 ENCOUNTER — LAB REQUISITION (OUTPATIENT)
Dept: LAB | Facility: CLINIC | Age: 73
End: 2023-01-01
Payer: COMMERCIAL

## 2023-01-01 ENCOUNTER — APPOINTMENT (OUTPATIENT)
Dept: OCCUPATIONAL THERAPY | Facility: CLINIC | Age: 73
DRG: 271 | End: 2023-01-01
Attending: NURSE PRACTITIONER
Payer: COMMERCIAL

## 2023-01-01 ENCOUNTER — OFFICE VISIT (OUTPATIENT)
Dept: FAMILY MEDICINE | Facility: CLINIC | Age: 73
End: 2023-01-01
Payer: COMMERCIAL

## 2023-01-01 ENCOUNTER — VIRTUAL VISIT (OUTPATIENT)
Dept: PHARMACY | Facility: CLINIC | Age: 73
End: 2023-01-01
Payer: COMMERCIAL

## 2023-01-01 ENCOUNTER — APPOINTMENT (OUTPATIENT)
Dept: GENERAL RADIOLOGY | Facility: CLINIC | Age: 73
DRG: 564 | End: 2023-01-01
Payer: COMMERCIAL

## 2023-01-01 ENCOUNTER — APPOINTMENT (OUTPATIENT)
Dept: INTERVENTIONAL RADIOLOGY/VASCULAR | Facility: CLINIC | Age: 73
DRG: 271 | End: 2023-01-01
Attending: SURGERY
Payer: COMMERCIAL

## 2023-01-01 ENCOUNTER — DOCUMENTATION ONLY (OUTPATIENT)
Dept: ORTHOPEDICS | Facility: CLINIC | Age: 73
End: 2023-01-01
Payer: COMMERCIAL

## 2023-01-01 ENCOUNTER — MYC MEDICAL ADVICE (OUTPATIENT)
Dept: VASCULAR SURGERY | Facility: CLINIC | Age: 73
End: 2023-01-01
Payer: COMMERCIAL

## 2023-01-01 ENCOUNTER — TELEPHONE (OUTPATIENT)
Dept: VASCULAR SURGERY | Facility: CLINIC | Age: 73
End: 2023-01-01
Payer: COMMERCIAL

## 2023-01-01 ENCOUNTER — ANCILLARY PROCEDURE (OUTPATIENT)
Dept: ULTRASOUND IMAGING | Facility: CLINIC | Age: 73
End: 2023-01-01
Attending: SURGERY
Payer: COMMERCIAL

## 2023-01-01 ENCOUNTER — TELEPHONE (OUTPATIENT)
Dept: CARDIOLOGY | Facility: CLINIC | Age: 73
End: 2023-01-01
Payer: COMMERCIAL

## 2023-01-01 ENCOUNTER — APPOINTMENT (OUTPATIENT)
Dept: GENERAL RADIOLOGY | Facility: CLINIC | Age: 73
DRG: 271 | End: 2023-01-01
Attending: SURGERY
Payer: COMMERCIAL

## 2023-01-01 ENCOUNTER — APPOINTMENT (OUTPATIENT)
Dept: OCCUPATIONAL THERAPY | Facility: CLINIC | Age: 73
DRG: 564 | End: 2023-01-01
Payer: COMMERCIAL

## 2023-01-01 ENCOUNTER — ANESTHESIA EVENT (OUTPATIENT)
Dept: SURGERY | Facility: CLINIC | Age: 73
DRG: 239 | End: 2023-01-01
Payer: COMMERCIAL

## 2023-01-01 ENCOUNTER — MYC MEDICAL ADVICE (OUTPATIENT)
Dept: FAMILY MEDICINE | Facility: CLINIC | Age: 73
End: 2023-01-01
Payer: COMMERCIAL

## 2023-01-01 ENCOUNTER — APPOINTMENT (OUTPATIENT)
Dept: GENERAL RADIOLOGY | Facility: CLINIC | Age: 73
DRG: 239 | End: 2023-01-01
Payer: COMMERCIAL

## 2023-01-01 ENCOUNTER — MYC MEDICAL ADVICE (OUTPATIENT)
Dept: FAMILY MEDICINE | Facility: CLINIC | Age: 73
End: 2023-01-01

## 2023-01-01 ENCOUNTER — APPOINTMENT (OUTPATIENT)
Dept: PHYSICAL THERAPY | Facility: CLINIC | Age: 73
DRG: 271 | End: 2023-01-01
Attending: PHYSICIAN ASSISTANT
Payer: COMMERCIAL

## 2023-01-01 ENCOUNTER — OFFICE VISIT (OUTPATIENT)
Dept: PODIATRY | Facility: CLINIC | Age: 73
End: 2023-01-01
Attending: FAMILY MEDICINE
Payer: COMMERCIAL

## 2023-01-01 ENCOUNTER — HOME INFUSION (PRE-WILLOW HOME INFUSION) (OUTPATIENT)
Dept: PHARMACY | Facility: CLINIC | Age: 73
End: 2023-01-01
Payer: COMMERCIAL

## 2023-01-01 ENCOUNTER — TELEPHONE (OUTPATIENT)
Dept: ORTHOPEDICS | Facility: CLINIC | Age: 73
End: 2023-01-01
Payer: COMMERCIAL

## 2023-01-01 ENCOUNTER — TELEPHONE (OUTPATIENT)
Dept: INFECTIOUS DISEASES | Facility: CLINIC | Age: 73
End: 2023-01-01
Payer: COMMERCIAL

## 2023-01-01 ENCOUNTER — HOSPITAL ENCOUNTER (INPATIENT)
Facility: CLINIC | Age: 73
LOS: 13 days | Discharge: NURSING, REHAB OR LONG-TERM CARE | DRG: 271 | End: 2023-10-30
Attending: SURGERY | Admitting: SURGERY
Payer: COMMERCIAL

## 2023-01-01 ENCOUNTER — ANCILLARY PROCEDURE (OUTPATIENT)
Dept: CARDIOLOGY | Facility: CLINIC | Age: 73
End: 2023-01-01
Attending: SURGERY
Payer: COMMERCIAL

## 2023-01-01 ENCOUNTER — OFFICE VISIT (OUTPATIENT)
Dept: INFECTIOUS DISEASES | Facility: CLINIC | Age: 73
End: 2023-01-01
Attending: FAMILY MEDICINE
Payer: COMMERCIAL

## 2023-01-01 ENCOUNTER — APPOINTMENT (OUTPATIENT)
Dept: MRI IMAGING | Facility: CLINIC | Age: 73
DRG: 239 | End: 2023-01-01
Attending: STUDENT IN AN ORGANIZED HEALTH CARE EDUCATION/TRAINING PROGRAM
Payer: COMMERCIAL

## 2023-01-01 ENCOUNTER — MYC REFILL (OUTPATIENT)
Dept: FAMILY MEDICINE | Facility: CLINIC | Age: 73
End: 2023-01-01
Payer: COMMERCIAL

## 2023-01-01 ENCOUNTER — APPOINTMENT (OUTPATIENT)
Dept: PHYSICAL THERAPY | Facility: CLINIC | Age: 73
DRG: 564 | End: 2023-01-01
Payer: COMMERCIAL

## 2023-01-01 ENCOUNTER — APPOINTMENT (OUTPATIENT)
Dept: ULTRASOUND IMAGING | Facility: CLINIC | Age: 73
DRG: 239 | End: 2023-01-01
Payer: COMMERCIAL

## 2023-01-01 ENCOUNTER — APPOINTMENT (OUTPATIENT)
Dept: GENERAL RADIOLOGY | Facility: CLINIC | Age: 73
DRG: 271 | End: 2023-01-01
Attending: NURSE PRACTITIONER
Payer: COMMERCIAL

## 2023-01-01 ENCOUNTER — HOSPITAL ENCOUNTER (INPATIENT)
Facility: CLINIC | Age: 73
LOS: 19 days | Discharge: HOME HEALTH | DRG: 239 | End: 2023-12-07
Attending: STUDENT IN AN ORGANIZED HEALTH CARE EDUCATION/TRAINING PROGRAM | Admitting: INTERNAL MEDICINE
Payer: COMMERCIAL

## 2023-01-01 ENCOUNTER — ANCILLARY PROCEDURE (OUTPATIENT)
Dept: CT IMAGING | Facility: CLINIC | Age: 73
End: 2023-01-01
Attending: SURGERY
Payer: COMMERCIAL

## 2023-01-01 ENCOUNTER — MYC REFILL (OUTPATIENT)
Dept: FAMILY MEDICINE | Facility: CLINIC | Age: 73
End: 2023-01-01

## 2023-01-01 ENCOUNTER — MEDICAL CORRESPONDENCE (OUTPATIENT)
Dept: HEALTH INFORMATION MANAGEMENT | Facility: CLINIC | Age: 73
End: 2023-01-01

## 2023-01-01 ENCOUNTER — APPOINTMENT (OUTPATIENT)
Dept: PHYSICAL THERAPY | Facility: CLINIC | Age: 73
DRG: 564 | End: 2023-01-01
Attending: STUDENT IN AN ORGANIZED HEALTH CARE EDUCATION/TRAINING PROGRAM
Payer: COMMERCIAL

## 2023-01-01 ENCOUNTER — DOCUMENTATION ONLY (OUTPATIENT)
Dept: OTHER | Facility: CLINIC | Age: 73
End: 2023-01-01
Payer: COMMERCIAL

## 2023-01-01 ENCOUNTER — TELEPHONE (OUTPATIENT)
Dept: VASCULAR SURGERY | Facility: CLINIC | Age: 73
End: 2023-01-01

## 2023-01-01 ENCOUNTER — APPOINTMENT (OUTPATIENT)
Dept: CT IMAGING | Facility: CLINIC | Age: 73
DRG: 271 | End: 2023-01-01
Attending: NURSE PRACTITIONER
Payer: COMMERCIAL

## 2023-01-01 ENCOUNTER — APPOINTMENT (OUTPATIENT)
Dept: PHYSICAL THERAPY | Facility: CLINIC | Age: 73
DRG: 239 | End: 2023-01-01
Attending: PEDIATRICS
Payer: COMMERCIAL

## 2023-01-01 ENCOUNTER — APPOINTMENT (OUTPATIENT)
Dept: MRI IMAGING | Facility: CLINIC | Age: 73
DRG: 271 | End: 2023-01-01
Attending: INTERNAL MEDICINE
Payer: COMMERCIAL

## 2023-01-01 ENCOUNTER — APPOINTMENT (OUTPATIENT)
Dept: OCCUPATIONAL THERAPY | Facility: CLINIC | Age: 73
DRG: 239 | End: 2023-01-01
Attending: STUDENT IN AN ORGANIZED HEALTH CARE EDUCATION/TRAINING PROGRAM
Payer: COMMERCIAL

## 2023-01-01 ENCOUNTER — APPOINTMENT (OUTPATIENT)
Dept: PHYSICAL THERAPY | Facility: CLINIC | Age: 73
DRG: 271 | End: 2023-01-01
Payer: COMMERCIAL

## 2023-01-01 ENCOUNTER — TRANSFERRED RECORDS (OUTPATIENT)
Dept: HEALTH INFORMATION MANAGEMENT | Facility: CLINIC | Age: 73
End: 2023-01-01

## 2023-01-01 ENCOUNTER — HOSPITAL ENCOUNTER (INPATIENT)
Facility: CLINIC | Age: 73
LOS: 8 days | Discharge: HOME | DRG: 564 | End: 2023-11-13
Attending: STUDENT IN AN ORGANIZED HEALTH CARE EDUCATION/TRAINING PROGRAM
Payer: COMMERCIAL

## 2023-01-01 VITALS
DIASTOLIC BLOOD PRESSURE: 55 MMHG | WEIGHT: 119.5 LBS | TEMPERATURE: 98.1 F | SYSTOLIC BLOOD PRESSURE: 94 MMHG | OXYGEN SATURATION: 99 % | HEART RATE: 81 BPM | BODY MASS INDEX: 18.72 KG/M2

## 2023-01-01 VITALS
SYSTOLIC BLOOD PRESSURE: 110 MMHG | OXYGEN SATURATION: 99 % | DIASTOLIC BLOOD PRESSURE: 63 MMHG | TEMPERATURE: 98.4 F | HEART RATE: 76 BPM | RESPIRATION RATE: 14 BRPM | HEIGHT: 67 IN | BODY MASS INDEX: 23.03 KG/M2

## 2023-01-01 VITALS — DIASTOLIC BLOOD PRESSURE: 83 MMHG | SYSTOLIC BLOOD PRESSURE: 181 MMHG | HEART RATE: 66 BPM | OXYGEN SATURATION: 98 %

## 2023-01-01 VITALS — DIASTOLIC BLOOD PRESSURE: 72 MMHG | OXYGEN SATURATION: 99 % | HEART RATE: 78 BPM | SYSTOLIC BLOOD PRESSURE: 182 MMHG

## 2023-01-01 VITALS
DIASTOLIC BLOOD PRESSURE: 54 MMHG | HEIGHT: 67 IN | BODY MASS INDEX: 20.88 KG/M2 | SYSTOLIC BLOOD PRESSURE: 135 MMHG | WEIGHT: 133 LBS

## 2023-01-01 VITALS
HEIGHT: 67 IN | BODY MASS INDEX: 23.08 KG/M2 | HEART RATE: 65 BPM | RESPIRATION RATE: 18 BRPM | DIASTOLIC BLOOD PRESSURE: 45 MMHG | WEIGHT: 147.05 LBS | OXYGEN SATURATION: 99 % | SYSTOLIC BLOOD PRESSURE: 141 MMHG | TEMPERATURE: 98.1 F

## 2023-01-01 VITALS
OXYGEN SATURATION: 100 % | RESPIRATION RATE: 20 BRPM | HEART RATE: 41 BPM | TEMPERATURE: 98.4 F | DIASTOLIC BLOOD PRESSURE: 59 MMHG | SYSTOLIC BLOOD PRESSURE: 120 MMHG

## 2023-01-01 VITALS
RESPIRATION RATE: 16 BRPM | BODY MASS INDEX: 20.68 KG/M2 | WEIGHT: 132.06 LBS | OXYGEN SATURATION: 99 % | SYSTOLIC BLOOD PRESSURE: 129 MMHG | TEMPERATURE: 98.5 F | HEART RATE: 66 BPM | DIASTOLIC BLOOD PRESSURE: 49 MMHG

## 2023-01-01 VITALS — HEART RATE: 75 BPM | OXYGEN SATURATION: 98 % | DIASTOLIC BLOOD PRESSURE: 51 MMHG | SYSTOLIC BLOOD PRESSURE: 114 MMHG

## 2023-01-01 VITALS — DIASTOLIC BLOOD PRESSURE: 76 MMHG | HEART RATE: 57 BPM | SYSTOLIC BLOOD PRESSURE: 198 MMHG | OXYGEN SATURATION: 100 %

## 2023-01-01 VITALS
TEMPERATURE: 99.1 F | OXYGEN SATURATION: 98 % | HEART RATE: 66 BPM | SYSTOLIC BLOOD PRESSURE: 77 MMHG | DIASTOLIC BLOOD PRESSURE: 43 MMHG

## 2023-01-01 VITALS
SYSTOLIC BLOOD PRESSURE: 156 MMHG | RESPIRATION RATE: 17 BRPM | TEMPERATURE: 98.1 F | DIASTOLIC BLOOD PRESSURE: 48 MMHG | OXYGEN SATURATION: 99 % | HEART RATE: 68 BPM | BODY MASS INDEX: 21.75 KG/M2 | WEIGHT: 138.9 LBS

## 2023-01-01 VITALS
DIASTOLIC BLOOD PRESSURE: 52 MMHG | WEIGHT: 151 LBS | HEART RATE: 70 BPM | SYSTOLIC BLOOD PRESSURE: 100 MMHG | BODY MASS INDEX: 23.83 KG/M2

## 2023-01-01 DIAGNOSIS — T81.49XS INFECTION FOLLOWING A PROCEDURE, OTHER SURGICAL SITE, SEQUELA: ICD-10-CM

## 2023-01-01 DIAGNOSIS — G89.18 POSTOPERATIVE PAIN: ICD-10-CM

## 2023-01-01 DIAGNOSIS — F03.90 DEMENTIA WITHOUT BEHAVIORAL DISTURBANCE (H): ICD-10-CM

## 2023-01-01 DIAGNOSIS — E11.8 TYPE 2 DIABETES MELLITUS WITH COMPLICATION, WITH LONG-TERM CURRENT USE OF INSULIN (H): ICD-10-CM

## 2023-01-01 DIAGNOSIS — E43 SEVERE MALNUTRITION (H): ICD-10-CM

## 2023-01-01 DIAGNOSIS — D64.9 ANEMIA, UNSPECIFIED TYPE: ICD-10-CM

## 2023-01-01 DIAGNOSIS — Z89.432 HISTORY OF TRANSMETATARSAL AMPUTATION OF LEFT FOOT (H): ICD-10-CM

## 2023-01-01 DIAGNOSIS — I48.0 PAF (PAROXYSMAL ATRIAL FIBRILLATION) (H): ICD-10-CM

## 2023-01-01 DIAGNOSIS — K21.9 GASTROESOPHAGEAL REFLUX DISEASE WITHOUT ESOPHAGITIS: ICD-10-CM

## 2023-01-01 DIAGNOSIS — F32.A DEPRESSION: ICD-10-CM

## 2023-01-01 DIAGNOSIS — E83.42 HYPOMAGNESEMIA: ICD-10-CM

## 2023-01-01 DIAGNOSIS — I96 DRY GANGRENE (H): ICD-10-CM

## 2023-01-01 DIAGNOSIS — I73.9 PAD (PERIPHERAL ARTERY DISEASE) (H): ICD-10-CM

## 2023-01-01 DIAGNOSIS — E11.9 TYPE 2 DIABETES MELLITUS WITHOUT RETINOPATHY (H): ICD-10-CM

## 2023-01-01 DIAGNOSIS — S78.112A UNILATERAL AKA, LEFT (H): ICD-10-CM

## 2023-01-01 DIAGNOSIS — F50.819 BINGE EATING DISORDER: ICD-10-CM

## 2023-01-01 DIAGNOSIS — T14.8XXA WOUND INFECTION: ICD-10-CM

## 2023-01-01 DIAGNOSIS — I99.9 VASCULOPATHY: ICD-10-CM

## 2023-01-01 DIAGNOSIS — Z09 HOSPITAL DISCHARGE FOLLOW-UP: Primary | ICD-10-CM

## 2023-01-01 DIAGNOSIS — R78.81 MSSA BACTEREMIA: ICD-10-CM

## 2023-01-01 DIAGNOSIS — I25.10 CAD, MULTIPLE VESSEL: Primary | ICD-10-CM

## 2023-01-01 DIAGNOSIS — Z45.2 PERIPHERALLY INSERTED CENTRAL CATHETER (PICC) IN PLACE: ICD-10-CM

## 2023-01-01 DIAGNOSIS — I70.262 ATHEROSCLEROSIS OF NATIVE ARTERIES OF EXTREMITIES WITH GANGRENE, LEFT LEG (H): Primary | ICD-10-CM

## 2023-01-01 DIAGNOSIS — I73.9 PAD (PERIPHERAL ARTERY DISEASE) (H): Primary | ICD-10-CM

## 2023-01-01 DIAGNOSIS — I70.262: Primary | ICD-10-CM

## 2023-01-01 DIAGNOSIS — Z79.4 TYPE 2 DIABETES MELLITUS WITH COMPLICATION, WITH LONG-TERM CURRENT USE OF INSULIN (H): ICD-10-CM

## 2023-01-01 DIAGNOSIS — K22.70 BARRETT'S ESOPHAGUS: ICD-10-CM

## 2023-01-01 DIAGNOSIS — B95.61 BACTEREMIA DUE TO METHICILLIN SUSCEPTIBLE STAPHYLOCOCCUS AUREUS (MSSA): ICD-10-CM

## 2023-01-01 DIAGNOSIS — I73.9 CLAUDICATION IN PERIPHERAL VASCULAR DISEASE (H): Primary | ICD-10-CM

## 2023-01-01 DIAGNOSIS — I25.10 CAD, MULTIPLE VESSEL: ICD-10-CM

## 2023-01-01 DIAGNOSIS — D64.9 ANEMIA, UNSPECIFIED TYPE: Primary | ICD-10-CM

## 2023-01-01 DIAGNOSIS — R09.89 OTHER SPECIFIED SYMPTOMS AND SIGNS INVOLVING THE CIRCULATORY AND RESPIRATORY SYSTEMS: ICD-10-CM

## 2023-01-01 DIAGNOSIS — I10 ESSENTIAL HYPERTENSION WITH GOAL BLOOD PRESSURE LESS THAN 140/90: Primary | ICD-10-CM

## 2023-01-01 DIAGNOSIS — I10 ESSENTIAL HYPERTENSION WITH GOAL BLOOD PRESSURE LESS THAN 140/90: ICD-10-CM

## 2023-01-01 DIAGNOSIS — I73.9 CLAUDICATION IN PERIPHERAL VASCULAR DISEASE (H): ICD-10-CM

## 2023-01-01 DIAGNOSIS — Z71.6 ENCOUNTER FOR TOBACCO USE CESSATION COUNSELING: ICD-10-CM

## 2023-01-01 DIAGNOSIS — E11.8 TYPE 2 DIABETES MELLITUS WITH COMPLICATION, WITH LONG-TERM CURRENT USE OF INSULIN (H): Primary | ICD-10-CM

## 2023-01-01 DIAGNOSIS — L08.9 LEFT FOOT INFECTION: Primary | ICD-10-CM

## 2023-01-01 DIAGNOSIS — R78.81 BACTEREMIA: ICD-10-CM

## 2023-01-01 DIAGNOSIS — I10 ESSENTIAL HYPERTENSION: ICD-10-CM

## 2023-01-01 DIAGNOSIS — Z79.2 ENCOUNTER FOR LONG-TERM (CURRENT) USE OF ANTIBIOTICS: Primary | ICD-10-CM

## 2023-01-01 DIAGNOSIS — Z01.818 PRE-OP EXAM: ICD-10-CM

## 2023-01-01 DIAGNOSIS — B95.61 MSSA BACTEREMIA: ICD-10-CM

## 2023-01-01 DIAGNOSIS — Z53.9 DIAGNOSIS NOT YET DEFINED: Primary | ICD-10-CM

## 2023-01-01 DIAGNOSIS — I99.9 VASCULOPATHY: Primary | ICD-10-CM

## 2023-01-01 DIAGNOSIS — S88.919A AMPUTATION OF LEG (H): ICD-10-CM

## 2023-01-01 DIAGNOSIS — R78.81 BACTEREMIA DUE TO METHICILLIN SUSCEPTIBLE STAPHYLOCOCCUS AUREUS (MSSA): ICD-10-CM

## 2023-01-01 DIAGNOSIS — Z72.0 TOBACCO USE: ICD-10-CM

## 2023-01-01 DIAGNOSIS — T81.49XA SURGICAL SITE INFECTION: ICD-10-CM

## 2023-01-01 DIAGNOSIS — I70.262: ICD-10-CM

## 2023-01-01 DIAGNOSIS — Z09 HOSPITAL DISCHARGE FOLLOW-UP: ICD-10-CM

## 2023-01-01 DIAGNOSIS — L08.9 FOOT INFECTION: ICD-10-CM

## 2023-01-01 DIAGNOSIS — Z78.9 TAKES DIETARY SUPPLEMENTS: ICD-10-CM

## 2023-01-01 DIAGNOSIS — L08.9 WOUND INFECTION: ICD-10-CM

## 2023-01-01 DIAGNOSIS — G47.00 INSOMNIA: ICD-10-CM

## 2023-01-01 DIAGNOSIS — G31.84 MCI (MILD COGNITIVE IMPAIRMENT) WITH MEMORY LOSS: ICD-10-CM

## 2023-01-01 DIAGNOSIS — Z79.4 TYPE 2 DIABETES MELLITUS WITH COMPLICATION, WITH LONG-TERM CURRENT USE OF INSULIN (H): Primary | ICD-10-CM

## 2023-01-01 DIAGNOSIS — E11.51 TYPE 2 DIABETES MELLITUS WITH DIABETIC PERIPHERAL ANGIOPATHY WITHOUT GANGRENE, WITHOUT LONG-TERM CURRENT USE OF INSULIN (H): ICD-10-CM

## 2023-01-01 DIAGNOSIS — E78.5 HYPERLIPIDEMIA LDL GOAL <100: ICD-10-CM

## 2023-01-01 DIAGNOSIS — Z89.612 S/P AKA (ABOVE KNEE AMPUTATION), LEFT (H): Primary | ICD-10-CM

## 2023-01-01 DIAGNOSIS — Z79.2 RECEIVING INTRAVENOUS ANTIBIOTIC TREATMENT AS OUTPATIENT: ICD-10-CM

## 2023-01-01 LAB
ABO/RH(D): NORMAL
ACT BLD: 147 SECONDS (ref 74–150)
ACT BLD: 209 SECONDS (ref 74–150)
ACT BLD: 214 SECONDS (ref 74–150)
ACT BLD: 222 SECONDS (ref 74–150)
ACT BLD: 222 SECONDS (ref 74–150)
ACT BLD: 238 SECONDS (ref 74–150)
ACT BLD: 243 SECONDS (ref 74–150)
ALBUMIN SERPL BCG-MCNC: 2.6 G/DL (ref 3.5–5.2)
ALBUMIN SERPL BCG-MCNC: 2.7 G/DL (ref 3.5–5.2)
ALBUMIN SERPL BCG-MCNC: 3.1 G/DL (ref 3.5–5.2)
ALBUMIN SERPL BCG-MCNC: 3.1 G/DL (ref 3.5–5.2)
ALBUMIN UR-MCNC: 100 MG/DL
ALP SERPL-CCNC: 87 U/L (ref 40–129)
ALP SERPL-CCNC: 91 U/L (ref 40–150)
ALP SERPL-CCNC: 97 U/L (ref 40–129)
ALT SERPL W P-5'-P-CCNC: 10 U/L (ref 0–70)
ALT SERPL W P-5'-P-CCNC: 5 U/L (ref 0–70)
ALT SERPL W P-5'-P-CCNC: 9 U/L (ref 0–70)
ANION GAP SERPL CALCULATED.3IONS-SCNC: 10 MMOL/L (ref 7–15)
ANION GAP SERPL CALCULATED.3IONS-SCNC: 11 MMOL/L (ref 7–15)
ANION GAP SERPL CALCULATED.3IONS-SCNC: 12 MMOL/L (ref 7–15)
ANION GAP SERPL CALCULATED.3IONS-SCNC: 13 MMOL/L (ref 7–15)
ANION GAP SERPL CALCULATED.3IONS-SCNC: 13 MMOL/L (ref 7–15)
ANION GAP SERPL CALCULATED.3IONS-SCNC: 14 MMOL/L (ref 7–15)
ANION GAP SERPL CALCULATED.3IONS-SCNC: 15 MMOL/L (ref 7–15)
ANION GAP SERPL CALCULATED.3IONS-SCNC: 15 MMOL/L (ref 7–15)
ANION GAP SERPL CALCULATED.3IONS-SCNC: 16 MMOL/L (ref 7–15)
ANION GAP SERPL CALCULATED.3IONS-SCNC: 19 MMOL/L (ref 7–15)
ANION GAP SERPL CALCULATED.3IONS-SCNC: 5 MMOL/L (ref 7–15)
ANION GAP SERPL CALCULATED.3IONS-SCNC: 5 MMOL/L (ref 7–15)
ANION GAP SERPL CALCULATED.3IONS-SCNC: 6 MMOL/L (ref 7–15)
ANION GAP SERPL CALCULATED.3IONS-SCNC: 7 MMOL/L (ref 7–15)
ANION GAP SERPL CALCULATED.3IONS-SCNC: 8 MMOL/L (ref 7–15)
ANION GAP SERPL CALCULATED.3IONS-SCNC: 9 MMOL/L (ref 7–15)
ANTIBODY SCREEN: NEGATIVE
APPEARANCE UR: ABNORMAL
APTT PPP: 173 SECONDS (ref 22–38)
APTT PPP: 37 SECONDS (ref 22–38)
APTT PPP: 78 SECONDS (ref 22–38)
APTT PPP: 80 SECONDS (ref 22–38)
AST SERPL W P-5'-P-CCNC: 26 U/L (ref 0–45)
AST SERPL W P-5'-P-CCNC: 29 U/L (ref 0–45)
AST SERPL W P-5'-P-CCNC: 29 U/L (ref 0–45)
ATRIAL RATE - MUSE: 67 BPM
ATRIAL RATE - MUSE: 80 BPM
BACTERIA BLD CULT: ABNORMAL
BACTERIA BLD CULT: ABNORMAL
BACTERIA BLD CULT: NO GROWTH
BASE EXCESS BLDA CALC-SCNC: -2.1 MMOL/L (ref -9.6–2)
BASE EXCESS BLDA CALC-SCNC: -3.9 MMOL/L (ref -9.6–2)
BASE EXCESS BLDA CALC-SCNC: -4.2 MMOL/L (ref -9.6–2)
BASE EXCESS BLDV CALC-SCNC: 3.7 MMOL/L (ref -8.1–1.9)
BASOPHILS # BLD AUTO: 0 10E3/UL (ref 0–0.2)
BASOPHILS # BLD AUTO: 0.1 10E3/UL (ref 0–0.2)
BASOPHILS NFR BLD AUTO: 0 %
BASOPHILS NFR BLD AUTO: 1 %
BILIRUB SERPL-MCNC: 0.3 MG/DL
BILIRUB SERPL-MCNC: 0.5 MG/DL
BILIRUB SERPL-MCNC: 0.7 MG/DL
BILIRUB UR QL STRIP: NEGATIVE
BLAIMP ISLT/SPM QL: NOT DETECTED
BLAKPC ISLT/SPM QL: NOT DETECTED
BLAOXA-48 ISLT/SPM QL: NOT DETECTED
BLAVIM ISLT/SPM QL: NOT DETECTED
BLD PROD TYP BPU: NORMAL
BLOOD COMPONENT TYPE: NORMAL
BUN SERPL-MCNC: 10.5 MG/DL (ref 8–23)
BUN SERPL-MCNC: 11 MG/DL (ref 8–23)
BUN SERPL-MCNC: 11.3 MG/DL (ref 8–23)
BUN SERPL-MCNC: 11.5 MG/DL (ref 8–23)
BUN SERPL-MCNC: 12.1 MG/DL (ref 8–23)
BUN SERPL-MCNC: 12.3 MG/DL (ref 8–23)
BUN SERPL-MCNC: 12.5 MG/DL (ref 8–23)
BUN SERPL-MCNC: 12.6 MG/DL (ref 8–23)
BUN SERPL-MCNC: 12.9 MG/DL (ref 8–23)
BUN SERPL-MCNC: 13 MG/DL (ref 8–23)
BUN SERPL-MCNC: 13 MG/DL (ref 8–23)
BUN SERPL-MCNC: 13.8 MG/DL (ref 8–23)
BUN SERPL-MCNC: 13.8 MG/DL (ref 8–23)
BUN SERPL-MCNC: 13.9 MG/DL (ref 8–23)
BUN SERPL-MCNC: 14 MG/DL (ref 8–23)
BUN SERPL-MCNC: 14.2 MG/DL (ref 8–23)
BUN SERPL-MCNC: 14.2 MG/DL (ref 8–23)
BUN SERPL-MCNC: 14.5 MG/DL (ref 8–23)
BUN SERPL-MCNC: 14.6 MG/DL (ref 8–23)
BUN SERPL-MCNC: 14.6 MG/DL (ref 8–23)
BUN SERPL-MCNC: 14.8 MG/DL (ref 8–23)
BUN SERPL-MCNC: 14.9 MG/DL (ref 8–23)
BUN SERPL-MCNC: 15 MG/DL (ref 8–23)
BUN SERPL-MCNC: 15.1 MG/DL (ref 8–23)
BUN SERPL-MCNC: 15.2 MG/DL (ref 8–23)
BUN SERPL-MCNC: 15.7 MG/DL (ref 8–23)
BUN SERPL-MCNC: 15.9 MG/DL (ref 8–23)
BUN SERPL-MCNC: 16.1 MG/DL (ref 8–23)
BUN SERPL-MCNC: 16.2 MG/DL (ref 8–23)
BUN SERPL-MCNC: 16.2 MG/DL (ref 8–23)
BUN SERPL-MCNC: 16.5 MG/DL (ref 8–23)
BUN SERPL-MCNC: 17.2 MG/DL (ref 8–23)
BUN SERPL-MCNC: 17.3 MG/DL (ref 8–23)
BUN SERPL-MCNC: 17.5 MG/DL (ref 8–23)
BUN SERPL-MCNC: 17.5 MG/DL (ref 8–23)
BUN SERPL-MCNC: 17.8 MG/DL (ref 8–23)
BUN SERPL-MCNC: 18.6 MG/DL (ref 8–23)
BUN SERPL-MCNC: 18.8 MG/DL (ref 8–23)
BUN SERPL-MCNC: 19.6 MG/DL (ref 8–23)
BUN SERPL-MCNC: 19.8 MG/DL (ref 8–23)
BUN SERPL-MCNC: 20.5 MG/DL (ref 8–23)
BUN SERPL-MCNC: 20.6 MG/DL (ref 8–23)
BUN SERPL-MCNC: 21.9 MG/DL (ref 8–23)
BUN SERPL-MCNC: 7.5 MG/DL (ref 8–23)
BUN SERPL-MCNC: 9.9 MG/DL (ref 8–23)
C DIFF TOX B STL QL: NEGATIVE
CA-I BLD-MCNC: 4.5 MG/DL (ref 4.4–5.2)
CA-I BLD-MCNC: 4.6 MG/DL (ref 4.4–5.2)
CA-I BLD-MCNC: 4.6 MG/DL (ref 4.4–5.2)
CA-I BLD-MCNC: 4.7 MG/DL (ref 4.4–5.2)
CALCIUM SERPL-MCNC: 8.1 MG/DL (ref 8.8–10.2)
CALCIUM SERPL-MCNC: 8.2 MG/DL (ref 8.8–10.2)
CALCIUM SERPL-MCNC: 8.3 MG/DL (ref 8.8–10.2)
CALCIUM SERPL-MCNC: 8.4 MG/DL (ref 8.8–10.2)
CALCIUM SERPL-MCNC: 8.5 MG/DL (ref 8.8–10.2)
CALCIUM SERPL-MCNC: 8.6 MG/DL (ref 8.8–10.2)
CALCIUM SERPL-MCNC: 8.7 MG/DL (ref 8.8–10.2)
CALCIUM SERPL-MCNC: 8.8 MG/DL (ref 8.8–10.2)
CALCIUM SERPL-MCNC: 8.9 MG/DL (ref 8.8–10.2)
CALCIUM SERPL-MCNC: 9 MG/DL (ref 8.8–10.2)
CANCER AG19-9 SERPL IA-ACNC: 19 U/ML
CHLORIDE SERPL-SCNC: 100 MMOL/L (ref 98–107)
CHLORIDE SERPL-SCNC: 103 MMOL/L (ref 98–107)
CHLORIDE SERPL-SCNC: 104 MMOL/L (ref 98–107)
CHLORIDE SERPL-SCNC: 105 MMOL/L (ref 98–107)
CHLORIDE SERPL-SCNC: 106 MMOL/L (ref 98–107)
CHLORIDE SERPL-SCNC: 107 MMOL/L (ref 98–107)
CHLORIDE SERPL-SCNC: 108 MMOL/L (ref 98–107)
CHLORIDE SERPL-SCNC: 109 MMOL/L (ref 98–107)
CHLORIDE SERPL-SCNC: 110 MMOL/L (ref 98–107)
CODING SYSTEM: NORMAL
COLOR UR AUTO: YELLOW
CREAT SERPL-MCNC: 0.52 MG/DL (ref 0.67–1.17)
CREAT SERPL-MCNC: 0.53 MG/DL (ref 0.67–1.17)
CREAT SERPL-MCNC: 0.54 MG/DL (ref 0.67–1.17)
CREAT SERPL-MCNC: 0.56 MG/DL (ref 0.67–1.17)
CREAT SERPL-MCNC: 0.58 MG/DL (ref 0.67–1.17)
CREAT SERPL-MCNC: 0.59 MG/DL (ref 0.67–1.17)
CREAT SERPL-MCNC: 0.59 MG/DL (ref 0.67–1.17)
CREAT SERPL-MCNC: 0.6 MG/DL (ref 0.67–1.17)
CREAT SERPL-MCNC: 0.61 MG/DL (ref 0.67–1.17)
CREAT SERPL-MCNC: 0.61 MG/DL (ref 0.67–1.17)
CREAT SERPL-MCNC: 0.62 MG/DL (ref 0.67–1.17)
CREAT SERPL-MCNC: 0.63 MG/DL (ref 0.67–1.17)
CREAT SERPL-MCNC: 0.64 MG/DL (ref 0.67–1.17)
CREAT SERPL-MCNC: 0.64 MG/DL (ref 0.67–1.17)
CREAT SERPL-MCNC: 0.65 MG/DL (ref 0.67–1.17)
CREAT SERPL-MCNC: 0.69 MG/DL (ref 0.67–1.17)
CREAT SERPL-MCNC: 0.69 MG/DL (ref 0.67–1.17)
CREAT SERPL-MCNC: 0.7 MG/DL (ref 0.67–1.17)
CREAT SERPL-MCNC: 0.71 MG/DL (ref 0.67–1.17)
CREAT SERPL-MCNC: 0.72 MG/DL (ref 0.67–1.17)
CREAT SERPL-MCNC: 0.73 MG/DL (ref 0.67–1.17)
CREAT SERPL-MCNC: 0.73 MG/DL (ref 0.67–1.17)
CREAT SERPL-MCNC: 0.74 MG/DL (ref 0.67–1.17)
CREAT SERPL-MCNC: 0.76 MG/DL (ref 0.67–1.17)
CREAT SERPL-MCNC: 0.77 MG/DL (ref 0.67–1.17)
CREAT SERPL-MCNC: 0.78 MG/DL (ref 0.67–1.17)
CREAT SERPL-MCNC: 0.79 MG/DL (ref 0.67–1.17)
CREAT SERPL-MCNC: 0.8 MG/DL (ref 0.67–1.17)
CREAT SERPL-MCNC: 0.8 MG/DL (ref 0.67–1.17)
CREAT SERPL-MCNC: 0.81 MG/DL (ref 0.67–1.17)
CREAT SERPL-MCNC: 0.81 MG/DL (ref 0.67–1.17)
CREAT SERPL-MCNC: 0.83 MG/DL (ref 0.67–1.17)
CREAT SERPL-MCNC: 0.83 MG/DL (ref 0.67–1.17)
CREAT SERPL-MCNC: 0.86 MG/DL (ref 0.67–1.17)
CREAT SERPL-MCNC: 0.89 MG/DL (ref 0.67–1.17)
CREAT SERPL-MCNC: 0.96 MG/DL (ref 0.67–1.17)
CREAT SERPL-MCNC: 0.97 MG/DL (ref 0.67–1.17)
CREAT SERPL-MCNC: 1.1 MG/DL (ref 0.67–1.17)
CROSSMATCH: NORMAL
CRP SERPL-MCNC: 104 MG/L
CRP SERPL-MCNC: 105 MG/L
CRP SERPL-MCNC: 108 MG/L
CRP SERPL-MCNC: 110 MG/L
CRP SERPL-MCNC: 129 MG/L
CRP SERPL-MCNC: 28.1 MG/L
CRP SERPL-MCNC: 39.3 MG/L
CRP SERPL-MCNC: 60.3 MG/L
CYSTATIN C (ROCHE): 1.1 MG/L (ref 0.6–1)
DEPRECATED HCO3 PLAS-SCNC: 15 MMOL/L (ref 22–29)
DEPRECATED HCO3 PLAS-SCNC: 16 MMOL/L (ref 22–29)
DEPRECATED HCO3 PLAS-SCNC: 17 MMOL/L (ref 22–29)
DEPRECATED HCO3 PLAS-SCNC: 18 MMOL/L (ref 22–29)
DEPRECATED HCO3 PLAS-SCNC: 19 MMOL/L (ref 22–29)
DEPRECATED HCO3 PLAS-SCNC: 19 MMOL/L (ref 22–29)
DEPRECATED HCO3 PLAS-SCNC: 20 MMOL/L (ref 22–29)
DEPRECATED HCO3 PLAS-SCNC: 21 MMOL/L (ref 22–29)
DEPRECATED HCO3 PLAS-SCNC: 22 MMOL/L (ref 22–29)
DEPRECATED HCO3 PLAS-SCNC: 22 MMOL/L (ref 22–29)
DEPRECATED HCO3 PLAS-SCNC: 23 MMOL/L (ref 22–29)
DEPRECATED HCO3 PLAS-SCNC: 24 MMOL/L (ref 22–29)
DEPRECATED HCO3 PLAS-SCNC: 25 MMOL/L (ref 22–29)
DEPRECATED HCO3 PLAS-SCNC: 26 MMOL/L (ref 22–29)
DEPRECATED HCO3 PLAS-SCNC: 26 MMOL/L (ref 22–29)
DEPRECATED HCO3 PLAS-SCNC: 27 MMOL/L (ref 22–29)
DIASTOLIC BLOOD PRESSURE - MUSE: NORMAL MMHG
DIASTOLIC BLOOD PRESSURE - MUSE: NORMAL MMHG
EGFRCR SERPLBLD CKD-EPI 2021: 72 ML/MIN/1.73M2
EGFRCR SERPLBLD CKD-EPI 2021: 83 ML/MIN/1.73M2
EGFRCR SERPLBLD CKD-EPI 2021: 85 ML/MIN/1.73M2
EGFRCR SERPLBLD CKD-EPI 2021: >90 ML/MIN/1.73M2
ENTEROCOCCUS FAECALIS: NOT DETECTED
ENTEROCOCCUS FAECIUM: NOT DETECTED
EOSINOPHIL # BLD AUTO: 0 10E3/UL (ref 0–0.7)
EOSINOPHIL # BLD AUTO: 0 10E3/UL (ref 0–0.7)
EOSINOPHIL # BLD AUTO: 0.1 10E3/UL (ref 0–0.7)
EOSINOPHIL # BLD AUTO: 0.2 10E3/UL (ref 0–0.7)
EOSINOPHIL # BLD AUTO: 0.3 10E3/UL (ref 0–0.7)
EOSINOPHIL NFR BLD AUTO: 0 %
EOSINOPHIL NFR BLD AUTO: 1 %
EOSINOPHIL NFR BLD AUTO: 2 %
EOSINOPHIL NFR BLD AUTO: 3 %
ERYTHROCYTE [DISTWIDTH] IN BLOOD BY AUTOMATED COUNT: 13.2 % (ref 10–15)
ERYTHROCYTE [DISTWIDTH] IN BLOOD BY AUTOMATED COUNT: 13.3 % (ref 10–15)
ERYTHROCYTE [DISTWIDTH] IN BLOOD BY AUTOMATED COUNT: 13.4 % (ref 10–15)
ERYTHROCYTE [DISTWIDTH] IN BLOOD BY AUTOMATED COUNT: 13.5 % (ref 10–15)
ERYTHROCYTE [DISTWIDTH] IN BLOOD BY AUTOMATED COUNT: 13.6 % (ref 10–15)
ERYTHROCYTE [DISTWIDTH] IN BLOOD BY AUTOMATED COUNT: 13.7 % (ref 10–15)
ERYTHROCYTE [DISTWIDTH] IN BLOOD BY AUTOMATED COUNT: 13.8 % (ref 10–15)
ERYTHROCYTE [DISTWIDTH] IN BLOOD BY AUTOMATED COUNT: 13.9 % (ref 10–15)
ERYTHROCYTE [DISTWIDTH] IN BLOOD BY AUTOMATED COUNT: 14 % (ref 10–15)
ERYTHROCYTE [DISTWIDTH] IN BLOOD BY AUTOMATED COUNT: 14.1 % (ref 10–15)
ERYTHROCYTE [DISTWIDTH] IN BLOOD BY AUTOMATED COUNT: 14.2 % (ref 10–15)
ERYTHROCYTE [DISTWIDTH] IN BLOOD BY AUTOMATED COUNT: 14.3 % (ref 10–15)
ERYTHROCYTE [DISTWIDTH] IN BLOOD BY AUTOMATED COUNT: 14.3 % (ref 10–15)
ERYTHROCYTE [DISTWIDTH] IN BLOOD BY AUTOMATED COUNT: 14.4 % (ref 10–15)
ERYTHROCYTE [DISTWIDTH] IN BLOOD BY AUTOMATED COUNT: 14.5 % (ref 10–15)
ERYTHROCYTE [DISTWIDTH] IN BLOOD BY AUTOMATED COUNT: NORMAL %
ERYTHROCYTE [SEDIMENTATION RATE] IN BLOOD BY WESTERGREN METHOD: 51 MM/HR (ref 0–20)
ERYTHROCYTE [SEDIMENTATION RATE] IN BLOOD BY WESTERGREN METHOD: 52 MM/HR (ref 0–20)
FIBRINOGEN PPP-MCNC: 350 MG/DL (ref 170–490)
FIBRINOGEN PPP-MCNC: 542 MG/DL (ref 170–490)
GFR SERPL CREATININE-BSD FRML MDRD: 66 ML/MIN/1.73M2
GLUCOSE BLD-MCNC: 80 MG/DL (ref 70–99)
GLUCOSE BLD-MCNC: 82 MG/DL (ref 70–99)
GLUCOSE BLD-MCNC: 82 MG/DL (ref 70–99)
GLUCOSE BLD-MCNC: 85 MG/DL (ref 70–99)
GLUCOSE BLDC GLUCOMTR-MCNC: 100 MG/DL (ref 70–99)
GLUCOSE BLDC GLUCOMTR-MCNC: 103 MG/DL (ref 70–99)
GLUCOSE BLDC GLUCOMTR-MCNC: 105 MG/DL (ref 70–99)
GLUCOSE BLDC GLUCOMTR-MCNC: 105 MG/DL (ref 70–99)
GLUCOSE BLDC GLUCOMTR-MCNC: 106 MG/DL (ref 70–99)
GLUCOSE BLDC GLUCOMTR-MCNC: 107 MG/DL (ref 70–99)
GLUCOSE BLDC GLUCOMTR-MCNC: 108 MG/DL (ref 70–99)
GLUCOSE BLDC GLUCOMTR-MCNC: 110 MG/DL (ref 70–99)
GLUCOSE BLDC GLUCOMTR-MCNC: 112 MG/DL (ref 70–99)
GLUCOSE BLDC GLUCOMTR-MCNC: 114 MG/DL (ref 70–99)
GLUCOSE BLDC GLUCOMTR-MCNC: 115 MG/DL (ref 70–99)
GLUCOSE BLDC GLUCOMTR-MCNC: 115 MG/DL (ref 70–99)
GLUCOSE BLDC GLUCOMTR-MCNC: 119 MG/DL (ref 70–99)
GLUCOSE BLDC GLUCOMTR-MCNC: 119 MG/DL (ref 70–99)
GLUCOSE BLDC GLUCOMTR-MCNC: 120 MG/DL (ref 70–99)
GLUCOSE BLDC GLUCOMTR-MCNC: 121 MG/DL (ref 70–99)
GLUCOSE BLDC GLUCOMTR-MCNC: 121 MG/DL (ref 70–99)
GLUCOSE BLDC GLUCOMTR-MCNC: 122 MG/DL (ref 70–99)
GLUCOSE BLDC GLUCOMTR-MCNC: 123 MG/DL (ref 70–99)
GLUCOSE BLDC GLUCOMTR-MCNC: 124 MG/DL (ref 70–99)
GLUCOSE BLDC GLUCOMTR-MCNC: 124 MG/DL (ref 70–99)
GLUCOSE BLDC GLUCOMTR-MCNC: 125 MG/DL (ref 70–99)
GLUCOSE BLDC GLUCOMTR-MCNC: 126 MG/DL (ref 70–99)
GLUCOSE BLDC GLUCOMTR-MCNC: 128 MG/DL (ref 70–99)
GLUCOSE BLDC GLUCOMTR-MCNC: 131 MG/DL (ref 70–99)
GLUCOSE BLDC GLUCOMTR-MCNC: 132 MG/DL (ref 70–99)
GLUCOSE BLDC GLUCOMTR-MCNC: 134 MG/DL (ref 70–99)
GLUCOSE BLDC GLUCOMTR-MCNC: 138 MG/DL (ref 70–99)
GLUCOSE BLDC GLUCOMTR-MCNC: 138 MG/DL (ref 70–99)
GLUCOSE BLDC GLUCOMTR-MCNC: 141 MG/DL (ref 70–99)
GLUCOSE BLDC GLUCOMTR-MCNC: 142 MG/DL (ref 70–99)
GLUCOSE BLDC GLUCOMTR-MCNC: 146 MG/DL (ref 70–99)
GLUCOSE BLDC GLUCOMTR-MCNC: 148 MG/DL (ref 70–99)
GLUCOSE BLDC GLUCOMTR-MCNC: 150 MG/DL (ref 70–99)
GLUCOSE BLDC GLUCOMTR-MCNC: 154 MG/DL (ref 70–99)
GLUCOSE BLDC GLUCOMTR-MCNC: 154 MG/DL (ref 70–99)
GLUCOSE BLDC GLUCOMTR-MCNC: 157 MG/DL (ref 70–99)
GLUCOSE BLDC GLUCOMTR-MCNC: 158 MG/DL (ref 70–99)
GLUCOSE BLDC GLUCOMTR-MCNC: 161 MG/DL (ref 70–99)
GLUCOSE BLDC GLUCOMTR-MCNC: 165 MG/DL (ref 70–99)
GLUCOSE BLDC GLUCOMTR-MCNC: 173 MG/DL (ref 70–99)
GLUCOSE BLDC GLUCOMTR-MCNC: 205 MG/DL (ref 70–99)
GLUCOSE BLDC GLUCOMTR-MCNC: 61 MG/DL (ref 70–99)
GLUCOSE BLDC GLUCOMTR-MCNC: 64 MG/DL (ref 70–99)
GLUCOSE BLDC GLUCOMTR-MCNC: 65 MG/DL (ref 70–99)
GLUCOSE BLDC GLUCOMTR-MCNC: 68 MG/DL (ref 70–99)
GLUCOSE BLDC GLUCOMTR-MCNC: 70 MG/DL (ref 70–99)
GLUCOSE BLDC GLUCOMTR-MCNC: 72 MG/DL (ref 70–99)
GLUCOSE BLDC GLUCOMTR-MCNC: 73 MG/DL (ref 70–99)
GLUCOSE BLDC GLUCOMTR-MCNC: 75 MG/DL (ref 70–99)
GLUCOSE BLDC GLUCOMTR-MCNC: 77 MG/DL (ref 70–99)
GLUCOSE BLDC GLUCOMTR-MCNC: 79 MG/DL (ref 70–99)
GLUCOSE BLDC GLUCOMTR-MCNC: 80 MG/DL (ref 70–99)
GLUCOSE BLDC GLUCOMTR-MCNC: 81 MG/DL (ref 70–99)
GLUCOSE BLDC GLUCOMTR-MCNC: 83 MG/DL (ref 70–99)
GLUCOSE BLDC GLUCOMTR-MCNC: 84 MG/DL (ref 70–99)
GLUCOSE BLDC GLUCOMTR-MCNC: 85 MG/DL (ref 70–99)
GLUCOSE BLDC GLUCOMTR-MCNC: 86 MG/DL (ref 70–99)
GLUCOSE BLDC GLUCOMTR-MCNC: 87 MG/DL (ref 70–99)
GLUCOSE BLDC GLUCOMTR-MCNC: 87 MG/DL (ref 70–99)
GLUCOSE BLDC GLUCOMTR-MCNC: 88 MG/DL (ref 70–99)
GLUCOSE BLDC GLUCOMTR-MCNC: 89 MG/DL (ref 70–99)
GLUCOSE BLDC GLUCOMTR-MCNC: 90 MG/DL (ref 70–99)
GLUCOSE BLDC GLUCOMTR-MCNC: 90 MG/DL (ref 70–99)
GLUCOSE BLDC GLUCOMTR-MCNC: 91 MG/DL (ref 70–99)
GLUCOSE BLDC GLUCOMTR-MCNC: 91 MG/DL (ref 70–99)
GLUCOSE BLDC GLUCOMTR-MCNC: 92 MG/DL (ref 70–99)
GLUCOSE BLDC GLUCOMTR-MCNC: 92 MG/DL (ref 70–99)
GLUCOSE BLDC GLUCOMTR-MCNC: 93 MG/DL (ref 70–99)
GLUCOSE BLDC GLUCOMTR-MCNC: 93 MG/DL (ref 70–99)
GLUCOSE BLDC GLUCOMTR-MCNC: 94 MG/DL (ref 70–99)
GLUCOSE BLDC GLUCOMTR-MCNC: 95 MG/DL (ref 70–99)
GLUCOSE BLDC GLUCOMTR-MCNC: 96 MG/DL (ref 70–99)
GLUCOSE BLDC GLUCOMTR-MCNC: 97 MG/DL (ref 70–99)
GLUCOSE BLDC GLUCOMTR-MCNC: 98 MG/DL (ref 70–99)
GLUCOSE BLDC GLUCOMTR-MCNC: 98 MG/DL (ref 70–99)
GLUCOSE BLDC GLUCOMTR-MCNC: 99 MG/DL (ref 70–99)
GLUCOSE SERPL-MCNC: 101 MG/DL (ref 70–99)
GLUCOSE SERPL-MCNC: 102 MG/DL (ref 70–99)
GLUCOSE SERPL-MCNC: 104 MG/DL (ref 70–99)
GLUCOSE SERPL-MCNC: 105 MG/DL (ref 70–99)
GLUCOSE SERPL-MCNC: 108 MG/DL (ref 70–99)
GLUCOSE SERPL-MCNC: 109 MG/DL (ref 70–99)
GLUCOSE SERPL-MCNC: 110 MG/DL (ref 70–99)
GLUCOSE SERPL-MCNC: 114 MG/DL (ref 70–99)
GLUCOSE SERPL-MCNC: 125 MG/DL (ref 70–99)
GLUCOSE SERPL-MCNC: 137 MG/DL (ref 70–99)
GLUCOSE SERPL-MCNC: 140 MG/DL (ref 70–99)
GLUCOSE SERPL-MCNC: 141 MG/DL (ref 70–99)
GLUCOSE SERPL-MCNC: 143 MG/DL (ref 70–99)
GLUCOSE SERPL-MCNC: 149 MG/DL (ref 70–99)
GLUCOSE SERPL-MCNC: 153 MG/DL (ref 70–99)
GLUCOSE SERPL-MCNC: 160 MG/DL (ref 70–99)
GLUCOSE SERPL-MCNC: 167 MG/DL (ref 70–99)
GLUCOSE SERPL-MCNC: 62 MG/DL (ref 70–99)
GLUCOSE SERPL-MCNC: 63 MG/DL (ref 70–99)
GLUCOSE SERPL-MCNC: 66 MG/DL (ref 70–99)
GLUCOSE SERPL-MCNC: 67 MG/DL (ref 70–99)
GLUCOSE SERPL-MCNC: 74 MG/DL (ref 70–99)
GLUCOSE SERPL-MCNC: 74 MG/DL (ref 70–99)
GLUCOSE SERPL-MCNC: 79 MG/DL (ref 70–99)
GLUCOSE SERPL-MCNC: 80 MG/DL (ref 70–99)
GLUCOSE SERPL-MCNC: 82 MG/DL (ref 70–99)
GLUCOSE SERPL-MCNC: 83 MG/DL (ref 70–99)
GLUCOSE SERPL-MCNC: 84 MG/DL (ref 70–99)
GLUCOSE SERPL-MCNC: 86 MG/DL (ref 70–99)
GLUCOSE SERPL-MCNC: 86 MG/DL (ref 70–99)
GLUCOSE SERPL-MCNC: 87 MG/DL (ref 70–99)
GLUCOSE SERPL-MCNC: 87 MG/DL (ref 70–99)
GLUCOSE SERPL-MCNC: 88 MG/DL (ref 70–99)
GLUCOSE SERPL-MCNC: 89 MG/DL (ref 70–99)
GLUCOSE SERPL-MCNC: 90 MG/DL (ref 70–99)
GLUCOSE SERPL-MCNC: 91 MG/DL (ref 70–99)
GLUCOSE SERPL-MCNC: 95 MG/DL (ref 70–99)
GLUCOSE SERPL-MCNC: 96 MG/DL (ref 70–99)
GLUCOSE SERPL-MCNC: 98 MG/DL (ref 70–99)
GLUCOSE SERPL-MCNC: 99 MG/DL (ref 70–99)
GLUCOSE UR STRIP-MCNC: NEGATIVE MG/DL
GRANULAR CAST: 1 /LPF
HBA1C MFR BLD: 5.5 %
HCO3 BLDA-SCNC: 20 MMOL/L (ref 21–28)
HCO3 BLDA-SCNC: 21 MMOL/L (ref 21–28)
HCO3 BLDA-SCNC: 22 MMOL/L (ref 21–28)
HCO3 BLDV-SCNC: 28 MMOL/L (ref 21–28)
HCT VFR BLD AUTO: 21.4 % (ref 40–53)
HCT VFR BLD AUTO: 21.5 % (ref 40–53)
HCT VFR BLD AUTO: 21.7 % (ref 40–53)
HCT VFR BLD AUTO: 21.9 % (ref 40–53)
HCT VFR BLD AUTO: 22.1 % (ref 40–53)
HCT VFR BLD AUTO: 22.1 % (ref 40–53)
HCT VFR BLD AUTO: 22.2 % (ref 40–53)
HCT VFR BLD AUTO: 22.5 % (ref 40–53)
HCT VFR BLD AUTO: 22.7 % (ref 40–53)
HCT VFR BLD AUTO: 23 % (ref 40–53)
HCT VFR BLD AUTO: 23.3 % (ref 40–53)
HCT VFR BLD AUTO: 23.3 % (ref 40–53)
HCT VFR BLD AUTO: 23.5 % (ref 40–53)
HCT VFR BLD AUTO: 23.6 % (ref 40–53)
HCT VFR BLD AUTO: 23.7 % (ref 40–53)
HCT VFR BLD AUTO: 23.9 % (ref 40–53)
HCT VFR BLD AUTO: 23.9 % (ref 40–53)
HCT VFR BLD AUTO: 24 % (ref 40–53)
HCT VFR BLD AUTO: 24.1 % (ref 40–53)
HCT VFR BLD AUTO: 24.7 % (ref 40–53)
HCT VFR BLD AUTO: 24.7 % (ref 40–53)
HCT VFR BLD AUTO: 24.8 % (ref 40–53)
HCT VFR BLD AUTO: 24.8 % (ref 40–53)
HCT VFR BLD AUTO: 25.2 % (ref 40–53)
HCT VFR BLD AUTO: 26 % (ref 40–53)
HCT VFR BLD AUTO: 26.6 % (ref 40–53)
HCT VFR BLD AUTO: 27.4 % (ref 40–53)
HCT VFR BLD AUTO: 28.3 % (ref 40–53)
HCT VFR BLD AUTO: 28.6 % (ref 40–53)
HCT VFR BLD AUTO: 28.7 % (ref 40–53)
HCT VFR BLD AUTO: 29.3 % (ref 40–53)
HCT VFR BLD AUTO: 29.4 % (ref 40–53)
HCT VFR BLD AUTO: 30 % (ref 40–53)
HCT VFR BLD AUTO: 30.1 % (ref 40–53)
HCT VFR BLD AUTO: 30.2 % (ref 40–53)
HCT VFR BLD AUTO: 31.1 % (ref 40–53)
HCT VFR BLD AUTO: 33.2 % (ref 40–53)
HCT VFR BLD AUTO: 35.9 % (ref 40–53)
HCT VFR BLD AUTO: 36.1 % (ref 40–53)
HCT VFR BLD AUTO: 36.2 % (ref 40–53)
HCT VFR BLD AUTO: 37.1 % (ref 40–53)
HCT VFR BLD AUTO: 37.2 % (ref 40–53)
HCT VFR BLD AUTO: 37.4 % (ref 40–53)
HCT VFR BLD AUTO: 37.6 % (ref 40–53)
HCT VFR BLD AUTO: 38.4 % (ref 40–53)
HCT VFR BLD AUTO: 39.4 % (ref 40–53)
HCT VFR BLD AUTO: 43.6 % (ref 40–53)
HCT VFR BLD AUTO: NORMAL %
HGB BLD-MCNC: 10 G/DL (ref 13.3–17.7)
HGB BLD-MCNC: 10.2 G/DL (ref 13.3–17.7)
HGB BLD-MCNC: 10.2 G/DL (ref 13.3–17.7)
HGB BLD-MCNC: 11 G/DL (ref 13.3–17.7)
HGB BLD-MCNC: 11.3 G/DL (ref 13.3–17.7)
HGB BLD-MCNC: 11.4 G/DL (ref 13.3–17.7)
HGB BLD-MCNC: 11.5 G/DL (ref 13.3–17.7)
HGB BLD-MCNC: 11.9 G/DL (ref 13.3–17.7)
HGB BLD-MCNC: 12 G/DL (ref 13.3–17.7)
HGB BLD-MCNC: 12.1 G/DL (ref 13.3–17.7)
HGB BLD-MCNC: 12.1 G/DL (ref 13.3–17.7)
HGB BLD-MCNC: 12.2 G/DL (ref 13.3–17.7)
HGB BLD-MCNC: 12.5 G/DL (ref 13.3–17.7)
HGB BLD-MCNC: 12.6 G/DL (ref 13.3–17.7)
HGB BLD-MCNC: 12.7 G/DL (ref 13.3–17.7)
HGB BLD-MCNC: 13.3 G/DL (ref 13.3–17.7)
HGB BLD-MCNC: 14.5 G/DL (ref 13.3–17.7)
HGB BLD-MCNC: 6.9 G/DL (ref 13.3–17.7)
HGB BLD-MCNC: 7 G/DL (ref 13.3–17.7)
HGB BLD-MCNC: 7.1 G/DL (ref 13.3–17.7)
HGB BLD-MCNC: 7.1 G/DL (ref 13.3–17.7)
HGB BLD-MCNC: 7.2 G/DL (ref 13.3–17.7)
HGB BLD-MCNC: 7.2 G/DL (ref 13.3–17.7)
HGB BLD-MCNC: 7.4 G/DL (ref 13.3–17.7)
HGB BLD-MCNC: 7.5 G/DL (ref 13.3–17.7)
HGB BLD-MCNC: 7.6 G/DL (ref 13.3–17.7)
HGB BLD-MCNC: 7.7 G/DL (ref 13.3–17.7)
HGB BLD-MCNC: 7.8 G/DL (ref 13.3–17.7)
HGB BLD-MCNC: 7.9 G/DL (ref 13.3–17.7)
HGB BLD-MCNC: 7.9 G/DL (ref 13.3–17.7)
HGB BLD-MCNC: 8 G/DL (ref 13.3–17.7)
HGB BLD-MCNC: 8.1 G/DL (ref 13.3–17.7)
HGB BLD-MCNC: 8.2 G/DL (ref 13.3–17.7)
HGB BLD-MCNC: 8.3 G/DL (ref 13.3–17.7)
HGB BLD-MCNC: 8.4 G/DL (ref 13.3–17.7)
HGB BLD-MCNC: 8.6 G/DL (ref 13.3–17.7)
HGB BLD-MCNC: 8.8 G/DL (ref 13.3–17.7)
HGB BLD-MCNC: 8.9 G/DL (ref 13.3–17.7)
HGB BLD-MCNC: 8.9 G/DL (ref 13.3–17.7)
HGB BLD-MCNC: 9.4 G/DL (ref 13.3–17.7)
HGB BLD-MCNC: 9.5 G/DL (ref 13.3–17.7)
HGB BLD-MCNC: 9.5 G/DL (ref 13.3–17.7)
HGB BLD-MCNC: 9.8 G/DL (ref 13.3–17.7)
HGB BLD-MCNC: 9.8 G/DL (ref 13.3–17.7)
HGB BLD-MCNC: 9.9 G/DL (ref 13.3–17.7)
HGB BLD-MCNC: NORMAL G/DL
HGB UR QL STRIP: NEGATIVE
HOLD SPECIMEN: NORMAL
HYALINE CASTS: 9 /LPF
IMM GRANULOCYTES # BLD: 0 10E3/UL
IMM GRANULOCYTES # BLD: 0.1 10E3/UL
IMM GRANULOCYTES NFR BLD: 0 %
IMM GRANULOCYTES NFR BLD: 1 %
INR PPP: 1.02 (ref 0.85–1.15)
INR PPP: 1.08 (ref 0.85–1.15)
INR PPP: 1.18 (ref 0.85–1.15)
INR PPP: 1.68 (ref 0.85–1.15)
INTERPRETATION ECG - MUSE: NORMAL
INTERPRETATION ECG - MUSE: NORMAL
ISSUE DATE AND TIME: NORMAL
KETONES UR STRIP-MCNC: 20 MG/DL
LACTATE BLD-SCNC: 0.6 MMOL/L
LACTATE BLD-SCNC: 0.7 MMOL/L
LACTATE BLD-SCNC: 0.7 MMOL/L
LACTATE BLD-SCNC: 0.9 MMOL/L
LACTATE SERPL-SCNC: 0.8 MMOL/L (ref 0.7–2)
LACTATE SERPL-SCNC: 2.3 MMOL/L (ref 0.7–2)
LEUKOCYTE ESTERASE UR QL STRIP: NEGATIVE
LISTERIA SPECIES (DETECTED/NOT DETECTED): NOT DETECTED
LVEF ECHO: NORMAL
LVEF ECHO: NORMAL
LYMPHOCYTES # BLD AUTO: 0.6 10E3/UL (ref 0.8–5.3)
LYMPHOCYTES # BLD AUTO: 0.7 10E3/UL (ref 0.8–5.3)
LYMPHOCYTES # BLD AUTO: 0.9 10E3/UL (ref 0.8–5.3)
LYMPHOCYTES # BLD AUTO: 1 10E3/UL (ref 0.8–5.3)
LYMPHOCYTES # BLD AUTO: 1 10E3/UL (ref 0.8–5.3)
LYMPHOCYTES # BLD AUTO: 1.1 10E3/UL (ref 0.8–5.3)
LYMPHOCYTES # BLD AUTO: 1.2 10E3/UL (ref 0.8–5.3)
LYMPHOCYTES # BLD AUTO: 1.3 10E3/UL (ref 0.8–5.3)
LYMPHOCYTES NFR BLD AUTO: 10 %
LYMPHOCYTES NFR BLD AUTO: 12 %
LYMPHOCYTES NFR BLD AUTO: 14 %
LYMPHOCYTES NFR BLD AUTO: 7 %
LYMPHOCYTES NFR BLD AUTO: 8 %
LYMPHOCYTES NFR BLD AUTO: 9 %
MAGNESIUM SERPL-MCNC: 1.2 MG/DL (ref 1.7–2.3)
MAGNESIUM SERPL-MCNC: 1.3 MG/DL (ref 1.7–2.3)
MAGNESIUM SERPL-MCNC: 1.4 MG/DL (ref 1.7–2.3)
MAGNESIUM SERPL-MCNC: 1.5 MG/DL (ref 1.7–2.3)
MAGNESIUM SERPL-MCNC: 1.6 MG/DL (ref 1.7–2.3)
MAGNESIUM SERPL-MCNC: 1.7 MG/DL (ref 1.7–2.3)
MAGNESIUM SERPL-MCNC: 1.8 MG/DL (ref 1.7–2.3)
MAGNESIUM SERPL-MCNC: 1.9 MG/DL (ref 1.7–2.3)
MAGNESIUM SERPL-MCNC: 2 MG/DL (ref 1.7–2.3)
MAGNESIUM SERPL-MCNC: 2.1 MG/DL (ref 1.7–2.3)
MAGNESIUM SERPL-MCNC: 2.2 MG/DL (ref 1.7–2.3)
MAGNESIUM SERPL-MCNC: 2.4 MG/DL (ref 1.7–2.3)
MAGNESIUM SERPL-MCNC: 2.4 MG/DL (ref 1.7–2.3)
MAGNESIUM SERPL-MCNC: 2.5 MG/DL (ref 1.7–2.3)
MCH RBC QN AUTO: 30.8 PG (ref 26.5–33)
MCH RBC QN AUTO: 30.9 PG (ref 26.5–33)
MCH RBC QN AUTO: 31 PG (ref 26.5–33)
MCH RBC QN AUTO: 31.1 PG (ref 26.5–33)
MCH RBC QN AUTO: 31.1 PG (ref 26.5–33)
MCH RBC QN AUTO: 31.2 PG (ref 26.5–33)
MCH RBC QN AUTO: 31.2 PG (ref 26.5–33)
MCH RBC QN AUTO: 31.3 PG (ref 26.5–33)
MCH RBC QN AUTO: 31.4 PG (ref 26.5–33)
MCH RBC QN AUTO: 31.6 PG (ref 26.5–33)
MCH RBC QN AUTO: 31.7 PG (ref 26.5–33)
MCH RBC QN AUTO: 31.8 PG (ref 26.5–33)
MCH RBC QN AUTO: 31.9 PG (ref 26.5–33)
MCH RBC QN AUTO: 31.9 PG (ref 26.5–33)
MCH RBC QN AUTO: 32 PG (ref 26.5–33)
MCH RBC QN AUTO: 32.1 PG (ref 26.5–33)
MCH RBC QN AUTO: 32.1 PG (ref 26.5–33)
MCH RBC QN AUTO: 32.2 PG (ref 26.5–33)
MCH RBC QN AUTO: 32.2 PG (ref 26.5–33)
MCH RBC QN AUTO: 32.3 PG (ref 26.5–33)
MCH RBC QN AUTO: 32.3 PG (ref 26.5–33)
MCH RBC QN AUTO: 32.4 PG (ref 26.5–33)
MCH RBC QN AUTO: 32.4 PG (ref 26.5–33)
MCH RBC QN AUTO: 32.7 PG (ref 26.5–33)
MCH RBC QN AUTO: NORMAL PG
MCHC RBC AUTO-ENTMCNC: 31.3 G/DL (ref 31.5–36.5)
MCHC RBC AUTO-ENTMCNC: 31.4 G/DL (ref 31.5–36.5)
MCHC RBC AUTO-ENTMCNC: 31.9 G/DL (ref 31.5–36.5)
MCHC RBC AUTO-ENTMCNC: 32 G/DL (ref 31.5–36.5)
MCHC RBC AUTO-ENTMCNC: 32.2 G/DL (ref 31.5–36.5)
MCHC RBC AUTO-ENTMCNC: 32.3 G/DL (ref 31.5–36.5)
MCHC RBC AUTO-ENTMCNC: 32.3 G/DL (ref 31.5–36.5)
MCHC RBC AUTO-ENTMCNC: 32.4 G/DL (ref 31.5–36.5)
MCHC RBC AUTO-ENTMCNC: 32.5 G/DL (ref 31.5–36.5)
MCHC RBC AUTO-ENTMCNC: 32.6 G/DL (ref 31.5–36.5)
MCHC RBC AUTO-ENTMCNC: 32.7 G/DL (ref 31.5–36.5)
MCHC RBC AUTO-ENTMCNC: 32.8 G/DL (ref 31.5–36.5)
MCHC RBC AUTO-ENTMCNC: 32.9 G/DL (ref 31.5–36.5)
MCHC RBC AUTO-ENTMCNC: 33 G/DL (ref 31.5–36.5)
MCHC RBC AUTO-ENTMCNC: 33.1 G/DL (ref 31.5–36.5)
MCHC RBC AUTO-ENTMCNC: 33.1 G/DL (ref 31.5–36.5)
MCHC RBC AUTO-ENTMCNC: 33.2 G/DL (ref 31.5–36.5)
MCHC RBC AUTO-ENTMCNC: 33.3 G/DL (ref 31.5–36.5)
MCHC RBC AUTO-ENTMCNC: 33.4 G/DL (ref 31.5–36.5)
MCHC RBC AUTO-ENTMCNC: 33.5 G/DL (ref 31.5–36.5)
MCHC RBC AUTO-ENTMCNC: 33.6 G/DL (ref 31.5–36.5)
MCHC RBC AUTO-ENTMCNC: 33.7 G/DL (ref 31.5–36.5)
MCHC RBC AUTO-ENTMCNC: 33.8 G/DL (ref 31.5–36.5)
MCHC RBC AUTO-ENTMCNC: 33.9 G/DL (ref 31.5–36.5)
MCHC RBC AUTO-ENTMCNC: 34.2 G/DL (ref 31.5–36.5)
MCHC RBC AUTO-ENTMCNC: NORMAL G/DL
MCV RBC AUTO: 100 FL (ref 78–100)
MCV RBC AUTO: 91 FL (ref 78–100)
MCV RBC AUTO: 93 FL (ref 78–100)
MCV RBC AUTO: 94 FL (ref 78–100)
MCV RBC AUTO: 95 FL (ref 78–100)
MCV RBC AUTO: 96 FL (ref 78–100)
MCV RBC AUTO: 97 FL (ref 78–100)
MCV RBC AUTO: 98 FL (ref 78–100)
MCV RBC AUTO: 99 FL (ref 78–100)
MCV RBC AUTO: 99 FL (ref 78–100)
MCV RBC AUTO: NORMAL FL
MONOCYTES # BLD AUTO: 0.5 10E3/UL (ref 0–1.3)
MONOCYTES # BLD AUTO: 0.7 10E3/UL (ref 0–1.3)
MONOCYTES # BLD AUTO: 0.8 10E3/UL (ref 0–1.3)
MONOCYTES # BLD AUTO: 1 10E3/UL (ref 0–1.3)
MONOCYTES # BLD AUTO: 1.1 10E3/UL (ref 0–1.3)
MONOCYTES # BLD AUTO: 1.2 10E3/UL (ref 0–1.3)
MONOCYTES # BLD AUTO: 1.3 10E3/UL (ref 0–1.3)
MONOCYTES NFR BLD AUTO: 10 %
MONOCYTES NFR BLD AUTO: 10 %
MONOCYTES NFR BLD AUTO: 11 %
MONOCYTES NFR BLD AUTO: 6 %
MONOCYTES NFR BLD AUTO: 7 %
MONOCYTES NFR BLD AUTO: 8 %
MONOCYTES NFR BLD AUTO: 9 %
MONOCYTES NFR BLD AUTO: 9 %
MUCOUS THREADS #/AREA URNS LPF: PRESENT /LPF
NDM TARGET DNA: NOT DETECTED
NEUTROPHILS # BLD AUTO: 10 10E3/UL (ref 1.6–8.3)
NEUTROPHILS # BLD AUTO: 5.1 10E3/UL (ref 1.6–8.3)
NEUTROPHILS # BLD AUTO: 6.8 10E3/UL (ref 1.6–8.3)
NEUTROPHILS # BLD AUTO: 7.4 10E3/UL (ref 1.6–8.3)
NEUTROPHILS # BLD AUTO: 7.5 10E3/UL (ref 1.6–8.3)
NEUTROPHILS # BLD AUTO: 7.9 10E3/UL (ref 1.6–8.3)
NEUTROPHILS # BLD AUTO: 8.6 10E3/UL (ref 1.6–8.3)
NEUTROPHILS # BLD AUTO: 8.7 10E3/UL (ref 1.6–8.3)
NEUTROPHILS # BLD AUTO: 8.9 10E3/UL (ref 1.6–8.3)
NEUTROPHILS # BLD AUTO: 9.2 10E3/UL (ref 1.6–8.3)
NEUTROPHILS # BLD AUTO: 9.2 10E3/UL (ref 1.6–8.3)
NEUTROPHILS # BLD AUTO: 9.4 10E3/UL (ref 1.6–8.3)
NEUTROPHILS NFR BLD AUTO: 73 %
NEUTROPHILS NFR BLD AUTO: 76 %
NEUTROPHILS NFR BLD AUTO: 76 %
NEUTROPHILS NFR BLD AUTO: 77 %
NEUTROPHILS NFR BLD AUTO: 77 %
NEUTROPHILS NFR BLD AUTO: 78 %
NEUTROPHILS NFR BLD AUTO: 79 %
NEUTROPHILS NFR BLD AUTO: 81 %
NEUTROPHILS NFR BLD AUTO: 81 %
NEUTROPHILS NFR BLD AUTO: 82 %
NEUTROPHILS NFR BLD AUTO: 82 %
NEUTROPHILS NFR BLD AUTO: 86 %
NITRATE UR QL: NEGATIVE
NRBC # BLD AUTO: 0 10E3/UL
NRBC BLD AUTO-RTO: 0 /100
O2/TOTAL GAS SETTING VFR VENT: 36 %
O2/TOTAL GAS SETTING VFR VENT: 37 %
O2/TOTAL GAS SETTING VFR VENT: 40 %
O2/TOTAL GAS SETTING VFR VENT: 75 %
P AXIS - MUSE: 17 DEGREES
P AXIS - MUSE: 50 DEGREES
PATH REPORT.COMMENTS IMP SPEC: NORMAL
PATH REPORT.FINAL DX SPEC: NORMAL
PATH REPORT.FINAL DX SPEC: NORMAL
PATH REPORT.GROSS SPEC: NORMAL
PATH REPORT.MICROSCOPIC SPEC OTHER STN: NORMAL
PATH REPORT.MICROSCOPIC SPEC OTHER STN: NORMAL
PATH REPORT.RELEVANT HX SPEC: NORMAL
PCO2 BLDA: 35 MM HG (ref 35–45)
PCO2 BLDA: 36 MM HG (ref 35–45)
PCO2 BLDA: 36 MM HG (ref 35–45)
PCO2 BLDV: 39 MM HG (ref 40–50)
PH BLDA: 7.37 [PH] (ref 7.35–7.45)
PH BLDA: 7.38 [PH] (ref 7.35–7.45)
PH BLDA: 7.4 [PH] (ref 7.35–7.45)
PH BLDV: 7.46 [PH] (ref 7.32–7.43)
PH UR STRIP: 5.5 [PH] (ref 5–7)
PHOSPHATE SERPL-MCNC: 1.9 MG/DL (ref 2.5–4.5)
PHOSPHATE SERPL-MCNC: 1.9 MG/DL (ref 2.5–4.5)
PHOSPHATE SERPL-MCNC: 2 MG/DL (ref 2.5–4.5)
PHOSPHATE SERPL-MCNC: 2.5 MG/DL (ref 2.5–4.5)
PHOSPHATE SERPL-MCNC: 2.5 MG/DL (ref 2.5–4.5)
PHOSPHATE SERPL-MCNC: 2.6 MG/DL (ref 2.5–4.5)
PHOSPHATE SERPL-MCNC: 2.7 MG/DL (ref 2.5–4.5)
PHOSPHATE SERPL-MCNC: 2.7 MG/DL (ref 2.5–4.5)
PHOSPHATE SERPL-MCNC: 2.8 MG/DL (ref 2.5–4.5)
PHOSPHATE SERPL-MCNC: 2.8 MG/DL (ref 2.5–4.5)
PHOSPHATE SERPL-MCNC: 2.9 MG/DL (ref 2.5–4.5)
PHOSPHATE SERPL-MCNC: 3 MG/DL (ref 2.5–4.5)
PHOSPHATE SERPL-MCNC: 3.1 MG/DL (ref 2.5–4.5)
PHOSPHATE SERPL-MCNC: 3.1 MG/DL (ref 2.5–4.5)
PHOSPHATE SERPL-MCNC: 3.3 MG/DL (ref 2.5–4.5)
PHOSPHATE SERPL-MCNC: 3.3 MG/DL (ref 2.5–4.5)
PHOSPHATE SERPL-MCNC: 3.4 MG/DL (ref 2.5–4.5)
PHOSPHATE SERPL-MCNC: 3.5 MG/DL (ref 2.5–4.5)
PHOSPHATE SERPL-MCNC: 3.6 MG/DL (ref 2.5–4.5)
PHOSPHATE SERPL-MCNC: 3.6 MG/DL (ref 2.5–4.5)
PHOSPHATE SERPL-MCNC: 3.8 MG/DL (ref 2.5–4.5)
PHOSPHATE SERPL-MCNC: 3.8 MG/DL (ref 2.5–4.5)
PHOSPHATE SERPL-MCNC: 3.9 MG/DL (ref 2.5–4.5)
PHOSPHATE SERPL-MCNC: 4 MG/DL (ref 2.5–4.5)
PHOSPHATE SERPL-MCNC: 4 MG/DL (ref 2.5–4.5)
PHOSPHATE SERPL-MCNC: 4.2 MG/DL (ref 2.5–4.5)
PHOTO IMAGE: NORMAL
PLATELET # BLD AUTO: 207 10E3/UL (ref 150–450)
PLATELET # BLD AUTO: 215 10E3/UL (ref 150–450)
PLATELET # BLD AUTO: 217 10E3/UL (ref 150–450)
PLATELET # BLD AUTO: 222 10E3/UL (ref 150–450)
PLATELET # BLD AUTO: 225 10E3/UL (ref 150–450)
PLATELET # BLD AUTO: 226 10E3/UL (ref 150–450)
PLATELET # BLD AUTO: 227 10E3/UL (ref 150–450)
PLATELET # BLD AUTO: 233 10E3/UL (ref 150–450)
PLATELET # BLD AUTO: 233 10E3/UL (ref 150–450)
PLATELET # BLD AUTO: 248 10E3/UL (ref 150–450)
PLATELET # BLD AUTO: 250 10E3/UL (ref 150–450)
PLATELET # BLD AUTO: 256 10E3/UL (ref 150–450)
PLATELET # BLD AUTO: 256 10E3/UL (ref 150–450)
PLATELET # BLD AUTO: 266 10E3/UL (ref 150–450)
PLATELET # BLD AUTO: 271 10E3/UL (ref 150–450)
PLATELET # BLD AUTO: 280 10E3/UL (ref 150–450)
PLATELET # BLD AUTO: 286 10E3/UL (ref 150–450)
PLATELET # BLD AUTO: 289 10E3/UL (ref 150–450)
PLATELET # BLD AUTO: 291 10E3/UL (ref 150–450)
PLATELET # BLD AUTO: 308 10E3/UL (ref 150–450)
PLATELET # BLD AUTO: 325 10E3/UL (ref 150–450)
PLATELET # BLD AUTO: 329 10E3/UL (ref 150–450)
PLATELET # BLD AUTO: 330 10E3/UL (ref 150–450)
PLATELET # BLD AUTO: 336 10E3/UL (ref 150–450)
PLATELET # BLD AUTO: 337 10E3/UL (ref 150–450)
PLATELET # BLD AUTO: 338 10E3/UL (ref 150–450)
PLATELET # BLD AUTO: 338 10E3/UL (ref 150–450)
PLATELET # BLD AUTO: 341 10E3/UL (ref 150–450)
PLATELET # BLD AUTO: 342 10E3/UL (ref 150–450)
PLATELET # BLD AUTO: 344 10E3/UL (ref 150–450)
PLATELET # BLD AUTO: 347 10E3/UL (ref 150–450)
PLATELET # BLD AUTO: 357 10E3/UL (ref 150–450)
PLATELET # BLD AUTO: 358 10E3/UL (ref 150–450)
PLATELET # BLD AUTO: 361 10E3/UL (ref 150–450)
PLATELET # BLD AUTO: 361 10E3/UL (ref 150–450)
PLATELET # BLD AUTO: 364 10E3/UL (ref 150–450)
PLATELET # BLD AUTO: 364 10E3/UL (ref 150–450)
PLATELET # BLD AUTO: 372 10E3/UL (ref 150–450)
PLATELET # BLD AUTO: 373 10E3/UL (ref 150–450)
PLATELET # BLD AUTO: 374 10E3/UL (ref 150–450)
PLATELET # BLD AUTO: 375 10E3/UL (ref 150–450)
PLATELET # BLD AUTO: 384 10E3/UL (ref 150–450)
PLATELET # BLD AUTO: 384 10E3/UL (ref 150–450)
PLATELET # BLD AUTO: 389 10E3/UL (ref 150–450)
PLATELET # BLD AUTO: 406 10E3/UL (ref 150–450)
PLATELET # BLD AUTO: NORMAL 10*3/UL
PO2 BLDA: 165 MM HG (ref 80–105)
PO2 BLDA: 166 MM HG (ref 80–105)
PO2 BLDA: 168 MM HG (ref 80–105)
PO2 BLDV: 40 MM HG (ref 25–47)
POTASSIUM BLD-SCNC: 4.1 MMOL/L (ref 3.5–5)
POTASSIUM BLD-SCNC: 4.2 MMOL/L (ref 3.5–5)
POTASSIUM BLD-SCNC: 4.2 MMOL/L (ref 3.5–5)
POTASSIUM BLD-SCNC: 4.8 MMOL/L (ref 3.5–5)
POTASSIUM SERPL-SCNC: 3.4 MMOL/L (ref 3.4–5.3)
POTASSIUM SERPL-SCNC: 3.7 MMOL/L (ref 3.4–5.3)
POTASSIUM SERPL-SCNC: 3.7 MMOL/L (ref 3.4–5.3)
POTASSIUM SERPL-SCNC: 3.8 MMOL/L (ref 3.4–5.3)
POTASSIUM SERPL-SCNC: 3.9 MMOL/L (ref 3.4–5.3)
POTASSIUM SERPL-SCNC: 4 MMOL/L (ref 3.4–5.3)
POTASSIUM SERPL-SCNC: 4.1 MMOL/L (ref 3.4–5.3)
POTASSIUM SERPL-SCNC: 4.1 MMOL/L (ref 3.4–5.3)
POTASSIUM SERPL-SCNC: 4.2 MMOL/L (ref 3.4–5.3)
POTASSIUM SERPL-SCNC: 4.3 MMOL/L (ref 3.4–5.3)
POTASSIUM SERPL-SCNC: 4.4 MMOL/L (ref 3.4–5.3)
POTASSIUM SERPL-SCNC: 4.5 MMOL/L (ref 3.4–5.3)
POTASSIUM SERPL-SCNC: 4.6 MMOL/L (ref 3.4–5.3)
POTASSIUM SERPL-SCNC: 4.7 MMOL/L (ref 3.4–5.3)
POTASSIUM SERPL-SCNC: 4.8 MMOL/L (ref 3.4–5.3)
POTASSIUM SERPL-SCNC: 4.8 MMOL/L (ref 3.4–5.3)
POTASSIUM SERPL-SCNC: 4.9 MMOL/L (ref 3.4–5.3)
POTASSIUM SERPL-SCNC: 5.2 MMOL/L (ref 3.4–5.3)
POTASSIUM SERPL-SCNC: 5.2 MMOL/L (ref 3.4–5.3)
POTASSIUM SERPL-SCNC: 5.3 MMOL/L (ref 3.4–5.3)
PR INTERVAL - MUSE: 126 MS
PR INTERVAL - MUSE: 152 MS
PREALB SERPL-MCNC: 11 MG/DL (ref 20–40)
PROCALCITONIN SERPL IA-MCNC: 0.08 NG/ML
PROT SERPL-MCNC: 6 G/DL (ref 6.4–8.3)
PROT SERPL-MCNC: 6.2 G/DL (ref 6.4–8.3)
PROT SERPL-MCNC: 6.3 G/DL (ref 6.4–8.3)
QRS DURATION - MUSE: 104 MS
QRS DURATION - MUSE: 98 MS
QT - MUSE: 398 MS
QT - MUSE: 446 MS
QTC - MUSE: 459 MS
QTC - MUSE: 471 MS
R AXIS - MUSE: 78 DEGREES
R AXIS - MUSE: 82 DEGREES
RBC # BLD AUTO: 2.21 10E6/UL (ref 4.4–5.9)
RBC # BLD AUTO: 2.24 10E6/UL (ref 4.4–5.9)
RBC # BLD AUTO: 2.26 10E6/UL (ref 4.4–5.9)
RBC # BLD AUTO: 2.27 10E6/UL (ref 4.4–5.9)
RBC # BLD AUTO: 2.29 10E6/UL (ref 4.4–5.9)
RBC # BLD AUTO: 2.33 10E6/UL (ref 4.4–5.9)
RBC # BLD AUTO: 2.35 10E6/UL (ref 4.4–5.9)
RBC # BLD AUTO: 2.39 10E6/UL (ref 4.4–5.9)
RBC # BLD AUTO: 2.4 10E6/UL (ref 4.4–5.9)
RBC # BLD AUTO: 2.4 10E6/UL (ref 4.4–5.9)
RBC # BLD AUTO: 2.41 10E6/UL (ref 4.4–5.9)
RBC # BLD AUTO: 2.41 10E6/UL (ref 4.4–5.9)
RBC # BLD AUTO: 2.43 10E6/UL (ref 4.4–5.9)
RBC # BLD AUTO: 2.43 10E6/UL (ref 4.4–5.9)
RBC # BLD AUTO: 2.46 10E6/UL (ref 4.4–5.9)
RBC # BLD AUTO: 2.46 10E6/UL (ref 4.4–5.9)
RBC # BLD AUTO: 2.47 10E6/UL (ref 4.4–5.9)
RBC # BLD AUTO: 2.53 10E6/UL (ref 4.4–5.9)
RBC # BLD AUTO: 2.54 10E6/UL (ref 4.4–5.9)
RBC # BLD AUTO: 2.56 10E6/UL (ref 4.4–5.9)
RBC # BLD AUTO: 2.58 10E6/UL (ref 4.4–5.9)
RBC # BLD AUTO: 2.59 10E6/UL (ref 4.4–5.9)
RBC # BLD AUTO: 2.6 10E6/UL (ref 4.4–5.9)
RBC # BLD AUTO: 2.6 10E6/UL (ref 4.4–5.9)
RBC # BLD AUTO: 2.65 10E6/UL (ref 4.4–5.9)
RBC # BLD AUTO: 2.72 10E6/UL (ref 4.4–5.9)
RBC # BLD AUTO: 2.78 10E6/UL (ref 4.4–5.9)
RBC # BLD AUTO: 2.93 10E6/UL (ref 4.4–5.9)
RBC # BLD AUTO: 2.96 10E6/UL (ref 4.4–5.9)
RBC # BLD AUTO: 3.03 10E6/UL (ref 4.4–5.9)
RBC # BLD AUTO: 3.03 10E6/UL (ref 4.4–5.9)
RBC # BLD AUTO: 3.1 10E6/UL (ref 4.4–5.9)
RBC # BLD AUTO: 3.11 10E6/UL (ref 4.4–5.9)
RBC # BLD AUTO: 3.11 10E6/UL (ref 4.4–5.9)
RBC # BLD AUTO: 3.19 10E6/UL (ref 4.4–5.9)
RBC # BLD AUTO: 3.23 10E6/UL (ref 4.4–5.9)
RBC # BLD AUTO: 3.45 10E6/UL (ref 4.4–5.9)
RBC # BLD AUTO: 3.77 10E6/UL (ref 4.4–5.9)
RBC # BLD AUTO: 3.79 10E6/UL (ref 4.4–5.9)
RBC # BLD AUTO: 3.83 10E6/UL (ref 4.4–5.9)
RBC # BLD AUTO: 3.84 10E6/UL (ref 4.4–5.9)
RBC # BLD AUTO: 3.85 10E6/UL (ref 4.4–5.9)
RBC # BLD AUTO: 3.89 10E6/UL (ref 4.4–5.9)
RBC # BLD AUTO: 4.01 10E6/UL (ref 4.4–5.9)
RBC # BLD AUTO: 4.11 10E6/UL (ref 4.4–5.9)
RBC # BLD AUTO: 4.25 10E6/UL (ref 4.4–5.9)
RBC # BLD AUTO: 4.51 10E6/UL (ref 4.4–5.9)
RBC # BLD AUTO: NORMAL 10*6/UL
RBC URINE: 2 /HPF
SODIUM BLD-SCNC: 135 MMOL/L (ref 135–145)
SODIUM BLD-SCNC: 136 MMOL/L (ref 135–145)
SODIUM BLD-SCNC: 136 MMOL/L (ref 135–145)
SODIUM BLD-SCNC: 137 MMOL/L (ref 135–145)
SODIUM SERPL-SCNC: 133 MMOL/L (ref 135–145)
SODIUM SERPL-SCNC: 134 MMOL/L (ref 135–145)
SODIUM SERPL-SCNC: 135 MMOL/L (ref 135–145)
SODIUM SERPL-SCNC: 136 MMOL/L (ref 135–145)
SODIUM SERPL-SCNC: 137 MMOL/L (ref 135–145)
SODIUM SERPL-SCNC: 138 MMOL/L (ref 135–145)
SODIUM SERPL-SCNC: 139 MMOL/L (ref 135–145)
SODIUM SERPL-SCNC: 140 MMOL/L (ref 135–145)
SODIUM SERPL-SCNC: 141 MMOL/L (ref 135–145)
SP GR UR STRIP: 1.02 (ref 1–1.03)
SPECIMEN EXPIRATION DATE: NORMAL
SQUAMOUS EPITHELIAL: <1 /HPF
STAPHYLOCOCCUS AUREUS: DETECTED
STAPHYLOCOCCUS EPIDERMIDIS: NOT DETECTED
STAPHYLOCOCCUS LUGDUNENSIS: NOT DETECTED
STREPTOCOCCUS AGALACTIAE: NOT DETECTED
STREPTOCOCCUS ANGINOSUS GROUP: NOT DETECTED
STREPTOCOCCUS PNEUMONIAE: NOT DETECTED
STREPTOCOCCUS PYOGENES: NOT DETECTED
STREPTOCOCCUS SPECIES: NOT DETECTED
SYSTOLIC BLOOD PRESSURE - MUSE: NORMAL MMHG
SYSTOLIC BLOOD PRESSURE - MUSE: NORMAL MMHG
T AXIS - MUSE: 26 DEGREES
T AXIS - MUSE: 41 DEGREES
TRANSITIONAL EPI: <1 /HPF
TROPONIN T SERPL HS-MCNC: 136 NG/L
TROPONIN T SERPL HS-MCNC: 219 NG/L
TROPONIN T SERPL HS-MCNC: 88 NG/L
UFH PPP CHRO-ACNC: 0.12 IU/ML
UFH PPP CHRO-ACNC: 0.16 IU/ML
UFH PPP CHRO-ACNC: 0.28 IU/ML
UFH PPP CHRO-ACNC: 0.29 IU/ML
UFH PPP CHRO-ACNC: 0.31 IU/ML
UFH PPP CHRO-ACNC: 0.33 IU/ML
UFH PPP CHRO-ACNC: 0.35 IU/ML
UFH PPP CHRO-ACNC: 0.63 IU/ML
UFH PPP CHRO-ACNC: <0.1 IU/ML
UNIT ABO/RH: NORMAL
UNIT NUMBER: NORMAL
UNIT STATUS: NORMAL
UNIT TYPE ISBT: 5100
UROBILINOGEN UR STRIP-MCNC: NORMAL MG/DL
VANCOMYCIN SERPL-MCNC: 11.6 UG/ML
VANCOMYCIN SERPL-MCNC: 16.4 UG/ML
VANCOMYCIN SERPL-MCNC: 18.3 UG/ML
VENTRICULAR RATE- MUSE: 67 BPM
VENTRICULAR RATE- MUSE: 80 BPM
WBC # BLD AUTO: 10.5 10E3/UL (ref 4–11)
WBC # BLD AUTO: 10.5 10E3/UL (ref 4–11)
WBC # BLD AUTO: 10.6 10E3/UL (ref 4–11)
WBC # BLD AUTO: 10.9 10E3/UL (ref 4–11)
WBC # BLD AUTO: 11 10E3/UL (ref 4–11)
WBC # BLD AUTO: 11.5 10E3/UL (ref 4–11)
WBC # BLD AUTO: 11.5 10E3/UL (ref 4–11)
WBC # BLD AUTO: 11.6 10E3/UL (ref 4–11)
WBC # BLD AUTO: 11.9 10E3/UL (ref 4–11)
WBC # BLD AUTO: 12.4 10E3/UL (ref 4–11)
WBC # BLD AUTO: 13.1 10E3/UL (ref 4–11)
WBC # BLD AUTO: 13.2 10E3/UL (ref 4–11)
WBC # BLD AUTO: 14 10E3/UL (ref 4–11)
WBC # BLD AUTO: 14.1 10E3/UL (ref 4–11)
WBC # BLD AUTO: 16 10E3/UL (ref 4–11)
WBC # BLD AUTO: 5.3 10E3/UL (ref 4–11)
WBC # BLD AUTO: 5.5 10E3/UL (ref 4–11)
WBC # BLD AUTO: 6 10E3/UL (ref 4–11)
WBC # BLD AUTO: 6 10E3/UL (ref 4–11)
WBC # BLD AUTO: 6.2 10E3/UL (ref 4–11)
WBC # BLD AUTO: 6.4 10E3/UL (ref 4–11)
WBC # BLD AUTO: 6.8 10E3/UL (ref 4–11)
WBC # BLD AUTO: 6.9 10E3/UL (ref 4–11)
WBC # BLD AUTO: 7.1 10E3/UL (ref 4–11)
WBC # BLD AUTO: 7.4 10E3/UL (ref 4–11)
WBC # BLD AUTO: 7.5 10E3/UL (ref 4–11)
WBC # BLD AUTO: 7.5 10E3/UL (ref 4–11)
WBC # BLD AUTO: 7.6 10E3/UL (ref 4–11)
WBC # BLD AUTO: 7.6 10E3/UL (ref 4–11)
WBC # BLD AUTO: 7.7 10E3/UL (ref 4–11)
WBC # BLD AUTO: 7.7 10E3/UL (ref 4–11)
WBC # BLD AUTO: 7.8 10E3/UL (ref 4–11)
WBC # BLD AUTO: 7.9 10E3/UL (ref 4–11)
WBC # BLD AUTO: 8 10E3/UL (ref 4–11)
WBC # BLD AUTO: 8.1 10E3/UL (ref 4–11)
WBC # BLD AUTO: 8.7 10E3/UL (ref 4–11)
WBC # BLD AUTO: 8.7 10E3/UL (ref 4–11)
WBC # BLD AUTO: 9.1 10E3/UL (ref 4–11)
WBC # BLD AUTO: 9.4 10E3/UL (ref 4–11)
WBC # BLD AUTO: 9.5 10E3/UL (ref 4–11)
WBC # BLD AUTO: 9.5 10E3/UL (ref 4–11)
WBC # BLD AUTO: 9.8 10E3/UL (ref 4–11)
WBC # BLD AUTO: 9.8 10E3/UL (ref 4–11)
WBC # BLD AUTO: NORMAL 10*3/UL
WBC URINE: 5 /HPF

## 2023-01-01 PROCEDURE — 86850 RBC ANTIBODY SCREEN: CPT

## 2023-01-01 PROCEDURE — 82310 ASSAY OF CALCIUM: CPT

## 2023-01-01 PROCEDURE — 250N000011 HC RX IP 250 OP 636: Mod: JZ | Performed by: NURSE PRACTITIONER

## 2023-01-01 PROCEDURE — 250N000011 HC RX IP 250 OP 636: Mod: JZ

## 2023-01-01 PROCEDURE — 85018 HEMOGLOBIN: CPT

## 2023-01-01 PROCEDURE — 85025 COMPLETE CBC W/AUTO DIFF WBC: CPT | Performed by: STUDENT IN AN ORGANIZED HEALTH CARE EDUCATION/TRAINING PROGRAM

## 2023-01-01 PROCEDURE — 83735 ASSAY OF MAGNESIUM: CPT | Performed by: SURGERY

## 2023-01-01 PROCEDURE — 250N000013 HC RX MED GY IP 250 OP 250 PS 637: Performed by: PHYSICIAN ASSISTANT

## 2023-01-01 PROCEDURE — 86140 C-REACTIVE PROTEIN: CPT | Performed by: STUDENT IN AN ORGANIZED HEALTH CARE EDUCATION/TRAINING PROGRAM

## 2023-01-01 PROCEDURE — 0Y6N0ZF DETACHMENT AT LEFT FOOT, PARTIAL 5TH RAY, OPEN APPROACH: ICD-10-PCS | Performed by: ORTHOPAEDIC SURGERY

## 2023-01-01 PROCEDURE — 120N000002 HC R&B MED SURG/OB UMMC

## 2023-01-01 PROCEDURE — 85610 PROTHROMBIN TIME: CPT | Performed by: STUDENT IN AN ORGANIZED HEALTH CARE EDUCATION/TRAINING PROGRAM

## 2023-01-01 PROCEDURE — 70450 CT HEAD/BRAIN W/O DYE: CPT

## 2023-01-01 PROCEDURE — 97530 THERAPEUTIC ACTIVITIES: CPT | Mod: GP

## 2023-01-01 PROCEDURE — 80048 BASIC METABOLIC PNL TOTAL CA: CPT | Performed by: STUDENT IN AN ORGANIZED HEALTH CARE EDUCATION/TRAINING PROGRAM

## 2023-01-01 PROCEDURE — 258N000003 HC RX IP 258 OP 636: Performed by: STUDENT IN AN ORGANIZED HEALTH CARE EDUCATION/TRAINING PROGRAM

## 2023-01-01 PROCEDURE — 250N000013 HC RX MED GY IP 250 OP 250 PS 637

## 2023-01-01 PROCEDURE — 84295 ASSAY OF SERUM SODIUM: CPT

## 2023-01-01 PROCEDURE — 36415 COLL VENOUS BLD VENIPUNCTURE: CPT | Performed by: SURGERY

## 2023-01-01 PROCEDURE — 97535 SELF CARE MNGMENT TRAINING: CPT | Mod: GO

## 2023-01-01 PROCEDURE — 83605 ASSAY OF LACTIC ACID: CPT

## 2023-01-01 PROCEDURE — 258N000003 HC RX IP 258 OP 636

## 2023-01-01 PROCEDURE — 272N000564 HC SHEATH CR2

## 2023-01-01 PROCEDURE — 84484 ASSAY OF TROPONIN QUANT: CPT | Performed by: NURSE PRACTITIONER

## 2023-01-01 PROCEDURE — 87040 BLOOD CULTURE FOR BACTERIA: CPT | Performed by: STUDENT IN AN ORGANIZED HEALTH CARE EDUCATION/TRAINING PROGRAM

## 2023-01-01 PROCEDURE — 250N000009 HC RX 250

## 2023-01-01 PROCEDURE — 250N000013 HC RX MED GY IP 250 OP 250 PS 637: Performed by: SURGERY

## 2023-01-01 PROCEDURE — 73620 X-RAY EXAM OF FOOT: CPT | Mod: LT

## 2023-01-01 PROCEDURE — 96366 THER/PROPH/DIAG IV INF ADDON: CPT

## 2023-01-01 PROCEDURE — 36415 COLL VENOUS BLD VENIPUNCTURE: CPT | Performed by: HOSPITALIST

## 2023-01-01 PROCEDURE — 250N000011 HC RX IP 250 OP 636: Mod: JZ | Performed by: STUDENT IN AN ORGANIZED HEALTH CARE EDUCATION/TRAINING PROGRAM

## 2023-01-01 PROCEDURE — 97116 GAIT TRAINING THERAPY: CPT | Mod: GP | Performed by: PHYSICAL THERAPIST

## 2023-01-01 PROCEDURE — 97129 THER IVNTJ 1ST 15 MIN: CPT | Mod: GO | Performed by: OCCUPATIONAL THERAPIST

## 2023-01-01 PROCEDURE — 250N000013 HC RX MED GY IP 250 OP 250 PS 637: Performed by: INTERNAL MEDICINE

## 2023-01-01 PROCEDURE — 83605 ASSAY OF LACTIC ACID: CPT | Performed by: STUDENT IN AN ORGANIZED HEALTH CARE EDUCATION/TRAINING PROGRAM

## 2023-01-01 PROCEDURE — 84100 ASSAY OF PHOSPHORUS: CPT | Performed by: INTERNAL MEDICINE

## 2023-01-01 PROCEDURE — 84100 ASSAY OF PHOSPHORUS: CPT | Performed by: STUDENT IN AN ORGANIZED HEALTH CARE EDUCATION/TRAINING PROGRAM

## 2023-01-01 PROCEDURE — 250N000011 HC RX IP 250 OP 636: Performed by: INTERNAL MEDICINE

## 2023-01-01 PROCEDURE — 36592 COLLECT BLOOD FROM PICC: CPT

## 2023-01-01 PROCEDURE — 120N000003 HC R&B IMCU UMMC

## 2023-01-01 PROCEDURE — 250N000009 HC RX 250: Performed by: INTERNAL MEDICINE

## 2023-01-01 PROCEDURE — 82962 GLUCOSE BLOOD TEST: CPT

## 2023-01-01 PROCEDURE — 83735 ASSAY OF MAGNESIUM: CPT

## 2023-01-01 PROCEDURE — 99213 OFFICE O/P EST LOW 20 MIN: CPT | Mod: 24 | Performed by: FAMILY MEDICINE

## 2023-01-01 PROCEDURE — 250N000009 HC RX 250: Performed by: ANESTHESIOLOGY

## 2023-01-01 PROCEDURE — 93922 UPR/L XTREMITY ART 2 LEVELS: CPT | Performed by: RADIOLOGY

## 2023-01-01 PROCEDURE — 250N000013 HC RX MED GY IP 250 OP 250 PS 637: Performed by: NURSE PRACTITIONER

## 2023-01-01 PROCEDURE — 250N000013 HC RX MED GY IP 250 OP 250 PS 637: Performed by: STUDENT IN AN ORGANIZED HEALTH CARE EDUCATION/TRAINING PROGRAM

## 2023-01-01 PROCEDURE — 250N000011 HC RX IP 250 OP 636: Performed by: STUDENT IN AN ORGANIZED HEALTH CARE EDUCATION/TRAINING PROGRAM

## 2023-01-01 PROCEDURE — 36415 COLL VENOUS BLD VENIPUNCTURE: CPT

## 2023-01-01 PROCEDURE — 80048 BASIC METABOLIC PNL TOTAL CA: CPT

## 2023-01-01 PROCEDURE — 99232 SBSQ HOSP IP/OBS MODERATE 35: CPT | Performed by: ANESTHESIOLOGY

## 2023-01-01 PROCEDURE — 250N000011 HC RX IP 250 OP 636

## 2023-01-01 PROCEDURE — 250N000011 HC RX IP 250 OP 636: Performed by: PHYSICIAN ASSISTANT

## 2023-01-01 PROCEDURE — 84100 ASSAY OF PHOSPHORUS: CPT | Performed by: PHYSICIAN ASSISTANT

## 2023-01-01 PROCEDURE — 96375 TX/PRO/DX INJ NEW DRUG ADDON: CPT

## 2023-01-01 PROCEDURE — 99223 1ST HOSP IP/OBS HIGH 75: CPT | Mod: AI | Performed by: INTERNAL MEDICINE

## 2023-01-01 PROCEDURE — 85520 HEPARIN ASSAY: CPT | Performed by: NURSE PRACTITIONER

## 2023-01-01 PROCEDURE — 99232 SBSQ HOSP IP/OBS MODERATE 35: CPT | Performed by: STUDENT IN AN ORGANIZED HEALTH CARE EDUCATION/TRAINING PROGRAM

## 2023-01-01 PROCEDURE — 71045 X-RAY EXAM CHEST 1 VIEW: CPT

## 2023-01-01 PROCEDURE — 71250 CT THORAX DX C-: CPT | Mod: GC | Performed by: RADIOLOGY

## 2023-01-01 PROCEDURE — 99207 PR APP CREDIT; MD BILLING SHARED VISIT: CPT | Performed by: NURSE PRACTITIONER

## 2023-01-01 PROCEDURE — 74018 RADEX ABDOMEN 1 VIEW: CPT | Mod: 26 | Performed by: STUDENT IN AN ORGANIZED HEALTH CARE EDUCATION/TRAINING PROGRAM

## 2023-01-01 PROCEDURE — 97110 THERAPEUTIC EXERCISES: CPT | Mod: GP | Performed by: REHABILITATION PRACTITIONER

## 2023-01-01 PROCEDURE — 85520 HEPARIN ASSAY: CPT | Performed by: SURGERY

## 2023-01-01 PROCEDURE — 04CN3ZZ EXTIRPATION OF MATTER FROM LEFT POPLITEAL ARTERY, PERCUTANEOUS APPROACH: ICD-10-PCS | Performed by: SURGERY

## 2023-01-01 PROCEDURE — 85027 COMPLETE CBC AUTOMATED: CPT

## 2023-01-01 PROCEDURE — 99285 EMERGENCY DEPT VISIT HI MDM: CPT | Mod: 25

## 2023-01-01 PROCEDURE — 0Y6N0ZC DETACHMENT AT LEFT FOOT, PARTIAL 3RD RAY, OPEN APPROACH: ICD-10-PCS | Performed by: ORTHOPAEDIC SURGERY

## 2023-01-01 PROCEDURE — 99233 SBSQ HOSP IP/OBS HIGH 50: CPT | Mod: FS | Performed by: NURSE PRACTITIONER

## 2023-01-01 PROCEDURE — 250N000011 HC RX IP 250 OP 636: Performed by: SURGERY

## 2023-01-01 PROCEDURE — 97530 THERAPEUTIC ACTIVITIES: CPT | Mod: GO

## 2023-01-01 PROCEDURE — 83735 ASSAY OF MAGNESIUM: CPT | Performed by: STUDENT IN AN ORGANIZED HEALTH CARE EDUCATION/TRAINING PROGRAM

## 2023-01-01 PROCEDURE — 258N000003 HC RX IP 258 OP 636: Performed by: ANESTHESIOLOGY

## 2023-01-01 PROCEDURE — 99215 OFFICE O/P EST HI 40 MIN: CPT | Mod: GC | Performed by: REGISTERED NURSE

## 2023-01-01 PROCEDURE — 99232 SBSQ HOSP IP/OBS MODERATE 35: CPT | Mod: GC | Performed by: ANESTHESIOLOGY

## 2023-01-01 PROCEDURE — 93016 CV STRESS TEST SUPVJ ONLY: CPT | Performed by: INTERNAL MEDICINE

## 2023-01-01 PROCEDURE — 99418 PROLNG IP/OBS E/M EA 15 MIN: CPT | Performed by: INTERNAL MEDICINE

## 2023-01-01 PROCEDURE — 99233 SBSQ HOSP IP/OBS HIGH 50: CPT | Mod: GC | Performed by: INTERNAL MEDICINE

## 2023-01-01 PROCEDURE — 999N000044 HC STATISTIC CVC DRESSING CHANGE

## 2023-01-01 PROCEDURE — C1763 CONN TISS, NON-HUMAN: HCPCS | Performed by: SURGERY

## 2023-01-01 PROCEDURE — 200N000002 HC R&B ICU UMMC

## 2023-01-01 PROCEDURE — 370N000017 HC ANESTHESIA TECHNICAL FEE, PER MIN: Performed by: ORTHOPAEDIC SURGERY

## 2023-01-01 PROCEDURE — 86301 IMMUNOASSAY TUMOR CA 19-9: CPT | Performed by: STUDENT IN AN ORGANIZED HEALTH CARE EDUCATION/TRAINING PROGRAM

## 2023-01-01 PROCEDURE — 85520 HEPARIN ASSAY: CPT

## 2023-01-01 PROCEDURE — 85018 HEMOGLOBIN: CPT | Performed by: PHYSICIAN ASSISTANT

## 2023-01-01 PROCEDURE — 84100 ASSAY OF PHOSPHORUS: CPT

## 2023-01-01 PROCEDURE — 99207 PR NO CHARGE LOS: CPT | Performed by: NURSE PRACTITIONER

## 2023-01-01 PROCEDURE — 36415 COLL VENOUS BLD VENIPUNCTURE: CPT | Performed by: PHYSICIAN ASSISTANT

## 2023-01-01 PROCEDURE — 83036 HEMOGLOBIN GLYCOSYLATED A1C: CPT

## 2023-01-01 PROCEDURE — 82330 ASSAY OF CALCIUM: CPT

## 2023-01-01 PROCEDURE — 85347 COAGULATION TIME ACTIVATED: CPT

## 2023-01-01 PROCEDURE — 710N000010 HC RECOVERY PHASE 1, LEVEL 2, PER MIN: Performed by: SURGERY

## 2023-01-01 PROCEDURE — 86923 COMPATIBILITY TEST ELECTRIC: CPT

## 2023-01-01 PROCEDURE — 999N000128 HC STATISTIC PERIPHERAL IV START W/O US GUIDANCE

## 2023-01-01 PROCEDURE — 271N000002 HC RX 271: Performed by: STUDENT IN AN ORGANIZED HEALTH CARE EDUCATION/TRAINING PROGRAM

## 2023-01-01 PROCEDURE — 999N000157 HC STATISTIC RCP TIME EA 10 MIN

## 2023-01-01 PROCEDURE — 84132 ASSAY OF SERUM POTASSIUM: CPT

## 2023-01-01 PROCEDURE — 360N000076 HC SURGERY LEVEL 3, PER MIN: Performed by: SURGERY

## 2023-01-01 PROCEDURE — 36415 COLL VENOUS BLD VENIPUNCTURE: CPT | Performed by: STUDENT IN AN ORGANIZED HEALTH CARE EDUCATION/TRAINING PROGRAM

## 2023-01-01 PROCEDURE — 04UL0KZ SUPPLEMENT LEFT FEMORAL ARTERY WITH NONAUTOLOGOUS TISSUE SUBSTITUTE, OPEN APPROACH: ICD-10-PCS | Performed by: SURGERY

## 2023-01-01 PROCEDURE — 272N000473 HC KIT, VPS RHYTHM STYLET

## 2023-01-01 PROCEDURE — 99233 SBSQ HOSP IP/OBS HIGH 50: CPT | Performed by: STUDENT IN AN ORGANIZED HEALTH CARE EDUCATION/TRAINING PROGRAM

## 2023-01-01 PROCEDURE — C1769 GUIDE WIRE: HCPCS | Performed by: SURGERY

## 2023-01-01 PROCEDURE — 88300 SURGICAL PATH GROSS: CPT | Mod: TC | Performed by: SURGERY

## 2023-01-01 PROCEDURE — 999N000141 HC STATISTIC PRE-PROCEDURE NURSING ASSESSMENT: Performed by: ORTHOPAEDIC SURGERY

## 2023-01-01 PROCEDURE — 250N000011 HC RX IP 250 OP 636: Performed by: REGISTERED NURSE

## 2023-01-01 PROCEDURE — 97164 PT RE-EVAL EST PLAN CARE: CPT | Mod: GP

## 2023-01-01 PROCEDURE — 85730 THROMBOPLASTIN TIME PARTIAL: CPT

## 2023-01-01 PROCEDURE — 97530 THERAPEUTIC ACTIVITIES: CPT | Mod: GP | Performed by: PHYSICAL THERAPIST

## 2023-01-01 PROCEDURE — 83735 ASSAY OF MAGNESIUM: CPT | Performed by: PHYSICIAN ASSISTANT

## 2023-01-01 PROCEDURE — 85025 COMPLETE CBC W/AUTO DIFF WBC: CPT

## 2023-01-01 PROCEDURE — 80053 COMPREHEN METABOLIC PANEL: CPT | Performed by: STUDENT IN AN ORGANIZED HEALTH CARE EDUCATION/TRAINING PROGRAM

## 2023-01-01 PROCEDURE — 97165 OT EVAL LOW COMPLEX 30 MIN: CPT | Mod: GO

## 2023-01-01 PROCEDURE — 80202 ASSAY OF VANCOMYCIN: CPT | Performed by: PEDIATRICS

## 2023-01-01 PROCEDURE — 99233 SBSQ HOSP IP/OBS HIGH 50: CPT | Mod: GC | Performed by: PHYSICAL MEDICINE & REHABILITATION

## 2023-01-01 PROCEDURE — 75563 CARD MRI W/STRESS IMG & DYE: CPT | Mod: 26 | Performed by: INTERNAL MEDICINE

## 2023-01-01 PROCEDURE — 97535 SELF CARE MNGMENT TRAINING: CPT | Mod: GO | Performed by: OCCUPATIONAL THERAPIST

## 2023-01-01 PROCEDURE — 250N000025 HC SEVOFLURANE, PER MIN: Performed by: ORTHOPAEDIC SURGERY

## 2023-01-01 PROCEDURE — 97116 GAIT TRAINING THERAPY: CPT | Mod: GP

## 2023-01-01 PROCEDURE — 250N000011 HC RX IP 250 OP 636: Performed by: ANESTHESIOLOGY

## 2023-01-01 PROCEDURE — 271N000001 HC OR GENERAL SUPPLY NON-STERILE: Performed by: ORTHOPAEDIC SURGERY

## 2023-01-01 PROCEDURE — 272N000001 HC OR GENERAL SUPPLY STERILE: Performed by: ORTHOPAEDIC SURGERY

## 2023-01-01 PROCEDURE — 36415 COLL VENOUS BLD VENIPUNCTURE: CPT | Performed by: INTERNAL MEDICINE

## 2023-01-01 PROCEDURE — 99024 POSTOP FOLLOW-UP VISIT: CPT | Performed by: NURSE PRACTITIONER

## 2023-01-01 PROCEDURE — 74018 RADEX ABDOMEN 1 VIEW: CPT | Mod: 26 | Performed by: RADIOLOGY

## 2023-01-01 PROCEDURE — 99221 1ST HOSP IP/OBS SF/LOW 40: CPT | Performed by: PHYSICIAN ASSISTANT

## 2023-01-01 PROCEDURE — 87040 BLOOD CULTURE FOR BACTERIA: CPT | Performed by: HOSPITALIST

## 2023-01-01 PROCEDURE — 96376 TX/PRO/DX INJ SAME DRUG ADON: CPT

## 2023-01-01 PROCEDURE — 73630 X-RAY EXAM OF FOOT: CPT | Mod: 26 | Performed by: RADIOLOGY

## 2023-01-01 PROCEDURE — C1894 INTRO/SHEATH, NON-LASER: HCPCS | Performed by: SURGERY

## 2023-01-01 PROCEDURE — 250N000009 HC RX 250: Performed by: REGISTERED NURSE

## 2023-01-01 PROCEDURE — 99221 1ST HOSP IP/OBS SF/LOW 40: CPT | Mod: GC | Performed by: EMERGENCY MEDICINE

## 2023-01-01 PROCEDURE — 99285 EMERGENCY DEPT VISIT HI MDM: CPT | Mod: 25 | Performed by: STUDENT IN AN ORGANIZED HEALTH CARE EDUCATION/TRAINING PROGRAM

## 2023-01-01 PROCEDURE — 97110 THERAPEUTIC EXERCISES: CPT | Mod: GP

## 2023-01-01 PROCEDURE — 86901 BLOOD TYPING SEROLOGIC RH(D): CPT

## 2023-01-01 PROCEDURE — 80053 COMPREHEN METABOLIC PANEL: CPT | Performed by: NURSE PRACTITIONER

## 2023-01-01 PROCEDURE — A9585 GADOBUTROL INJECTION: HCPCS | Mod: JZ | Performed by: SURGERY

## 2023-01-01 PROCEDURE — 80202 ASSAY OF VANCOMYCIN: CPT | Performed by: INTERNAL MEDICINE

## 2023-01-01 PROCEDURE — 93306 TTE W/DOPPLER COMPLETE: CPT

## 2023-01-01 PROCEDURE — 73630 X-RAY EXAM OF FOOT: CPT | Mod: LT

## 2023-01-01 PROCEDURE — 85014 HEMATOCRIT: CPT | Performed by: INTERNAL MEDICINE

## 2023-01-01 PROCEDURE — 96365 THER/PROPH/DIAG IV INF INIT: CPT

## 2023-01-01 PROCEDURE — 99239 HOSP IP/OBS DSCHRG MGMT >30: CPT | Mod: GC | Performed by: INTERNAL MEDICINE

## 2023-01-01 PROCEDURE — 87149 DNA/RNA DIRECT PROBE: CPT | Performed by: STUDENT IN AN ORGANIZED HEALTH CARE EDUCATION/TRAINING PROGRAM

## 2023-01-01 PROCEDURE — 93971 EXTREMITY STUDY: CPT | Mod: RT

## 2023-01-01 PROCEDURE — 258N000003 HC RX IP 258 OP 636: Performed by: INTERNAL MEDICINE

## 2023-01-01 PROCEDURE — G0463 HOSPITAL OUTPT CLINIC VISIT: HCPCS

## 2023-01-01 PROCEDURE — 88307 TISSUE EXAM BY PATHOLOGIST: CPT | Mod: 26 | Performed by: PATHOLOGY

## 2023-01-01 PROCEDURE — 93018 CV STRESS TEST I&R ONLY: CPT | Performed by: INTERNAL MEDICINE

## 2023-01-01 PROCEDURE — 83605 ASSAY OF LACTIC ACID: CPT | Performed by: NURSE PRACTITIONER

## 2023-01-01 PROCEDURE — 87493 C DIFF AMPLIFIED PROBE: CPT | Performed by: STUDENT IN AN ORGANIZED HEALTH CARE EDUCATION/TRAINING PROGRAM

## 2023-01-01 PROCEDURE — 36592 COLLECT BLOOD FROM PICC: CPT | Performed by: STUDENT IN AN ORGANIZED HEALTH CARE EDUCATION/TRAINING PROGRAM

## 2023-01-01 PROCEDURE — 93005 ELECTROCARDIOGRAM TRACING: CPT

## 2023-01-01 PROCEDURE — 80048 BASIC METABOLIC PNL TOTAL CA: CPT | Performed by: PHYSICIAN ASSISTANT

## 2023-01-01 PROCEDURE — L8460 SHRINKER ABOVE KNEE: HCPCS

## 2023-01-01 PROCEDURE — 83735 ASSAY OF MAGNESIUM: CPT | Performed by: INTERNAL MEDICINE

## 2023-01-01 PROCEDURE — L5999 LOWR EXTREMITY PROSTHES NOS: HCPCS

## 2023-01-01 PROCEDURE — 99207 PR INPT ADMISSION FROM CLINIC: CPT | Performed by: FAMILY MEDICINE

## 2023-01-01 PROCEDURE — 87798 DETECT AGENT NOS DNA AMP: CPT | Performed by: INTERNAL MEDICINE

## 2023-01-01 PROCEDURE — 999N000065 XR CHEST PORT 1 VIEW

## 2023-01-01 PROCEDURE — 88304 TISSUE EXAM BY PATHOLOGIST: CPT | Mod: 26 | Performed by: PATHOLOGY

## 2023-01-01 PROCEDURE — 250N000011 HC RX IP 250 OP 636: Mod: JZ | Performed by: PHYSICIAN ASSISTANT

## 2023-01-01 PROCEDURE — 36246 INS CATH ABD/L-EXT ART 2ND: CPT | Mod: LT | Performed by: SURGERY

## 2023-01-01 PROCEDURE — 85027 COMPLETE CBC AUTOMATED: CPT | Performed by: NURSE PRACTITIONER

## 2023-01-01 PROCEDURE — 999N000065 XR ABDOMEN 1 VIEW

## 2023-01-01 PROCEDURE — 360N000084 HC SURGERY LEVEL 4 W/ FLUORO, PER MIN: Performed by: SURGERY

## 2023-01-01 PROCEDURE — 250N000012 HC RX MED GY IP 250 OP 636 PS 637

## 2023-01-01 PROCEDURE — 258N000003 HC RX IP 258 OP 636: Performed by: REGISTERED NURSE

## 2023-01-01 PROCEDURE — 71046 X-RAY EXAM CHEST 2 VIEWS: CPT | Mod: 26 | Performed by: RADIOLOGY

## 2023-01-01 PROCEDURE — 73620 X-RAY EXAM OF FOOT: CPT | Mod: 26 | Performed by: RADIOLOGY

## 2023-01-01 PROCEDURE — 99207 PR NO CHARGE LOS: CPT | Performed by: INTERNAL MEDICINE

## 2023-01-01 PROCEDURE — 93970 EXTREMITY STUDY: CPT

## 2023-01-01 PROCEDURE — 99238 HOSP IP/OBS DSCHRG MGMT 30/<: CPT | Performed by: NURSE PRACTITIONER

## 2023-01-01 PROCEDURE — 76937 US GUIDE VASCULAR ACCESS: CPT | Mod: 26 | Performed by: SURGERY

## 2023-01-01 PROCEDURE — 99203 OFFICE O/P NEW LOW 30 MIN: CPT | Performed by: PODIATRIST

## 2023-01-01 PROCEDURE — 999N000065 XR ABDOMEN PORT 1 VIEW

## 2023-01-01 PROCEDURE — 250N000025 HC SEVOFLURANE, PER MIN: Performed by: SURGERY

## 2023-01-01 PROCEDURE — 85004 AUTOMATED DIFF WBC COUNT: CPT | Performed by: STUDENT IN AN ORGANIZED HEALTH CARE EDUCATION/TRAINING PROGRAM

## 2023-01-01 PROCEDURE — 93922 UPR/L XTREMITY ART 2 LEVELS: CPT

## 2023-01-01 PROCEDURE — 250N000011 HC RX IP 250 OP 636: Mod: JZ | Performed by: ANESTHESIOLOGY

## 2023-01-01 PROCEDURE — 85014 HEMATOCRIT: CPT

## 2023-01-01 PROCEDURE — 999N000197 HC STATISTIC WOC PT EDUCATION, 0-15 MIN

## 2023-01-01 PROCEDURE — 272N000001 HC OR GENERAL SUPPLY STERILE: Performed by: SURGERY

## 2023-01-01 PROCEDURE — 99222 1ST HOSP IP/OBS MODERATE 55: CPT | Performed by: PODIATRIST

## 2023-01-01 PROCEDURE — 73720 MRI LWR EXTREMITY W/O&W/DYE: CPT | Mod: 26 | Performed by: RADIOLOGY

## 2023-01-01 PROCEDURE — 99231 SBSQ HOSP IP/OBS SF/LOW 25: CPT | Performed by: STUDENT IN AN ORGANIZED HEALTH CARE EDUCATION/TRAINING PROGRAM

## 2023-01-01 PROCEDURE — L5688 BK WAIST BELT WEBBING: HCPCS

## 2023-01-01 PROCEDURE — 99233 SBSQ HOSP IP/OBS HIGH 50: CPT | Mod: 24 | Performed by: STUDENT IN AN ORGANIZED HEALTH CARE EDUCATION/TRAINING PROGRAM

## 2023-01-01 PROCEDURE — 71045 X-RAY EXAM CHEST 1 VIEW: CPT | Mod: 26 | Performed by: RADIOLOGY

## 2023-01-01 PROCEDURE — 93880 EXTRACRANIAL BILAT STUDY: CPT | Mod: GC | Performed by: RADIOLOGY

## 2023-01-01 PROCEDURE — 86140 C-REACTIVE PROTEIN: CPT

## 2023-01-01 PROCEDURE — 93971 EXTREMITY STUDY: CPT | Mod: 26 | Performed by: RADIOLOGY

## 2023-01-01 PROCEDURE — 84132 ASSAY OF SERUM POTASSIUM: CPT | Performed by: SURGERY

## 2023-01-01 PROCEDURE — 93306 TTE W/DOPPLER COMPLETE: CPT | Mod: 26 | Performed by: INTERNAL MEDICINE

## 2023-01-01 PROCEDURE — P9045 ALBUMIN (HUMAN), 5%, 250 ML: HCPCS | Mod: JZ | Performed by: STUDENT IN AN ORGANIZED HEALTH CARE EDUCATION/TRAINING PROGRAM

## 2023-01-01 PROCEDURE — 82610 CYSTATIN C: CPT | Performed by: SURGERY

## 2023-01-01 PROCEDURE — G0180 MD CERTIFICATION HHA PATIENT: HCPCS | Performed by: FAMILY MEDICINE

## 2023-01-01 PROCEDURE — 250N000013 HC RX MED GY IP 250 OP 250 PS 637: Performed by: ANESTHESIOLOGY

## 2023-01-01 PROCEDURE — 84134 ASSAY OF PREALBUMIN: CPT

## 2023-01-01 PROCEDURE — 85610 PROTHROMBIN TIME: CPT

## 2023-01-01 PROCEDURE — 250N000011 HC RX IP 250 OP 636: Performed by: NURSE PRACTITIONER

## 2023-01-01 PROCEDURE — 87077 CULTURE AEROBIC IDENTIFY: CPT | Performed by: STUDENT IN AN ORGANIZED HEALTH CARE EDUCATION/TRAINING PROGRAM

## 2023-01-01 PROCEDURE — 97530 THERAPEUTIC ACTIVITIES: CPT | Mod: GP | Performed by: REHABILITATION PRACTITIONER

## 2023-01-01 PROCEDURE — 250N000009 HC RX 250: Performed by: STUDENT IN AN ORGANIZED HEALTH CARE EDUCATION/TRAINING PROGRAM

## 2023-01-01 PROCEDURE — 999N000083 IR OR ANGIOGRAM

## 2023-01-01 PROCEDURE — L5460 POSTOP APP NON-WGT BEAR DSG: HCPCS

## 2023-01-01 PROCEDURE — 0Y6N0ZD DETACHMENT AT LEFT FOOT, PARTIAL 4TH RAY, OPEN APPROACH: ICD-10-PCS | Performed by: ORTHOPAEDIC SURGERY

## 2023-01-01 PROCEDURE — 99605 MTMS BY PHARM NP 15 MIN: CPT | Performed by: PHARMACIST

## 2023-01-01 PROCEDURE — 85025 COMPLETE CBC W/AUTO DIFF WBC: CPT | Performed by: HOSPITALIST

## 2023-01-01 PROCEDURE — 99239 HOSP IP/OBS DSCHRG MGMT >30: CPT | Performed by: STUDENT IN AN ORGANIZED HEALTH CARE EDUCATION/TRAINING PROGRAM

## 2023-01-01 PROCEDURE — 85048 AUTOMATED LEUKOCYTE COUNT: CPT

## 2023-01-01 PROCEDURE — 82040 ASSAY OF SERUM ALBUMIN: CPT

## 2023-01-01 PROCEDURE — 86901 BLOOD TYPING SEROLOGIC RH(D): CPT | Performed by: STUDENT IN AN ORGANIZED HEALTH CARE EDUCATION/TRAINING PROGRAM

## 2023-01-01 PROCEDURE — 93925 LOWER EXTREMITY STUDY: CPT | Performed by: RADIOLOGY

## 2023-01-01 PROCEDURE — 272N000002 HC OR SUPPLY OTHER OPNP: Performed by: SURGERY

## 2023-01-01 PROCEDURE — 93306 TTE W/DOPPLER COMPLETE: CPT | Mod: GC | Performed by: STUDENT IN AN ORGANIZED HEALTH CARE EDUCATION/TRAINING PROGRAM

## 2023-01-01 PROCEDURE — 85652 RBC SED RATE AUTOMATED: CPT | Performed by: INTERNAL MEDICINE

## 2023-01-01 PROCEDURE — 85027 COMPLETE CBC AUTOMATED: CPT | Performed by: PHYSICIAN ASSISTANT

## 2023-01-01 PROCEDURE — 90791 PSYCH DIAGNOSTIC EVALUATION: CPT | Performed by: PSYCHOLOGIST

## 2023-01-01 PROCEDURE — 97110 THERAPEUTIC EXERCISES: CPT | Mod: GO | Performed by: OCCUPATIONAL THERAPIST

## 2023-01-01 PROCEDURE — 258N000003 HC RX IP 258 OP 636: Performed by: PHYSICIAN ASSISTANT

## 2023-01-01 PROCEDURE — 04CU3ZZ EXTIRPATION OF MATTER FROM LEFT PERONEAL ARTERY, PERCUTANEOUS APPROACH: ICD-10-PCS | Performed by: SURGERY

## 2023-01-01 PROCEDURE — 258N000003 HC RX IP 258 OP 636: Performed by: SURGERY

## 2023-01-01 PROCEDURE — 96367 TX/PROPH/DG ADDL SEQ IV INF: CPT

## 2023-01-01 PROCEDURE — 99213 OFFICE O/P EST LOW 20 MIN: CPT | Performed by: SURGERY

## 2023-01-01 PROCEDURE — 73720 MRI LWR EXTREMITY W/O&W/DYE: CPT | Mod: LT

## 2023-01-01 PROCEDURE — 99223 1ST HOSP IP/OBS HIGH 75: CPT | Mod: GC | Performed by: PEDIATRICS

## 2023-01-01 PROCEDURE — 85014 HEMATOCRIT: CPT | Performed by: PHYSICIAN ASSISTANT

## 2023-01-01 PROCEDURE — 999N000141 HC STATISTIC PRE-PROCEDURE NURSING ASSESSMENT: Performed by: SURGERY

## 2023-01-01 PROCEDURE — 90662 IIV NO PRSV INCREASED AG IM: CPT | Performed by: SURGERY

## 2023-01-01 PROCEDURE — 86140 C-REACTIVE PROTEIN: CPT | Performed by: NURSE PRACTITIONER

## 2023-01-01 PROCEDURE — 85384 FIBRINOGEN ACTIVITY: CPT

## 2023-01-01 PROCEDURE — 255N000002 HC RX 255 OP 636: Performed by: SURGERY

## 2023-01-01 PROCEDURE — 99024 POSTOP FOLLOW-UP VISIT: CPT | Performed by: SURGERY

## 2023-01-01 PROCEDURE — 75625 CONTRAST EXAM ABDOMINL AORTA: CPT | Mod: 26 | Performed by: SURGERY

## 2023-01-01 PROCEDURE — 999N000063 XR PELVIS PORT 1/2 VIEWS

## 2023-01-01 PROCEDURE — P9016 RBC LEUKOCYTES REDUCED: HCPCS

## 2023-01-01 PROCEDURE — 97161 PT EVAL LOW COMPLEX 20 MIN: CPT | Mod: GP

## 2023-01-01 PROCEDURE — 360N000075 HC SURGERY LEVEL 2, PER MIN: Performed by: ORTHOPAEDIC SURGERY

## 2023-01-01 PROCEDURE — 82565 ASSAY OF CREATININE: CPT

## 2023-01-01 PROCEDURE — 75710 ARTERY X-RAYS ARM/LEG: CPT | Mod: 26 | Performed by: SURGERY

## 2023-01-01 PROCEDURE — 82803 BLOOD GASES ANY COMBINATION: CPT

## 2023-01-01 PROCEDURE — 36415 COLL VENOUS BLD VENIPUNCTURE: CPT | Performed by: NURSE PRACTITIONER

## 2023-01-01 PROCEDURE — 81003 URINALYSIS AUTO W/O SCOPE: CPT | Performed by: STUDENT IN AN ORGANIZED HEALTH CARE EDUCATION/TRAINING PROGRAM

## 2023-01-01 PROCEDURE — 99222 1ST HOSP IP/OBS MODERATE 55: CPT | Mod: GC | Performed by: INTERNAL MEDICINE

## 2023-01-01 PROCEDURE — 75563 CARD MRI W/STRESS IMG & DYE: CPT

## 2023-01-01 PROCEDURE — 86140 C-REACTIVE PROTEIN: CPT | Performed by: INTERNAL MEDICINE

## 2023-01-01 PROCEDURE — 04CL3ZZ EXTIRPATION OF MATTER FROM LEFT FEMORAL ARTERY, PERCUTANEOUS APPROACH: ICD-10-PCS | Performed by: SURGERY

## 2023-01-01 PROCEDURE — 93010 ELECTROCARDIOGRAM REPORT: CPT | Performed by: STUDENT IN AN ORGANIZED HEALTH CARE EDUCATION/TRAINING PROGRAM

## 2023-01-01 PROCEDURE — 258N000001 HC RX 258: Performed by: SURGERY

## 2023-01-01 PROCEDURE — 360N000082 HC SURGERY LEVEL 2 W/ FLUORO, PER MIN: Performed by: SURGERY

## 2023-01-01 PROCEDURE — 85652 RBC SED RATE AUTOMATED: CPT | Performed by: STUDENT IN AN ORGANIZED HEALTH CARE EDUCATION/TRAINING PROGRAM

## 2023-01-01 PROCEDURE — G0463 HOSPITAL OUTPT CLINIC VISIT: HCPCS | Performed by: STUDENT IN AN ORGANIZED HEALTH CARE EDUCATION/TRAINING PROGRAM

## 2023-01-01 PROCEDURE — 99607 MTMS BY PHARM ADDL 15 MIN: CPT | Performed by: PHARMACIST

## 2023-01-01 PROCEDURE — 97530 THERAPEUTIC ACTIVITIES: CPT | Mod: GO | Performed by: OCCUPATIONAL THERAPIST

## 2023-01-01 PROCEDURE — C1730 CATH, EP, 19 OR FEW ELECT: HCPCS | Performed by: SURGERY

## 2023-01-01 PROCEDURE — 80202 ASSAY OF VANCOMYCIN: CPT | Performed by: STUDENT IN AN ORGANIZED HEALTH CARE EDUCATION/TRAINING PROGRAM

## 2023-01-01 PROCEDURE — 82310 ASSAY OF CALCIUM: CPT | Performed by: STUDENT IN AN ORGANIZED HEALTH CARE EDUCATION/TRAINING PROGRAM

## 2023-01-01 PROCEDURE — 74018 RADEX ABDOMEN 1 VIEW: CPT

## 2023-01-01 PROCEDURE — 04HN3DZ INSERTION OF INTRALUMINAL DEVICE INTO LEFT POPLITEAL ARTERY, PERCUTANEOUS APPROACH: ICD-10-PCS | Performed by: SURGERY

## 2023-01-01 PROCEDURE — 84484 ASSAY OF TROPONIN QUANT: CPT

## 2023-01-01 PROCEDURE — 370N000017 HC ANESTHESIA TECHNICAL FEE, PER MIN: Performed by: SURGERY

## 2023-01-01 PROCEDURE — 99214 OFFICE O/P EST MOD 30 MIN: CPT | Mod: 24 | Performed by: STUDENT IN AN ORGANIZED HEALTH CARE EDUCATION/TRAINING PROGRAM

## 2023-01-01 PROCEDURE — 99203 OFFICE O/P NEW LOW 30 MIN: CPT | Mod: GC | Performed by: RADIOLOGY

## 2023-01-01 PROCEDURE — 99418 PROLNG IP/OBS E/M EA 15 MIN: CPT | Mod: FS | Performed by: NURSE PRACTITIONER

## 2023-01-01 PROCEDURE — 82310 ASSAY OF CALCIUM: CPT | Performed by: PHYSICIAN ASSISTANT

## 2023-01-01 PROCEDURE — 255N000002 HC RX 255 OP 636: Mod: JZ | Performed by: EMERGENCY MEDICINE

## 2023-01-01 PROCEDURE — 88304 TISSUE EXAM BY PATHOLOGIST: CPT | Mod: TC | Performed by: SURGERY

## 2023-01-01 PROCEDURE — 0Y6D0Z1 DETACHMENT AT LEFT UPPER LEG, HIGH, OPEN APPROACH: ICD-10-PCS | Performed by: SURGERY

## 2023-01-01 PROCEDURE — 71046 X-RAY EXAM CHEST 2 VIEWS: CPT

## 2023-01-01 PROCEDURE — 70450 CT HEAD/BRAIN W/O DYE: CPT | Mod: 26 | Performed by: STUDENT IN AN ORGANIZED HEALTH CARE EDUCATION/TRAINING PROGRAM

## 2023-01-01 PROCEDURE — A9585 GADOBUTROL INJECTION: HCPCS | Mod: JZ | Performed by: EMERGENCY MEDICINE

## 2023-01-01 PROCEDURE — 99222 1ST HOSP IP/OBS MODERATE 55: CPT | Performed by: STUDENT IN AN ORGANIZED HEALTH CARE EDUCATION/TRAINING PROGRAM

## 2023-01-01 PROCEDURE — 86850 RBC ANTIBODY SCREEN: CPT | Performed by: STUDENT IN AN ORGANIZED HEALTH CARE EDUCATION/TRAINING PROGRAM

## 2023-01-01 PROCEDURE — 88307 TISSUE EXAM BY PATHOLOGIST: CPT | Mod: TC | Performed by: ORTHOPAEDIC SURGERY

## 2023-01-01 PROCEDURE — 0Y6N0ZB DETACHMENT AT LEFT FOOT, PARTIAL 2ND RAY, OPEN APPROACH: ICD-10-PCS | Performed by: ORTHOPAEDIC SURGERY

## 2023-01-01 PROCEDURE — 99233 SBSQ HOSP IP/OBS HIGH 50: CPT | Mod: GC | Performed by: PEDIATRICS

## 2023-01-01 PROCEDURE — 97162 PT EVAL MOD COMPLEX 30 MIN: CPT | Mod: GP

## 2023-01-01 PROCEDURE — 0Y6N0Z9 DETACHMENT AT LEFT FOOT, PARTIAL 1ST RAY, OPEN APPROACH: ICD-10-PCS | Performed by: ORTHOPAEDIC SURGERY

## 2023-01-01 PROCEDURE — G0008 ADMIN INFLUENZA VIRUS VAC: HCPCS | Performed by: SURGERY

## 2023-01-01 PROCEDURE — 250N000011 HC RX IP 250 OP 636: Performed by: NURSE ANESTHETIST, CERTIFIED REGISTERED

## 2023-01-01 PROCEDURE — 85025 COMPLETE CBC W/AUTO DIFF WBC: CPT | Performed by: INTERNAL MEDICINE

## 2023-01-01 PROCEDURE — 85730 THROMBOPLASTIN TIME PARTIAL: CPT | Performed by: STUDENT IN AN ORGANIZED HEALTH CARE EDUCATION/TRAINING PROGRAM

## 2023-01-01 PROCEDURE — 255N000002 HC RX 255 OP 636: Mod: JZ | Performed by: SURGERY

## 2023-01-01 PROCEDURE — 93922 UPR/L XTREMITY ART 2 LEVELS: CPT | Mod: 26 | Performed by: RADIOLOGY

## 2023-01-01 PROCEDURE — 97542 WHEELCHAIR MNGMENT TRAINING: CPT | Mod: GP

## 2023-01-01 PROCEDURE — 72170 X-RAY EXAM OF PELVIS: CPT | Mod: 26 | Performed by: RADIOLOGY

## 2023-01-01 PROCEDURE — 250N000011 HC RX IP 250 OP 636: Performed by: EMERGENCY MEDICINE

## 2023-01-01 PROCEDURE — 99285 EMERGENCY DEPT VISIT HI MDM: CPT | Performed by: STUDENT IN AN ORGANIZED HEALTH CARE EDUCATION/TRAINING PROGRAM

## 2023-01-01 PROCEDURE — 85520 HEPARIN ASSAY: CPT | Performed by: INTERNAL MEDICINE

## 2023-01-01 PROCEDURE — 88300 SURGICAL PATH GROSS: CPT | Mod: 26 | Performed by: PATHOLOGY

## 2023-01-01 PROCEDURE — 36569 INSJ PICC 5 YR+ W/O IMAGING: CPT

## 2023-01-01 PROCEDURE — 04HU3DZ INSERTION OF INTRALUMINAL DEVICE INTO LEFT PERONEAL ARTERY, PERCUTANEOUS APPROACH: ICD-10-PCS | Performed by: SURGERY

## 2023-01-01 PROCEDURE — 99232 SBSQ HOSP IP/OBS MODERATE 35: CPT | Mod: 24 | Performed by: NURSE PRACTITIONER

## 2023-01-01 PROCEDURE — 3E0G76Z INTRODUCTION OF NUTRITIONAL SUBSTANCE INTO UPPER GI, VIA NATURAL OR ARTIFICIAL OPENING: ICD-10-PCS | Performed by: INTERNAL MEDICINE

## 2023-01-01 PROCEDURE — 82374 ASSAY BLOOD CARBON DIOXIDE: CPT

## 2023-01-01 PROCEDURE — 85018 HEMOGLOBIN: CPT | Performed by: STUDENT IN AN ORGANIZED HEALTH CARE EDUCATION/TRAINING PROGRAM

## 2023-01-01 PROCEDURE — 85041 AUTOMATED RBC COUNT: CPT

## 2023-01-01 PROCEDURE — 272N000450 HC KIT 4FR POWER PICC SINGLE LUMEN

## 2023-01-01 PROCEDURE — 710N000010 HC RECOVERY PHASE 1, LEVEL 2, PER MIN: Performed by: ORTHOPAEDIC SURGERY

## 2023-01-01 PROCEDURE — 93970 EXTREMITY STUDY: CPT | Mod: 26 | Performed by: RADIOLOGY

## 2023-01-01 PROCEDURE — L3260 AMBULATORY SURGICAL BOOT EAC: HCPCS

## 2023-01-01 PROCEDURE — 99207 PR INPT ADMISSION FROM CLINIC: CPT | Performed by: PHYSICIAN ASSISTANT

## 2023-01-01 PROCEDURE — 99232 SBSQ HOSP IP/OBS MODERATE 35: CPT | Mod: 24 | Performed by: STUDENT IN AN ORGANIZED HEALTH CARE EDUCATION/TRAINING PROGRAM

## 2023-01-01 PROCEDURE — 250N000009 HC RX 250: Performed by: SURGERY

## 2023-01-01 PROCEDURE — 250N000011 HC RX IP 250 OP 636: Mod: JZ | Performed by: INTERNAL MEDICINE

## 2023-01-01 PROCEDURE — 047L3Z1 DILATION OF LEFT FEMORAL ARTERY USING DRUG-COATED BALLOON, PERCUTANEOUS APPROACH: ICD-10-PCS | Performed by: SURGERY

## 2023-01-01 PROCEDURE — 84145 PROCALCITONIN (PCT): CPT | Performed by: INTERNAL MEDICINE

## 2023-01-01 PROCEDURE — 99222 1ST HOSP IP/OBS MODERATE 55: CPT | Mod: 24 | Performed by: STUDENT IN AN ORGANIZED HEALTH CARE EDUCATION/TRAINING PROGRAM

## 2023-01-01 PROCEDURE — 04CL0ZZ EXTIRPATION OF MATTER FROM LEFT FEMORAL ARTERY, OPEN APPROACH: ICD-10-PCS | Performed by: SURGERY

## 2023-01-01 PROCEDURE — 99231 SBSQ HOSP IP/OBS SF/LOW 25: CPT | Mod: GC | Performed by: SURGERY

## 2023-01-01 PROCEDURE — 88311 DECALCIFY TISSUE: CPT | Mod: 26 | Performed by: PATHOLOGY

## 2023-01-01 PROCEDURE — C1887 CATHETER, GUIDING: HCPCS | Performed by: SURGERY

## 2023-01-01 PROCEDURE — 86901 BLOOD TYPING SEROLOGIC RH(D): CPT | Performed by: SURGERY

## 2023-01-01 DEVICE — GRAFT PATCH VASC XENOSURE BIOLOGIC 0.8X08CM E0.8P8: Type: IMPLANTABLE DEVICE | Site: GROIN | Status: FUNCTIONAL

## 2023-01-01 RX ORDER — HEPARIN SODIUM,PORCINE 10 UNIT/ML
5-20 VIAL (ML) INTRAVENOUS
Status: DISCONTINUED | OUTPATIENT
Start: 2023-01-01 | End: 2023-01-01 | Stop reason: HOSPADM

## 2023-01-01 RX ORDER — METHYLPREDNISOLONE SODIUM SUCCINATE 125 MG/2ML
125 INJECTION, POWDER, LYOPHILIZED, FOR SOLUTION INTRAMUSCULAR; INTRAVENOUS
Status: DISCONTINUED | OUTPATIENT
Start: 2023-01-01 | End: 2023-01-01

## 2023-01-01 RX ORDER — HYDROMORPHONE HCL IN WATER/PF 6 MG/30 ML
0.2 PATIENT CONTROLLED ANALGESIA SYRINGE INTRAVENOUS
Status: CANCELLED | OUTPATIENT
Start: 2023-01-01

## 2023-01-01 RX ORDER — MAGNESIUM OXIDE 400 MG/1
400 TABLET ORAL EVERY 4 HOURS
Status: DISPENSED | OUTPATIENT
Start: 2023-01-01 | End: 2023-01-01

## 2023-01-01 RX ORDER — ONDANSETRON 4 MG/1
4 TABLET, ORALLY DISINTEGRATING ORAL EVERY 6 HOURS PRN
Status: DISCONTINUED | OUTPATIENT
Start: 2023-01-01 | End: 2023-01-01 | Stop reason: HOSPADM

## 2023-01-01 RX ORDER — HYDROMORPHONE HCL IN WATER/PF 6 MG/30 ML
0.4 PATIENT CONTROLLED ANALGESIA SYRINGE INTRAVENOUS
Status: CANCELLED | OUTPATIENT
Start: 2023-01-01

## 2023-01-01 RX ORDER — ISOSORBIDE MONONITRATE 30 MG/1
30 TABLET, EXTENDED RELEASE ORAL DAILY
Status: DISCONTINUED | OUTPATIENT
Start: 2023-01-01 | End: 2023-01-01 | Stop reason: HOSPADM

## 2023-01-01 RX ORDER — MAGNESIUM SULFATE HEPTAHYDRATE 40 MG/ML
2 INJECTION, SOLUTION INTRAVENOUS ONCE
Status: COMPLETED | OUTPATIENT
Start: 2023-01-01 | End: 2023-01-01

## 2023-01-01 RX ORDER — POLYETHYLENE GLYCOL 3350 17 G/17G
17 POWDER, FOR SOLUTION ORAL DAILY
Status: DISCONTINUED | OUTPATIENT
Start: 2023-01-01 | End: 2023-01-01

## 2023-01-01 RX ORDER — LABETALOL HYDROCHLORIDE 5 MG/ML
20 INJECTION, SOLUTION INTRAVENOUS EVERY 4 HOURS PRN
Status: DISCONTINUED | OUTPATIENT
Start: 2023-01-01 | End: 2023-01-01

## 2023-01-01 RX ORDER — LABETALOL 20 MG/4 ML (5 MG/ML) INTRAVENOUS SYRINGE
PRN
Status: DISCONTINUED | OUTPATIENT
Start: 2023-01-01 | End: 2023-01-01

## 2023-01-01 RX ORDER — LIDOCAINE HYDROCHLORIDE 20 MG/ML
JELLY TOPICAL ONCE
Status: COMPLETED | OUTPATIENT
Start: 2023-01-01 | End: 2023-01-01

## 2023-01-01 RX ORDER — OXYCODONE HYDROCHLORIDE 5 MG/1
5 TABLET ORAL EVERY 4 HOURS PRN
Status: DISCONTINUED | OUTPATIENT
Start: 2023-01-01 | End: 2023-01-01 | Stop reason: HOSPADM

## 2023-01-01 RX ORDER — FLUMAZENIL 0.1 MG/ML
0.2 INJECTION, SOLUTION INTRAVENOUS
Status: DISCONTINUED | OUTPATIENT
Start: 2023-01-01 | End: 2023-01-01 | Stop reason: HOSPADM

## 2023-01-01 RX ORDER — SODIUM CHLORIDE, SODIUM LACTATE, POTASSIUM CHLORIDE, CALCIUM CHLORIDE 600; 310; 30; 20 MG/100ML; MG/100ML; MG/100ML; MG/100ML
INJECTION, SOLUTION INTRAVENOUS CONTINUOUS
Status: DISCONTINUED | OUTPATIENT
Start: 2023-01-01 | End: 2023-01-01 | Stop reason: HOSPADM

## 2023-01-01 RX ORDER — HYDROXYZINE HYDROCHLORIDE 25 MG/1
50 TABLET, FILM COATED ORAL EVERY 6 HOURS PRN
Status: COMPLETED | OUTPATIENT
Start: 2023-01-01 | End: 2023-01-01

## 2023-01-01 RX ORDER — HYDRALAZINE HYDROCHLORIDE 20 MG/ML
10 INJECTION INTRAMUSCULAR; INTRAVENOUS EVERY 4 HOURS PRN
Status: DISCONTINUED | OUTPATIENT
Start: 2023-01-01 | End: 2023-01-01

## 2023-01-01 RX ORDER — ATORVASTATIN CALCIUM 40 MG/1
40 TABLET, FILM COATED ORAL DAILY
Status: DISCONTINUED | OUTPATIENT
Start: 2023-01-01 | End: 2023-01-01 | Stop reason: HOSPADM

## 2023-01-01 RX ORDER — FOLIC ACID 1 MG/1
1 TABLET ORAL DAILY
Status: DISCONTINUED | OUTPATIENT
Start: 2023-01-01 | End: 2023-01-01 | Stop reason: HOSPADM

## 2023-01-01 RX ORDER — NALOXONE HYDROCHLORIDE 0.4 MG/ML
0.2 INJECTION, SOLUTION INTRAMUSCULAR; INTRAVENOUS; SUBCUTANEOUS
Status: DISCONTINUED | OUTPATIENT
Start: 2023-01-01 | End: 2023-01-01 | Stop reason: HOSPADM

## 2023-01-01 RX ORDER — CEFAZOLIN SODIUM 1 G/3ML
1 INJECTION, POWDER, FOR SOLUTION INTRAMUSCULAR; INTRAVENOUS EVERY 8 HOURS
Qty: 10 ML | Refills: 0 | Status: COMPLETED | OUTPATIENT
Start: 2023-01-01 | End: 2023-01-01

## 2023-01-01 RX ORDER — SODIUM CHLORIDE, SODIUM LACTATE, POTASSIUM CHLORIDE, CALCIUM CHLORIDE 600; 310; 30; 20 MG/100ML; MG/100ML; MG/100ML; MG/100ML
INJECTION, SOLUTION INTRAVENOUS CONTINUOUS PRN
Status: DISCONTINUED | OUTPATIENT
Start: 2023-01-01 | End: 2023-01-01

## 2023-01-01 RX ORDER — LOSARTAN POTASSIUM 100 MG/1
100 TABLET ORAL DAILY
Status: DISCONTINUED | OUTPATIENT
Start: 2023-01-01 | End: 2023-01-01

## 2023-01-01 RX ORDER — VITAMIN B COMPLEX
50 TABLET ORAL DAILY
Status: DISCONTINUED | OUTPATIENT
Start: 2023-01-01 | End: 2023-01-01 | Stop reason: HOSPADM

## 2023-01-01 RX ORDER — HYDROXYZINE HYDROCHLORIDE 25 MG/1
50 TABLET, FILM COATED ORAL EVERY 6 HOURS PRN
Status: DISCONTINUED | OUTPATIENT
Start: 2023-01-01 | End: 2023-01-01 | Stop reason: HOSPADM

## 2023-01-01 RX ORDER — AMINOPHYLLINE 25 MG/ML
100 INJECTION, SOLUTION INTRAVENOUS ONCE
Status: COMPLETED | OUTPATIENT
Start: 2023-01-01 | End: 2023-01-01

## 2023-01-01 RX ORDER — POLYETHYLENE GLYCOL 3350 17 G/17G
17 POWDER, FOR SOLUTION ORAL DAILY
Qty: 510 G | Refills: 1 | Status: SHIPPED | OUTPATIENT
Start: 2023-01-01

## 2023-01-01 RX ORDER — HYDRALAZINE HYDROCHLORIDE 20 MG/ML
INJECTION INTRAMUSCULAR; INTRAVENOUS PRN
Status: DISCONTINUED | OUTPATIENT
Start: 2023-01-01 | End: 2023-01-01

## 2023-01-01 RX ORDER — LOSARTAN POTASSIUM 100 MG/1
100 TABLET ORAL
Status: DISCONTINUED | OUTPATIENT
Start: 2023-01-01 | End: 2023-01-01

## 2023-01-01 RX ORDER — METFORMIN HCL 500 MG
TABLET, EXTENDED RELEASE 24 HR ORAL
Qty: 180 TABLET | Refills: 0 | Status: SHIPPED | OUTPATIENT
Start: 2023-01-01

## 2023-01-01 RX ORDER — ISOSORBIDE MONONITRATE 30 MG/1
120 TABLET, EXTENDED RELEASE ORAL DAILY
Status: DISCONTINUED | OUTPATIENT
Start: 2023-01-01 | End: 2023-01-01 | Stop reason: HOSPADM

## 2023-01-01 RX ORDER — DIPHENHYDRAMINE HCL 25 MG
25 CAPSULE ORAL
Status: DISCONTINUED | OUTPATIENT
Start: 2023-01-01 | End: 2023-01-01

## 2023-01-01 RX ORDER — HYDROMORPHONE HCL IN WATER/PF 6 MG/30 ML
0.2 PATIENT CONTROLLED ANALGESIA SYRINGE INTRAVENOUS
Status: DISCONTINUED | OUTPATIENT
Start: 2023-01-01 | End: 2023-01-01

## 2023-01-01 RX ORDER — AMOXICILLIN 250 MG
1 CAPSULE ORAL 2 TIMES DAILY
Qty: 90 TABLET | Refills: 0 | Status: SHIPPED | OUTPATIENT
Start: 2023-01-01

## 2023-01-01 RX ORDER — METOCLOPRAMIDE HYDROCHLORIDE 5 MG/ML
10 INJECTION INTRAMUSCULAR; INTRAVENOUS ONCE
Status: COMPLETED | OUTPATIENT
Start: 2023-01-01 | End: 2023-01-01

## 2023-01-01 RX ORDER — BUPIVACAINE HYDROCHLORIDE 5 MG/ML
INJECTION, SOLUTION EPIDURAL; INTRACAUDAL
Status: COMPLETED | OUTPATIENT
Start: 2023-01-01 | End: 2023-01-01

## 2023-01-01 RX ORDER — OXYCODONE HYDROCHLORIDE 5 MG/1
5 TABLET ORAL EVERY 4 HOURS PRN
Status: CANCELLED | OUTPATIENT
Start: 2023-01-01

## 2023-01-01 RX ORDER — LABETALOL HYDROCHLORIDE 5 MG/ML
10 INJECTION, SOLUTION INTRAVENOUS
Status: COMPLETED | OUTPATIENT
Start: 2023-01-01 | End: 2023-01-01

## 2023-01-01 RX ORDER — FUROSEMIDE 10 MG/ML
40 INJECTION INTRAMUSCULAR; INTRAVENOUS ONCE
Status: COMPLETED | OUTPATIENT
Start: 2023-01-01 | End: 2023-01-01

## 2023-01-01 RX ORDER — CEFAZOLIN SODIUM/WATER 2 G/20 ML
2 SYRINGE (ML) INTRAVENOUS
Status: COMPLETED | OUTPATIENT
Start: 2023-01-01 | End: 2023-01-01

## 2023-01-01 RX ORDER — CILOSTAZOL 100 MG/1
100 TABLET ORAL 2 TIMES DAILY
Status: DISCONTINUED | OUTPATIENT
Start: 2023-01-01 | End: 2023-01-01 | Stop reason: HOSPADM

## 2023-01-01 RX ORDER — TRAZODONE HYDROCHLORIDE 50 MG/1
50 TABLET, FILM COATED ORAL AT BEDTIME
Status: ON HOLD | COMMUNITY
End: 2023-01-01

## 2023-01-01 RX ORDER — TRAZODONE HYDROCHLORIDE 50 MG/1
50 TABLET, FILM COATED ORAL AT BEDTIME
Qty: 30 TABLET | Refills: 0 | Status: SHIPPED | OUTPATIENT
Start: 2023-01-01

## 2023-01-01 RX ORDER — CEFAZOLIN SODIUM 2 G/100ML
2 INJECTION, SOLUTION INTRAVENOUS
Status: COMPLETED | OUTPATIENT
Start: 2023-01-01 | End: 2023-01-01

## 2023-01-01 RX ORDER — METOPROLOL SUCCINATE 50 MG/1
50 TABLET, EXTENDED RELEASE ORAL DAILY
Qty: 90 TABLET | Refills: 3 | Status: SHIPPED | OUTPATIENT
Start: 2023-01-01 | End: 2023-01-01

## 2023-01-01 RX ORDER — METFORMIN HCL 500 MG
500 TABLET, EXTENDED RELEASE 24 HR ORAL 2 TIMES DAILY WITH MEALS
Status: DISCONTINUED | OUTPATIENT
Start: 2023-01-01 | End: 2023-01-01 | Stop reason: HOSPADM

## 2023-01-01 RX ORDER — RESPIRATORY SYNCYTIAL VIRUS VACCINE 120MCG/0.5
0.5 KIT INTRAMUSCULAR ONCE
Qty: 1 EACH | Refills: 0 | Status: CANCELLED | OUTPATIENT
Start: 2023-01-01 | End: 2023-01-01

## 2023-01-01 RX ORDER — LIDOCAINE 40 MG/G
CREAM TOPICAL
Status: DISCONTINUED | OUTPATIENT
Start: 2023-01-01 | End: 2023-01-01 | Stop reason: HOSPADM

## 2023-01-01 RX ORDER — MAGNESIUM SULFATE HEPTAHYDRATE 40 MG/ML
2 INJECTION, SOLUTION INTRAVENOUS ONCE
Qty: 50 ML | Refills: 0 | Status: COMPLETED | OUTPATIENT
Start: 2023-01-01 | End: 2023-01-01

## 2023-01-01 RX ORDER — POLYETHYLENE GLYCOL 3350 17 G/17G
17 POWDER, FOR SOLUTION ORAL DAILY PRN
Status: DISCONTINUED | OUTPATIENT
Start: 2023-01-01 | End: 2023-01-01 | Stop reason: HOSPADM

## 2023-01-01 RX ORDER — OXYCODONE HYDROCHLORIDE 5 MG/1
5 TABLET ORAL DAILY PRN
Qty: 5 TABLET | Refills: 0 | Status: SHIPPED | OUTPATIENT
Start: 2023-01-01

## 2023-01-01 RX ORDER — SODIUM CHLORIDE, SODIUM LACTATE, POTASSIUM CHLORIDE, CALCIUM CHLORIDE 600; 310; 30; 20 MG/100ML; MG/100ML; MG/100ML; MG/100ML
INJECTION, SOLUTION INTRAVENOUS CONTINUOUS
Status: DISCONTINUED | OUTPATIENT
Start: 2023-01-01 | End: 2023-01-01

## 2023-01-01 RX ORDER — HEPARIN SODIUM 10000 [USP'U]/100ML
0-5000 INJECTION, SOLUTION INTRAVENOUS CONTINUOUS
Status: DISCONTINUED | OUTPATIENT
Start: 2023-01-01 | End: 2023-01-01

## 2023-01-01 RX ORDER — CARVEDILOL 25 MG/1
25 TABLET ORAL 2 TIMES DAILY
Status: DISCONTINUED | OUTPATIENT
Start: 2023-01-01 | End: 2023-01-01 | Stop reason: HOSPADM

## 2023-01-01 RX ORDER — ONDANSETRON 2 MG/ML
4 INJECTION INTRAMUSCULAR; INTRAVENOUS EVERY 30 MIN PRN
Status: DISCONTINUED | OUTPATIENT
Start: 2023-01-01 | End: 2023-01-01 | Stop reason: HOSPADM

## 2023-01-01 RX ORDER — SERTRALINE HYDROCHLORIDE 25 MG/1
25 TABLET, FILM COATED ORAL DAILY
Status: DISCONTINUED | OUTPATIENT
Start: 2023-01-01 | End: 2023-01-01 | Stop reason: HOSPADM

## 2023-01-01 RX ORDER — OXYCODONE HYDROCHLORIDE 10 MG/1
10 TABLET ORAL
Status: DISCONTINUED | OUTPATIENT
Start: 2023-01-01 | End: 2023-01-01 | Stop reason: HOSPADM

## 2023-01-01 RX ORDER — CEFEPIME HYDROCHLORIDE 2 G/1
2 INJECTION, POWDER, FOR SOLUTION INTRAVENOUS EVERY 8 HOURS
Status: DISCONTINUED | OUTPATIENT
Start: 2023-01-01 | End: 2023-01-01

## 2023-01-01 RX ORDER — LIDOCAINE 40 MG/G
CREAM TOPICAL
Status: DISCONTINUED | OUTPATIENT
Start: 2023-01-01 | End: 2023-01-01

## 2023-01-01 RX ORDER — CEFAZOLIN SODIUM 2 G/100ML
2 INJECTION, SOLUTION INTRAVENOUS
Status: CANCELLED | OUTPATIENT
Start: 2023-01-01

## 2023-01-01 RX ORDER — ONDANSETRON 4 MG/1
4 TABLET, ORALLY DISINTEGRATING ORAL EVERY 30 MIN PRN
Status: DISCONTINUED | OUTPATIENT
Start: 2023-01-01 | End: 2023-01-01 | Stop reason: HOSPADM

## 2023-01-01 RX ORDER — NICOTINE 21 MG/24HR
1 PATCH, TRANSDERMAL 24 HOURS TRANSDERMAL DAILY
Status: DISCONTINUED | OUTPATIENT
Start: 2023-01-01 | End: 2023-01-01 | Stop reason: HOSPADM

## 2023-01-01 RX ORDER — LORAZEPAM 2 MG/ML
1-2 INJECTION INTRAMUSCULAR EVERY 30 MIN PRN
Status: DISCONTINUED | OUTPATIENT
Start: 2023-01-01 | End: 2023-01-01

## 2023-01-01 RX ORDER — SENNOSIDES 8.6 MG
2 TABLET ORAL 2 TIMES DAILY PRN
Status: DISCONTINUED | OUTPATIENT
Start: 2023-01-01 | End: 2023-01-01 | Stop reason: HOSPADM

## 2023-01-01 RX ORDER — GADOBUTROL 604.72 MG/ML
7.5 INJECTION INTRAVENOUS ONCE
Status: COMPLETED | OUTPATIENT
Start: 2023-01-01 | End: 2023-01-01

## 2023-01-01 RX ORDER — ROPIVACAINE IN 0.9% SOD CHL/PF 0.1 %
.03-.125 PLASTIC BAG, INJECTION (ML) EPIDURAL CONTINUOUS
Status: DISCONTINUED | OUTPATIENT
Start: 2023-01-01 | End: 2023-01-01 | Stop reason: HOSPADM

## 2023-01-01 RX ORDER — DEXAMETHASONE SODIUM PHOSPHATE 4 MG/ML
INJECTION, SOLUTION INTRA-ARTICULAR; INTRALESIONAL; INTRAMUSCULAR; INTRAVENOUS; SOFT TISSUE PRN
Status: DISCONTINUED | OUTPATIENT
Start: 2023-01-01 | End: 2023-01-01

## 2023-01-01 RX ORDER — OXYCODONE HYDROCHLORIDE 10 MG/1
10 TABLET ORAL
Status: CANCELLED | OUTPATIENT
Start: 2023-01-01

## 2023-01-01 RX ORDER — MAGNESIUM OXIDE 400 MG/1
400 TABLET ORAL EVERY 4 HOURS
Status: COMPLETED | OUTPATIENT
Start: 2023-01-01 | End: 2023-01-01

## 2023-01-01 RX ORDER — DEXMEDETOMIDINE HYDROCHLORIDE 4 UG/ML
INJECTION, SOLUTION INTRAVENOUS PRN
Status: DISCONTINUED | OUTPATIENT
Start: 2023-01-01 | End: 2023-01-01

## 2023-01-01 RX ORDER — CEFAZOLIN SODIUM 2 G/100ML
2 INJECTION, SOLUTION INTRAVENOUS SEE ADMIN INSTRUCTIONS
Status: DISCONTINUED | OUTPATIENT
Start: 2023-01-01 | End: 2023-01-01 | Stop reason: HOSPADM

## 2023-01-01 RX ORDER — NICOTINE POLACRILEX 4 MG
15-30 LOZENGE BUCCAL
Status: DISCONTINUED | OUTPATIENT
Start: 2023-01-01 | End: 2023-01-01 | Stop reason: HOSPADM

## 2023-01-01 RX ORDER — OXYCODONE HYDROCHLORIDE 5 MG/1
5 TABLET ORAL EVERY 4 HOURS PRN
Status: DISCONTINUED | OUTPATIENT
Start: 2023-01-01 | End: 2023-01-01

## 2023-01-01 RX ORDER — CEFAZOLIN SODIUM 1 G/3ML
1 INJECTION, POWDER, FOR SOLUTION INTRAMUSCULAR; INTRAVENOUS EVERY 8 HOURS
Status: COMPLETED | OUTPATIENT
Start: 2023-01-01 | End: 2023-01-01

## 2023-01-01 RX ORDER — OXYCODONE HYDROCHLORIDE 5 MG/1
5-10 TABLET ORAL EVERY 6 HOURS PRN
Qty: 20 TABLET | Refills: 0 | Status: ON HOLD | OUTPATIENT
Start: 2023-01-01 | End: 2023-01-01

## 2023-01-01 RX ORDER — PIPERACILLIN SODIUM, TAZOBACTAM SODIUM 3; .375 G/15ML; G/15ML
3.38 INJECTION, POWDER, LYOPHILIZED, FOR SOLUTION INTRAVENOUS EVERY 6 HOURS
Status: DISCONTINUED | OUTPATIENT
Start: 2023-01-01 | End: 2023-01-01

## 2023-01-01 RX ORDER — CEFAZOLIN SODIUM 2 G/100ML
2 INJECTION, SOLUTION INTRAVENOUS EVERY 8 HOURS
Status: DISCONTINUED | OUTPATIENT
Start: 2023-01-01 | End: 2023-01-01

## 2023-01-01 RX ORDER — TIZANIDINE 2 MG/1
2 TABLET ORAL 3 TIMES DAILY PRN
Status: DISCONTINUED | OUTPATIENT
Start: 2023-01-01 | End: 2023-01-01 | Stop reason: HOSPADM

## 2023-01-01 RX ORDER — FERROUS SULFATE 325(65) MG
325 TABLET ORAL
Qty: 90 TABLET | Refills: 0 | Status: SHIPPED | OUTPATIENT
Start: 2023-01-01

## 2023-01-01 RX ORDER — TRAZODONE HYDROCHLORIDE 50 MG/1
50 TABLET, FILM COATED ORAL AT BEDTIME
Status: DISCONTINUED | OUTPATIENT
Start: 2023-01-01 | End: 2023-01-01 | Stop reason: HOSPADM

## 2023-01-01 RX ORDER — PROPOFOL 10 MG/ML
INJECTION, EMULSION INTRAVENOUS PRN
Status: DISCONTINUED | OUTPATIENT
Start: 2023-01-01 | End: 2023-01-01

## 2023-01-01 RX ORDER — SULFAMETHOXAZOLE/TRIMETHOPRIM 800-160 MG
1 TABLET ORAL 2 TIMES DAILY
Qty: 44 TABLET | Refills: 0 | Status: SHIPPED | OUTPATIENT
Start: 2023-01-01 | End: 2023-01-01

## 2023-01-01 RX ORDER — MAGNESIUM SULFATE HEPTAHYDRATE 40 MG/ML
4 INJECTION, SOLUTION INTRAVENOUS ONCE
Status: COMPLETED | OUTPATIENT
Start: 2023-01-01 | End: 2023-01-01

## 2023-01-01 RX ORDER — AMOXICILLIN 250 MG
1 CAPSULE ORAL 2 TIMES DAILY
Status: DISCONTINUED | OUTPATIENT
Start: 2023-01-01 | End: 2023-01-01 | Stop reason: HOSPADM

## 2023-01-01 RX ORDER — FENTANYL CITRATE 50 UG/ML
25-50 INJECTION, SOLUTION INTRAMUSCULAR; INTRAVENOUS
Status: DISCONTINUED | OUTPATIENT
Start: 2023-01-01 | End: 2023-01-01 | Stop reason: HOSPADM

## 2023-01-01 RX ORDER — LIDOCAINE HYDROCHLORIDE 20 MG/ML
JELLY TOPICAL ONCE
Status: DISCONTINUED | OUTPATIENT
Start: 2023-01-01 | End: 2023-01-01

## 2023-01-01 RX ORDER — ONDANSETRON 2 MG/ML
4 INJECTION INTRAMUSCULAR; INTRAVENOUS EVERY 6 HOURS PRN
Status: DISCONTINUED | OUTPATIENT
Start: 2023-01-01 | End: 2023-01-01

## 2023-01-01 RX ORDER — ONDANSETRON 2 MG/ML
INJECTION INTRAMUSCULAR; INTRAVENOUS PRN
Status: DISCONTINUED | OUTPATIENT
Start: 2023-01-01 | End: 2023-01-01

## 2023-01-01 RX ORDER — PANTOPRAZOLE SODIUM 40 MG/1
40 TABLET, DELAYED RELEASE ORAL DAILY
Status: DISCONTINUED | OUTPATIENT
Start: 2023-01-01 | End: 2023-01-01 | Stop reason: HOSPADM

## 2023-01-01 RX ORDER — OXYCODONE HYDROCHLORIDE 5 MG/1
5 TABLET ORAL
Status: DISCONTINUED | OUTPATIENT
Start: 2023-01-01 | End: 2023-01-01 | Stop reason: HOSPADM

## 2023-01-01 RX ORDER — CARVEDILOL 25 MG/1
25 TABLET ORAL 2 TIMES DAILY
Status: DISCONTINUED | OUTPATIENT
Start: 2023-01-01 | End: 2023-01-01

## 2023-01-01 RX ORDER — ISOSORBIDE MONONITRATE 30 MG/1
30 TABLET, EXTENDED RELEASE ORAL DAILY
Qty: 180 TABLET | Refills: 1 | Status: SHIPPED | OUTPATIENT
Start: 2023-01-01

## 2023-01-01 RX ORDER — ISOSORBIDE MONONITRATE 30 MG/1
TABLET, EXTENDED RELEASE ORAL
Qty: 180 TABLET | Refills: 1 | Status: ON HOLD | OUTPATIENT
Start: 2023-01-01 | End: 2023-01-01

## 2023-01-01 RX ORDER — ONDANSETRON 4 MG/1
4 TABLET, ORALLY DISINTEGRATING ORAL ONCE
Status: COMPLETED | OUTPATIENT
Start: 2023-01-01 | End: 2023-01-01

## 2023-01-01 RX ORDER — PEDIATRIC NUTRIT, IRON/DHA/ARA 4G/150KCAL
1 POWDER (GRAM) ORAL 4 TIMES DAILY
Qty: 948 ML | Refills: 0 | Status: SHIPPED | OUTPATIENT
Start: 2023-01-01 | End: 2023-01-01

## 2023-01-01 RX ORDER — ISOSORBIDE MONONITRATE 30 MG/1
60 TABLET, EXTENDED RELEASE ORAL DAILY
Status: DISCONTINUED | OUTPATIENT
Start: 2023-01-01 | End: 2023-01-01 | Stop reason: HOSPADM

## 2023-01-01 RX ORDER — SALIVA STIMULANT COMB. NO.3
2 SPRAY, NON-AEROSOL (ML) MUCOUS MEMBRANE 4 TIMES DAILY PRN
Status: DISCONTINUED | OUTPATIENT
Start: 2023-01-01 | End: 2023-01-01 | Stop reason: HOSPADM

## 2023-01-01 RX ORDER — CEFAZOLIN SODIUM 2 G/100ML
2 INJECTION, SOLUTION INTRAVENOUS
Status: DISCONTINUED | OUTPATIENT
Start: 2023-01-01 | End: 2023-01-01 | Stop reason: HOSPADM

## 2023-01-01 RX ORDER — HYDROMORPHONE HYDROCHLORIDE 1 MG/ML
0.5 INJECTION, SOLUTION INTRAMUSCULAR; INTRAVENOUS; SUBCUTANEOUS EVERY 4 HOURS PRN
Status: DISCONTINUED | OUTPATIENT
Start: 2023-01-01 | End: 2023-01-01

## 2023-01-01 RX ORDER — ONDANSETRON 4 MG/1
4 TABLET, ORALLY DISINTEGRATING ORAL EVERY 30 MIN PRN
Status: CANCELLED | OUTPATIENT
Start: 2023-01-01

## 2023-01-01 RX ORDER — OXYCODONE HYDROCHLORIDE 5 MG/1
5-10 TABLET ORAL EVERY 6 HOURS PRN
Qty: 20 TABLET | Refills: 0 | Status: SHIPPED | OUTPATIENT
Start: 2023-01-01 | End: 2023-01-01

## 2023-01-01 RX ORDER — ISOSORBIDE MONONITRATE 60 MG/1
60 TABLET, EXTENDED RELEASE ORAL ONCE
Status: COMPLETED | OUTPATIENT
Start: 2023-01-01 | End: 2023-01-01

## 2023-01-01 RX ORDER — FENTANYL CITRATE 50 UG/ML
50 INJECTION, SOLUTION INTRAMUSCULAR; INTRAVENOUS EVERY 5 MIN PRN
Status: DISCONTINUED | OUTPATIENT
Start: 2023-01-01 | End: 2023-01-01 | Stop reason: HOSPADM

## 2023-01-01 RX ORDER — ACETAMINOPHEN 325 MG/1
975 TABLET ORAL 3 TIMES DAILY
Status: DISCONTINUED | OUTPATIENT
Start: 2023-01-01 | End: 2023-01-01 | Stop reason: HOSPADM

## 2023-01-01 RX ORDER — SPIRONOLACTONE 25 MG/1
25 TABLET ORAL
Status: DISCONTINUED | OUTPATIENT
Start: 2023-01-01 | End: 2023-01-01

## 2023-01-01 RX ORDER — PROCHLORPERAZINE 25 MG
12.5 SUPPOSITORY, RECTAL RECTAL EVERY 12 HOURS PRN
Status: DISCONTINUED | OUTPATIENT
Start: 2023-01-01 | End: 2023-01-01 | Stop reason: HOSPADM

## 2023-01-01 RX ORDER — OXYCODONE HYDROCHLORIDE 5 MG/1
5-10 TABLET ORAL EVERY 6 HOURS PRN
Qty: 10 TABLET | Refills: 0 | Status: ON HOLD | OUTPATIENT
Start: 2023-01-01 | End: 2023-01-01

## 2023-01-01 RX ORDER — NALOXONE HYDROCHLORIDE 0.4 MG/ML
0.4 INJECTION, SOLUTION INTRAMUSCULAR; INTRAVENOUS; SUBCUTANEOUS
Status: DISCONTINUED | OUTPATIENT
Start: 2023-01-01 | End: 2023-01-01 | Stop reason: HOSPADM

## 2023-01-01 RX ORDER — ACETAMINOPHEN 325 MG/1
975 TABLET ORAL EVERY 8 HOURS
Status: DISCONTINUED | OUTPATIENT
Start: 2023-01-01 | End: 2023-01-01 | Stop reason: HOSPADM

## 2023-01-01 RX ORDER — FAMOTIDINE 10 MG
10 TABLET ORAL 2 TIMES DAILY
Status: DISCONTINUED | OUTPATIENT
Start: 2023-01-01 | End: 2023-01-01 | Stop reason: HOSPADM

## 2023-01-01 RX ORDER — EPHEDRINE SULFATE 50 MG/ML
INJECTION, SOLUTION INTRAMUSCULAR; INTRAVENOUS; SUBCUTANEOUS PRN
Status: DISCONTINUED | OUTPATIENT
Start: 2023-01-01 | End: 2023-01-01

## 2023-01-01 RX ORDER — ACETAMINOPHEN 500 MG
2 TABLET ORAL DAILY
COMMUNITY

## 2023-01-01 RX ORDER — FUROSEMIDE 40 MG
40 TABLET ORAL ONCE
Status: COMPLETED | OUTPATIENT
Start: 2023-01-01 | End: 2023-01-01

## 2023-01-01 RX ORDER — ISOSORBIDE MONONITRATE 30 MG/1
60 TABLET, EXTENDED RELEASE ORAL DAILY
Status: DISCONTINUED | OUTPATIENT
Start: 2023-01-01 | End: 2023-01-01

## 2023-01-01 RX ORDER — ONDANSETRON 2 MG/ML
4 INJECTION INTRAMUSCULAR; INTRAVENOUS EVERY 6 HOURS PRN
Status: DISCONTINUED | OUTPATIENT
Start: 2023-01-01 | End: 2023-01-01 | Stop reason: HOSPADM

## 2023-01-01 RX ORDER — KETOROLAC TROMETHAMINE 15 MG/ML
15 INJECTION, SOLUTION INTRAMUSCULAR; INTRAVENOUS EVERY 6 HOURS PRN
Status: DISPENSED | OUTPATIENT
Start: 2023-01-01 | End: 2023-01-01

## 2023-01-01 RX ORDER — PROCHLORPERAZINE MALEATE 5 MG
5 TABLET ORAL EVERY 6 HOURS PRN
Status: CANCELLED | OUTPATIENT
Start: 2023-01-01

## 2023-01-01 RX ORDER — CAFFEINE CITRATE 20 MG/ML
60 SOLUTION INTRAVENOUS
Status: DISCONTINUED | OUTPATIENT
Start: 2023-01-01 | End: 2023-01-01

## 2023-01-01 RX ORDER — DEXTROSE MONOHYDRATE 25 G/50ML
25-50 INJECTION, SOLUTION INTRAVENOUS
Status: DISCONTINUED | OUTPATIENT
Start: 2023-01-01 | End: 2023-01-01 | Stop reason: HOSPADM

## 2023-01-01 RX ORDER — OXYCODONE HYDROCHLORIDE 10 MG/1
10 TABLET ORAL EVERY 4 HOURS PRN
Status: CANCELLED | OUTPATIENT
Start: 2023-01-01

## 2023-01-01 RX ORDER — HEPARIN SODIUM 1000 [USP'U]/ML
INJECTION, SOLUTION INTRAVENOUS; SUBCUTANEOUS PRN
Status: DISCONTINUED | OUTPATIENT
Start: 2023-01-01 | End: 2023-01-01

## 2023-01-01 RX ORDER — HYDROMORPHONE HYDROCHLORIDE 1 MG/ML
0.3 INJECTION, SOLUTION INTRAMUSCULAR; INTRAVENOUS; SUBCUTANEOUS
Status: DISCONTINUED | OUTPATIENT
Start: 2023-01-01 | End: 2023-01-01

## 2023-01-01 RX ORDER — ALBUTEROL SULFATE 90 UG/1
2 AEROSOL, METERED RESPIRATORY (INHALATION) EVERY 5 MIN PRN
Status: DISCONTINUED | OUTPATIENT
Start: 2023-01-01 | End: 2023-01-01

## 2023-01-01 RX ORDER — POTASSIUM CHLORIDE 750 MG/1
20 TABLET, EXTENDED RELEASE ORAL ONCE
Status: COMPLETED | OUTPATIENT
Start: 2023-01-01 | End: 2023-01-01

## 2023-01-01 RX ORDER — HYDROMORPHONE HCL IN WATER/PF 6 MG/30 ML
0.2 PATIENT CONTROLLED ANALGESIA SYRINGE INTRAVENOUS EVERY 5 MIN PRN
Status: DISCONTINUED | OUTPATIENT
Start: 2023-01-01 | End: 2023-01-01 | Stop reason: HOSPADM

## 2023-01-01 RX ORDER — OXYCODONE HYDROCHLORIDE 5 MG/1
5 TABLET ORAL
Status: CANCELLED | OUTPATIENT
Start: 2023-01-01

## 2023-01-01 RX ORDER — ACETAMINOPHEN 325 MG/1
975 TABLET ORAL EVERY 8 HOURS
Qty: 90 TABLET | Refills: 0 | Status: SHIPPED | OUTPATIENT
Start: 2023-01-01

## 2023-01-01 RX ORDER — MIRTAZAPINE 15 MG/1
15 TABLET, FILM COATED ORAL AT BEDTIME
Qty: 30 TABLET | Refills: 0 | Status: SHIPPED | OUTPATIENT
Start: 2023-01-01 | End: 2024-01-01

## 2023-01-01 RX ORDER — BISACODYL 10 MG
10 SUPPOSITORY, RECTAL RECTAL DAILY PRN
Status: DISCONTINUED | OUTPATIENT
Start: 2023-01-01 | End: 2023-01-01 | Stop reason: HOSPADM

## 2023-01-01 RX ORDER — REGADENOSON 0.08 MG/ML
0.4 INJECTION, SOLUTION INTRAVENOUS ONCE
Status: COMPLETED | OUTPATIENT
Start: 2023-01-01 | End: 2023-01-01

## 2023-01-01 RX ORDER — CEFAZOLIN SODIUM/WATER 2 G/20 ML
2 SYRINGE (ML) INTRAVENOUS SEE ADMIN INSTRUCTIONS
Status: DISCONTINUED | OUTPATIENT
Start: 2023-01-01 | End: 2023-01-01 | Stop reason: HOSPADM

## 2023-01-01 RX ORDER — PANTOPRAZOLE SODIUM 40 MG/1
40 TABLET, DELAYED RELEASE ORAL
Status: DISCONTINUED | OUTPATIENT
Start: 2023-01-01 | End: 2023-01-01 | Stop reason: HOSPADM

## 2023-01-01 RX ORDER — ONDANSETRON 2 MG/ML
4 INJECTION INTRAMUSCULAR; INTRAVENOUS EVERY 6 HOURS PRN
Status: CANCELLED | OUTPATIENT
Start: 2023-01-01

## 2023-01-01 RX ORDER — OXYCODONE HYDROCHLORIDE 10 MG/1
10 TABLET ORAL EVERY 6 HOURS PRN
Qty: 20 TABLET | Refills: 0 | Status: ON HOLD | OUTPATIENT
Start: 2023-01-01 | End: 2023-01-01

## 2023-01-01 RX ORDER — ACETAMINOPHEN 325 MG/1
975 TABLET ORAL EVERY 8 HOURS
Status: CANCELLED | OUTPATIENT
Start: 2023-01-01 | End: 2023-01-01

## 2023-01-01 RX ORDER — HYDROXYZINE HYDROCHLORIDE 25 MG/1
25 TABLET, FILM COATED ORAL EVERY 6 HOURS PRN
Status: DISCONTINUED | OUTPATIENT
Start: 2023-01-01 | End: 2023-01-01 | Stop reason: HOSPADM

## 2023-01-01 RX ORDER — FENTANYL CITRATE 50 UG/ML
25 INJECTION, SOLUTION INTRAMUSCULAR; INTRAVENOUS EVERY 5 MIN PRN
Status: DISCONTINUED | OUTPATIENT
Start: 2023-01-01 | End: 2023-01-01 | Stop reason: HOSPADM

## 2023-01-01 RX ORDER — GABAPENTIN 100 MG/1
100 CAPSULE ORAL 3 TIMES DAILY
Qty: 180 CAPSULE | Refills: 0 | Status: ON HOLD | OUTPATIENT
Start: 2023-01-01 | End: 2023-01-01

## 2023-01-01 RX ORDER — POLYETHYLENE GLYCOL 3350 17 G/17G
17 POWDER, FOR SOLUTION ORAL DAILY
Status: DISCONTINUED | OUTPATIENT
Start: 2023-01-01 | End: 2023-01-01 | Stop reason: HOSPADM

## 2023-01-01 RX ORDER — GABAPENTIN 100 MG/1
100 CAPSULE ORAL 3 TIMES DAILY
Status: DISCONTINUED | OUTPATIENT
Start: 2023-01-01 | End: 2023-01-01 | Stop reason: HOSPADM

## 2023-01-01 RX ORDER — OXYCODONE HYDROCHLORIDE 5 MG/1
5-10 TABLET ORAL EVERY 4 HOURS PRN
Status: DISCONTINUED | OUTPATIENT
Start: 2023-01-01 | End: 2023-01-01 | Stop reason: HOSPADM

## 2023-01-01 RX ORDER — LOSARTAN POTASSIUM 100 MG/1
100 TABLET ORAL DAILY
Status: DISCONTINUED | OUTPATIENT
Start: 2023-01-01 | End: 2023-01-01 | Stop reason: HOSPADM

## 2023-01-01 RX ORDER — MULTIPLE VITAMINS W/ MINERALS TAB 9MG-400MCG
1 TAB ORAL DAILY
Status: DISCONTINUED | OUTPATIENT
Start: 2023-01-01 | End: 2023-01-01 | Stop reason: HOSPADM

## 2023-01-01 RX ORDER — SULFAMETHOXAZOLE/TRIMETHOPRIM 800-160 MG
2 TABLET ORAL 2 TIMES DAILY
Qty: 56 TABLET | Refills: 0 | Status: ON HOLD | OUTPATIENT
Start: 2023-01-01 | End: 2023-01-01

## 2023-01-01 RX ORDER — OXYCODONE HYDROCHLORIDE 5 MG/1
5-10 TABLET ORAL EVERY 6 HOURS PRN
Status: DISCONTINUED | OUTPATIENT
Start: 2023-01-01 | End: 2023-01-01

## 2023-01-01 RX ORDER — NICOTINE 21 MG/24HR
1 PATCH, TRANSDERMAL 24 HOURS TRANSDERMAL EVERY 24 HOURS
Qty: 28 PATCH | Refills: 0 | Status: SHIPPED | OUTPATIENT
Start: 2023-01-01

## 2023-01-01 RX ORDER — SODIUM CHLORIDE, SODIUM GLUCONATE, SODIUM ACETATE, POTASSIUM CHLORIDE AND MAGNESIUM CHLORIDE 526; 502; 368; 37; 30 MG/100ML; MG/100ML; MG/100ML; MG/100ML; MG/100ML
INJECTION, SOLUTION INTRAVENOUS CONTINUOUS PRN
Status: DISCONTINUED | OUTPATIENT
Start: 2023-01-01 | End: 2023-01-01

## 2023-01-01 RX ORDER — CARVEDILOL 12.5 MG/1
12.5 TABLET ORAL 2 TIMES DAILY
Status: DISCONTINUED | OUTPATIENT
Start: 2023-01-01 | End: 2023-01-01

## 2023-01-01 RX ORDER — ACETAMINOPHEN 325 MG/1
650 TABLET ORAL EVERY 6 HOURS PRN
Status: DISCONTINUED | OUTPATIENT
Start: 2023-01-01 | End: 2023-01-01 | Stop reason: HOSPADM

## 2023-01-01 RX ORDER — KETOROLAC TROMETHAMINE 15 MG/ML
15 INJECTION, SOLUTION INTRAMUSCULAR; INTRAVENOUS EVERY 6 HOURS PRN
Status: DISCONTINUED | OUTPATIENT
Start: 2023-01-01 | End: 2023-01-01

## 2023-01-01 RX ORDER — SPIRONOLACTONE 25 MG/1
25 TABLET ORAL DAILY
Qty: 90 TABLET | Refills: 1 | Status: SHIPPED | OUTPATIENT
Start: 2023-01-01 | End: 2023-01-01

## 2023-01-01 RX ORDER — HEPARIN SODIUM 10000 [USP'U]/100ML
0-5000 INJECTION, SOLUTION INTRAVENOUS CONTINUOUS
Status: ACTIVE | OUTPATIENT
Start: 2023-01-01 | End: 2023-01-01

## 2023-01-01 RX ORDER — DONEPEZIL HYDROCHLORIDE 10 MG/1
10 TABLET, FILM COATED ORAL AT BEDTIME
Qty: 90 TABLET | Refills: 1 | Status: ON HOLD | OUTPATIENT
Start: 2023-01-01 | End: 2023-01-01

## 2023-01-01 RX ORDER — METHOCARBAMOL 500 MG/1
1000 TABLET, FILM COATED ORAL ONCE
Status: COMPLETED | OUTPATIENT
Start: 2023-01-01 | End: 2023-01-01

## 2023-01-01 RX ORDER — CARVEDILOL 25 MG/1
25 TABLET ORAL ONCE
Status: COMPLETED | OUTPATIENT
Start: 2023-01-01 | End: 2023-01-01

## 2023-01-01 RX ORDER — ONDANSETRON 2 MG/ML
4 INJECTION INTRAMUSCULAR; INTRAVENOUS
Status: DISCONTINUED | OUTPATIENT
Start: 2023-01-01 | End: 2023-01-01

## 2023-01-01 RX ORDER — FENTANYL CITRATE 50 UG/ML
INJECTION, SOLUTION INTRAMUSCULAR; INTRAVENOUS PRN
Status: DISCONTINUED | OUTPATIENT
Start: 2023-01-01 | End: 2023-01-01

## 2023-01-01 RX ORDER — HYDROMORPHONE HCL IN WATER/PF 6 MG/30 ML
0.4 PATIENT CONTROLLED ANALGESIA SYRINGE INTRAVENOUS EVERY 5 MIN PRN
Status: DISCONTINUED | OUTPATIENT
Start: 2023-01-01 | End: 2023-01-01 | Stop reason: HOSPADM

## 2023-01-01 RX ORDER — QUETIAPINE FUMARATE 25 MG/1
25 TABLET, FILM COATED ORAL
Status: DISCONTINUED | OUTPATIENT
Start: 2023-01-01 | End: 2023-01-01 | Stop reason: HOSPADM

## 2023-01-01 RX ORDER — LOSARTAN POTASSIUM 50 MG/1
50 TABLET ORAL DAILY
Status: DISCONTINUED | OUTPATIENT
Start: 2023-01-01 | End: 2023-01-01 | Stop reason: HOSPADM

## 2023-01-01 RX ORDER — AMLODIPINE BESYLATE 10 MG/1
10 TABLET ORAL DAILY
Status: DISCONTINUED | OUTPATIENT
Start: 2023-01-01 | End: 2023-01-01 | Stop reason: HOSPADM

## 2023-01-01 RX ORDER — LOSARTAN POTASSIUM 50 MG/1
50 TABLET ORAL DAILY
Status: DISCONTINUED | OUTPATIENT
Start: 2023-01-01 | End: 2023-01-01

## 2023-01-01 RX ORDER — DIPHENHYDRAMINE HYDROCHLORIDE 50 MG/ML
25-50 INJECTION INTRAMUSCULAR; INTRAVENOUS
Status: DISCONTINUED | OUTPATIENT
Start: 2023-01-01 | End: 2023-01-01

## 2023-01-01 RX ORDER — PROCHLORPERAZINE MALEATE 5 MG
5 TABLET ORAL EVERY 6 HOURS PRN
Status: DISCONTINUED | OUTPATIENT
Start: 2023-01-01 | End: 2023-01-01

## 2023-01-01 RX ORDER — ACETAMINOPHEN 325 MG/1
650 TABLET ORAL EVERY 4 HOURS PRN
Status: CANCELLED | OUTPATIENT
Start: 2023-01-01

## 2023-01-01 RX ORDER — AMOXICILLIN 250 MG
2 CAPSULE ORAL 2 TIMES DAILY
Status: DISCONTINUED | OUTPATIENT
Start: 2023-01-01 | End: 2023-01-01 | Stop reason: HOSPADM

## 2023-01-01 RX ORDER — ASPIRIN 81 MG/1
81 TABLET, CHEWABLE ORAL DAILY
Status: DISCONTINUED | OUTPATIENT
Start: 2023-01-01 | End: 2023-01-01 | Stop reason: HOSPADM

## 2023-01-01 RX ORDER — VASOPRESSIN IN 0.9 % NACL 2 UNIT/2ML
SYRINGE (ML) INTRAVENOUS PRN
Status: DISCONTINUED | OUTPATIENT
Start: 2023-01-01 | End: 2023-01-01

## 2023-01-01 RX ORDER — CEFAZOLIN SODIUM 2 G/100ML
2 INJECTION, SOLUTION INTRAVENOUS EVERY 8 HOURS
Qty: 2400 ML | Refills: 0 | Status: ON HOLD | OUTPATIENT
Start: 2023-01-01 | End: 2023-01-01

## 2023-01-01 RX ORDER — SPIRONOLACTONE 25 MG/1
25 TABLET ORAL DAILY
Status: DISCONTINUED | OUTPATIENT
Start: 2023-01-01 | End: 2023-01-01 | Stop reason: HOSPADM

## 2023-01-01 RX ORDER — GABAPENTIN 100 MG/1
100 CAPSULE ORAL 2 TIMES DAILY
Status: DISCONTINUED | OUTPATIENT
Start: 2023-01-01 | End: 2023-01-01 | Stop reason: HOSPADM

## 2023-01-01 RX ORDER — ONDANSETRON 2 MG/ML
4 INJECTION INTRAMUSCULAR; INTRAVENOUS ONCE
Status: COMPLETED | OUTPATIENT
Start: 2023-01-01 | End: 2023-01-01

## 2023-01-01 RX ORDER — LORAZEPAM 1 MG/1
1-2 TABLET ORAL EVERY 30 MIN PRN
Status: DISCONTINUED | OUTPATIENT
Start: 2023-01-01 | End: 2023-01-01

## 2023-01-01 RX ORDER — DONEPEZIL HYDROCHLORIDE 5 MG/1
10 TABLET, FILM COATED ORAL AT BEDTIME
Status: DISCONTINUED | OUTPATIENT
Start: 2023-01-01 | End: 2023-01-01

## 2023-01-01 RX ORDER — ONDANSETRON 2 MG/ML
4 INJECTION INTRAMUSCULAR; INTRAVENOUS EVERY 30 MIN PRN
Status: CANCELLED | OUTPATIENT
Start: 2023-01-01

## 2023-01-01 RX ORDER — OXYMETAZOLINE HYDROCHLORIDE 0.05 G/100ML
2 SPRAY NASAL 2 TIMES DAILY
Status: DISCONTINUED | OUTPATIENT
Start: 2023-01-01 | End: 2023-01-01

## 2023-01-01 RX ORDER — KETOROLAC TROMETHAMINE 30 MG/ML
15 INJECTION, SOLUTION INTRAMUSCULAR; INTRAVENOUS EVERY 6 HOURS
Status: COMPLETED | OUTPATIENT
Start: 2023-01-01 | End: 2023-01-01

## 2023-01-01 RX ORDER — HYDROMORPHONE HYDROCHLORIDE 1 MG/ML
0.5 INJECTION, SOLUTION INTRAMUSCULAR; INTRAVENOUS; SUBCUTANEOUS EVERY 30 MIN PRN
Status: COMPLETED | OUTPATIENT
Start: 2023-01-01 | End: 2023-01-01

## 2023-01-01 RX ORDER — CEFAZOLIN SODIUM/WATER 2 G/20 ML
SYRINGE (ML) INTRAVENOUS PRN
Status: DISCONTINUED | OUTPATIENT
Start: 2023-01-01 | End: 2023-01-01

## 2023-01-01 RX ORDER — LABETALOL HYDROCHLORIDE 5 MG/ML
20 INJECTION, SOLUTION INTRAVENOUS EVERY 4 HOURS PRN
Status: DISCONTINUED | OUTPATIENT
Start: 2023-01-01 | End: 2023-01-01 | Stop reason: HOSPADM

## 2023-01-01 RX ORDER — APIXABAN 5 MG/1
TABLET, FILM COATED ORAL
Qty: 60 TABLET | Refills: 2 | OUTPATIENT
Start: 2023-01-01

## 2023-01-01 RX ORDER — ENOXAPARIN SODIUM 100 MG/ML
30 INJECTION SUBCUTANEOUS EVERY 24 HOURS
Status: DISCONTINUED | OUTPATIENT
Start: 2023-01-01 | End: 2023-01-01

## 2023-01-01 RX ORDER — CLONIDINE HYDROCHLORIDE 0.1 MG/1
0.1 TABLET ORAL EVERY 8 HOURS
Status: DISCONTINUED | OUTPATIENT
Start: 2023-01-01 | End: 2023-01-01 | Stop reason: HOSPADM

## 2023-01-01 RX ORDER — OXYCODONE HYDROCHLORIDE 10 MG/1
10 TABLET ORAL EVERY 4 HOURS PRN
Status: DISCONTINUED | OUTPATIENT
Start: 2023-01-01 | End: 2023-01-01 | Stop reason: HOSPADM

## 2023-01-01 RX ORDER — OXYCODONE HYDROCHLORIDE 5 MG/1
5-10 TABLET ORAL EVERY 6 HOURS PRN
Status: DISCONTINUED | OUTPATIENT
Start: 2023-01-01 | End: 2023-01-01 | Stop reason: HOSPADM

## 2023-01-01 RX ORDER — FAMOTIDINE 10 MG
TABLET ORAL
Status: ON HOLD | COMMUNITY
Start: 2023-01-01 | End: 2023-01-01

## 2023-01-01 RX ORDER — GABAPENTIN 100 MG/1
100 CAPSULE ORAL 3 TIMES DAILY
Status: DISCONTINUED | OUTPATIENT
Start: 2023-01-01 | End: 2023-01-01

## 2023-01-01 RX ORDER — HALOPERIDOL 5 MG/ML
2.5-5 INJECTION INTRAMUSCULAR EVERY 6 HOURS PRN
Status: DISCONTINUED | OUTPATIENT
Start: 2023-01-01 | End: 2023-01-01

## 2023-01-01 RX ORDER — IODIXANOL 320 MG/ML
INJECTION, SOLUTION INTRAVASCULAR PRN
Status: DISCONTINUED | OUTPATIENT
Start: 2023-01-01 | End: 2023-01-01 | Stop reason: HOSPADM

## 2023-01-01 RX ORDER — PROCHLORPERAZINE 25 MG
12.5 SUPPOSITORY, RECTAL RECTAL EVERY 12 HOURS PRN
Status: CANCELLED | OUTPATIENT
Start: 2023-01-01

## 2023-01-01 RX ORDER — OLANZAPINE 10 MG/2ML
2.5 INJECTION, POWDER, FOR SOLUTION INTRAMUSCULAR DAILY PRN
Status: DISCONTINUED | OUTPATIENT
Start: 2023-01-01 | End: 2023-01-01

## 2023-01-01 RX ORDER — HYDRALAZINE HYDROCHLORIDE 20 MG/ML
2.5-5 INJECTION INTRAMUSCULAR; INTRAVENOUS EVERY 10 MIN PRN
Status: DISCONTINUED | OUTPATIENT
Start: 2023-01-01 | End: 2023-01-01 | Stop reason: HOSPADM

## 2023-01-01 RX ORDER — CEFAZOLIN SODIUM 2 G/100ML
2 INJECTION, SOLUTION INTRAVENOUS EVERY 8 HOURS
Status: COMPLETED | OUTPATIENT
Start: 2023-01-01 | End: 2023-01-01

## 2023-01-01 RX ORDER — LOSARTAN POTASSIUM 50 MG/1
50 TABLET ORAL ONCE
Status: COMPLETED | OUTPATIENT
Start: 2023-01-01 | End: 2023-01-01

## 2023-01-01 RX ORDER — FOLIC ACID 1 MG/1
1 TABLET ORAL DAILY
Qty: 30 TABLET | Refills: 0 | Status: SHIPPED | OUTPATIENT
Start: 2023-01-01 | End: 2023-01-01

## 2023-01-01 RX ORDER — DIAZEPAM 5 MG
5 TABLET ORAL EVERY 30 MIN PRN
Status: DISCONTINUED | OUTPATIENT
Start: 2023-01-01 | End: 2023-01-01

## 2023-01-01 RX ORDER — PROCHLORPERAZINE MALEATE 5 MG
5 TABLET ORAL EVERY 6 HOURS PRN
Status: DISCONTINUED | OUTPATIENT
Start: 2023-01-01 | End: 2023-01-01 | Stop reason: HOSPADM

## 2023-01-01 RX ORDER — ACETAMINOPHEN 325 MG/1
975 TABLET ORAL ONCE
Status: COMPLETED | OUTPATIENT
Start: 2023-01-01 | End: 2023-01-01

## 2023-01-01 RX ORDER — CEFAZOLIN SODIUM 2 G/100ML
2 INJECTION, SOLUTION INTRAVENOUS SEE ADMIN INSTRUCTIONS
Status: CANCELLED | OUTPATIENT
Start: 2023-01-01

## 2023-01-01 RX ORDER — PEDIATRIC NUTRIT, IRON/DHA/ARA 4G/150KCAL
1 POWDER (GRAM) ORAL 4 TIMES DAILY
Qty: 948 ML | Refills: 0 | Status: SHIPPED | OUTPATIENT
Start: 2023-01-01

## 2023-01-01 RX ORDER — FLUMAZENIL 0.1 MG/ML
0.2 INJECTION, SOLUTION INTRAVENOUS
Status: DISCONTINUED | OUTPATIENT
Start: 2023-01-01 | End: 2023-01-01

## 2023-01-01 RX ORDER — LANCETS
EACH MISCELLANEOUS
Qty: 306 EACH | Refills: 3 | Status: SHIPPED | OUTPATIENT
Start: 2023-01-01

## 2023-01-01 RX ORDER — ASPIRIN 81 MG/1
81 TABLET ORAL DAILY
Status: DISCONTINUED | OUTPATIENT
Start: 2023-01-01 | End: 2023-01-01 | Stop reason: HOSPADM

## 2023-01-01 RX ORDER — MULTIVITAMIN,THERAPEUTIC
1 TABLET ORAL DAILY
Status: DISCONTINUED | OUTPATIENT
Start: 2023-01-01 | End: 2023-01-01 | Stop reason: HOSPADM

## 2023-01-01 RX ORDER — NICARDIPINE HCL-0.9% SOD CHLOR 1 MG/10 ML
SYRINGE (ML) INTRAVENOUS PRN
Status: DISCONTINUED | OUTPATIENT
Start: 2023-01-01 | End: 2023-01-01

## 2023-01-01 RX ORDER — ENOXAPARIN SODIUM 100 MG/ML
1 INJECTION SUBCUTANEOUS EVERY 12 HOURS
Status: DISCONTINUED | OUTPATIENT
Start: 2023-01-01 | End: 2023-01-01

## 2023-01-01 RX ORDER — SPIRONOLACTONE 25 MG/1
25 TABLET ORAL DAILY
Status: ON HOLD | COMMUNITY
Start: 2023-01-01 | End: 2023-01-01

## 2023-01-01 RX ORDER — POTASSIUM CHLORIDE 750 MG/1
40 TABLET, EXTENDED RELEASE ORAL ONCE
Status: COMPLETED | OUTPATIENT
Start: 2023-01-01 | End: 2023-01-01

## 2023-01-01 RX ORDER — ACETAMINOPHEN 325 MG/1
650 TABLET ORAL EVERY 6 HOURS PRN
Status: DISCONTINUED | OUTPATIENT
Start: 2023-01-01 | End: 2023-01-01

## 2023-01-01 RX ORDER — CEFAZOLIN SODIUM 2 G/100ML
2 INJECTION, SOLUTION INTRAVENOUS EVERY 8 HOURS
Status: DISCONTINUED | OUTPATIENT
Start: 2023-01-01 | End: 2023-01-01 | Stop reason: HOSPADM

## 2023-01-01 RX ORDER — FOLIC ACID 1 MG/1
1000 TABLET ORAL DAILY
Qty: 90 TABLET | Refills: 3 | Status: SHIPPED | OUTPATIENT
Start: 2023-01-01

## 2023-01-01 RX ORDER — ONDANSETRON 4 MG/1
4 TABLET, ORALLY DISINTEGRATING ORAL EVERY 6 HOURS PRN
Status: DISCONTINUED | OUTPATIENT
Start: 2023-01-01 | End: 2023-01-01

## 2023-01-01 RX ORDER — DONEPEZIL HYDROCHLORIDE 5 MG/1
10 TABLET, FILM COATED ORAL AT BEDTIME
Status: DISCONTINUED | OUTPATIENT
Start: 2023-01-01 | End: 2023-01-01 | Stop reason: HOSPADM

## 2023-01-01 RX ORDER — TRAZODONE HYDROCHLORIDE 50 MG/1
50 TABLET, FILM COATED ORAL ONCE
Qty: 1 TABLET | Refills: 0 | Status: COMPLETED | OUTPATIENT
Start: 2023-01-01 | End: 2023-01-01

## 2023-01-01 RX ORDER — DONEPEZIL HYDROCHLORIDE 10 MG/1
10 TABLET, FILM COATED ORAL AT BEDTIME
Status: DISCONTINUED | OUTPATIENT
Start: 2023-01-01 | End: 2023-01-01 | Stop reason: HOSPADM

## 2023-01-01 RX ORDER — BISACODYL 10 MG
10 SUPPOSITORY, RECTAL RECTAL DAILY PRN
Status: CANCELLED | OUTPATIENT
Start: 2023-01-01

## 2023-01-01 RX ORDER — SODIUM CHLORIDE 9 MG/ML
INJECTION, SOLUTION INTRAVENOUS CONTINUOUS
Status: DISCONTINUED | OUTPATIENT
Start: 2023-01-01 | End: 2023-01-01

## 2023-01-01 RX ORDER — ACYCLOVIR 200 MG/1
0-1 CAPSULE ORAL
Status: DISCONTINUED | OUTPATIENT
Start: 2023-01-01 | End: 2023-01-01 | Stop reason: HOSPADM

## 2023-01-01 RX ORDER — ONDANSETRON 4 MG/1
4 TABLET, ORALLY DISINTEGRATING ORAL EVERY 6 HOURS PRN
Qty: 30 TABLET | Refills: 0 | Status: SHIPPED | OUTPATIENT
Start: 2023-01-01

## 2023-01-01 RX ORDER — DEXTROSE MONOHYDRATE 100 MG/ML
INJECTION, SOLUTION INTRAVENOUS CONTINUOUS PRN
Status: DISCONTINUED | OUTPATIENT
Start: 2023-01-01 | End: 2023-01-01 | Stop reason: HOSPADM

## 2023-01-01 RX ORDER — CEFAZOLIN SODIUM 2 G/100ML
2 INJECTION, SOLUTION INTRAVENOUS EVERY 8 HOURS
Qty: 2400 ML | Refills: 0 | Status: SHIPPED | OUTPATIENT
Start: 2023-01-01 | End: 2023-01-01

## 2023-01-01 RX ORDER — HYDRALAZINE HYDROCHLORIDE 20 MG/ML
10 INJECTION INTRAMUSCULAR; INTRAVENOUS EVERY 4 HOURS PRN
Status: DISCONTINUED | OUTPATIENT
Start: 2023-01-01 | End: 2023-01-01 | Stop reason: HOSPADM

## 2023-01-01 RX ORDER — POLYETHYLENE GLYCOL 3350 17 G/17G
17 POWDER, FOR SOLUTION ORAL DAILY
Status: CANCELLED | OUTPATIENT
Start: 2023-01-01

## 2023-01-01 RX ORDER — AMOXICILLIN 250 MG
1 CAPSULE ORAL 2 TIMES DAILY
Status: DISCONTINUED | OUTPATIENT
Start: 2023-01-01 | End: 2023-01-01

## 2023-01-01 RX ORDER — HYDROXYZINE HYDROCHLORIDE 50 MG/1
50 TABLET, FILM COATED ORAL ONCE
Status: COMPLETED | OUTPATIENT
Start: 2023-01-01 | End: 2023-01-01

## 2023-01-01 RX ORDER — HEPARIN SODIUM,PORCINE 10 UNIT/ML
5-20 VIAL (ML) INTRAVENOUS EVERY 24 HOURS
Status: DISCONTINUED | OUTPATIENT
Start: 2023-01-01 | End: 2023-01-01 | Stop reason: HOSPADM

## 2023-01-01 RX ORDER — FAMOTIDINE 10 MG
10 TABLET ORAL 2 TIMES DAILY
Qty: 30 TABLET | Refills: 1 | Status: SHIPPED | OUTPATIENT
Start: 2023-01-01

## 2023-01-01 RX ORDER — OXYCODONE HYDROCHLORIDE 5 MG/1
5-10 TABLET ORAL EVERY 4 HOURS PRN
Status: DISCONTINUED | OUTPATIENT
Start: 2023-01-01 | End: 2023-01-01

## 2023-01-01 RX ORDER — AMLODIPINE BESYLATE 10 MG/1
10 TABLET ORAL DAILY
Qty: 90 TABLET | Refills: 0 | Status: SHIPPED | OUTPATIENT
Start: 2023-01-01

## 2023-01-01 RX ORDER — SPIRONOLACTONE 25 MG/1
25 TABLET ORAL DAILY
Qty: 90 TABLET | Refills: 0 | Status: ON HOLD | OUTPATIENT
Start: 2023-01-01 | End: 2023-01-01

## 2023-01-01 RX ORDER — METHOCARBAMOL 500 MG/1
500 TABLET, FILM COATED ORAL EVERY 6 HOURS PRN
Status: DISCONTINUED | OUTPATIENT
Start: 2023-01-01 | End: 2023-01-01 | Stop reason: HOSPADM

## 2023-01-01 RX ORDER — LIDOCAINE HYDROCHLORIDE 10 MG/ML
INJECTION, SOLUTION EPIDURAL; INFILTRATION; INTRACAUDAL; PERINEURAL PRN
Status: DISCONTINUED | OUTPATIENT
Start: 2023-01-01 | End: 2023-01-01 | Stop reason: HOSPADM

## 2023-01-01 RX ORDER — OLANZAPINE 5 MG/1
5-10 TABLET, ORALLY DISINTEGRATING ORAL EVERY 6 HOURS PRN
Status: DISCONTINUED | OUTPATIENT
Start: 2023-01-01 | End: 2023-01-01

## 2023-01-01 RX ORDER — HYDROXYZINE HYDROCHLORIDE 50 MG/1
50 TABLET, FILM COATED ORAL EVERY 6 HOURS PRN
Status: DISCONTINUED | OUTPATIENT
Start: 2023-01-01 | End: 2023-01-01 | Stop reason: HOSPADM

## 2023-01-01 RX ORDER — MIRTAZAPINE 15 MG/1
15 TABLET, FILM COATED ORAL AT BEDTIME
Status: DISCONTINUED | OUTPATIENT
Start: 2023-01-01 | End: 2023-01-01 | Stop reason: HOSPADM

## 2023-01-01 RX ORDER — OXYCODONE HYDROCHLORIDE 5 MG/1
5 TABLET ORAL EVERY 6 HOURS PRN
Qty: 12 TABLET | Refills: 0 | Status: ON HOLD | OUTPATIENT
Start: 2023-01-01 | End: 2023-01-01

## 2023-01-01 RX ORDER — GABAPENTIN 100 MG/1
100 CAPSULE ORAL 3 TIMES DAILY
Qty: 180 CAPSULE | Refills: 1 | Status: SHIPPED | OUTPATIENT
Start: 2023-01-01

## 2023-01-01 RX ORDER — HEPARIN SODIUM 10000 [USP'U]/100ML
200 INJECTION, SOLUTION INTRAVENOUS CONTINUOUS
Status: DISCONTINUED | OUTPATIENT
Start: 2023-01-01 | End: 2023-01-01

## 2023-01-01 RX ORDER — CALCIUM CARBONATE 500 MG/1
1000 TABLET, CHEWABLE ORAL 4 TIMES DAILY PRN
Status: DISCONTINUED | OUTPATIENT
Start: 2023-01-01 | End: 2023-01-01 | Stop reason: HOSPADM

## 2023-01-01 RX ORDER — CEFEPIME HYDROCHLORIDE 2 G/1
2 INJECTION, POWDER, FOR SOLUTION INTRAVENOUS ONCE
Status: COMPLETED | OUTPATIENT
Start: 2023-01-01 | End: 2023-01-01

## 2023-01-01 RX ORDER — ASPIRIN 81 MG/1
81 TABLET ORAL DAILY
Status: DISCONTINUED | OUTPATIENT
Start: 2023-01-01 | End: 2023-01-01

## 2023-01-01 RX ORDER — KETOROLAC TROMETHAMINE 30 MG/ML
15 INJECTION, SOLUTION INTRAMUSCULAR; INTRAVENOUS EVERY 6 HOURS PRN
Status: DISCONTINUED | OUTPATIENT
Start: 2023-01-01 | End: 2023-01-01

## 2023-01-01 RX ORDER — TIZANIDINE 2 MG/1
2 TABLET ORAL 3 TIMES DAILY PRN
Qty: 90 TABLET | Refills: 1 | Status: SHIPPED | OUTPATIENT
Start: 2023-01-01

## 2023-01-01 RX ORDER — OXYCODONE HYDROCHLORIDE 5 MG/1
5-10 TABLET ORAL
Status: DISCONTINUED | OUTPATIENT
Start: 2023-01-01 | End: 2023-01-01

## 2023-01-01 RX ORDER — CEFAZOLIN SODIUM 1 G/3ML
INJECTION, POWDER, FOR SOLUTION INTRAMUSCULAR; INTRAVENOUS PRN
Status: DISCONTINUED | OUTPATIENT
Start: 2023-01-01 | End: 2023-01-01

## 2023-01-01 RX ORDER — LOSARTAN POTASSIUM 50 MG/1
100 TABLET ORAL DAILY
Qty: 90 TABLET | Refills: 2 | Status: SHIPPED | OUTPATIENT
Start: 2023-01-01

## 2023-01-01 RX ORDER — AMOXICILLIN 250 MG
1 CAPSULE ORAL 2 TIMES DAILY
Status: CANCELLED | OUTPATIENT
Start: 2023-01-01

## 2023-01-01 RX ORDER — HEPARIN SODIUM 5000 [USP'U]/.5ML
5000 INJECTION, SOLUTION INTRAVENOUS; SUBCUTANEOUS
Status: COMPLETED | OUTPATIENT
Start: 2023-01-01 | End: 2023-01-01

## 2023-01-01 RX ORDER — AMLODIPINE BESYLATE 5 MG/1
10 TABLET ORAL
Status: DISCONTINUED | OUTPATIENT
Start: 2023-01-01 | End: 2023-01-01

## 2023-01-01 RX ORDER — ONDANSETRON 4 MG/1
4 TABLET, ORALLY DISINTEGRATING ORAL EVERY 6 HOURS PRN
Status: CANCELLED | OUTPATIENT
Start: 2023-01-01

## 2023-01-01 RX ORDER — ISOSORBIDE MONONITRATE 60 MG/1
60 TABLET, EXTENDED RELEASE ORAL DAILY
Status: DISCONTINUED | OUTPATIENT
Start: 2023-01-01 | End: 2023-01-01

## 2023-01-01 RX ORDER — HYDROMORPHONE HCL IN WATER/PF 6 MG/30 ML
.2-.4 PATIENT CONTROLLED ANALGESIA SYRINGE INTRAVENOUS
Status: DISCONTINUED | OUTPATIENT
Start: 2023-01-01 | End: 2023-01-01

## 2023-01-01 RX ORDER — HYDROMORPHONE HCL IN WATER/PF 6 MG/30 ML
0.2 PATIENT CONTROLLED ANALGESIA SYRINGE INTRAVENOUS ONCE
Status: COMPLETED | OUTPATIENT
Start: 2023-01-01 | End: 2023-01-01

## 2023-01-01 RX ORDER — LABETALOL HYDROCHLORIDE 5 MG/ML
10 INJECTION, SOLUTION INTRAVENOUS
Status: DISCONTINUED | OUTPATIENT
Start: 2023-01-01 | End: 2023-01-01 | Stop reason: HOSPADM

## 2023-01-01 RX ORDER — MAGNESIUM OXIDE 400 MG/1
800 TABLET ORAL 2 TIMES DAILY
Status: DISCONTINUED | OUTPATIENT
Start: 2023-01-01 | End: 2023-01-01 | Stop reason: HOSPADM

## 2023-01-01 RX ORDER — MAGNESIUM OXIDE 400 MG/1
400 TABLET ORAL 2 TIMES DAILY
Status: DISCONTINUED | OUTPATIENT
Start: 2023-01-01 | End: 2023-01-01

## 2023-01-01 RX ORDER — AMLODIPINE BESYLATE 10 MG/1
10 TABLET ORAL DAILY
Status: DISCONTINUED | OUTPATIENT
Start: 2023-01-01 | End: 2023-01-01

## 2023-01-01 RX ORDER — MAGNESIUM OXIDE 400 MG/1
400 TABLET ORAL ONCE
Status: COMPLETED | OUTPATIENT
Start: 2023-01-01 | End: 2023-01-01

## 2023-01-01 RX ORDER — SPIRONOLACTONE 25 MG/1
25 TABLET ORAL DAILY
Status: DISCONTINUED | OUTPATIENT
Start: 2023-01-01 | End: 2023-01-01

## 2023-01-01 RX ORDER — GABAPENTIN 300 MG/1
300 CAPSULE ORAL 3 TIMES DAILY
Status: DISCONTINUED | OUTPATIENT
Start: 2023-01-01 | End: 2023-01-01 | Stop reason: HOSPADM

## 2023-01-01 RX ORDER — TIZANIDINE 2 MG/1
2 TABLET ORAL 3 TIMES DAILY PRN
Qty: 90 TABLET | Refills: 0 | Status: ON HOLD | OUTPATIENT
Start: 2023-01-01 | End: 2023-01-01

## 2023-01-01 RX ADMIN — SERTRALINE HYDROCHLORIDE 25 MG: 25 TABLET ORAL at 08:57

## 2023-01-01 RX ADMIN — APIXABAN 5 MG: 5 TABLET, FILM COATED ORAL at 20:38

## 2023-01-01 RX ADMIN — AMLODIPINE BESYLATE 10 MG: 10 TABLET ORAL at 08:17

## 2023-01-01 RX ADMIN — PROCHLORPERAZINE EDISYLATE 5 MG: 5 INJECTION INTRAMUSCULAR; INTRAVENOUS at 12:45

## 2023-01-01 RX ADMIN — GADOBUTROL 7.5 ML: 604.72 INJECTION INTRAVENOUS at 13:17

## 2023-01-01 RX ADMIN — MAGNESIUM OXIDE TAB 400 MG (241.3 MG ELEMENTAL MG) 800 MG: 400 (241.3 MG) TAB at 10:13

## 2023-01-01 RX ADMIN — SENNOSIDES AND DOCUSATE SODIUM 1 TABLET: 8.6; 5 TABLET ORAL at 07:59

## 2023-01-01 RX ADMIN — CILOSTAZOL 100 MG: 100 TABLET ORAL at 09:07

## 2023-01-01 RX ADMIN — ACETAMINOPHEN 975 MG: 325 TABLET, FILM COATED ORAL at 06:48

## 2023-01-01 RX ADMIN — SERTRALINE HYDROCHLORIDE 25 MG: 25 TABLET ORAL at 09:37

## 2023-01-01 RX ADMIN — OXYCODONE HYDROCHLORIDE 10 MG: 5 TABLET ORAL at 08:23

## 2023-01-01 RX ADMIN — OXYCODONE HYDROCHLORIDE 10 MG: 10 TABLET ORAL at 23:34

## 2023-01-01 RX ADMIN — OXYCODONE HYDROCHLORIDE 10 MG: 5 TABLET ORAL at 02:34

## 2023-01-01 RX ADMIN — Medication 200 MG: at 08:04

## 2023-01-01 RX ADMIN — ATORVASTATIN CALCIUM 40 MG: 40 TABLET, FILM COATED ORAL at 09:04

## 2023-01-01 RX ADMIN — GABAPENTIN 100 MG: 100 CAPSULE ORAL at 19:47

## 2023-01-01 RX ADMIN — OXYCODONE HYDROCHLORIDE 10 MG: 5 TABLET ORAL at 04:56

## 2023-01-01 RX ADMIN — CARVEDILOL 25 MG: 25 TABLET, FILM COATED ORAL at 02:11

## 2023-01-01 RX ADMIN — Medication 200 MG: at 08:24

## 2023-01-01 RX ADMIN — GABAPENTIN 100 MG: 100 CAPSULE ORAL at 13:38

## 2023-01-01 RX ADMIN — HYDROMORPHONE HYDROCHLORIDE 1 MG: 1 INJECTION, SOLUTION INTRAMUSCULAR; INTRAVENOUS; SUBCUTANEOUS at 20:56

## 2023-01-01 RX ADMIN — FAMOTIDINE 10 MG: 10 TABLET, FILM COATED ORAL at 08:48

## 2023-01-01 RX ADMIN — SERTRALINE HYDROCHLORIDE 25 MG: 25 TABLET ORAL at 08:07

## 2023-01-01 RX ADMIN — LABETALOL HYDROCHLORIDE 20 MG: 5 INJECTION, SOLUTION INTRAVENOUS at 01:46

## 2023-01-01 RX ADMIN — ACETAMINOPHEN 975 MG: 325 TABLET, FILM COATED ORAL at 19:32

## 2023-01-01 RX ADMIN — ONDANSETRON 4 MG: 2 INJECTION INTRAMUSCULAR; INTRAVENOUS at 22:56

## 2023-01-01 RX ADMIN — APIXABAN 5 MG: 5 TABLET, FILM COATED ORAL at 20:51

## 2023-01-01 RX ADMIN — DONEPEZIL HYDROCHLORIDE 10 MG: 10 TABLET, FILM COATED ORAL at 21:03

## 2023-01-01 RX ADMIN — TIZANIDINE 2 MG: 2 TABLET ORAL at 12:26

## 2023-01-01 RX ADMIN — HYDROMORPHONE HYDROCHLORIDE 0.2 MG: 0.2 INJECTION, SOLUTION INTRAMUSCULAR; INTRAVENOUS; SUBCUTANEOUS at 15:09

## 2023-01-01 RX ADMIN — GABAPENTIN 300 MG: 300 CAPSULE ORAL at 09:37

## 2023-01-01 RX ADMIN — KETOROLAC TROMETHAMINE 15 MG: 30 INJECTION, SOLUTION INTRAMUSCULAR; INTRAVENOUS at 11:52

## 2023-01-01 RX ADMIN — LABETALOL 20 MG/4 ML (5 MG/ML) INTRAVENOUS SYRINGE 5 MG: at 17:06

## 2023-01-01 RX ADMIN — SERTRALINE HYDROCHLORIDE 25 MG: 25 TABLET ORAL at 08:18

## 2023-01-01 RX ADMIN — MAGNESIUM OXIDE TAB 400 MG (241.3 MG ELEMENTAL MG) 400 MG: 400 (241.3 MG) TAB at 19:44

## 2023-01-01 RX ADMIN — Medication 50 MCG: at 07:45

## 2023-01-01 RX ADMIN — PANTOPRAZOLE SODIUM 40 MG: 40 TABLET, DELAYED RELEASE ORAL at 08:36

## 2023-01-01 RX ADMIN — LOSARTAN POTASSIUM 50 MG: 50 TABLET, FILM COATED ORAL at 08:25

## 2023-01-01 RX ADMIN — ATORVASTATIN CALCIUM 40 MG: 40 TABLET, FILM COATED ORAL at 10:03

## 2023-01-01 RX ADMIN — GABAPENTIN 100 MG: 100 CAPSULE ORAL at 08:11

## 2023-01-01 RX ADMIN — PANTOPRAZOLE SODIUM 40 MG: 40 TABLET, DELAYED RELEASE ORAL at 07:41

## 2023-01-01 RX ADMIN — FUROSEMIDE 40 MG: 10 INJECTION, SOLUTION INTRAMUSCULAR; INTRAVENOUS at 13:59

## 2023-01-01 RX ADMIN — Medication 100 MCG: at 18:05

## 2023-01-01 RX ADMIN — LOSARTAN POTASSIUM 100 MG: 100 TABLET, FILM COATED ORAL at 08:15

## 2023-01-01 RX ADMIN — CARVEDILOL 25 MG: 25 TABLET, FILM COATED ORAL at 08:55

## 2023-01-01 RX ADMIN — FAMOTIDINE 10 MG: 10 TABLET, FILM COATED ORAL at 09:39

## 2023-01-01 RX ADMIN — ATORVASTATIN CALCIUM 40 MG: 40 TABLET, FILM COATED ORAL at 08:56

## 2023-01-01 RX ADMIN — FOLIC ACID 1 MG: 1 TABLET ORAL at 08:25

## 2023-01-01 RX ADMIN — OXYCODONE HYDROCHLORIDE 10 MG: 5 TABLET ORAL at 16:31

## 2023-01-01 RX ADMIN — PANTOPRAZOLE SODIUM 40 MG: 40 TABLET, DELAYED RELEASE ORAL at 09:01

## 2023-01-01 RX ADMIN — ACETAMINOPHEN 975 MG: 325 TABLET, FILM COATED ORAL at 08:17

## 2023-01-01 RX ADMIN — Medication 1 MG: at 20:17

## 2023-01-01 RX ADMIN — FAMOTIDINE 10 MG: 10 TABLET, FILM COATED ORAL at 08:16

## 2023-01-01 RX ADMIN — AMLODIPINE BESYLATE 10 MG: 10 TABLET ORAL at 09:36

## 2023-01-01 RX ADMIN — LOSARTAN POTASSIUM 50 MG: 50 TABLET, FILM COATED ORAL at 09:35

## 2023-01-01 RX ADMIN — GABAPENTIN 300 MG: 300 CAPSULE ORAL at 21:39

## 2023-01-01 RX ADMIN — FOLIC ACID 1 MG: 1 TABLET ORAL at 09:35

## 2023-01-01 RX ADMIN — HYDROMORPHONE HYDROCHLORIDE 0.3 MG: 1 INJECTION, SOLUTION INTRAMUSCULAR; INTRAVENOUS; SUBCUTANEOUS at 11:23

## 2023-01-01 RX ADMIN — DONEPEZIL HYDROCHLORIDE 10 MG: 10 TABLET, FILM COATED ORAL at 22:02

## 2023-01-01 RX ADMIN — CARVEDILOL 25 MG: 25 TABLET, FILM COATED ORAL at 08:03

## 2023-01-01 RX ADMIN — CARVEDILOL 25 MG: 25 TABLET, FILM COATED ORAL at 07:41

## 2023-01-01 RX ADMIN — PHENYLEPHRINE HYDROCHLORIDE 200 MCG: 10 INJECTION INTRAVENOUS at 09:17

## 2023-01-01 RX ADMIN — CLONIDINE HYDROCHLORIDE 0.1 MG: 0.1 TABLET ORAL at 02:08

## 2023-01-01 RX ADMIN — LIDOCAINE HYDROCHLORIDE: 20 JELLY TOPICAL at 11:20

## 2023-01-01 RX ADMIN — SODIUM CHLORIDE, POTASSIUM CHLORIDE, SODIUM LACTATE AND CALCIUM CHLORIDE: 600; 310; 30; 20 INJECTION, SOLUTION INTRAVENOUS at 11:26

## 2023-01-01 RX ADMIN — Medication 1 TABLET: at 07:55

## 2023-01-01 RX ADMIN — GABAPENTIN 100 MG: 100 CAPSULE ORAL at 08:25

## 2023-01-01 RX ADMIN — APIXABAN 5 MG: 5 TABLET, FILM COATED ORAL at 08:25

## 2023-01-01 RX ADMIN — GABAPENTIN 300 MG: 300 CAPSULE ORAL at 13:36

## 2023-01-01 RX ADMIN — OXYCODONE HYDROCHLORIDE 10 MG: 5 TABLET ORAL at 17:39

## 2023-01-01 RX ADMIN — FAMOTIDINE 10 MG: 10 TABLET, FILM COATED ORAL at 09:35

## 2023-01-01 RX ADMIN — MAGNESIUM SULFATE IN WATER 2 G: 40 INJECTION, SOLUTION INTRAVENOUS at 08:17

## 2023-01-01 RX ADMIN — CILOSTAZOL 100 MG: 100 TABLET ORAL at 08:31

## 2023-01-01 RX ADMIN — PANTOPRAZOLE SODIUM 40 MG: 40 TABLET, DELAYED RELEASE ORAL at 08:26

## 2023-01-01 RX ADMIN — OXYCODONE HYDROCHLORIDE 10 MG: 5 TABLET ORAL at 23:56

## 2023-01-01 RX ADMIN — CARVEDILOL 25 MG: 25 TABLET, FILM COATED ORAL at 08:33

## 2023-01-01 RX ADMIN — SERTRALINE HYDROCHLORIDE 25 MG: 25 TABLET ORAL at 08:17

## 2023-01-01 RX ADMIN — CARVEDILOL 25 MG: 25 TABLET, FILM COATED ORAL at 20:50

## 2023-01-01 RX ADMIN — SENNOSIDES AND DOCUSATE SODIUM 1 TABLET: 8.6; 5 TABLET ORAL at 08:18

## 2023-01-01 RX ADMIN — GABAPENTIN 300 MG: 300 CAPSULE ORAL at 19:32

## 2023-01-01 RX ADMIN — OXYCODONE HYDROCHLORIDE 10 MG: 10 TABLET ORAL at 09:53

## 2023-01-01 RX ADMIN — DEXMEDETOMIDINE 4 MCG: 100 INJECTION, SOLUTION, CONCENTRATE INTRAVENOUS at 13:37

## 2023-01-01 RX ADMIN — CEFAZOLIN SODIUM 2 G: 2 INJECTION, SOLUTION INTRAVENOUS at 02:26

## 2023-01-01 RX ADMIN — CILOSTAZOL 100 MG: 100 TABLET ORAL at 09:28

## 2023-01-01 RX ADMIN — APIXABAN 5 MG: 5 TABLET, FILM COATED ORAL at 09:29

## 2023-01-01 RX ADMIN — MIRTAZAPINE 15 MG: 15 TABLET, FILM COATED ORAL at 22:09

## 2023-01-01 RX ADMIN — REGADENOSON 0.4 MG: 0.08 INJECTION, SOLUTION INTRAVENOUS at 14:21

## 2023-01-01 RX ADMIN — SERTRALINE HYDROCHLORIDE 25 MG: 25 TABLET ORAL at 08:21

## 2023-01-01 RX ADMIN — LOSARTAN POTASSIUM 100 MG: 100 TABLET, FILM COATED ORAL at 09:03

## 2023-01-01 RX ADMIN — PANTOPRAZOLE SODIUM 40 MG: 40 TABLET, DELAYED RELEASE ORAL at 08:25

## 2023-01-01 RX ADMIN — APIXABAN 5 MG: 5 TABLET, FILM COATED ORAL at 07:36

## 2023-01-01 RX ADMIN — TIZANIDINE 2 MG: 2 TABLET ORAL at 13:10

## 2023-01-01 RX ADMIN — GABAPENTIN 100 MG: 100 CAPSULE ORAL at 14:24

## 2023-01-01 RX ADMIN — Medication 1 TABLET: at 08:48

## 2023-01-01 RX ADMIN — GABAPENTIN 300 MG: 300 CAPSULE ORAL at 21:20

## 2023-01-01 RX ADMIN — NICOTINE 1 PATCH: 14 PATCH, EXTENDED RELEASE TRANSDERMAL at 12:23

## 2023-01-01 RX ADMIN — CEFEPIME HYDROCHLORIDE 2 G: 2 INJECTION, POWDER, FOR SOLUTION INTRAVENOUS at 06:11

## 2023-01-01 RX ADMIN — APIXABAN 5 MG: 5 TABLET, FILM COATED ORAL at 20:34

## 2023-01-01 RX ADMIN — Medication 1 TABLET: at 08:57

## 2023-01-01 RX ADMIN — PROPOFOL 200 MG: 10 INJECTION, EMULSION INTRAVENOUS at 15:30

## 2023-01-01 RX ADMIN — ACETAMINOPHEN 975 MG: 325 TABLET, FILM COATED ORAL at 07:55

## 2023-01-01 RX ADMIN — OXYCODONE HYDROCHLORIDE 10 MG: 5 TABLET ORAL at 12:23

## 2023-01-01 RX ADMIN — SERTRALINE HYDROCHLORIDE 25 MG: 25 TABLET ORAL at 08:42

## 2023-01-01 RX ADMIN — CLONIDINE HYDROCHLORIDE 0.1 MG: 0.1 TABLET ORAL at 02:40

## 2023-01-01 RX ADMIN — OXYCODONE HYDROCHLORIDE 10 MG: 5 TABLET ORAL at 20:39

## 2023-01-01 RX ADMIN — Medication 1 TABLET: at 09:36

## 2023-01-01 RX ADMIN — CLONIDINE HYDROCHLORIDE 0.1 MG: 0.1 TABLET ORAL at 10:11

## 2023-01-01 RX ADMIN — CILOSTAZOL 100 MG: 100 TABLET ORAL at 08:50

## 2023-01-01 RX ADMIN — ASPIRIN 81 MG: 81 TABLET, COATED ORAL at 08:26

## 2023-01-01 RX ADMIN — ACETAMINOPHEN 975 MG: 325 TABLET, FILM COATED ORAL at 19:47

## 2023-01-01 RX ADMIN — ACETAMINOPHEN 975 MG: 325 TABLET, FILM COATED ORAL at 19:16

## 2023-01-01 RX ADMIN — CARVEDILOL 25 MG: 25 TABLET, FILM COATED ORAL at 08:39

## 2023-01-01 RX ADMIN — OXYCODONE HYDROCHLORIDE 10 MG: 5 TABLET ORAL at 09:06

## 2023-01-01 RX ADMIN — HYDROMORPHONE HYDROCHLORIDE 0.4 MG: 0.2 INJECTION, SOLUTION INTRAMUSCULAR; INTRAVENOUS; SUBCUTANEOUS at 22:05

## 2023-01-01 RX ADMIN — GABAPENTIN 300 MG: 300 CAPSULE ORAL at 13:55

## 2023-01-01 RX ADMIN — SERTRALINE HYDROCHLORIDE 25 MG: 25 TABLET ORAL at 08:26

## 2023-01-01 RX ADMIN — CLONIDINE HYDROCHLORIDE 0.1 MG: 0.1 TABLET ORAL at 10:17

## 2023-01-01 RX ADMIN — FAMOTIDINE 10 MG: 10 TABLET, FILM COATED ORAL at 20:37

## 2023-01-01 RX ADMIN — CARVEDILOL 25 MG: 25 TABLET, FILM COATED ORAL at 20:30

## 2023-01-01 RX ADMIN — SENNOSIDES AND DOCUSATE SODIUM 2 TABLET: 50; 8.6 TABLET ORAL at 09:06

## 2023-01-01 RX ADMIN — ATORVASTATIN CALCIUM 40 MG: 40 TABLET, FILM COATED ORAL at 07:55

## 2023-01-01 RX ADMIN — APIXABAN 5 MG: 5 TABLET, FILM COATED ORAL at 09:38

## 2023-01-01 RX ADMIN — POLYETHYLENE GLYCOL 3350 17 G: 17 POWDER, FOR SOLUTION ORAL at 07:42

## 2023-01-01 RX ADMIN — OXYCODONE HYDROCHLORIDE 10 MG: 10 TABLET ORAL at 02:11

## 2023-01-01 RX ADMIN — AMLODIPINE BESYLATE 10 MG: 10 TABLET ORAL at 09:06

## 2023-01-01 RX ADMIN — OXYCODONE HYDROCHLORIDE 10 MG: 5 TABLET ORAL at 14:22

## 2023-01-01 RX ADMIN — THIAMINE HCL TAB 100 MG 100 MG: 100 TAB at 09:38

## 2023-01-01 RX ADMIN — LOSARTAN POTASSIUM 50 MG: 50 TABLET, FILM COATED ORAL at 08:03

## 2023-01-01 RX ADMIN — FAMOTIDINE 10 MG: 10 TABLET, FILM COATED ORAL at 20:33

## 2023-01-01 RX ADMIN — ACETAMINOPHEN 975 MG: 325 TABLET, FILM COATED ORAL at 23:29

## 2023-01-01 RX ADMIN — CEFAZOLIN SODIUM 2 G: 2 INJECTION, SOLUTION INTRAVENOUS at 20:55

## 2023-01-01 RX ADMIN — GABAPENTIN 300 MG: 300 CAPSULE ORAL at 14:21

## 2023-01-01 RX ADMIN — CILOSTAZOL 100 MG: 100 TABLET ORAL at 20:23

## 2023-01-01 RX ADMIN — PROCHLORPERAZINE EDISYLATE 5 MG: 5 INJECTION INTRAMUSCULAR; INTRAVENOUS at 00:30

## 2023-01-01 RX ADMIN — PANTOPRAZOLE SODIUM 40 MG: 40 TABLET, DELAYED RELEASE ORAL at 08:04

## 2023-01-01 RX ADMIN — SENNOSIDES AND DOCUSATE SODIUM 2 TABLET: 50; 8.6 TABLET ORAL at 08:50

## 2023-01-01 RX ADMIN — CLONIDINE HYDROCHLORIDE 0.1 MG: 0.1 TABLET ORAL at 18:31

## 2023-01-01 RX ADMIN — NICOTINE POLACRILEX 2 MG: 2 GUM, CHEWING BUCCAL at 20:39

## 2023-01-01 RX ADMIN — FENTANYL CITRATE 50 MCG: 50 INJECTION INTRAMUSCULAR; INTRAVENOUS at 09:23

## 2023-01-01 RX ADMIN — ACETAMINOPHEN 975 MG: 325 TABLET, FILM COATED ORAL at 14:21

## 2023-01-01 RX ADMIN — LABETALOL HYDROCHLORIDE 10 MG: 5 INJECTION, SOLUTION INTRAVENOUS at 17:20

## 2023-01-01 RX ADMIN — SODIUM CHLORIDE, POTASSIUM CHLORIDE, SODIUM LACTATE AND CALCIUM CHLORIDE: 600; 310; 30; 20 INJECTION, SOLUTION INTRAVENOUS at 09:39

## 2023-01-01 RX ADMIN — DONEPEZIL HYDROCHLORIDE 10 MG: 10 TABLET, FILM COATED ORAL at 22:16

## 2023-01-01 RX ADMIN — ACETAMINOPHEN 975 MG: 325 TABLET, FILM COATED ORAL at 14:25

## 2023-01-01 RX ADMIN — FOLIC ACID 1 MG: 1 TABLET ORAL at 08:33

## 2023-01-01 RX ADMIN — CARVEDILOL 25 MG: 25 TABLET, FILM COATED ORAL at 20:13

## 2023-01-01 RX ADMIN — HYDROMORPHONE HYDROCHLORIDE 0.3 MG: 1 INJECTION, SOLUTION INTRAMUSCULAR; INTRAVENOUS; SUBCUTANEOUS at 08:15

## 2023-01-01 RX ADMIN — OXYCODONE HYDROCHLORIDE 10 MG: 5 TABLET ORAL at 07:03

## 2023-01-01 RX ADMIN — ONDANSETRON 4 MG: 2 INJECTION INTRAMUSCULAR; INTRAVENOUS at 08:34

## 2023-01-01 RX ADMIN — ONDANSETRON 4 MG: 4 TABLET, ORALLY DISINTEGRATING ORAL at 16:15

## 2023-01-01 RX ADMIN — GABAPENTIN 100 MG: 100 CAPSULE ORAL at 08:16

## 2023-01-01 RX ADMIN — GABAPENTIN 300 MG: 300 CAPSULE ORAL at 14:22

## 2023-01-01 RX ADMIN — GABAPENTIN 100 MG: 100 CAPSULE ORAL at 08:40

## 2023-01-01 RX ADMIN — CLONIDINE HYDROCHLORIDE 0.1 MG: 0.1 TABLET ORAL at 02:56

## 2023-01-01 RX ADMIN — AMLODIPINE BESYLATE 10 MG: 10 TABLET ORAL at 08:25

## 2023-01-01 RX ADMIN — SERTRALINE HYDROCHLORIDE 25 MG: 25 TABLET ORAL at 08:12

## 2023-01-01 RX ADMIN — HYDROMORPHONE HYDROCHLORIDE 0.2 MG: 0.2 INJECTION, SOLUTION INTRAMUSCULAR; INTRAVENOUS; SUBCUTANEOUS at 06:22

## 2023-01-01 RX ADMIN — FENTANYL CITRATE 50 MCG: 50 INJECTION INTRAMUSCULAR; INTRAVENOUS at 17:18

## 2023-01-01 RX ADMIN — CEFAZOLIN SODIUM 2 G: 2 INJECTION, SOLUTION INTRAVENOUS at 04:28

## 2023-01-01 RX ADMIN — GABAPENTIN 300 MG: 300 CAPSULE ORAL at 08:07

## 2023-01-01 RX ADMIN — SENNOSIDES AND DOCUSATE SODIUM 1 TABLET: 8.6; 5 TABLET ORAL at 19:47

## 2023-01-01 RX ADMIN — OXYCODONE HYDROCHLORIDE 10 MG: 5 TABLET ORAL at 05:39

## 2023-01-01 RX ADMIN — APIXABAN 5 MG: 5 TABLET, FILM COATED ORAL at 19:34

## 2023-01-01 RX ADMIN — CILOSTAZOL 100 MG: 100 TABLET ORAL at 20:59

## 2023-01-01 RX ADMIN — INFLUENZA A VIRUS A/VICTORIA/4897/2022 IVR-238 (H1N1) ANTIGEN (FORMALDEHYDE INACTIVATED), INFLUENZA A VIRUS A/DARWIN/9/2021 SAN-010 (H3N2) ANTIGEN (FORMALDEHYDE INACTIVATED), INFLUENZA B VIRUS B/PHUKET/3073/2013 ANTIGEN (FORMALDEHYDE INACTIVATED), AND INFLUENZA B VIRUS B/MICHIGAN/01/2021 ANTIGEN (FORMALDEHYDE INACTIVATED) 0.7 ML: 60; 60; 60; 60 INJECTION, SUSPENSION INTRAMUSCULAR at 10:59

## 2023-01-01 RX ADMIN — SODIUM CHLORIDE 500 ML: 9 INJECTION, SOLUTION INTRAVENOUS at 13:58

## 2023-01-01 RX ADMIN — OXYCODONE HYDROCHLORIDE 10 MG: 5 TABLET ORAL at 22:54

## 2023-01-01 RX ADMIN — FOLIC ACID 1 MG: 1 TABLET ORAL at 08:04

## 2023-01-01 RX ADMIN — SERTRALINE HYDROCHLORIDE 25 MG: 25 TABLET ORAL at 08:50

## 2023-01-01 RX ADMIN — TRAZODONE HYDROCHLORIDE 50 MG: 50 TABLET ORAL at 22:16

## 2023-01-01 RX ADMIN — SERTRALINE HYDROCHLORIDE 25 MG: 25 TABLET ORAL at 07:54

## 2023-01-01 RX ADMIN — METHOCARBAMOL 500 MG: 500 TABLET ORAL at 06:02

## 2023-01-01 RX ADMIN — TIZANIDINE 2 MG: 2 TABLET ORAL at 22:19

## 2023-01-01 RX ADMIN — LABETALOL HYDROCHLORIDE 20 MG: 5 INJECTION, SOLUTION INTRAVENOUS at 05:15

## 2023-01-01 RX ADMIN — ACETAMINOPHEN 975 MG: 325 TABLET, FILM COATED ORAL at 20:37

## 2023-01-01 RX ADMIN — CARVEDILOL 25 MG: 25 TABLET, FILM COATED ORAL at 08:51

## 2023-01-01 RX ADMIN — Medication 1 MG: at 22:20

## 2023-01-01 RX ADMIN — CEFEPIME HYDROCHLORIDE 2 G: 2 INJECTION, POWDER, FOR SOLUTION INTRAVENOUS at 06:00

## 2023-01-01 RX ADMIN — CEFAZOLIN SODIUM 2 G: 2 INJECTION, SOLUTION INTRAVENOUS at 06:11

## 2023-01-01 RX ADMIN — SENNOSIDES AND DOCUSATE SODIUM 1 TABLET: 8.6; 5 TABLET ORAL at 20:56

## 2023-01-01 RX ADMIN — PROPOFOL 100 MG: 10 INJECTION, EMULSION INTRAVENOUS at 08:02

## 2023-01-01 RX ADMIN — Medication 1 MG: at 22:04

## 2023-01-01 RX ADMIN — MIDAZOLAM 1 MG: 1 INJECTION INTRAMUSCULAR; INTRAVENOUS at 13:36

## 2023-01-01 RX ADMIN — POLYETHYLENE GLYCOL 3350 17 G: 17 POWDER, FOR SOLUTION ORAL at 08:20

## 2023-01-01 RX ADMIN — OXYCODONE HYDROCHLORIDE 10 MG: 10 TABLET ORAL at 09:27

## 2023-01-01 RX ADMIN — SUGAMMADEX 200 MG: 100 INJECTION, SOLUTION INTRAVENOUS at 10:43

## 2023-01-01 RX ADMIN — CEFEPIME HYDROCHLORIDE 2 G: 2 INJECTION, POWDER, FOR SOLUTION INTRAVENOUS at 21:36

## 2023-01-01 RX ADMIN — HYDROMORPHONE HYDROCHLORIDE 1 MG: 1 INJECTION, SOLUTION INTRAMUSCULAR; INTRAVENOUS; SUBCUTANEOUS at 00:58

## 2023-01-01 RX ADMIN — ACETAMINOPHEN 975 MG: 325 TABLET, FILM COATED ORAL at 08:24

## 2023-01-01 RX ADMIN — LOSARTAN POTASSIUM 50 MG: 50 TABLET, FILM COATED ORAL at 08:24

## 2023-01-01 RX ADMIN — HYDRALAZINE HYDROCHLORIDE 10 MG: 20 INJECTION INTRAMUSCULAR; INTRAVENOUS at 18:10

## 2023-01-01 RX ADMIN — CARVEDILOL 25 MG: 25 TABLET, FILM COATED ORAL at 08:17

## 2023-01-01 RX ADMIN — CARVEDILOL 25 MG: 25 TABLET, FILM COATED ORAL at 20:12

## 2023-01-01 RX ADMIN — ASPIRIN 81 MG: 81 TABLET ORAL at 09:04

## 2023-01-01 RX ADMIN — SENNOSIDES AND DOCUSATE SODIUM 1 TABLET: 8.6; 5 TABLET ORAL at 19:44

## 2023-01-01 RX ADMIN — HYDROMORPHONE HYDROCHLORIDE 0.2 MG: 0.2 INJECTION, SOLUTION INTRAMUSCULAR; INTRAVENOUS; SUBCUTANEOUS at 20:56

## 2023-01-01 RX ADMIN — LOSARTAN POTASSIUM 50 MG: 50 TABLET, FILM COATED ORAL at 08:56

## 2023-01-01 RX ADMIN — APIXABAN 5 MG: 5 TABLET, FILM COATED ORAL at 08:01

## 2023-01-01 RX ADMIN — PHENYLEPHRINE HYDROCHLORIDE 100 MCG: 10 INJECTION INTRAVENOUS at 09:22

## 2023-01-01 RX ADMIN — SENNOSIDES AND DOCUSATE SODIUM 2 TABLET: 50; 8.6 TABLET ORAL at 19:46

## 2023-01-01 RX ADMIN — GABAPENTIN 300 MG: 300 CAPSULE ORAL at 14:07

## 2023-01-01 RX ADMIN — FAMOTIDINE 10 MG: 10 TABLET, FILM COATED ORAL at 08:55

## 2023-01-01 RX ADMIN — CARVEDILOL 25 MG: 25 TABLET, FILM COATED ORAL at 21:20

## 2023-01-01 RX ADMIN — Medication 20 MG: at 08:52

## 2023-01-01 RX ADMIN — CILOSTAZOL 100 MG: 100 TABLET ORAL at 08:57

## 2023-01-01 RX ADMIN — MAGNESIUM SULFATE 4 G: 4 INJECTION INTRAVENOUS at 13:34

## 2023-01-01 RX ADMIN — CARVEDILOL 25 MG: 25 TABLET, FILM COATED ORAL at 20:09

## 2023-01-01 RX ADMIN — OXYCODONE HYDROCHLORIDE 10 MG: 5 TABLET ORAL at 07:55

## 2023-01-01 RX ADMIN — ACETAMINOPHEN 975 MG: 325 TABLET, FILM COATED ORAL at 05:44

## 2023-01-01 RX ADMIN — CLONIDINE HYDROCHLORIDE 0.1 MG: 0.1 TABLET ORAL at 17:38

## 2023-01-01 RX ADMIN — FAMOTIDINE 10 MG: 10 TABLET, FILM COATED ORAL at 20:08

## 2023-01-01 RX ADMIN — SENNOSIDES AND DOCUSATE SODIUM 1 TABLET: 8.6; 5 TABLET ORAL at 20:37

## 2023-01-01 RX ADMIN — SENNOSIDES AND DOCUSATE SODIUM 2 TABLET: 50; 8.6 TABLET ORAL at 08:12

## 2023-01-01 RX ADMIN — SERTRALINE HYDROCHLORIDE 25 MG: 25 TABLET ORAL at 08:06

## 2023-01-01 RX ADMIN — ASPIRIN 81 MG CHEWABLE TABLET 81 MG: 81 TABLET CHEWABLE at 08:49

## 2023-01-01 RX ADMIN — FAMOTIDINE 10 MG: 10 TABLET, FILM COATED ORAL at 20:12

## 2023-01-01 RX ADMIN — LOSARTAN POTASSIUM 50 MG: 50 TABLET, FILM COATED ORAL at 08:49

## 2023-01-01 RX ADMIN — APIXABAN 5 MG: 5 TABLET, FILM COATED ORAL at 19:17

## 2023-01-01 RX ADMIN — ENOXAPARIN SODIUM 70 MG: 80 INJECTION SUBCUTANEOUS at 19:26

## 2023-01-01 RX ADMIN — Medication 500 MG: at 11:22

## 2023-01-01 RX ADMIN — ATORVASTATIN CALCIUM 40 MG: 40 TABLET, FILM COATED ORAL at 08:58

## 2023-01-01 RX ADMIN — CEFAZOLIN SODIUM 2 G: 2 INJECTION, SOLUTION INTRAVENOUS at 00:34

## 2023-01-01 RX ADMIN — HYDROMORPHONE HYDROCHLORIDE 0.4 MG: 0.2 INJECTION, SOLUTION INTRAMUSCULAR; INTRAVENOUS; SUBCUTANEOUS at 04:12

## 2023-01-01 RX ADMIN — ENOXAPARIN SODIUM 70 MG: 80 INJECTION SUBCUTANEOUS at 08:45

## 2023-01-01 RX ADMIN — DONEPEZIL HYDROCHLORIDE 10 MG: 10 TABLET, FILM COATED ORAL at 22:30

## 2023-01-01 RX ADMIN — PANTOPRAZOLE SODIUM 40 MG: 40 TABLET, DELAYED RELEASE ORAL at 08:42

## 2023-01-01 RX ADMIN — ATORVASTATIN CALCIUM 40 MG: 40 TABLET, FILM COATED ORAL at 07:42

## 2023-01-01 RX ADMIN — INSULIN ASPART 1 UNITS: 100 INJECTION, SOLUTION INTRAVENOUS; SUBCUTANEOUS at 11:40

## 2023-01-01 RX ADMIN — OXYCODONE HYDROCHLORIDE 10 MG: 5 TABLET ORAL at 18:01

## 2023-01-01 RX ADMIN — HYDRALAZINE HYDROCHLORIDE 10 MG: 20 INJECTION INTRAMUSCULAR; INTRAVENOUS at 19:29

## 2023-01-01 RX ADMIN — ATORVASTATIN CALCIUM 40 MG: 40 TABLET, FILM COATED ORAL at 09:27

## 2023-01-01 RX ADMIN — CARVEDILOL 12.5 MG: 12.5 TABLET, FILM COATED ORAL at 21:39

## 2023-01-01 RX ADMIN — FAMOTIDINE 10 MG: 10 TABLET, FILM COATED ORAL at 20:17

## 2023-01-01 RX ADMIN — CARVEDILOL 25 MG: 25 TABLET, FILM COATED ORAL at 08:26

## 2023-01-01 RX ADMIN — PANTOPRAZOLE SODIUM 40 MG: 40 TABLET, DELAYED RELEASE ORAL at 09:28

## 2023-01-01 RX ADMIN — ACETAMINOPHEN 975 MG: 325 TABLET, FILM COATED ORAL at 08:33

## 2023-01-01 RX ADMIN — FOLIC ACID 1 MG: 1 TABLET ORAL at 08:17

## 2023-01-01 RX ADMIN — Medication 30 MG: at 09:30

## 2023-01-01 RX ADMIN — TRAZODONE HYDROCHLORIDE 50 MG: 50 TABLET ORAL at 22:09

## 2023-01-01 RX ADMIN — OXYCODONE HYDROCHLORIDE 10 MG: 5 TABLET ORAL at 03:10

## 2023-01-01 RX ADMIN — CARVEDILOL 25 MG: 25 TABLET, FILM COATED ORAL at 09:37

## 2023-01-01 RX ADMIN — ISOSORBIDE MONONITRATE 60 MG: 30 TABLET, EXTENDED RELEASE ORAL at 07:37

## 2023-01-01 RX ADMIN — OXYCODONE HYDROCHLORIDE 10 MG: 5 TABLET ORAL at 11:02

## 2023-01-01 RX ADMIN — Medication 20 MG: at 10:14

## 2023-01-01 RX ADMIN — OXYCODONE HYDROCHLORIDE 5 MG: 5 TABLET ORAL at 12:54

## 2023-01-01 RX ADMIN — ENOXAPARIN SODIUM 70 MG: 80 INJECTION SUBCUTANEOUS at 21:04

## 2023-01-01 RX ADMIN — HYDROMORPHONE HYDROCHLORIDE 0.5 MG: 1 INJECTION, SOLUTION INTRAMUSCULAR; INTRAVENOUS; SUBCUTANEOUS at 23:52

## 2023-01-01 RX ADMIN — SODIUM CHLORIDE, POTASSIUM CHLORIDE, SODIUM LACTATE AND CALCIUM CHLORIDE: 600; 310; 30; 20 INJECTION, SOLUTION INTRAVENOUS at 21:45

## 2023-01-01 RX ADMIN — VANCOMYCIN HYDROCHLORIDE 750 MG: 1 INJECTION, POWDER, LYOPHILIZED, FOR SOLUTION INTRAVENOUS at 23:37

## 2023-01-01 RX ADMIN — CLONIDINE HYDROCHLORIDE 0.1 MG: 0.1 TABLET ORAL at 11:53

## 2023-01-01 RX ADMIN — MAGNESIUM SULFATE IN WATER 2 G: 40 INJECTION, SOLUTION INTRAVENOUS at 08:47

## 2023-01-01 RX ADMIN — APIXABAN 5 MG: 5 TABLET, FILM COATED ORAL at 19:41

## 2023-01-01 RX ADMIN — OXYMETAZOLINE HYDROCHLORIDE 2 SPRAY: 0.05 SPRAY NASAL at 20:12

## 2023-01-01 RX ADMIN — TIZANIDINE 2 MG: 2 TABLET ORAL at 05:22

## 2023-01-01 RX ADMIN — ACETAMINOPHEN 975 MG: 325 TABLET, FILM COATED ORAL at 13:17

## 2023-01-01 RX ADMIN — HYDROMORPHONE HYDROCHLORIDE 0.3 MG: 1 INJECTION, SOLUTION INTRAMUSCULAR; INTRAVENOUS; SUBCUTANEOUS at 06:25

## 2023-01-01 RX ADMIN — Medication 1 TABLET: at 09:27

## 2023-01-01 RX ADMIN — AMLODIPINE BESYLATE 10 MG: 10 TABLET ORAL at 09:01

## 2023-01-01 RX ADMIN — GABAPENTIN 300 MG: 300 CAPSULE ORAL at 13:59

## 2023-01-01 RX ADMIN — METHOCARBAMOL 500 MG: 500 TABLET ORAL at 00:51

## 2023-01-01 RX ADMIN — PIPERACILLIN AND TAZOBACTAM 3.38 G: 3; .375 INJECTION, POWDER, LYOPHILIZED, FOR SOLUTION INTRAVENOUS at 04:07

## 2023-01-01 RX ADMIN — ACETAMINOPHEN 975 MG: 325 TABLET, FILM COATED ORAL at 02:03

## 2023-01-01 RX ADMIN — GABAPENTIN 100 MG: 100 CAPSULE ORAL at 20:34

## 2023-01-01 RX ADMIN — ISOSORBIDE MONONITRATE 60 MG: 30 TABLET, EXTENDED RELEASE ORAL at 08:42

## 2023-01-01 RX ADMIN — GABAPENTIN 300 MG: 300 CAPSULE ORAL at 20:42

## 2023-01-01 RX ADMIN — KETOROLAC TROMETHAMINE 15 MG: 15 INJECTION, SOLUTION INTRAMUSCULAR; INTRAVENOUS at 12:26

## 2023-01-01 RX ADMIN — AMLODIPINE BESYLATE 10 MG: 10 TABLET ORAL at 08:07

## 2023-01-01 RX ADMIN — HYDROMORPHONE HYDROCHLORIDE 0.2 MG: 0.2 INJECTION, SOLUTION INTRAMUSCULAR; INTRAVENOUS; SUBCUTANEOUS at 11:27

## 2023-01-01 RX ADMIN — ACETAMINOPHEN 975 MG: 325 TABLET, FILM COATED ORAL at 14:06

## 2023-01-01 RX ADMIN — CEFAZOLIN SODIUM 2 G: 2 INJECTION, SOLUTION INTRAVENOUS at 11:52

## 2023-01-01 RX ADMIN — FENTANYL CITRATE 25 MCG: 50 INJECTION, SOLUTION INTRAMUSCULAR; INTRAVENOUS at 14:33

## 2023-01-01 RX ADMIN — CEFAZOLIN SODIUM 2 G: 2 INJECTION, SOLUTION INTRAVENOUS at 05:55

## 2023-01-01 RX ADMIN — HYDRALAZINE HYDROCHLORIDE 10 MG: 20 INJECTION INTRAMUSCULAR; INTRAVENOUS at 09:15

## 2023-01-01 RX ADMIN — ASPIRIN 81 MG: 81 TABLET, COATED ORAL at 07:40

## 2023-01-01 RX ADMIN — CILOSTAZOL 100 MG: 100 TABLET ORAL at 21:37

## 2023-01-01 RX ADMIN — LOSARTAN POTASSIUM 100 MG: 100 TABLET, FILM COATED ORAL at 08:04

## 2023-01-01 RX ADMIN — GABAPENTIN 100 MG: 100 CAPSULE ORAL at 07:41

## 2023-01-01 RX ADMIN — ACETAMINOPHEN 975 MG: 325 TABLET, FILM COATED ORAL at 12:56

## 2023-01-01 RX ADMIN — SERTRALINE HYDROCHLORIDE 25 MG: 25 TABLET ORAL at 17:45

## 2023-01-01 RX ADMIN — METOCLOPRAMIDE 10 MG: 5 INJECTION, SOLUTION INTRAMUSCULAR; INTRAVENOUS at 11:02

## 2023-01-01 RX ADMIN — Medication 500 MG: at 18:47

## 2023-01-01 RX ADMIN — OXYCODONE HYDROCHLORIDE 10 MG: 5 TABLET ORAL at 02:29

## 2023-01-01 RX ADMIN — CARVEDILOL 25 MG: 25 TABLET, FILM COATED ORAL at 08:25

## 2023-01-01 RX ADMIN — PHENYLEPHRINE HYDROCHLORIDE 100 MCG: 10 INJECTION INTRAVENOUS at 10:03

## 2023-01-01 RX ADMIN — OXYCODONE HYDROCHLORIDE 10 MG: 10 TABLET ORAL at 22:19

## 2023-01-01 RX ADMIN — TRAZODONE HYDROCHLORIDE 50 MG: 50 TABLET ORAL at 20:58

## 2023-01-01 RX ADMIN — KETOROLAC TROMETHAMINE 15 MG: 15 INJECTION, SOLUTION INTRAMUSCULAR; INTRAVENOUS at 04:25

## 2023-01-01 RX ADMIN — ASPIRIN 81 MG CHEWABLE TABLET 81 MG: 81 TABLET CHEWABLE at 08:16

## 2023-01-01 RX ADMIN — HYDRALAZINE HYDROCHLORIDE 10 MG: 20 INJECTION INTRAMUSCULAR; INTRAVENOUS at 05:11

## 2023-01-01 RX ADMIN — ACETAMINOPHEN 975 MG: 325 TABLET, FILM COATED ORAL at 22:48

## 2023-01-01 RX ADMIN — CARVEDILOL 25 MG: 25 TABLET, FILM COATED ORAL at 19:34

## 2023-01-01 RX ADMIN — ACETAMINOPHEN 975 MG: 325 TABLET, FILM COATED ORAL at 06:06

## 2023-01-01 RX ADMIN — OXYCODONE HYDROCHLORIDE 10 MG: 5 TABLET ORAL at 10:17

## 2023-01-01 RX ADMIN — ISOSORBIDE MONONITRATE 120 MG: 60 TABLET, EXTENDED RELEASE ORAL at 05:48

## 2023-01-01 RX ADMIN — ENOXAPARIN SODIUM 70 MG: 80 INJECTION SUBCUTANEOUS at 19:44

## 2023-01-01 RX ADMIN — PANTOPRAZOLE SODIUM 40 MG: 40 TABLET, DELAYED RELEASE ORAL at 08:34

## 2023-01-01 RX ADMIN — VANCOMYCIN HYDROCHLORIDE 750 MG: 1 INJECTION, POWDER, LYOPHILIZED, FOR SOLUTION INTRAVENOUS at 16:19

## 2023-01-01 RX ADMIN — ASPIRIN 81 MG: 81 TABLET ORAL at 07:42

## 2023-01-01 RX ADMIN — CARVEDILOL 25 MG: 25 TABLET, FILM COATED ORAL at 08:05

## 2023-01-01 RX ADMIN — CEFAZOLIN SODIUM 2 G: 2 INJECTION, SOLUTION INTRAVENOUS at 11:01

## 2023-01-01 RX ADMIN — CARVEDILOL 25 MG: 25 TABLET, FILM COATED ORAL at 20:38

## 2023-01-01 RX ADMIN — GABAPENTIN 100 MG: 100 CAPSULE ORAL at 14:09

## 2023-01-01 RX ADMIN — Medication 1 TABLET: at 09:37

## 2023-01-01 RX ADMIN — CEFAZOLIN SODIUM 2 G: 2 INJECTION, SOLUTION INTRAVENOUS at 00:04

## 2023-01-01 RX ADMIN — HYDROMORPHONE HYDROCHLORIDE 0.3 MG: 1 INJECTION, SOLUTION INTRAMUSCULAR; INTRAVENOUS; SUBCUTANEOUS at 20:44

## 2023-01-01 RX ADMIN — ACETAMINOPHEN 975 MG: 325 TABLET, FILM COATED ORAL at 13:55

## 2023-01-01 RX ADMIN — ASPIRIN 81 MG CHEWABLE TABLET 81 MG: 81 TABLET CHEWABLE at 08:57

## 2023-01-01 RX ADMIN — KETOROLAC TROMETHAMINE 15 MG: 15 INJECTION, SOLUTION INTRAMUSCULAR; INTRAVENOUS at 03:46

## 2023-01-01 RX ADMIN — THERA TABS 1 TABLET: TAB at 07:59

## 2023-01-01 RX ADMIN — ONDANSETRON 4 MG: 2 INJECTION INTRAMUSCULAR; INTRAVENOUS at 12:56

## 2023-01-01 RX ADMIN — ACETAMINOPHEN 975 MG: 325 TABLET, FILM COATED ORAL at 08:04

## 2023-01-01 RX ADMIN — CARVEDILOL 12.5 MG: 12.5 TABLET, FILM COATED ORAL at 09:01

## 2023-01-01 RX ADMIN — CILOSTAZOL 100 MG: 100 TABLET ORAL at 19:44

## 2023-01-01 RX ADMIN — HYDROMORPHONE HYDROCHLORIDE 0.3 MG: 1 INJECTION, SOLUTION INTRAMUSCULAR; INTRAVENOUS; SUBCUTANEOUS at 23:45

## 2023-01-01 RX ADMIN — CARVEDILOL 25 MG: 25 TABLET, FILM COATED ORAL at 09:36

## 2023-01-01 RX ADMIN — PANTOPRAZOLE SODIUM 40 MG: 40 TABLET, DELAYED RELEASE ORAL at 08:19

## 2023-01-01 RX ADMIN — GABAPENTIN 300 MG: 300 CAPSULE ORAL at 14:17

## 2023-01-01 RX ADMIN — FENTANYL CITRATE 25 MCG: 50 INJECTION, SOLUTION INTRAMUSCULAR; INTRAVENOUS at 14:57

## 2023-01-01 RX ADMIN — THERA TABS 1 TABLET: TAB at 08:05

## 2023-01-01 RX ADMIN — GABAPENTIN 300 MG: 300 CAPSULE ORAL at 21:25

## 2023-01-01 RX ADMIN — FOLIC ACID 1 MG: 1 TABLET ORAL at 07:55

## 2023-01-01 RX ADMIN — OXYCODONE HYDROCHLORIDE 10 MG: 5 TABLET ORAL at 12:41

## 2023-01-01 RX ADMIN — LOSARTAN POTASSIUM 100 MG: 100 TABLET, FILM COATED ORAL at 08:05

## 2023-01-01 RX ADMIN — SERTRALINE HYDROCHLORIDE 25 MG: 25 TABLET ORAL at 08:36

## 2023-01-01 RX ADMIN — ACETAMINOPHEN 975 MG: 325 TABLET, FILM COATED ORAL at 14:02

## 2023-01-01 RX ADMIN — ENOXAPARIN SODIUM 70 MG: 80 INJECTION SUBCUTANEOUS at 08:57

## 2023-01-01 RX ADMIN — CARVEDILOL 25 MG: 25 TABLET, FILM COATED ORAL at 08:41

## 2023-01-01 RX ADMIN — ACETAMINOPHEN 975 MG: 325 TABLET, FILM COATED ORAL at 20:42

## 2023-01-01 RX ADMIN — GABAPENTIN 100 MG: 100 CAPSULE ORAL at 20:54

## 2023-01-01 RX ADMIN — Medication 100 MCG: at 18:42

## 2023-01-01 RX ADMIN — CLONIDINE HYDROCHLORIDE 0.1 MG: 0.1 TABLET ORAL at 18:08

## 2023-01-01 RX ADMIN — PIPERACILLIN AND TAZOBACTAM 3.38 G: 3; .375 INJECTION, POWDER, LYOPHILIZED, FOR SOLUTION INTRAVENOUS at 22:45

## 2023-01-01 RX ADMIN — TRAZODONE HYDROCHLORIDE 50 MG: 50 TABLET ORAL at 22:06

## 2023-01-01 RX ADMIN — ATORVASTATIN CALCIUM 40 MG: 40 TABLET, FILM COATED ORAL at 08:25

## 2023-01-01 RX ADMIN — ATORVASTATIN CALCIUM 40 MG: 40 TABLET, FILM COATED ORAL at 08:17

## 2023-01-01 RX ADMIN — CILOSTAZOL 100 MG: 100 TABLET ORAL at 08:07

## 2023-01-01 RX ADMIN — ACETAMINOPHEN 975 MG: 325 TABLET, FILM COATED ORAL at 20:15

## 2023-01-01 RX ADMIN — AMLODIPINE BESYLATE 10 MG: 10 TABLET ORAL at 09:27

## 2023-01-01 RX ADMIN — ATORVASTATIN CALCIUM 40 MG: 40 TABLET, FILM COATED ORAL at 08:07

## 2023-01-01 RX ADMIN — SENNOSIDES AND DOCUSATE SODIUM 1 TABLET: 8.6; 5 TABLET ORAL at 08:34

## 2023-01-01 RX ADMIN — ISOSORBIDE MONONITRATE 60 MG: 30 TABLET, EXTENDED RELEASE ORAL at 08:20

## 2023-01-01 RX ADMIN — ASPIRIN 81 MG: 81 TABLET, COATED ORAL at 07:59

## 2023-01-01 RX ADMIN — ENOXAPARIN SODIUM 70 MG: 80 INJECTION SUBCUTANEOUS at 20:38

## 2023-01-01 RX ADMIN — VANCOMYCIN HYDROCHLORIDE 750 MG: 1 INJECTION, POWDER, LYOPHILIZED, FOR SOLUTION INTRAVENOUS at 11:10

## 2023-01-01 RX ADMIN — Medication 1 TABLET: at 10:03

## 2023-01-01 RX ADMIN — CEFEPIME HYDROCHLORIDE 2 G: 2 INJECTION, POWDER, FOR SOLUTION INTRAVENOUS at 21:33

## 2023-01-01 RX ADMIN — ASPIRIN 81 MG: 81 TABLET, COATED ORAL at 08:02

## 2023-01-01 RX ADMIN — ACETAMINOPHEN 975 MG: 325 TABLET, FILM COATED ORAL at 18:30

## 2023-01-01 RX ADMIN — CARVEDILOL 25 MG: 25 TABLET, FILM COATED ORAL at 20:16

## 2023-01-01 RX ADMIN — ASPIRIN 81 MG CHEWABLE TABLET 81 MG: 81 TABLET CHEWABLE at 09:01

## 2023-01-01 RX ADMIN — APIXABAN 5 MG: 5 TABLET, FILM COATED ORAL at 20:12

## 2023-01-01 RX ADMIN — GABAPENTIN 100 MG: 100 CAPSULE ORAL at 19:49

## 2023-01-01 RX ADMIN — METFORMIN ER 500 MG 500 MG: 500 TABLET ORAL at 18:56

## 2023-01-01 RX ADMIN — POLYETHYLENE GLYCOL 3350 17 G: 17 POWDER, FOR SOLUTION ORAL at 08:15

## 2023-01-01 RX ADMIN — KETOROLAC TROMETHAMINE 15 MG: 30 INJECTION, SOLUTION INTRAMUSCULAR; INTRAVENOUS at 16:00

## 2023-01-01 RX ADMIN — CEFAZOLIN SODIUM 2 G: 2 INJECTION, SOLUTION INTRAVENOUS at 09:48

## 2023-01-01 RX ADMIN — ACETAMINOPHEN 975 MG: 325 TABLET, FILM COATED ORAL at 06:19

## 2023-01-01 RX ADMIN — ASPIRIN 81 MG CHEWABLE TABLET 81 MG: 81 TABLET CHEWABLE at 08:39

## 2023-01-01 RX ADMIN — ISOSORBIDE MONONITRATE 120 MG: 60 TABLET, EXTENDED RELEASE ORAL at 09:05

## 2023-01-01 RX ADMIN — FENTANYL CITRATE 100 MCG: 50 INJECTION INTRAMUSCULAR; INTRAVENOUS at 15:30

## 2023-01-01 RX ADMIN — APIXABAN 5 MG: 5 TABLET, FILM COATED ORAL at 10:03

## 2023-01-01 RX ADMIN — PHENYLEPHRINE HYDROCHLORIDE 0.5 MCG/KG/MIN: 10 INJECTION INTRAVENOUS at 09:01

## 2023-01-01 RX ADMIN — CEFAZOLIN SODIUM 2 G: 2 INJECTION, SOLUTION INTRAVENOUS at 05:24

## 2023-01-01 RX ADMIN — CEFAZOLIN SODIUM 2 G: 2 INJECTION, SOLUTION INTRAVENOUS at 02:08

## 2023-01-01 RX ADMIN — PHENYLEPHRINE HYDROCHLORIDE 200 MCG: 10 INJECTION INTRAVENOUS at 16:03

## 2023-01-01 RX ADMIN — CLONIDINE HYDROCHLORIDE 0.1 MG: 0.1 TABLET ORAL at 02:38

## 2023-01-01 RX ADMIN — ATORVASTATIN CALCIUM 40 MG: 40 TABLET, FILM COATED ORAL at 07:40

## 2023-01-01 RX ADMIN — ASPIRIN 81 MG: 81 TABLET ORAL at 08:45

## 2023-01-01 RX ADMIN — SENNOSIDES AND DOCUSATE SODIUM 1 TABLET: 8.6; 5 TABLET ORAL at 21:20

## 2023-01-01 RX ADMIN — ATORVASTATIN CALCIUM 40 MG: 40 TABLET, FILM COATED ORAL at 08:50

## 2023-01-01 RX ADMIN — ACETAMINOPHEN 975 MG: 325 TABLET, FILM COATED ORAL at 20:32

## 2023-01-01 RX ADMIN — SERTRALINE HYDROCHLORIDE 25 MG: 25 TABLET ORAL at 08:49

## 2023-01-01 RX ADMIN — OXYCODONE HYDROCHLORIDE 10 MG: 5 TABLET ORAL at 10:09

## 2023-01-01 RX ADMIN — CILOSTAZOL 100 MG: 100 TABLET ORAL at 08:25

## 2023-01-01 RX ADMIN — GABAPENTIN 300 MG: 300 CAPSULE ORAL at 08:26

## 2023-01-01 RX ADMIN — SPIRONOLACTONE 25 MG: 25 TABLET ORAL at 07:36

## 2023-01-01 RX ADMIN — SENNOSIDES AND DOCUSATE SODIUM 1 TABLET: 8.6; 5 TABLET ORAL at 20:15

## 2023-01-01 RX ADMIN — INSULIN ASPART 2 UNITS: 100 INJECTION, SOLUTION INTRAVENOUS; SUBCUTANEOUS at 12:38

## 2023-01-01 RX ADMIN — SERTRALINE HYDROCHLORIDE 25 MG: 25 TABLET ORAL at 09:02

## 2023-01-01 RX ADMIN — ACETAMINOPHEN 975 MG: 325 TABLET, FILM COATED ORAL at 22:04

## 2023-01-01 RX ADMIN — KETOROLAC TROMETHAMINE 15 MG: 30 INJECTION, SOLUTION INTRAMUSCULAR; INTRAVENOUS at 03:59

## 2023-01-01 RX ADMIN — APIXABAN 5 MG: 5 TABLET, FILM COATED ORAL at 08:04

## 2023-01-01 RX ADMIN — SENNOSIDES AND DOCUSATE SODIUM 1 TABLET: 8.6; 5 TABLET ORAL at 20:34

## 2023-01-01 RX ADMIN — CILOSTAZOL 100 MG: 100 TABLET ORAL at 20:25

## 2023-01-01 RX ADMIN — TIZANIDINE 2 MG: 2 TABLET ORAL at 22:08

## 2023-01-01 RX ADMIN — Medication 1 TABLET: at 12:33

## 2023-01-01 RX ADMIN — CILOSTAZOL 100 MG: 100 TABLET ORAL at 08:52

## 2023-01-01 RX ADMIN — Medication 50 MCG: at 08:42

## 2023-01-01 RX ADMIN — ATORVASTATIN CALCIUM 40 MG: 40 TABLET, FILM COATED ORAL at 08:18

## 2023-01-01 RX ADMIN — ACETAMINOPHEN 975 MG: 325 TABLET, FILM COATED ORAL at 14:22

## 2023-01-01 RX ADMIN — TRAZODONE HYDROCHLORIDE 50 MG: 50 TABLET ORAL at 21:21

## 2023-01-01 RX ADMIN — OXYCODONE HYDROCHLORIDE 10 MG: 5 TABLET ORAL at 17:36

## 2023-01-01 RX ADMIN — ACETAMINOPHEN 975 MG: 325 TABLET, FILM COATED ORAL at 05:57

## 2023-01-01 RX ADMIN — TRAZODONE HYDROCHLORIDE 50 MG: 50 TABLET ORAL at 21:58

## 2023-01-01 RX ADMIN — ACETAMINOPHEN 975 MG: 325 TABLET, FILM COATED ORAL at 14:17

## 2023-01-01 RX ADMIN — OXYCODONE HYDROCHLORIDE 5 MG: 5 TABLET ORAL at 15:33

## 2023-01-01 RX ADMIN — Medication 30 MG: at 08:23

## 2023-01-01 RX ADMIN — REGADENOSON 0.4 MG: 0.08 INJECTION, SOLUTION INTRAVENOUS at 13:40

## 2023-01-01 RX ADMIN — CEFAZOLIN SODIUM 2 G: 2 INJECTION, SOLUTION INTRAVENOUS at 16:20

## 2023-01-01 RX ADMIN — ONDANSETRON 4 MG: 4 TABLET, ORALLY DISINTEGRATING ORAL at 13:49

## 2023-01-01 RX ADMIN — CEFAZOLIN SODIUM 2 G: 2 INJECTION, SOLUTION INTRAVENOUS at 17:38

## 2023-01-01 RX ADMIN — ACETAMINOPHEN 975 MG: 325 TABLET, FILM COATED ORAL at 08:56

## 2023-01-01 RX ADMIN — ASPIRIN 81 MG CHEWABLE TABLET 81 MG: 81 TABLET CHEWABLE at 09:43

## 2023-01-01 RX ADMIN — FAMOTIDINE 10 MG: 10 TABLET, FILM COATED ORAL at 10:03

## 2023-01-01 RX ADMIN — VANCOMYCIN HYDROCHLORIDE 750 MG: 1 INJECTION, POWDER, LYOPHILIZED, FOR SOLUTION INTRAVENOUS at 08:31

## 2023-01-01 RX ADMIN — ATORVASTATIN CALCIUM 40 MG: 40 TABLET, FILM COATED ORAL at 08:26

## 2023-01-01 RX ADMIN — ACETAMINOPHEN 975 MG: 325 TABLET, FILM COATED ORAL at 22:05

## 2023-01-01 RX ADMIN — LOSARTAN POTASSIUM 100 MG: 100 TABLET, FILM COATED ORAL at 07:36

## 2023-01-01 RX ADMIN — CEFAZOLIN SODIUM 2 G: 2 INJECTION, SOLUTION INTRAVENOUS at 17:39

## 2023-01-01 RX ADMIN — EPHEDRINE SULFATE 10 MG: 5 INJECTION INTRAVENOUS at 10:01

## 2023-01-01 RX ADMIN — FAMOTIDINE 10 MG: 10 TABLET, FILM COATED ORAL at 08:25

## 2023-01-01 RX ADMIN — OXYCODONE HYDROCHLORIDE 10 MG: 10 TABLET ORAL at 08:20

## 2023-01-01 RX ADMIN — SERTRALINE HYDROCHLORIDE 25 MG: 25 TABLET ORAL at 08:25

## 2023-01-01 RX ADMIN — OXYCODONE HYDROCHLORIDE 10 MG: 5 TABLET ORAL at 17:46

## 2023-01-01 RX ADMIN — HYDROMORPHONE HYDROCHLORIDE 0.3 MG: 1 INJECTION, SOLUTION INTRAMUSCULAR; INTRAVENOUS; SUBCUTANEOUS at 21:35

## 2023-01-01 RX ADMIN — CARVEDILOL 25 MG: 25 TABLET, FILM COATED ORAL at 08:57

## 2023-01-01 RX ADMIN — MAGNESIUM SULFATE IN WATER 2 G: 40 INJECTION, SOLUTION INTRAVENOUS at 08:00

## 2023-01-01 RX ADMIN — LOSARTAN POTASSIUM 50 MG: 50 TABLET, FILM COATED ORAL at 07:55

## 2023-01-01 RX ADMIN — ISOSORBIDE MONONITRATE 120 MG: 60 TABLET, EXTENDED RELEASE ORAL at 07:41

## 2023-01-01 RX ADMIN — CEFAZOLIN SODIUM 2 G: 2 INJECTION, SOLUTION INTRAVENOUS at 12:18

## 2023-01-01 RX ADMIN — ENOXAPARIN SODIUM 70 MG: 80 INJECTION SUBCUTANEOUS at 21:06

## 2023-01-01 RX ADMIN — SERTRALINE HYDROCHLORIDE 25 MG: 25 TABLET ORAL at 08:00

## 2023-01-01 RX ADMIN — GABAPENTIN 300 MG: 300 CAPSULE ORAL at 15:16

## 2023-01-01 RX ADMIN — CEFAZOLIN SODIUM 2 G: 2 INJECTION, SOLUTION INTRAVENOUS at 17:30

## 2023-01-01 RX ADMIN — CEFAZOLIN SODIUM 2 G: 2 INJECTION, SOLUTION INTRAVENOUS at 03:54

## 2023-01-01 RX ADMIN — ACETAMINOPHEN 975 MG: 325 TABLET, FILM COATED ORAL at 14:16

## 2023-01-01 RX ADMIN — FAMOTIDINE 10 MG: 10 TABLET, FILM COATED ORAL at 08:17

## 2023-01-01 RX ADMIN — HYDROMORPHONE HYDROCHLORIDE 0.4 MG: 0.2 INJECTION, SOLUTION INTRAMUSCULAR; INTRAVENOUS; SUBCUTANEOUS at 17:55

## 2023-01-01 RX ADMIN — APIXABAN 5 MG: 5 TABLET, FILM COATED ORAL at 08:57

## 2023-01-01 RX ADMIN — FAMOTIDINE 10 MG: 10 TABLET, FILM COATED ORAL at 21:20

## 2023-01-01 RX ADMIN — Medication 1 TABLET: at 12:56

## 2023-01-01 RX ADMIN — SODIUM CHLORIDE, POTASSIUM CHLORIDE, SODIUM LACTATE AND CALCIUM CHLORIDE: 600; 310; 30; 20 INJECTION, SOLUTION INTRAVENOUS at 07:46

## 2023-01-01 RX ADMIN — LOSARTAN POTASSIUM 100 MG: 100 TABLET, FILM COATED ORAL at 08:50

## 2023-01-01 RX ADMIN — CILOSTAZOL 100 MG: 100 TABLET ORAL at 07:56

## 2023-01-01 RX ADMIN — SODIUM CHLORIDE, POTASSIUM CHLORIDE, SODIUM LACTATE AND CALCIUM CHLORIDE: 600; 310; 30; 20 INJECTION, SOLUTION INTRAVENOUS at 14:21

## 2023-01-01 RX ADMIN — GADOBUTROL 6 ML: 604.72 INJECTION INTRAVENOUS at 01:57

## 2023-01-01 RX ADMIN — CEFAZOLIN SODIUM 2 G: 2 INJECTION, SOLUTION INTRAVENOUS at 09:06

## 2023-01-01 RX ADMIN — SENNOSIDES AND DOCUSATE SODIUM 1 TABLET: 8.6; 5 TABLET ORAL at 08:02

## 2023-01-01 RX ADMIN — THERA TABS 1 TABLET: TAB at 08:42

## 2023-01-01 RX ADMIN — ACETAMINOPHEN 975 MG: 325 TABLET, FILM COATED ORAL at 14:42

## 2023-01-01 RX ADMIN — TIZANIDINE 2 MG: 2 TABLET ORAL at 13:12

## 2023-01-01 RX ADMIN — ISOSORBIDE MONONITRATE 30 MG: 30 TABLET, EXTENDED RELEASE ORAL at 10:13

## 2023-01-01 RX ADMIN — SERTRALINE HYDROCHLORIDE 25 MG: 25 TABLET ORAL at 08:41

## 2023-01-01 RX ADMIN — CLONIDINE HYDROCHLORIDE 0.1 MG: 0.1 TABLET ORAL at 10:46

## 2023-01-01 RX ADMIN — HYDRALAZINE HYDROCHLORIDE 10 MG: 20 INJECTION INTRAMUSCULAR; INTRAVENOUS at 08:38

## 2023-01-01 RX ADMIN — SENNOSIDES AND DOCUSATE SODIUM 1 TABLET: 8.6; 5 TABLET ORAL at 21:25

## 2023-01-01 RX ADMIN — PANTOPRAZOLE SODIUM 40 MG: 40 TABLET, DELAYED RELEASE ORAL at 09:49

## 2023-01-01 RX ADMIN — HYDROMORPHONE HYDROCHLORIDE 0.2 MG: 0.2 INJECTION, SOLUTION INTRAMUSCULAR; INTRAVENOUS; SUBCUTANEOUS at 08:59

## 2023-01-01 RX ADMIN — CILOSTAZOL 100 MG: 100 TABLET ORAL at 08:06

## 2023-01-01 RX ADMIN — CILOSTAZOL 100 MG: 100 TABLET ORAL at 07:35

## 2023-01-01 RX ADMIN — DONEPEZIL HYDROCHLORIDE 10 MG: 10 TABLET, FILM COATED ORAL at 21:44

## 2023-01-01 RX ADMIN — HYDROXYZINE HYDROCHLORIDE 25 MG: 25 TABLET, FILM COATED ORAL at 01:22

## 2023-01-01 RX ADMIN — ONDANSETRON 4 MG: 2 INJECTION INTRAMUSCULAR; INTRAVENOUS at 16:54

## 2023-01-01 RX ADMIN — TIZANIDINE 2 MG: 2 TABLET ORAL at 07:35

## 2023-01-01 RX ADMIN — DONEPEZIL HYDROCHLORIDE 10 MG: 10 TABLET, FILM COATED ORAL at 22:45

## 2023-01-01 RX ADMIN — CLONIDINE HYDROCHLORIDE 0.1 MG: 0.1 TABLET ORAL at 09:30

## 2023-01-01 RX ADMIN — MAGNESIUM SULFATE IN WATER 4 G: 40 INJECTION, SOLUTION INTRAVENOUS at 08:28

## 2023-01-01 RX ADMIN — SENNOSIDES AND DOCUSATE SODIUM 1 TABLET: 8.6; 5 TABLET ORAL at 09:01

## 2023-01-01 RX ADMIN — ISOSORBIDE MONONITRATE 60 MG: 30 TABLET, EXTENDED RELEASE ORAL at 08:23

## 2023-01-01 RX ADMIN — FOLIC ACID 1 MG: 1 TABLET ORAL at 09:27

## 2023-01-01 RX ADMIN — OXYCODONE HYDROCHLORIDE 10 MG: 5 TABLET ORAL at 00:44

## 2023-01-01 RX ADMIN — OXYCODONE HYDROCHLORIDE 10 MG: 10 TABLET ORAL at 18:47

## 2023-01-01 RX ADMIN — CLONIDINE HYDROCHLORIDE 0.1 MG: 0.1 TABLET ORAL at 02:50

## 2023-01-01 RX ADMIN — CEFAZOLIN 2 G: 1 INJECTION, POWDER, FOR SOLUTION INTRAMUSCULAR; INTRAVENOUS at 12:52

## 2023-01-01 RX ADMIN — THERA TABS 1 TABLET: TAB at 08:26

## 2023-01-01 RX ADMIN — ACETAMINOPHEN 975 MG: 325 TABLET, FILM COATED ORAL at 06:01

## 2023-01-01 RX ADMIN — CILOSTAZOL 100 MG: 100 TABLET ORAL at 09:35

## 2023-01-01 RX ADMIN — CARVEDILOL 25 MG: 25 TABLET, FILM COATED ORAL at 19:11

## 2023-01-01 RX ADMIN — OXYCODONE HYDROCHLORIDE 10 MG: 5 TABLET ORAL at 19:16

## 2023-01-01 RX ADMIN — CEFAZOLIN SODIUM 2 G: 2 INJECTION, SOLUTION INTRAVENOUS at 11:25

## 2023-01-01 RX ADMIN — LOSARTAN POTASSIUM 50 MG: 50 TABLET, FILM COATED ORAL at 08:20

## 2023-01-01 RX ADMIN — OXYCODONE HYDROCHLORIDE 10 MG: 10 TABLET ORAL at 07:53

## 2023-01-01 RX ADMIN — HYDROMORPHONE HYDROCHLORIDE 0.5 MG: 1 INJECTION, SOLUTION INTRAMUSCULAR; INTRAVENOUS; SUBCUTANEOUS at 04:32

## 2023-01-01 RX ADMIN — CEFAZOLIN SODIUM 2 G: 2 INJECTION, SOLUTION INTRAVENOUS at 00:09

## 2023-01-01 RX ADMIN — DONEPEZIL HYDROCHLORIDE 10 MG: 10 TABLET, FILM COATED ORAL at 21:58

## 2023-01-01 RX ADMIN — GABAPENTIN 100 MG: 100 CAPSULE ORAL at 07:56

## 2023-01-01 RX ADMIN — CARVEDILOL 25 MG: 25 TABLET, FILM COATED ORAL at 05:49

## 2023-01-01 RX ADMIN — ISOSORBIDE MONONITRATE 120 MG: 60 TABLET, EXTENDED RELEASE ORAL at 07:36

## 2023-01-01 RX ADMIN — CILOSTAZOL 100 MG: 100 TABLET ORAL at 20:52

## 2023-01-01 RX ADMIN — TRAZODONE HYDROCHLORIDE 50 MG: 50 TABLET ORAL at 22:53

## 2023-01-01 RX ADMIN — CILOSTAZOL 100 MG: 100 TABLET ORAL at 08:16

## 2023-01-01 RX ADMIN — MIRTAZAPINE 15 MG: 15 TABLET, FILM COATED ORAL at 22:17

## 2023-01-01 RX ADMIN — OXYCODONE HYDROCHLORIDE 10 MG: 10 TABLET ORAL at 01:47

## 2023-01-01 RX ADMIN — ACETAMINOPHEN 975 MG: 325 TABLET, FILM COATED ORAL at 22:19

## 2023-01-01 RX ADMIN — Medication 5 ML: at 08:41

## 2023-01-01 RX ADMIN — CEFAZOLIN 2 G: 1 INJECTION, POWDER, FOR SOLUTION INTRAMUSCULAR; INTRAVENOUS at 16:51

## 2023-01-01 RX ADMIN — ACETAMINOPHEN 975 MG: 325 TABLET, FILM COATED ORAL at 21:50

## 2023-01-01 RX ADMIN — PHENYLEPHRINE HYDROCHLORIDE 100 MCG: 10 INJECTION INTRAVENOUS at 15:30

## 2023-01-01 RX ADMIN — Medication 20 MG: at 10:58

## 2023-01-01 RX ADMIN — ASPIRIN 81 MG CHEWABLE TABLET 81 MG: 81 TABLET CHEWABLE at 08:18

## 2023-01-01 RX ADMIN — OXYCODONE HYDROCHLORIDE 10 MG: 5 TABLET ORAL at 03:43

## 2023-01-01 RX ADMIN — CLONIDINE HYDROCHLORIDE 0.1 MG: 0.1 TABLET ORAL at 18:51

## 2023-01-01 RX ADMIN — BUPIVACAINE HYDROCHLORIDE 10 ML: 5 INJECTION, SOLUTION EPIDURAL; INTRACAUDAL at 06:50

## 2023-01-01 RX ADMIN — OXYCODONE HYDROCHLORIDE 10 MG: 5 TABLET ORAL at 16:08

## 2023-01-01 RX ADMIN — ACETAMINOPHEN 975 MG: 325 TABLET, FILM COATED ORAL at 13:59

## 2023-01-01 RX ADMIN — Medication 50 MG: at 08:04

## 2023-01-01 RX ADMIN — HYDROMORPHONE HYDROCHLORIDE 0.5 MG: 1 INJECTION, SOLUTION INTRAMUSCULAR; INTRAVENOUS; SUBCUTANEOUS at 09:50

## 2023-01-01 RX ADMIN — ATORVASTATIN CALCIUM 40 MG: 40 TABLET, FILM COATED ORAL at 09:35

## 2023-01-01 RX ADMIN — SENNOSIDES AND DOCUSATE SODIUM 2 TABLET: 50; 8.6 TABLET ORAL at 20:13

## 2023-01-01 RX ADMIN — DONEPEZIL HYDROCHLORIDE 10 MG: 10 TABLET, FILM COATED ORAL at 22:33

## 2023-01-01 RX ADMIN — CEFAZOLIN SODIUM 2 G: 2 INJECTION, SOLUTION INTRAVENOUS at 20:22

## 2023-01-01 RX ADMIN — CILOSTAZOL 100 MG: 100 TABLET ORAL at 00:25

## 2023-01-01 RX ADMIN — OXYCODONE HYDROCHLORIDE 10 MG: 5 TABLET ORAL at 04:21

## 2023-01-01 RX ADMIN — ACETAMINOPHEN 975 MG: 325 TABLET, FILM COATED ORAL at 20:17

## 2023-01-01 RX ADMIN — HYDROMORPHONE HYDROCHLORIDE 0.2 MG: 0.2 INJECTION, SOLUTION INTRAMUSCULAR; INTRAVENOUS; SUBCUTANEOUS at 17:38

## 2023-01-01 RX ADMIN — CARVEDILOL 25 MG: 25 TABLET, FILM COATED ORAL at 08:48

## 2023-01-01 RX ADMIN — HYDROMORPHONE HYDROCHLORIDE 0.2 MG: 0.2 INJECTION, SOLUTION INTRAMUSCULAR; INTRAVENOUS; SUBCUTANEOUS at 11:53

## 2023-01-01 RX ADMIN — FENTANYL CITRATE 50 MCG: 50 INJECTION, SOLUTION INTRAMUSCULAR; INTRAVENOUS at 16:55

## 2023-01-01 RX ADMIN — CILOSTAZOL 100 MG: 100 TABLET ORAL at 07:55

## 2023-01-01 RX ADMIN — OXYCODONE HYDROCHLORIDE 10 MG: 10 TABLET ORAL at 11:01

## 2023-01-01 RX ADMIN — SUGAMMADEX 200 MG: 100 INJECTION, SOLUTION INTRAVENOUS at 17:57

## 2023-01-01 RX ADMIN — LOSARTAN POTASSIUM 100 MG: 100 TABLET, FILM COATED ORAL at 17:55

## 2023-01-01 RX ADMIN — APIXABAN 5 MG: 5 TABLET, FILM COATED ORAL at 19:11

## 2023-01-01 RX ADMIN — OXYCODONE HYDROCHLORIDE 10 MG: 5 TABLET ORAL at 04:54

## 2023-01-01 RX ADMIN — ISOSORBIDE MONONITRATE 60 MG: 30 TABLET, EXTENDED RELEASE ORAL at 08:56

## 2023-01-01 RX ADMIN — ISOSORBIDE MONONITRATE 60 MG: 30 TABLET, EXTENDED RELEASE ORAL at 08:26

## 2023-01-01 RX ADMIN — CEFAZOLIN SODIUM 2 G: 2 INJECTION, SOLUTION INTRAVENOUS at 13:38

## 2023-01-01 RX ADMIN — MAGNESIUM OXIDE TAB 400 MG (241.3 MG ELEMENTAL MG) 400 MG: 400 (241.3 MG) TAB at 09:35

## 2023-01-01 RX ADMIN — GABAPENTIN 300 MG: 300 CAPSULE ORAL at 08:16

## 2023-01-01 RX ADMIN — FAMOTIDINE 10 MG: 10 TABLET, FILM COATED ORAL at 20:34

## 2023-01-01 RX ADMIN — CEFAZOLIN SODIUM 2 G: 2 INJECTION, SOLUTION INTRAVENOUS at 00:21

## 2023-01-01 RX ADMIN — CILOSTAZOL 100 MG: 100 TABLET ORAL at 08:45

## 2023-01-01 RX ADMIN — GABAPENTIN 300 MG: 300 CAPSULE ORAL at 10:03

## 2023-01-01 RX ADMIN — CEFAZOLIN SODIUM 2 G: 2 INJECTION, SOLUTION INTRAVENOUS at 21:48

## 2023-01-01 RX ADMIN — OXYCODONE HYDROCHLORIDE 10 MG: 5 TABLET ORAL at 23:16

## 2023-01-01 RX ADMIN — MIRTAZAPINE 15 MG: 15 TABLET, FILM COATED ORAL at 21:39

## 2023-01-01 RX ADMIN — ACETAMINOPHEN 975 MG: 325 TABLET, FILM COATED ORAL at 03:26

## 2023-01-01 RX ADMIN — CEFAZOLIN SODIUM 2 G: 2 INJECTION, SOLUTION INTRAVENOUS at 05:59

## 2023-01-01 RX ADMIN — ACETAMINOPHEN 975 MG: 325 TABLET, FILM COATED ORAL at 06:38

## 2023-01-01 RX ADMIN — ACETAMINOPHEN 975 MG: 325 TABLET, FILM COATED ORAL at 15:16

## 2023-01-01 RX ADMIN — CILOSTAZOL 100 MG: 100 TABLET ORAL at 20:38

## 2023-01-01 RX ADMIN — PIPERACILLIN AND TAZOBACTAM 3.38 G: 3; .375 INJECTION, POWDER, LYOPHILIZED, FOR SOLUTION INTRAVENOUS at 21:49

## 2023-01-01 RX ADMIN — SENNOSIDES AND DOCUSATE SODIUM 1 TABLET: 8.6; 5 TABLET ORAL at 07:40

## 2023-01-01 RX ADMIN — FENTANYL CITRATE 50 MCG: 50 INJECTION INTRAMUSCULAR; INTRAVENOUS at 17:43

## 2023-01-01 RX ADMIN — HYDROMORPHONE HYDROCHLORIDE 0.2 MG: 0.2 INJECTION, SOLUTION INTRAMUSCULAR; INTRAVENOUS; SUBCUTANEOUS at 01:59

## 2023-01-01 RX ADMIN — DONEPEZIL HYDROCHLORIDE 10 MG: 10 TABLET, FILM COATED ORAL at 21:04

## 2023-01-01 RX ADMIN — ONDANSETRON 4 MG: 4 TABLET, ORALLY DISINTEGRATING ORAL at 10:02

## 2023-01-01 RX ADMIN — Medication 10 MG: at 14:03

## 2023-01-01 RX ADMIN — TIZANIDINE 2 MG: 2 TABLET ORAL at 04:13

## 2023-01-01 RX ADMIN — OXYCODONE HYDROCHLORIDE 5 MG: 5 TABLET ORAL at 03:04

## 2023-01-01 RX ADMIN — CARVEDILOL 25 MG: 25 TABLET, FILM COATED ORAL at 08:42

## 2023-01-01 RX ADMIN — SODIUM CHLORIDE, SODIUM GLUCONATE, SODIUM ACETATE, POTASSIUM CHLORIDE AND MAGNESIUM CHLORIDE: 526; 502; 368; 37; 30 INJECTION, SOLUTION INTRAVENOUS at 08:35

## 2023-01-01 RX ADMIN — CLONIDINE HYDROCHLORIDE 0.1 MG: 0.1 TABLET ORAL at 02:58

## 2023-01-01 RX ADMIN — SERTRALINE HYDROCHLORIDE 25 MG: 25 TABLET ORAL at 08:45

## 2023-01-01 RX ADMIN — SENNOSIDES AND DOCUSATE SODIUM 1 TABLET: 8.6; 5 TABLET ORAL at 19:49

## 2023-01-01 RX ADMIN — GABAPENTIN 300 MG: 300 CAPSULE ORAL at 14:05

## 2023-01-01 RX ADMIN — ISOSORBIDE MONONITRATE 60 MG: 30 TABLET, EXTENDED RELEASE ORAL at 08:44

## 2023-01-01 RX ADMIN — ACETAMINOPHEN 975 MG: 325 TABLET, FILM COATED ORAL at 10:00

## 2023-01-01 RX ADMIN — SODIUM CHLORIDE, POTASSIUM CHLORIDE, SODIUM LACTATE AND CALCIUM CHLORIDE 1000 ML: 600; 310; 30; 20 INJECTION, SOLUTION INTRAVENOUS at 22:58

## 2023-01-01 RX ADMIN — CEFAZOLIN SODIUM 2 G: 2 INJECTION, SOLUTION INTRAVENOUS at 20:57

## 2023-01-01 RX ADMIN — SENNOSIDES AND DOCUSATE SODIUM 1 TABLET: 8.6; 5 TABLET ORAL at 08:07

## 2023-01-01 RX ADMIN — CILOSTAZOL 100 MG: 100 TABLET ORAL at 09:27

## 2023-01-01 RX ADMIN — LOSARTAN POTASSIUM 50 MG: 50 TABLET, FILM COATED ORAL at 08:16

## 2023-01-01 RX ADMIN — OXYCODONE HYDROCHLORIDE 5 MG: 5 TABLET ORAL at 13:08

## 2023-01-01 RX ADMIN — FENTANYL CITRATE 50 MCG: 50 INJECTION, SOLUTION INTRAMUSCULAR; INTRAVENOUS at 18:58

## 2023-01-01 RX ADMIN — OXYCODONE HYDROCHLORIDE 10 MG: 5 TABLET ORAL at 20:20

## 2023-01-01 RX ADMIN — THERA TABS 1 TABLET: TAB at 08:19

## 2023-01-01 RX ADMIN — ACETAMINOPHEN 975 MG: 325 TABLET, FILM COATED ORAL at 13:42

## 2023-01-01 RX ADMIN — CLONIDINE HYDROCHLORIDE 0.1 MG: 0.1 TABLET ORAL at 10:23

## 2023-01-01 RX ADMIN — Medication 50 MCG: at 08:04

## 2023-01-01 RX ADMIN — LOSARTAN POTASSIUM 100 MG: 100 TABLET, FILM COATED ORAL at 08:57

## 2023-01-01 RX ADMIN — CARVEDILOL 25 MG: 25 TABLET, FILM COATED ORAL at 09:06

## 2023-01-01 RX ADMIN — ASPIRIN 81 MG CHEWABLE TABLET 81 MG: 81 TABLET CHEWABLE at 08:25

## 2023-01-01 RX ADMIN — INSULIN ASPART 1 UNITS: 100 INJECTION, SOLUTION INTRAVENOUS; SUBCUTANEOUS at 11:50

## 2023-01-01 RX ADMIN — ACETAMINOPHEN 975 MG: 325 TABLET, FILM COATED ORAL at 20:12

## 2023-01-01 RX ADMIN — OXYCODONE HYDROCHLORIDE 10 MG: 5 TABLET ORAL at 00:03

## 2023-01-01 RX ADMIN — MIRTAZAPINE 15 MG: 15 TABLET, FILM COATED ORAL at 22:00

## 2023-01-01 RX ADMIN — OXYCODONE HYDROCHLORIDE 10 MG: 5 TABLET ORAL at 10:37

## 2023-01-01 RX ADMIN — PHENYLEPHRINE HYDROCHLORIDE 200 MCG: 10 INJECTION INTRAVENOUS at 15:58

## 2023-01-01 RX ADMIN — AMLODIPINE BESYLATE 10 MG: 10 TABLET ORAL at 09:35

## 2023-01-01 RX ADMIN — CARVEDILOL 25 MG: 25 TABLET, FILM COATED ORAL at 09:05

## 2023-01-01 RX ADMIN — ASPIRIN 81 MG: 81 TABLET, COATED ORAL at 08:20

## 2023-01-01 RX ADMIN — OXYCODONE HYDROCHLORIDE 10 MG: 5 TABLET ORAL at 06:03

## 2023-01-01 RX ADMIN — METFORMIN ER 500 MG 500 MG: 500 TABLET ORAL at 08:12

## 2023-01-01 RX ADMIN — OXYCODONE HYDROCHLORIDE 5 MG: 5 TABLET ORAL at 06:36

## 2023-01-01 RX ADMIN — GABAPENTIN 300 MG: 300 CAPSULE ORAL at 13:22

## 2023-01-01 RX ADMIN — CILOSTAZOL 100 MG: 100 TABLET ORAL at 21:28

## 2023-01-01 RX ADMIN — CARVEDILOL 12.5 MG: 12.5 TABLET, FILM COATED ORAL at 21:25

## 2023-01-01 RX ADMIN — FAMOTIDINE 10 MG: 10 TABLET, FILM COATED ORAL at 21:25

## 2023-01-01 RX ADMIN — ENOXAPARIN SODIUM 70 MG: 80 INJECTION SUBCUTANEOUS at 08:06

## 2023-01-01 RX ADMIN — CEFEPIME HYDROCHLORIDE 2 G: 2 INJECTION, POWDER, FOR SOLUTION INTRAVENOUS at 12:23

## 2023-01-01 RX ADMIN — Medication 1 TABLET: at 08:25

## 2023-01-01 RX ADMIN — CARVEDILOL 25 MG: 25 TABLET, FILM COATED ORAL at 19:17

## 2023-01-01 RX ADMIN — HYDROMORPHONE HYDROCHLORIDE 0.3 MG: 1 INJECTION, SOLUTION INTRAMUSCULAR; INTRAVENOUS; SUBCUTANEOUS at 00:26

## 2023-01-01 RX ADMIN — ASPIRIN 81 MG: 81 TABLET ORAL at 09:05

## 2023-01-01 RX ADMIN — MAGNESIUM SULFATE IN WATER 4 G: 40 INJECTION, SOLUTION INTRAVENOUS at 18:52

## 2023-01-01 RX ADMIN — CEFAZOLIN 1 G: 1 INJECTION, POWDER, FOR SOLUTION INTRAMUSCULAR; INTRAVENOUS at 22:12

## 2023-01-01 RX ADMIN — CLONIDINE HYDROCHLORIDE 0.1 MG: 0.1 TABLET ORAL at 10:03

## 2023-01-01 RX ADMIN — MAGNESIUM OXIDE TAB 400 MG (241.3 MG ELEMENTAL MG) 400 MG: 400 (241.3 MG) TAB at 12:23

## 2023-01-01 RX ADMIN — FENTANYL CITRATE 100 MCG: 50 INJECTION INTRAMUSCULAR; INTRAVENOUS at 08:22

## 2023-01-01 RX ADMIN — FAMOTIDINE 10 MG: 10 TABLET, FILM COATED ORAL at 08:07

## 2023-01-01 RX ADMIN — MAGNESIUM OXIDE TAB 400 MG (241.3 MG ELEMENTAL MG) 400 MG: 400 (241.3 MG) TAB at 13:55

## 2023-01-01 RX ADMIN — ACETAMINOPHEN 975 MG: 325 TABLET, FILM COATED ORAL at 14:07

## 2023-01-01 RX ADMIN — MAGNESIUM OXIDE TAB 400 MG (241.3 MG ELEMENTAL MG) 400 MG: 400 (241.3 MG) TAB at 01:59

## 2023-01-01 RX ADMIN — FENTANYL CITRATE 100 MCG: 50 INJECTION INTRAMUSCULAR; INTRAVENOUS at 08:02

## 2023-01-01 RX ADMIN — DONEPEZIL HYDROCHLORIDE 10 MG: 10 TABLET, FILM COATED ORAL at 01:19

## 2023-01-01 RX ADMIN — ACETAMINOPHEN 975 MG: 325 TABLET, FILM COATED ORAL at 20:09

## 2023-01-01 RX ADMIN — FAMOTIDINE 10 MG: 10 TABLET, FILM COATED ORAL at 07:55

## 2023-01-01 RX ADMIN — NICARDIPINE HYDROCHLORIDE 5 MG/HR: 0.2 INJECTION, SOLUTION INTRAVENOUS at 16:34

## 2023-01-01 RX ADMIN — ACETAMINOPHEN 975 MG: 325 TABLET, FILM COATED ORAL at 13:50

## 2023-01-01 RX ADMIN — ACETAMINOPHEN 975 MG: 325 TABLET, FILM COATED ORAL at 20:34

## 2023-01-01 RX ADMIN — ACETAMINOPHEN 975 MG: 325 TABLET, FILM COATED ORAL at 21:20

## 2023-01-01 RX ADMIN — PROPOFOL 10 MG: 10 INJECTION, EMULSION INTRAVENOUS at 11:28

## 2023-01-01 RX ADMIN — ATORVASTATIN CALCIUM 40 MG: 40 TABLET, FILM COATED ORAL at 08:12

## 2023-01-01 RX ADMIN — ATORVASTATIN CALCIUM 40 MG: 40 TABLET, FILM COATED ORAL at 08:57

## 2023-01-01 RX ADMIN — CLONIDINE HYDROCHLORIDE 0.1 MG: 0.1 TABLET ORAL at 17:46

## 2023-01-01 RX ADMIN — Medication 1 TABLET: at 08:18

## 2023-01-01 RX ADMIN — APIXABAN 5 MG: 5 TABLET, FILM COATED ORAL at 19:32

## 2023-01-01 RX ADMIN — CARVEDILOL 12.5 MG: 12.5 TABLET, FILM COATED ORAL at 09:28

## 2023-01-01 RX ADMIN — ACETAMINOPHEN 975 MG: 325 TABLET, FILM COATED ORAL at 22:45

## 2023-01-01 RX ADMIN — FOLIC ACID 1 MG: 1 TABLET ORAL at 08:16

## 2023-01-01 RX ADMIN — ASPIRIN 81 MG CHEWABLE TABLET 81 MG: 81 TABLET CHEWABLE at 08:04

## 2023-01-01 RX ADMIN — Medication 50 MCG: at 08:00

## 2023-01-01 RX ADMIN — MAGNESIUM OXIDE TAB 400 MG (241.3 MG ELEMENTAL MG) 400 MG: 400 (241.3 MG) TAB at 13:49

## 2023-01-01 RX ADMIN — ACETAMINOPHEN 975 MG: 325 TABLET, FILM COATED ORAL at 11:37

## 2023-01-01 RX ADMIN — TRAZODONE HYDROCHLORIDE 50 MG: 50 TABLET ORAL at 22:08

## 2023-01-01 RX ADMIN — CILOSTAZOL 100 MG: 100 TABLET ORAL at 20:42

## 2023-01-01 RX ADMIN — PROCHLORPERAZINE EDISYLATE 5 MG: 5 INJECTION INTRAMUSCULAR; INTRAVENOUS at 14:08

## 2023-01-01 RX ADMIN — FENTANYL CITRATE 50 MCG: 50 INJECTION, SOLUTION INTRAMUSCULAR; INTRAVENOUS at 14:23

## 2023-01-01 RX ADMIN — MIRTAZAPINE 15 MG: 15 TABLET, FILM COATED ORAL at 21:44

## 2023-01-01 RX ADMIN — MAGNESIUM SULFATE IN WATER 2 G: 40 INJECTION, SOLUTION INTRAVENOUS at 10:04

## 2023-01-01 RX ADMIN — ACETAMINOPHEN 975 MG: 325 TABLET, FILM COATED ORAL at 09:01

## 2023-01-01 RX ADMIN — CARVEDILOL 25 MG: 25 TABLET, FILM COATED ORAL at 19:44

## 2023-01-01 RX ADMIN — AMLODIPINE BESYLATE 10 MG: 10 TABLET ORAL at 08:39

## 2023-01-01 RX ADMIN — CARVEDILOL 25 MG: 25 TABLET, FILM COATED ORAL at 07:40

## 2023-01-01 RX ADMIN — PROPOFOL 10 MG: 10 INJECTION, EMULSION INTRAVENOUS at 10:52

## 2023-01-01 RX ADMIN — ATORVASTATIN CALCIUM 40 MG: 40 TABLET, FILM COATED ORAL at 09:45

## 2023-01-01 RX ADMIN — ACETAMINOPHEN 975 MG: 325 TABLET, FILM COATED ORAL at 05:53

## 2023-01-01 RX ADMIN — CEFAZOLIN SODIUM 2 G: 2 INJECTION, SOLUTION INTRAVENOUS at 23:43

## 2023-01-01 RX ADMIN — PHENYLEPHRINE HYDROCHLORIDE 200 MCG: 10 INJECTION INTRAVENOUS at 09:26

## 2023-01-01 RX ADMIN — DEXMEDETOMIDINE 4 MCG: 100 INJECTION, SOLUTION, CONCENTRATE INTRAVENOUS at 13:40

## 2023-01-01 RX ADMIN — PANTOPRAZOLE SODIUM 40 MG: 40 TABLET, DELAYED RELEASE ORAL at 08:49

## 2023-01-01 RX ADMIN — CLONIDINE HYDROCHLORIDE 0.1 MG: 0.1 TABLET ORAL at 10:41

## 2023-01-01 RX ADMIN — OXYCODONE HYDROCHLORIDE 10 MG: 5 TABLET ORAL at 13:06

## 2023-01-01 RX ADMIN — ATORVASTATIN CALCIUM 40 MG: 40 TABLET, FILM COATED ORAL at 08:42

## 2023-01-01 RX ADMIN — Medication 100 MCG: at 18:41

## 2023-01-01 RX ADMIN — HYDROMORPHONE HYDROCHLORIDE 0.3 MG: 1 INJECTION, SOLUTION INTRAMUSCULAR; INTRAVENOUS; SUBCUTANEOUS at 05:00

## 2023-01-01 RX ADMIN — ACETAMINOPHEN 975 MG: 325 TABLET, FILM COATED ORAL at 17:27

## 2023-01-01 RX ADMIN — KETOROLAC TROMETHAMINE 15 MG: 30 INJECTION, SOLUTION INTRAMUSCULAR; INTRAVENOUS at 03:29

## 2023-01-01 RX ADMIN — FAMOTIDINE 10 MG: 10 TABLET, FILM COATED ORAL at 20:15

## 2023-01-01 RX ADMIN — HEPARIN SODIUM 2000 UNITS: 1000 INJECTION INTRAVENOUS; SUBCUTANEOUS at 14:49

## 2023-01-01 RX ADMIN — CILOSTAZOL 100 MG: 100 TABLET ORAL at 20:08

## 2023-01-01 RX ADMIN — PHENYLEPHRINE HYDROCHLORIDE 200 MCG: 10 INJECTION INTRAVENOUS at 16:06

## 2023-01-01 RX ADMIN — GABAPENTIN 300 MG: 300 CAPSULE ORAL at 13:01

## 2023-01-01 RX ADMIN — SERTRALINE HYDROCHLORIDE 25 MG: 25 TABLET ORAL at 08:16

## 2023-01-01 RX ADMIN — CARVEDILOL 25 MG: 25 TABLET, FILM COATED ORAL at 20:23

## 2023-01-01 RX ADMIN — HYDRALAZINE HYDROCHLORIDE 10 MG: 20 INJECTION INTRAMUSCULAR; INTRAVENOUS at 13:03

## 2023-01-01 RX ADMIN — CLONIDINE HYDROCHLORIDE 0.1 MG: 0.1 TABLET ORAL at 18:12

## 2023-01-01 RX ADMIN — OXYCODONE HYDROCHLORIDE 10 MG: 10 TABLET ORAL at 03:13

## 2023-01-01 RX ADMIN — SENNOSIDES AND DOCUSATE SODIUM 1 TABLET: 8.6; 5 TABLET ORAL at 21:04

## 2023-01-01 RX ADMIN — ACETAMINOPHEN 975 MG: 325 TABLET, FILM COATED ORAL at 22:14

## 2023-01-01 RX ADMIN — AMLODIPINE BESYLATE 10 MG: 10 TABLET ORAL at 08:42

## 2023-01-01 RX ADMIN — CLONIDINE HYDROCHLORIDE 0.1 MG: 0.1 TABLET ORAL at 10:01

## 2023-01-01 RX ADMIN — APIXABAN 5 MG: 5 TABLET, FILM COATED ORAL at 07:56

## 2023-01-01 RX ADMIN — FOLIC ACID 1 MG: 1 TABLET ORAL at 09:02

## 2023-01-01 RX ADMIN — PANTOPRAZOLE SODIUM 40 MG: 40 TABLET, DELAYED RELEASE ORAL at 10:05

## 2023-01-01 RX ADMIN — MAGNESIUM SULFATE IN WATER 2 G: 40 INJECTION, SOLUTION INTRAVENOUS at 07:03

## 2023-01-01 RX ADMIN — ASPIRIN 81 MG: 81 TABLET ORAL at 08:15

## 2023-01-01 RX ADMIN — GABAPENTIN 100 MG: 100 CAPSULE ORAL at 08:42

## 2023-01-01 RX ADMIN — OXYCODONE HYDROCHLORIDE 10 MG: 5 TABLET ORAL at 06:56

## 2023-01-01 RX ADMIN — CLONIDINE HYDROCHLORIDE 0.1 MG: 0.1 TABLET ORAL at 19:11

## 2023-01-01 RX ADMIN — ENOXAPARIN SODIUM 70 MG: 80 INJECTION SUBCUTANEOUS at 08:49

## 2023-01-01 RX ADMIN — AMLODIPINE BESYLATE 10 MG: 10 TABLET ORAL at 08:34

## 2023-01-01 RX ADMIN — HEPARIN SODIUM 900 UNITS/HR: 10000 INJECTION, SOLUTION INTRAVENOUS at 12:41

## 2023-01-01 RX ADMIN — Medication 100 MCG: at 17:36

## 2023-01-01 RX ADMIN — HYDROMORPHONE HYDROCHLORIDE 0.5 MG: 1 INJECTION, SOLUTION INTRAMUSCULAR; INTRAVENOUS; SUBCUTANEOUS at 18:27

## 2023-01-01 RX ADMIN — METHOCARBAMOL 1000 MG: 500 TABLET ORAL at 05:40

## 2023-01-01 RX ADMIN — CILOSTAZOL 100 MG: 100 TABLET ORAL at 19:11

## 2023-01-01 RX ADMIN — DONEPEZIL HYDROCHLORIDE 10 MG: 10 TABLET, FILM COATED ORAL at 20:13

## 2023-01-01 RX ADMIN — OXYCODONE HYDROCHLORIDE 10 MG: 10 TABLET ORAL at 15:30

## 2023-01-01 RX ADMIN — OXYCODONE HYDROCHLORIDE 10 MG: 10 TABLET ORAL at 10:15

## 2023-01-01 RX ADMIN — HYDROXYZINE HYDROCHLORIDE 50 MG: 50 TABLET ORAL at 23:07

## 2023-01-01 RX ADMIN — LOSARTAN POTASSIUM 50 MG: 50 TABLET, FILM COATED ORAL at 08:26

## 2023-01-01 RX ADMIN — DEXMEDETOMIDINE 4 MCG: 100 INJECTION, SOLUTION, CONCENTRATE INTRAVENOUS at 13:19

## 2023-01-01 RX ADMIN — ASPIRIN 81 MG: 81 TABLET, COATED ORAL at 07:36

## 2023-01-01 RX ADMIN — CEFAZOLIN SODIUM 2 G: 2 INJECTION, SOLUTION INTRAVENOUS at 03:29

## 2023-01-01 RX ADMIN — OXYCODONE HYDROCHLORIDE 5 MG: 5 TABLET ORAL at 07:41

## 2023-01-01 RX ADMIN — CEFAZOLIN SODIUM 2 G: 2 INJECTION, SOLUTION INTRAVENOUS at 19:44

## 2023-01-01 RX ADMIN — HYDRALAZINE HYDROCHLORIDE 10 MG: 20 INJECTION INTRAMUSCULAR; INTRAVENOUS at 04:00

## 2023-01-01 RX ADMIN — FAMOTIDINE 10 MG: 10 TABLET, FILM COATED ORAL at 21:39

## 2023-01-01 RX ADMIN — MIDAZOLAM 1 MG: 1 INJECTION INTRAMUSCULAR; INTRAVENOUS at 13:01

## 2023-01-01 RX ADMIN — APIXABAN 5 MG: 5 TABLET, FILM COATED ORAL at 09:44

## 2023-01-01 RX ADMIN — TIZANIDINE 2 MG: 2 TABLET ORAL at 20:58

## 2023-01-01 RX ADMIN — LOSARTAN POTASSIUM 50 MG: 50 TABLET, FILM COATED ORAL at 07:36

## 2023-01-01 RX ADMIN — FAMOTIDINE 10 MG: 10 TABLET, FILM COATED ORAL at 08:57

## 2023-01-01 RX ADMIN — KETOROLAC TROMETHAMINE 15 MG: 30 INJECTION, SOLUTION INTRAMUSCULAR; INTRAVENOUS at 21:39

## 2023-01-01 RX ADMIN — PANTOPRAZOLE SODIUM 40 MG: 40 TABLET, DELAYED RELEASE ORAL at 07:37

## 2023-01-01 RX ADMIN — OXYCODONE HYDROCHLORIDE 10 MG: 5 TABLET ORAL at 07:12

## 2023-01-01 RX ADMIN — CARVEDILOL 25 MG: 25 TABLET, FILM COATED ORAL at 07:35

## 2023-01-01 RX ADMIN — POTASSIUM CHLORIDE 40 MEQ: 750 TABLET, EXTENDED RELEASE ORAL at 09:15

## 2023-01-01 RX ADMIN — POLYETHYLENE GLYCOL 3350 17 G: 17 POWDER, FOR SOLUTION ORAL at 08:18

## 2023-01-01 RX ADMIN — SODIUM CHLORIDE, POTASSIUM CHLORIDE, SODIUM LACTATE AND CALCIUM CHLORIDE: 600; 310; 30; 20 INJECTION, SOLUTION INTRAVENOUS at 15:20

## 2023-01-01 RX ADMIN — HYDROMORPHONE HYDROCHLORIDE 0.4 MG: 0.2 INJECTION, SOLUTION INTRAMUSCULAR; INTRAVENOUS; SUBCUTANEOUS at 19:42

## 2023-01-01 RX ADMIN — THIAMINE HCL TAB 100 MG 100 MG: 100 TAB at 08:58

## 2023-01-01 RX ADMIN — QUETIAPINE FUMARATE 25 MG: 25 TABLET ORAL at 01:14

## 2023-01-01 RX ADMIN — SERTRALINE HYDROCHLORIDE 25 MG: 25 TABLET ORAL at 09:50

## 2023-01-01 RX ADMIN — HYDRALAZINE HYDROCHLORIDE 5 MG: 20 INJECTION INTRAMUSCULAR; INTRAVENOUS at 17:34

## 2023-01-01 RX ADMIN — HEPARIN SODIUM 1000 UNITS: 1000 INJECTION INTRAVENOUS; SUBCUTANEOUS at 12:30

## 2023-01-01 RX ADMIN — HYDRALAZINE HYDROCHLORIDE 10 MG: 20 INJECTION INTRAMUSCULAR; INTRAVENOUS at 13:16

## 2023-01-01 RX ADMIN — OXYCODONE HYDROCHLORIDE 10 MG: 5 TABLET ORAL at 15:59

## 2023-01-01 RX ADMIN — ACETAMINOPHEN 975 MG: 325 TABLET, FILM COATED ORAL at 20:30

## 2023-01-01 RX ADMIN — SENNOSIDES AND DOCUSATE SODIUM 1 TABLET: 8.6; 5 TABLET ORAL at 19:34

## 2023-01-01 RX ADMIN — OXYCODONE HYDROCHLORIDE 5 MG: 5 TABLET ORAL at 19:15

## 2023-01-01 RX ADMIN — FOLIC ACID 1 MG: 1 TABLET ORAL at 09:28

## 2023-01-01 RX ADMIN — CILOSTAZOL 100 MG: 100 TABLET ORAL at 19:54

## 2023-01-01 RX ADMIN — PANTOPRAZOLE SODIUM 40 MG: 40 TABLET, DELAYED RELEASE ORAL at 07:55

## 2023-01-01 RX ADMIN — POTASSIUM & SODIUM PHOSPHATES POWDER PACK 280-160-250 MG 1 PACKET: 280-160-250 PACK at 09:47

## 2023-01-01 RX ADMIN — CILOSTAZOL 100 MG: 100 TABLET ORAL at 08:34

## 2023-01-01 RX ADMIN — CARVEDILOL 25 MG: 25 TABLET, FILM COATED ORAL at 20:08

## 2023-01-01 RX ADMIN — ACETAMINOPHEN 975 MG: 325 TABLET, FILM COATED ORAL at 05:01

## 2023-01-01 RX ADMIN — CEFAZOLIN SODIUM 2 G: 2 INJECTION, SOLUTION INTRAVENOUS at 14:22

## 2023-01-01 RX ADMIN — CEFAZOLIN SODIUM 2 G: 2 INJECTION, SOLUTION INTRAVENOUS at 22:16

## 2023-01-01 RX ADMIN — APIXABAN 5 MG: 5 TABLET, FILM COATED ORAL at 08:17

## 2023-01-01 RX ADMIN — CILOSTAZOL 100 MG: 100 TABLET ORAL at 19:32

## 2023-01-01 RX ADMIN — GABAPENTIN 100 MG: 100 CAPSULE ORAL at 19:44

## 2023-01-01 RX ADMIN — APIXABAN 5 MG: 5 TABLET, FILM COATED ORAL at 19:47

## 2023-01-01 RX ADMIN — HYDROXYZINE HYDROCHLORIDE 50 MG: 25 TABLET ORAL at 13:37

## 2023-01-01 RX ADMIN — OXYCODONE HYDROCHLORIDE 5 MG: 5 TABLET ORAL at 09:49

## 2023-01-01 RX ADMIN — CLONIDINE HYDROCHLORIDE 0.1 MG: 0.1 TABLET ORAL at 09:43

## 2023-01-01 RX ADMIN — CILOSTAZOL 100 MG: 100 TABLET ORAL at 10:06

## 2023-01-01 RX ADMIN — PIPERACILLIN AND TAZOBACTAM 3.38 G: 3; .375 INJECTION, POWDER, LYOPHILIZED, FOR SOLUTION INTRAVENOUS at 09:53

## 2023-01-01 RX ADMIN — SERTRALINE HYDROCHLORIDE 25 MG: 25 TABLET ORAL at 09:05

## 2023-01-01 RX ADMIN — DONEPEZIL HYDROCHLORIDE 10 MG: 10 TABLET, FILM COATED ORAL at 22:20

## 2023-01-01 RX ADMIN — CARVEDILOL 25 MG: 25 TABLET, FILM COATED ORAL at 20:54

## 2023-01-01 RX ADMIN — OXYCODONE HYDROCHLORIDE 10 MG: 10 TABLET ORAL at 19:41

## 2023-01-01 RX ADMIN — THERA TABS 1 TABLET: TAB at 07:36

## 2023-01-01 RX ADMIN — PANTOPRAZOLE SODIUM 40 MG: 40 TABLET, DELAYED RELEASE ORAL at 08:12

## 2023-01-01 RX ADMIN — GABAPENTIN 100 MG: 100 CAPSULE ORAL at 07:35

## 2023-01-01 RX ADMIN — Medication 10 ML/HR: at 10:59

## 2023-01-01 RX ADMIN — SODIUM CHLORIDE, POTASSIUM CHLORIDE, SODIUM LACTATE AND CALCIUM CHLORIDE: 600; 310; 30; 20 INJECTION, SOLUTION INTRAVENOUS at 08:00

## 2023-01-01 RX ADMIN — GABAPENTIN 100 MG: 100 CAPSULE ORAL at 08:17

## 2023-01-01 RX ADMIN — HYDROMORPHONE HYDROCHLORIDE 0.4 MG: 0.2 INJECTION, SOLUTION INTRAMUSCULAR; INTRAVENOUS; SUBCUTANEOUS at 19:23

## 2023-01-01 RX ADMIN — OXYCODONE HYDROCHLORIDE 10 MG: 5 TABLET ORAL at 05:01

## 2023-01-01 RX ADMIN — DONEPEZIL HYDROCHLORIDE 10 MG: 10 TABLET, FILM COATED ORAL at 20:33

## 2023-01-01 RX ADMIN — AMLODIPINE BESYLATE 10 MG: 10 TABLET ORAL at 09:04

## 2023-01-01 RX ADMIN — SPIRONOLACTONE 25 MG: 25 TABLET ORAL at 08:16

## 2023-01-01 RX ADMIN — CEFAZOLIN SODIUM 2 G: 2 INJECTION, SOLUTION INTRAVENOUS at 14:07

## 2023-01-01 RX ADMIN — GABAPENTIN 300 MG: 300 CAPSULE ORAL at 20:30

## 2023-01-01 RX ADMIN — ACETAMINOPHEN 975 MG: 325 TABLET, FILM COATED ORAL at 15:29

## 2023-01-01 RX ADMIN — CARVEDILOL 25 MG: 25 TABLET, FILM COATED ORAL at 21:04

## 2023-01-01 RX ADMIN — LOSARTAN POTASSIUM 50 MG: 50 TABLET, FILM COATED ORAL at 08:44

## 2023-01-01 RX ADMIN — APIXABAN 5 MG: 5 TABLET, FILM COATED ORAL at 20:32

## 2023-01-01 RX ADMIN — METFORMIN ER 500 MG 500 MG: 500 TABLET ORAL at 07:41

## 2023-01-01 RX ADMIN — OXYCODONE HYDROCHLORIDE 5 MG: 5 TABLET ORAL at 05:10

## 2023-01-01 RX ADMIN — PANTOPRAZOLE SODIUM 40 MG: 40 TABLET, DELAYED RELEASE ORAL at 08:23

## 2023-01-01 RX ADMIN — Medication 1 TABLET: at 09:01

## 2023-01-01 RX ADMIN — AMLODIPINE BESYLATE 10 MG: 10 TABLET ORAL at 08:05

## 2023-01-01 RX ADMIN — PROCHLORPERAZINE EDISYLATE 5 MG: 5 INJECTION INTRAMUSCULAR; INTRAVENOUS at 14:02

## 2023-01-01 RX ADMIN — ACETAMINOPHEN 975 MG: 325 TABLET, FILM COATED ORAL at 09:48

## 2023-01-01 RX ADMIN — HYDROMORPHONE HYDROCHLORIDE 0.4 MG: 0.2 INJECTION, SOLUTION INTRAMUSCULAR; INTRAVENOUS; SUBCUTANEOUS at 05:50

## 2023-01-01 RX ADMIN — POTASSIUM & SODIUM PHOSPHATES POWDER PACK 280-160-250 MG 1 PACKET: 280-160-250 PACK at 14:21

## 2023-01-01 RX ADMIN — CARVEDILOL 12.5 MG: 12.5 TABLET, FILM COATED ORAL at 20:38

## 2023-01-01 RX ADMIN — CLONIDINE HYDROCHLORIDE 0.1 MG: 0.1 TABLET ORAL at 02:15

## 2023-01-01 RX ADMIN — CARVEDILOL 25 MG: 25 TABLET, FILM COATED ORAL at 20:31

## 2023-01-01 RX ADMIN — CLONIDINE HYDROCHLORIDE 0.1 MG: 0.1 TABLET ORAL at 02:47

## 2023-01-01 RX ADMIN — CEFAZOLIN SODIUM 2 G: 2 INJECTION, SOLUTION INTRAVENOUS at 15:59

## 2023-01-01 RX ADMIN — MAGNESIUM SULFATE IN WATER 4 G: 40 INJECTION, SOLUTION INTRAVENOUS at 08:44

## 2023-01-01 RX ADMIN — APIXABAN 5 MG: 5 TABLET, FILM COATED ORAL at 20:52

## 2023-01-01 RX ADMIN — CILOSTAZOL 100 MG: 100 TABLET ORAL at 21:20

## 2023-01-01 RX ADMIN — Medication 500 MG: at 00:23

## 2023-01-01 RX ADMIN — HEPARIN SODIUM 1000 UNITS: 1000 INJECTION INTRAVENOUS; SUBCUTANEOUS at 14:32

## 2023-01-01 RX ADMIN — CEFEPIME HYDROCHLORIDE 2 G: 2 INJECTION, POWDER, FOR SOLUTION INTRAVENOUS at 13:57

## 2023-01-01 RX ADMIN — HYDROMORPHONE HYDROCHLORIDE 0.3 MG: 1 INJECTION, SOLUTION INTRAMUSCULAR; INTRAVENOUS; SUBCUTANEOUS at 12:27

## 2023-01-01 RX ADMIN — TIZANIDINE 2 MG: 2 TABLET ORAL at 04:51

## 2023-01-01 RX ADMIN — Medication 50 MG: at 08:24

## 2023-01-01 RX ADMIN — ACETAMINOPHEN 975 MG: 325 TABLET, FILM COATED ORAL at 05:48

## 2023-01-01 RX ADMIN — FOLIC ACID 1 MG: 1 TABLET ORAL at 08:48

## 2023-01-01 RX ADMIN — OXYCODONE HYDROCHLORIDE 10 MG: 5 TABLET ORAL at 22:48

## 2023-01-01 RX ADMIN — CEFAZOLIN 1 G: 1 INJECTION, POWDER, FOR SOLUTION INTRAMUSCULAR; INTRAVENOUS at 06:05

## 2023-01-01 RX ADMIN — Medication 0.5 UNITS: at 09:42

## 2023-01-01 RX ADMIN — LOSARTAN POTASSIUM 50 MG: 50 TABLET, FILM COATED ORAL at 08:07

## 2023-01-01 RX ADMIN — LIDOCAINE HYDROCHLORIDE: 20 JELLY TOPICAL at 16:45

## 2023-01-01 RX ADMIN — CEFAZOLIN SODIUM 2 G: 2 INJECTION, SOLUTION INTRAVENOUS at 16:37

## 2023-01-01 RX ADMIN — ASPIRIN 81 MG: 81 TABLET ORAL at 07:36

## 2023-01-01 RX ADMIN — ATORVASTATIN CALCIUM 40 MG: 40 TABLET, FILM COATED ORAL at 08:34

## 2023-01-01 RX ADMIN — MIRTAZAPINE 15 MG: 15 TABLET, FILM COATED ORAL at 20:18

## 2023-01-01 RX ADMIN — HEPARIN SODIUM 1000 UNITS: 1000 INJECTION INTRAVENOUS; SUBCUTANEOUS at 16:04

## 2023-01-01 RX ADMIN — SERTRALINE HYDROCHLORIDE 25 MG: 25 TABLET ORAL at 07:37

## 2023-01-01 RX ADMIN — SODIUM BICARBONATE 38 MEQ: 84 INJECTION, SOLUTION INTRAVENOUS at 09:38

## 2023-01-01 RX ADMIN — SENNOSIDES AND DOCUSATE SODIUM 1 TABLET: 8.6; 5 TABLET ORAL at 20:33

## 2023-01-01 RX ADMIN — OXYCODONE HYDROCHLORIDE 10 MG: 5 TABLET ORAL at 18:40

## 2023-01-01 RX ADMIN — ONDANSETRON 4 MG: 2 INJECTION INTRAMUSCULAR; INTRAVENOUS at 01:03

## 2023-01-01 RX ADMIN — TIZANIDINE 2 MG: 2 TABLET ORAL at 10:15

## 2023-01-01 RX ADMIN — APIXABAN 5 MG: 5 TABLET, FILM COATED ORAL at 20:18

## 2023-01-01 RX ADMIN — LOSARTAN POTASSIUM 100 MG: 100 TABLET, FILM COATED ORAL at 08:41

## 2023-01-01 RX ADMIN — MAGNESIUM OXIDE TAB 400 MG (241.3 MG ELEMENTAL MG) 400 MG: 400 (241.3 MG) TAB at 10:13

## 2023-01-01 RX ADMIN — ACETAMINOPHEN 975 MG: 325 TABLET, FILM COATED ORAL at 14:08

## 2023-01-01 RX ADMIN — CILOSTAZOL 100 MG: 100 TABLET ORAL at 19:20

## 2023-01-01 RX ADMIN — ATORVASTATIN CALCIUM 40 MG: 40 TABLET, FILM COATED ORAL at 09:30

## 2023-01-01 RX ADMIN — CILOSTAZOL 100 MG: 100 TABLET ORAL at 20:09

## 2023-01-01 RX ADMIN — GABAPENTIN 100 MG: 100 CAPSULE ORAL at 13:19

## 2023-01-01 RX ADMIN — CEFAZOLIN SODIUM 2 G: 2 INJECTION, SOLUTION INTRAVENOUS at 14:48

## 2023-01-01 RX ADMIN — DONEPEZIL HYDROCHLORIDE 10 MG: 10 TABLET, FILM COATED ORAL at 22:05

## 2023-01-01 RX ADMIN — VANCOMYCIN HYDROCHLORIDE 1500 MG: 10 INJECTION, POWDER, LYOPHILIZED, FOR SOLUTION INTRAVENOUS at 23:03

## 2023-01-01 RX ADMIN — CILOSTAZOL 100 MG: 100 TABLET ORAL at 08:18

## 2023-01-01 RX ADMIN — CEFAZOLIN SODIUM 2 G: 2 INJECTION, SOLUTION INTRAVENOUS at 21:58

## 2023-01-01 RX ADMIN — OXYCODONE HYDROCHLORIDE 10 MG: 10 TABLET ORAL at 14:02

## 2023-01-01 RX ADMIN — CARVEDILOL 25 MG: 25 TABLET, FILM COATED ORAL at 19:41

## 2023-01-01 RX ADMIN — PANTOPRAZOLE SODIUM 40 MG: 40 TABLET, DELAYED RELEASE ORAL at 09:27

## 2023-01-01 RX ADMIN — CEFEPIME HYDROCHLORIDE 2 G: 2 INJECTION, POWDER, FOR SOLUTION INTRAVENOUS at 04:57

## 2023-01-01 RX ADMIN — SENNOSIDES AND DOCUSATE SODIUM 2 TABLET: 50; 8.6 TABLET ORAL at 07:41

## 2023-01-01 RX ADMIN — Medication 1 TABLET: at 08:16

## 2023-01-01 RX ADMIN — HYDRALAZINE HYDROCHLORIDE 10 MG: 20 INJECTION INTRAMUSCULAR; INTRAVENOUS at 14:01

## 2023-01-01 RX ADMIN — OXYCODONE HYDROCHLORIDE 10 MG: 10 TABLET ORAL at 06:14

## 2023-01-01 RX ADMIN — CLONIDINE HYDROCHLORIDE 0.1 MG: 0.1 TABLET ORAL at 02:13

## 2023-01-01 RX ADMIN — FENTANYL CITRATE 50 MCG: 50 INJECTION, SOLUTION INTRAMUSCULAR; INTRAVENOUS at 19:07

## 2023-01-01 RX ADMIN — Medication 200 MG: at 07:59

## 2023-01-01 RX ADMIN — HYDROMORPHONE HYDROCHLORIDE 0.3 MG: 1 INJECTION, SOLUTION INTRAMUSCULAR; INTRAVENOUS; SUBCUTANEOUS at 10:58

## 2023-01-01 RX ADMIN — SUGAMMADEX 200 MG: 100 INJECTION, SOLUTION INTRAVENOUS at 16:26

## 2023-01-01 RX ADMIN — GABAPENTIN 300 MG: 300 CAPSULE ORAL at 08:48

## 2023-01-01 RX ADMIN — ACETAMINOPHEN 975 MG: 325 TABLET, FILM COATED ORAL at 23:52

## 2023-01-01 RX ADMIN — ASPIRIN 81 MG CHEWABLE TABLET 81 MG: 81 TABLET CHEWABLE at 09:39

## 2023-01-01 RX ADMIN — Medication 100 MCG: at 18:09

## 2023-01-01 RX ADMIN — OXYCODONE HYDROCHLORIDE 10 MG: 5 TABLET ORAL at 02:38

## 2023-01-01 RX ADMIN — PANTOPRAZOLE SODIUM 40 MG: 40 TABLET, DELAYED RELEASE ORAL at 09:38

## 2023-01-01 RX ADMIN — PANTOPRAZOLE SODIUM 40 MG: 40 TABLET, DELAYED RELEASE ORAL at 09:04

## 2023-01-01 RX ADMIN — TRAZODONE HYDROCHLORIDE 50 MG: 50 TABLET ORAL at 02:30

## 2023-01-01 RX ADMIN — FAMOTIDINE 10 MG: 10 TABLET, FILM COATED ORAL at 09:27

## 2023-01-01 RX ADMIN — GADOBUTROL 7.5 ML: 604.72 INJECTION INTRAVENOUS at 13:22

## 2023-01-01 RX ADMIN — CEFAZOLIN SODIUM 2 G: 2 INJECTION, SOLUTION INTRAVENOUS at 13:56

## 2023-01-01 RX ADMIN — LOSARTAN POTASSIUM 100 MG: 100 TABLET, FILM COATED ORAL at 08:39

## 2023-01-01 RX ADMIN — ACETAMINOPHEN 975 MG: 325 TABLET, FILM COATED ORAL at 08:39

## 2023-01-01 RX ADMIN — OXYCODONE HYDROCHLORIDE 10 MG: 5 TABLET ORAL at 16:23

## 2023-01-01 RX ADMIN — HEPARIN SODIUM 1000 UNITS: 1000 INJECTION INTRAVENOUS; SUBCUTANEOUS at 13:29

## 2023-01-01 RX ADMIN — PANTOPRAZOLE SODIUM 40 MG: 40 TABLET, DELAYED RELEASE ORAL at 07:59

## 2023-01-01 RX ADMIN — SENNOSIDES AND DOCUSATE SODIUM 1 TABLET: 8.6; 5 TABLET ORAL at 09:27

## 2023-01-01 RX ADMIN — PANTOPRAZOLE SODIUM 40 MG: 40 TABLET, DELAYED RELEASE ORAL at 08:16

## 2023-01-01 RX ADMIN — DONEPEZIL HYDROCHLORIDE 10 MG: 10 TABLET, FILM COATED ORAL at 22:09

## 2023-01-01 RX ADMIN — CEFAZOLIN SODIUM 2 G: 2 INJECTION, SOLUTION INTRAVENOUS at 22:53

## 2023-01-01 RX ADMIN — PHENYLEPHRINE HYDROCHLORIDE 100 MCG: 10 INJECTION INTRAVENOUS at 15:55

## 2023-01-01 RX ADMIN — CARVEDILOL 25 MG: 25 TABLET, FILM COATED ORAL at 20:34

## 2023-01-01 RX ADMIN — OXYCODONE HYDROCHLORIDE 10 MG: 5 TABLET ORAL at 11:03

## 2023-01-01 RX ADMIN — CARVEDILOL 25 MG: 25 TABLET, FILM COATED ORAL at 20:56

## 2023-01-01 RX ADMIN — ASPIRIN 81 MG: 81 TABLET ORAL at 08:50

## 2023-01-01 RX ADMIN — ACETAMINOPHEN 975 MG: 325 TABLET, FILM COATED ORAL at 14:24

## 2023-01-01 RX ADMIN — DONEPEZIL HYDROCHLORIDE 10 MG: 10 TABLET, FILM COATED ORAL at 21:33

## 2023-01-01 RX ADMIN — HEPARIN SODIUM 200 UNITS/HR: 10000 INJECTION, SOLUTION INTRAVENOUS at 22:24

## 2023-01-01 RX ADMIN — KETOROLAC TROMETHAMINE 15 MG: 15 INJECTION, SOLUTION INTRAMUSCULAR; INTRAVENOUS at 00:08

## 2023-01-01 RX ADMIN — CILOSTAZOL 100 MG: 100 TABLET ORAL at 21:50

## 2023-01-01 RX ADMIN — CARVEDILOL 25 MG: 25 TABLET, FILM COATED ORAL at 07:55

## 2023-01-01 RX ADMIN — FAMOTIDINE 10 MG: 10 TABLET, FILM COATED ORAL at 19:34

## 2023-01-01 RX ADMIN — Medication 1 TABLET: at 08:03

## 2023-01-01 RX ADMIN — CEFAZOLIN 1 G: 1 INJECTION, POWDER, FOR SOLUTION INTRAMUSCULAR; INTRAVENOUS at 11:28

## 2023-01-01 RX ADMIN — TIZANIDINE 2 MG: 2 TABLET ORAL at 23:39

## 2023-01-01 RX ADMIN — HYDRALAZINE HYDROCHLORIDE 5 MG: 20 INJECTION INTRAMUSCULAR; INTRAVENOUS at 18:01

## 2023-01-01 RX ADMIN — Medication 50 MCG: at 07:37

## 2023-01-01 RX ADMIN — OXYCODONE HYDROCHLORIDE 5 MG: 5 TABLET ORAL at 15:32

## 2023-01-01 RX ADMIN — GABAPENTIN 300 MG: 300 CAPSULE ORAL at 20:15

## 2023-01-01 RX ADMIN — MAGNESIUM OXIDE TAB 400 MG (241.3 MG ELEMENTAL MG) 400 MG: 400 (241.3 MG) TAB at 12:00

## 2023-01-01 RX ADMIN — HYDROMORPHONE HYDROCHLORIDE 0.5 MG: 1 INJECTION, SOLUTION INTRAMUSCULAR; INTRAVENOUS; SUBCUTANEOUS at 15:16

## 2023-01-01 RX ADMIN — ENOXAPARIN SODIUM 70 MG: 80 INJECTION SUBCUTANEOUS at 20:09

## 2023-01-01 RX ADMIN — MAGNESIUM SULFATE IN WATER 4 G: 40 INJECTION, SOLUTION INTRAVENOUS at 06:02

## 2023-01-01 RX ADMIN — PROPOFOL 50 MG: 10 INJECTION, EMULSION INTRAVENOUS at 16:30

## 2023-01-01 RX ADMIN — AMINOPHYLLINE 100 MG: 25 INJECTION, SOLUTION INTRAVENOUS at 14:31

## 2023-01-01 RX ADMIN — CEFAZOLIN SODIUM 2 G: 2 INJECTION, SOLUTION INTRAVENOUS at 16:31

## 2023-01-01 RX ADMIN — HYDROMORPHONE HYDROCHLORIDE 0.4 MG: 0.2 INJECTION, SOLUTION INTRAMUSCULAR; INTRAVENOUS; SUBCUTANEOUS at 20:38

## 2023-01-01 RX ADMIN — OXYCODONE HYDROCHLORIDE 10 MG: 5 TABLET ORAL at 22:15

## 2023-01-01 RX ADMIN — GABAPENTIN 300 MG: 300 CAPSULE ORAL at 08:24

## 2023-01-01 RX ADMIN — OXYCODONE HYDROCHLORIDE 10 MG: 10 TABLET ORAL at 15:17

## 2023-01-01 RX ADMIN — CLONIDINE HYDROCHLORIDE 0.1 MG: 0.1 TABLET ORAL at 18:29

## 2023-01-01 RX ADMIN — HYDROMORPHONE HYDROCHLORIDE 0.3 MG: 1 INJECTION, SOLUTION INTRAMUSCULAR; INTRAVENOUS; SUBCUTANEOUS at 21:21

## 2023-01-01 RX ADMIN — KETOROLAC TROMETHAMINE 15 MG: 15 INJECTION, SOLUTION INTRAMUSCULAR; INTRAVENOUS at 11:24

## 2023-01-01 RX ADMIN — HEPARIN SODIUM 7000 UNITS: 1000 INJECTION INTRAVENOUS; SUBCUTANEOUS at 11:18

## 2023-01-01 RX ADMIN — CEFAZOLIN SODIUM 2 G: 2 INJECTION, SOLUTION INTRAVENOUS at 23:19

## 2023-01-01 RX ADMIN — APIXABAN 5 MG: 5 TABLET, FILM COATED ORAL at 20:17

## 2023-01-01 RX ADMIN — CEFAZOLIN 1 G: 1 INJECTION, POWDER, FOR SOLUTION INTRAMUSCULAR; INTRAVENOUS at 04:05

## 2023-01-01 RX ADMIN — OXYCODONE HYDROCHLORIDE 10 MG: 5 TABLET ORAL at 03:58

## 2023-01-01 RX ADMIN — ASPIRIN 81 MG CHEWABLE TABLET 81 MG: 81 TABLET CHEWABLE at 08:07

## 2023-01-01 RX ADMIN — GABAPENTIN 300 MG: 300 CAPSULE ORAL at 19:11

## 2023-01-01 RX ADMIN — HYDRALAZINE HYDROCHLORIDE 10 MG: 20 INJECTION INTRAMUSCULAR; INTRAVENOUS at 13:56

## 2023-01-01 RX ADMIN — PIPERACILLIN AND TAZOBACTAM 3.38 G: 3; .375 INJECTION, POWDER, LYOPHILIZED, FOR SOLUTION INTRAVENOUS at 04:31

## 2023-01-01 RX ADMIN — PANTOPRAZOLE SODIUM 40 MG: 40 TABLET, DELAYED RELEASE ORAL at 07:35

## 2023-01-01 RX ADMIN — ISOSORBIDE MONONITRATE 60 MG: 30 TABLET, EXTENDED RELEASE ORAL at 08:04

## 2023-01-01 RX ADMIN — ACETAMINOPHEN 975 MG: 325 TABLET, FILM COATED ORAL at 13:48

## 2023-01-01 RX ADMIN — OXYCODONE HYDROCHLORIDE 5 MG: 5 TABLET ORAL at 15:38

## 2023-01-01 RX ADMIN — CEFAZOLIN SODIUM 2 G: 2 INJECTION, SOLUTION INTRAVENOUS at 21:53

## 2023-01-01 RX ADMIN — CILOSTAZOL 100 MG: 100 TABLET ORAL at 20:45

## 2023-01-01 RX ADMIN — DONEPEZIL HYDROCHLORIDE 10 MG: 10 TABLET, FILM COATED ORAL at 22:24

## 2023-01-01 RX ADMIN — DONEPEZIL HYDROCHLORIDE 10 MG: 10 TABLET, FILM COATED ORAL at 21:53

## 2023-01-01 RX ADMIN — ACETAMINOPHEN 975 MG: 325 TABLET, FILM COATED ORAL at 21:39

## 2023-01-01 RX ADMIN — PIPERACILLIN AND TAZOBACTAM 3.38 G: 3; .375 INJECTION, POWDER, LYOPHILIZED, FOR SOLUTION INTRAVENOUS at 11:14

## 2023-01-01 RX ADMIN — CILOSTAZOL 100 MG: 100 TABLET ORAL at 07:36

## 2023-01-01 RX ADMIN — ACETAMINOPHEN 975 MG: 325 TABLET, FILM COATED ORAL at 08:49

## 2023-01-01 RX ADMIN — TRAZODONE HYDROCHLORIDE 50 MG: 50 TABLET ORAL at 22:33

## 2023-01-01 RX ADMIN — Medication 1 MG: at 23:48

## 2023-01-01 RX ADMIN — OXYMETAZOLINE HYDROCHLORIDE 2 SPRAY: 0.05 SPRAY NASAL at 08:06

## 2023-01-01 RX ADMIN — Medication 1 MG: at 22:08

## 2023-01-01 RX ADMIN — CEFAZOLIN SODIUM 2 G: 2 INJECTION, SOLUTION INTRAVENOUS at 20:23

## 2023-01-01 RX ADMIN — OXYCODONE HYDROCHLORIDE 10 MG: 5 TABLET ORAL at 16:16

## 2023-01-01 RX ADMIN — PHENYLEPHRINE HYDROCHLORIDE 100 MCG: 10 INJECTION INTRAVENOUS at 09:30

## 2023-01-01 RX ADMIN — CARVEDILOL 25 MG: 25 TABLET, FILM COATED ORAL at 09:35

## 2023-01-01 RX ADMIN — ONDANSETRON 4 MG: 4 TABLET, ORALLY DISINTEGRATING ORAL at 03:56

## 2023-01-01 RX ADMIN — CILOSTAZOL 100 MG: 100 TABLET ORAL at 19:26

## 2023-01-01 RX ADMIN — FUROSEMIDE 40 MG: 40 TABLET ORAL at 13:19

## 2023-01-01 RX ADMIN — APIXABAN 5 MG: 5 TABLET, FILM COATED ORAL at 20:42

## 2023-01-01 RX ADMIN — GABAPENTIN 300 MG: 300 CAPSULE ORAL at 20:08

## 2023-01-01 RX ADMIN — GABAPENTIN 300 MG: 300 CAPSULE ORAL at 20:32

## 2023-01-01 RX ADMIN — OXYCODONE HYDROCHLORIDE 10 MG: 5 TABLET ORAL at 13:54

## 2023-01-01 RX ADMIN — ACETAMINOPHEN 975 MG: 325 TABLET, FILM COATED ORAL at 13:01

## 2023-01-01 RX ADMIN — ATORVASTATIN CALCIUM 40 MG: 40 TABLET, FILM COATED ORAL at 09:01

## 2023-01-01 RX ADMIN — ACETAMINOPHEN 975 MG: 325 TABLET, FILM COATED ORAL at 09:27

## 2023-01-01 RX ADMIN — LIDOCAINE HYDROCHLORIDE ANHYDROUS 3 ML: 10 INJECTION, SOLUTION INFILTRATION at 14:40

## 2023-01-01 RX ADMIN — Medication 0.5 UNITS: at 09:49

## 2023-01-01 RX ADMIN — CEFAZOLIN SODIUM 2 G: 2 INJECTION, SOLUTION INTRAVENOUS at 05:21

## 2023-01-01 RX ADMIN — CARVEDILOL 25 MG: 25 TABLET, FILM COATED ORAL at 20:52

## 2023-01-01 RX ADMIN — ATORVASTATIN CALCIUM 40 MG: 40 TABLET, FILM COATED ORAL at 08:20

## 2023-01-01 RX ADMIN — CEFAZOLIN 1 G: 1 INJECTION, POWDER, FOR SOLUTION INTRAMUSCULAR; INTRAVENOUS at 20:54

## 2023-01-01 RX ADMIN — TRAZODONE HYDROCHLORIDE 50 MG: 50 TABLET ORAL at 21:25

## 2023-01-01 RX ADMIN — CILOSTAZOL 100 MG: 100 TABLET ORAL at 08:44

## 2023-01-01 RX ADMIN — VANCOMYCIN HYDROCHLORIDE 750 MG: 1 INJECTION, POWDER, LYOPHILIZED, FOR SOLUTION INTRAVENOUS at 02:58

## 2023-01-01 RX ADMIN — ATORVASTATIN CALCIUM 40 MG: 40 TABLET, FILM COATED ORAL at 08:06

## 2023-01-01 RX ADMIN — AMLODIPINE BESYLATE 10 MG: 10 TABLET ORAL at 08:48

## 2023-01-01 RX ADMIN — GABAPENTIN 100 MG: 100 CAPSULE ORAL at 08:26

## 2023-01-01 RX ADMIN — GABAPENTIN 300 MG: 300 CAPSULE ORAL at 08:57

## 2023-01-01 RX ADMIN — OXYCODONE HYDROCHLORIDE 10 MG: 5 TABLET ORAL at 11:01

## 2023-01-01 RX ADMIN — OXYMETAZOLINE HYDROCHLORIDE 2 SPRAY: 0.05 SPRAY NASAL at 21:21

## 2023-01-01 RX ADMIN — ATORVASTATIN CALCIUM 40 MG: 40 TABLET, FILM COATED ORAL at 08:04

## 2023-01-01 RX ADMIN — APIXABAN 5 MG: 5 TABLET, FILM COATED ORAL at 08:23

## 2023-01-01 RX ADMIN — HEPARIN SODIUM 5000 UNITS: 10000 INJECTION, SOLUTION INTRAVENOUS; SUBCUTANEOUS at 06:17

## 2023-01-01 RX ADMIN — CILOSTAZOL 100 MG: 100 TABLET ORAL at 08:26

## 2023-01-01 RX ADMIN — Medication 50 MCG: at 08:20

## 2023-01-01 RX ADMIN — DONEPEZIL HYDROCHLORIDE 10 MG: 10 TABLET, FILM COATED ORAL at 21:21

## 2023-01-01 RX ADMIN — APIXABAN 5 MG: 5 TABLET, FILM COATED ORAL at 20:15

## 2023-01-01 RX ADMIN — GABAPENTIN 300 MG: 300 CAPSULE ORAL at 08:34

## 2023-01-01 RX ADMIN — FOLIC ACID 1 MG: 1 TABLET ORAL at 09:41

## 2023-01-01 RX ADMIN — ENOXAPARIN SODIUM 70 MG: 80 INJECTION SUBCUTANEOUS at 08:46

## 2023-01-01 RX ADMIN — Medication 50 MG: at 15:30

## 2023-01-01 RX ADMIN — FAMOTIDINE 10 MG: 10 TABLET, FILM COATED ORAL at 19:11

## 2023-01-01 RX ADMIN — HYDROMORPHONE HYDROCHLORIDE 0.2 MG: 0.2 INJECTION, SOLUTION INTRAMUSCULAR; INTRAVENOUS; SUBCUTANEOUS at 15:17

## 2023-01-01 RX ADMIN — HYDRALAZINE HYDROCHLORIDE 5 MG: 20 INJECTION INTRAMUSCULAR; INTRAVENOUS at 18:22

## 2023-01-01 RX ADMIN — HYDROMORPHONE HYDROCHLORIDE 0.3 MG: 1 INJECTION, SOLUTION INTRAMUSCULAR; INTRAVENOUS; SUBCUTANEOUS at 18:28

## 2023-01-01 RX ADMIN — GABAPENTIN 300 MG: 300 CAPSULE ORAL at 13:48

## 2023-01-01 RX ADMIN — FAMOTIDINE 10 MG: 10 TABLET, FILM COATED ORAL at 20:30

## 2023-01-01 RX ADMIN — SPIRONOLACTONE 25 MG: 25 TABLET ORAL at 08:13

## 2023-01-01 RX ADMIN — Medication 1 TABLET: at 08:17

## 2023-01-01 RX ADMIN — CEFAZOLIN SODIUM 2 G: 2 INJECTION, SOLUTION INTRAVENOUS at 14:00

## 2023-01-01 RX ADMIN — GABAPENTIN 300 MG: 300 CAPSULE ORAL at 09:01

## 2023-01-01 RX ADMIN — OXYCODONE HYDROCHLORIDE 10 MG: 10 TABLET ORAL at 06:09

## 2023-01-01 RX ADMIN — Medication 1 TABLET: at 08:34

## 2023-01-01 RX ADMIN — ACETAMINOPHEN 975 MG: 325 TABLET, FILM COATED ORAL at 14:23

## 2023-01-01 RX ADMIN — CARVEDILOL 25 MG: 25 TABLET, FILM COATED ORAL at 19:32

## 2023-01-01 RX ADMIN — ACETAMINOPHEN 975 MG: 325 TABLET, FILM COATED ORAL at 22:30

## 2023-01-01 RX ADMIN — APIXABAN 5 MG: 5 TABLET, FILM COATED ORAL at 08:34

## 2023-01-01 RX ADMIN — HYDROMORPHONE HYDROCHLORIDE 0.2 MG: 0.2 INJECTION, SOLUTION INTRAMUSCULAR; INTRAVENOUS; SUBCUTANEOUS at 15:11

## 2023-01-01 RX ADMIN — HYDROMORPHONE HYDROCHLORIDE 0.5 MG: 1 INJECTION, SOLUTION INTRAMUSCULAR; INTRAVENOUS; SUBCUTANEOUS at 12:25

## 2023-01-01 RX ADMIN — OXYCODONE HYDROCHLORIDE 5 MG: 5 TABLET ORAL at 02:00

## 2023-01-01 RX ADMIN — CILOSTAZOL 100 MG: 100 TABLET ORAL at 09:04

## 2023-01-01 RX ADMIN — OXYCODONE HYDROCHLORIDE 10 MG: 5 TABLET ORAL at 08:24

## 2023-01-01 RX ADMIN — KETOROLAC TROMETHAMINE 15 MG: 15 INJECTION, SOLUTION INTRAMUSCULAR; INTRAVENOUS at 11:51

## 2023-01-01 RX ADMIN — OXYCODONE HYDROCHLORIDE 10 MG: 5 TABLET ORAL at 19:02

## 2023-01-01 RX ADMIN — PROCHLORPERAZINE EDISYLATE 5 MG: 5 INJECTION INTRAMUSCULAR; INTRAVENOUS at 17:23

## 2023-01-01 RX ADMIN — APIXABAN 5 MG: 5 TABLET, FILM COATED ORAL at 07:40

## 2023-01-01 RX ADMIN — PROCHLORPERAZINE MALEATE 5 MG: 5 TABLET ORAL at 12:49

## 2023-01-01 RX ADMIN — SENNOSIDES AND DOCUSATE SODIUM 1 TABLET: 8.6; 5 TABLET ORAL at 20:08

## 2023-01-01 RX ADMIN — HYDRALAZINE HYDROCHLORIDE 10 MG: 20 INJECTION INTRAMUSCULAR; INTRAVENOUS at 20:02

## 2023-01-01 RX ADMIN — CARVEDILOL 25 MG: 25 TABLET, FILM COATED ORAL at 13:01

## 2023-01-01 RX ADMIN — LOSARTAN POTASSIUM 50 MG: 50 TABLET, FILM COATED ORAL at 08:42

## 2023-01-01 RX ADMIN — CEFAZOLIN SODIUM 2 G: 2 INJECTION, SOLUTION INTRAVENOUS at 03:52

## 2023-01-01 RX ADMIN — TRAZODONE HYDROCHLORIDE 50 MG: 50 TABLET ORAL at 21:44

## 2023-01-01 RX ADMIN — OXYCODONE HYDROCHLORIDE 10 MG: 5 TABLET ORAL at 19:56

## 2023-01-01 RX ADMIN — HYDROMORPHONE HYDROCHLORIDE 0.3 MG: 1 INJECTION, SOLUTION INTRAMUSCULAR; INTRAVENOUS; SUBCUTANEOUS at 00:10

## 2023-01-01 RX ADMIN — CLONIDINE HYDROCHLORIDE 0.1 MG: 0.1 TABLET ORAL at 02:31

## 2023-01-01 RX ADMIN — TRAZODONE HYDROCHLORIDE 50 MG: 50 TABLET ORAL at 06:34

## 2023-01-01 RX ADMIN — OXYCODONE HYDROCHLORIDE 10 MG: 5 TABLET ORAL at 08:11

## 2023-01-01 RX ADMIN — ONDANSETRON 4 MG: 2 INJECTION INTRAMUSCULAR; INTRAVENOUS at 04:14

## 2023-01-01 RX ADMIN — SODIUM CHLORIDE: 9 INJECTION, SOLUTION INTRAVENOUS at 04:24

## 2023-01-01 RX ADMIN — CEFAZOLIN SODIUM 2 G: 2 INJECTION, SOLUTION INTRAVENOUS at 10:54

## 2023-01-01 RX ADMIN — Medication 2 G: at 12:42

## 2023-01-01 RX ADMIN — ONDANSETRON 4 MG: 4 TABLET, ORALLY DISINTEGRATING ORAL at 06:58

## 2023-01-01 RX ADMIN — OXYCODONE HYDROCHLORIDE 10 MG: 5 TABLET ORAL at 19:11

## 2023-01-01 RX ADMIN — PANTOPRAZOLE SODIUM 40 MG: 40 TABLET, DELAYED RELEASE ORAL at 09:35

## 2023-01-01 RX ADMIN — ATORVASTATIN CALCIUM 40 MG: 40 TABLET, FILM COATED ORAL at 08:16

## 2023-01-01 RX ADMIN — OXYCODONE HYDROCHLORIDE 10 MG: 10 TABLET ORAL at 03:15

## 2023-01-01 RX ADMIN — ASPIRIN 81 MG: 81 TABLET ORAL at 08:05

## 2023-01-01 RX ADMIN — LOSARTAN POTASSIUM 100 MG: 100 TABLET, FILM COATED ORAL at 08:11

## 2023-01-01 RX ADMIN — ASPIRIN 81 MG CHEWABLE TABLET 81 MG: 81 TABLET CHEWABLE at 08:34

## 2023-01-01 RX ADMIN — CILOSTAZOL 100 MG: 100 TABLET ORAL at 08:04

## 2023-01-01 RX ADMIN — ONDANSETRON 4 MG: 2 INJECTION INTRAMUSCULAR; INTRAVENOUS at 21:37

## 2023-01-01 RX ADMIN — CLONIDINE HYDROCHLORIDE 0.1 MG: 0.1 TABLET ORAL at 17:39

## 2023-01-01 RX ADMIN — HYDROMORPHONE HYDROCHLORIDE 0.2 MG: 0.2 INJECTION, SOLUTION INTRAMUSCULAR; INTRAVENOUS; SUBCUTANEOUS at 06:01

## 2023-01-01 RX ADMIN — PROCHLORPERAZINE EDISYLATE 5 MG: 5 INJECTION INTRAMUSCULAR; INTRAVENOUS at 07:47

## 2023-01-01 RX ADMIN — ACETAMINOPHEN 975 MG: 325 TABLET, FILM COATED ORAL at 11:43

## 2023-01-01 RX ADMIN — KETOROLAC TROMETHAMINE 15 MG: 30 INJECTION, SOLUTION INTRAMUSCULAR; INTRAVENOUS at 16:11

## 2023-01-01 RX ADMIN — ATORVASTATIN CALCIUM 40 MG: 40 TABLET, FILM COATED ORAL at 08:03

## 2023-01-01 RX ADMIN — OXYCODONE HYDROCHLORIDE 10 MG: 5 TABLET ORAL at 04:24

## 2023-01-01 RX ADMIN — ASPIRIN 81 MG: 81 TABLET, COATED ORAL at 08:42

## 2023-01-01 RX ADMIN — ISOSORBIDE MONONITRATE 120 MG: 60 TABLET, EXTENDED RELEASE ORAL at 08:06

## 2023-01-01 RX ADMIN — CLONIDINE HYDROCHLORIDE 0.1 MG: 0.1 TABLET ORAL at 18:34

## 2023-01-01 RX ADMIN — ACETAMINOPHEN 975 MG: 325 TABLET, FILM COATED ORAL at 09:26

## 2023-01-01 RX ADMIN — OXYCODONE HYDROCHLORIDE 10 MG: 10 TABLET ORAL at 22:45

## 2023-01-01 RX ADMIN — DONEPEZIL HYDROCHLORIDE 10 MG: 10 TABLET, FILM COATED ORAL at 22:15

## 2023-01-01 RX ADMIN — CARVEDILOL 12.5 MG: 12.5 TABLET, FILM COATED ORAL at 08:17

## 2023-01-01 RX ADMIN — ACETAMINOPHEN 975 MG: 325 TABLET, FILM COATED ORAL at 08:06

## 2023-01-01 RX ADMIN — TIZANIDINE 2 MG: 2 TABLET ORAL at 00:57

## 2023-01-01 RX ADMIN — OXYCODONE HYDROCHLORIDE 10 MG: 5 TABLET ORAL at 05:20

## 2023-01-01 RX ADMIN — NICOTINE 1 PATCH: 14 PATCH, EXTENDED RELEASE TRANSDERMAL at 10:35

## 2023-01-01 RX ADMIN — ASPIRIN 81 MG CHEWABLE TABLET 81 MG: 81 TABLET CHEWABLE at 08:17

## 2023-01-01 RX ADMIN — Medication 10 MG: at 14:34

## 2023-01-01 RX ADMIN — DONEPEZIL HYDROCHLORIDE 10 MG: 10 TABLET, FILM COATED ORAL at 22:04

## 2023-01-01 RX ADMIN — ACETAMINOPHEN 975 MG: 325 TABLET, FILM COATED ORAL at 13:21

## 2023-01-01 RX ADMIN — OXYCODONE HYDROCHLORIDE 5 MG: 5 TABLET ORAL at 08:16

## 2023-01-01 RX ADMIN — Medication 1 TABLET: at 08:07

## 2023-01-01 RX ADMIN — FAMOTIDINE 10 MG: 10 TABLET, FILM COATED ORAL at 20:38

## 2023-01-01 RX ADMIN — VANCOMYCIN HYDROCHLORIDE 750 MG: 1 INJECTION, POWDER, LYOPHILIZED, FOR SOLUTION INTRAVENOUS at 02:31

## 2023-01-01 RX ADMIN — OXYCODONE HYDROCHLORIDE 10 MG: 5 TABLET ORAL at 11:25

## 2023-01-01 RX ADMIN — HYDROMORPHONE HYDROCHLORIDE 0.2 MG: 0.2 INJECTION, SOLUTION INTRAMUSCULAR; INTRAVENOUS; SUBCUTANEOUS at 02:31

## 2023-01-01 RX ADMIN — CLONIDINE HYDROCHLORIDE 0.1 MG: 0.1 TABLET ORAL at 10:56

## 2023-01-01 RX ADMIN — OXYCODONE HYDROCHLORIDE 5 MG: 5 TABLET ORAL at 05:35

## 2023-01-01 RX ADMIN — OXYCODONE HYDROCHLORIDE 10 MG: 10 TABLET ORAL at 08:09

## 2023-01-01 RX ADMIN — OXYCODONE HYDROCHLORIDE 10 MG: 5 TABLET ORAL at 19:32

## 2023-01-01 RX ADMIN — CLONIDINE HYDROCHLORIDE 0.1 MG: 0.1 TABLET ORAL at 09:35

## 2023-01-01 RX ADMIN — ISOSORBIDE MONONITRATE 120 MG: 60 TABLET, EXTENDED RELEASE ORAL at 09:07

## 2023-01-01 RX ADMIN — LOSARTAN POTASSIUM 100 MG: 100 TABLET, FILM COATED ORAL at 08:25

## 2023-01-01 RX ADMIN — PANTOPRAZOLE SODIUM 40 MG: 40 TABLET, DELAYED RELEASE ORAL at 08:18

## 2023-01-01 RX ADMIN — ACETAMINOPHEN 975 MG: 325 TABLET, FILM COATED ORAL at 20:13

## 2023-01-01 RX ADMIN — MIRTAZAPINE 15 MG: 15 TABLET, FILM COATED ORAL at 21:25

## 2023-01-01 RX ADMIN — GABAPENTIN 100 MG: 100 CAPSULE ORAL at 20:57

## 2023-01-01 RX ADMIN — ACETAMINOPHEN 975 MG: 325 TABLET, FILM COATED ORAL at 13:54

## 2023-01-01 RX ADMIN — ASPIRIN 81 MG: 81 TABLET ORAL at 08:41

## 2023-01-01 RX ADMIN — ACETAMINOPHEN 975 MG: 325 TABLET, FILM COATED ORAL at 14:03

## 2023-01-01 RX ADMIN — OXYCODONE HYDROCHLORIDE 10 MG: 5 TABLET ORAL at 00:01

## 2023-01-01 RX ADMIN — SENNOSIDES AND DOCUSATE SODIUM 1 TABLET: 8.6; 5 TABLET ORAL at 08:49

## 2023-01-01 RX ADMIN — PANTOPRAZOLE SODIUM 40 MG: 40 TABLET, DELAYED RELEASE ORAL at 08:45

## 2023-01-01 RX ADMIN — GABAPENTIN 300 MG: 300 CAPSULE ORAL at 20:17

## 2023-01-01 RX ADMIN — HYDROMORPHONE HYDROCHLORIDE 0.4 MG: 0.2 INJECTION, SOLUTION INTRAMUSCULAR; INTRAVENOUS; SUBCUTANEOUS at 05:01

## 2023-01-01 RX ADMIN — LOSARTAN POTASSIUM 50 MG: 50 TABLET, FILM COATED ORAL at 09:28

## 2023-01-01 RX ADMIN — HEPARIN SODIUM 800 UNITS/HR: 10000 INJECTION, SOLUTION INTRAVENOUS at 19:06

## 2023-01-01 RX ADMIN — AMLODIPINE BESYLATE 10 MG: 10 TABLET ORAL at 07:35

## 2023-01-01 RX ADMIN — BUPIVACAINE HYDROCHLORIDE 20 ML: 5 INJECTION, SOLUTION EPIDURAL; INTRACAUDAL at 07:20

## 2023-01-01 RX ADMIN — ACETAMINOPHEN 975 MG: 325 TABLET, FILM COATED ORAL at 08:16

## 2023-01-01 RX ADMIN — FUROSEMIDE 40 MG: 10 INJECTION, SOLUTION INTRAMUSCULAR; INTRAVENOUS at 10:23

## 2023-01-01 RX ADMIN — POTASSIUM CHLORIDE 20 MEQ: 750 TABLET, EXTENDED RELEASE ORAL at 07:35

## 2023-01-01 RX ADMIN — GABAPENTIN 100 MG: 100 CAPSULE ORAL at 08:20

## 2023-01-01 RX ADMIN — CARVEDILOL 12.5 MG: 12.5 TABLET, FILM COATED ORAL at 08:25

## 2023-01-01 RX ADMIN — PANTOPRAZOLE SODIUM 40 MG: 40 TABLET, DELAYED RELEASE ORAL at 08:55

## 2023-01-01 RX ADMIN — ACETAMINOPHEN 975 MG: 325 TABLET, FILM COATED ORAL at 18:37

## 2023-01-01 RX ADMIN — CILOSTAZOL 100 MG: 100 TABLET ORAL at 19:35

## 2023-01-01 RX ADMIN — MAGNESIUM SULFATE HEPTAHYDRATE 2 G: 40 INJECTION, SOLUTION INTRAVENOUS at 01:53

## 2023-01-01 RX ADMIN — CEFAZOLIN SODIUM 2 G: 2 INJECTION, SOLUTION INTRAVENOUS at 08:12

## 2023-01-01 RX ADMIN — ONDANSETRON 4 MG: 2 INJECTION INTRAMUSCULAR; INTRAVENOUS at 15:43

## 2023-01-01 RX ADMIN — HYDROMORPHONE HYDROCHLORIDE 0.3 MG: 1 INJECTION, SOLUTION INTRAMUSCULAR; INTRAVENOUS; SUBCUTANEOUS at 09:37

## 2023-01-01 RX ADMIN — ISOSORBIDE MONONITRATE 60 MG: 30 TABLET, EXTENDED RELEASE ORAL at 07:40

## 2023-01-01 RX ADMIN — OXYCODONE HYDROCHLORIDE 5 MG: 5 TABLET ORAL at 10:13

## 2023-01-01 RX ADMIN — CILOSTAZOL 100 MG: 100 TABLET ORAL at 21:39

## 2023-01-01 RX ADMIN — MAGNESIUM SULFATE IN WATER 2 G: 40 INJECTION, SOLUTION INTRAVENOUS at 20:19

## 2023-01-01 RX ADMIN — HEPARIN SODIUM 2000 UNITS: 1000 INJECTION INTRAVENOUS; SUBCUTANEOUS at 15:50

## 2023-01-01 RX ADMIN — FAMOTIDINE 10 MG: 10 TABLET, FILM COATED ORAL at 20:18

## 2023-01-01 RX ADMIN — CEFAZOLIN 1 G: 1 INJECTION, POWDER, FOR SOLUTION INTRAMUSCULAR; INTRAVENOUS at 03:09

## 2023-01-01 RX ADMIN — TRAZODONE HYDROCHLORIDE 50 MG: 50 TABLET ORAL at 21:33

## 2023-01-01 RX ADMIN — TIZANIDINE 2 MG: 2 TABLET ORAL at 01:19

## 2023-01-01 RX ADMIN — OXYCODONE HYDROCHLORIDE 10 MG: 5 TABLET ORAL at 09:01

## 2023-01-01 RX ADMIN — CARVEDILOL 25 MG: 25 TABLET, FILM COATED ORAL at 08:12

## 2023-01-01 RX ADMIN — CLONIDINE HYDROCHLORIDE 0.1 MG: 0.1 TABLET ORAL at 10:13

## 2023-01-01 RX ADMIN — FAMOTIDINE 10 MG: 10 TABLET, FILM COATED ORAL at 09:50

## 2023-01-01 RX ADMIN — LOSARTAN POTASSIUM 50 MG: 50 TABLET, FILM COATED ORAL at 13:56

## 2023-01-01 RX ADMIN — SERTRALINE HYDROCHLORIDE 25 MG: 25 TABLET ORAL at 09:27

## 2023-01-01 RX ADMIN — APIXABAN 5 MG: 5 TABLET, FILM COATED ORAL at 09:28

## 2023-01-01 RX ADMIN — ASPIRIN 81 MG CHEWABLE TABLET 81 MG: 81 TABLET CHEWABLE at 09:35

## 2023-01-01 RX ADMIN — DEXAMETHASONE SODIUM PHOSPHATE 8 MG: 4 INJECTION, SOLUTION INTRA-ARTICULAR; INTRALESIONAL; INTRAMUSCULAR; INTRAVENOUS; SOFT TISSUE at 15:30

## 2023-01-01 RX ADMIN — GABAPENTIN 100 MG: 100 CAPSULE ORAL at 15:17

## 2023-01-01 RX ADMIN — CEFAZOLIN SODIUM 2 G: 2 INJECTION, SOLUTION INTRAVENOUS at 05:45

## 2023-01-01 RX ADMIN — ACETAMINOPHEN 975 MG: 325 TABLET, FILM COATED ORAL at 14:05

## 2023-01-01 RX ADMIN — SENNOSIDES AND DOCUSATE SODIUM 1 TABLET: 8.6; 5 TABLET ORAL at 19:11

## 2023-01-01 RX ADMIN — MAGNESIUM SULFATE IN WATER 2 G: 40 INJECTION, SOLUTION INTRAVENOUS at 14:01

## 2023-01-01 RX ADMIN — MAGNESIUM OXIDE TAB 400 MG (241.3 MG ELEMENTAL MG) 400 MG: 400 (241.3 MG) TAB at 20:19

## 2023-01-01 RX ADMIN — OXYCODONE HYDROCHLORIDE 10 MG: 5 TABLET ORAL at 20:16

## 2023-01-01 RX ADMIN — CARVEDILOL 25 MG: 25 TABLET, FILM COATED ORAL at 19:26

## 2023-01-01 RX ADMIN — CEFAZOLIN SODIUM 2 G: 2 INJECTION, SOLUTION INTRAVENOUS at 12:23

## 2023-01-01 RX ADMIN — ISOSORBIDE MONONITRATE 120 MG: 60 TABLET, EXTENDED RELEASE ORAL at 08:41

## 2023-01-01 RX ADMIN — GABAPENTIN 100 MG: 100 CAPSULE ORAL at 19:41

## 2023-01-01 RX ADMIN — ACETAMINOPHEN 975 MG: 325 TABLET, FILM COATED ORAL at 13:39

## 2023-01-01 RX ADMIN — HYDROMORPHONE HYDROCHLORIDE 0.4 MG: 0.2 INJECTION, SOLUTION INTRAMUSCULAR; INTRAVENOUS; SUBCUTANEOUS at 20:06

## 2023-01-01 RX ADMIN — HYDRALAZINE HYDROCHLORIDE 10 MG: 20 INJECTION INTRAMUSCULAR; INTRAVENOUS at 03:21

## 2023-01-01 RX ADMIN — CEFAZOLIN SODIUM 2 G: 2 INJECTION, SOLUTION INTRAVENOUS at 02:40

## 2023-01-01 RX ADMIN — SENNOSIDES AND DOCUSATE SODIUM 1 TABLET: 8.6; 5 TABLET ORAL at 20:38

## 2023-01-01 RX ADMIN — OXYCODONE HYDROCHLORIDE 10 MG: 5 TABLET ORAL at 04:45

## 2023-01-01 RX ADMIN — HYDROMORPHONE HYDROCHLORIDE 0.5 MG: 1 INJECTION, SOLUTION INTRAMUSCULAR; INTRAVENOUS; SUBCUTANEOUS at 22:54

## 2023-01-01 RX ADMIN — CILOSTAZOL 100 MG: 100 TABLET ORAL at 20:34

## 2023-01-01 RX ADMIN — SENNOSIDES AND DOCUSATE SODIUM 1 TABLET: 8.6; 5 TABLET ORAL at 08:42

## 2023-01-01 RX ADMIN — DONEPEZIL HYDROCHLORIDE 10 MG: 10 TABLET, FILM COATED ORAL at 22:14

## 2023-01-01 RX ADMIN — ACETAMINOPHEN 975 MG: 325 TABLET, FILM COATED ORAL at 12:33

## 2023-01-01 RX ADMIN — GABAPENTIN 100 MG: 100 CAPSULE ORAL at 20:52

## 2023-01-01 RX ADMIN — OXYCODONE HYDROCHLORIDE 10 MG: 10 TABLET ORAL at 02:28

## 2023-01-01 RX ADMIN — CILOSTAZOL 100 MG: 100 TABLET ORAL at 20:54

## 2023-01-01 RX ADMIN — THIAMINE HCL TAB 100 MG 100 MG: 100 TAB at 08:18

## 2023-01-01 RX ADMIN — ASPIRIN 81 MG CHEWABLE TABLET 81 MG: 81 TABLET CHEWABLE at 07:54

## 2023-01-01 RX ADMIN — ASPIRIN 81 MG CHEWABLE TABLET 81 MG: 81 TABLET CHEWABLE at 09:27

## 2023-01-01 RX ADMIN — TIZANIDINE 2 MG: 2 TABLET ORAL at 19:08

## 2023-01-01 RX ADMIN — SODIUM CHLORIDE, POTASSIUM CHLORIDE, SODIUM LACTATE AND CALCIUM CHLORIDE: 600; 310; 30; 20 INJECTION, SOLUTION INTRAVENOUS at 14:07

## 2023-01-01 RX ADMIN — GABAPENTIN 300 MG: 300 CAPSULE ORAL at 09:42

## 2023-01-01 RX ADMIN — AMLODIPINE BESYLATE 10 MG: 10 TABLET ORAL at 08:57

## 2023-01-01 RX ADMIN — SENNOSIDES AND DOCUSATE SODIUM 2 TABLET: 50; 8.6 TABLET ORAL at 08:06

## 2023-01-01 RX ADMIN — SPIRONOLACTONE 25 MG: 25 TABLET ORAL at 07:41

## 2023-01-01 RX ADMIN — AMLODIPINE BESYLATE 10 MG: 10 TABLET ORAL at 08:18

## 2023-01-01 RX ADMIN — CLONIDINE HYDROCHLORIDE 0.1 MG: 0.1 TABLET ORAL at 01:44

## 2023-01-01 RX ADMIN — MAGNESIUM SULFATE IN WATER 4 G: 40 INJECTION, SOLUTION INTRAVENOUS at 01:27

## 2023-01-01 RX ADMIN — ACETAMINOPHEN 975 MG: 325 TABLET, FILM COATED ORAL at 09:35

## 2023-01-01 RX ADMIN — HYDROMORPHONE HYDROCHLORIDE 0.3 MG: 1 INJECTION, SOLUTION INTRAMUSCULAR; INTRAVENOUS; SUBCUTANEOUS at 16:04

## 2023-01-01 RX ADMIN — FAMOTIDINE 10 MG: 10 TABLET, FILM COATED ORAL at 08:05

## 2023-01-01 RX ADMIN — ACETAMINOPHEN 975 MG: 325 TABLET, FILM COATED ORAL at 09:43

## 2023-01-01 RX ADMIN — ATORVASTATIN CALCIUM 40 MG: 40 TABLET, FILM COATED ORAL at 08:49

## 2023-01-01 RX ADMIN — GABAPENTIN 300 MG: 300 CAPSULE ORAL at 08:58

## 2023-01-01 RX ADMIN — HYDROMORPHONE HYDROCHLORIDE 0.2 MG: 0.2 INJECTION, SOLUTION INTRAMUSCULAR; INTRAVENOUS; SUBCUTANEOUS at 08:06

## 2023-01-01 RX ADMIN — OXYCODONE HYDROCHLORIDE 10 MG: 5 TABLET ORAL at 20:12

## 2023-01-01 RX ADMIN — THIAMINE HCL TAB 100 MG 100 MG: 100 TAB at 08:17

## 2023-01-01 RX ADMIN — APIXABAN 5 MG: 5 TABLET, FILM COATED ORAL at 08:26

## 2023-01-01 RX ADMIN — MAGNESIUM SULFATE IN WATER 2 G: 40 INJECTION, SOLUTION INTRAVENOUS at 09:39

## 2023-01-01 RX ADMIN — GABAPENTIN 300 MG: 300 CAPSULE ORAL at 09:27

## 2023-01-01 RX ADMIN — SENNOSIDES AND DOCUSATE SODIUM 1 TABLET: 8.6; 5 TABLET ORAL at 08:24

## 2023-01-01 RX ADMIN — CEFAZOLIN SODIUM 2 G: 2 INJECTION, SOLUTION INTRAVENOUS at 13:18

## 2023-01-01 RX ADMIN — CARVEDILOL 25 MG: 25 TABLET, FILM COATED ORAL at 20:17

## 2023-01-01 RX ADMIN — SODIUM CHLORIDE, POTASSIUM CHLORIDE, SODIUM LACTATE AND CALCIUM CHLORIDE: 600; 310; 30; 20 INJECTION, SOLUTION INTRAVENOUS at 21:00

## 2023-01-01 RX ADMIN — APIXABAN 5 MG: 5 TABLET, FILM COATED ORAL at 20:30

## 2023-01-01 RX ADMIN — OXYCODONE HYDROCHLORIDE 5 MG: 5 TABLET ORAL at 23:29

## 2023-01-01 RX ADMIN — SENNOSIDES AND DOCUSATE SODIUM 1 TABLET: 8.6; 5 TABLET ORAL at 20:18

## 2023-01-01 RX ADMIN — GABAPENTIN 300 MG: 300 CAPSULE ORAL at 19:34

## 2023-01-01 RX ADMIN — OXYCODONE HYDROCHLORIDE 5 MG: 5 TABLET ORAL at 20:27

## 2023-01-01 RX ADMIN — GABAPENTIN 100 MG: 100 CAPSULE ORAL at 20:23

## 2023-01-01 RX ADMIN — ACETAMINOPHEN 975 MG: 325 TABLET, FILM COATED ORAL at 04:04

## 2023-01-01 RX ADMIN — DONEPEZIL HYDROCHLORIDE 10 MG: 10 TABLET, FILM COATED ORAL at 23:29

## 2023-01-01 RX ADMIN — CLONIDINE HYDROCHLORIDE 0.1 MG: 0.1 TABLET ORAL at 18:33

## 2023-01-01 RX ADMIN — APIXABAN 5 MG: 5 TABLET, FILM COATED ORAL at 07:41

## 2023-01-01 RX ADMIN — OXYCODONE HYDROCHLORIDE 5 MG: 5 TABLET ORAL at 22:20

## 2023-01-01 RX ADMIN — KETOROLAC TROMETHAMINE 15 MG: 30 INJECTION, SOLUTION INTRAMUSCULAR; INTRAVENOUS at 09:48

## 2023-01-01 RX ADMIN — ATORVASTATIN CALCIUM 40 MG: 40 TABLET, FILM COATED ORAL at 08:41

## 2023-01-01 RX ADMIN — OXYCODONE HYDROCHLORIDE 5 MG: 5 TABLET ORAL at 09:15

## 2023-01-01 RX ADMIN — HYDROMORPHONE HYDROCHLORIDE 0.3 MG: 1 INJECTION, SOLUTION INTRAMUSCULAR; INTRAVENOUS; SUBCUTANEOUS at 18:21

## 2023-01-01 RX ADMIN — GABAPENTIN 100 MG: 100 CAPSULE ORAL at 08:03

## 2023-01-01 RX ADMIN — SENNOSIDES AND DOCUSATE SODIUM 2 TABLET: 50; 8.6 TABLET ORAL at 20:38

## 2023-01-01 RX ADMIN — ACETAMINOPHEN 975 MG: 325 TABLET, FILM COATED ORAL at 14:13

## 2023-01-01 RX ADMIN — FAMOTIDINE 10 MG: 10 TABLET, FILM COATED ORAL at 20:50

## 2023-01-01 RX ADMIN — HYDROXYZINE HYDROCHLORIDE 50 MG: 25 TABLET ORAL at 05:40

## 2023-01-01 RX ADMIN — Medication 1 TABLET: at 11:37

## 2023-01-01 RX ADMIN — EPHEDRINE SULFATE 5 MG: 5 INJECTION INTRAVENOUS at 09:32

## 2023-01-01 RX ADMIN — MAGNESIUM SULFATE 4 G: 4 INJECTION INTRAVENOUS at 09:28

## 2023-01-01 RX ADMIN — SERTRALINE HYDROCHLORIDE 25 MG: 25 TABLET ORAL at 08:31

## 2023-01-01 RX ADMIN — GABAPENTIN 300 MG: 300 CAPSULE ORAL at 20:37

## 2023-01-01 RX ADMIN — OXYCODONE HYDROCHLORIDE 10 MG: 5 TABLET ORAL at 10:32

## 2023-01-01 RX ADMIN — CILOSTAZOL 100 MG: 100 TABLET ORAL at 08:20

## 2023-01-01 RX ADMIN — AMLODIPINE BESYLATE 10 MG: 10 TABLET ORAL at 14:25

## 2023-01-01 RX ADMIN — EPHEDRINE SULFATE 10 MG: 5 INJECTION INTRAVENOUS at 09:48

## 2023-01-01 RX ADMIN — Medication 2 G: at 08:52

## 2023-01-01 RX ADMIN — AMLODIPINE BESYLATE 10 MG: 10 TABLET ORAL at 07:55

## 2023-01-01 RX ADMIN — FOLIC ACID 1 MG: 1 TABLET ORAL at 08:57

## 2023-01-01 RX ADMIN — FAMOTIDINE 10 MG: 10 TABLET, FILM COATED ORAL at 19:47

## 2023-01-01 RX ADMIN — LORAZEPAM 2 MG: 2 INJECTION INTRAMUSCULAR; INTRAVENOUS at 03:47

## 2023-01-01 RX ADMIN — LABETALOL HYDROCHLORIDE 20 MG: 5 INJECTION, SOLUTION INTRAVENOUS at 23:51

## 2023-01-01 RX ADMIN — ATORVASTATIN CALCIUM 40 MG: 40 TABLET, FILM COATED ORAL at 09:37

## 2023-01-01 RX ADMIN — PANTOPRAZOLE SODIUM 40 MG: 40 TABLET, DELAYED RELEASE ORAL at 08:41

## 2023-01-01 RX ADMIN — Medication 20 MG: at 13:19

## 2023-01-01 RX ADMIN — FAMOTIDINE 10 MG: 10 TABLET, FILM COATED ORAL at 09:01

## 2023-01-01 RX ADMIN — CARVEDILOL 25 MG: 25 TABLET, FILM COATED ORAL at 07:36

## 2023-01-01 RX ADMIN — FENTANYL CITRATE 50 MCG: 50 INJECTION, SOLUTION INTRAMUSCULAR; INTRAVENOUS at 17:04

## 2023-01-01 RX ADMIN — CLONIDINE HYDROCHLORIDE 0.1 MG: 0.1 TABLET ORAL at 10:24

## 2023-01-01 RX ADMIN — LABETALOL 20 MG/4 ML (5 MG/ML) INTRAVENOUS SYRINGE 5 MG: at 17:19

## 2023-01-01 RX ADMIN — OXYCODONE HYDROCHLORIDE 10 MG: 5 TABLET ORAL at 10:00

## 2023-01-01 RX ADMIN — APIXABAN 5 MG: 5 TABLET, FILM COATED ORAL at 08:42

## 2023-01-01 RX ADMIN — FOLIC ACID 1 MG: 1 TABLET ORAL at 08:07

## 2023-01-01 RX ADMIN — KETOROLAC TROMETHAMINE 15 MG: 30 INJECTION, SOLUTION INTRAMUSCULAR; INTRAVENOUS at 04:20

## 2023-01-01 RX ADMIN — EPHEDRINE SULFATE 2.5 MG: 5 INJECTION INTRAVENOUS at 14:30

## 2023-01-01 RX ADMIN — OXYCODONE HYDROCHLORIDE 10 MG: 5 TABLET ORAL at 12:48

## 2023-01-01 RX ADMIN — PANTOPRAZOLE SODIUM 40 MG: 40 TABLET, DELAYED RELEASE ORAL at 08:50

## 2023-01-01 RX ADMIN — CLONIDINE HYDROCHLORIDE 0.1 MG: 0.1 TABLET ORAL at 17:50

## 2023-01-01 RX ADMIN — CARVEDILOL 25 MG: 25 TABLET, FILM COATED ORAL at 20:33

## 2023-01-01 RX ADMIN — MIRTAZAPINE 15 MG: 15 TABLET, FILM COATED ORAL at 22:20

## 2023-01-01 RX ADMIN — HYDROMORPHONE HYDROCHLORIDE 0.5 MG: 1 INJECTION, SOLUTION INTRAMUSCULAR; INTRAVENOUS; SUBCUTANEOUS at 14:19

## 2023-01-01 RX ADMIN — OXYCODONE HYDROCHLORIDE 5 MG: 5 TABLET ORAL at 23:14

## 2023-01-01 RX ADMIN — CEFAZOLIN SODIUM 2 G: 2 INJECTION, SOLUTION INTRAVENOUS at 08:06

## 2023-01-01 RX ADMIN — APIXABAN 5 MG: 5 TABLET, FILM COATED ORAL at 20:23

## 2023-01-01 RX ADMIN — CEFEPIME HYDROCHLORIDE 2 G: 2 INJECTION, POWDER, FOR SOLUTION INTRAVENOUS at 12:27

## 2023-01-01 RX ADMIN — ISOSORBIDE MONONITRATE 120 MG: 60 TABLET, EXTENDED RELEASE ORAL at 08:11

## 2023-01-01 RX ADMIN — GABAPENTIN 300 MG: 300 CAPSULE ORAL at 20:18

## 2023-01-01 RX ADMIN — Medication 10 MG: at 12:25

## 2023-01-01 RX ADMIN — ACETAMINOPHEN 975 MG: 325 TABLET, FILM COATED ORAL at 06:00

## 2023-01-01 RX ADMIN — CILOSTAZOL 100 MG: 100 TABLET ORAL at 20:51

## 2023-01-01 RX ADMIN — CEFEPIME HYDROCHLORIDE 2 G: 2 INJECTION, POWDER, FOR SOLUTION INTRAVENOUS at 20:53

## 2023-01-01 RX ADMIN — PANTOPRAZOLE SODIUM 40 MG: 40 TABLET, DELAYED RELEASE ORAL at 08:07

## 2023-01-01 RX ADMIN — HYDROMORPHONE HYDROCHLORIDE 0.3 MG: 1 INJECTION, SOLUTION INTRAMUSCULAR; INTRAVENOUS; SUBCUTANEOUS at 18:33

## 2023-01-01 RX ADMIN — CLONIDINE HYDROCHLORIDE 0.1 MG: 0.1 TABLET ORAL at 02:26

## 2023-01-01 RX ADMIN — CILOSTAZOL 100 MG: 100 TABLET ORAL at 21:23

## 2023-01-01 RX ADMIN — CARVEDILOL 25 MG: 25 TABLET, FILM COATED ORAL at 08:23

## 2023-01-01 RX ADMIN — MAGNESIUM SULFATE IN WATER 4 G: 40 INJECTION, SOLUTION INTRAVENOUS at 09:07

## 2023-01-01 RX ADMIN — GABAPENTIN 300 MG: 300 CAPSULE ORAL at 08:04

## 2023-01-01 RX ADMIN — PROCHLORPERAZINE EDISYLATE 5 MG: 5 INJECTION INTRAMUSCULAR; INTRAVENOUS at 11:52

## 2023-01-01 RX ADMIN — CILOSTAZOL 100 MG: 100 TABLET ORAL at 19:49

## 2023-01-01 RX ADMIN — CILOSTAZOL 100 MG: 100 TABLET ORAL at 20:12

## 2023-01-01 RX ADMIN — ACETAMINOPHEN 975 MG: 325 TABLET, FILM COATED ORAL at 20:19

## 2023-01-01 RX ADMIN — OXYCODONE HYDROCHLORIDE 5 MG: 5 TABLET ORAL at 20:45

## 2023-01-01 RX ADMIN — SENNOSIDES AND DOCUSATE SODIUM 1 TABLET: 8.6; 5 TABLET ORAL at 20:50

## 2023-01-01 RX ADMIN — CILOSTAZOL 100 MG: 100 TABLET ORAL at 07:41

## 2023-01-01 RX ADMIN — CLONIDINE HYDROCHLORIDE 0.1 MG: 0.1 TABLET ORAL at 18:15

## 2023-01-01 RX ADMIN — GABAPENTIN 300 MG: 300 CAPSULE ORAL at 20:50

## 2023-01-01 RX ADMIN — SERTRALINE HYDROCHLORIDE 25 MG: 25 TABLET ORAL at 09:28

## 2023-01-01 RX ADMIN — CEFAZOLIN SODIUM 2 G: 2 INJECTION, SOLUTION INTRAVENOUS at 04:15

## 2023-01-01 RX ADMIN — ACETAMINOPHEN 975 MG: 325 TABLET, FILM COATED ORAL at 03:44

## 2023-01-01 RX ADMIN — SENNOSIDES AND DOCUSATE SODIUM 1 TABLET: 8.6; 5 TABLET ORAL at 19:17

## 2023-01-01 RX ADMIN — HYDRALAZINE HYDROCHLORIDE 5 MG: 20 INJECTION INTRAMUSCULAR; INTRAVENOUS at 17:50

## 2023-01-01 RX ADMIN — CLONIDINE HYDROCHLORIDE 0.1 MG: 0.1 TABLET ORAL at 10:55

## 2023-01-01 RX ADMIN — GABAPENTIN 100 MG: 100 CAPSULE ORAL at 19:17

## 2023-01-01 RX ADMIN — KETOROLAC TROMETHAMINE 15 MG: 30 INJECTION, SOLUTION INTRAMUSCULAR; INTRAVENOUS at 21:31

## 2023-01-01 RX ADMIN — POTASSIUM PHOSPHATE, MONOBASIC POTASSIUM PHOSPHATE, DIBASIC 9 MMOL: 224; 236 INJECTION, SOLUTION, CONCENTRATE INTRAVENOUS at 20:56

## 2023-01-01 RX ADMIN — AMLODIPINE BESYLATE 10 MG: 10 TABLET ORAL at 08:12

## 2023-01-01 RX ADMIN — SERTRALINE HYDROCHLORIDE 25 MG: 25 TABLET ORAL at 08:56

## 2023-01-01 RX ADMIN — FOLIC ACID 1 MG: 1 TABLET ORAL at 08:58

## 2023-01-01 RX ADMIN — DONEPEZIL HYDROCHLORIDE 10 MG: 10 TABLET, FILM COATED ORAL at 22:08

## 2023-01-01 RX ADMIN — ENOXAPARIN SODIUM 70 MG: 80 INJECTION SUBCUTANEOUS at 09:05

## 2023-01-01 RX ADMIN — HEPARIN SODIUM 1100 UNITS/HR: 10000 INJECTION, SOLUTION INTRAVENOUS at 22:32

## 2023-01-01 RX ADMIN — GABAPENTIN 300 MG: 300 CAPSULE ORAL at 20:12

## 2023-01-01 RX ADMIN — CARVEDILOL 25 MG: 25 TABLET, FILM COATED ORAL at 08:04

## 2023-01-01 RX ADMIN — ISOSORBIDE MONONITRATE 60 MG: 60 TABLET, EXTENDED RELEASE ORAL at 09:35

## 2023-01-01 RX ADMIN — CILOSTAZOL 100 MG: 100 TABLET ORAL at 08:49

## 2023-01-01 RX ADMIN — ACETAMINOPHEN 975 MG: 325 TABLET, FILM COATED ORAL at 13:49

## 2023-01-01 RX ADMIN — ACETAMINOPHEN 975 MG: 325 TABLET, FILM COATED ORAL at 20:50

## 2023-01-01 RX ADMIN — OXYCODONE HYDROCHLORIDE 5 MG: 5 TABLET ORAL at 03:13

## 2023-01-01 RX ADMIN — ACETAMINOPHEN 975 MG: 325 TABLET, FILM COATED ORAL at 13:36

## 2023-01-01 RX ADMIN — Medication 1 TABLET: at 09:28

## 2023-01-01 RX ADMIN — VANCOMYCIN HYDROCHLORIDE 750 MG: 1 INJECTION, POWDER, LYOPHILIZED, FOR SOLUTION INTRAVENOUS at 00:03

## 2023-01-01 RX ADMIN — DONEPEZIL HYDROCHLORIDE 10 MG: 10 TABLET, FILM COATED ORAL at 21:37

## 2023-01-01 RX ADMIN — PANTOPRAZOLE SODIUM 40 MG: 40 TABLET, DELAYED RELEASE ORAL at 08:17

## 2023-01-01 RX ADMIN — OXYCODONE HYDROCHLORIDE 10 MG: 5 TABLET ORAL at 22:08

## 2023-01-01 RX ADMIN — SENNOSIDES AND DOCUSATE SODIUM 1 TABLET: 8.6; 5 TABLET ORAL at 22:24

## 2023-01-01 RX ADMIN — DEXMEDETOMIDINE 4 MCG: 100 INJECTION, SOLUTION, CONCENTRATE INTRAVENOUS at 13:27

## 2023-01-01 RX ADMIN — OXYCODONE HYDROCHLORIDE 5 MG: 5 TABLET ORAL at 06:33

## 2023-01-01 RX ADMIN — PROPOFOL 180 MG: 10 INJECTION, EMULSION INTRAVENOUS at 08:22

## 2023-01-01 RX ADMIN — POTASSIUM CHLORIDE 20 MEQ: 750 TABLET, EXTENDED RELEASE ORAL at 08:45

## 2023-01-01 RX ADMIN — APIXABAN 5 MG: 5 TABLET, FILM COATED ORAL at 08:20

## 2023-01-01 RX ADMIN — SENNOSIDES AND DOCUSATE SODIUM 2 TABLET: 50; 8.6 TABLET ORAL at 19:26

## 2023-01-01 RX ADMIN — Medication 200 MG: at 08:21

## 2023-01-01 RX ADMIN — PANTOPRAZOLE SODIUM 40 MG: 40 TABLET, DELAYED RELEASE ORAL at 08:57

## 2023-01-01 RX ADMIN — KETOROLAC TROMETHAMINE 15 MG: 15 INJECTION, SOLUTION INTRAMUSCULAR; INTRAVENOUS at 04:06

## 2023-01-01 RX ADMIN — PANTOPRAZOLE SODIUM 40 MG: 40 TABLET, DELAYED RELEASE ORAL at 08:05

## 2023-01-01 RX ADMIN — ANTACID TABLETS 1000 MG: 500 TABLET, CHEWABLE ORAL at 08:26

## 2023-01-01 RX ADMIN — HYDROMORPHONE HYDROCHLORIDE 0.4 MG: 0.2 INJECTION, SOLUTION INTRAMUSCULAR; INTRAVENOUS; SUBCUTANEOUS at 01:58

## 2023-01-01 RX ADMIN — Medication 50 MCG: at 08:25

## 2023-01-01 RX ADMIN — AMLODIPINE BESYLATE 10 MG: 10 TABLET ORAL at 08:04

## 2023-01-01 RX ADMIN — SODIUM CHLORIDE, POTASSIUM CHLORIDE, SODIUM LACTATE AND CALCIUM CHLORIDE: 600; 310; 30; 20 INJECTION, SOLUTION INTRAVENOUS at 12:42

## 2023-01-01 RX ADMIN — OXYCODONE HYDROCHLORIDE 10 MG: 5 TABLET ORAL at 16:26

## 2023-01-01 RX ADMIN — SENNOSIDES AND DOCUSATE SODIUM 1 TABLET: 8.6; 5 TABLET ORAL at 09:35

## 2023-01-01 RX ADMIN — KETOROLAC TROMETHAMINE 15 MG: 15 INJECTION, SOLUTION INTRAMUSCULAR; INTRAVENOUS at 14:19

## 2023-01-01 RX ADMIN — TRAZODONE HYDROCHLORIDE 50 MG: 50 TABLET ORAL at 22:17

## 2023-01-01 RX ADMIN — GABAPENTIN 100 MG: 100 CAPSULE ORAL at 10:09

## 2023-01-01 RX ADMIN — CILOSTAZOL 100 MG: 100 TABLET ORAL at 21:04

## 2023-01-01 RX ADMIN — SERTRALINE HYDROCHLORIDE 25 MG: 25 TABLET ORAL at 08:39

## 2023-01-01 RX ADMIN — SENNOSIDES AND DOCUSATE SODIUM 1 TABLET: 8.6; 5 TABLET ORAL at 08:26

## 2023-01-01 RX ADMIN — HYDROXYZINE HYDROCHLORIDE 50 MG: 25 TABLET ORAL at 06:02

## 2023-01-01 RX ADMIN — LOSARTAN POTASSIUM 100 MG: 100 TABLET, FILM COATED ORAL at 07:42

## 2023-01-01 RX ADMIN — FAMOTIDINE 10 MG: 10 TABLET, FILM COATED ORAL at 08:33

## 2023-01-01 RX ADMIN — THIAMINE HCL TAB 100 MG 100 MG: 100 TAB at 08:34

## 2023-01-01 RX ADMIN — KETOROLAC TROMETHAMINE 15 MG: 30 INJECTION, SOLUTION INTRAMUSCULAR; INTRAVENOUS at 22:00

## 2023-01-01 RX ADMIN — HEPARIN SODIUM 2000 UNITS: 1000 INJECTION INTRAVENOUS; SUBCUTANEOUS at 12:17

## 2023-01-01 RX ADMIN — OXYCODONE HYDROCHLORIDE 10 MG: 5 TABLET ORAL at 03:27

## 2023-01-01 RX ADMIN — APIXABAN 5 MG: 5 TABLET, FILM COATED ORAL at 20:13

## 2023-01-01 RX ADMIN — THERA TABS 1 TABLET: TAB at 07:45

## 2023-01-01 RX ADMIN — OXYCODONE HYDROCHLORIDE 5 MG: 5 TABLET ORAL at 20:57

## 2023-01-01 RX ADMIN — ISOSORBIDE MONONITRATE 60 MG: 60 TABLET, EXTENDED RELEASE ORAL at 13:06

## 2023-01-01 RX ADMIN — CILOSTAZOL 100 MG: 100 TABLET ORAL at 20:17

## 2023-01-01 RX ADMIN — GABAPENTIN 300 MG: 300 CAPSULE ORAL at 13:54

## 2023-01-01 RX ADMIN — CEFAZOLIN SODIUM 2 G: 2 INJECTION, SOLUTION INTRAVENOUS at 12:43

## 2023-01-01 RX ADMIN — CILOSTAZOL 100 MG: 100 TABLET ORAL at 09:37

## 2023-01-01 RX ADMIN — GABAPENTIN 300 MG: 300 CAPSULE ORAL at 13:43

## 2023-01-01 RX ADMIN — DONEPEZIL HYDROCHLORIDE 10 MG: 10 TABLET, FILM COATED ORAL at 22:17

## 2023-01-01 RX ADMIN — CILOSTAZOL 100 MG: 100 TABLET ORAL at 20:30

## 2023-01-01 RX ADMIN — OXYCODONE HYDROCHLORIDE 5 MG: 5 TABLET ORAL at 13:56

## 2023-01-01 RX ADMIN — SENNOSIDES AND DOCUSATE SODIUM 2 TABLET: 50; 8.6 TABLET ORAL at 09:05

## 2023-01-01 RX ADMIN — TRAZODONE HYDROCHLORIDE 50 MG: 50 TABLET ORAL at 22:00

## 2023-01-01 RX ADMIN — GABAPENTIN 100 MG: 100 CAPSULE ORAL at 18:01

## 2023-01-01 RX ADMIN — CILOSTAZOL 100 MG: 100 TABLET ORAL at 20:16

## 2023-01-01 RX ADMIN — Medication 50 MCG: at 08:26

## 2023-01-01 RX ADMIN — PIPERACILLIN AND TAZOBACTAM 3.38 G: 3; .375 INJECTION, POWDER, LYOPHILIZED, FOR SOLUTION INTRAVENOUS at 15:34

## 2023-01-01 RX ADMIN — SENNOSIDES AND DOCUSATE SODIUM 1 TABLET: 8.6; 5 TABLET ORAL at 08:16

## 2023-01-01 RX ADMIN — CILOSTAZOL 100 MG: 100 TABLET ORAL at 09:50

## 2023-01-01 RX ADMIN — CILOSTAZOL 100 MG: 100 TABLET ORAL at 20:19

## 2023-01-01 RX ADMIN — APIXABAN 5 MG: 5 TABLET, FILM COATED ORAL at 08:07

## 2023-01-01 RX ADMIN — SODIUM CHLORIDE, POTASSIUM CHLORIDE, SODIUM LACTATE AND CALCIUM CHLORIDE: 600; 310; 30; 20 INJECTION, SOLUTION INTRAVENOUS at 16:11

## 2023-01-01 RX ADMIN — Medication 1 TABLET: at 11:44

## 2023-01-01 RX ADMIN — EPHEDRINE SULFATE 5 MG: 5 INJECTION INTRAVENOUS at 10:03

## 2023-01-01 RX ADMIN — SENNOSIDES AND DOCUSATE SODIUM 1 TABLET: 8.6; 5 TABLET ORAL at 07:54

## 2023-01-01 RX ADMIN — GABAPENTIN 100 MG: 100 CAPSULE ORAL at 09:03

## 2023-01-01 RX ADMIN — OXYCODONE HYDROCHLORIDE 10 MG: 5 TABLET ORAL at 14:03

## 2023-01-01 RX ADMIN — SENNOSIDES AND DOCUSATE SODIUM 1 TABLET: 8.6; 5 TABLET ORAL at 08:36

## 2023-01-01 RX ADMIN — INSULIN ASPART 1 UNITS: 100 INJECTION, SOLUTION INTRAVENOUS; SUBCUTANEOUS at 14:19

## 2023-01-01 RX ADMIN — HYDROMORPHONE HYDROCHLORIDE 0.3 MG: 1 INJECTION, SOLUTION INTRAMUSCULAR; INTRAVENOUS; SUBCUTANEOUS at 21:42

## 2023-01-01 RX ADMIN — CEFAZOLIN SODIUM 2 G: 2 INJECTION, SOLUTION INTRAVENOUS at 15:02

## 2023-01-01 RX ADMIN — SENNOSIDES AND DOCUSATE SODIUM 1 TABLET: 8.6; 5 TABLET ORAL at 19:41

## 2023-01-01 RX ADMIN — CARVEDILOL 25 MG: 25 TABLET, FILM COATED ORAL at 20:18

## 2023-01-01 RX ADMIN — AMLODIPINE BESYLATE 10 MG: 10 TABLET ORAL at 07:41

## 2023-01-01 RX ADMIN — ONDANSETRON 4 MG: 4 TABLET, ORALLY DISINTEGRATING ORAL at 05:09

## 2023-01-01 RX ADMIN — ALBUMIN (HUMAN): 12.5 SOLUTION INTRAVENOUS at 09:36

## 2023-01-01 RX ADMIN — TIZANIDINE 2 MG: 2 TABLET ORAL at 23:34

## 2023-01-01 RX ADMIN — ONDANSETRON 4 MG: 2 INJECTION INTRAMUSCULAR; INTRAVENOUS at 14:19

## 2023-01-01 RX ADMIN — GABAPENTIN 300 MG: 300 CAPSULE ORAL at 07:55

## 2023-01-01 RX ADMIN — ACETAMINOPHEN 975 MG: 325 TABLET, FILM COATED ORAL at 03:10

## 2023-01-01 RX ADMIN — OXYCODONE HYDROCHLORIDE 10 MG: 5 TABLET ORAL at 12:20

## 2023-01-01 RX ADMIN — ACETAMINOPHEN 975 MG: 325 TABLET, FILM COATED ORAL at 19:11

## 2023-01-01 RX ADMIN — Medication 12.5 MG: at 08:16

## 2023-01-01 RX ADMIN — FOLIC ACID 1 MG: 1 TABLET ORAL at 10:05

## 2023-01-01 RX ADMIN — SODIUM CHLORIDE, POTASSIUM CHLORIDE, SODIUM LACTATE AND CALCIUM CHLORIDE: 600; 310; 30; 20 INJECTION, SOLUTION INTRAVENOUS at 10:37

## 2023-01-01 RX ADMIN — HYDROXYZINE HYDROCHLORIDE 25 MG: 25 TABLET ORAL at 22:32

## 2023-01-01 RX ADMIN — HYDROMORPHONE HYDROCHLORIDE 0.3 MG: 1 INJECTION, SOLUTION INTRAMUSCULAR; INTRAVENOUS; SUBCUTANEOUS at 13:21

## 2023-01-01 RX ADMIN — TRAZODONE HYDROCHLORIDE 50 MG: 50 TABLET ORAL at 21:53

## 2023-01-01 RX ADMIN — ASPIRIN 81 MG: 81 TABLET ORAL at 08:56

## 2023-01-01 RX ADMIN — SENNOSIDES AND DOCUSATE SODIUM 1 TABLET: 8.6; 5 TABLET ORAL at 20:54

## 2023-01-01 RX ADMIN — Medication 200 MG: at 08:26

## 2023-01-01 RX ADMIN — ONDANSETRON 4 MG: 4 TABLET, ORALLY DISINTEGRATING ORAL at 10:27

## 2023-01-01 RX ADMIN — DEXAMETHASONE SODIUM PHOSPHATE 4 MG: 4 INJECTION, SOLUTION INTRA-ARTICULAR; INTRALESIONAL; INTRAMUSCULAR; INTRAVENOUS; SOFT TISSUE at 13:31

## 2023-01-01 RX ADMIN — CLONIDINE HYDROCHLORIDE 0.1 MG: 0.1 TABLET ORAL at 17:30

## 2023-01-01 RX ADMIN — DEXMEDETOMIDINE 4 MCG: 100 INJECTION, SOLUTION, CONCENTRATE INTRAVENOUS at 13:43

## 2023-01-01 RX ADMIN — ACETAMINOPHEN 975 MG: 325 TABLET, FILM COATED ORAL at 21:25

## 2023-01-01 RX ADMIN — TRAZODONE HYDROCHLORIDE 50 MG: 50 TABLET ORAL at 21:39

## 2023-01-01 RX ADMIN — APIXABAN 5 MG: 5 TABLET, FILM COATED ORAL at 20:33

## 2023-01-01 RX ADMIN — GABAPENTIN 100 MG: 100 CAPSULE ORAL at 07:36

## 2023-01-01 RX ADMIN — Medication 5 MG: at 22:54

## 2023-01-01 RX ADMIN — OXYCODONE HYDROCHLORIDE 10 MG: 10 TABLET ORAL at 18:19

## 2023-01-01 RX ADMIN — GABAPENTIN 100 MG: 100 CAPSULE ORAL at 20:30

## 2023-01-01 RX ADMIN — OXYCODONE HYDROCHLORIDE 10 MG: 5 TABLET ORAL at 08:53

## 2023-01-01 RX ADMIN — SERTRALINE HYDROCHLORIDE 25 MG: 25 TABLET ORAL at 08:04

## 2023-01-01 RX ADMIN — CARVEDILOL 25 MG: 25 TABLET, FILM COATED ORAL at 20:37

## 2023-01-01 RX ADMIN — DONEPEZIL HYDROCHLORIDE 10 MG: 10 TABLET, FILM COATED ORAL at 06:34

## 2023-01-01 RX ADMIN — PANTOPRAZOLE SODIUM 40 MG: 40 TABLET, DELAYED RELEASE ORAL at 09:05

## 2023-01-01 RX ADMIN — POLYETHYLENE GLYCOL 3350 17 G: 17 POWDER, FOR SOLUTION ORAL at 08:13

## 2023-01-01 RX ADMIN — CEFAZOLIN SODIUM 2 G: 2 INJECTION, SOLUTION INTRAVENOUS at 10:32

## 2023-01-01 RX ADMIN — FAMOTIDINE 10 MG: 10 TABLET, FILM COATED ORAL at 09:29

## 2023-01-01 RX ADMIN — CARVEDILOL 12.5 MG: 12.5 TABLET, FILM COATED ORAL at 20:42

## 2023-01-01 RX ADMIN — Medication 1 MG: at 22:06

## 2023-01-01 RX ADMIN — GABAPENTIN 300 MG: 300 CAPSULE ORAL at 09:28

## 2023-01-01 RX ADMIN — TRAZODONE HYDROCHLORIDE 50 MG: 50 TABLET ORAL at 20:18

## 2023-01-01 RX ADMIN — LOSARTAN POTASSIUM 100 MG: 100 TABLET, FILM COATED ORAL at 09:30

## 2023-01-01 RX ADMIN — HYDROXYZINE HYDROCHLORIDE 25 MG: 25 TABLET ORAL at 00:50

## 2023-01-01 RX ADMIN — CILOSTAZOL 100 MG: 100 TABLET ORAL at 08:17

## 2023-01-01 RX ADMIN — ATORVASTATIN CALCIUM 40 MG: 40 TABLET, FILM COATED ORAL at 07:36

## 2023-01-01 RX ADMIN — Medication 100 MCG: at 18:13

## 2023-01-01 RX ADMIN — OXYCODONE HYDROCHLORIDE 10 MG: 10 TABLET ORAL at 20:22

## 2023-01-01 RX ADMIN — Medication 2 G: at 15:30

## 2023-01-01 RX ADMIN — TRAZODONE HYDROCHLORIDE 50 MG: 50 TABLET ORAL at 22:20

## 2023-01-01 RX ADMIN — GABAPENTIN 100 MG: 100 CAPSULE ORAL at 20:33

## 2023-01-01 RX ADMIN — GABAPENTIN 300 MG: 300 CAPSULE ORAL at 14:03

## 2023-01-01 RX ADMIN — ONDANSETRON 4 MG: 2 INJECTION INTRAMUSCULAR; INTRAVENOUS at 10:32

## 2023-01-01 RX ADMIN — FAMOTIDINE 10 MG: 10 TABLET, FILM COATED ORAL at 20:42

## 2023-01-01 RX ADMIN — EPHEDRINE SULFATE 10 MG: 5 INJECTION INTRAVENOUS at 09:41

## 2023-01-01 RX ADMIN — LOSARTAN POTASSIUM 100 MG: 100 TABLET, FILM COATED ORAL at 09:04

## 2023-01-01 RX ADMIN — OXYMETAZOLINE HYDROCHLORIDE 2 SPRAY: 0.05 SPRAY NASAL at 20:46

## 2023-01-01 RX ADMIN — METFORMIN ER 500 MG 500 MG: 500 TABLET ORAL at 18:01

## 2023-01-01 RX ADMIN — ACETAMINOPHEN 975 MG: 325 TABLET, FILM COATED ORAL at 06:53

## 2023-01-01 RX ADMIN — OXYCODONE HYDROCHLORIDE 10 MG: 5 TABLET ORAL at 12:32

## 2023-01-01 RX ADMIN — LABETALOL HYDROCHLORIDE 20 MG: 5 INJECTION, SOLUTION INTRAVENOUS at 05:49

## 2023-01-01 RX ADMIN — ONDANSETRON 4 MG: 4 TABLET, ORALLY DISINTEGRATING ORAL at 12:27

## 2023-01-01 RX ADMIN — ATORVASTATIN CALCIUM 40 MG: 40 TABLET, FILM COATED ORAL at 07:35

## 2023-01-01 RX ADMIN — LOSARTAN POTASSIUM 100 MG: 100 TABLET, FILM COATED ORAL at 09:38

## 2023-01-01 RX ADMIN — SENNOSIDES AND DOCUSATE SODIUM 2 TABLET: 50; 8.6 TABLET ORAL at 08:55

## 2023-01-01 RX ADMIN — MIRTAZAPINE 15 MG: 15 TABLET, FILM COATED ORAL at 22:06

## 2023-01-01 RX ADMIN — ONDANSETRON 4 MG: 4 TABLET, ORALLY DISINTEGRATING ORAL at 17:05

## 2023-01-01 RX ADMIN — SERTRALINE HYDROCHLORIDE 25 MG: 25 TABLET ORAL at 08:33

## 2023-01-01 RX ADMIN — MAGNESIUM OXIDE TAB 400 MG (241.3 MG ELEMENTAL MG) 400 MG: 400 (241.3 MG) TAB at 09:15

## 2023-01-01 RX ADMIN — METOCLOPRAMIDE 10 MG: 5 INJECTION, SOLUTION INTRAMUSCULAR; INTRAVENOUS at 16:44

## 2023-01-01 RX ADMIN — DONEPEZIL HYDROCHLORIDE 10 MG: 10 TABLET, FILM COATED ORAL at 23:52

## 2023-01-01 RX ADMIN — ACETAMINOPHEN 975 MG: 325 TABLET, FILM COATED ORAL at 09:36

## 2023-01-01 RX ADMIN — APIXABAN 5 MG: 5 TABLET, FILM COATED ORAL at 20:57

## 2023-01-01 RX ADMIN — HYDROMORPHONE HYDROCHLORIDE 0.4 MG: 0.2 INJECTION, SOLUTION INTRAMUSCULAR; INTRAVENOUS; SUBCUTANEOUS at 17:33

## 2023-01-01 RX ADMIN — POLYETHYLENE GLYCOL 3350 17 G: 17 POWDER, FOR SOLUTION ORAL at 08:34

## 2023-01-01 RX ADMIN — LOSARTAN POTASSIUM 50 MG: 50 TABLET, FILM COATED ORAL at 09:01

## 2023-01-01 RX ADMIN — ISOSORBIDE MONONITRATE 120 MG: 60 TABLET, EXTENDED RELEASE ORAL at 09:06

## 2023-01-01 RX ADMIN — QUETIAPINE FUMARATE 25 MG: 25 TABLET ORAL at 22:19

## 2023-01-01 RX ADMIN — CILOSTAZOL 100 MG: 100 TABLET ORAL at 20:18

## 2023-01-01 RX ADMIN — LABETALOL HYDROCHLORIDE 20 MG: 5 INJECTION, SOLUTION INTRAVENOUS at 08:45

## 2023-01-01 RX ADMIN — APIXABAN 5 MG: 5 TABLET, FILM COATED ORAL at 08:12

## 2023-01-01 RX ADMIN — APIXABAN 5 MG: 5 TABLET, FILM COATED ORAL at 09:35

## 2023-01-01 RX ADMIN — GABAPENTIN 300 MG: 300 CAPSULE ORAL at 20:38

## 2023-01-01 RX ADMIN — Medication 200 MG: at 08:44

## 2023-01-01 RX ADMIN — CARVEDILOL 25 MG: 25 TABLET, FILM COATED ORAL at 19:47

## 2023-01-01 RX ADMIN — KETOROLAC TROMETHAMINE 15 MG: 30 INJECTION, SOLUTION INTRAMUSCULAR; INTRAVENOUS at 10:56

## 2023-01-01 RX ADMIN — TIZANIDINE 2 MG: 2 TABLET ORAL at 17:22

## 2023-01-01 RX ADMIN — ASPIRIN 81 MG: 81 TABLET, COATED ORAL at 08:23

## 2023-01-01 RX ADMIN — SENNOSIDES AND DOCUSATE SODIUM 1 TABLET: 8.6; 5 TABLET ORAL at 20:30

## 2023-01-01 RX ADMIN — CEFAZOLIN SODIUM 2 G: 2 INJECTION, SOLUTION INTRAVENOUS at 14:10

## 2023-01-01 RX ADMIN — MAGNESIUM SULFATE IN WATER 4 G: 40 INJECTION, SOLUTION INTRAVENOUS at 07:36

## 2023-01-01 RX ADMIN — NICARDIPINE HYDROCHLORIDE 2.5 MG/HR: 0.2 INJECTION, SOLUTION INTRAVENOUS at 21:14

## 2023-01-01 RX ADMIN — CARVEDILOL 25 MG: 25 TABLET, FILM COATED ORAL at 08:07

## 2023-01-01 RX ADMIN — SERTRALINE HYDROCHLORIDE 25 MG: 25 TABLET ORAL at 07:42

## 2023-01-01 RX ADMIN — DONEPEZIL HYDROCHLORIDE 10 MG: 10 TABLET, FILM COATED ORAL at 22:48

## 2023-01-01 RX ADMIN — Medication 200 MG: at 07:36

## 2023-01-01 RX ADMIN — ACETAMINOPHEN 975 MG: 325 TABLET, FILM COATED ORAL at 20:38

## 2023-01-01 RX ADMIN — SENNOSIDES AND DOCUSATE SODIUM 1 TABLET: 8.6; 5 TABLET ORAL at 20:12

## 2023-01-01 RX ADMIN — SERTRALINE HYDROCHLORIDE 25 MG: 25 TABLET ORAL at 08:03

## 2023-01-01 RX ADMIN — CILOSTAZOL 100 MG: 100 TABLET ORAL at 09:02

## 2023-01-01 RX ADMIN — ATORVASTATIN CALCIUM 40 MG: 40 TABLET, FILM COATED ORAL at 08:45

## 2023-01-01 RX ADMIN — GABAPENTIN 100 MG: 100 CAPSULE ORAL at 13:39

## 2023-01-01 RX ADMIN — ACETAMINOPHEN 975 MG: 325 TABLET, FILM COATED ORAL at 19:34

## 2023-01-01 RX ADMIN — HYDROXYZINE HYDROCHLORIDE 50 MG: 25 TABLET, FILM COATED ORAL at 22:09

## 2023-01-01 RX ADMIN — TIZANIDINE 2 MG: 2 TABLET ORAL at 06:11

## 2023-01-01 RX ADMIN — ISOSORBIDE MONONITRATE 60 MG: 30 TABLET, EXTENDED RELEASE ORAL at 08:50

## 2023-01-01 RX ADMIN — CLONIDINE HYDROCHLORIDE 0.1 MG: 0.1 TABLET ORAL at 02:35

## 2023-01-01 RX ADMIN — SODIUM CHLORIDE, POTASSIUM CHLORIDE, SODIUM LACTATE AND CALCIUM CHLORIDE 250 ML: 600; 310; 30; 20 INJECTION, SOLUTION INTRAVENOUS at 12:40

## 2023-01-01 RX ADMIN — PANTOPRAZOLE SODIUM 40 MG: 40 TABLET, DELAYED RELEASE ORAL at 05:49

## 2023-01-01 RX ADMIN — ASPIRIN 81 MG: 81 TABLET ORAL at 08:11

## 2023-01-01 RX ADMIN — SENNOSIDES AND DOCUSATE SODIUM 1 TABLET: 8.6; 5 TABLET ORAL at 08:20

## 2023-01-01 RX ADMIN — METHOCARBAMOL 500 MG: 500 TABLET ORAL at 15:12

## 2023-01-01 RX ADMIN — OXYCODONE HYDROCHLORIDE 10 MG: 10 TABLET ORAL at 03:51

## 2023-01-01 RX ADMIN — HYDROMORPHONE HYDROCHLORIDE 0.4 MG: 0.2 INJECTION, SOLUTION INTRAMUSCULAR; INTRAVENOUS; SUBCUTANEOUS at 20:24

## 2023-01-01 RX ADMIN — APIXABAN 5 MG: 5 TABLET, FILM COATED ORAL at 19:48

## 2023-01-01 RX ADMIN — ISOSORBIDE MONONITRATE 120 MG: 60 TABLET, EXTENDED RELEASE ORAL at 08:16

## 2023-01-01 RX ADMIN — ONDANSETRON 4 MG: 2 INJECTION INTRAMUSCULAR; INTRAVENOUS at 10:43

## 2023-01-01 RX ADMIN — HYDROMORPHONE HYDROCHLORIDE 0.3 MG: 1 INJECTION, SOLUTION INTRAMUSCULAR; INTRAVENOUS; SUBCUTANEOUS at 10:57

## 2023-01-01 RX ADMIN — CLONIDINE HYDROCHLORIDE 0.1 MG: 0.1 TABLET ORAL at 17:36

## 2023-01-01 RX ADMIN — CLONIDINE HYDROCHLORIDE 0.1 MG: 0.1 TABLET ORAL at 02:37

## 2023-01-01 RX ADMIN — ACETAMINOPHEN 975 MG: 325 TABLET, FILM COATED ORAL at 08:23

## 2023-01-01 RX ADMIN — AMLODIPINE BESYLATE 10 MG: 10 TABLET ORAL at 08:26

## 2023-01-01 RX ADMIN — PROCHLORPERAZINE EDISYLATE 5 MG: 5 INJECTION INTRAMUSCULAR; INTRAVENOUS at 13:37

## 2023-01-01 RX ADMIN — VANCOMYCIN HYDROCHLORIDE 750 MG: 1 INJECTION, POWDER, LYOPHILIZED, FOR SOLUTION INTRAVENOUS at 16:11

## 2023-01-01 RX ADMIN — SERTRALINE HYDROCHLORIDE 25 MG: 25 TABLET ORAL at 07:35

## 2023-01-01 RX ADMIN — CILOSTAZOL 100 MG: 100 TABLET ORAL at 20:29

## 2023-01-01 RX ADMIN — LOSARTAN POTASSIUM 100 MG: 100 TABLET, FILM COATED ORAL at 10:03

## 2023-01-01 RX ADMIN — Medication 10 MG: at 15:09

## 2023-01-01 RX ADMIN — FAMOTIDINE 10 MG: 10 TABLET, FILM COATED ORAL at 19:32

## 2023-01-01 RX ADMIN — Medication 1 MG: at 22:45

## 2023-01-01 RX ADMIN — SERTRALINE HYDROCHLORIDE 25 MG: 25 TABLET ORAL at 09:36

## 2023-01-01 RX ADMIN — CILOSTAZOL 100 MG: 100 TABLET ORAL at 08:13

## 2023-01-01 RX ADMIN — CEFAZOLIN SODIUM 2 G: 2 INJECTION, SOLUTION INTRAVENOUS at 22:08

## 2023-01-01 RX ADMIN — CEFAZOLIN SODIUM 2 G: 2 INJECTION, SOLUTION INTRAVENOUS at 08:58

## 2023-01-01 RX ADMIN — OXYCODONE HYDROCHLORIDE 10 MG: 10 TABLET ORAL at 07:40

## 2023-01-01 RX ADMIN — HYDROMORPHONE HYDROCHLORIDE 0.3 MG: 1 INJECTION, SOLUTION INTRAMUSCULAR; INTRAVENOUS; SUBCUTANEOUS at 09:44

## 2023-01-01 RX ADMIN — OXYCODONE HYDROCHLORIDE 10 MG: 5 TABLET ORAL at 14:16

## 2023-01-01 RX ADMIN — Medication 2 G: at 08:00

## 2023-01-01 ASSESSMENT — ACTIVITIES OF DAILY LIVING (ADL)
ADLS_ACUITY_SCORE: 49
ADLS_ACUITY_SCORE: 36
ADLS_ACUITY_SCORE: 35
ADLS_ACUITY_SCORE: 35
ADLS_ACUITY_SCORE: 34
ADLS_ACUITY_SCORE: 45
ADLS_ACUITY_SCORE: 25
ADLS_ACUITY_SCORE: 25
ADLS_ACUITY_SCORE: 31
ADLS_ACUITY_SCORE: 40
ADLS_ACUITY_SCORE: 41
PREVIOUS_RESPONSIBILITIES: MEAL PREP;HOUSEKEEPING;LAUNDRY;MEDICATION MANAGEMENT
ADLS_ACUITY_SCORE: 25
ADLS_ACUITY_SCORE: 32
ADLS_ACUITY_SCORE: 24
ADLS_ACUITY_SCORE: 49
ADLS_ACUITY_SCORE: 29
ADLS_ACUITY_SCORE: 37
ADLS_ACUITY_SCORE: 36
ADLS_ACUITY_SCORE: 34
ADLS_ACUITY_SCORE: 39
ADLS_ACUITY_SCORE: 49
ADLS_ACUITY_SCORE: 36
ADLS_ACUITY_SCORE: 36
ADLS_ACUITY_SCORE: 37
ADLS_ACUITY_SCORE: 25
ADLS_ACUITY_SCORE: 39
ADLS_ACUITY_SCORE: 36
ADLS_ACUITY_SCORE: 25
ADLS_ACUITY_SCORE: 37
ADLS_ACUITY_SCORE: 40
ADLS_ACUITY_SCORE: 47
HEARING_DIFFICULTY_OR_DEAF: YES
ADLS_ACUITY_SCORE: 25
ADLS_ACUITY_SCORE: 36
ADLS_ACUITY_SCORE: 25
ADLS_ACUITY_SCORE: 37
ADLS_ACUITY_SCORE: 36
ADLS_ACUITY_SCORE: 37
ADLS_ACUITY_SCORE: 37
ADLS_ACUITY_SCORE: 42
ADLS_ACUITY_SCORE: 42
ADLS_ACUITY_SCORE: 37
ADLS_ACUITY_SCORE: 35
ADLS_ACUITY_SCORE: 25
ADLS_ACUITY_SCORE: 41
ADLS_ACUITY_SCORE: 36
ADLS_ACUITY_SCORE: 41
CONCENTRATING,_REMEMBERING_OR_MAKING_DECISIONS_DIFFICULTY: YES
ADLS_ACUITY_SCORE: 36
ADLS_ACUITY_SCORE: 31
ADLS_ACUITY_SCORE: 39
ADLS_ACUITY_SCORE: 36
DOING_ERRANDS_INDEPENDENTLY_DIFFICULTY: YES
ADLS_ACUITY_SCORE: 38
ADLS_ACUITY_SCORE: 36
ADLS_ACUITY_SCORE: 42
ADLS_ACUITY_SCORE: 34
ADLS_ACUITY_SCORE: 32
WALKING_OR_CLIMBING_STAIRS: AMBULATION DIFFICULTY, REQUIRES EQUIPMENT
ADLS_ACUITY_SCORE: 25
ADLS_ACUITY_SCORE: 37
ADLS_ACUITY_SCORE: 31
ADLS_ACUITY_SCORE: 30
ADLS_ACUITY_SCORE: 31
ADLS_ACUITY_SCORE: 34
ADLS_ACUITY_SCORE: 34
ADLS_ACUITY_SCORE: 31
ADLS_ACUITY_SCORE: 38
ADLS_ACUITY_SCORE: 31
ADLS_ACUITY_SCORE: 25
ADLS_ACUITY_SCORE: 36
ADLS_ACUITY_SCORE: 34
ADLS_ACUITY_SCORE: 26
ADLS_ACUITY_SCORE: 39
ADLS_ACUITY_SCORE: 25
ADLS_ACUITY_SCORE: 25
ADLS_ACUITY_SCORE: 39
ADLS_ACUITY_SCORE: 25
ADLS_ACUITY_SCORE: 25
ADLS_ACUITY_SCORE: 31
ADLS_ACUITY_SCORE: 49
ADLS_ACUITY_SCORE: 37
ADLS_ACUITY_SCORE: 35
ADLS_ACUITY_SCORE: 38
ADLS_ACUITY_SCORE: 41
ADLS_ACUITY_SCORE: 25
ADLS_ACUITY_SCORE: 41
ADLS_ACUITY_SCORE: 44
ADLS_ACUITY_SCORE: 37
ADLS_ACUITY_SCORE: 29
ADLS_ACUITY_SCORE: 37
ADLS_ACUITY_SCORE: 42
ADLS_ACUITY_SCORE: 44
ADLS_ACUITY_SCORE: 39
ADLS_ACUITY_SCORE: 49
ADLS_ACUITY_SCORE: 41
WEAR_GLASSES_OR_BLIND: YES
ADLS_ACUITY_SCORE: 35
ADLS_ACUITY_SCORE: 40
ADLS_ACUITY_SCORE: 25
ADLS_ACUITY_SCORE: 25
ADLS_ACUITY_SCORE: 35
ADLS_ACUITY_SCORE: 39
ADLS_ACUITY_SCORE: 47
ADLS_ACUITY_SCORE: 36
ADLS_ACUITY_SCORE: 34
ADLS_ACUITY_SCORE: 26
ADLS_ACUITY_SCORE: 25
ADLS_ACUITY_SCORE: 34
ADLS_ACUITY_SCORE: 36
ADLS_ACUITY_SCORE: 25
ADLS_ACUITY_SCORE: 36
ADLS_ACUITY_SCORE: 36
ADLS_ACUITY_SCORE: 41
ADLS_ACUITY_SCORE: 32
ADLS_ACUITY_SCORE: 36
ADLS_ACUITY_SCORE: 49
ADLS_ACUITY_SCORE: 39
ADLS_ACUITY_SCORE: 38
ADLS_ACUITY_SCORE: 39
ADLS_ACUITY_SCORE: 26
ADLS_ACUITY_SCORE: 39
ADLS_ACUITY_SCORE: 38
ADLS_ACUITY_SCORE: 29
ADLS_ACUITY_SCORE: 31
ADLS_ACUITY_SCORE: 49
ADLS_ACUITY_SCORE: 35
ADLS_ACUITY_SCORE: 36
ADLS_ACUITY_SCORE: 37
ADLS_ACUITY_SCORE: 39
ADLS_ACUITY_SCORE: 25
ADLS_ACUITY_SCORE: 38
ADLS_ACUITY_SCORE: 25
ADLS_ACUITY_SCORE: 25
ADLS_ACUITY_SCORE: 38
ADLS_ACUITY_SCORE: 41
ADLS_ACUITY_SCORE: 25
ADLS_ACUITY_SCORE: 41
ADLS_ACUITY_SCORE: 26
ADLS_ACUITY_SCORE: 39
ADLS_ACUITY_SCORE: 37
ADLS_ACUITY_SCORE: 41
ADLS_ACUITY_SCORE: 36
ADLS_ACUITY_SCORE: 41
ADLS_ACUITY_SCORE: 45
ADLS_ACUITY_SCORE: 36
ADLS_ACUITY_SCORE: 25
ADLS_ACUITY_SCORE: 25
ADLS_ACUITY_SCORE: 39
ADLS_ACUITY_SCORE: 25
ADLS_ACUITY_SCORE: 36
ADLS_ACUITY_SCORE: 25
ADLS_ACUITY_SCORE: 25
ADLS_ACUITY_SCORE: 39
ADLS_ACUITY_SCORE: 36
ADLS_ACUITY_SCORE: 37
ADLS_ACUITY_SCORE: 37
ADLS_ACUITY_SCORE: 31
ADLS_ACUITY_SCORE: 34
ADLS_ACUITY_SCORE: 37
ADLS_ACUITY_SCORE: 36
ADLS_ACUITY_SCORE: 39
ADLS_ACUITY_SCORE: 39
ADLS_ACUITY_SCORE: 25
ADLS_ACUITY_SCORE: 37
ADLS_ACUITY_SCORE: 35
ADLS_ACUITY_SCORE: 36
ADLS_ACUITY_SCORE: 36
ADLS_ACUITY_SCORE: 31
ADLS_ACUITY_SCORE: 37
ADLS_ACUITY_SCORE: 40
ADLS_ACUITY_SCORE: 36
ADLS_ACUITY_SCORE: 45
ADLS_ACUITY_SCORE: 25
ADLS_ACUITY_SCORE: 42
ADLS_ACUITY_SCORE: 25
ADLS_ACUITY_SCORE: 26
ADLS_ACUITY_SCORE: 25
ADLS_ACUITY_SCORE: 44
ADLS_ACUITY_SCORE: 40
ADLS_ACUITY_SCORE: 41
ADLS_ACUITY_SCORE: 36
ADLS_ACUITY_SCORE: 36
ADLS_ACUITY_SCORE: 35
ADLS_ACUITY_SCORE: 36
ADLS_ACUITY_SCORE: 39
ADLS_ACUITY_SCORE: 39
ADLS_ACUITY_SCORE: 30
DEPENDENT_IADLS:: CLEANING;COOKING;LAUNDRY;SHOPPING;MEAL PREPARATION;MEDICATION MANAGEMENT;MONEY MANAGEMENT;TRANSPORTATION
ADLS_ACUITY_SCORE: 25
ADLS_ACUITY_SCORE: 31
FALL_HISTORY_WITHIN_LAST_SIX_MONTHS: YES
ADLS_ACUITY_SCORE: 40
ADLS_ACUITY_SCORE: 37
ADLS_ACUITY_SCORE: 36
ADLS_ACUITY_SCORE: 39
ADLS_ACUITY_SCORE: 36
ADLS_ACUITY_SCORE: 37
ADLS_ACUITY_SCORE: 38
ADLS_ACUITY_SCORE: 34
ADLS_ACUITY_SCORE: 25
ADLS_ACUITY_SCORE: 25
ADLS_ACUITY_SCORE: 41
ADLS_ACUITY_SCORE: 25
PATIENT'S_PREFERRED_MEANS_OF_COMMUNICATION: ENGLISH SPEAKER WITH HEARING LOSS, NO SPEECH PROBLEMS.
ADLS_ACUITY_SCORE: 31
ADLS_ACUITY_SCORE: 39
ADLS_ACUITY_SCORE: 31
ADLS_ACUITY_SCORE: 37
ADLS_ACUITY_SCORE: 39
ADLS_ACUITY_SCORE: 25
ADLS_ACUITY_SCORE: 49
ADLS_ACUITY_SCORE: 41
ADLS_ACUITY_SCORE: 31
ADLS_ACUITY_SCORE: 35
ADLS_ACUITY_SCORE: 37
ADLS_ACUITY_SCORE: 49
ADLS_ACUITY_SCORE: 25
ADLS_ACUITY_SCORE: 41
ADLS_ACUITY_SCORE: 36
ADLS_ACUITY_SCORE: 25
ADLS_ACUITY_SCORE: 25
ADLS_ACUITY_SCORE: 37
ADLS_ACUITY_SCORE: 37
ADLS_ACUITY_SCORE: 36
ADLS_ACUITY_SCORE: 25
ADLS_ACUITY_SCORE: 36
ADLS_ACUITY_SCORE: 34
ADLS_ACUITY_SCORE: 44
ADLS_ACUITY_SCORE: 45
ADLS_ACUITY_SCORE: 49
ADLS_ACUITY_SCORE: 25
ADLS_ACUITY_SCORE: 49
ADLS_ACUITY_SCORE: 47
ADLS_ACUITY_SCORE: 29
ADLS_ACUITY_SCORE: 25
TOILETING_ISSUES: NO
ADLS_ACUITY_SCORE: 25
ADLS_ACUITY_SCORE: 45
ADLS_ACUITY_SCORE: 34
ADLS_ACUITY_SCORE: 38
ADLS_ACUITY_SCORE: 24
ADLS_ACUITY_SCORE: 39
ADLS_ACUITY_SCORE: 25
ADLS_ACUITY_SCORE: 37
ADLS_ACUITY_SCORE: 32
ADLS_ACUITY_SCORE: 31
ADLS_ACUITY_SCORE: 39
ADLS_ACUITY_SCORE: 38
ADLS_ACUITY_SCORE: 47
ADLS_ACUITY_SCORE: 42
ADLS_ACUITY_SCORE: 40
ADLS_ACUITY_SCORE: 25
ADLS_ACUITY_SCORE: 47
ADLS_ACUITY_SCORE: 26
ADLS_ACUITY_SCORE: 36
ADLS_ACUITY_SCORE: 31
ADLS_ACUITY_SCORE: 47
THE_FOLLOWING_AIDS_WERE_PROVIDED;: PATIENT DECLINED OFFER OF AUXILIARY AIDS
ADLS_ACUITY_SCORE: 26
ADLS_ACUITY_SCORE: 37
ADLS_ACUITY_SCORE: 40
ADLS_ACUITY_SCORE: 25
ADLS_ACUITY_SCORE: 38
ADLS_ACUITY_SCORE: 44
ADLS_ACUITY_SCORE: 31
ADLS_ACUITY_SCORE: 37
ADLS_ACUITY_SCORE: 37
ADLS_ACUITY_SCORE: 35
ADLS_ACUITY_SCORE: 24
ADLS_ACUITY_SCORE: 25
ADLS_ACUITY_SCORE: 37
ADLS_ACUITY_SCORE: 36
ADLS_ACUITY_SCORE: 41
DESCRIBE_HEARING_LOSS: BILATERAL HEARING LOSS
ADLS_ACUITY_SCORE: 40
ADLS_ACUITY_SCORE: 35
ADLS_ACUITY_SCORE: 26
ADLS_ACUITY_SCORE: 42
ADLS_ACUITY_SCORE: 37
ADLS_ACUITY_SCORE: 35
ADLS_ACUITY_SCORE: 25
DIFFICULTY_EATING/SWALLOWING: NO
ADLS_ACUITY_SCORE: 26
ADLS_ACUITY_SCORE: 34
ADLS_ACUITY_SCORE: 37
ADLS_ACUITY_SCORE: 42
WALKING_OR_CLIMBING_STAIRS_DIFFICULTY: YES
ADLS_ACUITY_SCORE: 25
ADLS_ACUITY_SCORE: 35
ADLS_ACUITY_SCORE: 35
ADLS_ACUITY_SCORE: 42
ADLS_ACUITY_SCORE: 25
ADLS_ACUITY_SCORE: 38
ADLS_ACUITY_SCORE: 36
ADLS_ACUITY_SCORE: 38
ADLS_ACUITY_SCORE: 49
ADLS_ACUITY_SCORE: 39
ADLS_ACUITY_SCORE: 37
ADLS_ACUITY_SCORE: 34
ADLS_ACUITY_SCORE: 25
ADLS_ACUITY_SCORE: 37
ADLS_ACUITY_SCORE: 25
ADLS_ACUITY_SCORE: 25
ADLS_ACUITY_SCORE: 39
ADLS_ACUITY_SCORE: 35
ADLS_ACUITY_SCORE: 26
ADLS_ACUITY_SCORE: 37
ADLS_ACUITY_SCORE: 34
ADLS_ACUITY_SCORE: 25
ADLS_ACUITY_SCORE: 36
ADLS_ACUITY_SCORE: 36
ADLS_ACUITY_SCORE: 49
ADLS_ACUITY_SCORE: 42
ADLS_ACUITY_SCORE: 37
DRESSING/BATHING_DIFFICULTY: NO
ADLS_ACUITY_SCORE: 47
ADLS_ACUITY_SCORE: 36
ADLS_ACUITY_SCORE: 25
ADLS_ACUITY_SCORE: 25
ADLS_ACUITY_SCORE: 45
ADLS_ACUITY_SCORE: 39
ADLS_ACUITY_SCORE: 25
DIFFICULTY_COMMUNICATING: NO
ADLS_ACUITY_SCORE: 25
ADLS_ACUITY_SCORE: 35
ADLS_ACUITY_SCORE: 36
ADLS_ACUITY_SCORE: 25
ADLS_ACUITY_SCORE: 35
ADLS_ACUITY_SCORE: 25
ADLS_ACUITY_SCORE: 47
ADLS_ACUITY_SCORE: 24
ADLS_ACUITY_SCORE: 25
DEPENDENT_IADLS:: INDEPENDENT
ADLS_ACUITY_SCORE: 37
ADLS_ACUITY_SCORE: 25
EQUIPMENT_CURRENTLY_USED_AT_HOME: WHEELCHAIR, MANUAL
ADLS_ACUITY_SCORE: 41
ADLS_ACUITY_SCORE: 31
ADLS_ACUITY_SCORE: 31
ADLS_ACUITY_SCORE: 25
ADLS_ACUITY_SCORE: 36
ADLS_ACUITY_SCORE: 40
WERE_AUXILIARY_AIDS_OFFERED?: YES
ADLS_ACUITY_SCORE: 36
ADLS_ACUITY_SCORE: 37
ADLS_ACUITY_SCORE: 37
PREVIOUS_RESPONSIBILITIES: MEAL PREP;HOUSEKEEPING;LAUNDRY;MEDICATION MANAGEMENT
ADLS_ACUITY_SCORE: 36
ADLS_ACUITY_SCORE: 25
ADLS_ACUITY_SCORE: 34
ADLS_ACUITY_SCORE: 25
ADLS_ACUITY_SCORE: 25
ADLS_ACUITY_SCORE: 31
ADLS_ACUITY_SCORE: 25
ADLS_ACUITY_SCORE: 37
ADLS_ACUITY_SCORE: 25
ADLS_ACUITY_SCORE: 36
ADLS_ACUITY_SCORE: 36
ADLS_ACUITY_SCORE: 29
ADLS_ACUITY_SCORE: 45
ADLS_ACUITY_SCORE: 38
ADLS_ACUITY_SCORE: 39
ADLS_ACUITY_SCORE: 25
ADLS_ACUITY_SCORE: 25
ADLS_ACUITY_SCORE: 38
ADLS_ACUITY_SCORE: 36
ADLS_ACUITY_SCORE: 37
ADLS_ACUITY_SCORE: 26
ADLS_ACUITY_SCORE: 42
ADLS_ACUITY_SCORE: 36
ADLS_ACUITY_SCORE: 37
ADLS_ACUITY_SCORE: 36
ADLS_ACUITY_SCORE: 39
ADLS_ACUITY_SCORE: 25
ADLS_ACUITY_SCORE: 37
ADLS_ACUITY_SCORE: 41
ADLS_ACUITY_SCORE: 25
ADLS_ACUITY_SCORE: 38
ADLS_ACUITY_SCORE: 45
ADLS_ACUITY_SCORE: 32
ADLS_ACUITY_SCORE: 36
ADLS_ACUITY_SCORE: 29
ADLS_ACUITY_SCORE: 36
ADLS_ACUITY_SCORE: 31
ADLS_ACUITY_SCORE: 31
ADLS_ACUITY_SCORE: 25
ADLS_ACUITY_SCORE: 37
ADLS_ACUITY_SCORE: 36
ADLS_ACUITY_SCORE: 37
ADLS_ACUITY_SCORE: 39
ADLS_ACUITY_SCORE: 29
ADLS_ACUITY_SCORE: 39
ADLS_ACUITY_SCORE: 35
ADLS_ACUITY_SCORE: 25
ADLS_ACUITY_SCORE: 31
ADLS_ACUITY_SCORE: 41
ADLS_ACUITY_SCORE: 39
ADLS_ACUITY_SCORE: 37
ADLS_ACUITY_SCORE: 33
ADLS_ACUITY_SCORE: 36
ADLS_ACUITY_SCORE: 36
ADLS_ACUITY_SCORE: 41
ADLS_ACUITY_SCORE: 35
ADLS_ACUITY_SCORE: 36
ADLS_ACUITY_SCORE: 49
ADLS_ACUITY_SCORE: 36
ADLS_ACUITY_SCORE: 37
ADLS_ACUITY_SCORE: 40
ADLS_ACUITY_SCORE: 34
ADLS_ACUITY_SCORE: 45
ADLS_ACUITY_SCORE: 37
ADLS_ACUITY_SCORE: 25
ADLS_ACUITY_SCORE: 37
ADLS_ACUITY_SCORE: 36
ADLS_ACUITY_SCORE: 39
ADLS_ACUITY_SCORE: 42
ADLS_ACUITY_SCORE: 38
ADLS_ACUITY_SCORE: 37
ADLS_ACUITY_SCORE: 25
ADLS_ACUITY_SCORE: 37
ADLS_ACUITY_SCORE: 24
ADLS_ACUITY_SCORE: 49
ADLS_ACUITY_SCORE: 41
ADLS_ACUITY_SCORE: 39
ADLS_ACUITY_SCORE: 36
ADLS_ACUITY_SCORE: 37
ADLS_ACUITY_SCORE: 24
ADLS_ACUITY_SCORE: 37
ADLS_ACUITY_SCORE: 25
ADLS_ACUITY_SCORE: 39
ADLS_ACUITY_SCORE: 49
ADLS_ACUITY_SCORE: 25
ADLS_ACUITY_SCORE: 37
ADLS_ACUITY_SCORE: 25
ADLS_ACUITY_SCORE: 47
ADLS_ACUITY_SCORE: 26
ADLS_ACUITY_SCORE: 40
ADLS_ACUITY_SCORE: 39
ADLS_ACUITY_SCORE: 39
CHANGE_IN_FUNCTIONAL_STATUS_SINCE_ONSET_OF_CURRENT_ILLNESS/INJURY: YES
ADLS_ACUITY_SCORE: 36
ADLS_ACUITY_SCORE: 47
ADLS_ACUITY_SCORE: 47
ADLS_ACUITY_SCORE: 37
ADLS_ACUITY_SCORE: 25
ADLS_ACUITY_SCORE: 39
ADLS_ACUITY_SCORE: 25
ADLS_ACUITY_SCORE: 49
ADLS_ACUITY_SCORE: 36
ADLS_ACUITY_SCORE: 38
ADLS_ACUITY_SCORE: 39
ADLS_ACUITY_SCORE: 38
ADLS_ACUITY_SCORE: 42
ADLS_ACUITY_SCORE: 41
ADLS_ACUITY_SCORE: 44
NUMBER_OF_TIMES_PATIENT_HAS_FALLEN_WITHIN_LAST_SIX_MONTHS: 10
ADLS_ACUITY_SCORE: 25
ADLS_ACUITY_SCORE: 36
ADLS_ACUITY_SCORE: 24
ADLS_ACUITY_SCORE: 34
ADLS_ACUITY_SCORE: 25
ADLS_ACUITY_SCORE: 38
ADLS_ACUITY_SCORE: 37
ADLS_ACUITY_SCORE: 36
ADLS_ACUITY_SCORE: 35
ADLS_ACUITY_SCORE: 35
ADLS_ACUITY_SCORE: 31
ADLS_ACUITY_SCORE: 37
ADLS_ACUITY_SCORE: 39
ADLS_ACUITY_SCORE: 49
ADLS_ACUITY_SCORE: 36
ADLS_ACUITY_SCORE: 38
ADLS_ACUITY_SCORE: 36
ADLS_ACUITY_SCORE: 32
ADLS_ACUITY_SCORE: 25
ADLS_ACUITY_SCORE: 39
ADLS_ACUITY_SCORE: 49
ADLS_ACUITY_SCORE: 31
ADLS_ACUITY_SCORE: 37
ADLS_ACUITY_SCORE: 39
ADLS_ACUITY_SCORE: 34
ADLS_ACUITY_SCORE: 25
ADLS_ACUITY_SCORE: 34
ADLS_ACUITY_SCORE: 42
ADLS_ACUITY_SCORE: 37
ADLS_ACUITY_SCORE: 40
ADLS_ACUITY_SCORE: 38
ADLS_ACUITY_SCORE: 25
ADLS_ACUITY_SCORE: 49
ADLS_ACUITY_SCORE: 25
ADLS_ACUITY_SCORE: 37
ADLS_ACUITY_SCORE: 36
ADLS_ACUITY_SCORE: 25
ADLS_ACUITY_SCORE: 47
ADLS_ACUITY_SCORE: 35

## 2023-01-01 ASSESSMENT — LIFESTYLE VARIABLES
TOBACCO_USE: 1
TOBACCO_USE: 1

## 2023-01-01 ASSESSMENT — PAIN SCALES - GENERAL
PAINLEVEL: MILD PAIN (3)
PAINLEVEL: NO PAIN (1)
PAINLEVEL: NO PAIN (0)
PAINLEVEL: SEVERE PAIN (7)
PAINLEVEL: EXTREME PAIN (9)

## 2023-01-01 ASSESSMENT — ENCOUNTER SYMPTOMS
DYSRHYTHMIAS: 1

## 2023-01-13 ENCOUNTER — OFFICE VISIT (OUTPATIENT)
Dept: NEUROLOGY | Facility: CLINIC | Age: 73
End: 2023-01-13
Payer: COMMERCIAL

## 2023-01-13 VITALS
RESPIRATION RATE: 16 BRPM | DIASTOLIC BLOOD PRESSURE: 67 MMHG | SYSTOLIC BLOOD PRESSURE: 164 MMHG | HEART RATE: 74 BPM | OXYGEN SATURATION: 100 %

## 2023-01-13 DIAGNOSIS — G31.84 MCI (MILD COGNITIVE IMPAIRMENT) WITH MEMORY LOSS: ICD-10-CM

## 2023-01-13 DIAGNOSIS — G47.00 INSOMNIA, UNSPECIFIED TYPE: ICD-10-CM

## 2023-01-13 PROCEDURE — 99215 OFFICE O/P EST HI 40 MIN: CPT | Performed by: PSYCHIATRY & NEUROLOGY

## 2023-01-13 RX ORDER — DONEPEZIL HYDROCHLORIDE 10 MG/1
10 TABLET, FILM COATED ORAL AT BEDTIME
Qty: 90 TABLET | Refills: 1 | Status: SHIPPED | OUTPATIENT
Start: 2023-01-13 | End: 2023-01-01

## 2023-01-13 RX ORDER — CLOPIDOGREL BISULFATE 75 MG/1
75 TABLET ORAL DAILY
COMMUNITY
Start: 2022-05-03 | End: 2023-01-01

## 2023-01-13 ASSESSMENT — PAIN SCALES - GENERAL: PAINLEVEL: NO PAIN (0)

## 2023-01-13 NOTE — PATIENT INSTRUCTIONS
For sleep:  - Take melatonin 10 mg   - Stop caffeine use at 3pm and switch to decaf at that time    For cognitive impairment:  - Take donepezil 10 mg QAM  - Repeat neuropsychology and cognitive performance testing

## 2023-01-13 NOTE — PROGRESS NOTES
[facility] Neurology Follow Up Visit    Darian Engle MRN# [id number]   Age: 72 year old YOB: 1950     Brief history of symptoms: The patient was initially seen in neurologic consultation on 1/20/2021 and most recently 7/22/2022 for evaluation of MCI and chronic CVA (Left frontal, left occipital). Please see the comprehensive neurologic consultation notes from those dates in the Epic records for details.     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 Cardiology consultation 3/2022 led to recommendation to start apixiban (given his 16% afib burden on prior testing) for stroke risk reduction over the course of 6 months, then continue with ASA alone.  He was also to stop/reduce alcohol intake.    Today, Frandy is here with his wife.  He indicates that he just sits in the house most of the time. His wife drives for him. He has stopped cooking, and tells me that he doesn't recall ever cooking.  His wife tells me that he always cooked and this is a change.  There are no visual hallucinations, passing out events, or loss of consciousness events.  He has difficulty in determining days and nights, often leading to him staying up at night and sleeping during the day.  His continues to drink coffee through the day.  He can get frustrated at things that are difficult for him, especially issues like directions and getting lost or doing a simple task.     Physical Exam:   Vitals: BP (!) 164/67   Pulse 74   Resp 16   SpO2 100%    General: Seated comfortably in no acute distress.  HEENT: Neck supple with normal range of motion.   Neurologic:     Mental Status: Fully alert, attentive and oriented. Speech clear and fluent, no paraphasic errors.     Cranial Nerves: EOMI with normal  smooth pursuit. Facial movements symmetric. Hearing not formally tested but intact to conversation.  No dysarthria.     Motor: No tremors or other abnormal movements observed.      Gait: Normal, steady casual gait.  Stands easily with arms across chest. Wider base stance but gait is not widened with heel distance (under 3-6 inches). Mild arm swing b/l. Turns easily, but has a hitch in his right hip (pauses with possible pain).         Assessment and Plan:   Assessment:  MCI, undifferentiated  CVA, hx    The patient isn't having (by report) major signs of functional decline in comparison to our last visit half a year ago, but there are signs of disengagement and apathy present.  Some of the apathetic responses have been improved with use of an antidepressant.  His sleep is still poor, and he is almost switching his sleep cycle at this time.  I suspect he would benefit from melatonin to be restarted and well defined sleep schedule be set.  He is tolerating donepezil without major or even minor side effects being reported. We discussed that continued social engagement and engagement in hobbies is an important aspect of treating cognitive impairment, and he was feeling positive overall in terms of wanting to paint again and attend events with his wife.  We also discussed that repeating some cognitive testing could tell us more about any progression occurring in the last 2 years, and he was open to doing these tests again.    Plan:  - Melatonin 10 mg at bedtime  - Donepezil 10 mg at bedtime  - CPT through OT  - Repeat neuropsychology testing    Follow up in Neurology clinic in one year, or earlier as needed should new concerns arise.    ANA Franco D.O.   of Neurology    Total time today (47 min) in this patient encounter was spent on pre-charting, counseling and/or coordination of care.

## 2023-01-13 NOTE — LETTER
1/13/2023       RE: Darian Engle  [address]   [place] [address]     Dear Colleague,    Thank you for referring your patient, Darian Engle, to the [facility]OLOG[facility] [place] [facility]. Please see a copy of my visit note below.    [facility] Neurology Follow Up Visit    Darina Engle MRN# [id number]   Age: 72 year old YOB: 1950     Brief history of symptoms: The patient was initially seen in neurologic consultation on 1/20/2021 and most recently 7/22/2022 for evaluation of MCI and chronic CVA (Left frontal, left occipital). Please see the comprehensive neurologic consultation notes from those dates in the Epic records for details.     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 Cardiology consultation 3/2022 led to recommendation to start apixiban (given his 16% afib burden on prior testing) for stroke risk reduction over the course of 6 months, then continue with ASA alone.  He was also to stop/reduce alcohol intake.    Today, Frandy is here with his wife.  He indicates that he just sits in the house most of the time. His wife drives for him. He has stopped cooking, and tells me that he doesn't recall ever cooking.  His wife tells me that he always cooked and this is a change.  There are no visual hallucinations, passing out events, or loss of consciousness events.  He has difficulty in determining days and nights, often leading to him staying up at night and sleeping during the day.  His continues to drink coffee through the day.  He can get frustrated at things that are difficult for him, especially issues like directions and getting lost or doing a simple task.     Physical  Exam:   Vitals: BP (!) 164/67   Pulse 74   Resp 16   SpO2 100%    General: Seated comfortably in no acute distress.  HEENT: Neck supple with normal range of motion.   Neurologic:     Mental Status: Fully alert, attentive and oriented. Speech clear and fluent, no paraphasic errors.     Cranial Nerves: EOMI with normal smooth pursuit. Facial movements symmetric. Hearing not formally tested but intact to conversation.  No dysarthria.     Motor: No tremors or other abnormal movements observed.      Gait: Normal, steady casual gait.  Stands easily with arms across chest. Wider base stance but gait is not widened with heel distance (under 3-6 inches). Mild arm swing b/l. Turns easily, but has a hitch in his right hip (pauses with possible pain).         Assessment and Plan:   Assessment:  MCI, undifferentiated  CVA, hx    The patient isn't having (by report) major signs of functional decline in comparison to our last visit half a year ago, but there are signs of disengagement and apathy present.  Some of the apathetic responses have been improved with use of an antidepressant.  His sleep is still poor, and he is almost switching his sleep cycle at this time.  I suspect he would benefit from melatonin to be restarted and well defined sleep schedule be set.  He is tolerating donepezil without major or even minor side effects being reported. We discussed that continued social engagement and engagement in hobbies is an important aspect of treating cognitive impairment, and he was feeling positive overall in terms of wanting to paint again and attend events with his wife.  We also discussed that repeating some cognitive testing could tell us more about any progression occurring in the last 2 years, and he was open to doing these tests again.    Plan:  - Melatonin 10 mg at bedtime  - Donepezil 10 mg at bedtime  - CPT through OT  - Repeat neuropsychology testing    Follow up in Neurology clinic in one year, or earlier as  needed should new concerns arise.    ANA Franco D.O.   of Neurology    Total time today (47 min) in this patient encounter was spent on pre-charting, counseling and/or coordination of care.

## 2023-01-23 ENCOUNTER — TELEPHONE (OUTPATIENT)
Dept: NEUROPSYCHOLOGY | Facility: CLINIC | Age: 73
End: 2023-01-23

## 2023-01-23 NOTE — TELEPHONE ENCOUNTER
[facility] Call Center    Phone Message    May a detailed message be left on voicemail: yes     Reason for Call: Other: Angelica, patients wife, is calling to schedule neuropsych eval. Please call back to schedule.     Action Taken: Message routed to:  [facility] ([facility]    Travel Screening: Not Applicable

## 2023-02-01 ENCOUNTER — THERAPY VISIT (OUTPATIENT)
Dept: OCCUPATIONAL THERAPY | Facility: CLINIC | Age: 73
End: 2023-02-01
Attending: PSYCHIATRY & NEUROLOGY
Payer: COMMERCIAL

## 2023-02-01 DIAGNOSIS — Z78.9 IMPAIRED INSTRUMENTAL ACTIVITIES OF DAILY LIVING (IADL): Primary | ICD-10-CM

## 2023-02-01 DIAGNOSIS — G31.84 MCI (MILD COGNITIVE IMPAIRMENT) WITH MEMORY LOSS: ICD-10-CM

## 2023-02-01 PROCEDURE — 96125 COGNITIVE TEST BY HC PRO: CPT | Mod: GO

## 2023-02-01 NOTE — PROGRESS NOTES
Cognitive Performance Test    SUMMARY OF TEST:    The Cognitive Performance Test (CPT) is a standardized performance-based assessment to measure working memory/executive function processing capacities that underlie functional performance. Subtasks include common basic and instrumental activities of daily living (ADL/IADL) which are rated based on the manner in which patients respond to task demands of varying complexity. The total CPT score describes a level of functioning that indicates how information is processed, implications for functional activities, potential safety risks and a recommended level of supervision or assist based on cognitive function. The highest total score on this test is in the range of 5.6 to 5.8.    DATE OF TESTIN23    Ordering Provider: Schuyler Franco DO    Order date: 23    Order Diagnosis: MCI (mild cognitive impairment) with memory loss        Occupational Profile (including diagnosis and medical history): Mr. Frandy Engle is a 72 year old male who was referred to outpatient occupational therapy at request of Dr. Joan DO for a CPT evaluation. Patient presented to clinic ambulating independently, accompanied by his wife, Angelica, who was present and contributed to history. Followed by Dr. Joan DO on 21,  22, and most recently 23 for evaluation of mild cognitive impairment and history of strokes. Patient underwent neuropsych testing 2021, and this was suggestive for amnestic mild cognitive impairment worrisome for early stages of Alzheimer's disease with contributions from his strokes (deficits not entirely caused by infarctions).       Previous cognitive screens completed in OT or by Physician and score:  Pt completed CPT testing with OT 21, scoring 5.0/5.5, OT 3/21/22, scoring 5.0/5.5    Functional History Interview:    Informants: Patient and wife, Angelica     Client s perception of memory/ thinking or functional  difficulties: Pt and wife note worsening memory over the past two years and he has required increased assistance for higher level from his wife. He ceased driving in 2021. He is dependent on wife with for higher level cognitive tasks including dispensing medications and recalling to take medications, managing finances and appointments, errands (ie: grocery shopping, going to pharmacy), and wife notes changes in cooking responsibilities as he now only prepares meals for himself instead of both of them.  Pt's wife reports he frequently misplaces items or places things in wrong area of kitchen. She notes he difficulty in determining days and nights, often leading to him staying up at night and sleeping during the day     Living situation: Pt lives in a one-level townhouse with his wife, Angelica. Patient and his wife are both retired. Pt's wife notes he is rarely home alone, however, occassionally needs to run an errand. He is able to recall phone numbers or use pre-programmed phone to call if he needs. His daughter lives 2 blocks away     Home maintenance activities:  Pt's wife reports he participates in  household management tasks such as cleaning floor, feeding cats, completing laundry, and washing dishes. His wife performs light shoveling.      Meal preparation and grocery shopping: Pt's wife completes grocery shopping or they go together. Wife reports he used to complete all the cooking, however, no he only prepares meals for himself. They deny cognitive errors such as forgetting to turn off stove/oven or errors in task sequencing. He washes dishes     Finances and paying bills: Angelica has taken over the bill paying due to Frandy's cognitive deficits.      Medication management: Pt's wife is required to dispense medication and patient requires multiple reminders to take medications     Driving and transportation: Pt ceased driving in 2021     Leisure and routine activities: Pt reports it is hard to get him out of the  "house and he prefers to be home and watch tv     ADL/Mobility/Physical Functioning: Pt is completely independent in self-care tasks and ambulates independently without requiring an assistive device.     Fall Risk Screen:   Has the patient fallen 2 or more times in the last year? No      Has the patient fallen and had an injury in the past year? No        Is the patient a fall risk? Yes, department fall risk interventions implemented      RESULTS OF TESTING:                                                                                         CPT Subtest Results    MEDBOX: 6/6 SHOP/GLOVES: 5/6 PHONE: 6/6   WASH:  5/5 TRAVEL: NT/6 TOAST: 5/5   DRESS: NT/5   TOTAL CPT SCORE:  27/28     Average CPT Score  5.4/5.6    INTERPRETATION OF TEST RESULTS:    Based on the Cognitive Performance Test, this patient scored at CPT Level 5.0.  See CPT Levels reference below.    Summary of functional cognitive status:   Medbox task: Pt set up 4 of 4 pill bottles with good attention to detail and accuracy on all 4 bottles.  Shop task: Pt first checked the wallet to see how much money there was, chose a pair of gloves that fit, and required cues to look at all the gloves to pay with the amount of money provided. He paid the correct amount of money. T  Phone task: Pt was able to locate appropriate section in phone book. He required cues for task initiation and continuation. He then dialed the number correctly and asked a question about paint, but did not recall the specific question \"how much is a gallon of white paint.\"   Toast task: Pt correctly sequenced the steps of making toast, including plugging in the toaster, and puts butter on toast but does not remember instruction to put jelly on toast    Wash task: Pt selected correct grooming items (soap), he was thorough in washing his hands, and initated shutting off the water and drying hands    Results reveals mild functional decline. Individuals at this level are able to complete " "basic daily and routine household tasks but requires assistance for more complex tasks such as managing medication schedules, appointments, finances, higher level household tasks, etc. Caregiver may need to: urge patient to use notes, daily checklists, calendars or there reminders; make tasks easier and limit tasks that are too hard; allow extra time; monitor, supervise or help with complex tasks. Shows start of decline in memory, keeping track of details, decision-making, planning, and organization.    Factors affecting performance:  No additional problems noted    Recommendations:    Assist for ADL/IADL:  Cleaning, Shopping, Finances, Driving, and Medication management  Supervision for ADL/IADL:  ADL, Meal preparation, and Laundry  Supervision in living setting:  Daily checks                    Skilled CPT Statement:   Patient educated in [place] Cognitive Level 5.0 based on results of CPT. Patient was educated in general information regarding level 5.0 includin) people at this level may be able live alone with daily checks for safety by a caregiver, 2) they may experience trouble with memory, judgment, reasoning, planning and especially with new learning tasks, 3) they may have trouble with paying attention, shifting attention and getting distracted, 4) may have trouble seeing the \"big picture.\", 5.) may have some trouble with daily activities and may make mistakes and have trouble with attention to details with complex tasks. The following recommendations were instructed to the patient: 1) offer daily structure and reminders 2) simplify (or give help) with complex activities, 3) encourage the person to use notes, daily checklists or other reminders, 4) make the area safe, 5) allow extra time for all activities, 6) watch for changes and supervise for safety, 7) have someone monitor, supervise or help with complex tasks such as medication management, bill paying, meal prep and shopping, 100% comprehension " "    TIME ADMINISTERING TEST: 45    TIME FOR INTERPRETATION AND PREPARATION OF REPORT: 45    TOTAL TIME: 90      CPT Levels Reference:    Patient's Average CPT Score:  5.0                                                                                                                                                  Individual scores range along a continuum as outlined below.  In addition to cognitive status, other factors may affect safety in a home environment.  Please refer to specific recommendations for this patient.    ___5.6-5.8  Normal functioning (absence of cognitive-functional disability).  Independent in managing personal affairs, monitors and directs own behavior.  Uses complex information to carry out daily activities with safety and accuracy.    Proficient with instrumental activities of daily living (IADL) and learning new activity.  Problems are anticipated, errors are avoided, and consequences of actions are considered.      __x_5.0   Mild cognitive-functional disability; deficits in working memory and executive thought processes. Difficulty using complex information. Problems may be observed with recent memory, judgment, reasoning and planning ahead. May be impulsive or have difficulty anticipating consequences.  Safety:  May require assistance to plan ahead; or to manage complex medication schedules, appointments or finances.  Hazardous activities may need to be monitored or limited.  ADL:  Mild functional decline.  Able to complete basic self-care and routine household tasks.  May have difficulty with complex daily tasks such as reading, writing, meal preparation, shopping or driving.   Learns through hands on teaching. Self-centered behavior or difficulty considering the needs of others may be seen related to trouble seeing the  whole picture\". Can appear disorganized or uninhibited.    ___4.5  Mild to moderate cognitive-functional disability. Significant deficits in working memory and executive " thought processes. Judgment, reasoning and planning show obvious impairment.  Distractible with inability to shift attention/actions given competing stimuli.  Difficulty with problem solving and managing details. Complex daily tasks performed with inconsistency, difficulty, or error.     Safety:  Medications should be monitored, stove use may require supervision, and driving ability may be affected.  Impaired safety awareness with inability to anticipate potential problems.  May not recognize or respond to emergent situations. Requires frequent check-in support.   ADL:  Mild difficulty with simple everyday self-care tasks. Benefits from structured, routine activity.  Will likely need reminders to complete tasks outside of the routine. Requires assistance with planning and IADL tasks like shopping and finances. Learns concrete tasks through repetition, but performance may not generalize. Tends to be impulsive with poor insight. Self centered behavior or inability to consider the needs of others is common.    ___4.0  Moderate cognitive-functional disability; abstract to concrete thought processes. Working memory and executive function impairments are obvious. Difficulty with planning and problem solving.  Behavior is goal-directed, but unable to follow multi-step directions, is easily distracted, and may not recognize mistakes.  Inability to anticipate hazards or understand precautions.  Safety:  Recommend 24-hour supervision for safety. Supervision needed for medication management and for hazardous activities. May not be able to follow a restricted diet. Can get lost in unfamiliar surroundings. Generally, persons functioning at level 4 should not be driving.   ADL:  Some decline in quality or frequency of ADL.  [place] enhanced by use of a routine, simple concrete directions, and caregiver set-up of needed items. Complex tasks such as money or home management typically requires assistance.  Relies heavily on  vision to guide behavior; will ignore objects/hazards not in plain sight and can be distracted by irrelevant objects. Often has poor insight.  Able to carry out social conversation and may verbally  cover  for deficits leading caregivers to believe they are capable of functioning independently.       ___3.5  Moderate cognitive-functional disability; increased cues needed for task completion. Aware of concrete task steps but needs prompting or cues to initiate and complete simple tasks. Attention span is limited, simple directions may need to be repeated, and re-focus to a topic or task may be required.  Safety:  24-hour supervision required for safety and for assistance with daily tasks. Assistance required with medications, and access to medication should be limited. Meals, nutrition and dietary restrictions need to be monitored.  All hazardous activities should be restricted or supervised. Should not drive. Prone to wandering and can become lost.  ADL:  Moderate functional decline. Familiar tasks usually requires set-up of supplies and directions to complete steps. May need objects handed to them for task initiation. Function best with a set schedule in familiar surroundings with familiar people. All complex tasks must be done by others. Vocabulary is diminished and speech often unfocused.

## 2023-02-02 ENCOUNTER — TELEPHONE (OUTPATIENT)
Dept: FAMILY MEDICINE | Facility: CLINIC | Age: 73
End: 2023-02-02
Payer: COMMERCIAL

## 2023-02-02 NOTE — TELEPHONE ENCOUNTER
Ping RN with [facility], calling to request hold orders for Eliquis.    Order: 3 day hold of Eliquis prior to upper endoscopy ultrasound scheduled for 3/7/2023.    Routing to provider to review and advise.    Teressa Milligan RN  [facility]

## 2023-02-02 NOTE — TELEPHONE ENCOUNTER
I called and spoke with Ivy DIA at [facility] and gave the hold orders as requested below.  Thank you. Safia Noriega R.N.      If we need to call [facility] dial [phone] and Ext: [phone] for the blood thinning RN team Ivy

## 2023-02-17 NOTE — PROGRESS NOTES
[facility]  [address] [facility] [address]  Phone: [phone]  Primary Provider: Stan Shell  Pre-op Performing Provider: STAN SHELL      PREOPERATIVE EVALUATION:  Today's date: 2/21/2023    Darian Engle is a 72 year old male who presents for a preoperative evaluation.  Did well with endoscopic in fall. eliquis held 3 days before.   some weight gain. Appetite - improving. Blood pressure a little high.    Blood sugars ok. lantus 40units and taking victoza. No LOSS OF CONSCIENCE or sugars <50. No feet changes acutely. No ulcers.   Eye exam one year. No shortness of breath or chest pain.   No nausea, vomiting or diarrhea or black/bloody stools. No abdominal pain. No urine changes or hematuria.. some frequency.   Coffee 1 pot/day. Rare diet pop. No ALCOHOL.   Emotionally doing ok. Sleep overall ok. No fevers or chills or cold symptoms.   History pancreatic mass/ weight loss.   History dm, cad (seeing cardiology), memory issues (seeing neurology), htn and high cholesterol.   Surgical Information:  Surgery/Procedure: Endoscopic Ultrasound fine needle aspirate   Surgery Location: [facility]   Surgeon: Chris Reveles  Surgery Date: 3/7/23  Time of Surgery: 9:55am  Where patient plans to recover: At home with family  Fax number for surgical facility: Note does not need to be faxed, will be available electronically in Epic.    Type of Anesthesia Anticipated: to be determined    ASSESSMENT / PLAN:    (Z01.818) Preop general physical exam  Comment: denies chest pain or shortness of breath. Blood pressure and sugars ok  Plan: ok for procedure. Hold eliquis 3 days before again.     (K86.89) Pancreatic mass  Comment: hopefully not cancerous with weight gain and no abdominal pain  Plan: per GI.     (E11.8,  Z79.4) Type 2 diabetes mellitus with complication, with long-term current use of insulin (H)  Comment: over treatment. Unclear exactly how much  lantus patient is taking and not sure taking victoza  Plan: Hemoglobin A1c, Renal panel, Albumin Random         Urine Quantitative with Creat Ratio        Stop victoza and lower lantus by 5 units. To 35units. Recheck in 3 months  Dm ed help if too low.     (I25.10) CAD, multiple vessel  Comment: stable  Plan: per cardiology. Chest pain or shortness of breath to er.         Subjective     HPI related to upcoming procedure: pancreatic mass      Preop Questions 2/21/2023   1. Have you ever had a heart attack or stroke? YES -    2. Have you ever had surgery on your heart or blood vessels, such as a stent placement, a coronary artery bypass, or surgery on an artery in your head, neck, heart, or legs? YES -    3. Do you have chest pain with activity? No   4. Do you have a history of  heart failure? No   5. Do you currently have a cold, bronchitis or symptoms of other infection? No   6. Do you have a cough, shortness of breath, or wheezing? No   7. Do you or anyone in your family have previous history of blood clots? No   8. Do you or does anyone in your family have a serious bleeding problem such as prolonged bleeding following surgeries or cuts? No   9. Have you ever had problems with anemia or been told to take iron pills? No   10. Have you had any abnormal blood loss such as black, tarry or bloody stools? No   11. Have you ever had a blood transfusion? No   12. Are you willing to have a blood transfusion if it is medically needed before, during, or after your surgery? Yes   13. Have you or any of your relatives ever had problems with anesthesia? No   14. Do you have sleep apnea, excessive snoring or daytime drowsiness? YES -    14a. Do you have a CPAP machine? Yes   15. Do you have any artifical heart valves or other implanted medical devices like a pacemaker, defibrillator, or continuous glucose monitor? No   16. Do you have artificial joints? No   17. Are you allergic to latex? No     Health Care Directive:  Patient  does not have a Health Care Directive or Living Will: Discussed advance care planning with patient; information given to patient to review.    Preoperative Review of :   reviewed - controlled substances reflected in medication list.      Review of Systems   Constitutional: Positive for chills. Negative for fever.   HENT: Positive for hearing loss. Negative for congestion, ear pain and sore throat.    Eyes: Negative for pain and visual disturbance.   Respiratory: Negative for cough and shortness of breath.    Cardiovascular: Positive for peripheral edema. Negative for chest pain and palpitations.   Gastrointestinal: Positive for heartburn. Negative for abdominal pain, constipation, diarrhea, hematochezia and nausea.   Genitourinary: Positive for frequency and urgency. Negative for dysuria, genital sores and hematuria.   Musculoskeletal: Negative for arthralgias, joint swelling and myalgias.   Skin: Negative for rash.   Neurological: Positive for weakness. Negative for dizziness, headaches and paresthesias.   Psychiatric/Behavioral: Negative for mood changes. The patient is not nervous/anxious.          Patient Active Problem List    Diagnosis Date Noted     Angina pectoris (H) 05/13/2022     Priority: Medium     Obstructive sleep apnea syndrome 01/20/2021     Priority: Medium     Seen at [facility]; diagnosed with moderate TALA by Dr. Herve Louise; Sept 2019.       Type 2 diabetes mellitus without retinopathy (H) 06/27/2018     Priority: Medium     Bilateral incipient cataracts 06/27/2018     Priority: Medium     PAD (peripheral artery disease) (H) 12/18/2017     Priority: Medium     CAD, multiple vessel 01/27/2017     Priority: Medium     Essential hypertension with goal blood pressure less than 140/90 01/27/2017     Priority: Medium     Type 2 diabetes mellitus with complication, with long-term current use of insulin (H) 01/27/2017     Priority: Medium     Hyperlipidemia LDL goal <100  01/27/2017     Priority: Medium     Gastroesophageal reflux disease without esophagitis 01/27/2017     Priority: Medium      Past Medical History:   Diagnosis Date     Arthritis March 2018    joint pain both hands     Diabetes (H)      Heart disease 1991    [place]-angioplasty     Hypertension      Past Surgical History:   Procedure Laterality Date     CARDIAC SURGERY  2004    [facility]     ENHANCE LASER REFRACTIVE BILATERAL EXISTING PT IN PARAMETERS  2000     EYE SURGERY  2000    [facility]     VASCULAR SURGERY  2017    [facility]     Current Outpatient Medications   Medication Sig Dispense Refill     ACCU-CHEK SMARTVIEW test strip TEST THREE TIMES DAILY OR AS DIRECTED 300 strip 3     apixaban ANTICOAGULANT (ELIQUIS ANTICOAGULANT) 5 MG tablet Take 1 tablet (5 mg) by mouth 2 times daily 180 tablet 3     aspirin 81 MG tablet Take by mouth daily 30 tablet      atorvastatin (LIPITOR) 40 MG tablet Take 1 tablet (40 mg) by mouth daily For cholesterol. 90 tablet 3     blood glucose monitoring (ACCU-CHEK FASTCLIX) lancets USE TO TEST THREE TIMES DAILY OR AS DIRECTED 306 each 3     cholecalciferol 25 MCG (1000 UT) TABS Take 25 mcg by mouth       cilostazol (PLETAL) 50 MG tablet Take 50 mg by mouth 2 times daily       clopidogrel (PLAVIX) 75 MG tablet        donepezil (ARICEPT) 10 MG tablet Take 1 tablet (10 mg) by mouth At Bedtime 90 tablet 1     doxepin (SINEQUAN) 10 MG capsule TAKE 1 CAPSULE(10 MG) BY MOUTH EVERY NIGHT AS NEEDED FOR SLEEP 30 capsule 2     insulin glargine (LANTUS SOLOSTAR) 100 UNIT/ML pen Inject 48 Units Subcutaneous daily (Patient not taking: Reported on 9/8/2022) 30 mL 1     insulin pen needle (B-D U/F) 31G X 5 MM miscellaneous USE THREE TIMES DAILY (Patient not taking: Reported on 9/8/2022) 300 each 1     isosorbide mononitrate (IMDUR) 30 MG 24 hr tablet TAKE 2 TABLETS(60 MG) BY MOUTH DAILY 180 tablet 0     liraglutide (VICTOZA) 18  MG/3ML solution Inject 1.8 mg Subcutaneous daily (Patient not taking: Reported on 9/8/2022) 27 mL 1     losartan (COZAAR) 100 MG tablet TAKE 1 TABLET(100 MG) BY MOUTH DAILY (Patient not taking: Reported on 12/1/2022) 90 tablet 1     losartan (COZAAR) 50 MG tablet Take 1 tablet (50 mg) by mouth daily 90 tablet 1     magnesium oxide 200 MG TABS Take 200 mg by mouth       metFORMIN (GLUCOPHAGE XR) 500 MG 24 hr tablet TAKE 1 TABLET(500 MG) BY MOUTH TWICE DAILY WITH MEALS 180 tablet 0     metoprolol succinate ER (TOPROL XL) 50 MG 24 hr tablet Take 1 tablet (50 mg) by mouth daily For blood pressure - this is 1/2 dosage 90 tablet 1     metoprolol succinate ER (TOPROL-XL) 100 MG 24 hr tablet Take 1 tablet (100 mg) by mouth daily For blood pressure (Patient not taking: Reported on 9/8/2022) 90 tablet 1     multivitamin (CENTRUM SILVER) tablet Take 1 tablet by mouth daily       nitroGLYcerin (NITROSTAT) 0.4 MG sublingual tablet PLACE 1 TABLET UNDER THE TONGUE EVERY 5 MINUTES FOR 3 DOSES, IF SYMPTOMS PERSIST 5 MINUTES AFTER 1ST DOSE CALL 911 25 tablet 0     Omega-3 Fatty Acids (FISH OIL OMEGA-3) 1000 MG CAPS Take 1,200 mg by mouth daily       omeprazole (PRILOSEC) 20 MG DR capsule Take 1 capsule (20 mg) by mouth daily 90 capsule 3     oxyCODONE-acetaminophen (PERCOCET) 5-325 MG tablet 1/2 tab every 8 hours as needed for pain 12 tablet 0     sertraline (ZOLOFT) 25 MG tablet Take 1 tablet (25 mg) by mouth daily One pill daily FOR DEPRESSION AND HELP APPETITE 90 tablet 1     traZODone (DESYREL) 50 MG tablet Take 1 tablet (50 mg) by mouth At Bedtime 30 tablet 4       Allergies   Allergen Reactions     Lidocaine Other (See Comments)     Lungs filled with fluid. ? Anaphylaxis     Lisinopril Cough     cough     Naltrexone Nausea and Vomiting        Social History     Tobacco Use     Smoking status: Every Day     Packs/day: 0.50     Years: 5.00     Pack years: 2.50     Types: Cigarettes     Start date: 1/1/1968     Last attempt to  "quit: 7/15/2016     Years since quittin.5     Smokeless tobacco: Former   Substance Use Topics     Alcohol use: Not Currently     Comment: binge, two  months sober       History   Drug Use No         Objective     /70   Pulse 60   Temp 97.5  F (36.4  C) (Tympanic)   Resp 17   Ht 1.702 m (5' 7\")   Wt 69.9 kg (154 lb 3.2 oz)   SpO2 98%   BMI 24.15 kg/m     Physical Exam  GENERAL APPEARANCE: healthy, alert and no distress  EYES: Eyes grossly normal to inspection, PERRL and conjunctivae and sclerae normal  HENT: ear canals and TM's normal and nose and mouth without ulcers or lesions  NECK: no adenopathy, no asymmetry, masses, or scars and thyroid normal to palpation  RESP: lungs clear to auscultation - no rales, rhonchi or wheezes  CV: regular rate and rhythm, normal S1 S2, no S3 or S4 and no murmur, click or rub   ABDOMEN: soft, nontender, no HSM or masses and bowel sounds normal  MS: extremities normal- no gross deformities noted  NEURO: Normal strength and tone, sensory exam grossly normal, mentation intact marc a little slow and speech normal  PSYCH: affect normal/bright    Recent Labs   Lab Test 22  1045 22  0740 21  0740 21  0855 21  1747   HGB  --  13.8  --   --  13.8   PLT  --   --   --   --  279   NA  --  140  --   --   --    POTASSIUM  --  5.1  --  4.5  --    CR  --  2.29*  --   --   --    A1C 5.8* 7.5*   < > 10.5*  --     < > = values in this interval not displayed.        Diagnostics:  Recent Results (from the past 24 hour(s))   CBC with platelets    Collection Time: 23 11:12 AM   Result Value Ref Range    WBC Count 7.4 4.0 - 11.0 10e3/uL    RBC Count 4.10 (L) 4.40 - 5.90 10e6/uL    Hemoglobin 12.6 (L) 13.3 - 17.7 g/dL    Hematocrit 37.3 (L) 40.0 - 53.0 %    MCV 91 78 - 100 fL    MCH 30.7 26.5 - 33.0 pg    MCHC 33.8 31.5 - 36.5 g/dL    RDW 14.1 10.0 - 15.0 %    Platelet Count 237 150 - 450 10e3/uL   Hemoglobin A1c    Collection Time: 23 11:12 AM "   Result Value Ref Range    Hemoglobin A1C 5.5 0.0 - 5.6 %    renal panel pending  No new ekg. See EKG from 12/2023    Revised Cardiac Risk Index (RCRI):  The patient has the following serious cardiovascular risks for perioperative complications:   - Coronary Artery Disease (MI, positive stress test, angina, Qs on EKG) = 1 point     RCRI Interpretation: 1 point: Class II (low risk - 0.9% complication rate)           Signed Electronically by: Marcos Gonzalez MD  Copy of this evaluation report is provided to requesting physician.

## 2023-02-19 DIAGNOSIS — E11.8 TYPE 2 DIABETES MELLITUS WITH COMPLICATION, WITH LONG-TERM CURRENT USE OF INSULIN (H): ICD-10-CM

## 2023-02-19 DIAGNOSIS — Z79.4 TYPE 2 DIABETES MELLITUS WITH COMPLICATION, WITH LONG-TERM CURRENT USE OF INSULIN (H): ICD-10-CM

## 2023-02-20 DIAGNOSIS — K21.9 GASTROESOPHAGEAL REFLUX DISEASE WITHOUT ESOPHAGITIS: ICD-10-CM

## 2023-02-20 RX ORDER — METFORMIN HCL 500 MG
TABLET, EXTENDED RELEASE 24 HR ORAL
Qty: 180 TABLET | Refills: 0 | Status: SHIPPED | OUTPATIENT
Start: 2023-02-20 | End: 2023-05-22

## 2023-02-21 ENCOUNTER — OFFICE VISIT (OUTPATIENT)
Dept: FAMILY MEDICINE | Facility: CLINIC | Age: 73
End: 2023-02-21
Payer: COMMERCIAL

## 2023-02-21 VITALS
HEIGHT: 67 IN | HEART RATE: 60 BPM | WEIGHT: 154.2 LBS | RESPIRATION RATE: 17 BRPM | DIASTOLIC BLOOD PRESSURE: 70 MMHG | BODY MASS INDEX: 24.2 KG/M2 | TEMPERATURE: 97.5 F | OXYGEN SATURATION: 98 % | SYSTOLIC BLOOD PRESSURE: 131 MMHG

## 2023-02-21 DIAGNOSIS — I20.9 ANGINA PECTORIS (H): ICD-10-CM

## 2023-02-21 DIAGNOSIS — F03.90 DEMENTIA WITHOUT BEHAVIORAL DISTURBANCE (H): ICD-10-CM

## 2023-02-21 DIAGNOSIS — K21.9 GASTROESOPHAGEAL REFLUX DISEASE WITHOUT ESOPHAGITIS: ICD-10-CM

## 2023-02-21 DIAGNOSIS — Z79.4 TYPE 2 DIABETES MELLITUS WITH COMPLICATION, WITH LONG-TERM CURRENT USE OF INSULIN (H): ICD-10-CM

## 2023-02-21 DIAGNOSIS — Z00.00 MEDICARE ANNUAL WELLNESS VISIT, SUBSEQUENT: Primary | ICD-10-CM

## 2023-02-21 DIAGNOSIS — E11.8 TYPE 2 DIABETES MELLITUS WITH COMPLICATION, WITH LONG-TERM CURRENT USE OF INSULIN (H): ICD-10-CM

## 2023-02-21 DIAGNOSIS — I48.0 PAF (PAROXYSMAL ATRIAL FIBRILLATION) (H): ICD-10-CM

## 2023-02-21 DIAGNOSIS — Z01.818 PREOP GENERAL PHYSICAL EXAM: ICD-10-CM

## 2023-02-21 DIAGNOSIS — I73.9 PAD (PERIPHERAL ARTERY DISEASE) (H): ICD-10-CM

## 2023-02-21 DIAGNOSIS — I25.10 CAD, MULTIPLE VESSEL: ICD-10-CM

## 2023-02-21 DIAGNOSIS — K86.89 PANCREATIC MASS: ICD-10-CM

## 2023-02-21 LAB
ERYTHROCYTE [DISTWIDTH] IN BLOOD BY AUTOMATED COUNT: 14.1 % (ref 10–15)
HBA1C MFR BLD: 5.5 % (ref 0–5.6)
HCT VFR BLD AUTO: 37.3 % (ref 40–53)
HGB BLD-MCNC: 12.6 G/DL (ref 13.3–17.7)
MCH RBC QN AUTO: 30.7 PG (ref 26.5–33)
MCHC RBC AUTO-ENTMCNC: 33.8 G/DL (ref 31.5–36.5)
MCV RBC AUTO: 91 FL (ref 78–100)
PLATELET # BLD AUTO: 237 10E3/UL (ref 150–450)
RBC # BLD AUTO: 4.1 10E6/UL (ref 4.4–5.9)
WBC # BLD AUTO: 7.4 10E3/UL (ref 4–11)

## 2023-02-21 PROCEDURE — 99214 OFFICE O/P EST MOD 30 MIN: CPT | Mod: 25 | Performed by: FAMILY MEDICINE

## 2023-02-21 PROCEDURE — 82570 ASSAY OF URINE CREATININE: CPT | Performed by: FAMILY MEDICINE

## 2023-02-21 PROCEDURE — 36415 COLL VENOUS BLD VENIPUNCTURE: CPT | Performed by: FAMILY MEDICINE

## 2023-02-21 PROCEDURE — 80069 RENAL FUNCTION PANEL: CPT | Performed by: FAMILY MEDICINE

## 2023-02-21 PROCEDURE — 85027 COMPLETE CBC AUTOMATED: CPT | Performed by: FAMILY MEDICINE

## 2023-02-21 PROCEDURE — 82043 UR ALBUMIN QUANTITATIVE: CPT | Performed by: FAMILY MEDICINE

## 2023-02-21 PROCEDURE — G0438 PPPS, INITIAL VISIT: HCPCS | Performed by: FAMILY MEDICINE

## 2023-02-21 PROCEDURE — 83036 HEMOGLOBIN GLYCOSYLATED A1C: CPT | Performed by: FAMILY MEDICINE

## 2023-02-21 ASSESSMENT — ENCOUNTER SYMPTOMS
WEAKNESS: 1
CHILLS: 1
HEADACHES: 0
MYALGIAS: 0
EYE PAIN: 0
PALPITATIONS: 0
COUGH: 0
NAUSEA: 0
PARESTHESIAS: 0
NERVOUS/ANXIOUS: 0
FREQUENCY: 1
HEMATURIA: 0
ABDOMINAL PAIN: 0
DYSURIA: 0
SORE THROAT: 0
DIARRHEA: 0
FEVER: 0
CONSTIPATION: 0
ARTHRALGIAS: 0
HEMATOCHEZIA: 0
JOINT SWELLING: 0
HEARTBURN: 1
SHORTNESS OF BREATH: 0
DIZZINESS: 0

## 2023-02-21 ASSESSMENT — ACTIVITIES OF DAILY LIVING (ADL)
CURRENT_FUNCTION: SHOPPING REQUIRES ASSISTANCE
CURRENT_FUNCTION: MONEY MANAGEMENT REQUIRES ASSISTANCE
CURRENT_FUNCTION: TRANSPORTATION REQUIRES ASSISTANCE
CURRENT_FUNCTION: MEDICATION ADMINISTRATION REQUIRES ASSISTANCE

## 2023-02-21 ASSESSMENT — PAIN SCALES - GENERAL: PAINLEVEL: NO PAIN (0)

## 2023-02-21 NOTE — PROGRESS NOTES
"SUBJECTIVE:   Frandy is a 72 year old who presents for Preventive Visit.  History dm, cad (seeing cardiology), memory issues (seeing neurology), htn and high cholesterol  No recently fall. Memory stable.   No kids. . Has cell phones - tracter.    Patient has been advised of split billing requirements and indicates understanding: Yes  Are you in the first 12 months of your Medicare coverage?  No    Healthy Habits:     In general, how would you rate your overall health?  Fair    Frequency of exercise:  None    Do you usually eat at least 4 servings of fruit and vegetables a day, include whole grains    & fiber and avoid regularly eating high fat or \"junk\" foods?  Yes    Taking medications regularly:  Yes    Medication side effects:  None    Ability to successfully perform activities of daily living:  Transportation requires assistance, shopping requires assistance, medication administration requires assistance and money management requires assistance    Home Safety:  No safety concerns identified    Hearing Impairment:  Difficulty following a conversation in a noisy restaurant or crowded room, feel that people are mumbling or not speaking clearly, difficulty following dialogue in the theater, difficult to understand a speaker at a public meeting or Yazid[employer] service, need to ask people to speak up or repeat themselves, find that men's voices are easier to understand than woman's, difficulty understanding soft or whispered speech and difficulty understanding speech on the telephone    In the past 6 months, have you been bothered by leaking of urine?  No    In general, how would you rate your overall mental or emotional health?  Excellent      PHQ-2 Total Score: 0    Additional concerns today:  No      Have you ever done Advance Care Planning? (For example, a Health Directive, POLST, or a discussion with a medical provider or your loved ones about your wishes): Yes, patient states has an Advance Care Planning " document and will bring a copy to the clinic.    Fall risk  Fallen 2 or more times in the past year?: No  Any fall with injury in the past year?: No    Cognitive Screening Not appropriate due to known dementia        Reviewed and updated as needed this visit by clinical staff   Tobacco  Allergies  Meds              Reviewed and updated as needed this visit by Provider                 Social History     Tobacco Use     Smoking status: Every Day     Packs/day: 0.50     Years: 5.00     Pack years: 2.50     Types: Cigarettes     Start date: 1968     Last attempt to quit: 7/15/2016     Years since quittin.6     Smokeless tobacco: Former   Substance Use Topics     Alcohol use: Not Currently     Comment: binge, two  months sober         Alcohol Use 2023   Prescreen: >3 drinks/day or >7 drinks/week? Not Applicable   Prescreen: >3 drinks/day or >7 drinks/week? -         Current providers sharing in care for this patient include:   Patient Care Team:  Marcos Gonzalez MD as PCP - General (Family Practice)  Schuyler Franco DO as Assigned Neuroscience Provider  Marcos Gonzalez MD as Assigned PCP  Padmini Mcdaniel MD as Assigned Heart and Vascular Provider    The following health maintenance items are reviewed in Epic and correct as of today:  Health Maintenance   Topic Date Due     NICOTINE/TOBACCO CESSATION COUNSELING Q 1 YR  Never done     ANNUAL REVIEW OF  ORDERS  Never done     LUNG CANCER SCREENING  Never done     AORTIC ANEURYSM SCREENING (SYSTEM ASSIGNED)  Never done     MEDICARE ANNUAL WELLNESS VISIT  2018     COLORECTAL CANCER SCREENING  2018     DIABETIC FOOT EXAM  2021     MICROALBUMIN  2022     EYE EXAM  2022     COVID-19 Vaccine (4 - Booster for Pfizer series) 2022     LIPID  2022     A1C  2023     BMP  2023     FALL RISK ASSESSMENT  2023     ADVANCE CARE PLANNING  2024     DTAP/TDAP/TD IMMUNIZATION (4 - Td or Tdap)  "09/16/2030     HEPATITIS C SCREENING  Completed     PHQ-2 (once per calendar year)  Completed     INFLUENZA VACCINE  Completed     Pneumococcal Vaccine: 65+ Years  Completed     ZOSTER IMMUNIZATION  Completed     IPV IMMUNIZATION  Aged Out     MENINGITIS IMMUNIZATION  Aged Out     Lab work is in process      Review of Systems   Constitutional: Positive for chills. Negative for fever.   HENT: Positive for hearing loss. Negative for congestion, ear pain and sore throat.    Eyes: Negative for pain and visual disturbance.   Respiratory: Negative for cough and shortness of breath.    Cardiovascular: Positive for peripheral edema. Negative for chest pain and palpitations.   Gastrointestinal: Positive for heartburn. Negative for abdominal pain, constipation, diarrhea, hematochezia and nausea.   Genitourinary: Positive for frequency and urgency. Negative for dysuria, genital sores and hematuria.   Musculoskeletal: Negative for arthralgias, joint swelling and myalgias.   Skin: Negative for rash.   Neurological: Positive for weakness. Negative for dizziness, headaches and paresthesias.   Psychiatric/Behavioral: Negative for mood changes. The patient is not nervous/anxious.          OBJECTIVE:   Physical Exam   /70   Pulse 60   Temp 97.5  F (36.4  C) (Tympanic)   Resp 17   Ht 1.702 m (5' 7\")   Wt 69.9 kg (154 lb 3.2 oz)   SpO2 98%   BMI 24.15 kg/m     GENERAL: healthy, alert and no distress  EYES: Eyes grossly normal to inspection, PERRL and conjunctivae and sclerae normal  HENT: ear canals and TM's normal, nose and mouth without ulcers or lesions  NECK: no adenopathy, no asymmetry, masses, or scars and thyroid normal to palpation  RESP: lungs clear to auscultation - no rales, rhonchi or wheezes  BREAST: normal without masses, tenderness or nipple discharge and no palpable axillary masses or adenopathy  CV: regular rate and rhythm, normal S1 S2, no S3 or S4, no murmur, click or rub, no peripheral edema and " peripheral pulses strong  ABDOMEN: soft, nontender, no hepatosplenomegaly, no masses and bowel sounds normal  MS: no gross musculoskeletal defects noted, no edema  SKIN: no suspicious lesions or rashes  NEURO: Normal strength and tone, mentation ok - answers most questions appropiate. A little slow and speech normal  PSYCH:affect normal/bright  LYMPH: no cervical, supraclavicular, axillary adenopathy    ASSESSMENT / PLAN:   ASSESSMENT / PLAN:  (Z00.00) Medicare annual wellness visit, subsequent  (primary encounter diagnosis)  Comment: generally healthy and good support system. Some memory concerns but no falls  Plan: vitmaind. exercise    (I25.10) CAD, multiple vessel  Comment: stable  Plan: per cardiology. Recheck in 6 months  Chest pain or shortness of breath to er      (E11.8,  Z79.4) Type 2 diabetes mellitus with complication, with long-term current use of insulin (H)  Comment: over treatment. Unclear exactly how much lantus patient is taking and not sure taking victoza  Plan: Hemoglobin A1c, Renal panel, Albumin Random         Urine Quantitative with Creat Ratio        Stop victoza and lower lantus by 5 units. To 35units. Recheck in 3 months  Dm ed help if too low.       (K21.9) Gastroesophageal reflux disease without esophagitis  Comment: stable  Plan: CBC with platelets        prilosec prn. Limit coffee    (F03.90) Dementia without behavioral disturbance (H)  Comment: stable  Plan: back to neurology if a lot worse.    (I48.0) PAF (paroxysmal atrial fibrillation) (H)  Comment: stable  Plan: continue meds per cardiology    (I73.9) PAD (peripheral artery disease) (H)  Comment: stable  Plan: exercisie    (I20.9) Angina pectoris (H)  Comment: stable  Plan: to ER if prolonged    (K86.89) Pancreatic mass    Plan: see pre-op.        Patient has been advised of split billing requirements and indicates understanding: Yes      COUNSELING:  Reviewed preventive health counseling, as reflected in patient instructions        Regular exercise       Healthy diet/nutrition       Vision screening       Dental care       Bladder control       Fall risk prevention            Appropriate preventive services were discussed with this patient, including applicable screening as appropriate for cardiovascular disease, diabetes, osteopenia/osteoporosis, and glaucoma.  As appropriate for age/gender, discussed screening for colorectal cancer, prostate cancer, breast cancer, and cervical cancer. Checklist reviewing preventive services available has been given to the patient.    Reviewed patients plan of care and provided an AVS. The Intermediate Care Plan ( asthma action plan, low back pain action plan, and migraine action plan) for Darian meets the Care Plan requirement. This Care Plan has been established and reviewed with the Patient and spouse.          Marcos Gonzalez MD  [facility]    Identified Health Risks:

## 2023-02-22 ENCOUNTER — TELEPHONE (OUTPATIENT)
Dept: FAMILY MEDICINE | Facility: CLINIC | Age: 73
End: 2023-02-22
Payer: COMMERCIAL

## 2023-02-22 LAB
ALBUMIN SERPL-MCNC: 3.4 G/DL (ref 3.4–5)
ANION GAP SERPL CALCULATED.3IONS-SCNC: 6 MMOL/L (ref 3–14)
BUN SERPL-MCNC: 12 MG/DL (ref 7–30)
CALCIUM SERPL-MCNC: 9.4 MG/DL (ref 8.5–10.1)
CHLORIDE BLD-SCNC: 110 MMOL/L (ref 94–109)
CO2 SERPL-SCNC: 26 MMOL/L (ref 20–32)
CREAT SERPL-MCNC: 0.69 MG/DL (ref 0.66–1.25)
CREAT UR-MCNC: 73 MG/DL
GFR SERPL CREATININE-BSD FRML MDRD: >90 ML/MIN/1.73M2
GLUCOSE BLD-MCNC: 96 MG/DL (ref 70–99)
MICROALBUMIN UR-MCNC: 3180 MG/L
MICROALBUMIN/CREAT UR: 4356.16 MG/G CR (ref 0–17)
PHOSPHATE SERPL-MCNC: 3.7 MG/DL (ref 2.5–4.5)
POTASSIUM BLD-SCNC: 4.1 MMOL/L (ref 3.4–5.3)
SODIUM SERPL-SCNC: 142 MMOL/L (ref 133–144)

## 2023-02-22 NOTE — TELEPHONE ENCOUNTER
"Provider,     Message below has not yet been relayed to pt or pt's spouse. Please review the statement below in red, and answer the following 2 numbered items below.    1. Please clarify if kidney test is better or worse per urine test result.   2. Please clarify if kidney test is better or worse per blood test result.      \"Marcos Gonzalez MD  [facility]  Please call patient's wife. Blood sugars too low. If taking victoza stop and lower insulin dosage by 5 units less. Kidney tests a lot better per urine test but worse per urine test.  (Blood test is usually more important).  Will need follow-up md appointment and labs in 3months. Make sure blood sugars aren't too low and can continue to titrite down insulin to make blood sugar average [id number]'s.     Patient:   Darian Engle   [phone] (home) [phone] (work)     Provider:   Marcos Gonzalez MD MD   Please call/evisit with questions or concerns.\"    THANH Max, RN    "

## 2023-02-22 NOTE — TELEPHONE ENCOUNTER
Marcos Gonzalez MD  [facility]  Please call patient's wife. Blood sugars too low. If taking victoza stop and lower insulin dosage by 5 units less. Kidney tests a lot better per urine test but worse per urine test.  (Blood test is usually more important).  Will need follow-up md appointment and labs in 3months. Make sure blood sugars aren't too low and can continue to titrite down insulin to make blood sugar average [id number]'s.     Patient:   Darian Engle   [phone] (home) [phone] (work)     Provider:   Marcos Gonzalez MD MD   Please call/evisit with questions or concerns.

## 2023-02-22 NOTE — TELEPHONE ENCOUNTER
Kidneys likely overall improving since blood test is more accurate then urine tests. Marcos Gonzalez MD

## 2023-02-22 NOTE — TELEPHONE ENCOUNTER
Patient informed of result and provider note as below  Patient verbalized understanding      Brooke BANKSN, RN

## 2023-03-21 DIAGNOSIS — I25.10 CAD, MULTIPLE VESSEL: ICD-10-CM

## 2023-03-21 DIAGNOSIS — R53.83 OTHER FATIGUE: ICD-10-CM

## 2023-03-21 DIAGNOSIS — G47.00 INSOMNIA, UNSPECIFIED TYPE: ICD-10-CM

## 2023-03-21 DIAGNOSIS — R63.0 POOR APPETITE: ICD-10-CM

## 2023-03-21 RX ORDER — DOXEPIN HYDROCHLORIDE 10 MG/1
CAPSULE ORAL
Qty: 30 CAPSULE | Refills: 2 | Status: SHIPPED | OUTPATIENT
Start: 2023-03-21 | End: 2023-01-01

## 2023-03-21 RX ORDER — SERTRALINE HYDROCHLORIDE 25 MG/1
TABLET, FILM COATED ORAL
Qty: 90 TABLET | Refills: 0 | Status: SHIPPED | OUTPATIENT
Start: 2023-03-21 | End: 2023-06-19

## 2023-03-21 RX ORDER — ISOSORBIDE MONONITRATE 30 MG/1
TABLET, EXTENDED RELEASE ORAL
Qty: 180 TABLET | Refills: 1 | Status: SHIPPED | OUTPATIENT
Start: 2023-03-21 | End: 2023-01-01

## 2023-03-23 ENCOUNTER — TELEPHONE (OUTPATIENT)
Dept: FAMILY MEDICINE | Facility: CLINIC | Age: 73
End: 2023-03-23
Payer: COMMERCIAL

## 2023-03-23 NOTE — TELEPHONE ENCOUNTER
Patient Quality Outreach    Patient is due for the following:   Colon Cancer Screening    Next Steps:   Schedule a office visit for colon cacner screening    Type of outreach:    Sent adQuota message.      Questions for provider review:    None     Ami Go

## 2023-03-29 DIAGNOSIS — I48.0 PAF (PAROXYSMAL ATRIAL FIBRILLATION) (H): ICD-10-CM

## 2023-04-03 NOTE — TELEPHONE ENCOUNTER
Dr. Mcdaniel, refill x1 then refer to primary care for future refills?      Dina Lin, RN  Cardiology Care Coordinator  [facility]  [phone] option 1      Last Comprehensive Metabolic Panel:  Lab Results   Component Value Date     02/21/2023    POTASSIUM 4.1 02/21/2023    CHLORIDE 110 (H) 02/21/2023    CO2 26 02/21/2023    ANIONGAP 6 02/21/2023    GLC 96 02/21/2023    BUN 12 02/21/2023    CR 0.69 02/21/2023    GFRESTIMATED >90 02/21/2023    SHERYL 9.4 02/21/2023

## 2023-04-03 NOTE — TELEPHONE ENCOUNTER
apixaban ANTICOAGULANT (ELIQUIS ANTICOAGULANT) 5 MG tablet      Last Written Prescription Date:  3/15/2022  Last Fill Quantity: 180,   # refills: 3  Last Office Visit : 3/15/2022  [facility]  Future Office visit:  none    Routing refill request to provider for review/approval because:  Refill needs review- Cardiology order or defer refills to PCP?  - plan last visit 3/15/2022: return to clinic as needed  - have not sent message to scheduling at this time

## 2023-04-10 ENCOUNTER — DOCUMENTATION ONLY (OUTPATIENT)
Dept: OTHER | Facility: CLINIC | Age: 73
End: 2023-04-10
Payer: COMMERCIAL

## 2023-06-19 ENCOUNTER — OFFICE VISIT (OUTPATIENT)
Dept: FAMILY MEDICINE | Facility: CLINIC | Age: 73
End: 2023-06-19
Payer: COMMERCIAL

## 2023-06-19 VITALS
OXYGEN SATURATION: 99 % | WEIGHT: 151.5 LBS | BODY MASS INDEX: 23.78 KG/M2 | DIASTOLIC BLOOD PRESSURE: 69 MMHG | HEIGHT: 67 IN | RESPIRATION RATE: 18 BRPM | HEART RATE: 62 BPM | SYSTOLIC BLOOD PRESSURE: 137 MMHG | TEMPERATURE: 97.9 F

## 2023-06-19 DIAGNOSIS — Z79.4 TYPE 2 DIABETES MELLITUS WITH COMPLICATION, WITH LONG-TERM CURRENT USE OF INSULIN (H): Primary | ICD-10-CM

## 2023-06-19 DIAGNOSIS — R53.83 OTHER FATIGUE: ICD-10-CM

## 2023-06-19 DIAGNOSIS — E11.621 DIABETIC FOOT ULCER ASSOCIATED WITH TYPE 2 DIABETES MELLITUS, UNSPECIFIED LATERALITY, UNSPECIFIED PART OF FOOT, UNSPECIFIED ULCER STAGE (H): ICD-10-CM

## 2023-06-19 DIAGNOSIS — I10 ESSENTIAL HYPERTENSION WITH GOAL BLOOD PRESSURE LESS THAN 140/90: ICD-10-CM

## 2023-06-19 DIAGNOSIS — R63.0 POOR APPETITE: ICD-10-CM

## 2023-06-19 DIAGNOSIS — E11.8 TYPE 2 DIABETES MELLITUS WITH COMPLICATION, WITH LONG-TERM CURRENT USE OF INSULIN (H): Primary | ICD-10-CM

## 2023-06-19 DIAGNOSIS — L97.509 DIABETIC FOOT ULCER ASSOCIATED WITH TYPE 2 DIABETES MELLITUS, UNSPECIFIED LATERALITY, UNSPECIFIED PART OF FOOT, UNSPECIFIED ULCER STAGE (H): ICD-10-CM

## 2023-06-19 DIAGNOSIS — E78.5 HYPERLIPIDEMIA LDL GOAL <100: ICD-10-CM

## 2023-06-19 PROBLEM — K86.2 CYST OF PANCREAS: Status: ACTIVE | Noted: 2023-06-19

## 2023-06-19 PROBLEM — I73.9 CLAUDICATION IN PERIPHERAL VASCULAR DISEASE (H): Status: ACTIVE | Noted: 2017-07-20

## 2023-06-19 PROBLEM — R55 SYNCOPE AND COLLAPSE: Status: ACTIVE | Noted: 2022-05-22

## 2023-06-19 PROBLEM — I95.1 ORTHOSTATIC HYPOTENSION: Status: ACTIVE | Noted: 2022-05-23

## 2023-06-19 PROBLEM — I21.3: Status: ACTIVE | Noted: 2017-05-08

## 2023-06-19 LAB — HBA1C MFR BLD: 5.9 % (ref 0–5.6)

## 2023-06-19 PROCEDURE — 99214 OFFICE O/P EST MOD 30 MIN: CPT | Performed by: FAMILY MEDICINE

## 2023-06-19 PROCEDURE — 83036 HEMOGLOBIN GLYCOSYLATED A1C: CPT | Performed by: FAMILY MEDICINE

## 2023-06-19 PROCEDURE — 36415 COLL VENOUS BLD VENIPUNCTURE: CPT | Performed by: FAMILY MEDICINE

## 2023-06-19 RX ORDER — SERTRALINE HYDROCHLORIDE 25 MG/1
TABLET, FILM COATED ORAL
Qty: 90 TABLET | Refills: 1 | Status: SHIPPED | OUTPATIENT
Start: 2023-06-19

## 2023-06-19 RX ORDER — ATORVASTATIN CALCIUM 40 MG/1
40 TABLET, FILM COATED ORAL DAILY
Qty: 90 TABLET | Refills: 3 | Status: SHIPPED | OUTPATIENT
Start: 2023-06-19

## 2023-06-19 RX ORDER — METOPROLOL SUCCINATE 50 MG/1
TABLET, EXTENDED RELEASE ORAL
Qty: 90 TABLET | Refills: 1 | Status: SHIPPED | OUTPATIENT
Start: 2023-06-19 | End: 2023-01-01

## 2023-06-19 RX ORDER — CARVEDILOL 25 MG/1
25 TABLET ORAL 2 TIMES DAILY
COMMUNITY
Start: 2023-04-26

## 2023-06-19 RX ORDER — AMLODIPINE BESYLATE 5 MG/1
5 TABLET ORAL DAILY
COMMUNITY
Start: 2023-03-31 | End: 2023-01-01

## 2023-06-19 ASSESSMENT — PAIN SCALES - GENERAL: PAINLEVEL: MILD PAIN (2)

## 2023-06-19 NOTE — PROGRESS NOTES
Answers for HPI/ROS submitted by the patient on 6/19/2023  How many servings of fruits and vegetables do you eat daily?: 2-3  How many days a week do you exercise enough to make your heart beat faster?: 3 or less  What is the reason for your visit today?: Legs hurt while walking, feet are red and painful  What are your symptoms?: Feet hurt, turn red, hurts to walk, swollen  Have you had these symptoms before?: No  Is there anything that makes you feel worse?: Walking  Is there anything that makes you feel better?: Raise the legs and feet in a recliner chair    Conflicting answers have been found for some questions. Please document the patient's answers manually.     ASSESSMENT / PLAN:  (E11.8,  Z79.4) Type 2 diabetes mellitus with complication, with long-term current use of insulin (H)  (primary encounter diagnosis)  Comment: off injectiables with weight loss  Plan: Orthopedic  Referral, Hemoglobin A1c        Possible hold metformin if still too low. Recheck in 4 months      (E11.621,  L97.509) Diabetic foot ulcer associated with type 2 diabetes mellitus, unspecified laterality, unspecified part of foot, unspecified ulcer stage (H)  Plan: Orthopedic  Referral        Coban wraps to stop picking at areas.  Follow-up podiatry.  Expected course and warning signs reviewed.     (E78.5) Hyperlipidemia LDL goal <100    Plan: atorvastatin (LIPITOR) 40 MG tablet           (I10) Essential hypertension with goal blood pressure less than 140/90  Comment: stable  Plan: metoprolol succinate ER (TOPROL XL) 50 MG 24 hr        tablet        Continue meds and self-monitor    (R63.0) Poor appetite  Comment: consider about dementia and weight loss  Plan: sertraline (ZOLOFT) 25 MG tablet        Seeing neuropsych in future.   Might need hospice. Recheck in 4 months  Continue push protein shakes.     (R53.83) Other fatigue    Plan: sertraline (ZOLOFT) 25 MG tablet                Terrance Wise is a 73 year old,  "presenting for the following health issues:  Recheck Medication and Musculoskeletal Problem (Calf pain )  History dm, cad (seeing cardiology), memory issues (seeing neurology), htn and high cholesterol  Sore on bottom of left 1st toe. Some picking at and some pain. Ointment.   No wrapping. No pus or discharge. Limited walking- using walker.  No monitoring sugars. No LOSS OF CONSCIENCE  Stopped all injections.  No increase urination. No chest pain or shortness of breath.  No more geronimo via weight loss. Appetite ok.    Not taking protein shakes but in fridge.   Seeing neuropsych. No thc gummies .  No abdominal pain.        6/19/2023     8:30 AM   Additional Questions   Roomed by Antwan   Accompanied by Self     Musculoskeletal Problem    History of Present Illness       Reason for visit:  Legs hurt while walking, feet are red and painful  Symptoms include:  Feet hurt, turn red, hurts to walk, swollen  Had these symptoms before:  No  What makes it worse:  Walking  What makes it better:  Raise the legs and feet in a recliner chair    He eats 2-3 servings of fruits and vegetables daily. He exercises with enough effort to increase his heart rate 3 or less days per week.   He is not taking prescribed medications regularly due to remembering to take.         Objective    There were no vitals taken for this visit.  There is no height or weight on file to calculate BMI.   /69   Pulse 62   Temp 97.9  F (36.6  C) (Tympanic)   Resp 18   Ht 1.695 m (5' 6.75\")   Wt 68.7 kg (151 lb 8 oz)   SpO2 99%   BMI 23.91 kg/m     Physical Exam   GENERAL: healthy, alert and no distress  EYES: Eyes grossly normal to inspection, PERRL and conjunctivae and sclerae normal  NECK: no adenopathy, no asymmetry, masses, or scars and thyroid normal to palpation  RESP: lungs clear to auscultation - no rales, rhonchi or wheezes  CV: regular rate and rhythm, normal S1 S2, no S3 or S4, no murmur, click or rub, no peripheral edema and peripheral " pulses strong  ABDOMEN: soft, nontender, no hepatosplenomegaly, no masses and bowel sounds normal  MS: no gross musculoskeletal defects noted, no edema  SKIN: 1cm ulcerations - shallow on left big toe and left ankle  PSYCH: mildly anxious

## 2023-06-19 NOTE — LETTER
June 19, 2023      Frandy Engle  [address] [place] [address]  [place] [address]        Dear ,    We are writing to inform you of your test results.    Blood sugars over treated.  You can take 1/2 of metformin dosage (only ONE daily).Recheck in 6 months.    Resulted Orders   Hemoglobin A1c   Result Value Ref Range    Hemoglobin A1C 5.9 (H) 0.0 - 5.6 %      Comment:      Normal <5.7%   Prediabetes 5.7-6.4%    Diabetes 6.5% or higher     Note: Adopted from ADA consensus guidelines.       If you have any questions or concerns, please call the clinic at the number listed above.       Sincerely,      Marcos Gonzalez MD

## 2023-07-21 NOTE — PROGRESS NOTES
"Medication Therapy Management (MTM) Encounter    ASSESSMENT:                            Medication Adherence/Access: patient and wife were not aware of changes to meds by cardiology, and have not made previous recommended changes as a result; on duplicative meds - see below for considerations    Hypertension, CAD, PVD, Paroxysmal Afib:  Patient/wife not aware that metoprolol ER and amlodipine were to be stopped when nifedipine ER and carvedilol were initiated, and thus have continued on these.  Metoprolol ER dose was however reduced to 50mg from 100mg by PCP in June, but doesn't appear PCP was aware of changes made by cardiology, and that patient should not have been on the metoprolol at all.  Also, as noted below, patient continues on Plavix (in addition to aspirin), although it appears Plavix was to be dc'd in March 2022.  Is also on Eliquis and therefore at increased risk of bleed with use of DOAC plus DAPT.  Other change made by cardiology that has not been made by patient, is that patient continues on cilostazol 50mg twice daily per wife vs 100mg twice daily.  I called cardiology clinic to update re how patient has been taking his meds, and informed of recent \"passing out\" with falls.  Blood pressures vary per wife.  Patient would benefit from d'c of metoprolol ER and amlodipine as previously planned per cardiology, and follow-up with cardiology re blood pressure, heart rate.  Would also likely benefit from d'c of Plavix since patient continues on aspirin in addition to Eliquis, and is at increased risk of bleed with combo.  Given passing out, falls currently, I do not want to recommend that he increase the cilostazol to the 100mg twice daily dosing at this time.    Hyperlipidemia: Due for lipid panel.  Patient with significant polypharmacy, at risk of falls, bleed, and may benefit from stopping fish oil as likely not adding much benefit to current statin therapy, contributing to polypharmacy.    Dementia, " Depression:  Seeing neurology in September.  Due to potential of donepezil to contribute to bradycardia, syncopal events, falls, reduced appetite, insomnia, may benefit from reduction in donepezil dose.    Type 2 Diabetes:  A1c at goal <8%.  Currently with sore on big left toe - recommend seeing MD for assessment.  Wife misunderstood metformin ER directions, and reduced to once daily as noted below - recommend increasing back to twice daily for time being and monitoring blood sugar at least once daily prior to breakfast.    GERD, Galileo's esophagus: Stable.  PPI also likely adding benefit of gi protection given patient on antiplatelet and anticoagulant therapy.    Insomnia: Would benefit from stopping prn doxepin due to highly anticholinergic/on [place].  Doses >6mg not recommended in elderly due to increased risk of falls, confusion, cognitive decline, sedation, etc.  Educated patient and wife re this, and they are agreeable to discontinuing the doxepin.  Trazodone is available if needed, however, encouraged using this only if needed due to potential side effects of orthostasis, falls, patient with significant polypharmacy.    Supplements:  May benefit from Mg level as well as vitamin D level with next labs.      PLAN:                            Stop metoprolol ER & amlodipine as previously recommended by cardiology - I spoke with triage nurse at cardiology clinic, and they will get back to me tomorrow morning regarding continued Plavix use, as well as cilostazol dose.  May benefit from follow-up with cardiology.    I will verify with cardiology if Plavix should be discontinued.    Cardiology may consider discontinuation of fish oil due to polypharmacy, difficulty with compliance.    Due for lipid panel.      Please stop doxepin due to increased risk of falls, cognitive decline.  Limit use of trazodone.    Provider may consider checking magnesium level, vitamin D level with next labs.    Provider may consider  "reduction in donepezil to 5mg daily.    Take metformin ER 500mg twice daily with breakfast and dinner as previously recommended.  Please check blood sugar prior to breakfast in the morning.    See  regarding sore on left big toe - concern for infection.    Addendum:7/26/23: spoke with Dina, nurse with cardiology/'s office - ok'd discontinuation of Plavix.  I called patient to update with this information.  Spoke with wife, Angelica, and she understands.  During this call, Angelica updated me that patient has indeed been taking cilostazol 100mg twice daily (not 50mg twice daily).  Patient also now has follow-up appt with cardiology first week of August.  Encouraged patient/wife to call me with any questions/concerns prior to MTM follow-up.      Follow-up: 3 months, sooner if necessary    SUBJECTIVE/OBJECTIVE:                          Frandy Engle is a 73 year old male called for an initial visit. He was referred to me from his health plan.  His wife, Angelica, participates in visit today as well.    Reason for visit: initial MTM.    Allergies/ADRs: Reviewed in chart  Past Medical History: Reviewed in chart  Tobacco: He reports that he has been smoking cigarettes. He started smoking about 55 years ago. He has a 2.50 pack-year smoking history. He has quit using smokeless tobacco.Nicotine/Tobacco Cessation Plan:   not interested in quitting    Alcohol: not currently using  Assistive Devices: walker for ambulation  Has been falling - wondering if dehydration, \"all he drinks is coffee\" per wife, Angelica  See below re falls.    Medication Adherence/Access: Patient has assistance with medication administration: wife sets up in pill box and administers.    Has sore on foot - he won't go to dr due to thinking it's healing per wife.  Was a mole, picked at it, now foot red.  Angelica had appt scheduled, but he canceled it.  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    Hypertension, CAD, PVD, Paroxysmal Afib:   Metoprolol ER 50mg daily  Carvedilol " "25mg twice daily  Amlodipine 5mg daily  Nifedipine ER 60mg daily  Losartan 50mg daily  Imdur 60mg daily  Cilostazol 50mg twice daily (although was increased to 100mg twice daily on 3/31)  Aspirin 81mg daily  Plavix 75mg daily  Eliquis 5mg twice daily  Atorvastatin 40mg daily    History of multiple stents, STEMI 19. History of \"old stroke\" per wife.  Hasn't required prn use of nitroglycerin.  Per chart review, metoprolol ER and amlodipine were to be stopped on 23, nifedipine ER 60mg initiated, as well as carvedilol on this date - however, patient has continued taking both metoprolol ER and amlodipine.  Was seen by PCP in , and metoprolol ER was reduced from 100mg daily to 50mg daily at that time.  Per cardiology note on 23: \"extensive prior history of peripheral vascular disease with device-based revascularization. Current limiting claudication at 2 blocks of activity. We increased his Pletal dose to 100 mg p.o. twice daily with his last visit 3/31/2023 and also instructed in a walking program.\"  As noted above, he has continued on 50mg twice daily of cilostazol.  Wife not aware of dose increase.  On chart review, 3/15/22 note by  indicates that Plavix should've been stopped at that time (Eliquis was started at same time when Plavix recommended to be dc'd), however, patient has continued on Plavix.  Wife states it's possible that it was stopped then restarted.  No bleeding, bruising, dark/tarry stools.  No dizziness. He states \"I pass out because I stop breathing, need to make myself catch my breath.\" - hit floor, wakes up, realizes what happened - this has occurred twice since last seen by dr. Gamez lightheadedness with position changes.  Denies chest pain, shortness of breath, edema, palpitations.  Patient self-monitors blood pressure.  Home BP monitorin/62, p70 (9pm)  153/66, p88 (8am)  114/72, p69  Has lost quite a bit of weight.  BP Readings from Last 3 Encounters:   23 137/69 " "  02/21/23 131/70   01/13/23 (!) 164/67     Pulse Readings from Last 3 Encounters:   06/19/23 62   02/21/23 60   01/13/23 74     Hyperlipidemia:   atorvastatin 40mg daily  Fish Oil 1000mg once daily  Patient reports no significant myalgias or other side effects.  Cardiology recommended fish oil several years ago per wife.    Recent Labs   Lab Test 07/13/21  0830 09/02/20  0825   CHOL 114 166   HDL 36* 27*   LDL 60 120*   TRIG 90 93       Dementia, Depression:  Donepezil 10mg at bedtime  Sertraline 25mg daily  Neuro assessment scheduled for September 7.  As noted above, history of falls  No diarrhea, nausea.  Appetite \"normal.\"  Not talking to family per wife, only interacting with Angelica/wife.  Wife would like for him to continue sertraline.    Type 2 Diabetes:    Metformin ER 500mg twice daily with meals  Wife reports reducing to 500mg daily, but this was not recommended yet per chart review  Aspirin 81mg daily  Patient is not experiencing side effects.  Blood sugar monitoring: none currently  Current diabetes symptoms: currently with sore on toe as noted above  Diet/Exercise: appetite less.  Eats when gets hungry    Urine Albumin:   Lab Results   Component Value Date    UMALCR 4,356.16 (H) 02/21/2023      Lab Results   Component Value Date    A1C 5.9 (H) 06/19/2023     GERD, Galileo's esophagus:   Omeprazole 20 mg once daily   The patient does not have a history of GI bleed. Patient feels that current regimen is effective.    Insomnia:   Doxepin 10mg nightly as needed  Trazodone 50mg nightly (hasn't been using)  Is not taking doxepin every night; will forget to take it, but does use it frequently. Hasn't been taking trazodone.    Supplements:  Vitamin D3 2000u - 2 caps daily  Mg Oxide 200mg daily  MVI daily  As noted above, lower appetite.  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    Estimated Creatinine Clearance: 95.4 mL/min (based on SCr of 0.69 mg/dL).      ----------------      I spent 60 minutes with this " patient today. A copy of the visit note was provided to the patient's provider(s).    A summary of these recommendations was sent via [employer] [facility] [employer].    Shobha Ruggiero, Pharm.D.,[facility] [place]  Board Certified Geriatric Pharmacist  Medication Therapy Management Pharmacist  [phone]      Telemedicine Visit Details  Type of service:  Telephone visit  Start Time:  9:30am  End Time:  10:30am     Medication Therapy Recommendations  Dementia without behavioral disturbance (H)    Current Medication: donepezil (ARICEPT) 10 MG tablet   Rationale: Undesirable effect - Adverse medication event - Safety   Recommendation: Decrease Dose   Status: Contact Provider - Awaiting Response         Essential hypertension with goal blood pressure less than 140/90    Current Medication: amLODIPine (NORVASC) 5 MG tablet (Discontinued)   Rationale: Duplicate Therapy - Unnecessary medication therapy - Indication   Recommendation: Discontinue Medication   Status: Patient Agreed - Adherence/Education          Current Medication: metoprolol succinate ER (TOPROL XL) 50 MG 24 hr tablet (Discontinued)   Rationale: Duplicate Therapy - Unnecessary medication therapy - Indication   Recommendation: Discontinue Medication   Status: Patient Agreed - Adherence/Education         Hyperlipidemia LDL goal <100    Current Medication: Omega-3 Fatty Acids (FISH OIL OMEGA-3) 1000 MG CAPS   Rationale: Undesirable effect - Adverse medication event - Safety   Recommendation: Discontinue Medication   Status: Contact Provider - Awaiting Response          Current Medication: atorvastatin (LIPITOR) 40 MG tablet   Rationale: Medication requires monitoring - Needs additional monitoring   Recommendation: Order Lab   Status: Contact Provider - Awaiting Response         Insomnia    Current Medication: doxepin (SINEQUAN) 10 MG capsule (Discontinued)   Rationale: Unsafe medication for the patient - Adverse medication event - Safety   Recommendation: Discontinue Medication   Status: Patient  Agreed - Adherence/Education         PAD (peripheral artery disease) (H)    Current Medication: cilostazol (PLETAL) 50 MG tablet   Rationale: Does not understand instructions - Adherence - Adherence   Recommendation: Provide Education   Status: Contact Provider - Awaiting Response   Note: I will verify with cardiology if they would like the cilostazol increased to their previously recommended dose of 100mg twice daily (From current dose of 50mg twice daily)          Current Medication: clopidogrel (PLAVIX) 75 MG tablet   Rationale: Duplicate Therapy - Unnecessary medication therapy - Indication   Recommendation: Discontinue Medication   Status: Contact Provider - Awaiting Response         Takes dietary supplements    Current Medication: cholecalciferol ( VITAMIN D3) 50 MCG (2000 UT) CAPS   Rationale: Medication requires monitoring - Needs additional monitoring   Recommendation: Order Lab   Status: Contact Provider - Awaiting Response          Current Medication: magnesium oxide 200 MG TABS   Rationale: Medication requires monitoring - Needs additional monitoring   Recommendation: Order Lab   Status: Contact Provider - Awaiting Response         Type 2 diabetes mellitus without retinopathy (H)    Current Medication: metFORMIN (GLUCOPHAGE XR) 500 MG 24 hr tablet   Rationale: Does not understand instructions - Adherence - Adherence   Recommendation: Increase Frequency   Status: Patient Agreed - Adherence/Education

## 2023-07-21 NOTE — Clinical Note
Hi , Please see MTM visit - concerns regarding understanding of what meds to be taking.  I'm awaiting a call back from cardiology, but did ask patient to hold metoprolol and amlodipine as cardiology previously discontinued these when nifedipine ER and carvedilol were started.  I should hear back from cardiology tomorrow morning.  Please see other recommendations as well.  Thank you.  Let me know if any questions!

## 2023-07-21 NOTE — LETTER
_  Medication List        Prepared on: 07/24/2023     Bring your Medication List when you go to the doctor, hospital, or   emergency room. And, share it with your family or caregivers.     Note any changes to how you take your medications.  Cross out medications when you no longer use them.    Medication How I take it Why I use it Prescriber   apixaban ANTICOAGULANT (ELIQUIS ANTICOAGULANT) 5 MG tablet Take 1 tablet (5 mg) by mouth 2 times daily PAF (Paroxysmal Atrial Fibrillation) (H) Padmini Mcdaniel MD   aspirin 81 MG tablet Take by mouth daily CAD, multiple vessel Eh Matos MD   atorvastatin (LIPITOR) 40 MG tablet Take 1 tablet (40 mg) by mouth daily For cholesterol. Hyperlipidemia LDL Goal <100 Marcos Gonzalez MD   carvedilol (COREG) 25 MG tablet Take 25 mg by mouth 2 times daily  Hypertension, afib Patient Reported   cholecalciferol ( VITAMIN D3) 50 MCG (2000 UT) CAPS Take 2 capsules by mouth daily  supplement Patient Reported   cilostazol (PLETAL) 50 MG tablet Take 50 mg by mouth 2 times daily  Peripheral vascular disease Patient Reported   clopidogrel (PLAVIX) 75 MG tablet Take 75 mg by mouth daily  CAD, peripheral vascular disease Patient Reported   donepezil (ARICEPT) 10 MG tablet Take 1 tablet (10 mg) by mouth At Bedtime MCI (Mild Cognitive Impairment) with Memory Loss Schuyler Franco,    isosorbide mononitrate (IMDUR) 30 MG 24 hr tablet TAKE 2 TABLETS(60 MG) BY MOUTH DAILY CAD, multiple vessel Marcos Gonzalez MD   losartan (COZAAR) 50 MG tablet TAKE 1 TABLET(50 MG) BY MOUTH DAILY Essential hypertension with goal blood pressure less than 140/90 Marcos Gonzalez MD   magnesium oxide 200 MG TABS Take 200 mg by mouth  supplement Patient Reported   metFORMIN (GLUCOPHAGE XR) 500 MG 24 hr tablet TAKE 1 TABLET(500 MG) BY MOUTH TWICE DAILY WITH MEALS Type 2 diabetes mellitus with complication, with long-term current use of insulin (H) Marcos Gonzalez MD   multivitamin (CENTRUM SILVER)  tablet Take 1 tablet by mouth daily  supplement Patient Reported   NIFEdipine ER (ADALAT CC) 60 MG 24 hr tablet 60 mg daily  hypertension Patient Reported   nitroGLYcerin (NITROSTAT) 0.4 MG sublingual tablet PLACE 1 TABLET UNDER THE TONGUE EVERY 5 MINUTES FOR 3 DOSES, IF SYMPTOMS PERSIST 5 MINUTES AFTER 1ST DOSE CALL 911 CAD, multiple vessel Marcos Gonzalez MD   Omega-3 Fatty Acids (FISH OIL OMEGA-3) 1000 MG CAPS Take 1,000 mg by mouth daily High cholesterol Patient Reported   omeprazole (PRILOSEC) 20 MG DR capsule Take 1 capsule (20 mg) by mouth daily Gastroesophageal Reflux Disease without Esophagitis Marcos Gonzalez MD   sertraline (ZOLOFT) 25 MG tablet TAKE ONE TABLET BY MOUTH DAILY FOR DEPRESSION AND TO HELP APPETITE Poor Appetite; Other fatigue, depression Marcos Gonzalez MD   traZODone (DESYREL) 50 MG tablet Take 1 tablet (50 mg) by mouth At Bedtime as needed Insomnia, unspecified type Schuyler Franco, DO         Add new medications, over-the-counter drugs, herbals, vitamins, or  minerals in the blank rows below.    Medication How I take it Why I use it Prescriber                                      Allergies:      lidocaine; lisinopril; naltrexone        Side effects I have had:               Other Information:              My notes and questions:

## 2023-07-21 NOTE — LETTER
"Recommended To-Do List      Prepared on: 07/24/2023       You can get the best results from your medications by completing the items on this \"To-Do List.\"      Bring your To-Do List when you go to your doctor. And, share it with your family or caregivers.    My To-Do List:  What we talked about: What I should do:   An issue with your medication    Decrease your dosage of donepezil (ARICEPT) to 5mg daily - IF provider agrees          What we talked about: What I should do:   An issue with your medication    Stop taking Fish Oil [place]-3 - IF cardiology agrees          What we talked about: What I should do:   Needing additional monitoring    Get the following lab test(s): magnesium - IF provider agrees           What we talked about: What I should do:   The importance of taking your medication as intended    Education: I will check with cardiology if they would like you to increase your cilostazol dose to 100mg twice daily (for now, continue with current dose of 50mg twice daily)          What we talked about: What I should do:   A medication that you may no longer need    Stop taking clopidogrel (PLAVIX)- IF cardiology agrees          What we talked about: What I should do:   Needing additional monitoring    Get the following lab test(s): vitamin D level - IF Provider agrees           What we talked about: What I should do:   Needing additional monitoring    Get the following lab test(s): cholesterol panel - IF provider agrees           What we talked about: What I should do:   A medication that you may no longer need    Stop taking amLODIPine (NORVASC) - as cardiology previously recommended          What we talked about: What I should do:   A medication that you may no longer need    Stop taking metoprolol succinate ER (TOPROL XL)- as cardiology previously recommended          What we talked about: What I should do:   An issue with your medication    Stop taking doxepin (SINEquan)          What we talked about: What " I should do:   The importance of taking your medication as intended    Increase how often you take metFORMIN (GLUCOPHAGE XR) to twice daily with breakfast and dinner; check blood sugar prior to breakfast          What we talked about: What I should do:

## 2023-07-21 NOTE — LETTER
July 24, 2023  Darian Engle  [address] [place] [id number]    Dear Mr. Engle, [facility]     Thank you for talking with me on Jul 21, 2023 about your health and medications. As a follow-up to our conversation, I have included two documents:      Your Recommended To-Do List has steps you should take to get the best results from your medications.  Your Medication List will help you keep track of your medications and how to take them.    If you want to talk about these documents, please call Shobha Ruggiero RPH at phone: [phone], Monday-Friday 8-4:30pm.    I look forward to working with you and your doctors to make sure your medications work well for you.    Sincerely,  Shobha Ruggiero RPH  [facility] Pharmacist, [facility]

## 2023-07-24 NOTE — PATIENT INSTRUCTIONS
"Recommendations from today's MTM visit:                                                    MTM (medication therapy management) is a service provided by a clinical pharmacist designed to help you get the most of out of your medicines.   Today we reviewed what your medicines are for, how to know if they are working, that your medicines are safe and how to make your medicine regimen as easy as possible.      Stop metoprolol ER & amlodipine as previously recommended by cardiology - I spoke with triage nurse at cardiology clinic, and they will get back to me tomorrow morning regarding continued Plavix use, as well as cilostazol dose.  I will call you in the morning to update you once I hear from them.  May benefit from follow-up with cardiology.    I will verify with cardiology if Plavix should be discontinued.    Cardiology may consider discontinuation of fish oil due to polypharmacy, difficulty with compliance.    Due for lipid panel.      Please stop Doxepin due to increased risk of falls, cognitive decline.  Limit use of trazodone.    Provider may consider checking magnesium level, vitamin D level with next labs.    Provider may consider reduction in donepezil to 5mg daily.    Take metformin ER 500mg twice daily with breakfast and dinner as previously recommended.  Please check blood sugar prior to breakfast in the morning.    See Dr regarding sore on left big toe - concern for infection.    Follow-up: 3 months over the phone, sooner if necessary    It was great speaking with you today.  I value your experience and would be very thankful for your time in providing feedback in our clinic survey. In the next few days, you may receive an email or text message from [employer] with a link to a survey related to your  clinical pharmacist.\"     To schedule another MTM appointment, please call me directly or you may call the MTM scheduling line at [phone] or toll-free at [phone].     My Clinical Pharmacist's " contact information:                                                      Please feel free to contact me with any questions or concerns you have.      Shobha Ruggiero, Pharm.D.,[facility]  Board Certified Geriatric Pharmacist  Medication Therapy Management Pharmacist  [phone]

## 2023-07-31 NOTE — PROGRESS NOTES
Assessment:      ICD-10-CM    1. Type 2 diabetes mellitus with complication, with long-term current use of insulin (H)  E11.8 Orthopedic  Referral    Z79.4 Vascular Surgery Referral      2. PAD (peripheral artery disease) (H)  I73.9 Vascular Surgery Referral             Plan:  Orders Placed This Encounter   Procedures    Vascular Surgery Referral         Discussed the etiology and treatment of the condition with the patient.  Discussed surgical and conservative options.    -Referral to foot care nurse for nails & calluses, see AVS.  -Referral to PCP / Neurology for neuropathy.  -Diabetic shoes & custom insoles - PCP can prescribe also.      Return:  No follow-ups on file.     Pauline Zimmer DPM                  Chief Complaint:     Patient presents with:  Left Foot - Diabetes       HPI:  Darian Engle is a 73 year old year old male who presents for Diabetic Foot complication.      Last HbA1C -   Hemoglobin A1C   Date Value Ref Range Status   06/19/2023 5.9 (H) 0.0 - 5.6 % Final     Comment:     Normal <5.7%   Prediabetes 5.7-6.4%    Diabetes 6.5% or higher     Note: Adopted from ADA consensus guidelines.   04/09/2021 7.0 (H) 0 - 5.6 % Final     Comment:     Normal <5.7% Prediabetes 5.7-6.4%  Diabetes 6.5% or higher - adopted from ADA   consensus guidelines.           Past Medical & Surgical History:  Past Medical History:   Diagnosis Date    Arthritis March 2018    joint pain both hands    Diabetes (H)     Heart disease 1991    [place]-angioplasty    Hypertension       Past Surgical History:   Procedure Laterality Date    CARDIAC SURGERY  2004    [facility]    ENHANCE LASER REFRACTIVE BILATERAL EXISTING PT IN PARAMETERS  2000    EYE SURGERY  2000    [facility]    VASCULAR SURGERY  2017    [facility]      Family History   Problem Relation Age of Onset    Glaucoma Mother     Macular Degeneration Mother     Macular Degeneration Other      Lung Cancer Brother         Social History:  ?  History   Smoking Status    Every Day    Packs/day: 0.50    Years: 5.00    Types: Cigarettes    Start date: 1/1/1968    Last attempt to quit: 7/15/2016   Smokeless Tobacco    Former     History   Drug Use No     Social History    Substance and Sexual Activity      Alcohol use: Not Currently        Comment: binge, two  months sober      Allergies:  ?   Allergies   Allergen Reactions    Lidocaine Other (See Comments)     Lungs filled with fluid. ? Anaphylaxis    Lisinopril Cough     cough    Naltrexone Nausea and Vomiting        Medications:    Current Outpatient Medications   Medication    ACCU-CHEK SMARTVIEW test strip    apixaban ANTICOAGULANT (ELIQUIS ANTICOAGULANT) 5 MG tablet    aspirin 81 MG tablet    atorvastatin (LIPITOR) 40 MG tablet    blood glucose monitoring (ACCU-CHEK FASTCLIX) lancets    carvedilol (COREG) 25 MG tablet    cholecalciferol (SM VITAMIN D3) 50 MCG (2000 UT) CAPS    cilostazol (PLETAL) 50 MG tablet    donepezil (ARICEPT) 10 MG tablet    isosorbide mononitrate (IMDUR) 30 MG 24 hr tablet    losartan (COZAAR) 50 MG tablet    magnesium oxide 200 MG TABS    metFORMIN (GLUCOPHAGE XR) 500 MG 24 hr tablet    multivitamin (CENTRUM SILVER) tablet    NIFEdipine ER (ADALAT CC) 60 MG 24 hr tablet    nitroGLYcerin (NITROSTAT) 0.4 MG sublingual tablet    Omega-3 Fatty Acids (FISH OIL OMEGA-3) 1000 MG CAPS    omeprazole (PRILOSEC) 20 MG DR capsule    sertraline (ZOLOFT) 25 MG tablet    traZODone (DESYREL) 50 MG tablet     No current facility-administered medications for this visit.       Physical Exam:  ?  Vitals:  /52   Pulse 70   Wt 68.5 kg (151 lb)   BMI 23.83 kg/m     General:  WD/WN, in NAD.  A&O x3.  Dermatologic:    Skin is intact, open lesions absent.  Skin texture, turgor is normal.  Vascular:  Pulses palpable.  Digital capillary refill time normal.  Skin temperature is normal.  Generalized edema- trace.  Neurologic:    Protective  sensation diminished.  Gait and balance normal.  Musculoskeletal:  No pain to palpation of foot & ankle  aROM intact to foot & ankle  Muscle strength 5/5 foot & ankle

## 2023-07-31 NOTE — LETTER
7/31/2023         RE: Darian Engle  [address] [facility] [place] [facility] [place] [address]        Dear Colleague,    Thank you for referring your patient, Darian Engle, to the [facility]. Please see a copy of my visit note below.    Assessment:      ICD-10-CM    1. Type 2 diabetes mellitus with complication, with long-term current use of insulin (H)  E11.8 Orthopedic  Referral    Z79.4 Vascular Surgery Referral      2. PAD (peripheral artery disease) (H)  I73.9 Vascular Surgery Referral             Plan:  Orders Placed This Encounter   Procedures     Vascular Surgery Referral         Discussed the etiology and treatment of the condition with the patient.  Discussed surgical and conservative options.    -Referral to foot care nurse for nails & calluses, see AVS.  -Referral to PCP / Neurology for neuropathy.  -Diabetic shoes & custom insoles - PCP can prescribe also.      Return:  No follow-ups on file.     Pauline Zimmer DPM                  Chief Complaint:     Patient presents with:  Left Foot - Diabetes       HPI:  Darian Engle is a 73 year old year old male who presents for Diabetic Foot complication.      Last HbA1C -   Hemoglobin A1C   Date Value Ref Range Status   06/19/2023 5.9 (H) 0.0 - 5.6 % Final     Comment:     Normal <5.7%   Prediabetes 5.7-6.4%    Diabetes 6.5% or higher     Note: Adopted from ADA consensus guidelines.   04/09/2021 7.0 (H) 0 - 5.6 % Final     Comment:     Normal <5.7% Prediabetes 5.7-6.4%  Diabetes 6.5% or higher - adopted from ADA   consensus guidelines.           Past Medical & Surgical History:  Past Medical History:   Diagnosis Date     Arthritis March 2018    joint pain both hands     Diabetes (H)      Heart disease 1991    [place]-angioplasty     Hypertension       Past Surgical History:   Procedure Laterality Date     CARDIAC SURGERY  2004    [facility]     ENHANCE LASER REFRACTIVE BILATERAL  EXISTING PT IN PARAMETERS  2000     EYE SURGERY  2000    [facility]     VASCULAR SURGERY  2017    [facility]      Family History   Problem Relation Age of Onset     Glaucoma Mother      Macular Degeneration Mother      Macular Degeneration Other      Lung Cancer Brother         Social History:  ?  History   Smoking Status     Every Day     Packs/day: 0.50     Years: 5.00     Types: Cigarettes     Start date: 1/1/1968     Last attempt to quit: 7/15/2016   Smokeless Tobacco     Former     History   Drug Use No     Social History    Substance and Sexual Activity      Alcohol use: Not Currently        Comment: binge, two  months sober      Allergies:  ?   Allergies   Allergen Reactions     Lidocaine Other (See Comments)     Lungs filled with fluid. ? Anaphylaxis     Lisinopril Cough     cough     Naltrexone Nausea and Vomiting        Medications:    Current Outpatient Medications   Medication     ACCU-CHEK SMARTVIEW test strip     apixaban ANTICOAGULANT (ELIQUIS ANTICOAGULANT) 5 MG tablet     aspirin 81 MG tablet     atorvastatin (LIPITOR) 40 MG tablet     blood glucose monitoring (ACCU-CHEK FASTCLIX) lancets     carvedilol (COREG) 25 MG tablet     cholecalciferol (SM VITAMIN D3) 50 MCG (2000 UT) CAPS     cilostazol (PLETAL) 50 MG tablet     donepezil (ARICEPT) 10 MG tablet     isosorbide mononitrate (IMDUR) 30 MG 24 hr tablet     losartan (COZAAR) 50 MG tablet     magnesium oxide 200 MG TABS     metFORMIN (GLUCOPHAGE XR) 500 MG 24 hr tablet     multivitamin (CENTRUM SILVER) tablet     NIFEdipine ER (ADALAT CC) 60 MG 24 hr tablet     nitroGLYcerin (NITROSTAT) 0.4 MG sublingual tablet     Omega-3 Fatty Acids (FISH OIL OMEGA-3) 1000 MG CAPS     omeprazole (PRILOSEC) 20 MG DR capsule     sertraline (ZOLOFT) 25 MG tablet     traZODone (DESYREL) 50 MG tablet     No current facility-administered medications for this visit.       Physical Exam:  ?  Vitals:  /52   Pulse 70   Wt 68.5 kg (151 lb)    BMI 23.83 kg/m     General:  WD/WN, in NAD.  A&O x3.  Dermatologic:    Skin is intact, open lesions absent.  Skin texture, turgor is normal.  Vascular:  Pulses palpable.  Digital capillary refill time normal.  Skin temperature is normal.  Generalized edema- trace.  Neurologic:    Protective sensation diminished.  Gait and balance normal.  Musculoskeletal:  No pain to palpation of foot & ankle  aROM intact to foot & ankle  Muscle strength 5/5 foot & ankle                    Again, thank you for allowing me to participate in the care of your patient.        Sincerely,        Pauline Zimmer DPM

## 2023-07-31 NOTE — TELEPHONE ENCOUNTER
Rx Authorization:  Requested Medication/ Dose Donepezil 10MG Tablets  Date last refill ordered:   Quantity ordered:   # refills:   Date of last clinic visit with ordering provider:   Date of next clinic visit with ordering provider:   All pertinent protocol data (lab date/result):   Include pertinent information from patients message:

## 2023-08-23 NOTE — TELEPHONE ENCOUNTER
[facility] Call Center    Phone Message    May a detailed message be left on voicemail: no     Reason for Call: Other: Per mychart request,     second opinion regarding loss of blood flow to big toe, we want the least invasive procedure for the best results. If the need is to take the entire leg can there be assisted help for the patient to stay at home?     Pt of Dr. Mcdaniel- please review and call to discuss/ schedule as necessary.     Action Taken: Other: cardio    Travel Screening: Not Applicable

## 2023-08-23 NOTE — TELEPHONE ENCOUNTER
"Spoke to patient and wife who reports that he saw a vascular surgeon on 8/16 at [facility] V[facility] Dr. Ashford for worsening PAD and they were not happy with the care.  They are asking Dr. Mcdaniel to make a referral to a different vascular surgoen.  Writer explained that Dr. Mcdaniel is on vacation.  Also discussed that she has not seen the patient in over 1 year.    Hx of significant PAD.  Pain was infrequent but since he was seen on 8/16 his symptoms have worsened.  His pain is constant now but \"at a low level\" pain is rated a 2/10 (0=no pain, 10=severe pain)His pain is located at his left great toe.  Wife states that \"the black area has gotten bigger\".  Also states the area is reddened, swollen and tender to the touch.    Writer advised that the patient go to the Emergency room and discussed the importance of this.  Wife and patient would not agree to go to the ED.  Verbalized that they will call Dr. Glez from [facility] [place] to discuss other vascular providers.    JANET Mcdaniel.    Dina Lin, RN  Cardiology Care Coordinator  [facility]  [phone] option 1      8/8/2023  [facility] [place] [facility] [place]    ASSESSMENT/PLAN:  73-year-old male seen in the [facility] cardi[facility] clinic:  1. Hypotension -patient's blood pressures had been elevated though he had seemingly attain good blood pressure control on a prescribed medical regimen collaborated by office values as well as his wife's home recorded values during a visit in May 2023. At this point he has had recurrent episodes of presyncope/near syncope and has a blood pressure today recorded 88/42 mmHg. Has no other specific cardiovascular symptoms. He denies any symptoms to suggest GI bleeding. I suggested that he eliminate his nifedipine therapy totally. His wife will continue to monitor his blood pressures daily and asked that she call in a couple of days to " provide interim values. With today's visit we will obtain a CBC and also a BMP.  2. Peripheral vascular disease -patient presents with a several week history of a black discoloration of his left great toe and tenderness. By examination his toe is significantly ischemic. His presentation has been subacute worsening over the past several weeks. I will make arrangements for an urgent peripheral vascular evaluation. I believe angiography will be necessary. Note he had no ischemic ulceration or discoloration at his last May 2023 visit.  3. Multivessel coronary disease -prior history of surgical as well as device-based revascularization. No symptoms of angina. Additional ischemic risk stratification not indicated.  4. Pancreatic mass -managed outside through [facility] March 2023. [place] to be benign at this point with follow-up surveillance MRI imaging as discussed above.  5. Hyperlipidemia -on high intensity statin therapy. Last available lipid profile from July 2021 showed an LDL cholesterol 60 mg/dL. Anticipate lipid testing with his next fasting lab draw.  6. Tobacco abuse - counseled regarding smoking cessation.  7. Mitral regurgitation - present on exam. Not seen on last echocardiogram May 2022. Anticipate a repeat echocardiogram for surveillance in 6 months.   8. Bilateral carotid artery stenoses -SANJAY 16 to 49%, LICA 16 to 49% via carotid ultrasound July 2021. No TIA or strokelike complaints.  9. Diabetes mellitus -excellent blood glucose values and hemoglobin A1c. Patient's wife is interested in stopping his metformin though I believe his control was probably a result of medical therapy. We will further discuss with his primary care doctor and if metformin were indeed stopped an alternative agent would likely be necessary.  10. Obstructive sleep apnea  11. Paroxysmal atrial fibrillation -tolerating Eliquis without bleeding difficulty. ECG with last visit confirmed normal sinus rhythm.  Electrocardiogram today confirms normal sinus rhythm. The patient has not had a clinical recurrence of atrial fibrillation.  12. Tobacco abuse-counseled regarding smoking cessation.    Raghu Glez MD [facility]  8/8/2023

## 2023-08-23 NOTE — TELEPHONE ENCOUNTER
Charles message sent.    Dina Lin, RN  Cardiology Care Coordinator  [facility]  [phone] option 1

## 2023-08-25 NOTE — TELEPHONE ENCOUNTER
The pt is scheduled on 8/30 at the [facility] for imaging and with Gabrielle.  The pts spouse Angelica is stating that the pt is in a lot of pain she is wondering what she should do?  Brennen Gonzalez on 8/25/2023 at 4:32 PM

## 2023-08-30 NOTE — TELEPHONE ENCOUNTER
----- Message from Kayley Davis LPN sent at 8/30/2023  4:03 PM CDT -----  Regarding: [facility]  Hello,    Can you schedule this Pt in the [facility] next week. He saw Gabrielle today, but his disease is too severe and needs a surgeon. New PAD. No new imaging.     Thank you,    Kayley

## 2023-08-30 NOTE — TELEPHONE ENCOUNTER
Augusta calling from [facility] mail order pharmacy calling to confirm dosages of Rx that patient is on. Patient in process of switching Rx from local to mail order pharmacy. RN confirmed Rx doses for medications, including atorvastatin, carvedilol, cilostazol, donepezil, losartan, metformin, and omeprazole. RN relayed to Augusta that some medications are patient reported as patient receives those medications from different specialist. Augusta verbalized good understanding. No further questions or concerns.    IFEOMA Gannon  [facility] Primary Care Triage

## 2023-08-30 NOTE — NURSING NOTE
Chief Complaint   Patient presents with    New Patient     New Vascular Patient- PAD (peripheral artery disease)     Vitals were taken and medications reconciled.    Nikolay Avelar, NORMAN  3:31 PM

## 2023-08-30 NOTE — PATIENT INSTRUCTIONS
You were seen today in the Vascular IR Clinic by Dr Anthony for consult regarding PAD.    Plan:    - We will have you see by our vascular surgeon in our [facility]. Our  will contact you to coordinate.     Please call or send a MyChart with any questions or concerns.    Kayley GUAMAN LPN/ Sarita FAM, RNCC  [phone]

## 2023-08-30 NOTE — PROGRESS NOTES
INTERVENTIONAL RADIOLOGY CONSULTATION    Name: Darian Spence  Age: 73 year old   Referring Physician: Dr. Mcdaniel   REASON FOR REFERRAL: peripheral vascular disease     HPI:   Darian Spence is a 73 year old male seen in clinic today for follow up of his peripheral vascular disease. He was previously seen at [facility] but wished to be evaluated at the [facility] for a second opinion regarding his future options for management. He has a past medical history significant for CAD status post LIMA and left GSV graft as well as some carotid vascular disease. 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 He had prior stenting in the right SFA, right popliteal artery, and left SFA. He has a long history of claudication, however more recently he developed a black left great toe which was noted at his most recent cardiology visit on 8/8/2023. He is also reporting foot pain mainly at night for which he has taken oxycodone in the past but had recently run out.    PAST MEDICAL HISTORY:   Past Medical History:   Diagnosis Date     Arthritis March 2018    joint pain both hands     Diabetes (H)      Heart disease 1991    [place]-angioplasty     Hypertension        PAST SURGICAL HISTORY:   Past Surgical History:   Procedure Laterality Date     CARDIAC SURGERY  [address] [place] [address]     ENHANCE LASER REFRACTIVE BILATERAL EXISTING PT IN PARAMETERS  2000     EYE SURGERY  2000    [facility]     VASCULAR SURGERY  2017    [facility]       FAMILY HISTORY:   Family History   Problem Relation Age of Onset     Glaucoma Mother      Macular Degeneration Mother      Macular Degeneration Other      Lung Cancer Brother        SOCIAL HISTORY:   Social History     Tobacco Use     Smoking status: Every Day     Packs/day: 0.50     Years: 5.00     Pack years: 2.50     Types: Cigarettes     Start date: 1/1/1968      Last attempt to quit: 7/15/2016     Years since quittin.1     Smokeless tobacco: Former   Substance Use Topics     Alcohol use: Not Currently     Comment: binge, two  months sober       PROBLEM LIST:   Patient Active Problem List    Diagnosis Date Noted     Cyst of pancreas 2023     Priority: Medium     Dementia without behavioral disturbance (H) 2023     Priority: Medium     PAF (paroxysmal atrial fibrillation) (H) 2023     Priority: Medium     Orthostatic hypotension 2022     Priority: Medium     Syncope and collapse 2022     Priority: Medium     Angina pectoris (H) 2022     Priority: Medium     Obstructive sleep apnea syndrome 2021     Priority: Medium     Seen at [facility]; diagnosed with moderate TALA by Dr. Herve Louise; 2019.       Type 2 diabetes mellitus without retinopathy (H) 2018     Priority: Medium     Bilateral incipient cataracts 2018     Priority: Medium     PAD (peripheral artery disease) (H) 2017     Priority: Medium     Claudication in peripheral vascular disease (H) 2017     Priority: Medium     Acute ST elevation myocardial infarction (STEMI) due to occlusion of right coronary artery (H) 2017     Priority: Medium     CAD, multiple vessel 2017     Priority: Medium     Essential hypertension with goal blood pressure less than 140/90 2017     Priority: Medium     Type 2 diabetes mellitus with complication, with long-term current use of insulin (H) 2017     Priority: Medium     Hyperlipidemia LDL goal <100 2017     Priority: Medium     Gastroesophageal reflux disease without esophagitis 2017     Priority: Medium     Callahan's esophagus 2016     Priority: Medium     Binge eating disorder 2016     Priority: Medium     Formatting of this note might be different from the original.  [place] intake 2016       Periodic limb movement disorder (PLMD)  12/03/2014     Priority: Medium     Formatting of this note might be different from the original.  Per [facility]cer  PLMs and may benefit from further evaluation with blood tests for Ferritin and Iron levels.       Abnormal electrocardiography 05/16/2014     Priority: Medium     Dyslipidemia 03/21/2011     Priority: Medium     Formatting of this note might be different from the original.  05/07/13, Lipids:  TC 99, TG 89, LDL 58, HDL 23       Alcohol abuse 11/13/2007     Priority: Medium     Formatting of this note might be different from the original.  Previous treatment  ; Alcohol Abuse, episodic       Microalbuminuria 02/07/2007     Priority: Medium     Postsurgical aortocoronary bypass status 04/23/2004     Priority: Medium     Formatting of this note might be different from the original.  4/04 Dr. Powell Regions  ; AORTOCORONARY BYPASS STATUS(aka CABG)       Reflux esophagitis 09/17/2003     Priority: Medium       MEDICATIONS:   Prescription Medications as of 8/30/2023         Rx Number Disp Refills Start End Last Dispensed Date Next Fill Date Owning Pharmacy    ACCU-UMMCK Zenops test strip  300 strip 3 4/29/2021    [facility] #[id number] - [place], [place] - [address] AT [facility]    Sig: TEST THREE TIMES DAILY OR AS DIRECTED    Class: E-Prescribe    apixaban ANTICOAGULANT (ELIQUIS ANTICOAGULANT) 5 MG tablet  60 tablet 2 4/3/2023    [facility] #[id number] - [place], [place] - [address] AT [facility]    Sig: Take 1 tablet (5 mg) by mouth 2 times daily    Class: E-Prescribe    Route: Oral    aspirin 81 MG tablet  30 tablet  12/18/2017    [facility] #[id number] - [facility] [address]    Sig: Take by mouth daily    Class: Historical    Route: Oral    atorvastatin (LIPITOR) 40 MG tablet  90 tablet 3 6/19/2023    [facility] #[id number] - [place], [place] - [address] AT [facility]    Sig: Take 1  tablet (40 mg) by mouth daily For cholesterol.    Class: E-Prescribe    Route: Oral    blood glucose monitoring (ACCU-CHEK FASTCLIX) lancets  306 each 3 4/29/2021    [id number] - [place], [place] - [address] AT [facility]    Sig: USE TO TEST THREE TIMES DAILY OR AS DIRECTED    Class: E-Prescribe    carvedilol (COREG) 25 MG tablet    4/26/2023        Sig: Take 25 mg by mouth 2 times daily    Class: Historical    Route: Oral    cholecalciferol ( VITAMIN D3) 50 MCG (2000 UT) CAPS            Sig: Take 2 capsules by mouth daily    Class: Historical    Route: Oral    cilostazol (PLETAL) 50 MG tablet            Sig: Take 100 mg by mouth 2 times daily    Class: Historical    Route: Oral    donepezil (ARICEPT) 10 MG tablet  90 tablet 1 7/31/2023    [id number] - [place], [place] - [address] AT [facility]    Sig: Take 1 tablet (10 mg) by mouth At Bedtime    Class: E-Prescribe    Route: Oral    isosorbide mononitrate (IMDUR) 30 MG 24 hr tablet  180 tablet 1 3/21/2023    [id number] - [place], [place] - [address] AT [facility]    Sig: TAKE 2 TABLETS(60 MG) BY MOUTH DAILY    Class: E-Prescribe    Renewals       Renewal provider: Ward Randall MD            losartan (COZAAR) 50 MG tablet  90 tablet 2 5/22/2023    [id number] - [place], [place] - [address] AT [facility]    Sig: TAKE 1 TABLET(50 MG) BY MOUTH DAILY    Class: E-Prescribe    magnesium oxide 200 MG TABS            Sig: Take 200 mg by mouth    Class: Historical    Route: Oral    metFORMIN (GLUCOPHAGE XR) 500 MG 24 hr tablet  180 tablet 0 8/18/2023    [id number] - [place], [place] - [address] AT [facility]    Sig: TAKE 1 TABLET(500 MG) BY MOUTH TWICE DAILY WITH MEALS    Class: E-Prescribe    multivitamin (CENTRUM SILVER) tablet        [id number] - [place], [place] - [address] AT [facility]    Sig: Take 1 tablet by mouth daily    Class: Historical    Route: Oral    NIFEdipine ER (ADALAT CC) 60 MG 24 hr tablet    2023        Si mg daily    Class: Historical    nitroGLYcerin (NITROSTAT) 0.4 MG sublingual tablet  25 tablet 0 2022    [id number] [place] - [address] AT [facility]    Sig: PLACE 1 TABLET UNDER THE TONGUE EVERY 5 MINUTES FOR 3 DOSES, IF SYMPTOMS PERSIST 5 MINUTES AFTER 1ST DOSE CALL 911    Class: E-Prescribe    Omega-3 Fatty Acids (FISH OIL OMEGA-3) 1000 MG CAPS            Sig: Take 1,000 mg by mouth daily    Class: Historical    Route: Oral    omeprazole (PRILOSEC) 20 MG DR capsule  90 capsule 3 2023    [facility] #[id number] [place] - [address] AT [facility]    Sig: Take 1 capsule (20 mg) by mouth daily    Class: E-Prescribe    Route: Oral    sertraline (ZOLOFT) 25 MG tablet  90 tablet 1 2023    [facility] #[id number] - [place], [place] - [address] AT [facility]    Sig: TAKE ONE TABLET BY MOUTH DAILY FOR DEPRESSION AND TO HELP APPETITE    Class: E-Prescribe    traZODone (DESYREL) 50 MG tablet  30 tablet 4 2022    [facility][id number] - [place], [place] - [address] AT [facility]    Sig: Take 1 tablet (50 mg) by mouth At Bedtime    Class: E-Prescribe    Route: Oral            ALLERGIES:   Lidocaine, Lisinopril, and Naltrexone    ROS:  Negative unless otherwise stated in HPI.      Physical Examination:   VITALS:   BP (!) 198/76 (BP Location: Right arm, Patient Position: Chair, Cuff Size: Adult Small)   Pulse 57   SpO2 100%     Gen: alert and oriented x3 no acute distress  HEENT: wears glasses, EOMI  Neck: supple  Respiratory: equal chest rise  Extremities: the left lower extremity demonstrates a eschar on the left great  to along the medial aspect of the digit with hair loss and a shiny appearance of the toe and small amount of rubor. Additionally there are scatteres sores along the left lower extremity as well as some calf muscle wasting. Pulses not palpable and foot is cold, cap refil > 3 seconds.    Labs:    BMP RESULTS:  Lab Results   Component Value Date     02/21/2023     09/02/2020    POTASSIUM 4.1 02/21/2023    POTASSIUM 4.0 09/02/2020    CHLORIDE 110 (H) 02/21/2023    CHLORIDE 109 09/02/2020    CO2 26 02/21/2023    CO2 25 09/02/2020    ANIONGAP 6 02/21/2023    ANIONGAP 7 09/02/2020    GLC 96 02/21/2023     (H) 09/02/2020    BUN 12 02/21/2023    BUN 15 09/02/2020    CR 0.69 02/21/2023    CR 0.92 09/02/2020    GFRESTIMATED >90 02/21/2023    GFRESTIMATED >90 03/01/2021    GFRESTBLACK >90 03/01/2021    SHERYL 9.4 02/21/2023    SHERYL 9.2 09/02/2020        CBC RESULTS:  Lab Results   Component Value Date    WBC 7.4 02/21/2023    WBC 7.1 06/03/2021    RBC 4.10 (L) 02/21/2023    RBC 4.59 06/03/2021    HGB 12.6 (L) 02/21/2023    HGB 13.8 06/03/2021    HCT 37.3 (L) 02/21/2023    HCT 40.8 06/03/2021    MCV 91 02/21/2023    MCV 89 06/03/2021    MCH 30.7 02/21/2023    MCH 30.1 06/03/2021    MCHC 33.8 02/21/2023    MCHC 33.8 06/03/2021    RDW 14.1 02/21/2023    RDW 12.2 06/03/2021     02/21/2023     06/03/2021       INR/PTT:  No results found for: INR, PTT    Diagnostic studies:   Run off CTA on 8/16/2023 demonstrated    Left: Severe narrowing of the CFA with long segment  of the superficial femoral artery involving a proximal stent with reccanulation about the area of the adductor canal for a total length of 21.7 cm. There is a left popliteal stent which demonstrates in stent stenosis as well as severe narrowing/short segment occlusion of the popliteal artery inferior to the stent. Below the trifurcation the IVONE is occluded, the peroneal is patent and there is severe calcific disease of the  PTA.    Surgical changes of left GSV harvesting    Right: Moderate narrowing of the CFA. There is long segment moderate to severe narrowing of the entire SFA and popliteal arteries with a stent about the adductor canal which appears patent. The IVONE is occluded, the peroneal is patent and there is severe calcific disease of the PTA.      RHONDA 8/30/23 - noncompressable with absent toe waveforms bilaterally        Arterial ultrasound 8/30/2023 with severely post stenotic waveforms with the PTA bilaterally  left      Right:      Assessment   Darian Spence is a 73 year old male seen in clinic today for follow up of his peripheral vascular disease. Regarding his imaging findings as above he has severe left lower extremity and moderate to severe right lower extremity peripheral vascular disease. Given the extensive disease, including the left CFA, long segment chronic occlusion of the left superficial femoral artery it is felt that bypass may offer better long term results for this patient rather than endovascular intervention. It is recommended that the patient be seen at the vascular surgery fellow clinic here at the [place] and be evaluated by there team as well. If they feel as though endovascular intervention first would be preferred due to the patients overall health IR would be willing to proceed.    Plan   - Referral to vascular surgery fellow clinic for evaluation for possible bypass  - IR would be happy to help facilitate lower extremity angiogram (diagnotic or intervention) if desired by vascular surgery team.    I interviewed and examined the patient with the resident and agree with the assessment and plan.    Thaddeus Anthony MD    CC  Patient Care Team:  Marcos Gonzalez MD as PCP - General (Family Practice)  Schuyler Franco DO as Assigned Neuroscience Provider  Marcos Gonzalez MD as Assigned PCP  Padmini Mcdaniel MD as Assigned Heart and Vascular Provider  Shobha Ruggiero [facility] as Pharmacist  (Pharmacist)  Shobha Ruggiero [facility] as Assigned MTM Pharmacist  Pauline Zimmer DPM as Assigned Surgical Provider  SAMANTHA BOYD      ----- Service Performed and Documented by Resident or Fellow ------        PAD (peripheral artery disease) (H)  - Vascular Surgery Referral  - oxyCODONE (ROXICODONE) 5 MG tablet; Take 1 tablet (5 mg) by mouth every 6 hours as needed for pain    Critical limb ischemia of left lower extremity with gangrene (H)  - oxyCODONE (ROXICODONE) 5 MG tablet; Take 1 tablet (5 mg) by mouth every 6 hours as needed for pain        Win Szymanski MD    Case discussed with staff Thaddeus Anthony MD

## 2023-08-30 NOTE — LETTER
8/30/2023       RE: Darian Spence  [address] [place] [id number]       Dear Colleague,    Thank you for referring your patient, Darian Spence, to the [facility] at [facility]. Please see a copy of my visit note below.        INTERVENTIONAL RADIOLOGY CONSULTATION    Name: Darian Spence  Age: 73 year old   Referring Physician: Dr. Mcdaniel   REASON FOR REFERRAL: peripheral vascular disease     HPI:   Darian Spence is a 73 year old male seen in clinic today for follow up of his peripheral vascular disease. He was previously seen at [facility] but wished to be evaluated at the [facility] for a second opinion regarding his future options for management. He has a past medical history significant for CAD status post LIMA and left GSV graft as well as some carotid vascular disease. 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 He had prior stenting in the right SFA, right popliteal artery, and left SFA. He has a long history of claudication, however more recently he developed a black left great toe which was noted at his most recent cardiology visit on 8/8/2023. He is also reporting foot pain mainly at night for which he has taken oxycodone in the past but had recently run out.    PAST MEDICAL HISTORY:   Past Medical History:   Diagnosis Date    Arthritis March 2018    joint pain both hands    Diabetes (H)     Heart disease 1991    [place]-angioplasty    Hypertension        PAST SURGICAL HISTORY:   Past Surgical History:   Procedure Laterality Date    CARDIAC SURGERY  2004    [facility]    ENHANCE LASER REFRACTIVE BILATERAL EXISTING PT IN PARAMETERS  2000    EYE SURGERY  2000    [facility]    VASCULAR SURGERY  2017    [facility]       FAMILY HISTORY:   Family History   Problem Relation Age of  Onset    Glaucoma Mother     Macular Degeneration Mother     Macular Degeneration Other     Lung Cancer Brother        SOCIAL HISTORY:   Social History     Tobacco Use    Smoking status: Every Day     Packs/day: 0.50     Years: 5.00     Pack years: 2.50     Types: Cigarettes     Start date: 1968     Last attempt to quit: 7/15/2016     Years since quittin.1    Smokeless tobacco: Former   Substance Use Topics    Alcohol use: Not Currently     Comment: binge, two  months sober       PROBLEM LIST:   Patient Active Problem List    Diagnosis Date Noted    Cyst of pancreas 2023     Priority: Medium    Dementia without behavioral disturbance (H) 2023     Priority: Medium    PAF (paroxysmal atrial fibrillation) (H) 2023     Priority: Medium    Orthostatic hypotension 2022     Priority: Medium    Syncope and collapse 2022     Priority: Medium    Angina pectoris (H) 2022     Priority: Medium    Obstructive sleep apnea syndrome 2021     Priority: Medium     Seen at [facility]; diagnosed with moderate TALA by Dr. Herve Louise; 2019.      Type 2 diabetes mellitus without retinopathy (H) 2018     Priority: Medium    Bilateral incipient cataracts 2018     Priority: Medium    PAD (peripheral artery disease) (H) 2017     Priority: Medium    Claudication in peripheral vascular disease (H) 2017     Priority: Medium    Acute ST elevation myocardial infarction (STEMI) due to occlusion of right coronary artery (H) 2017     Priority: Medium    CAD, multiple vessel 2017     Priority: Medium    Essential hypertension with goal blood pressure less than 140/90 2017     Priority: Medium    Type 2 diabetes mellitus with complication, with long-term current use of insulin (H) 2017     Priority: Medium    Hyperlipidemia LDL goal <100 2017     Priority: Medium    Gastroesophageal reflux disease without esophagitis  01/27/2017     Priority: Medium    Callahan's esophagus 09/12/2016     Priority: Medium    Binge eating disorder 02/12/2016     Priority: Medium     Formatting of this note might be different from the original.  [place] intake 2/2016      Periodic limb movement disorder (PLMD) 12/03/2014     Priority: Medium     Formatting of this note might be different from the original.  Per [facility]  PLMs and may benefit from further evaluation with blood tests for Ferritin and Iron levels.      Abnormal electrocardiography 05/16/2014     Priority: Medium    Dyslipidemia 03/21/2011     Priority: Medium     Formatting of this note might be different from the original.  05/07/13, Lipids:  TC 99, TG 89, LDL 58, HDL 23      Alcohol abuse 11/13/2007     Priority: Medium     Formatting of this note might be different from the original.  Previous treatment  ; Alcohol Abuse, episodic      Microalbuminuria 02/07/2007     Priority: Medium    Postsurgical aortocoronary bypass status 04/23/2004     Priority: Medium     Formatting of this note might be different from the original.  4/04 Dr. Powell Regions  ; AORTOCORONARY BYPASS STATUS(aka CABG)      Reflux esophagitis 09/17/2003     Priority: Medium       MEDICATIONS:   Prescription Medications as of 8/30/2023         Rx Number Disp Refills Start End Last Dispensed Date Next Fill Date Owning Pharmacy    [facility] [facility] test strip  300 strip 3 4/29/2021    [id number] [place] [facility] [address] AT [facility]    Sig: TEST THREE TIMES DAILY OR AS DIRECTED    Class: E-Prescribe    apixaban ANTICOAGULANT (ELIQUIS ANTICOAGULANT) 5 MG tablet  60 tablet 2 4/3/2023    [id number] [place] [facility] [address] AT [facility]    Sig: Take 1 tablet (5 mg) by mouth 2 times daily    Class: E-Prescribe    Route: Oral    aspirin 81 MG tablet  30 tablet  12/18/2017    [id number] [address]  [address]    Sig: Take by mouth daily    Class: Historical    Route: Oral    atorvastatin (LIPITOR) 40 MG tablet  90 tablet 3 6/19/2023    [id number] - [place], [place] - [address] AT [facility]    Sig: Take 1 tablet (40 mg) by mouth daily For cholesterol.    Class: E-Prescribe    Route: Oral    blood glucose monitoring (ACCU-CHEK FASTCLIX) lancets  306 each 3 4/29/2021    [id number] - [place], [place] - [address] AT [facility]    Sig: USE TO TEST THREE TIMES DAILY OR AS DIRECTED    Class: E-Prescribe    carvedilol (COREG) 25 MG tablet    4/26/2023        Sig: Take 25 mg by mouth 2 times daily    Class: Historical    Route: Oral    cholecalciferol ( VITAMIN D3) 50 MCG (2000 UT) CAPS            Sig: Take 2 capsules by mouth daily    Class: Historical    Route: Oral    cilostazol (PLETAL) 50 MG tablet            Sig: Take 100 mg by mouth 2 times daily    Class: Historical    Route: Oral    donepezil (ARICEPT) 10 MG tablet  90 tablet 1 7/31/2023    [id number] - [place], [place] - [address] AT [facility]    Sig: Take 1 tablet (10 mg) by mouth At Bedtime    Class: E-Prescribe    Route: Oral    isosorbide mononitrate (IMDUR) 30 MG 24 hr tablet  180 tablet 1 3/21/2023    [id number] - [place], [place] - [address] AT [facility]    Sig: TAKE 2 TABLETS(60 MG) BY MOUTH DAILY    Class: E-Prescribe    Renewals       Renewal provider: Ward Randall MD            losartan (COZAAR) 50 MG tablet  90 tablet 2 5/22/2023    [facility][id number] - [place], [place] - [address] AT [facility]    Sig: TAKE 1 TABLET(50 MG) BY MOUTH DAILY    Class: E-Prescribe    magnesium oxide 200 MG TABS            Sig: Take 200 mg by mouth    Class: Historical    Route: Oral    metFORMIN (GLUCOPHAGE XR) 500 MG 24 hr tablet  180  tablet 0 2023    [id number] - [place], [place] - [address] AT [facility]    Sig: TAKE 1 TABLET(500 MG) BY MOUTH TWICE DAILY WITH MEALS    Class: E-Prescribe    multivitamin (CENTRUM SILVER) tablet        [id number] - [place], [place] - [address] AT [facility]    Sig: Take 1 tablet by mouth daily    Class: Historical    Route: Oral    NIFEdipine ER (ADALAT CC) 60 MG 24 hr tablet    2023        Si mg daily    Class: Historical    nitroGLYcerin (NITROSTAT) 0.4 MG sublingual tablet  25 tablet 0 2022    [facility][id number] - [place], [place] - [address] AT [facility]    Sig: PLACE 1 TABLET UNDER THE TONGUE EVERY 5 MINUTES FOR 3 DOSES, IF SYMPTOMS PERSIST 5 MINUTES AFTER 1ST DOSE CALL 911    Class: E-Prescribe    Omega-3 Fatty Acids (FISH OIL OMEGA-3) 1000 MG CAPS            Sig: Take 1,000 mg by mouth daily    Class: Historical    Route: Oral    omeprazole (PRILOSEC) 20 MG DR capsule  90 capsule 3 2023    [facility][id number] - [place], [place] - [address] AT [facility]    Sig: Take 1 capsule (20 mg) by mouth daily    Class: E-Prescribe    Route: Oral    sertraline (ZOLOFT) 25 MG tablet  90 tablet 1 2023    [facility][id number] - [place], [place] - [address] AT [facility]    Sig: TAKE ONE TABLET BY MOUTH DAILY FOR DEPRESSION AND TO HELP APPETITE    Class: E-Prescribe    traZODone (DESYREL) 50 MG tablet  30 tablet 4 2022    [facility] #[id number] - [place], [place] - [address] AT [facility]    Sig: Take 1 tablet (50 mg) by mouth At Bedtime    Class: E-Prescribe    Route: Oral            ALLERGIES:   Lidocaine, Lisinopril, and Naltrexone    ROS:  Negative unless otherwise stated in HPI.      Physical Examination:   VITALS:   BP (!) 198/76 (BP Location: Right arm, Patient  Position: Chair, Cuff Size: Adult Small)   Pulse 57   SpO2 100%     Gen: alert and oriented x3 no acute distress  HEENT: wears glasses, EOMI  Neck: supple  Respiratory: equal chest rise  Extremities: the left lower extremity demonstrates a eschar on the left great to along the medial aspect of the digit with hair loss and a shiny appearance of the toe and small amount of rubor. Additionally there are scatteres sores along the left lower extremity as well as some calf muscle wasting. Pulses not palpable and foot is cold, cap refil > 3 seconds.    Labs:    BMP RESULTS:  Lab Results   Component Value Date     02/21/2023     09/02/2020    POTASSIUM 4.1 02/21/2023    POTASSIUM 4.0 09/02/2020    CHLORIDE 110 (H) 02/21/2023    CHLORIDE 109 09/02/2020    CO2 26 02/21/2023    CO2 25 09/02/2020    ANIONGAP 6 02/21/2023    ANIONGAP 7 09/02/2020    GLC 96 02/21/2023     (H) 09/02/2020    BUN 12 02/21/2023    BUN 15 09/02/2020    CR 0.69 02/21/2023    CR 0.92 09/02/2020    GFRESTIMATED >90 02/21/2023    GFRESTIMATED >90 03/01/2021    GFRESTBLACK >90 03/01/2021    SHERYL 9.4 02/21/2023    SHERYL 9.2 09/02/2020        CBC RESULTS:  Lab Results   Component Value Date    WBC 7.4 02/21/2023    WBC 7.1 06/03/2021    RBC 4.10 (L) 02/21/2023    RBC 4.59 06/03/2021    HGB 12.6 (L) 02/21/2023    HGB 13.8 06/03/2021    HCT 37.3 (L) 02/21/2023    HCT 40.8 06/03/2021    MCV 91 02/21/2023    MCV 89 06/03/2021    MCH 30.7 02/21/2023    MCH 30.1 06/03/2021    MCHC 33.8 02/21/2023    MCHC 33.8 06/03/2021    RDW 14.1 02/21/2023    RDW 12.2 06/03/2021     02/21/2023     06/03/2021       INR/PTT:  No results found for: INR, PTT    Diagnostic studies:   Run off CTA on 8/16/2023 demonstrated    Left: Severe narrowing of the CFA with long segment  of the superficial femoral artery involving a proximal stent with reccanulation about the area of the adductor canal for a total length of 21.7 cm. There is a left popliteal  stent which demonstrates in stent stenosis as well as severe narrowing/short segment occlusion of the popliteal artery inferior to the stent. Below the trifurcation the IVONE is occluded, the peroneal is patent and there is severe calcific disease of the PTA.    Surgical changes of left GSV harvesting    Right: Moderate narrowing of the CFA. There is long segment moderate to severe narrowing of the entire SFA and popliteal arteries with a stent about the adductor canal which appears patent. The IVONE is occluded, the peroneal is patent and there is severe calcific disease of the PTA.      RHONDA 8/30/23 - noncompressable with absent toe waveforms bilaterally        Arterial ultrasound 8/30/2023 with severely post stenotic waveforms with the PTA bilaterally  left      Right:      Assessment   Darian Spence is a 73 year old male seen in clinic today for follow up of his peripheral vascular disease. Regarding his imaging findings as above he has severe left lower extremity and moderate to severe right lower extremity peripheral vascular disease. Given the extensive disease, including the left CFA, long segment chronic occlusion of the left superficial femoral artery it is felt that bypass may offer better long term results for this patient rather than endovascular intervention. It is recommended that the patient be seen at the vascular surgery fellow clinic here at the [facility] and be evaluated by there team as well. If they feel as though endovascular intervention first would be preferred due to the patients overall health IR would be willing to proceed.    Plan   - Referral to vascular surgery fellow clinic for evaluation for possible bypass  - IR would be happy to help facilitate lower extremity angiogram (diagnotic or intervention) if desired by vascular surgery team.    I interviewed and examined the patient with the resident and agree with the assessment and plan.      ----- Service Performed and Documented by  Resident or Fellow ------    PAD (peripheral artery disease) (H)  - Vascular Surgery Referral  - oxyCODONE (ROXICODONE) 5 MG tablet; Take 1 tablet (5 mg) by mouth every 6 hours as needed for pain    Critical limb ischemia of left lower extremity with gangrene (H)  - oxyCODONE (ROXICODONE) 5 MG tablet; Take 1 tablet (5 mg) by mouth every 6 hours as needed for pain     Win Szymanski MD    Case discussed with staff Thaddeus Anthony MD        Again, thank you for allowing me to participate in the care of your patient.      Sincerely,    Thaddeus Anthony MD

## 2023-09-07 NOTE — NURSING NOTE
Chief Complaint   Patient presents with    Follow Up     Return Vascular Patient- new PAD     Vitals were taken and medications reconciled.    NORMAN Vega  12:59 PM

## 2023-09-07 NOTE — PATIENT INSTRUCTIONS
Thank you so much for choosing us for your care. It was a pleasure to see you at the vascular clinic today.     Follow-up recommendations: We will see you back in 2 weeks. Please do not hesitate to reach out to us in the meantime with any concerns. Our schedulers will get in touch with you to set up your follow-up visit.    Additional testing/imaging ordered today: TTE, carotid US, and CT scan prior to seeing us in 2 weeks.      Our scheduling team will get in touch with you to set up any follow-up testing/imaging and/or appointments. Please be aware that any testing/imaging recommended today will need to completed prior to your next visit with the provider. If testing/imaging is not completed prior to your next visit, your visit may be rescheduled.        If you have any questions, please contact our clinic directly at [phone] and ask for the nurse. We also encourage the use of [employer] to communicate with your HealthCare Provider.      If you have an urgent need after business hours (8:00 am to 4:30 pm) please call [phone], option 4, and ask for the vascular attending on call. For non-urgent after hours needs, please call the vascular clinic at [phone]. For scheduling needs, please call our clinic directly at [phone].

## 2023-09-07 NOTE — PROGRESS NOTES
VASCULAR SURGERY CLINIC CONSULTATION    VASCULAR SURGEON: MARÍA ELENA Gee    LOCATION:  [facility]    Darian Engle   Medical Record #:  [id number]  YOB: 1950  Age:  73 year old     Date of Service: 9/7/2023    PRIMARY CARE PROVIDER: Marcos Gonzalez      Reason for visit: New visit for peripheral arterial disease.       IMPRESSION:  Mr. Engle is a pleasant 73 year old who presents for a new visit for CLTI with tissue loss and dry gangrene of his L great toe. He has PMH of CAD s/p CAB w/ LIMA and L GSV, benign cystic neoplasm of the pancreas, T2DM, HTN, and active smoking and presents for evaluation of options for revascularization in setting of CLTI. His ABIs are noncompressible and no toe pressures were able to be obtained, duplex demonstrates narrowing of the L CFA with highly elevated velocities as well as occlusion of his L SFA with reconstitution at the adductor canal as well as monophasic parvus tardus waveforms in the AT and PT distally. He may benefit from bypass, however this would likely be a high risk procedure for him, we will obtain further workup to determine suitability for bypass procedure and see him shortly again in clinic, in 2 weeks.       RECOMMENDATION/RISKS:    We will plan for repeat visit in 2 weeks to assess status of wound L toe.   In the mean time we will obtain TTE to evaluate heart function, carotid duplex give h/o 15-50% narrowing of carotids noted previously, and CT chest.   Continue ASA, statin in the meantime.   We discussed that if he develops any signs of infection of the L great toe, including redness, increased pain, drainage, swelling prior to follow up visit, that he should present to the nearest ED.     HPI:  Darian Engle is a 73 year old male with PMH of CAD s/p LIMA and L GSV graft, cystic neoplasm of the pancreas (found 6/22, felt to be benign w/ MRI surveillance imaging), T2DM, HTN, current every day smoker at  least 1/2 ppd (25 pack year history), history of alcohol abuse (sober now for several months).  Has previous history of R SFA and popliteal stents, as well as L SFA. Has a long history of claudication, and now has developed L great toe dry gagrene noted at cardiology visit 8/8. Reports foot pain at night that has been ongoing for several weeks now.     He has previously been seen at [facility] however per patient and patient's wife they were not offered revascularization options thus they wished to pursue second opinion. He was recently seen by our colleagues in IR who felt that with long segment occlusion of SFA he should be evaluated for possible bypass prior to considering endovascular intervention.     Today - He is alert and oriented and involved in conversation. Wife reports that he has some difficulty remembering things, however otherwise is functional. He thinks he would be able to lay flat were we to recommend angiogram.     Former smoker 1/2 ppd for 50 years.   H/o alocohol abuse - sober now.       REVIEW OF SYSTEMS:    A 12 point ROS was reviewed and is negative except for as above.     PHH:    Past Medical History:   Diagnosis Date    Arthritis March 2018    joint pain both hands    Diabetes (H)     Heart disease 1991    [place]-angioplasty    Hypertension          Past Surgical History:   Procedure Laterality Date    CARDIAC SURGERY  2004    [facility]    ENHANCE LASER REFRACTIVE BILATERAL EXISTING PT IN PARAMETERS  2000    EYE SURGERY  2000    [facility]    VASCULAR SURGERY  2017    [facility]        ALLERGIES:     Allergies   Allergen Reactions    Lidocaine Other (See Comments)     Lungs filled with fluid. ? Anaphylaxis    Lisinopril Cough     cough    Naltrexone Nausea and Vomiting        MEDS:    Current Outpatient Medications   Medication    ACCU-CHEK SMARTVIEW test strip    apixaban ANTICOAGULANT (ELIQUIS  ANTICOAGULANT) 5 MG tablet    aspirin 81 MG tablet    atorvastatin (LIPITOR) 40 MG tablet    blood glucose monitoring (ACCU-CHEK FASTCLIX) lancets    carvedilol (COREG) 25 MG tablet    cholecalciferol (SM VITAMIN D3) 50 MCG (2000) CAPS    cilostazol (PLETAL) 50 MG tablet    donepezil (ARICEPT) 10 MG tablet    isosorbide mononitrate (IMDUR) 30 MG 24 hr tablet    losartan (COZAAR) 50 MG tablet    magnesium oxide 200 MG TABS    metFORMIN (GLUCOPHAGE XR) 500 MG 24 hr tablet    multivitamin (CENTRUM SILVER) tablet    NIFEdipine ER (ADALAT CC) 60 MG 24 hr tablet    nitroGLYcerin (NITROSTAT) 0.4 MG sublingual tablet    Omega-3 Fatty Acids (FISH OIL OMEGA-3) 1000 MG CAPS    omeprazole (PRILOSEC) 20 MG DR capsule    oxyCODONE (ROXICODONE) 5 MG tablet    sertraline (ZOLOFT) 25 MG tablet    traZODone (DESYREL) 50 MG tablet     No current facility-administered medications for this visit.        SOCIAL HABITS:    Tobacco Use      Smoking status: Every Day        Packs/day: 0.50        Years: 5.00        Pack years: 2.5        Types: Cigarettes        Start date: 1968        Last attempt to quit: 7/15/2016        Years since quittin.1      Smokeless tobacco: Former       Alcohol Use: Not on file       History   Drug Use No        FAMILY HISTORY:    Family History   Problem Relation Age of Onset    Glaucoma Mother     Macular Degeneration Mother     Macular Degeneration Other     Lung Cancer Brother        PE:  There were no vitals taken for this visit.  Wt Readings from Last 1 Encounters:   23 151 lb     There is no height or weight on file to calculate BMI.    EXAM:  GENERAL: This is a well-developed 73 year old male who appears his stated age  EYES: Grossly normal.  MOUTH: Buccal mucosa grossly normal  CARDIAC:  Normal S1 and S2, no Murmur  CHEST/LUNG:  Clear lung fields bilaterally  GASTROINTESINAL (ABDOMEN):Soft, non-tender, no pulsatile mass   MUSCULOSKELETAL: Grossly normal and both lower extremities are  intact.  HEME/LYMPH: No lymphedema  NEUROLOGIC: Focally intact, Alert and oriented x 3.   PSYCH: appropriate affect  INTEGUMENT: No open lesions or ulcers  Pulse Exam:   Carotid: No Bruit    Radial: Left +2   Right  +2    Brachial: L: strong 2+ pulse.     Femoral: Left  heavily calcified L CFA appreciated with weakly palpable pulse   Right  +1  DP: Left monophasic doppler signal   Right  monophasic doppler signal     PT:   Left monophasic doppler signal   Right  monophasic doppler signal      DIAGNOSTIC STUDIES:     Images:  US Lower Extremity Arterial Duplex Bilateral    Result Date: 8/30/2023  ULTRASOUND LOWER EXTREMITY ARTERIAL DUPLEX BILATERAL 8/30/2023 1:26 PM CLINICAL HISTORY: Claudication. Peripheral arterial disease. COMPARISONS: [facility] CTA 8/16/2023 REFERRING PROVIDER: HANS VALENCIA TECHNIQUE: Bilateral leg arteries evaluated with grayscale, color Doppler, and spectral pulsed wave Doppler ultrasound. FINDINGS: RIGHT: External iliac artery: 146/15 cm/s, arterial monophasic Common femoral artery: 197/0 cm/s, biphasic Profundus femoral artery: 405/52 cm/s, arterial monophasic, near occlusion by plaque in CTA Superficial femoral artery, proximal: 71/0 cm/s, biphasic Superficial femoral artery, proximal: 632/31 cm/s, arterial monophasic Collateral from proximal superficial femoral artery: 348/20 cm/s, arterial monophasic Superficial femoral artery, mid: 21/0 cm/s, tardus parvus Superficial femoral artery, distal: 28/0 cm/s, tardus parvus. Stent in distal superficial femoral artery is hard to distinguish from calcifications. Popliteal artery, proximal: 25/9 cm/s, tardus parvus Popliteal artery, distal: 51/16 cm/s, tardus parvus Posterior tibial artery: 29/14 cm/s, tardus parvus Anterior tibial artery: 20/8 cm/s, tardus parvus Dorsalis pedis artery: 14/7 cm/s, tardus parvus. LEFT: External iliac artery: 26/0 cm/s, biphasic Common femoral artery: 703/53 cm/s, 748/62 cm/s, arterial  monophasic, near occlusion by plaque in CTA Profundus femoral artery: 832/46 cm/s, arterial monophasic Superficial femoral artery, proximal: 120/0 cm/s, biphasic Stent in the proximal superficial femoral artery is poorly demonstrated. CTA demonstrates proximal superficial femoral artery stent occlusion. Superficial femoral artery occluded through the thigh. Reconstituted at the adductor canal. Superficial femoral artery, mid: occluded Collateral to distal superficial femoral artery: 35/6 cm/s, tardus parvus Superficial femoral artery, distal: 38/11 cm/s, tardus parvus. Stent in popliteal artery is hard to distinguish from calcifications. Popliteal artery, proximal: 14/7 cm/s, tardus parvus Popliteal artery, distal: 12/5 cm/s, tardus parvus Posterior tibial artery: 39/22 cm/s, tardus parvus Anterior tibial artery: 9/5 cm/s, tardus parvus Dorsalis pedis artery: occluded     IMPRESSION: 1. RIGHT:      A. By CTA common femoral plaque extending into proximal profundus and superficial femoral arteries is nearly occlusive.      B. Near occlusion of proximal superficial femoral artery.      C. Post stenotic/occlusive tardus parvus waveforms through the rest of the leg.      D. Distal superficial femoral artery stent poorly demonstrated by ultrasound.      E. CTA suggested below knee popliteal near occlusion and multifocal stenoses and occlusions below the knee. 2. LEFT:      A. Common femoral artery near occlusion.      B. Superficial femoral artery occlusion by CTA, reconstituted at the adductor canal.      C. Post occlusive tardus parvus waveforms from the distal superficial femoral artery to foot.      D. CTA suggested below knee popliteal near occlusion and multifocal stenoses and occlusions below the knee.      E. Dorsalis pedis artery occluded. JOSE BORRERO MD   SYSTEM ID:  [id number]    US RHONDA Doppler No Exercise 1-2 Levels Bilateral    Result Date: 8/30/2023  HRONDA, TBI, AND 1ST DIGIT PPG 8/30/2023 1:46 PM CLINICAL  HISTORY: Claudication and peripheral arterial disease. COMPARISONS: Ultrasound lower extremity arterial duplex bilateral 8/30/2023. [facility] CTA 8/16/2023 REFERRING PROVIDER: HANS VALENCIA TECHNIQUE: Bilateral RHONDA, TBI, and 1st digit PPG obtained. FINDINGS: RIGHT:      Brachial: 198 mmHg      Ankle (PT): > 254 mmHg - non compressible      Ankle (DP): > 254 mmHg - non compressible      1st Digit: 0 mmHg      RHONDA: non compressible      TBI: 0      1st Digit PPG: occlusive LEFT:      Brachial: 192 mmHg      Ankle (PT): > 254 mmHg - non compressible      Ankle (DP): > 254 mmHg - non compressible      1st Digit: 0 mmHg      RHONDA: non compressible      TBI: 0      1st Digit PPG: occlusive     IMPRESSION: 1. RIGHT:      A. Resting RHONDA is non compressible.      B. Resting TBI is ABNORMAL, 0. 2. LEFT:      A. Resting RHONDA is non compressible.      B. Resting TBI is ABNORMAL, 0. Guidelines: RHONDA Diagnostic Criteria (Based on criteria published in Circulation 2011; 124: 2709-1987):   > 1.4: Non compressible   1.00 - 1.40: Normal   0.91 - 0.99: Borderline   At or below 0.90: Abnormal RHONDA Diagnostic Criteria (Based on ACC/AHA guideline 2008):   >/=1.3 - non compressible vessels   1.00  -1.29 - Normal   0.91 - 0.99 - Borderline   0.41 - 0.90 - Mild to moderate PAD   0.00 - 0.40 - Severe PAD JOSE BORRERO MD   SYSTEM ID:  [id number]    CT ANGIO ABD/BILAT LE RUNOFF    Result Date: 8/16/2023  EXAM: CT ANGIO ABD/BILAT LE RUNOFF DATE: 8/16/2023 8:29 AM CLINICAL DATA:  I73.9 Peripheral vascular disease, unspecified COMPARISON: 4/27/2022 CT. CONTRAST: 100 mL  mL IOHEXOL 350 IV TECHNIQUE: A CT angiogram (CTA) of the abdominal aorta and bilateral lower extremities was performed.  Specifically, preliminary unenhanced images were obtained through the abdominal aorta.  Following this, during the arterial phase of intravenous contrast administration, thin-section contiguous transaxial images were obtained through the  abdomen and pelvis as well as bilateral lower extremities.  Coronal and sagittal reformations through the abdominal aorta and bilateral lower extremities were obtained.  In addition, multi-planer three-dimensional (3-D) reformations through the abdominal aorta, abdomen, pelvis, and bilateral lower extremities were generated on an independent workstation by the radiologist and reviewed.   FINDINGS: Soft tissues: External soft tissues are unremarkable. Osseous structures: No acute or aggressive appearing osseous lesions. Multilevel lumbar spondylosis most pronounced at L3-L4 there is a calcified disc bulge, disc space height loss, endplate proliferative changes. Heterogeneous marrow within the bony pelvis including lucencies abutting both sides of the sacroiliac joints favored degenerative in etiology given stable appearance from prior. Lung bases: No acute abnormality in the partially visualized lung bases. Coronary arterial calcifications and/or stents. Hepatobiliary: No arterial hyperenhancing hepatic lesions. Unable to evaluate portal and hepatic veins due to arterial phase of contrast. Gallbladder is unremarkable. No intra or extra hepatic biliary ductal dilation. Spleen: Unremarkable. Pancreas: Pancreatic parenchymal calcifications consistent with chronic calcific pancreatitis. Redemonstration of intrinsically complex and likely septated cystic pancreatic head lesion measuring 39 x 40 x 24 mm (series 4 image 55; series 8 image 15), this measures 39 x 38 x 21 mm prior CT when remeasured (series 3 image 36; series 6 image 26). There is no associated pancreatic ductal dilation. Adrenal glands: Unremarkable. Renal collecting systems: Bilateral low-attenuation renal cortical lesions, reference lesions measure fluid attenuation and are compatible with simple renal cysts (reference left inferior renal pole series 8 image 39). Additional low-attenuation renal cortical lesions are identified but too small to  characterize. Homogeneous enhancement of bilateral kidneys. No hydronephrosis. Urinary bladder is partially decompressed. Pelvic organs: Heterogeneously enlarged prostate. Large right hydrocele. Bowel: Small hiatal hernia. Stomach and duodenal sweep are unremarkable. Small bowel is nondilated. Colon is unremarkable. Appendix is unremarkable. Abdominopelvic arterial vasculature: *  No abdominal aortic aneurysm. *  Extensive calcified atherosclerotic plaque of the abdominal aorta *  Moderate to severe narrowing at the origin of the celiac artery with associated poststenotic dilation (series 9 image 54). *  Severe narrowing at the origin of the superior mesenteric artery. There appears to be focal occlusion of the proximal superior mesenteric artery (series 8 image 14), there is opacification of the more peripheral superior mesenteric artery likely due to collateral filling from pancreaticoduodenal arcade (hypertrophied necrotic or duodenal artery series 4 image 49). *  Single right and duplicated left renal arteries. Moderate ostial narrowing of the right renal artery. *  Bulky calcifications at the origin of the inferior mesenteric artery with preserved contrast opacification of this vessel. *  Right common iliac, internal iliac, and external iliac arteries demonstrate moderate to severe calcified atherosclerotic plaque with no flow-limiting stenosis *  Left common iliac, internal iliac, and external iliac arteries demonstrate moderate to severe calcified atherosclerotic plaque with no flow-limiting stenosis Right lower extremity: *  Severe calcified atherosclerotic plaque in the common femoral artery with moderate to severe stenosis at the bifurcation (series 4 image 136) *  Multifocal severe stenoses of the superficial femoral artery secondary to mixed calcified and noncalcified plaque, there are regions of intermittent opacification which may demonstrate near occlusion/occlusion. The peripheral SFA stent is  patent. *  Hypertrophied profunda femoris artery with robust collaterals in the right thigh. *  Multifocal moderate-to-severe stenoses of the popliteal artery secondary to calcified atherosclerotic plaque *  Anterior tibial artery appears patent proximally however there is dense calcification and intermittent opacification beyond the proximal calf. *  Dense calcification of the posterior tibial artery limits the discrete evaluation of contrast filling, there appears to be a runoff through the plantar branches in the foot. *  Peroneal artery is diminutive but appears patent through the level of the ankle mortise Left lower extremity: *  Multifocal severe stenoses of the left common femoral artery secondary to mixed calcified and noncalcified plaque, there is near occlusion versus occlusion just central to the bifurcation *  Long segment occlusion of the stented superficial femoral artery *  Reconstitution of the popliteal artery beyond the adductor hiatus, there appears to be at least moderate in-stent stenoses within the popliteal artery stent *  Anterior tibial artery is occluded from the proximal calf *  Dense calcifications of the posterior tibial artery limits the evaluation of contrast filling, there appears to be runoff through the plantar branches of the foot *  Peroneal artery is diminutive but appears patent to the level of the ankle mortise Lymph nodes: No mesenteric, retroperitoneal, or pelvic lymphadenopathy. General: No ascites. No free air.    IMPRESSION: 1.  Single vessel runoff to bilateral feet with occlusion of both anterior and likely posterior tibial arteries, peroneal artery appears to reconstituted posterior tibial arteries at the plantar branches of the feet. Consider further evaluation with arteriogram. 2.  Long segment occlusion of the stented left superficial femoral artery with reconstitution of the popliteal artery. 3.  Severe calcified atherosclerotic disease within the right common  femoral artery with moderate to severe stenosis at the bifurcation. 4.  Multifocal moderate-to-severe stenoses of the left common femoral artery with near occlusion versus occlusion just central to the bifurcation. 5.  Occlusion of the central aspect of the superior mesenteric artery with distal reconstitution via robust pancreaticoduodenal arcade. 6.  Redemonstration of complex appearing cystic pancreatic head lesion which appears stable to slightly increased in size accounting for differences in measurement technique. Differential includes cystic pancreatic neoplasm versus IPMN, further evaluation with MRI should be considered if this aligns with patient wishes. Alternatively annual CT could be considered for surveillance as clinically indicated. 7.  Large right hydrocele. REPORT SIGNED BY DR. Danyel Maldonado      I personally reviewed the images and my interpretation is severe narrowing of b/l CFA with near occlusion, with on the L signficantly narrowed proximal SFA with occlusion then with reconstitution distally at the adductor canal with monophasic tardus parvus waveforms distally. Severe calcific disease throughout.     LABS:      Sodium   Date Value Ref Range Status   02/21/2023 142 133 - 144 mmol/L Final   05/13/2022 140 133 - 144 mmol/L Final   09/02/2020 141 133 - 144 mmol/L Final   08/02/2019 141 133 - 144 mmol/L Final   11/26/2018 141 133 - 144 mmol/L Final     Urea Nitrogen   Date Value Ref Range Status   02/21/2023 12 7 - 30 mg/dL Final   05/13/2022 31 (H) 7 - 30 mg/dL Final   09/02/2020 15 7 - 30 mg/dL Final   08/02/2019 26 7 - 30 mg/dL Final   11/26/2018 16 7 - 30 mg/dL Final     Hemoglobin   Date Value Ref Range Status   02/21/2023 12.6 (L) 13.3 - 17.7 g/dL Final   05/13/2022 13.8 13.3 - 17.7 g/dL Final   06/03/2021 13.8 13.3 - 17.7 g/dL Final   02/03/2017 13.9 13.3 - 17.7 g/dL Final     Platelet Count   Date Value Ref Range Status   02/21/2023 237 150 - 450 10e3/uL Final   06/03/2021 279 150 - 450  10e9/L Final   02/03/2017 256 150 - 450 10e9/L Final       60 minutes spent on the day of encounter doing chart review, history and exam, documentation, and further activities as noted.     Licha Batista MD  VASCULAR SURGERY PGY3

## 2023-09-08 NOTE — TELEPHONE ENCOUNTER
The pt request that a [employer] message be sent to him so that his spouse will read it and call to schedule the pt does not schedule his own appointments.  I sent the pt a [employer] message   Brennen Gonzalez on 9/8/2023 at 4:22 PM

## 2023-09-08 NOTE — TELEPHONE ENCOUNTER
----- Message from Linda Soto RN sent at 9/8/2023  3:27 PM CDT -----  Oops sorry about that! They are in now  ----- Message -----  From: Brennen Gonzalez  Sent: 9/8/2023   3:19 PM CDT  To: IFEOMA Soliman there are no orders entered for the pt to be scheduled for the request imaging.  Brennen Gonzalez on 9/8/2023 at 3:19 PM   ----- Message -----  From: Linda Soto RN  Sent: 9/7/2023   1:59 PM CDT  To: Linda Soto RN; #    Hi Brennen,    This patient needs a TTE, CT scan, and US (ordered today under Dr Batista) sometime in the next week if possible. He should then see us back in fellow clinic in 2-3 weeks for follow-up. Can we get the imaging and TTE scheduled and then place him in fellow clinic 9/28 at 12:00 noon?    Thank you!    Dory

## 2023-09-12 NOTE — TELEPHONE ENCOUNTER
Patient Quality Outreach    Patient is due for the following:   Diabetes -  Eye Exam and Foot Exam  Colon Cancer Screening      Topic Date Due    COVID-19 Vaccine (4 - Pfizer series) 04/04/2022    Flu Vaccine (1) 09/01/2023       Next Steps:   Schedule a nurse only visit for immunizations office visit for colon cancer screen    Type of outreach:    Sent [employer] message.      Questions for provider review:    None           Ami Go

## 2023-09-15 NOTE — TELEPHONE ENCOUNTER
Patient returned message with update blood pressure reading.      BP Readings from Last 3 Encounters:   09/07/23 (!) 182/72   08/30/23 (!) 198/76   07/31/23 100/52       Blood Pressure: 134/47  Today's Heart Rate: 80  Location: Home BP    Patient reported blood pressure updated in Epic. Blood pressure falls within [place] Community Measures guidelines.  Patient will follow up as previously advised.

## 2023-09-25 NOTE — TELEPHONE ENCOUNTER
----- Message from Linda Soto RN sent at 9/25/2023  2:47 PM CDT -----  Chandrakant Hutton,    This patient is scheduled at noon on Thursday.  Can you please see if he would like to take the 12:30 or 1PM slot instead? Thank you    Linda

## 2023-09-28 NOTE — PATIENT INSTRUCTIONS
Thank you so much for choosing us for your care. It was a pleasure to see you at the vascular clinic today.     Follow-up recommendations: We will plan for surgery in the coming weeks. Our surgical scheduler will get in touch with you soon to determine a surgery date and will provide you with detailed pre-op instructions.    Additional testing/imaging ordered today: None      Our scheduling team will get in touch with you to set up any follow-up testing/imaging and/or appointments. Please be aware that any testing/imaging recommended today will need to completed prior to your next visit with the provider. If testing/imaging is not completed prior to your next visit, your visit may be rescheduled.        If you have any questions, please contact our clinic directly at [phone] and ask for the nurse. We also encourage the use of [employer] to communicate with your HealthCare Provider.      If you have an urgent need after business hours (8:00 am to 4:30 pm) please call [phone], option 4, and ask for the vascular attending on call. For non-urgent after hours needs, please call the vascular clinic at [phone]. For scheduling needs, please call our clinic directly at [phone].

## 2023-09-28 NOTE — NURSING NOTE
Chief Complaint   Patient presents with    Follow Up     The patient has 3/10 pain to the big toe of the left foot. He is returning to here the results of some recent tests. The patient and family member are wondering what is the treatment plan for the wound? The patient is having difficulty managing the pain of his wound stating sometimes the pain reaches a 5/10. They have no other questions or concerns at this time.     Vitals were taken and medications reconciled.    Truman Quezada, EMT  12:54 PM

## 2023-09-28 NOTE — PROGRESS NOTES
VASCULAR SURGERY CLINIC CONSULTATION    VASCULAR SURGEON: MARÍA ELENA Gee    LOCATION:  [facility]    Darian Engle   Medical Record #:  [id number]  YOB: 1950  Age:  73 year old     Date of Service: 9/28/2023    PRIMARY CARE PROVIDER: Marcos Gonzalez      Reason for visit:  Follow up CLTI    IMPRESSION:    Mr. Engle is a pleasant 73 year old who presents for follow up of CLTI with tissue loss and dry gangrene of his L great toe. He has PMH of CAD s/p CAB w/ LIMA and L GSV, benign cystic neoplasm of the pancreas, T2DM, HTN, and active smoking and presented 2 weeks ago for evaluation of options for revascularization in setting of CLTI. He has since had ECHO done which revealed 55-60% EF. We think he may be able to tolerate a bypass given this, however it would certainly be a higher risk operation than a primary amputation for this patient, however that would also carry risks. Discussed with patient and wife that we can attempt bypass however they should be aware that bypass is a higher risk procedure and accept the higher risk profile compared to an amputation.  If they wish to proceed with bypass we would first want to get a diagnostic angiogram to ensure appropriate target vessel which we would plan to do via L brachial approach. We will tentatively plan for this Tuesday 10/10, and patient and wife will call us if they feel that they do not wish to proceed with bypass, in which case we will cancel diagnostic angiogram as well.     RECOMMENDATION/RISKS:    - Plan for diagnostic angiogram via L brachial approach on 10/10 to assess potential target vessels for bypass, with patient to call if they no longer wish to proceed with bypass.   - Patient instructed to stop taking apixaban 5 days prior to procedure  - Patient instructed to continue ASA 81   - Patient instructed to stop taking metformin 48 hours prior to procedure.   - Most recent H&P 9/7, will need full  repeat H&P with update to be done day of procedure by vascular resident.   - For pain control - patient may take up to 1000mg tylenol q8 hours scheduled.   - Patient should continue wound care as he is doing with daily cleansing of wound with hydrogen peroxide BID   - Instructed patient on strict return/ED precautions including increasing erythema of L great toe, drainage, sudden worsening of pain, fever, and chills.     HPI:  Mr. Engle is a pleasant 73 year old who presents for follow up of CLTI with tissue loss and dry gangrene of his L great toe. He has PMH of CAD s/p CAB w/ LIMA and L GSV, benign cystic neoplasm of the pancreas, T2DM, HTN, and active smoking and presented 2 weeks ago for evaluation of options for revascularization in setting of CLTI. He has since had ECHO done which revealed 55-60% EF.  He has been doing okay at home since last visit, with pain at a 3-4/10 intermittently throughout the day. He has been taking tylenol when pain is out of control, but feels pain control could be improved.   Discussed that he has been using hydrogen peroxide on his L great toe 2-3x/day for wound care, this appears to be working well.     REVIEW OF SYSTEMS:    A 12 point ROS was reviewed and is negative except for as above.     PHH:    Past Medical History:   Diagnosis Date    Arthritis March 2018    joint pain both hands    Diabetes (H)     Heart disease 1991    [place]-angioplasty    Hypertension          Past Surgical History:   Procedure Laterality Date    CARDIAC SURGERY  2004    [facility]    ENHANCE LASER REFRACTIVE BILATERAL EXISTING PT IN PARAMETERS  2000    EYE SURGERY  2000    [facility]    VASCULAR SURGERY  2017    [facility]        ALLERGIES:     Allergies   Allergen Reactions    Lidocaine Other (See Comments)     Lungs filled with fluid. ? Anaphylaxis    Lisinopril Cough     cough    Naltrexone Nausea and Vomiting        MEDS:     Current Outpatient Medications   Medication    ACCU-CHEK SMARTVIEW test strip    apixaban ANTICOAGULANT (ELIQUIS ANTICOAGULANT) 5 MG tablet    aspirin 81 MG tablet    atorvastatin (LIPITOR) 40 MG tablet    blood glucose monitoring (ACCU-CHEK FASTCLIX) lancets    carvedilol (COREG) 25 MG tablet    cholecalciferol (SM VITAMIN D3) 50 MCG (2000) CAPS    cilostazol (PLETAL) 50 MG tablet    donepezil (ARICEPT) 10 MG tablet    isosorbide mononitrate (IMDUR) 30 MG 24 hr tablet    losartan (COZAAR) 50 MG tablet    magnesium oxide 200 MG TABS    metFORMIN (GLUCOPHAGE XR) 500 MG 24 hr tablet    multivitamin (CENTRUM SILVER) tablet    NIFEdipine ER (ADALAT CC) 60 MG 24 hr tablet    nitroGLYcerin (NITROSTAT) 0.4 MG sublingual tablet    Omega-3 Fatty Acids (FISH OIL OMEGA-3) 1000 MG CAPS    omeprazole (PRILOSEC) 20 MG DR capsule    oxyCODONE (ROXICODONE) 5 MG tablet    sertraline (ZOLOFT) 25 MG tablet    traZODone (DESYREL) 50 MG tablet     No current facility-administered medications for this visit.        SOCIAL HABITS:    Tobacco Use      Smoking status: Every Day        Packs/day: 0.50        Years: 5.00        Pack years: 2.5        Types: Cigarettes        Start date: 1968        Last attempt to quit: 7/15/2016        Years since quittin.2      Smokeless tobacco: Former       Alcohol Use: Not on file       History   Drug Use No        FAMILY HISTORY:    Family History   Problem Relation Age of Onset    Glaucoma Mother     Macular Degeneration Mother     Macular Degeneration Other     Lung Cancer Brother        PE:  There were no vitals taken for this visit.  Wt Readings from Last 1 Encounters:   23 68.5 kg (151 lb)     There is no height or weight on file to calculate BMI.    EXAM:  GENERAL: This is a well-developed 73 year old male who appears his stated age  EYES: Grossly normal.  MOUTH: Buccal mucosa grossly normal  CARDIAC:  Regular rate per radial pulse  CHEST/LUNG:  Nonlabored breathing on room  air  GASTROINTESINAL (ABDOMEN):Not Assessed   MUSCULOSKELETAL: Grossly normal and both lower extremities are intact.  HEME/LYMPH: No lymphedema  NEUROLOGIC: Focally intact, Alert and oriented x 3.   PSYCH: appropriate affect  INTEGUMENT: L great toe with dry gangrenous changes with black skin on great toe, ulcer with dry black eschar on plantar aspect of L great toe      DIAGNOSTIC STUDIES:     Images:  US Carotid Bilateral    Result Date: 9/21/2023  BILATERAL CAROTID DUPLEX DOPPLER ULTRASOUND 9/21/2023 7:54 AM CLINICAL HISTORY: PAD (peripheral artery disease) (H); Critical limb ischemia of left lower extremity with gangrene (H); Other specified symptoms and signs involving the circulatory and respiratory systems COMPARISON: CTA head and neck 3/1/2021 REFERRING PROVIDER: MONSERRAT PATIÑO TECHNIQUE: Grayscale (B-mode) and duplex and spectral Doppler ultrasound of the common carotid, extracranial internal carotid, external carotid, and vertebral artery origins. Velocity measurements obtained with angle correction at or less than 60 degrees. FINDINGS: RIGHT SIDE: Plaque Morphology: Echogenic plaque causes less than 50% diameter stenosis.    Proximal CCA: 60/0 cm/s    Mid CCA: 81/0 cm/s    Distal CCA: 77/6 cm/s    Carotid bifurcation: 66/6 cm/s    External CA: 109/0 cm/s    Proximal ICA: 55/7 cm/s    Mid ICA: 61/12 cm/s    Distal ICA: 63/12 cm/s    Vertebral artery: Antegrade: 87/13 cm/s    Innominate artery: 69/0 cm/s    Proximal subclavian: 118/0 cm/s ICA/CCA ratio: 0.82 LEFT SIDE: Plaque Morphology: Echogenic plaque causes less than 50% diameter stenosis.    Origin CCA: 76/8 cm/s    Proximal CCA: 77/0 cm/s    Mid CCA: 89/6 cm/s    Distal CCA: 98/11 cm/s    Carotid bifurcation: 90/9 cm/s    External CA: 130/0 cm/s    Proximal ICA: 72/9 cm/s    Mid ICA: 66/15 cm/s    Distal ICA: 61/14 cm/s    Vertebral artery: Antegrade: 45/9 cm/s    Subclavian origin: 63/0 cm/s    Proximal subclavian: 165/0 cm/s ICA/CCA ratio: 0.73      IMPRESSION: 1. RIGHT ICA: Less than 50% diameter narrowing by grayscale imaging and sonographic velocity criteria. 2. LEFT ICA:  Less than 50% diameter narrowing by grayscale imaging and sonographic velocity criteria. [employer] Commission Updated Recommendations for Carotid Stenosis Interpretation Criteria - October 2021. https://intersocietal.org/wp-content/uploads/2021/10/IAC-Vascular-Test it-Ijdqdbuysjgcv_Ghodsev-Gczghgklhnlceyw-for-Carotid-Stenosis-Interpre ation-Criteria.pdf Greater than 70% diameter stenosis criteria per - Journal of Vascular Surgery Vol. 75 Issue 1 Supplement [id number] Published online: June 18, 2021      Normal           ICA PSV: < 180 cm/s           Plaque Estimate: None           ICA/CCA PSV Ratio: < 2.0           ICA EDV: < 40 cm/s      < 50%           ICA PSV: < 180 cm/s           Plaque Estimate: < 50%           ICA/CCA PSV Ratio: < 2.0           ICA EDV: < 40 cm/s      50 - 69%           ICA PSV: 180 - 230 cm/s           Plaque Estimate: > 50%           ICA/CCA PSV Ratio: 2.0 - 4.0           ICA EDV: 40 - 100 cm/s      > 70% but less than near occlusion           ICA PSV: > 230 cm/s           Plaque Estimate: > 50%           AND either           ICA EDV: > 100 cm/s           or           ICA/CCA PSV Ratio: > 4.0      Near occlusion           ICA PSV: > 230 cm/s           Plaque Estimate: Visible           ICA/CCA PSV Ratio: Variable           ICA EDV: Variable      Total occlusion           ICA PSV: Undetectable           Plaque Estimate: Visible, no detectable lumen           ICA/CCA PSV Ratio: N/A           ICA EDV: N/A                                       Additional criteria from vascular surgery    > 80%      EDV > 120 cm/s I have personally reviewed the examination and initial interpretation and I agree with the findings. JOSE BORRERO MD   SYSTEM ID:  [id number]    CT Chest w/o Contrast    Result Date: 9/21/2023  EXAMINATION: CT CHEST W/O CONTRAST, 9/21/2023  7:48 AM CLINICAL HISTORY: PAD (peripheral artery disease) (H); Critical limb ischemia of left lower extremity with gangrene (H) COMPARISON: None. TECHNIQUE: CT imaging obtained through the chest without contrast. Coronal, sagittal and axial MIP reformatted images obtained and reviewed. FINDINGS: Lungs: Mild centrilobular emphysematous changes, most pronounced in the upper lobes. The central tracheobronchial tree is patent. No areas of bronchiectasis or architectural distortion. No pleural effusion, pulmonary consolidation, or pneumothorax. Dependent and subsegmental atelectasis involving the lower lobes. Calcified granulomas. No suspicious pulmonary nodule or mass. Mediastinum: Postsurgical changes of the chest with multilevel sternotomy wires. Coronary artery bypass graft. The visualized thyroid is unremarkable.Heart size is within normal limits. No pericardial effusion. Severe multivessel coronary artery calcification/coronary stents. Extensive vascular calcifications of the aortic arch and the origin of the great vessels. Thoracic aorta is normal in caliber. Main pulmonary arteries within normal limits. Standard branching pattern of the great vessels. No thoracic lymphadenopathy. Circumferential wall thickening of the distal thoracic esophagus. Small hiatal hernia. Bones and soft tissues: No suspicious osseous lesion. Moderate degenerative changes of thoracolumbar spine. Upper abdomen:  Limited evaluation of the upper abdomen. Diffuse dystrophic calcifications of the pancreas. Simple appearing bilateral renal cysts. Nonobstructing punctate calcification within the upper pole of the left kidney. Ill-defined cystic mass within the pancreatic head measuring up to at least 3.5 cm.     IMPRESSION: 1. No acute pulmonary abnormality. 2. Postsurgical changes of the chest status post coronary bypass graft with severe multivessel coronary artery calcification/coronary stents. There are additional extensive vascular  calcifications involving the aortic arch and origin of great vessels 3. Circumferential wall thickening of the distal thoracic esophagus with small hiatal hernia, can be seen in the setting of esophagitis. 4. Diffuse dystrophic calcifications the pancreas, apparent sequela of chronic pancreatitis. 5. Ill-defined cystic mass within the pancreatic head measuring up to at least 3.5 cm, previously characterized as complex sidebranch IPMN or serous cystic neoplasm on outside MR 2023, however images are not made available for direct comparison. I have personally reviewed the examination and initial interpretation and I agree with the findings. ORA BUENO MD   SYSTEM ID:  [id number]    Echocardiogram Complete    Result Date: 2023  [id number]^HAROON^MONSERRAT  [facility] Diagnostic and Treatment-3rd Floor [address] [place] [address]  Name: MELINDA BROOKS MRN: [id number] : 1950 Study Date: 2023 08:18 AM Age: 73 yrs Gender: Male Patient Location: [facility] Reason For Study: Pre-op exam Ordering Physician: MONSERRAT PATIÑO Referring Physician: MONSERRAT PATIÑO Performed By: Lakshmi Michele  BSA: 1.8 m2 Height: 66 in Weight: 151 lb BP: 219/84 mmHg ______________________________________________________________________________ Procedure Echocardiogram with two-dimensional, color and spectral Doppler performed. Contrast Optison. Optison (NDC #1275-2402-86) given intravenously. Patient was given 5 ml mixture of 3 ml Optison and 6 ml saline. 5 ml wasted. IV start location LAC . IV start time 0840 . ______________________________________________________________________________ Interpretation Summary Examination done in the setting of severe hypertension. Global and regional left ventricular function is normal with an EF of 55-60%. Right ventricular function and size are normal. No significant valvular abnormalities. Right  ventricular systolic pressure is 32mmHg above the right atrial pressure. No pericardial effusion is present. This study was compared with the study from 3/1/2021. The mitral and aortic regurgitation were both present on the last study but are worsened due to the patient's severe hypertension.  ______________________________________________________________________________ Left Ventricle Global and regional left ventricular function is normal with an EF of 55-60%. Left ventricular size is normal. Mild concentric wall thickening consistent with left ventricular hypertrophy is present. Diastolic function not assessed due to significant valvular regurgitation.  Right Ventricle Right ventricular function, chamber size, wall motion, and thickness are normal.  Atria The right atria appears normal. Moderate left atrial enlargement is present.  Mitral Valve Mild mitral annular calcification is present. Mild mitral insufficiency is present. The EROA is 0.09 cm2 and the RVol is 28 mL.  Aortic Valve Moderate aortic valve calcification is present. The aortic valve is tricuspid. Mild aortic insufficiency is present.  Tricuspid Valve The tricuspid valve is normal. Trace tricuspid insufficiency is present. Right ventricular systolic pressure is 32mmHg above the right atrial pressure.  Pulmonic Valve The valve leaflets are not well visualized. On Doppler interrogation, there is no significant stenosis or regurgitation.  Vessels The aorta root is normal. The thoracic aorta cannot be assessed. IVC diameter <2.1 cm collapsing >50% with sniff suggests a normal RA pressure of 3 mmHg.  Pericardium No pericardial effusion is present.  Compared to Previous Study This study was compared with the study from 3/1/2021 . No significant changes noted. The mitral and aortic regurgitation were both present on the last study but are worsened due to the patient's severe hypertension.  Attestation I have personally viewed the imaging and agree with the  interpretation and report as documented by the fellow, Govind Cabrales, and/or edited by me. ______________________________________________________________________________ MMode/2D Measurements & Calculations IVSd: 1.1 cm LVIDd: 5.9 cm LVIDs: 4.0 cm LVPWd: 0.68 cm FS: 32.3 % LV mass(C)d: 208.5 grams LV mass(C)dI: 117.5 grams/m2 Ao root diam: 3.4 cm LVOT diam: 2.2 cm LVOT area: 3.8 cm2 Ao root diam index Ht(cm/m): 2.1 Ao root diam index BSA (cm/m2): 1.9 LA Volume (BP): 73.3 ml  LA Volume Index (BP): 41.4 ml/m2 RWT: 0.23 TAPSE: 1.6 cm  Doppler Measurements & Calculations MV E max saul: 74.7 cm/sec MV A max saul: 121.0 cm/sec MV E/A: 0.62 MV dec time: 0.29 sec Ao V2 max: 127.0 cm/sec Ao max P.5 mmHg Ao V2 mean: 89.3 cm/sec Ao mean P.0 mmHg Ao V2 VTI: 32.2 cm RANDI(I,D): 2.8 cm2 RANDI(V,D): 3.1 cm2 LV V1 max P.2 mmHg LV V1 max: 103.0 cm/sec LV V1 VTI: 23.8 cm SV(LVOT): 91.4 ml SI(LVOT): 51.5 ml/m2 TR max saul: 283.4 cm/sec TR max P.1 mmHg AV Saul Ratio (DI): 0.81 RANDI Index (cm2/m2): 1.6 E/E' av.2  Lateral E/e': 8.0 Medial E/e': 20.3 RV S Saul: 5.5 cm/sec  ______________________________________________________________________________ Report approved by: Opal Golden 2023 10:04 AM     US Lower Extremity Arterial Duplex Bilateral    Result Date: 2023  ULTRASOUND LOWER EXTREMITY ARTERIAL DUPLEX BILATERAL 2023 1:26 PM CLINICAL HISTORY: Claudication. Peripheral arterial disease. COMPARISONS: [facility] CTA 2023 REFERRING PROVIDER: HANS VALENCIA TECHNIQUE: Bilateral leg arteries evaluated with grayscale, color Doppler, and spectral pulsed wave Doppler ultrasound. FINDINGS: RIGHT: External iliac artery: 146/15 cm/s, arterial monophasic Common femoral artery: 197/0 cm/s, biphasic Profundus femoral artery: 405/52 cm/s, arterial monophasic, near occlusion by plaque in CTA Superficial femoral artery, proximal: 71/0 cm/s, biphasic Superficial femoral artery, proximal: 632/31  cm/s, arterial monophasic Collateral from proximal superficial femoral artery: 348/20 cm/s, arterial monophasic Superficial femoral artery, mid: 21/0 cm/s, tardus parvus Superficial femoral artery, distal: 28/0 cm/s, tardus parvus. Stent in distal superficial femoral artery is hard to distinguish from calcifications. Popliteal artery, proximal: 25/9 cm/s, tardus parvus Popliteal artery, distal: 51/16 cm/s, tardus parvus Posterior tibial artery: 29/14 cm/s, tardus parvus Anterior tibial artery: 20/8 cm/s, tardus parvus Dorsalis pedis artery: 14/7 cm/s, tardus parvus. LEFT: External iliac artery: 26/0 cm/s, biphasic Common femoral artery: 703/53 cm/s, 748/62 cm/s, arterial monophasic, near occlusion by plaque in CTA Profundus femoral artery: 832/46 cm/s, arterial monophasic Superficial femoral artery, proximal: 120/0 cm/s, biphasic Stent in the proximal superficial femoral artery is poorly demonstrated. CTA demonstrates proximal superficial femoral artery stent occlusion. Superficial femoral artery occluded through the thigh. Reconstituted at the adductor canal. Superficial femoral artery, mid: occluded Collateral to distal superficial femoral artery: 35/6 cm/s, tardus parvus Superficial femoral artery, distal: 38/11 cm/s, tardus parvus. Stent in popliteal artery is hard to distinguish from calcifications. Popliteal artery, proximal: 14/7 cm/s, tardus parvus Popliteal artery, distal: 12/5 cm/s, tardus parvus Posterior tibial artery: 39/22 cm/s, tardus parvus Anterior tibial artery: 9/5 cm/s, tardus parvus Dorsalis pedis artery: occluded     IMPRESSION: 1. RIGHT:      A. By CTA common femoral plaque extending into proximal profundus and superficial femoral arteries is nearly occlusive.      B. Near occlusion of proximal superficial femoral artery.      C. Post stenotic/occlusive tardus parvus waveforms through the rest of the leg.      D. Distal superficial femoral artery stent poorly demonstrated by ultrasound.       E. CTA suggested below knee popliteal near occlusion and multifocal stenoses and occlusions below the knee. 2. LEFT:      A. Common femoral artery near occlusion.      B. Superficial femoral artery occlusion by CTA, reconstituted at the adductor canal.      C. Post occlusive tardus parvus waveforms from the distal superficial femoral artery to foot.      D. CTA suggested below knee popliteal near occlusion and multifocal stenoses and occlusions below the knee.      E. Dorsalis pedis artery occluded. JOSE BORRERO MD   SYSTEM ID:  [id number]    US RHONDA Doppler No Exercise 1-2 Levels Bilateral    Result Date: 8/30/2023  RHONDA, TBI, AND 1ST DIGIT PPG 8/30/2023 1:46 PM CLINICAL HISTORY: Claudication and peripheral arterial disease. COMPARISONS: Ultrasound lower extremity arterial duplex bilateral 8/30/2023. [facility] CTA 8/16/2023 REFERRING PROVIDER: HANS VALENCIA TECHNIQUE: Bilateral RHONDA, TBI, and 1st digit PPG obtained. FINDINGS: RIGHT:      Brachial: 198 mmHg      Ankle (PT): > 254 mmHg - non compressible      Ankle (DP): > 254 mmHg - non compressible      1st Digit: 0 mmHg      RHONDA: non compressible      TBI: 0      1st Digit PPG: occlusive LEFT:      Brachial: 192 mmHg      Ankle (PT): > 254 mmHg - non compressible      Ankle (DP): > 254 mmHg - non compressible      1st Digit: 0 mmHg      RHONDA: non compressible      TBI: 0      1st Digit PPG: occlusive     IMPRESSION: 1. RIGHT:      A. Resting RHONDA is non compressible.      B. Resting TBI is ABNORMAL, 0. 2. LEFT:      A. Resting RHONDA is non compressible.      B. Resting TBI is ABNORMAL, 0. Guidelines: RHONDA Diagnostic Criteria (Based on criteria published in Circulation 2011; 124: 6875-0695):   > 1.4: Non compressible   1.00 - 1.40: Normal   0.91 - 0.99: Borderline   At or below 0.90: Abnormal RHONDA Diagnostic Criteria (Based on ACC/AHA guideline 2008):   >/=1.3 - non compressible vessels   1.00  -1.29 - Normal   0.91 - 0.99 - Borderline   0.41 - 0.90 -  Mild to moderate PAD   0.00 - 0.40 - Severe PAD JOSE BORRERO MD   SYSTEM ID:  [id number]      I personally reviewed the images and my interpretation is EF 55-60%, patent subclavian arteries bilaterally, with no evidence retrograde flow of L vertebral which would indicate severe L subclavian stenosis no appropriate for brachial access. Carotids bilaterally have <50% stenosis. Chest CT does reveal diffusely calcified aorta, with some stenosis of L subclavian, however it is not densely calcified in such a way that would prohibit wire access.    LABS:      Sodium   Date Value Ref Range Status   02/21/2023 142 133 - 144 mmol/L Final   05/13/2022 140 133 - 144 mmol/L Final   09/02/2020 141 133 - 144 mmol/L Final   08/02/2019 141 133 - 144 mmol/L Final   11/26/2018 141 133 - 144 mmol/L Final     Urea Nitrogen   Date Value Ref Range Status   02/21/2023 12 7 - 30 mg/dL Final   05/13/2022 31 (H) 7 - 30 mg/dL Final   09/02/2020 15 7 - 30 mg/dL Final   08/02/2019 26 7 - 30 mg/dL Final   11/26/2018 16 7 - 30 mg/dL Final     Hemoglobin   Date Value Ref Range Status   02/21/2023 12.6 (L) 13.3 - 17.7 g/dL Final   05/13/2022 13.8 13.3 - 17.7 g/dL Final   06/03/2021 13.8 13.3 - 17.7 g/dL Final   02/03/2017 13.9 13.3 - 17.7 g/dL Final     Platelet Count   Date Value Ref Range Status   02/21/2023 237 150 - 450 10e3/uL Final   06/03/2021 279 150 - 450 10e9/L Final   02/03/2017 256 150 - 450 10e9/L Final       60 minutes spent on the day of encounter doing chart review, history and exam, documentation, and further activities as noted.     Licha Batista MD  VASCULAR SURGERY PGY3

## 2023-10-02 NOTE — TELEPHONE ENCOUNTER
Called and spoke with Frandy and wife Angelica about pre-op instructions for LLE angio on 10/10. Medication hold instructions given over the phone and via [employer]. Instructions provided regarding pre-op COVID test and pre-op shower. VSANGIO dot phrase sent via [employer].    DARREN SolimanN, RN  RN Care Coordinator  [facility] Vascular Surgery - [facility] phone: [phone]  Fax: [phone]

## 2023-10-09 NOTE — TELEPHONE ENCOUNTER
Discussed with VS team. Recommend angiogram be postponed until pt is taking his BP medications. Attempted to contact pt and wife - LVM w/ callback. Will also send MyC message.    THANH Soliman, RN  RN Care Coordinator  [facility] Vascular Surgery - [facility] phone: [phone]  Fax: [phone]

## 2023-10-09 NOTE — TELEPHONE ENCOUNTER
[facility] Call Center    Phone Message    May a detailed message be left on voicemail: yes     Reason for Call: Other: Wife is calling to discuss the cancellation of procedure tomorrow.  She does not want to cancel, patient is in pain, please call back to discuss.  [phone]     Action Taken: Message routed to:  [facility] ([facility] Vascular RN line    Travel Screening: Not Applicable

## 2023-10-09 NOTE — TELEPHONE ENCOUNTER
Called and talked with Angelica. She is aware procedure is canceled dt poorly controlled BP. They will reach out to PCP ASAP regarding BP med mgmt. I will touch base with pt and wife again tomorrow.    DARREN SolimanN, RN  RN Care Coordinator  [facility] Vascular Surgery - [facility] phone: [phone]  Fax: [phone]

## 2023-10-10 NOTE — TELEPHONE ENCOUNTER
Routing to provider to review and advise, see [employer] message and requests below.  Patient asking for ASAP refill on his Nifedipine ER 60 mg tablet and patient asking for pain medication     Per further review of chart, patient has been dealing with vascular surgery regarding worsening PAD and gangrenous left great toe.  Plan was to have surgery today, 10/10 but was cancelled due to uncontrolled blood pressure. Are attempting to get rescheduled for next week, or once BP is controlled.    Patient appears to have been taken off his Nifedipine by cardiologist with [facility], Dr. Glez, back on 8/8/23 due to low blood pressure.  Patient has not been taking this medication since and now BP's are high. Last reported BP in chart was 181/83 on 9/28/23.    See previous encounters in Chart Review for additional information and details (9/28 Vas Surg visit notes, and MyChart encounters and Telephone encounters in Chart Review from 10/2-10/9/23)      How would you like to proceed?        Kayley Sanderson RN  Clinical Triage/Primary Care  [facility]

## 2023-10-10 NOTE — ANESTHESIA PREPROCEDURE EVALUATION
Anesthesia Pre-Procedure Evaluation    Patient: Darian Engle   MRN: [id number] : 1950        Procedure : Procedure(s):  Left lower extremity angiogram via Left brachial artery approach          Past Medical History:   Diagnosis Date    Arthritis 2018    joint pain both hands    Diabetes (H)     Heart disease     [place]-angioplasty    Hypertension       Past Surgical History:   Procedure Laterality Date    CARDIAC SURGERY      [facility]    ENHANCE LASER REFRACTIVE BILATERAL EXISTING PT IN PARAMETERS      EYE SURGERY      [facility]    VASCULAR SURGERY      [facility]      Allergies   Allergen Reactions    Lidocaine Other (See Comments)     Lungs filled with fluid. ? Anaphylaxis    Lisinopril Cough     cough    Naltrexone Nausea and Vomiting      Social History     Tobacco Use    Smoking status: Every Day     Packs/day: 0.50     Years: 5.00     Pack years: 2.50     Types: Cigarettes     Start date: 1968     Last attempt to quit: 7/15/2016     Years since quittin.2    Smokeless tobacco: Former   Substance Use Topics    Alcohol use: Not Currently     Comment: binge, two  months sober      Wt Readings from Last 1 Encounters:   23 68.5 kg (151 lb)        Anesthesia Evaluation   Pt has had prior anesthetic. Type: General and MAC.    No history of anesthetic complications       ROS/MED HX  ENT/Pulmonary:     (+) sleep apnea,                                      Neurologic:     (+)   dementia,                             Cardiovascular: Comment: Imdur, coreg, nifedipine, losartan    (+)  hypertension- -  CAD - past MI CABG-date: x4 in  s/p inferior MI. -   Taking blood thinners                     dysrhythmias, a-fib,        Previous cardiac testing   Echo: Date: 23 Results:  Examination done in the setting of severe hypertension.  Global and regional left ventricular function is normal with an EF  of 55-60%.  Right ventricular function and size are normal.  No significant valvular abnormalities.  Right ventricular systolic pressure is 32mmHg above the right atrial pressure.  No pericardial effusion is present.  This study was compared with the study from 3/1/2021. The mitral and aortic  regurgitation were both present on the last study but are worsened due to the  patient's severe hypertension.    Stress Test:  Date: Results:    ECG Reviewed:  Date: Results:    Cath:  Date: Results:      METS/Exercise Tolerance:     Hematologic:  - neg hematologic  ROS     Musculoskeletal:  - neg musculoskeletal ROS     GI/Hepatic:     (+) GERD,                   Renal/Genitourinary:       Endo:     (+)  type II DM,   Using insulin,                 Psychiatric/Substance Use:       Infectious Disease:       Malignancy:       Other:               OUTSIDE LABS:  CBC:   Lab Results   Component Value Date    WBC 7.4 02/21/2023    WBC 7.1 06/03/2021    HGB 12.6 (L) 02/21/2023    HGB 13.8 05/13/2022    HCT 37.3 (L) 02/21/2023    HCT 40.8 06/03/2021     02/21/2023     06/03/2021     BMP:   Lab Results   Component Value Date     02/21/2023     05/13/2022    POTASSIUM 4.1 02/21/2023    POTASSIUM 5.1 05/13/2022    CHLORIDE 110 (H) 02/21/2023    CHLORIDE 111 (H) 05/13/2022    CO2 26 02/21/2023    CO2 21 05/13/2022    BUN 12 02/21/2023    BUN 31 (H) 05/13/2022    CR 0.69 02/21/2023    CR 2.29 (H) 05/13/2022    GLC 96 02/21/2023     (H) 05/13/2022     COAGS: No results found for: PTT, INR, FIBR  POC:   Lab Results   Component Value Date     (H) 04/19/2018     HEPATIC:   Lab Results   Component Value Date    ALBUMIN 3.4 02/21/2023     OTHER:   Lab Results   Component Value Date    A1C 5.9 (H) 06/19/2023    SHERYL 9.4 02/21/2023    PHOS 3.7 02/21/2023    MAG 1.8 06/03/2021    TSH 1.03 09/08/2022       Anesthesia Plan    ASA Status:  3    NPO Status:  Will be NPO Appropriate at ...    Anesthesia Type:  MAC.     - Reason for MAC: straight local not clinically adequate         Techniques and Equipment:     - Lines/Monitors: 2nd IV     Consents            Postoperative Care    Pain management: Multi-modal analgesia.   PONV prophylaxis: Ondansetron (or other 5HT-3), Dexamethasone or Solumedrol     Comments:                Adebayo Montero MD

## 2023-10-10 NOTE — TELEPHONE ENCOUNTER
I don't know why nifedipine was stopped by cardiology possibly because heart rate too low. Can double cozaar and eat a very low sodium diet <1200/day. Would need appointment for pain meds. Can see a provider here for blood pressure recheck/pain meds. Marcos Gonzalez MD

## 2023-10-12 NOTE — TELEPHONE ENCOUNTER
Confirmed pt has been taking increased cozaar dose per recommendation from PCP. Also reminded pt and wife regarding Eliquis hold (LD today) and Metformin (LD 10/14).    THANH Soliman, RN  RN Care Coordinator  [facility] Vascular Surgery - [facility] phone: [phone]  Fax: [phone]

## 2023-10-17 PROBLEM — I96 DRY GANGRENE (H): Status: ACTIVE | Noted: 2023-01-01

## 2023-10-17 NOTE — CONSULTS
Cardiology Inpatient Consultation  October 17, 2023    Reason for Consult:  A cardiology consult was requested by Dr. Batista from the vascular surgery service to provide clinical guidance regarding preoperative cardiac risk stratification.    Assessment:  Preoperative cardiac risk stratification   This is a non-emergent procedure that is of intermediate-risk based on ACC/AHA guidelines.  The patient currently has no active cardiac conditions [unstable coronary syndromes, decompensated HF, significant arrhythmias, or severe valvular disease (severe AS or symptomatic MS)].  Functional METs is unknown due to limitation from leg pain. The patient's RCRI score is 2 [history of ischemic heart disease, history of cerebrovascular disease]. Based on RCRI score, the risk of major cardiac risk complications is ~10.1% 30 day risk of death, MI, or cardiac arrest   Due to his history of CAD with no evaluation since 2018, would recommend an evaluation with stress cardiac MRI prior to either AKA or fem-pop bypass. If there is a large territory of ischemia, the next step would be coronary angiography and PCI prior to surgery. If there is a small territory of ischemia or no ischemia, no further cardiac testing would be indicated prior to surgery.   CAD s/p 4v CABG LIMA-LAD, sequential SVG-OM1 and OM 2, SVG-diagonal in 2004  Normal biventricular function on echo 9/2023   Severe PAD with dry gangrene of LLE   Paroxysmal atrial fibrillation ([id number]=6 for age, htn, dm, stroke, MI)     Recommendations:  Stress cardiac MRI tomorrow 10/18/23 (ordered for you and confirmed with MRI lab that there is availability at this time)   Further recommendations for revascularization prior to vascular surgery pending stress MRI   Continue beta blockers in perioperative period, currently on coreg 25 mg BID   Continue aspirin, statin  Consider heparin infusion for atrial fibrillation stroke risk prophylaxis given elevated [id number] score and  still several days before surgery   Continue losartan, imdur     Plan of care discussed with Dr. Addison, who agrees with above plan.    Thank you for consulting the cardiovascular services at the [facility]. Please do not hesitate to call us with any questions.     Stanislaw Woods MD  Cardiology Fellow  Pager: [phone]    HPI:   Darian Engle is a 73 year old male with history of CAD and inferior MI (s/p 4v CABG LIMA-LAD, sequential SVG-OM1 and OM 2, SVG-diagonal in 2004), recurrent NSTEMI s/p PCI to diagonal in 2017, severe PAD, paroxysmal atrial fibrillation, DM2, prior stroke, HLD, TALA on CPAP, HTN, who came in for elective LLE angiogram via left brachial access today 10/17/23 for dry gangrene of the left great toe. Vascular surgery is offering either revascularization of the leg with bypass or AKA, whichever the patient and his spouse prefer. Cardiology is consulted on 10/17/23 for preoperative risk stratification.    He lives in a house in [place] and spends most of his time watching TV. He says he can walk a few hundred feet without dyspnea or chest pain and can even go up a flight of stairs. The main thing that bothers him is foot pain. No palpitations, syncope.     Review of Systems:    Complete review of systems was performed and negative except per HPI.    PMH:  Past Medical History:   Diagnosis Date    Arthritis March 2018    joint pain both hands    Diabetes (H)     Heart disease 1991    [place]-angioplasty    Hypertension      Active Problems:  Patient Active Problem List    Diagnosis Date Noted    Dry gangrene (H) 10/17/2023     Priority: Medium    Cyst of pancreas 06/19/2023     Priority: Medium    Dementia without behavioral disturbance (H) 02/21/2023     Priority: Medium    PAF (paroxysmal atrial fibrillation) (H) 02/21/2023     Priority: Medium    Orthostatic hypotension 05/23/2022     Priority: Medium    Syncope and collapse 05/22/2022     Priority: Medium     Angina pectoris (H24) 05/13/2022     Priority: Medium    Obstructive sleep apnea syndrome 01/20/2021     Priority: Medium     Seen at [facility]; diagnosed with moderate TALA by Dr. Herve Louise; Sept 2019.      Type 2 diabetes mellitus without retinopathy (H) 06/27/2018     Priority: Medium    Bilateral incipient cataracts 06/27/2018     Priority: Medium    PAD (peripheral artery disease) (H24) 12/18/2017     Priority: Medium    Claudication in peripheral vascular disease (H24) 07/20/2017     Priority: Medium    Acute ST elevation myocardial infarction (STEMI) due to occlusion of right coronary artery (H) 05/08/2017     Priority: Medium    CAD, multiple vessel 01/27/2017     Priority: Medium    Essential hypertension with goal blood pressure less than 140/90 01/27/2017     Priority: Medium    Type 2 diabetes mellitus with complication, with long-term current use of insulin (H) 01/27/2017     Priority: Medium    Hyperlipidemia LDL goal <100 01/27/2017     Priority: Medium    Gastroesophageal reflux disease without esophagitis 01/27/2017     Priority: Medium    Callahan's esophagus 09/12/2016     Priority: Medium    Binge eating disorder 02/12/2016     Priority: Medium     Formatting of this note might be different from the original.  [place] intake 2/2016      Periodic limb movement disorder (PLMD) 12/03/2014     Priority: Medium     Formatting of this note might be different from the original.  Per [facility]  PLMs and may benefit from further evaluation with blood tests for Ferritin and Iron levels.      Abnormal electrocardiography 05/16/2014     Priority: Medium    Dyslipidemia 03/21/2011     Priority: Medium     Formatting of this note might be different from the original.  05/07/13, Lipids:  TC 99, TG 89, LDL 58, HDL 23      Alcohol abuse 11/13/2007     Priority: Medium     Formatting of this note might be different from the original.  Previous treatment  ; Alcohol Abuse, episodic       Microalbuminuria 2007     Priority: Medium    Postsurgical aortocoronary bypass status 2004     Priority: Medium     Formatting of this note might be different from the original.   Dr. Andre Duffy  ; AORTOCORONARY BYPASS STATUS(aka CABG)      Reflux esophagitis 2003     Priority: Medium     Social History:  Social History     Tobacco Use    Smoking status: Every Day     Packs/day: 0.50     Years: 5.00     Additional pack years: 0.00     Total pack years: 2.50     Types: Cigarettes     Start date: 1968     Last attempt to quit: 7/15/2016     Years since quittin.2    Smokeless tobacco: Former   Vaping Use    Vaping Use: Never used   Substance Use Topics    Alcohol use: Yes     Comment: binge, two months sober    Drug use: Yes     Types: Marijuana     Comment: uses Marijuana for pain     Family History:  Family History   Problem Relation Age of Onset    Glaucoma Mother     Macular Degeneration Mother     Macular Degeneration Other     Lung Cancer Brother        Medications:   acetaminophen  975 mg Oral Q8H    [START ON 10/18/2023] aspirin  81 mg Oral Daily    [START ON 10/18/2023] atorvastatin  40 mg Oral Daily    carvedilol  25 mg Oral BID    cilostazol  100 mg Oral BID    enoxaparin ANTICOAGULANT  1 mg/kg Subcutaneous Q12H    insulin aspart  1-7 Units Subcutaneous TID AC    insulin aspart  1-5 Units Subcutaneous At Bedtime    [START ON 10/18/2023] isosorbide mononitrate  30 mg Oral BID    [START ON 10/18/2023] losartan  50 mg Oral Daily    omeprazole  20 mg Oral Daily    senna-docusate  1 tablet Oral BID    Or    senna-docusate  2 tablet Oral BID    [START ON 10/18/2023] sertraline  25 mg Oral Daily        - MEDICATION INSTRUCTIONS -      lactated ringers      lactated ringers         Physical Exam:  Temp:  [97.9  F (36.6  C)-98.1  F (36.7  C)] 98  F (36.7  C)  Pulse:  [54-81] 60  Resp:  [16-18] 18  BP: (118-153)/() 128/52  SpO2:  [99 %] 99 %    Intake/Output Summary (Last 24  hours) at 10/17/2023 1530  Last data filed at 10/17/2023 1412  Gross per 24 hour   Intake 300 ml   Output --   Net 300 ml     GEN: pleasant, no acute distress  HEENT: No discharge  EYES: no icterus  CV: RRR, normal s1/s2, no murmurs/rubs/s3/s4, no heave. No JVD.   CHEST: clear to ausculation bilaterally, no rales or wheezing  ABD: soft, non-tender, normal active bowel sounds  : no flank/suprapubic tenderness  EXTR: pulses 1+ radial. No clubbing, cyanosis or edema. Dusky discoloration of L great toe.   NEURO: alert oriented, speech fluent/appropriate, motor grossly nonfocal  PSYCH: cooperative, affect appropriate, pleasant      Diagnostics:  All labs and imaging were reviewed, of note:    CMP  Recent Labs   Lab 10/17/23  1426 10/17/23  1131 10/17/23  0939   NA  --  141  --    POTASSIUM  --  4.2  --    CHLORIDE  --  109*  --    CO2  --  20*  --    ANIONGAP  --  12  --    GLC 87 83 87   BUN  --  13.8  --    CR  --  0.72  --    GFRESTIMATED  --  >90  --    SHERYL  --  8.3*  --      CBC  Recent Labs   Lab 10/17/23  1131   WBC 6.4   RBC 4.11*   HGB 12.7*   HCT 38.4*   MCV 93   MCH 30.9   MCHC 33.1   RDW 14.2          Transthoracic echocardiogram:   9/21/23  Interpretation Summary  Examination done in the setting of severe hypertension.  Global and regional left ventricular function is normal with an EF of 55-60%.  Right ventricular function and size are normal.  No significant valvular abnormalities.  Right ventricular systolic pressure is 32mmHg above the right atrial pressure.  No pericardial effusion is present.  This study was compared with the study from 3/1/2021. The mitral and aortic  regurgitation were both present on the last study but are worsened due to the  patient's severe hypertension.       Additional imaging:   SPECT MPI 10/31/2018  Summary    1. Myocardial perfusion imaging is normal without evidence of ischemia or   infarct.    2. Post stress left ventricular ejection fraction is normal, 74 %.      Catheterization(s):   5/8/2017  Conclusions    CAD as described.     PCI to Diagonal branch with a single Drug-eluting stent; excellent result   was obtained.   Recommendations    Aggressive medical therapy for coronary artery disease.   Diagnostic Summary     Left Main is a medium calibre vessel; normal.     LAD is a medium calibre vessel; occluded after the first diagonal.  The   diagonal branch has severe 95% stenosis proximal to the previously placed   stent in this vessel.  Prox LAD has moderate disease.     LCx is a medium calibre vessel; OMs are occluded.     RCA is a medium calibre vessel; has been extensively stented before.  Stents   are patent with mild diffuse disease seen elsewhere.     SVG to diag is known to be occluded.     SVG to OM1/OM2 is widely patent with mild disease in the distal OMs.     LIMA to LAD is widely patent; no significant disease is sen in the distal   LAD.   Interventional Summary     PCI to Diagonal branch with a single Drug-eluting stent; excellent result   was obtained.

## 2023-10-17 NOTE — ANESTHESIA POSTPROCEDURE EVALUATION
Patient: Darian Engle    Procedure: Procedure(s):  Left lower extremity angiogram via Left brachial artery approach       Anesthesia Type:  MAC    Note:  Disposition: Inpatient   Postop Pain Control: Uneventful            Sign Out: Well controlled pain   PONV: No   Neuro/Psych: Uneventful            Sign Out: Acceptable/Baseline neuro status   Airway/Respiratory: Uneventful            Sign Out: Acceptable/Baseline resp. status   CV/Hemodynamics: Uneventful            Sign Out: Acceptable CV status; No obvious hypovolemia; No obvious fluid overload   Other NRE: NONE   DID A NON-ROUTINE EVENT OCCUR? No           Last vitals:  Vitals Value Taken Time   /90 10/17/23 1500   Temp 36.7  C (98.1  F) 10/17/23 1445   Pulse 60 10/17/23 1501   Resp 18 10/17/23 1445   SpO2 100 % 10/17/23 1501   Vitals shown include unfiled device data.    Electronically Signed By: José Antonio Ellison MD  October 17, 2023  3:02 PM

## 2023-10-17 NOTE — ANESTHESIA CARE TRANSFER NOTE
Patient: Darian Engle    Procedure: Procedure(s):  Left lower extremity angiogram via Left brachial artery approach       Diagnosis: Dry gangrene (H) [I96]  Diagnosis Additional Information: No value filed.    Anesthesia Type:   MAC     Note:    Oropharynx: oropharynx clear of all foreign objects  Level of Consciousness: awake  Oxygen Supplementation: face mask  Level of Supplemental Oxygen (L/min / FiO2): 10  Independent Airway: airway patency satisfactory and stable  Dentition: dentition unchanged  Vital Signs Stable: post-procedure vital signs reviewed and stable  Report to RN Given: handoff report given  Patient transferred to: PACU    Handoff Report: Identifed the Patient, Identified the Reponsible Provider, Reviewed the pertinent medical history, Discussed the surgical course, Reviewed Intra-OP anesthesia mangement and issues during anesthesia, Set expectations for post-procedure period and Allowed opportunity for questions and acknowledgement of understanding      Vitals:  Vitals Value Taken Time   /86 10/17/23 1416   Temp     Pulse 72 10/17/23 1423   Resp     SpO2 99 % 10/17/23 1423   Vitals shown include unfiled device data.    Electronically Signed By: Adebayo Montero MD  October 17, 2023  2:24 PM

## 2023-10-17 NOTE — BRIEF OP NOTE
[facility]    Brief Operative Note    Pre-operative diagnosis: Dry gangrene (H) [I96]  Post-operative diagnosis Same as pre-operative diagnosis    Procedure: Left lower extremity angiogram via Left brachial artery approach, Left - Leg    Surgeon: Surgeon(s) and Role:     * Chuck Felix MBBS - Primary     * Licha Batista MD  Anesthesia: MAC with Local   Estimated Blood Loss: 10cc    Drains: None  Specimens: * No specimens in log *  Findings:   CFA narrowing, known occlusion of SFA, PT and peroneal runoff into the foot, with PT at the ankle.  Complications: None.  Implants: * No implants in log *

## 2023-10-17 NOTE — H&P
Vascular Surgery H&P  2023    Darian Engle  : 1950    Date of Service: 10/17/2023 5:07 PM    Assessment and Plan:  Darian Engle is a 73 year old male with PMH  of CAD s/p CAB w/ LIMA and L GSV (), benign cystic neoplasm of the pancreas, T2DM, HTN, and active smoking who has dry gangrene of his L first toe. This has been going on for several weeks, and he has significant pain in the foot. He underwent angiogram 10/17 which revealed occluded SFA & SFA stent (known) with peroneal and PT runoff with both vessels with disease. He is admitted post angiogram for cardiac eval, pain control, wound cares, and plan for possible bypass vs. AKA vs. BKA pending discussion with patient this admission.       - Neuro:   - tylenol sched 975 q8, oxy 5 q4 prn  - continue PTA donepezil, sertraline  Resp:   - encourage IS   CV:   - continue home coreg 25 BID, statin, asa 81, pletal, losartan 50 daily (may have been taking 100 daily, will clarify with wife)   - holding home eliquis postop and in anticipation OR Friday: lvx therapeutic BID given paroxysmal afib (chadsvasc 6)   - appreciate cardiac consult for preop eval    - stress MRI 10/18 (will make sure no caffeine diet)    - If patient has need for coronary angio or PCI - femoral vessels should NOT be used for access.   - prns for HTN  Peripheral vascular:   - PAD, L great toe dry gangrene - will plan on intervention Friday, exact plan pending patient preference, several options.   - For most aggressive limb salvage could proceed with bypass from CFA to peroneal or below knee pop, however this may not be durable and discussed with wife that this has approximately 20-30% chance of successfully helping patient heal wound from anticipated toe amputation vs TMA amputation   - Could consider L CFA endarterectomy alone, which would help patient heal a BKA.    - Could also consider a primary AKA, which would help the patient get to healing and working with  rehab the fastest  - Ongoing discussion with patient and wife about what patient's goals are and how they would like to proceed knowing that all procedures come with risks and benefits.   - Podiatry consult in coming days for evaluation for L great toe amp vs. TMA pending patient decision re: above  - Vein mapping BLE, RUE to eval options for conduit.   GI:   - sodium restricted diet (on 1200mg @ home per PCP), no caffeine til after stress test  - Home omeprazole  - bowel reg (sched doc/senna BID while on narcotics, prn miralax)   :   - no dinh   Endo:   - hold metformin 48 hours post procedure  - sliding scale insulin in mean time, medium intensity   Misc:   - acute care  (7A)   - PT consult early   - wound cares: please betadine paint L great toe daily and wrap with gauze/kerlix and keep covered.     Discussed with Dr. Felix, who is in agreement with the above.     Richar Batista MD  Surgery PGY3    History of Present Illness:    Darian Engle is a 73 year old male that presents with  PMH  of CAD s/p CAB w/ LIMA and L GSV (2004), benign cystic neoplasm of the pancreas, T2DM, HTN, and active smoking who has dry gangrene of his L first toe. This has been going on for several weeks, and he has significant pain in the foot. He underwent angiogram 10/17 which revealed occluded SFA & SFA stent (known) with peroneal and PT runoff with both vessels with disease. He is admitted post angiogram for cardiac eval, pain control, wound cares, and plan for possible bypass vs. AKA vs. BKA pending discussion with patient this admission.    Past Medical History:  Past Medical History:   Diagnosis Date    Arthritis March 2018    joint pain both hands    Diabetes (H)     Heart disease 1991    [place]-angioplasty    Hypertension        Past Surgical History  Past Surgical History:   Procedure Laterality Date    CARDIAC SURGERY  2004    [facility]    ENHANCE LASER REFRACTIVE BILATERAL  EXISTING PT IN PARAMETERS  2000    EYE SURGERY      [facility]    VASCULAR SURGERY  2017    [facility]       Family History:  Family History   Problem Relation Age of Onset    Glaucoma Mother     Macular Degeneration Mother     Macular Degeneration Other     Lung Cancer Brother        Social History:  Social History     Socioeconomic History    Marital status:      Spouse name: Not on file    Number of children: Not on file    Years of education: Not on file    Highest education level: Not on file   Occupational History    Not on file   Tobacco Use    Smoking status: Every Day     Packs/day: 0.50     Years: 5.00     Additional pack years: 0.00     Total pack years: 2.50     Types: Cigarettes     Start date: 1968     Last attempt to quit: 7/15/2016     Years since quittin.2    Smokeless tobacco: Former   Vaping Use    Vaping Use: Never used   Substance and Sexual Activity    Alcohol use: Yes     Comment: binge, two months sober    Drug use: Yes     Types: Marijuana     Comment: uses Marijuana for pain    Sexual activity: Not Currently     Partners: Female   Other Topics Concern    Parent/sibling w/ CABG, MI or angioplasty before 65F 55M? Yes     Comment: brother   Social History Narrative    Not on file     Social Determinants of Health     Financial Resource Strain: Not on file   Food Insecurity: Not on file   Transportation Needs: Not on file   Physical Activity: Not on file   Stress: Not on file   Social Connections: Not on file   Interpersonal Safety: Not on file   Housing Stability: Not on file       Medications:  No current outpatient medications on file.       Allergies:     Allergies   Allergen Reactions    Lidocaine Other (See Comments)     Lungs filled with fluid. ? Anaphylaxis    Lisinopril Cough     cough    Naltrexone Nausea and Vomiting       Review of Symptoms:  A 10 point review of symptoms has been conducted and is negative except for that mentioned in the  "above HPI.    Physical Exam:    Blood pressure (!) 153/63, pulse 55, temperature 98  F (36.7  C), temperature source Oral, resp. rate 18, height 1.702 m (5' 7\"), weight 68.2 kg (150 lb 5.7 oz), SpO2 100%.  Gen:    Lying in bed in NAD, A&OX3  HEENT: Normocephalic and atraumatic  CV:  RRR  Pulm:  Non-labored breathing RA  Ext:  RLE warm and well perfused.    LLE with dry gangrenous 1st digit, dependent rubor of forefoot. No drainage, odor, or signs of infection.   Vascular:  Monophasic PT signals bilaterally, more brisk on RLE compared to LLE. No DP signals b/l.     Labs:  CBC RESULTS:   Recent Labs   Lab Test 10/17/23  1131   WBC 6.4   RBC 4.11*   HGB 12.7*   HCT 38.4*   MCV 93   MCH 30.9   MCHC 33.1   RDW 14.2        Last Basic Metabolic Panel:  Lab Results   Component Value Date     10/17/2023     09/02/2020      Lab Results   Component Value Date    POTASSIUM 4.2 10/17/2023    POTASSIUM 4.1 02/21/2023    POTASSIUM 4.0 09/02/2020     Lab Results   Component Value Date    CHLORIDE 109 10/17/2023    CHLORIDE 110 02/21/2023    CHLORIDE 109 09/02/2020     Lab Results   Component Value Date    SHERYL 8.3 10/17/2023    SHERYL 9.2 09/02/2020     Lab Results   Component Value Date    CO2 20 10/17/2023    CO2 26 02/21/2023    CO2 25 09/02/2020     Lab Results   Component Value Date    BUN 13.8 10/17/2023    BUN 12 02/21/2023    BUN 15 09/02/2020     Lab Results   Component Value Date    CR 0.65 10/17/2023    CR 0.92 09/02/2020     Lab Results   Component Value Date    GLC 87 10/17/2023    GLC 83 10/17/2023    GLC 96 02/21/2023     09/02/2020       Imaging:  No new imaging reviewed for this admission.    "

## 2023-10-17 NOTE — PROGRESS NOTES
"VS BP (!) 166/60 (BP Location: Right arm, Patient Position: Semi-Richards's, Cuff Size: Adult Small)   Pulse 68   Temp 98.3  F (36.8  C) (Oral)   Resp 20   Ht 1.702 m (5' 7\")   Wt 64.4 kg (141 lb 15.6 oz)   SpO2 100%   BMI 22.24 kg/m     Activity: SBA  Neuros: A&O x4. Able to make needs known.  Cardiac: Elevated Bps, PRN hydralazine given, team made aware. Denies cardiac chest pain.  Respiratory: WDL. RA. Denies SOB  GI/: Voiding spon. No BM this shift.  Diet: 2 gm NA diet. NO CAFFEINE  Skin/Incisions: Admitted/transferred from: PACU  2 RN full skin assessment completed by Yan Muro, IFEOMA and Fercho FALCON RN.  Skin assessment finding: scattered scabs on Bilateral calves and feet. Blanchable redness on (L) foot. Blanchable redness on sacrum.   Interventions/actions: Educated on importance of frequent repositioning, hydration, and early ambulation. Sacral mepilex in place.    Betadine and kerlix covered (L) Big toe per orders  Lines/Drains: (R) PIV - SL  Labs: BG 70  Pain/nausea: 0-5/10 pain. Managed w/ sched tylenol, PRN oxycodone   "

## 2023-10-18 NOTE — CONSULTS
Cardiology Inpatient Consultation  October 18, 2023    Reason for Consult:  A cardiology consult was requested by Dr. Baitsta from the vascular surgery service to provide clinical guidance regarding preoperative cardiac risk stratification.     Assessment:  Preoperative cardiac risk stratification   This is a non-emergent procedure that is of intermediate-risk based on ACC/AHA guidelines.  The patient currently has no active cardiac conditions [unstable coronary syndromes, decompensated HF, significant arrhythmias, or severe valvular disease (severe AS or symptomatic MS)].  Functional METs is unknown due to limitation from leg pain. The patient's RCRI score is 2 [history of ischemic heart disease, history of cerebrovascular disease]. Based on RCRI score, the risk of major cardiac risk complications is ~10.1% 30 day risk of death, MI, or cardiac arrest   Due to his history of CAD with no evaluation since 2018, would recommend an evaluation with stress cardiac MRI prior to either AKA or fem-pop bypass. If there is a large territory of ischemia, the next step would be coronary angiography and PCI prior to surgery. If there is a small territory of ischemia or no ischemia, no further cardiac testing would be indicated prior to surgery.   CAD s/p 4v CABG LIMA-LAD, sequential SVG-OM1 and OM 2, SVG-diagonal in 2004  Normal biventricular function on echo 9/2023   Severe PAD with dry gangrene of LLE   Paroxysmal atrial fibrillation ([id number]=8 for age, htn, dm, stroke, MI)      Recommendations:  Stress cardiac MRI done yesterday and it showed normal biventricular function with prior inferior and inferoseptal infarction from RCA disease and a small apical infarction from LAD disease. These findings are non actionable and would not warrant further cardiac testing prior to surgery   Would rec Amlodipine 10 mg, increasing Imdur to 120mg and cont Losartan for HTN control, Cont  Hydralazine PRN for SBP >180  Continue beta  blockers in perioperative period, currently on coreg 25 mg BID   Continue aspirin, statin  Consider heparin infusion for atrial fibrillation stroke risk prophylaxis given elevated [id number] score and still several days before surgery   Kindly continue to monitor the electrolytes with goal of K>4 and Mg>2     Plan of care discussed with Dr. Addison, who agrees with above plan.     Thank you for consulting the cardiovascular services at the [facility]. Please do not hesitate to call us with any questions.      HPI:   Darian Engle is a 73 year old male with history of CAD and inferior MI (s/p 4v CABG LIMA-LAD, sequential SVG-OM1 and OM 2, SVG-diagonal in 2004), recurrent NSTEMI s/p PCI to diagonal in 2017, severe PAD, paroxysmal atrial fibrillation, DM2, prior stroke, HLD, TALA on CPAP, HTN, who came in for elective LLE angiogram via left brachial access today 10/17/23 for dry gangrene of the left great toe. Vascular surgery is offering either revascularization of the leg with bypass or AKA, whichever the patient and his spouse prefer. Cardiology is consulted on 10/17/23 for preoperative risk stratification.     He lives in a house in [place] and spends most of his time watching TV. He says he can walk a few hundred feet without dyspnea or chest pain and can even go up a flight of stairs. The main thing that bothers him is foot pain. No palpitations, syncope.      Review of Systems:    Complete review of systems was performed and negative except per HPI.    PMH:  Past Medical History:   Diagnosis Date    Arthritis March 2018    joint pain both hands    Diabetes (H)     Heart disease 1991    [place]-angioplasty    Hypertension      Active Problems:  Patient Active Problem List    Diagnosis Date Noted    Dry gangrene (H) 10/17/2023     Priority: Medium    Cyst of pancreas 06/19/2023     Priority: Medium    Dementia without behavioral disturbance (H) 02/21/2023     Priority: Medium     PAF (paroxysmal atrial fibrillation) (H) 02/21/2023     Priority: Medium    Orthostatic hypotension 05/23/2022     Priority: Medium    Syncope and collapse 05/22/2022     Priority: Medium    Angina pectoris (H24) 05/13/2022     Priority: Medium    Obstructive sleep apnea syndrome 01/20/2021     Priority: Medium     Seen at [facility]; diagnosed with moderate TALA by Dr. Herve Louise; Sept 2019.      Type 2 diabetes mellitus without retinopathy (H) 06/27/2018     Priority: Medium    Bilateral incipient cataracts 06/27/2018     Priority: Medium    PAD (peripheral artery disease) (H24) 12/18/2017     Priority: Medium    Claudication in peripheral vascular disease (H24) 07/20/2017     Priority: Medium    Acute ST elevation myocardial infarction (STEMI) due to occlusion of right coronary artery (H) 05/08/2017     Priority: Medium    CAD, multiple vessel 01/27/2017     Priority: Medium    Essential hypertension with goal blood pressure less than 140/90 01/27/2017     Priority: Medium    Type 2 diabetes mellitus with complication, with long-term current use of insulin (H) 01/27/2017     Priority: Medium    Hyperlipidemia LDL goal <100 01/27/2017     Priority: Medium    Gastroesophageal reflux disease without esophagitis 01/27/2017     Priority: Medium    Callahan's esophagus 09/12/2016     Priority: Medium    Binge eating disorder 02/12/2016     Priority: Medium     Formatting of this note might be different from the original.  Argelia intake 2/2016      Periodic limb movement disorder (PLMD) 12/03/2014     Priority: Medium     Formatting of this note might be different from the original.  Per Ucer  PLMs and may benefit from further evaluation with blood tests for Ferritin and Iron levels.      Abnormal electrocardiography 05/16/2014     Priority: Medium    Dyslipidemia 03/21/2011     Priority: Medium     Formatting of this note might be different from the original.  05/07/13, Lipids:  TC 99, TG  89, LDL 58, HDL 23      Alcohol abuse 2007     Priority: Medium     Formatting of this note might be different from the original.  Previous treatment  ; Alcohol Abuse, episodic      Microalbuminuria 2007     Priority: Medium    Postsurgical aortocoronary bypass status 2004     Priority: Medium     Formatting of this note might be different from the original.   Dr. Powell Regions  ; AORTOCORONARY BYPASS STATUS(aka CABG)      Reflux esophagitis 2003     Priority: Medium     Social History:  Social History     Tobacco Use    Smoking status: Every Day     Packs/day: 0.50     Years: 5.00     Additional pack years: 0.00     Total pack years: 2.50     Types: Cigarettes     Start date: 1968     Last attempt to quit: 7/15/2016     Years since quittin.2    Smokeless tobacco: Former   Vaping Use    Vaping Use: Never used   Substance Use Topics    Alcohol use: Yes     Comment: binge, two months sober    Drug use: Yes     Types: Marijuana     Comment: uses Marijuana for pain     Family History:  Family History   Problem Relation Age of Onset    Glaucoma Mother     Macular Degeneration Mother     Macular Degeneration Other     Lung Cancer Brother        Medications:   acetaminophen  975 mg Oral Q8H    aspirin  81 mg Oral Daily    atorvastatin  40 mg Oral Daily    carvedilol  25 mg Oral BID    cilostazol  100 mg Oral BID    donepezil  10 mg Oral At Bedtime    enoxaparin ANTICOAGULANT  1 mg/kg Subcutaneous Q12H    insulin aspart  1-7 Units Subcutaneous TID AC    insulin aspart  1-5 Units Subcutaneous At Bedtime    isosorbide mononitrate  60 mg Oral Daily    losartan  50 mg Oral Daily    pantoprazole  40 mg Oral QAM AC    senna-docusate  1 tablet Oral BID    Or    senna-docusate  2 tablet Oral BID    sertraline  25 mg Oral Daily        - MEDICATION INSTRUCTIONS -         Physical Exam:  Temp:  [98.1  F (36.7  C)-98.4  F (36.9  C)] (P) 98.2  F (36.8  C)  Pulse:  [] 87  Resp:  [16-20] (P)  "18  BP: (102-197)/(48-66) 160/54  SpO2:  [98 %-100 %] (P) 100 %    Intake/Output Summary (Last 24 hours) at 10/18/2023 1512  Last data filed at 10/17/2023 1600  Gross per 24 hour   Intake --   Output 250 ml   Net -250 ml     GEN: pleasant, no acute distress  HEENT: No discharge  EYES: no icterus  CV: RRR, normal s1/s2, no murmurs/rubs/s3/s4, no heave. No JVD.   CHEST: clear to ausculation bilaterally, no rales or wheezing  ABD: soft, non-tender, normal active bowel sounds  : no flank/suprapubic tenderness  EXTR: pulses 1+ radial. No clubbing, cyanosis or edema. Dusky discoloration of L great toe.   NEURO: alert oriented, speech fluent/appropriate, motor grossly nonfocal  PSYCH: cooperative, affect appropriate, pleasant      Diagnostics:  All labs and imaging were reviewed, of note:    CMP  Recent Labs   Lab 10/18/23  1149 10/18/23  0808 10/18/23  0541 10/18/23  0209 10/18/23  0025 10/17/23  1854 10/17/23  1455 10/17/23  1426 10/17/23  1131   NA  --   --  140  140  --   --   --   --   --  141   POTASSIUM  --   --  3.8  3.8  --   --   --   --   --  4.2   CHLORIDE  --   --  108*  108*  --   --   --   --   --  109*   CO2  --   --  17*  17*  --   --   --   --   --  20*   ANIONGAP  --   --  15  15  --   --   --   --   --  12   * 80 96 92  --    < >  --    < > 83   BUN  --   --  14.0  --   --   --   --   --  13.8   CR  --   --  0.64*  --   --   --  0.65*  --  0.72   GFRESTIMATED  --   --  >90  --   --   --  >90  --  >90   SHERYL  --   --  8.3*  --   --   --   --   --  8.3*   MAG  --   --  2.4*  --  1.5*  --  1.4*  --   --     < > = values in this interval not displayed.     CBC  Recent Labs   Lab 10/18/23  0541 10/17/23  1131   WBC 7.5 6.4   RBC 4.25* 4.11*   HGB 13.3 12.7*   HCT 39.4* 38.4*   MCV 93 93   MCH 31.3 30.9   MCHC 33.8 33.1   RDW 14.1 14.2    207     INRNo lab results found in last 7 days.  Arterial Blood GasNo lab results found in last 7 days.    No results found for: \"TROPI\", \"TROPONIN\", " "\"TROPR\", \"TROPN\"    Transthoracic echocardiogram:   9/21/23  Interpretation Summary  Examination done in the setting of severe hypertension.  Global and regional left ventricular function is normal with an EF of 55-60%.  Right ventricular function and size are normal.  No significant valvular abnormalities.  Right ventricular systolic pressure is 32mmHg above the right atrial pressure.  No pericardial effusion is present.  This study was compared with the study from 3/1/2021. The mitral and aortic  regurgitation were both present on the last study but are worsened due to the  patient's severe hypertension.        Additional imaging:   SPECT MPI 10/31/2018  Summary    1. Myocardial perfusion imaging is normal without evidence of ischemia or   infarct.    2. Post stress left ventricular ejection fraction is normal, 74 %.      Catheterization(s):   5/8/2017  Conclusions    CAD as described.     PCI to Diagonal branch with a single Drug-eluting stent; excellent result   was obtained.   Recommendations    Aggressive medical therapy for coronary artery disease.   Diagnostic Summary     Left Main is a medium calibre vessel; normal.     LAD is a medium calibre vessel; occluded after the first diagonal.  The   diagonal branch has severe 95% stenosis proximal to the previously placed   stent in this vessel.  Prox LAD has moderate disease.     LCx is a medium calibre vessel; OMs are occluded.     RCA is a medium calibre vessel; has been extensively stented before.  Stents   are patent with mild diffuse disease seen elsewhere.     SVG to diag is known to be occluded.     SVG to OM1/OM2 is widely patent with mild disease in the distal OMs.     LIMA to LAD is widely patent; no significant disease is sen in the distal   LAD.   Interventional Summary     PCI to Diagonal branch with a single Drug-eluting stent; excellent result   was obtained.      "

## 2023-10-18 NOTE — PHARMACY-ADMISSION MEDICATION HISTORY
Pharmacist Admission Medication History    Admission medication history is complete. The information provided in this note is only as accurate as the sources available at the time of the update.    Information Source(s): Patient, Family member, Hospital records, and CareEverywhere/SureScripts via in-person    Pertinent Information:   - Patient is not familiar with medications. Patient's spouse manages medication and was familiar with most medication names and frequencies. See discrepancies below.   - Per cardiology note by Dr. Govind Jovel in April 2023, patient was to stop metoprolol succinate and amlodipine and start taking carvedilol at 1/2 tablet a day for 3 days and increase to 1 full tablet if SBP > 140 mmHg and nifedipine 60 mg daily. Per MTM note by [facility] in 7/2023, there was a period of time where patient continued to take metoprolol, amlodipine, nifedipine, and carvedilol but at that time patient was instructed to stop metoprolol and amlodipine. Patient's spouse reports that metoprolol might have been discontinued but was unsure. Metoprolol re-prescribed on 10/17/2023 by patient primary care provider. Metoprolol was removed from list based on the cardiology note indicating the patient is to be on carvedilol.   - Patient's wife reports that patient is taking carvedilol 1 tablet daily. Most recent prescription indicates the patient should be taking 1 tablet BID.   - Patient prescribed nifedipine ER 30 mg every day. Per patient's spouse, patient has not been taking since July due to unable to get refill.   - Patient prescribed donepezil with a recent fill on 10/14 #90. Per patient's spouse, this is taken infrequently at night time due to forgetting.     Changes made to PTA medication list:  Added: None  Deleted:   Trazodone: Discontinued per patient's spouse  Oxycodone: Short term prescription from 8/2023.   Changed: None    Allergies reviewed with patient and updates made in EHR: yes    Medication History  Completed By: Piotr Waldron [facility] 10/18/2023 12:20 PM    Prior to Admission medications    Medication Sig Last Dose Taking? Auth Provider Long Term End Date   ACCU-CHEK SMARTVIEW test strip TEST THREE TIMES DAILY OR AS DIRECTED 10/16/2023 Yes Marcos Gonzalez MD     apixaban ANTICOAGULANT (ELIQUIS ANTICOAGULANT) 5 MG tablet Take 1 tablet (5 mg) by mouth 2 times daily 10/4/2023 at 0800 Yes Padmini Mcdaniel MD     aspirin 81 MG tablet Take 81 mg by mouth daily 10/16/2023 Yes Eh Matos MD     atorvastatin (LIPITOR) 40 MG tablet Take 1 tablet (40 mg) by mouth daily For cholesterol. 10/4/2023 at 0800 Yes Marcos Gonzalez MD Yes    blood glucose monitoring (ACCU-CHEK FASTCLIX) lancets USE TO TEST THREE TIMES DAILY OR AS DIRECTED 10/16/2023 Yes Marcos Gonzalez MD     carvedilol (COREG) 25 MG tablet Take 25 mg by mouth 2 times daily 10/16/2023 at 0800 Yes Reported, Patient Yes    cholecalciferol ( VITAMIN D3) 50 MCG (2000 UT) CAPS Take 2 capsules by mouth daily 10/4/2023 at 0800 Yes Reported, Patient No    cilostazol (PLETAL) 50 MG tablet Take 100 mg by mouth 2 times daily 10/4/2023 at 0800 Yes Reported, Patient Yes    donepezil (ARICEPT) 10 MG tablet Take 1 tablet (10 mg) by mouth At Bedtime More than a month Yes Schuyler Franco,      isosorbide mononitrate (IMDUR) 30 MG 24 hr tablet TAKE 2 TABLETS(60 MG) BY MOUTH DAILY 10/4/2023 Yes Marcos Gonzalez MD Yes    losartan (COZAAR) 50 MG tablet TAKE 1 TABLET(50 MG) BY MOUTH DAILY 10/16/2023 at 0800 Yes Marcos Gonzalez MD Yes    magnesium oxide 200 MG TABS Take 200 mg by mouth daily 10/4/2023 at 0800 Yes Reported, Patient     metFORMIN (GLUCOPHAGE XR) 500 MG 24 hr tablet TAKE 1 TABLET(500 MG) BY MOUTH TWICE DAILY WITH MEALS 10/8/2023 at 2000 Yes Marcos Gonzalez MD Yes    multivitamin (CENTRUM SILVER) tablet Take 1 tablet by mouth daily 10/9/2023 at 0800 Yes Reported, Patient     nitroGLYcerin (NITROSTAT) 0.4 MG sublingual tablet PLACE 1  TABLET UNDER THE TONGUE EVERY 5 MINUTES FOR 3 DOSES, IF SYMPTOMS PERSIST 5 MINUTES AFTER 1ST DOSE CALL 911 Unknown Yes Marcos Gonzalez MD Yes    Omega-3 Fatty Acids (FISH OIL OMEGA-3) 1000 MG CAPS Take 1,000 mg by mouth daily 10/9/2023 at 0800 Yes Reported, Patient     omeprazole (PRILOSEC) 20 MG DR capsule Take 1 capsule (20 mg) by mouth daily 10/16/2023 at 0800 Yes Marcos Gonzalez MD     sertraline (ZOLOFT) 25 MG tablet TAKE ONE TABLET BY MOUTH DAILY FOR DEPRESSION AND TO HELP APPETITE 10/16/2023 at 0800 Yes Marcos Gonzalez MD Yes    NIFEdipine ER (ADALAT CC) 60 MG 24 hr tablet 60 mg daily  Patient not taking: Reported on 10/18/2023 Not Taking  Reported, Patient Yes

## 2023-10-18 NOTE — PROGRESS NOTES
Pt arrived for cardiac MRI with stress. Allergies, medications, and safety checklist reviewed with patient. Test explained and all questions were answered. Denies caffeine intake. Lungs are clear. Lexiscan 0.4 mg given over 10 seconds followed by 5 mL of saline. Pt had episode of incontinence and emesis following lexiscan. Dose had to be repeated for imaging per Dr. Addison. After stress imaging complete, 100 mg IV Aminophylline given per MD order. Pre and post EKG completed. Pt monitored after MRI and transported back to .

## 2023-10-18 NOTE — PLAN OF CARE
Goal Outcome Evaluation:      Plan of Care Reviewed With: patient    Overall Patient Progress: no changeOverall Patient Progress: no change     Temp: 98.1  F (36.7  C) Temp src: Oral BP: (!) 169/64 Pulse: 75   Resp: 16 SpO2: 98 % O2 Device: None (Room air) Oxygen Delivery: 2 LPM    Patient slept well overnight. PIV saline locked. Pain controlled with tylenol. Magnesium replaced overnight, recheck at 0730. Up SBA to bathroom, voiding. No caffeine for stress test, 2 gm sodium diet. CMS intact, PT pulses with doppler, no numbness or tingling, no abnormal color.

## 2023-10-18 NOTE — PROGRESS NOTES
"VASCULAR SURGERY PROGRESS NOTE    Subjective:  No events overnight, continues to have monophasic faint left PT present only in neutral or dependent position, absent on extremity elevation, pending cardiac MRI    Objective:  Intake/Output Summary (Last 24 hours) at 10/18/2023 0730  Last data filed at 10/17/2023 1600  Gross per 24 hour   Intake 300 ml   Output 250 ml   Net 50 ml     Labs:  ROUTINE IP LABS (Last four results)  BMP  Recent Labs   Lab 10/18/23  0541 10/18/23  0209 10/17/23  2101 10/17/23  1854 10/17/23  1455 10/17/23  1426 10/17/23  1131     140  --   --   --   --   --  141   POTASSIUM 3.8  3.8  --   --   --   --   --  4.2   CHLORIDE 108*  108*  --   --   --   --   --  109*   SHERYL 8.3*  --   --   --   --   --  8.3*   CO2 17*  17*  --   --   --   --   --  20*   BUN 14.0  --   --   --   --   --  13.8   CR 0.64*  --   --   --  0.65*  --  0.72   GLC 96 92 103* 70  --    < > 83    < > = values in this interval not displayed.     CBC  Recent Labs   Lab 10/18/23  0541 10/17/23  1131   WBC 7.5 6.4   RBC 4.25* 4.11*   HGB 13.3 12.7*   HCT 39.4* 38.4*   MCV 93 93   MCH 31.3 30.9   MCHC 33.8 33.1   RDW 14.1 14.2    207     INRNo lab results found in last 7 days.  PHYSICAL EXAM:  /51 (BP Location: Right arm)   Pulse 82   Temp 98.2  F (36.8  C) (Oral)   Resp 17   Ht 1.702 m (5' 7\")   Wt 64.4 kg (141 lb 15.6 oz)   SpO2 98%   BMI 22.24 kg/m    General: The patient is alert and oriented. Appropriate. No acute distress  Psych: pleasant affect, answers questions appropriately  Skin: Color appropriate for race, warm, dry.  Respiratory: The patient does not require supplemental oxygen. Breathing unlabored  GI:  Abdomen soft, nontender to light palpation.  Incision: Left antecubital site without swelling or bruising  Extremities: LUE with palpable radial and brachial, LLE with monophasic faint PT present only in neutral or dependent position, absent on extremity elevation, warm, with dry gangrene " of first toe.    ASSESSMENT / PLAN:  Darian Engle is a 73 year old male with PMH  of CAD s/p CAB w/ LIMA and L GSV (2004), benign cystic neoplasm of the pancreas, T2DM, HTN, and active smoking who has dry gangrene of his L first toe. S/p angiogram 10/17 demonstrating SFA & SFA stent (known) occlusion with peroneal and PT runoff - both vessels with disease. Now pending cardiac eval, pain control, wound care, and plan for possible bypass vs. AKA vs. BKA.       Neuro:   - tylenol sched 975 q8, oxy 5 q4 prn  - continue PTA donepezil, sertraline    Resp:   - encourage IS     CV:   - continue home coreg 25 BID, statin, asa 81, pletal, losartan 50 daily  - holding home eliquis postop and in anticipation OR Friday: lovenox therapeutic BID given paroxysmal afib (chadsvasc 6)   - appreciate cardiac consult for preop eval              - stress MRI 10/18 (no caffeine diet ordered)               - If patient has need for coronary angio or PCI - femoral vessels should NOT be used for access.    - please address HTN and recommendations for current regimen/need for up-titration  - prns for HTN    Peripheral vascular:   - PAD, L great toe dry gangrene - will plan on intervention Friday.   - For most aggressive limb salvage could proceed with bypass from CFA to peroneal or below knee pop, however this may not be durable and discussed with wife that this has approximately 20-30% chance of successfully helping patient heal wound from anticipated toe amputation vs TMA amputation   - Could consider L CFA endarterectomy alone, which would help patient heal a BKA.    - Could also consider a primary AKA, which would help the patient get to healing and working with rehab the fastest  - Ongoing discussion with patient and wife about what patient's goals are and how they would like to proceed knowing that all procedures come with risks and benefits.   - Podiatry consult in coming days for evaluation for L great toe amp vs. TMA pending  patient decision re: above  - Vein mapping BLE, RUE to eval options for conduit.     GI:   - Sodium restricted diet (on 1200mg @ home per PCP), no caffeine til after stress test  - Home omeprazole  - bowel reg (sched doc/senna BID while on narcotics, prn miralax)     :   - no dinh     Endo:   - hold metformin 48 hours post procedure  - sliding scale insulin in mean time, medium intensity     Misc:   - PT consult early   - wound cares: please betadine paint L great toe daily and wrap with gauze/kerlix and keep covered.     Discussed pt history, exam, assessment and plan with Dr. Batista who will discuss with staff.     Roxann Fontanez, AVIS  Vascular Surgery  Pager: [phone]  [email or web address]  Send message or 10 digit call back number Securely via [employer] with the [employer] Web Console (learn more here)

## 2023-10-19 NOTE — PROGRESS NOTES
"VASCULAR SURGERY PROGRESS NOTE    Subjective:  Cardiac MRI completed with normal biventricular function with prior inferior and inferoseptal infarction from RCA disease and a small apical infarction from LAD disease, chronic in nature, no interventions needed. Vein mapping on bilateral upper and lower extremities completed. His losartan in increased this morning to 100mg. Having significant pain this morning, pending opioid administration.     Objective:  Intake/Output Summary (Last 24 hours) at 10/19/2023 0810  Last data filed at 10/19/2023 0600  Gross per 24 hour   Intake 350 ml   Output --   Net 350 ml       Labs:  ROUTINE IP LABS (Last four results)  BMP  Recent Labs   Lab 10/19/23  0555 10/19/23  0303 10/18/23  2058 10/18/23  1754 10/18/23  0808 10/18/23  0541 10/17/23  1854 10/17/23  1455 10/17/23  1426 10/17/23  1131     --   --   --   --  140  140  --   --   --  141   POTASSIUM 3.4  --   --   --   --  3.8  3.8  --   --   --  4.2   CHLORIDE 108*  --   --   --   --  108*  108*  --   --   --  109*   SHERYL 8.4*  --   --   --   --  8.3*  --   --   --  8.3*   CO2 18*  --   --   --   --  17*  17*  --   --   --  20*   BUN 17.3  --   --   --   --  14.0  --   --   --  13.8   CR 0.79  --   --   --   --  0.64*  --  0.65*  --  0.72   GLC 86 85 114* 119*   < > 96   < >  --    < > 83    < > = values in this interval not displayed.     CBC  Recent Labs   Lab 10/19/23  0555 10/18/23  0541 10/17/23  1131   WBC 6.8 7.5 6.4   RBC 4.01* 4.25* 4.11*   HGB 12.6* 13.3 12.7*   HCT 37.4* 39.4* 38.4*   MCV 93 93 93   MCH 31.4 31.3 30.9   MCHC 33.7 33.8 33.1   RDW 14.0 14.1 14.2    215 207     INRNo lab results found in last 7 days.  PHYSICAL EXAM:  BP (!) 150/51 (BP Location: Left arm)   Pulse 66   Temp 98.4  F (36.9  C) (Oral)   Resp 16   Ht 1.702 m (5' 7\")   Wt 64.4 kg (141 lb 15.6 oz)   SpO2 98%   BMI 22.24 kg/m    General: The patient is alert and oriented. Appropriate.   Psych: Pleasant affect, answers " questions appropriately  Skin: Color appropriate for race, warm, dry.  Respiratory: The patient does not require supplemental oxygen. Breathing unlabored  GI:  Abdomen soft, nontender to light palpation.  Incision: Left antecubital site without swelling or bruising  Extremities: LLE with monophasic faint PT present in neutral position, warm, with dry gangrene of first toe.    ASSESSMENT / PLAN:  Darian Engle is a 73 year old male with PMH  of CAD s/p CAB w/ LIMA and L GSV (2004), benign cystic neoplasm of the pancreas, T2DM, HTN, and active smoking who has dry gangrene of his L first toe. S/p angiogram 10/17 demonstrating SFA & SFA stent (known) occlusion with peroneal and PT runoff - both vessels with disease. Cardiac eval completed, normal EF and no cardiac interventions required. Plan for possible bypass vs. AKA vs. BKA tomorrow 10/20/23, pending discussion with patient and family.      Neuro:   - tylenol sched 975 q8, oxy 5 q4 prn  - continue PTA donepezil, sertraline    Resp:   - encourage IS     CV:   - continue home coreg 25 BID, statin, asa 81, pletal, losartan 100 daily  - holding home eliquis postop and in anticipation OR Friday 10/20/23: lovenox therapeutic BID given paroxysmal afib (chadsvasc 6)   - appreciate cardiac consult for preop eval              - stress MRI 10/18: normal biventricular function with prior inferior and inferoseptal infarction from RCA disease and a small apical infarction from LAD disease, chronic in nature, no interventions needed.    - Increasing Losartan to 100mg daily for ongoing HTN  - prns for HTN    Peripheral vascular:   - PAD, L great toe dry gangrene -  Intervention tomorrow.   - For most aggressive limb salvage could proceed with bypass from CFA to peroneal or below knee pop, however this may not be durable and discussed with wife that this has approximately 20-30% chance of successfully helping patient heal wound from anticipated toe amputation vs TMA amputation    - Could consider L CFA endarterectomy alone, which would help patient heal a BKA.    - Could also consider a primary AKA, which would help the patient get to healing and working with rehab the fastest  - Ongoing discussion with patient and wife about what patient's goals are and how they would like to proceed knowing that all procedures come with risks and benefits.   - Podiatry consult in coming days for evaluation for L great toe amp vs. TMA pending patient decision re: above  - Vein mapping BLE, RUE completed    GI:   - Sodium restricted diet (on 1200mg @ home per PCP)  - Home omeprazole  - bowel reg (sched doc/senna BID while on narcotics, prn miralax)     :   - no dinh     Endo:   - hold metformin 48 hours post procedure  - sliding scale insulin in mean time, medium intensity     Misc:   - PT consult early   - wound cares: please betadine paint L great toe daily and wrap with gauze/kerlix and keep covered.     Discussed pt history, exam, assessment and plan with Dr. Batista who will discuss with staff.     Roxann Fontanez, AVIS  Vascular Surgery  Pager: [phone]  [email or web address]  Send message or 10 digit call back number Securely via [employer] with the [employer] Web Console (learn more here)

## 2023-10-19 NOTE — ANESTHESIA PREPROCEDURE EVALUATION
Anesthesia Pre-Procedure Evaluation    Patient: Darian Engle   MRN: [id number] : 1950        Procedure : Procedure(s):  ENDARTERECTOMY, FEMORAL  ANGIOGRAM, possible angioplasty, possible atherectomy, possible stent placement Left Lower Extremity          Past Medical History:   Diagnosis Date    Arthritis 2018    joint pain both hands    Diabetes (H)     Heart disease     [place]-angioplasty    Hypertension       Past Surgical History:   Procedure Laterality Date    ANGIOGRAM Left 10/17/2023    Procedure: Left lower extremity angiogram via Left brachial artery approach;  Surgeon: Chuck Felix MBBS;  Location: [facility] OR    CARDIAC SURGERY  [address] [place] [address]    ENHANCE LASER REFRACTIVE BILATERAL EXISTING PT IN PARAMETERS      EYE SURGERY      [facility]    VASCULAR SURGERY      [facility]      Allergies   Allergen Reactions    Lidocaine Other (See Comments)     Lungs filled with fluid. ? Anaphylaxis    Lisinopril Cough     cough    Naltrexone Nausea and Vomiting      Social History     Tobacco Use    Smoking status: Every Day     Packs/day: 0.50     Years: 5.00     Additional pack years: 0.00     Total pack years: 2.50     Types: Cigarettes     Start date: 1968     Last attempt to quit: 7/15/2016     Years since quittin.2    Smokeless tobacco: Former   Substance Use Topics    Alcohol use: Yes     Comment: binge, two months sober      Wt Readings from Last 1 Encounters:   10/17/23 64.4 kg (141 lb 15.6 oz)        Anesthesia Evaluation   Pt has had prior anesthetic. Type: General and MAC.    No history of anesthetic complications       ROS/MED HX  ENT/Pulmonary:     (+) sleep apnea,               tobacco use, Past use,                      Neurologic:     (+)   dementia,                             Cardiovascular: Comment: # Severe PAD with dry gangrene of LLE  # CAD s/p 4v CABG LIMA-LAD, sequential SVG-OM1 and OM  2, SVG-diagonal in 2004  # pAF    RCRI score is 2 [history of ischemic heart disease, history of cerebrovascular disease]. Based on RCRI score, the risk of major cardiac risk complications is ~10.1% 30 day risk of death, MI, or cardiac arrest     (+)  hypertension (on Imdur, coreg, nifedipine, losartan)- -  CAD - past MI CABG-date: x4 in 2004 s/p inferior MI. -   Taking blood thinners                     dysrhythmias (paroxysmal afib),         Previous cardiac testing (10/17/2023)   Echo: Date: 9/21/23 Results:  - Examination done in the setting of severe hypertension.  - Global and regional left ventricular function is normal with an EF of 55-60%.  - Right ventricular function and size are normal.  - No significant valvular abnormalities.  - Right ventricular systolic pressure is 32mmHg above the right atrial pressure.  - No pericardial effusion is present.  - This study was compared with the study from 3/1/2021. The mitral and aortic regurgitation were both present on the last study but are worsened due to the patient's severe hypertension.    Stress Test:  Date: 10/17/23 Results:  - Normal biventricular function with prior inferior and inferoseptal infarction from RCA disease and a small apical infarction from LAD disease.   - Mild gris-infarct ischemia in the apical inferior segment.     ECG Reviewed:  Date: Results:    Cath:  Date: Results:      METS/Exercise Tolerance:     Hematologic:  - neg hematologic  ROS     Musculoskeletal:  - neg musculoskeletal ROS (+)  arthritis,             GI/Hepatic:     (+) GERD,                   Renal/Genitourinary:       Endo:     (+)  type II DM, Last HgA1c: 5.5, date: 10/17, Using insulin,                 Psychiatric/Substance Use:       Infectious Disease:       Malignancy:       Other:            Physical Exam    Airway  airway exam normal      Mallampati: I   TM distance: > 3 FB   Neck ROM: full   Mouth opening: > 3 cm    Respiratory Devices and Support         Dental      "  (+) Minor Abnormalities - some fillings, tiny chips      Cardiovascular   cardiovascular exam normal       Rhythm and rate: regular and normal     Pulmonary   pulmonary exam normal        breath sounds clear to auscultation           OUTSIDE LABS:  CBC:   Lab Results   Component Value Date    WBC 6.8 10/19/2023    WBC 7.5 10/18/2023    HGB 12.6 (L) 10/19/2023    HGB 13.3 10/18/2023    HCT 37.4 (L) 10/19/2023    HCT 39.4 (L) 10/18/2023     10/19/2023     10/18/2023     BMP:   Lab Results   Component Value Date     10/19/2023     10/18/2023     10/18/2023    POTASSIUM 4.0 10/19/2023    POTASSIUM 3.4 10/19/2023    CHLORIDE 108 (H) 10/19/2023    CHLORIDE 108 (H) 10/18/2023    CHLORIDE 108 (H) 10/18/2023    CO2 18 (L) 10/19/2023    CO2 17 (L) 10/18/2023    CO2 17 (L) 10/18/2023    BUN 17.3 10/19/2023    BUN 14.0 10/18/2023    CR 0.79 10/19/2023    CR 0.64 (L) 10/18/2023     (H) 10/19/2023    GLC 81 10/19/2023     COAGS: No results found for: \"PTT\", \"INR\", \"FIBR\"  POC:   Lab Results   Component Value Date     (H) 04/19/2018     HEPATIC:   Lab Results   Component Value Date    ALBUMIN 3.4 02/21/2023     OTHER:   Lab Results   Component Value Date    A1C 5.5 10/17/2023    SHERYL 8.4 (L) 10/19/2023    PHOS 3.9 10/19/2023    MAG 1.8 10/19/2023    TSH 1.03 09/08/2022       Anesthesia Plan    ASA Status:  3       Anesthesia Type: General.     - Airway: ETT   Induction: Intravenous.   Maintenance: Balanced.   Techniques and Equipment:     - Airway: Video-Laryngoscope     - Lines/Monitors: BIS, Arterial Line, 2nd IV     - Blood: T&S     - Drips/Meds: Phenylephrine     Consents          - Extended Intubation/Ventilatory Support Discussed: Yes.      - Patient is DNR/DNI Status: No     Use of blood products discussed: Yes.     Postoperative Care    Pain management: IV analgesics, Oral pain medications, Multi-modal analgesia.   PONV prophylaxis: Ondansetron (or other 5HT-3), Dexamethasone " or Solumedrol     Comments:    Other Comments: The material risks, benefits, and alternatives were discussed in detail.  The patient agrees to proceed.  The patient has no other complaints at this time.            Declan Alegre MD

## 2023-10-19 NOTE — PLAN OF CARE
Goal Outcome Evaluation:      Plan of Care Reviewed With: patient    Overall Patient Progress: improvingOverall Patient Progress: improving     Temp: 98.4  F (36.9  C) Temp src: Oral BP: (!) 150/51 Pulse: 66   Resp: 16 SpO2: 98 % O2 Device: None (Room air)      Patient slept well overnight. PIV saline locked. Moderate pain, given oxycodone once. Up SBA to bathroom, voiding. CMS intact, tibial pulses with doppler. Reports numbness in left toe, not increased from baseline.

## 2023-10-19 NOTE — PROGRESS NOTES
10/19/23 1000   Appointment Info   Signing Clinician's Name / Credentials (PT) Liberty Bazzi, PT, DPT   Living Environment   People in Home spouse   Current Living Arrangements house   Home Accessibility no concerns   Transportation Anticipated family or friend will provide   Living Environment Comments Pt lives in a 2 bedroom house with his spouse; reports no stair concerns.   Self-Care   Usual Activity Tolerance good   Current Activity Tolerance moderate   Regular Exercise No   Equipment Currently Used at Home none   Fall history within last six months yes   Number of times patient has fallen within last six months 2   Activity/Exercise/Self-Care Comment IND to mod IND with walker at baseline; reports he sometimes uses his walker.   General Information   Onset of Illness/Injury or Date of Surgery 10/17/23   Referring Physician Chuck Felix MBBS   Patient/Family Therapy Goals Statement (PT) to go home   Pertinent History of Current Problem (include personal factors and/or comorbidities that impact the POC) per EMR:Darian Engle is a 73 year old male with PMH  of CAD s/p CAB w/ LIMA and L GSV (2004), benign cystic neoplasm of the pancreas, T2DM, HTN, and active smoking who has dry gangrene of his L first toe. S/p angiogram 10/17 demonstrating SFA & SFA stent (known) occlusion with peroneal and PT runoff - both vessels with disease. Now pending cardiac eval, pain control, wound care, and plan for possible bypass vs. AKA vs. BKA.   Cognition   Affect/Mental Status (Cognition) WNL   Orientation Status (Cognition) oriented x 4   Follows Commands (Cognition) WNL   Pain Assessment   Patient Currently in Pain No   Integumentary/Edema   Integumentary/Edema no deficits were identifed   Posture    Posture Not impaired   Range of Motion (ROM)   Range of Motion ROM is WFL   Strength (Manual Muscle Testing)   Strength (Manual Muscle Testing) strength is WFL   Bed Mobility   Bed Mobility supine-sit   Comment, (Bed  Mobility) SBA with supine to sit   Transfers   Transfers sit-stand transfer   Comment, (Transfers) SBA   Gait/Stairs (Locomotion)   [place] Level (Gait) modified independence;supervision   Assistive Device (Gait) walker, front-wheeled   Comment, (Gait/Stairs) SBA with FWW   Balance   Balance no deficits were identified   Sensory Examination   Sensory Perception patient reports no sensory changes   Coordination   Coordination no deficits were identified   Muscle Tone   Muscle Tone no deficits were identified   Clinical Impression   Criteria for Skilled Therapeutic Intervention Yes, treatment indicated   PT Diagnosis (PT) impaired functional mobility   Influenced by the following impairments pain, weakness   Functional limitations due to impairments gait, activity tolerance   Clinical Presentation (PT Evaluation Complexity) stable   Clinical Presentation Rationale clinician judgement   Clinical Decision Making (Complexity) low complexity   Planned Therapy Interventions (PT) balance training;gait training;strengthening;transfer training;progressive activity/exercise;risk factor education;home program guidelines   Risk & Benefits of therapy have been explained evaluation/treatment results reviewed;care plan/treatment goals reviewed;risks/benefits reviewed;current/potential barriers reviewed;participants voiced agreement with care plan;participants included;patient   PT Total Evaluation Time   PT Eval, Low Complexity Minutes (68475) 8   Physical Therapy Goals   PT Frequency 3x/week   PT Predicted Duration/Target Date for Goal Attainment 10/25/23   PT Goals Gait;Aerobic Activity;Transfers   PT: Transfers Independent;Sit to/from stand   PT: Gait Independent;Greater than 200 feet   PT: Perform aerobic activity with stable cardiovascular response continuous activity;10 minutes;ambulation;NuStep   Interventions   Interventions Quick Adds Therapeutic Activity;Gait Training   Therapeutic Activity   Therapeutic Activities:  dynamic activities to improve functional performance Minutes (86553) 10   Treatment Detail/Skilled Intervention PT: post subjective evaluation; pt agreeable to OOB mobility; educated on plan for session, set goals for ambulating 3-4x/day and educated on energy conservation techniques to ensure safety with mobility. Pt reports he is having no pain and was open to ambulating IND. Reported he uses walker at times; discussed potential for use of walker while admitted here. Pt agreeable if he has to. SBA with bed mobility and transfer out of bed. Ambulating IND out of room and returned to supine in bed (see comments). Discussed walker use and pt reports he feels more comfortable walking with walker after the gait training session.   Gait Training   Gait Training Minutes (60191) 10   Symptoms Noted During/After Treatment (Gait Training) fatigue   Treatment Detail/Skilled Intervention PT: facilitated gait training to promote activity tolerance and mobility; pt ambulated initially IND with no LOB but slightly unsteady and pt at times reaching for wall for support. Ambulated ~200' took a seated rest break. Therapist then grabbed walker for pt use; educated on importance of mobilizing safely; agreeable to use walker. Ambulated 200' back to room with walker; more stable and felt comfortable. Started to feel pain in L leg but reports it goes away with rest.   PT Discharge Planning   PT Plan PT: progress gait with walker, balance exercises, strengthening   PT Discharge Recommendation (DC Rec) home with assist   PT Rationale for DC Rec Pt mobilizing close to baseline; anticipate safe d/c to home when medically cleared. Pending medical decisions; will likely require re-evaluation. At the current level mobilizing well. PT will continue to follow.   PT Brief overview of current status mod IND   Total Session Time   Timed Code Treatment Minutes 20   Total Session Time (sum of timed and untimed services) 28

## 2023-10-20 NOTE — PLAN OF CARE
Goal Outcome Evaluation:      Plan of Care Reviewed With: patient    Overall Patient Progress: no changeOverall Patient Progress: no change     Temp: 97.6  F (36.4  C) Temp src: Oral BP: (!) 147/50 Pulse: 67   Resp: 16 SpO2: 98 % O2 Device: None (Room air)      Patient slept well overnight. Had come increased pain, gave oxycodone x3 and scheduled tylenol. A&Ox4, forgetful. Right PIV saline locked. Up SBA to bathroom. Voiding. Last BM 10/19, per patient. Has been NPO since 1am incase of procedure.

## 2023-10-20 NOTE — PLAN OF CARE
"Goal Outcome Evaluation:      Plan of Care Reviewed With: patient    Overall Patient Progress: no changeOverall Patient Progress: no change     /49 (BP Location: Right arm)   Pulse 70   Temp 98.2  F (36.8  C) (Oral)   Resp 20   Ht 1.702 m (5' 7\")   Wt 64.4 kg (141 lb 15.6 oz)   SpO2 98%   BMI 22.24 kg/m      Status: Admitted with gangrene of left big toe.   Pain/Nausea: Denied nausea, pain is managed with regimens per ordered.   Mobility: UAL and ambulate with SBA.   Diet: Regular with adequate intake.   Labs: Reviewed. K+ 3.4, Mag. 1.8, both replaced.   LDAs: PIV, SL.   Skin/incisions: Lt big toe gangrene, top of left foot red, some scab over abrasions on BLE.   Neuro:  Intact, able to communicate needs.   Respiratory: WDL, on RA.   Cardiac: WDL, denied chest pain.   GI/: Continent of [facility] 10/19/2023.   New Changes: No acute changes.   Plan: Possible angioplasty, atherectomy, stent placement LLE.     "

## 2023-10-20 NOTE — PLAN OF CARE
"Goal Outcome Evaluation:      Plan of Care Reviewed With: patient    Overall Patient Progress: no changeOverall Patient Progress: no change       /51 (BP Location: Right arm)   Pulse 59   Temp 98.3  F (36.8  C) (Oral)   Resp 16   Ht 1.702 m (5' 7\")   Wt 64.4 kg (141 lb 15.6 oz)   SpO2 99%   BMI 22.24 kg/m      Status: Admitted with gangrene of left big toe.   Pain/Nausea: Denied nausea, pain is managed with regimens per ordered.   Mobility: UAL and ambulate with SBA.   Diet: Regular with adequate intake.   Labs: Reviewed. K+ 3.9, Mag. 1.7, replaced.   LDAs: PIV, SL.   Skin/incisions: Lt big toe gangrene, top of left foot red, some scab over abrasions on BLE.   Neuro:  Intact, able to communicate needs.   Respiratory: WDL, on RA.   Cardiac: WDL, denied chest pain.   GI/: Continent of [facility]. [facility] 10/20/2023.   New Changes: Pain regiments modified. Has CXR, PCR rectal swab to rule out CP-CRE.  Plan: Possible angioplasty, atherectomy, stent placement LLE.     "

## 2023-10-20 NOTE — PROGRESS NOTES
"VASCULAR SURGERY PROGRESS NOTE    Subjective:  Upon review of chart, discussion with patient and family, we will proceed with femoral endarterectomy, angioplasty, possible atherectomy, possible stent placement in the left lower extremity.  Patient is feeling well this morning.  Uneventful night.    Objective:    Intake/Output Summary (Last 24 hours) at 10/20/2023 0836  Last data filed at 10/20/2023 0600  Gross per 24 hour   Intake 1050 ml   Output 300 ml   Net 750 ml       Labs:  ROUTINE IP LABS (Last four results)  BMP  Recent Labs   Lab 10/20/23  0805 10/20/23  0624 10/20/23  0205 10/19/23  2226 10/19/23  1724 10/19/23  1308 10/19/23  0853 10/19/23  0555 10/18/23  0808 10/18/23  0541 10/17/23  1854 10/17/23  1455 10/17/23  1426 10/17/23  1131   NA  --  137  --   --   --   --   --  138  --  140  140  --   --   --  141   POTASSIUM  --  3.9  --   --   --  4.0  --  3.4  --  3.8  3.8  --   --   --  4.2   CHLORIDE  --  109*  --   --   --   --   --  108*  --  108*  108*  --   --   --  109*   SHERYL  --  8.6*  --   --   --   --   --  8.4*  --  8.3*  --   --   --  8.3*   CO2  --  18*  --   --   --   --   --  18*  --  17*  17*  --   --   --  20*   BUN  --  17.2  --   --   --   --   --  17.3  --  14.0  --   --   --  13.8   CR  --  0.74  --   --   --   --   --  0.79  --  0.64*  --  0.65*  --  0.72   * 104* 150* 98   < >  --    < > 86   < > 96   < >  --    < > 83    < > = values in this interval not displayed.     CBC  Recent Labs   Lab 10/20/23  0624 10/19/23  0555 10/18/23  0541 10/17/23  1131   WBC 6.0 6.8 7.5 6.4   RBC 3.89* 4.01* 4.25* 4.11*   HGB 12.5* 12.6* 13.3 12.7*   HCT 37.6* 37.4* 39.4* 38.4*   MCV 97 93 93 93   MCH 32.1 31.4 31.3 30.9   MCHC 33.2 33.7 33.8 33.1   RDW 13.7 14.0 14.1 14.2    222 215 207     INRNo lab results found in last 7 days.  PHYSICAL EXAM:  BP (!) 166/55 (BP Location: Right arm)   Pulse 60   Temp 97.4  F (36.3  C) (Oral)   Resp 15   Ht 1.702 m (5' 7\")   Wt 64.4 kg (141 lb " 15.6 oz)   SpO2 100%   BMI 22.24 kg/m    General: The patient is alert and oriented. Appropriate.   Psych: Pleasant affect, answers questions appropriately  Skin: Color appropriate for race, warm, dry.  Respiratory: The patient does not require supplemental oxygen. Breathing unlabored  GI:  Abdomen soft, nontender to light palpation.  Incision: Left antecubital site without swelling or bruising  Extremities: LLE with monophasic faint PT present in neutral position, warm, with dry gangrene of first toe.    ASSESSMENT / PLAN:  Darian Engle is a 73 year old male with PMH  of CAD s/p CAB w/ LIMA and L GSV (2004), benign cystic neoplasm of the pancreas, T2DM, HTN, and active smoking who has dry gangrene of his L first toe. S/p angiogram 10/17 demonstrating SFA & SFA stent (known) occlusion with peroneal and PT runoff - both vessels with disease. Cardiac eval completed, normal EF and no cardiac interventions required. His cardiac risk based on RCRI score, the risk of major cardiac risk complications is ~10.1% 30 day risk of death, MI, or cardiac arrest. Upon further discussion with patient and family, it was determined that best course of action would be to undergo femoral endarterectomy, angioplasty, possible atherectomy, possible stent placement in the left lower extremity. Given special equipment not available this week, we will schedule the patient to OR for Tuesday 10/31/23 and medically optimize while inpatient.      Neuro:   - tylenol sched 975 q8, oxy 5 q4 prn  - continue PTA donepezil, sertraline    Resp:   - encourage IS     CV:   - continue home coreg 25 BID, statin, asa 81, pletal, losartan 100 daily, increased home Imdur to 120mg, will add Norvasc if remains hypertensive.   - holding home eliquis postop: lovenox therapeutic BID given paroxysmal afib (chadsvasc 6)   - appreciate cardiology for preop cardiac risk assessment              - stress MRI 10/18: normal biventricular function with prior  inferior and inferoseptal infarction from RCA disease and a small apical infarction from LAD disease, chronic in nature, no interventions needed.    - functional METs is unknown due to limitation from leg pain. The patient's RCRI score is 2 [history of ischemic heart disease, history of cerebrovascular disease]. Based on RCRI score, the risk of major cardiac risk complications is ~10.1% 30 day risk of death, MI, or cardiac arrest   - prns for HTN    Peripheral vascular:   - PAD, L great toe dry gangrene -  Intervention tomorrow.   - L CFA endarterectomy, possible atherectomy, possible stent placement in the left lower extremity on Tuesday 10/31, which would help patient heal a BKA.    - Podiatry consult in coming days for evaluation for L great toe amp vs. TMA pending patient decision re: above    GI:   - Sodium restricted diet (on 1200mg @ home per PCP)  - Home omeprazole  - bowel reg (sched doc/senna BID while on narcotics, prn miralax)     :   - no dinh     Endo:   - hold metformin for now given upcoming procedure, may consider resuming over the weekend if needed, normoglycemic for the last 3 days  - sliding scale insulin in mean time, medium intensity     Misc:   - PT consult   - wound cares: please betadine paint L great toe daily and wrap with gauze/kerlix and keep covered.     Discussed pt history, exam, assessment and plan with Dr. Batista who will discuss with staff.     Roxann Fontanez, [facility]  Vascular Surgery  Pager: [phone]  [email or web address]  Send message or 10 digit call back number Securely via [employer] with the [employer] Web Console (learn more here)

## 2023-10-21 NOTE — PLAN OF CARE
Goal Outcome Evaluation:      Plan of Care Reviewed With: patient    Overall Patient Progress: no change    Neuro:A/O x4; forgetful  Respiratory: WDL on RA  Cardiac:WDL. Denies chest pain  Diet: reg ; 2g NA   GI/:no BM this shift  Incision/Drains:none  IV Access:R PIV SL  Pain:managed with PRN dilaudid and PRN oxy  Labs:reviewed   VS:Temp: 98.5  F (36.9  C) Temp src: Oral BP: (!) 165/55 Pulse: 55   Resp: 14 SpO2: 99 % O2 Device: None (Room air)     Activity: SBA/ind     New Changes this shift: none   Plan: continue POC

## 2023-10-21 NOTE — PLAN OF CARE
Goal Outcome Evaluation:      Plan of Care Reviewed With: patient, spouse    Overall Patient Progress: no changeOverall Patient Progress: no change    Temp: 98.5  F (36.9  C) Temp src: Oral BP: 128/44 Pulse: 65   Resp: 16 SpO2: 99 % O2 Device: None (Room air)    NEURO: A&Ox4, pleasant/cooperative, able to make needs known, [place] at baseline  RESPIRATORY: AVSS on RA, denies SOB, rule-out CP-CRE negative - contact precautions discontinued this shift  CARDIAC: WNL, denies chest pain  GI/: Voids w/o difficulty, LBM 10/20, denies abd discomfort, intermittent nausea relieved w/ PRN oral zofran x1 this shift  SKIN: L-toe gangrene- first toe black w/ some loss of sensation and tenderness present, q4hr CMS checks done, pulses +2 R-foot and +1 L-foot  DIET: Regular w/ 2g. Sodium limit, fair appetite, BG ACHS  PAIN/NAUSEA: L-toe pain, adequate relief w/ PRN oxycodone x2 and PRN dilaudid x2 this shift  IV ACCESS: R-PIV SL  ACTIVITY: Independent, up ad kesha w/ walker  LAB: Reviewed, Mg replaced for Mg 1.7, recheck Mg and K scheduled for tomorrow A.M. per protocols  PLAN: Continue w/ POC, CC/SW consult ordered today, surgical procedures planned for 10/31, discharge plan pending

## 2023-10-21 NOTE — CONSULTS
Care Management Initial Consult    General Information  Assessment completed with: Spouse or significant other, Angelica  Type of CM/SW Visit: Initial Assessment    Primary Care Provider verified and updated as needed: Yes (Dr Marcos Gonzalez with [facility])   Readmission within the last 30 days: no previous admission in last 30 days      Reason for Consult: discharge planning  Advance Care Planning: Advance Care Planning Reviewed: present on chart          Communication Assessment  Patient's communication style: spoken language (English or Bilingual)    Hearing Difficulty or Deaf: yes   Wear Glasses or Blind: yes    Cognitive  Cognitive/Neuro/Behavioral: WDL  Level of Consciousness: alert  Arousal Level: opens eyes spontaneously  Orientation: oriented x 4        Speech: spontaneous, clear, logical    Living Environment:   People in home: spouse  Angelica (wife)  Current living Arrangements: house (single level home, with no stairs)      Able to return to prior arrangements: yes       Family/Social Support:  Care provided by: self  Provides care for: no one  Marital Status:   Wife  Angelica       Description of Support System: Supportive, Involved    Support Assessment: Adequate family and caregiver support    Current Resources:   Patient receiving home care services: No     Community Resources: None  Equipment currently used at home: walker, rolling  Supplies currently used at home: Wound Care Supplies (using gauze for wound care on left toes)    Employment/Financial:  Employment Status: retired        Financial Concerns: none         Does the patient's insurance plan have a 3 day qualifying hospital stay waiver?  Yes     Which insurance plan 3 day waiver is available? Alternative insurance waiver    Will the waiver be used for post-acute placement? Undetermined at this time    Lifestyle & Psychosocial Needs:  Social Determinants of Health     Food Insecurity: Not on file   Depression: Not at risk (6/19/2023)     PHQ-2     PHQ-2 Score: 0   Housing Stability: Not on file   Tobacco Use: High Risk (10/19/2023)    Patient History     Smoking Tobacco Use: Every Day     Smokeless Tobacco Use: Former     Passive Exposure: Not on file   Financial Resource Strain: Not on file   Alcohol Use: Not on file   Transportation Needs: Not on file   Physical Activity: Not on file   Interpersonal Safety: Not on file   Stress: Not on file   Social Connections: Not on file       Functional Status:  Prior to admission patient needed assistance:   Dependent ADLs:: Independent  Dependent IADLs:: Independent  Assesssment of Functional Status: Not at baseline with mobility    Mental Health Status:  Mental Health Status: No Current Concerns       Chemical Dependency Status:  Chemical Dependency Status: No Current Concerns             Values/Beliefs:  Spiritual, Cultural Beliefs, Denominational Practices, Values that affect care: no               Additional Information:  Patient is a 73 year old male with history of CAD and inferior MI (s/p 4v CABG LIMA-LAD, sequential SVG-OM1 and OM 2, SVG-diagonal in 2004), recurrent NSTEMI s/p PCI to diagonal in 2017, severe PAD, paroxysmal atrial fibrillation, DM2, prior stroke, HLD, TALA on CPAP, HTN, who came in for elective LLE angiogram via left brachial access today 10/17/23 for dry gangrene of the left great toe.     RNCC met at patient's bedside to complete initial assessment and explain RNCC role. Patient was sleeping at the time of assessment, so patient's wife Angelica was present and answered the questions. Patient's wife states they live in a single family home with no stairs. Angelica states patient is independent at home, and receives no services, or home care currently. Plan is for patient to get surgery on Tuesday 10/24/23 for a femoral endarterectomy, angioplasty, possible atherectomy, possible stent placement in the left lower extremity.     Angelica was asking if patient will need TCU and how is a facility  picked. RNCC did explain that after surgery patient will likely be reevaluated by PT again, who will recommend a safe disposition such as home with family, or TCU, or home care with PT. RNCC stated SW/RNCC will follow closely, and if TCU is recommended we will assist in sending those referrals. Angelica had no further questions at this time.    RNCC will continue to monitor and assist with discharge planning as needed.         SUNNY Hall   10/21/2023  Nurse Coordinator      Social Work and Care Management Department       SEARCHABLE in [facility] - search CARE COORDINATOR       [place] (0910-1221) Saturday & Sunday; (5302-2064) [facility] Recognized Holidays     Units: 5A & 5C  Pager: [phone]    Units: 6B & 6C   Pager: [phone]    Units: 7A, 7B, & 7C   Pager: [phone]    Units: 6A & ICU   Pager: [phone]    Units: 5 Ortho, 5MS & WB ED Pager: [phone]    Units: 6MS, 8A & 10 ICU  Pager [phone]

## 2023-10-21 NOTE — PLAN OF CARE
Goal: Plan of Care Review    Outcome: Progressing  Flowsheets (Taken 10/21/2023 1316)  Overall Patient Progress: no change  Taken 10/21/2023 1200 by Linda Leonardo, RN    Plan for patient to go into the OR on 10/24/23 for surgery.       Linda Leonardo, [email or web address]

## 2023-10-21 NOTE — PROGRESS NOTES
"VASCULAR SURGERY PROGRESS NOTE    Subjective:  Patient seen and examined early in the morning. No acute complaints when woke up. Pain controlled.    Objective:    Intake/Output Summary (Last 24 hours) at 10/20/2023 0836  Last data filed at 10/20/2023 0600  Gross per 24 hour   Intake 1050 ml   Output 300 ml   Net 750 ml       Labs:  ROUTINE IP LABS (Last four results)  BMP  Recent Labs   Lab 10/21/23  0854 10/21/23  0657 10/20/23  1731 10/20/23  1254 10/20/23  0805 10/20/23  0624 10/19/23  1724 10/19/23  1308 10/19/23  0853 10/19/23  0555 10/18/23  0808 10/18/23  0541   NA  --  137  --   --   --  137  --   --   --  138  --  140  140   POTASSIUM  --  3.9  --   --   --  3.9  --  4.0  --  3.4  --  3.8  3.8   CHLORIDE  --  106  --   --   --  109*  --   --   --  108*  --  108*  108*   SHERYL  --  8.5*  --   --   --  8.6*  --   --   --  8.4*  --  8.3*   CO2  --  20*  --   --   --  18*  --   --   --  18*  --  17*  17*   BUN  --  13.0  --   --   --  17.2  --   --   --  17.3  --  14.0   CR  --  0.70  --   --   --  0.74  --   --   --  0.79  --  0.64*   GLC 95 89 97 107*   < > 104*   < >  --    < > 86   < > 96    < > = values in this interval not displayed.     CBC  Recent Labs   Lab 10/21/23  0657 10/20/23  0624 10/19/23  0555 10/18/23  0541   WBC 7.9 6.0 6.8 7.5   RBC 3.79* 3.89* 4.01* 4.25*   HGB 11.9* 12.5* 12.6* 13.3   HCT 36.1* 37.6* 37.4* 39.4*   MCV 95 97 93 93   MCH 31.4 32.1 31.4 31.3   MCHC 33.0 33.2 33.7 33.8   RDW 13.7 13.7 14.0 14.1    217 222 215     INRNo lab results found in last 7 days.  PHYSICAL EXAM:  BP (!) 161/61 (BP Location: Right arm)   Pulse 64   Temp 97.9  F (36.6  C) (Oral)   Resp 16   Ht 1.702 m (5' 7\")   Wt 64.4 kg (141 lb 15.6 oz)   SpO2 100%   BMI 22.24 kg/m    General: The patient is alert and oriented. Appropriate.   Psych: Pleasant affect, answers questions appropriately  Skin: Color appropriate for race, warm, dry.  Respiratory: The patient does not require supplemental oxygen. " Breathing unlabored  GI:  Abdomen soft, nontender to light palpation.  Incision: Left antecubital site without swelling or bruising  Extremities: LLE with monophasic faint PT present in neutral position, warm, with dry gangrene of first toe.    ASSESSMENT / PLAN:  Darian Engle is a 73 year old male with PMH  of CAD s/p CAB w/ LIMA and L GSV (2004), benign cystic neoplasm of the pancreas, T2DM, HTN, and active smoking who has dry gangrene of his L first toe. S/p angiogram 10/17 demonstrating SFA & SFA stent (known) occlusion with peroneal and PT runoff - both vessels with disease. Cardiac eval completed, normal EF and no cardiac interventions required. His cardiac risk based on RCRI score, the risk of major cardiac risk complications is ~10.1% 30 day risk of death, MI, or cardiac arrest. Upon further discussion with patient and family, it was determined that best course of action would be to undergo femoral endarterectomy, angioplasty, possible atherectomy, possible stent placement in the left lower extremity. Given special equipment not available this week, we will schedule the patient to OR for Tuesday 10/24/23 and medically optimize while inpatient.      Neuro:   - tylenol sched 975 q8, oxy 5 q4 prn  - continue PTA donepezil, sertraline    Resp:   - encourage IS     CV:   - continue home coreg 25 BID, statin, asa 81, pletal, losartan 100 daily, increased home Imdur to 120mg, will add Norvasc if remains hypertensive.   - holding home eliquis postop: lovenox therapeutic BID given paroxysmal afib (chadsvasc 6)   - appreciate cardiology for preop cardiac risk assessment              - stress MRI 10/18: normal biventricular function with prior inferior and inferoseptal infarction from RCA disease and a small apical infarction from LAD disease, chronic in nature, no interventions needed.    - functional METs is unknown due to limitation from leg pain. The patient's RCRI score is 2 [history of ischemic heart  disease, history of cerebrovascular disease]. Based on RCRI score, the risk of major cardiac risk complications is ~10.1% 30 day risk of death, MI, or cardiac arrest   - prns for HTN    Peripheral vascular:   - PAD, L great toe dry gangrene -  Intervention tomorrow.   - L CFA endarterectomy, possible atherectomy, possible stent placement in the left lower extremity on Tuesday 10/24, which would help patient heal a BKA.    - Podiatry consult in coming days for evaluation for L great toe amp vs. TMA pending patient decision re: above    GI:   - Sodium restricted diet (on 1200mg @ home per PCP)  - Home omeprazole  - bowel reg (sched doc/senna BID while on narcotics, prn miralax)     :   - no dinh     Endo:   - hold metformin for now given upcoming procedure, may consider resuming over the weekend if needed, normoglycemic for the last 3 days  - sliding scale insulin in mean time, medium intensity     Misc:   - PT consult   - wound cares: please betadine paint L great toe daily and wrap with gauze/kerlix and keep covered.

## 2023-10-22 NOTE — PLAN OF CARE
Goal Outcome Evaluation:      Plan of Care Reviewed With: patient    Overall Patient Progress: no change     Neuro:A/O x4; forgetful  Respiratory: WDL on RA  Cardiac:WDL. Denies chest pain  Diet: reg ; 2g NA   GI/:no BM this shift  Incision/Drains:none  Skin: L gangrenous big toe. No changes  IV Access:R PIV SL  Pain:managed with PRN dilaudid and PRN oxy  Labs:reviewed   VS: Temp: 98.1  F (36.7  C) Temp src: Oral BP: (!) 172/61 Pulse: 55   Resp: 14 SpO2: 97 % O2 Device: None (Room air)     Activity: independent      New Changes this shift: none   Plan: continue POC

## 2023-10-22 NOTE — PROGRESS NOTES
"VASCULAR SURGERY PROGRESS NOTE    Subjective:  Patient seen and examined. Pain in 4-5s today. No other complaints.    Objective:    Intake/Output Summary (Last 24 hours) at 10/20/2023 0836  Last data filed at 10/20/2023 0600  Gross per 24 hour   Intake 1050 ml   Output 300 ml   Net 750 ml       Labs:  ROUTINE IP LABS (Last four results)  BMP  Recent Labs   Lab 10/22/23  1153 10/22/23  0832 10/22/23  0514 10/21/23  2159 10/21/23  0854 10/21/23  0657 10/20/23  0805 10/20/23  0624 10/19/23  1724 10/19/23  1308 10/19/23  0853 10/19/23  0555   NA  --   --  137  --   --  137  --  137  --   --   --  138   POTASSIUM  --   --  4.0  --   --  3.9  --  3.9  --  4.0  --  3.4   CHLORIDE  --   --  107  --   --  106  --  109*  --   --   --  108*   SHERYL  --   --  8.7*  --   --  8.5*  --  8.6*  --   --   --  8.4*   CO2  --   --  21*  --   --  20*  --  18*  --   --   --  18*   BUN  --   --  12.1  --   --  13.0  --  17.2  --   --   --  17.3   CR  --   --  0.73  --   --  0.70  --  0.74  --   --   --  0.79   * 93 87 97   < > 89   < > 104*   < >  --    < > 86    < > = values in this interval not displayed.     CBC  Recent Labs   Lab 10/22/23  0514 10/21/23  0657 10/20/23  0624 10/19/23  0555   WBC 6.4 7.9 6.0 6.8   RBC 3.85* 3.79* 3.89* 4.01*   HGB 12.1* 11.9* 12.5* 12.6*   HCT 37.1* 36.1* 37.6* 37.4*   MCV 96 95 97 93   MCH 31.4 31.4 32.1 31.4   MCHC 32.6 33.0 33.2 33.7   RDW 13.7 13.7 13.7 14.0    226 217 222     INRNo lab results found in last 7 days.  PHYSICAL EXAM:  /48 (BP Location: Left arm)   Pulse 61   Temp 98.4  F (36.9  C) (Oral)   Resp 16   Ht 1.702 m (5' 7\")   Wt 64.4 kg (141 lb 15.6 oz)   SpO2 99%   BMI 22.24 kg/m    General: The patient is alert and oriented. Appropriate.   Psych: Pleasant affect, answers questions appropriately  Skin: Color appropriate for race, warm, dry.  Respiratory: The patient does not require supplemental oxygen. Breathing unlabored  GI:  Abdomen soft, nontender to light " palpation.  Incision: Left antecubital site without swelling or bruising  Extremities: LLE with monophasic faint PT present in neutral position, warm, with dry gangrene of first toe.    ASSESSMENT / PLAN:  Darian Engle is a 73 year old male with PMH  of CAD s/p CAB w/ LIMA and L GSV (2004), benign cystic neoplasm of the pancreas, T2DM, HTN, and active smoking who has dry gangrene of his L first toe. S/p angiogram 10/17 demonstrating SFA & SFA stent (known) occlusion with peroneal and PT runoff - both vessels with disease. Cardiac eval completed, normal EF and no cardiac interventions required. His cardiac risk based on RCRI score, the risk of major cardiac risk complications is ~10.1% 30 day risk of death, MI, or cardiac arrest. Upon further discussion with patient and family, it was determined that best course of action would be to undergo femoral endarterectomy, angioplasty, possible atherectomy, possible stent placement in the left lower extremity. Given special equipment not available this week, we will schedule the patient to OR for Tuesday 10/24/23 and medically optimize while inpatient.      Neuro:   - tylenol sched 975 q8, oxy 5 q4 prn  - continue PTA donepezil, sertraline    Resp:   - encourage IS     CV:   - continue home coreg 25 BID, statin, asa 81, pletal, losartan 100 daily, increased home Imdur to 120mg, will add Norvasc if remains hypertensive.   - holding home eliquis postop: lovenox therapeutic BID given paroxysmal afib (chadsvasc 6)   - appreciate cardiology for preop cardiac risk assessment              - stress MRI 10/18: normal biventricular function with prior inferior and inferoseptal infarction from RCA disease and a small apical infarction from LAD disease, chronic in nature, no interventions needed.    - functional METs is unknown due to limitation from leg pain. The patient's RCRI score is 2 [history of ischemic heart disease, history of cerebrovascular disease]. Based on RCRI score,  the risk of major cardiac risk complications is ~10.1% 30 day risk of death, MI, or cardiac arrest   - prns for HTN    Peripheral vascular:   - PAD, L great toe dry gangrene -  Intervention tomorrow.   - L CFA endarterectomy, possible atherectomy, possible stent placement in the left lower extremity on Tuesday 10/24, which would help patient heal a BKA.    - Podiatry consult in coming days for evaluation for L great toe amp vs. TMA pending patient decision re: above    GI:   - Sodium restricted diet (on 1200mg @ home per PCP)  - Home omeprazole  - bowel reg (sched doc/senna BID while on narcotics, prn miralax)     :   - no dinh     Endo:   - hold metformin for now given upcoming procedure, may consider resuming over the weekend if needed, normoglycemic for the last 3 days  - sliding scale insulin in mean time, medium intensity     Misc:   - PT consult   - wound cares: please betadine paint L great toe daily and wrap with gauze/kerlix and keep covered.

## 2023-10-22 NOTE — PLAN OF CARE
"Goal Outcome Evaluation:      Plan of Care Reviewed With: patient     Blood pressure 135/48, pulse 68, temperature 98.2  F (36.8  C), temperature source Oral, resp. rate 16, height 1.702 m (5' 7\"), weight 64.4 kg (141 lb 15.6 oz), SpO2 99%.    Neuro: A&Ox4. Forgetful at times  Cardiac: Hypertensive- improved with am medications and one time hydralazine, other vitals stable.  Respiratory: Sating >92% on RA.  GI/: Adequate urine output. No BM today- declined stool softeners  Diet/appetite: Tolerating regular diet. Intermittent nausea- vomited once- zofran and compazine given  Activity:  Assist of 1 in halls,  up independently in room and  to chair   Pain: At acceptable level on current regimen.   Skin: No new deficits noted.  LDA's: PIV x1    Plan: Continue with POC. Notify primary team with changes.             " No complaints

## 2023-10-23 NOTE — PROGRESS NOTES
"VASCULAR SURGERY PROGRESS NOTE    Subjective:  Patient noted \" not much pain\" this morning. Appropriate questions about the upcoming procedure. No other concerns.     Objective:  Intake/Output Summary (Last 24 hours) at 10/23/2023 0855  Last data filed at 10/22/2023 1428  Gross per 24 hour   Intake --   Output 300 ml   Net -300 ml       Labs:  ROUTINE IP LABS (Last four results)  BMP  Recent Labs   Lab 10/23/23  0811 10/23/23  0548 10/23/23  0250 10/22/23  2235 10/22/23  0832 10/22/23  0514 10/21/23  0854 10/21/23  0657 10/20/23  0805 10/20/23  0624   NA  --  136  --   --   --  137  --  137  --  137   POTASSIUM  --  4.0  --   --   --  4.0  --  3.9  --  3.9   CHLORIDE  --  106  --   --   --  107  --  106  --  109*   SHERYL  --  8.9  --   --   --  8.7*  --  8.5*  --  8.6*   CO2  --  21*  --   --   --  21*  --  20*  --  18*   BUN  --  12.9  --   --   --  12.1  --  13.0  --  17.2   CR  --  0.77  --   --   --  0.73  --  0.70  --  0.74   GLC 86 82 97 115*   < > 87   < > 89   < > 104*    < > = values in this interval not displayed.     CBC  Recent Labs   Lab 10/23/23  0548 10/22/23  0514 10/21/23  0657 10/20/23  0624   WBC 6.0 6.4 7.9 6.0   RBC 3.84* 3.85* 3.79* 3.89*   HGB 12.2* 12.1* 11.9* 12.5*   HCT 37.2* 37.1* 36.1* 37.6*   MCV 97 96 95 97   MCH 31.8 31.4 31.4 32.1   MCHC 32.8 32.6 33.0 33.2   RDW 13.6 13.7 13.7 13.7    225 226 217     INRNo lab results found in last 7 days.  PHYSICAL EXAM:  BP (!) 182/65 (BP Location: Right arm)   Pulse 63   Temp 97.7  F (36.5  C) (Oral)   Resp 18   Ht 1.702 m (5' 7\")   Wt 64.4 kg (141 lb 15.6 oz)   SpO2 100%   BMI 22.24 kg/m    General: The patient is alert and oriented. Appropriate.   Psych: Pleasant affect, answers questions appropriately  Skin: Color appropriate for race, warm, dry.  Respiratory: The patient does not require supplemental oxygen. Breathing unlabored  GI:  Abdomen soft, nontender to light palpation.  Incision: Left antecubital site without swelling or " bruising  Extremities: LLE with monophasic faint PT present in neutral position, warm, with dry gangrene of first toe.    ASSESSMENT / PLAN:  Darian Engle is a 73 year old male with PMH  of CAD s/p CAB w/ LIMA and L GSV (2004), benign cystic neoplasm of the pancreas, T2DM, HTN, and active smoking who has dry gangrene of his L first toe. S/p angiogram 10/17 demonstrating SFA & SFA stent (known) occlusion with peroneal and PT runoff - both vessels with disease. Cardiac eval completed, normal EF and no cardiac interventions required. His cardiac risk based on RCRI score, the risk of major cardiac risk complications is ~10.1% 30 day risk of death, MI, or cardiac arrest. Upon further discussion with patient and family, it was determined that best course of action would be to undergo femoral endarterectomy, angioplasty, possible atherectomy, possible stent placement in the left lower extremity on Tuesday 10/24/23.      Neuro:   - tylenol sched 975 q8, oxy 5 q4 prn  - continue PTA donepezil, sertraline    Resp:   - encourage IS     CV:   - continue home coreg 25 BID, statin, asa 81, pletal, losartan 100 daily, increased home Imdur to 120mg, Norvasc 10mg daily.   - holding home eliquis postop: lovenox therapeutic BID given paroxysmal afib (chadsvasc 6) Holding tonight's dose of lovenox due to OR tomorrow   - cardiology for preop cardiac risk assessment completed              - stress MRI 10/18: normal biventricular function with prior inferior and inferoseptal infarction from RCA disease and a small apical infarction from LAD disease, chronic in nature, no interventions needed.    - functional METs is unknown due to limitation from leg pain. The patient's RCRI score is 2 [history of ischemic heart disease, history of cerebrovascular disease]. Based on RCRI score, the risk of major cardiac risk complications is ~10.1% 30 day risk of death, MI, or cardiac arrest   - prns for HTN    Peripheral vascular:   - PAD, L great  toe dry gangrene -  Intervention tomorrow.   - L CFA endarterectomy, possible atherectomy, possible stent placement in the left lower extremity on Tuesday 10/24, which would help patient heal a BKA.    - Podiatry consult for evaluation for L great toe amp    GI:   - Sodium restricted diet (on 1200mg @ home per PCP). NPO after midnight for OR tomorrow  - Home omeprazole  - bowel reg (sched doc/senna BID while on narcotics, prn miralax)     :   - no dinh     Endo:   - hold metformin for now given upcoming procedure, may consider resuming over the weekend if needed, normoglycemic for the last 3 days  - sliding scale insulin in mean time, medium intensity     Misc:   - PT consult   - wound cares: please betadine paint L great toe daily and wrap with gauze/kerlix and keep covered.

## 2023-10-23 NOTE — PROGRESS NOTES
Shift 1500 -1900    No new changes to report in the last 4 hours. Pt A&O, able to make his needs know. PIV SL. Regular diet, provided PRN zofran for nausea. Up ad kesha in room. No BM this shift. Voiding w/o difficulty. Cont with current POC

## 2023-10-23 NOTE — CONSULTS
Past Medical History:   Diagnosis Date    Arthritis March 2018    joint pain both hands    Diabetes (H)     Heart disease 1991    [place]-angioplasty    Hypertension      Patient Active Problem List   Diagnosis    CAD, multiple vessel    Essential hypertension with goal blood pressure less than 140/90    Type 2 diabetes mellitus with complication, with long-term current use of insulin (H)    Hyperlipidemia LDL goal <100    Gastroesophageal reflux disease without esophagitis    PAD (peripheral artery disease) (H24)    Type 2 diabetes mellitus without retinopathy (H)    Bilateral incipient cataracts    Obstructive sleep apnea syndrome    Angina pectoris (H24)    Dementia without behavioral disturbance (H)    PAF (paroxysmal atrial fibrillation) (H)    Abnormal electrocardiography    Acute ST elevation myocardial infarction (STEMI) due to occlusion of right coronary artery (H)    Alcohol abuse    Callahan's esophagus    Binge eating disorder    Claudication in peripheral vascular disease (H24)    Cyst of pancreas    Dyslipidemia    Microalbuminuria    Orthostatic hypotension    Periodic limb movement disorder (PLMD)    Postsurgical aortocoronary bypass status    Reflux esophagitis    Syncope and collapse    Dry gangrene (H)     Past Surgical History:   Procedure Laterality Date    ANGIOGRAM Left 10/17/2023    Procedure: Left lower extremity angiogram via Left brachial artery approach;  Surgeon: Chuck Felix MBBS;  Location: [facility] OR    CARDIAC SURGERY  [address] [place] [address]    ENHANCE LASER REFRACTIVE BILATERAL EXISTING PT IN PARAMETERS  2000    EYE SURGERY  2000    [facility]    VASCULAR SURGERY  2017    [facility]     Social History     Socioeconomic History    Marital status:      Spouse name: Not on file    Number of children: Not on file    Years of education: Not on file    Highest education level: Not on file   Occupational History    Not on  "file   Tobacco Use    Smoking status: Every Day     Packs/day: 0.50     Years: 5.00     Additional pack years: 0.00     Total pack years: 2.50     Types: Cigarettes     Start date: 1968     Last attempt to quit: 7/15/2016     Years since quittin.2    Smokeless tobacco: Former   Vaping Use    Vaping Use: Never used   Substance and Sexual Activity    Alcohol use: Yes     Comment: binge, two months sober    Drug use: Yes     Types: Marijuana     Comment: uses Marijuana for pain    Sexual activity: Not Currently     Partners: Female   Other Topics Concern    Parent/sibling w/ CABG, MI or angioplasty before 65F 55M? Yes     Comment: brother   Social History Narrative    Not on file     Social Determinants of Health     Financial Resource Strain: Not on file   Food Insecurity: Not on file   Transportation Needs: Not on file   Physical Activity: Not on file   Stress: Not on file   Social Connections: Not on file   Interpersonal Safety: Not on file   Housing Stability: Not on file     Family History   Problem Relation Age of Onset    Glaucoma Mother     Macular Degeneration Mother     Macular Degeneration Other     Lung Cancer Brother      Lab Results   Component Value Date    WBC 6.0 10/23/2023    WBC 7.1 2021     Lab Results   Component Value Date    RBC 3.84 10/23/2023    RBC 4.59 2021     Lab Results   Component Value Date    HGB 12.2 10/23/2023    HGB 13.8 2021     Lab Results   Component Value Date    HCT 37.2 10/23/2023    HCT 40.8 2021     No components found for: \"MCT\"  Lab Results   Component Value Date    MCV 97 10/23/2023    MCV 89 2021     Lab Results   Component Value Date    MCH 31.8 10/23/2023    MCH 30.1 2021     Lab Results   Component Value Date    MCHC 32.8 10/23/2023    MCHC 33.8 2021     Lab Results   Component Value Date    RDW 13.6 10/23/2023    RDW 12.2 2021     Lab Results   Component Value Date     10/23/2023     2021 "     Last Comprehensive Metabolic Panel:  Lab Results   Component Value Date     10/23/2023    POTASSIUM 4.0 10/23/2023    CHLORIDE 106 10/23/2023    CO2 21 (L) 10/23/2023    ANIONGAP 9 10/23/2023    GLC 99 10/23/2023    BUN 12.9 10/23/2023    CR 0.77 10/23/2023    GFRESTIMATED >90 10/23/2023    SHERYL 8.9 10/23/2023     Lab Results   Component Value Date    A1C 5.5 10/17/2023    A1C 5.9 06/19/2023    A1C 5.5 02/21/2023    A1C 5.8 09/08/2022    A1C 7.5 05/13/2022    A1C 7.0 04/09/2021    A1C 8.1 01/04/2021    A1C 8.2 09/02/2020    A1C 8.6 12/03/2019    A1C 9.5 08/02/2019                         Subjective findings- 73-year-old consulted to podiatry by Chuck RING for gangrenous changes of the left hallux and evaluation for amputation.  Patient seen at bedside resting, relates couple weeks ago he was picking it up lesion on his toe and it got infected, relates he stopped smoking 7 days ago, relates it does not hurt, has seen vascular.    Objective findings- DP and PT are 1 out of 4 bilaterally.  Has decreased hair growth bilaterally.  Has dorsal contracted digits bilaterally.  Has full-thickness eschar left hallux with no erythema, no drainage, no odor, no calor, positive pain on palpation.    Assessment and plan- Dry gangrenous changes left Hallux, peripheral vascular disease with Diabetes.  Diagnosis and treatment options discussed discussed with the patient.  Will refer to orthopedic surgery for any amputation and defer to vascular for healable level of amputation.  Previous notes reviewed.  Follow-up with podiatry as needed.            High level of medical decision.

## 2023-10-23 NOTE — PROGRESS NOTES
Brief Orthopedic Surgery/Podiatry Note    Orthopedics/Podiatry was consulted for dry gangrene. Based on conversation with RN, no concern for infection at this time.     Our podiatrist rounds on Thursday each week. The next rounding date is 10/26/23. The patient will be seen at that time if they remain inpatient. Should the patient discharge prior to that time, please place a outpatient podiatry referral for nail cares.     Of note, this patient was not evaluated by this provider. Please page Orthopedic Surgery if further assistance is needed.     Antwan Rodrigues MD  Orthopedic Surgery Resident

## 2023-10-23 NOTE — PLAN OF CARE
Goal Outcome Evaluation:      Plan of Care Reviewed With: patient    Overall Patient Progress: no change        Neuro:A/O x4; forgetful  Respiratory: WDL on RA  Cardiac:Hypertensive at baseline. Denies chest pain  Diet: reg ; 2g NA   GI/:no BM this shift. Adequate urine output   Incision/Drains:none  Skin: L gangrenous big toe. No changes  IV Access:R PIV SL  Pain:managed with PRN dilaudid and PRN oxy  Labs:reviewed   VS:Temp: 98.1  F (36.7  C) Temp src: Oral BP: (!) 147/57 Pulse: 58   Resp: 16 SpO2: 98 % O2 Device: None (Room air)     New Changes this shift: none   Plan: continue POC

## 2023-10-24 NOTE — CONSULTS
[facility]  Orthopedic Surgery Consult    Name: Darian Spence  Age: 73 year old  MRN: [id number]  YOB: 1950    Reason for Consult: Left great toe dry gangrene, consideration of great toe or foot amputation    Requesting Provider: Roxann Fontanez NP    Assessment and Plan:     Assessment:  Darian Spence is a 73 y.o. man with PMH of  CAD s/p CAB w/ LIMA and L GSV (2004), benign cystic neoplasm of the pancreas, T2DM, HTN and tobacco use who has left great toe dry gangrene s/p femoral endarterectomy, angioplasty, and possible atherectomy with possible stent placement in the left lower extremity on 10/24/23 for consideration of great toe vs transmetatarsal amputation. Patient is otherwise stable postoperatively without systemic infection driving his illness. Will plan to review the patient with foot and ankle staff to consider potential operative plan.     Plan:  Vascular primary  - Plan for OR: TBD pending availability of foot and ankle staff, plan for end of this week vs early next week  - Anticoagulation/DVT prophylaxis: Per primary  - Antibiotics: Per primary  - Imaging: left foot xrays complete  - Activity: Up as tolerated  - Weight bearing: WBAT LLE  - Pain control: Per primary  - Diet: Ok for a diet at this time from an orthopedic perspective, will update NPO pending surgery timing  - Follow-up: TBD  - Disposition: Admitted to SICU postoperatively    Staff: Jerardo Thornton MD    Respectfully,    Antwan Rodrigues MD  Orthopedic Surgery PGY1  [phone]    Please page me directly with any questions/concerns during regular weekday hours before 5 pm. If there is no response, if it is a weekend, or if it is during evening hours then please page the orthopedic surgery resident on call.    History of Present Illness:     Patient was seen and examined by me. History, PMH, Meds, SH, complete ROS (10 organ systems) and PE reviewed with patient and prior medical records.      Darian  Bebe is a 73 y.o. man with PMH of  CAD s/p CAB w/ LIMA and L GSV (2004), benign cystic neoplasm of the pancreas, T2DM, HTN and tobacco use who has left great toe dry gangrene on who orthopedics is consulted for consideration of amputation. Symptoms began 3-4 months ago. After referral patient presented to the [facility] for vascular workup and treatment. Patient was admitted on 10/17/23 after undergoing an angiogram that revealed occlusions of the SFA and SFA stent with peroneal and PT runoff with disease present in both vessels. Patient and family elected to proceed with femoral endarterectomy, angioplasty, and possible atherectomy with possible stent placement in the left lower extremity on 10/24/23 to increase chance for limb salvage and hope that he can undergo a great toe amp vs transmetatarsal amp at this time rather than undergoing a BKA. Patient is stable and has no systemic illness. No pain within the great toe at this time.      Past Medical History:     Past Medical History:   Diagnosis Date    Arthritis March 2018    joint pain both hands    Diabetes (H)     Heart disease 1991    [place]-angioplasty    Hypertension        Past Surgical History:     Past Surgical History:   Procedure Laterality Date    ANGIOGRAM Left 10/17/2023    Procedure: Left lower extremity angiogram via Left brachial artery approach;  Surgeon: Chuck Felix MBBS;  Location: [facility] OR    CARDIAC SURGERY  [address] [place] [address]    ENHANCE LASER REFRACTIVE BILATERAL EXISTING PT IN PARAMETERS  2000    EYE SURGERY  2000    [facility]    VASCULAR SURGERY  2017    [facility] [place]       Social History:     Social History     Socioeconomic History    Marital status:      Spouse name: None    Number of children: None    Years of education: None    Highest education level: None   Tobacco Use    Smoking status: Every Day     Packs/day: 0.50     Years: 5.00     Additional pack years:  0.00     Total pack years: 2.50     Types: Cigarettes     Start date: 1968     Last attempt to quit: 7/15/2016     Years since quittin.2    Smokeless tobacco: Former   Vaping Use    Vaping Use: Never used   Substance and Sexual Activity    Alcohol use: Yes     Comment: binge, two months sober    Drug use: Yes     Types: Marijuana     Comment: uses Marijuana for pain    Sexual activity: Not Currently     Partners: Female   Other Topics Concern    Parent/sibling w/ CABG, MI or angioplasty before 65F 55M? Yes     Comment: brother       Family History:     Family History   Problem Relation Age of Onset    Glaucoma Mother     Macular Degeneration Mother     Macular Degeneration Other     Lung Cancer Brother        Medications:     Current Facility-Administered Medications   Medication    [Auto Hold] acetaminophen (TYLENOL) tablet 975 mg    [Auto Hold] amLODIPine (NORVASC) tablet 10 mg    [Auto Hold] aspirin EC tablet 81 mg    [Auto Hold] atorvastatin (LIPITOR) tablet 40 mg    [Auto Hold] carvedilol (COREG) tablet 25 mg    ceFAZolin (ANCEF) 2 g in 100 mL D5W intermittent infusion    [Auto Hold] cilostazol (PLETAL) tablet 100 mg    [Auto Hold] Continuing statin from home medication list OR statin order already placed during this visit    [Auto Hold] glucose gel 15-30 g    Or    [Auto Hold] dextrose 50 % injection 25-50 mL    Or    [Auto Hold] glucagon injection 1 mg    [Auto Hold] donepezil (ARICEPT) tablet 10 mg    [Held by provider] enoxaparin ANTICOAGULANT (LOVENOX) injection 70 mg    [Auto Hold] hydrALAZINE (APRESOLINE) injection 10 mg    [Auto Hold] HYDROmorphone (DILAUDID) injection 0.2 mg    [Auto Hold] insulin aspart (NovoLOG) injection (RAPID ACTING)    [Auto Hold] insulin aspart (NovoLOG) injection (RAPID ACTING)    [Auto Hold] isosorbide mononitrate (IMDUR) 24 hr tablet 120 mg    [Auto Hold] labetalol (NORMODYNE/TRANDATE) injection 20 mg    lactated ringers infusion    [Auto Hold] losartan (COZAAR)  tablet 100 mg    [Auto Hold] melatonin tablet 1 mg    [Auto Hold] naloxone (NARCAN) injection 0.2 mg    Or    [Auto Hold] naloxone (NARCAN) injection 0.4 mg    Or    [Auto Hold] naloxone (NARCAN) injection 0.2 mg    Or    [Auto Hold] naloxone (NARCAN) injection 0.4 mg    norepinephrine (LEVOPHED) 4 mg in  mL PERIPHERAL infusion    [Auto Hold] ondansetron (ZOFRAN ODT) ODT tab 4 mg    Or    [Auto Hold] ondansetron (ZOFRAN) injection 4 mg    [Auto Hold] oxyCODONE (ROXICODONE) tablet 5-10 mg    [Auto Hold] pantoprazole (PROTONIX) EC tablet 40 mg    [Auto Hold] polyethylene glycol (MIRALAX) Packet 17 g    [Auto Hold] prochlorperazine (COMPAZINE) injection 5 mg    Or    [Auto Hold] prochlorperazine (COMPAZINE) tablet 5 mg    Or    [Auto Hold] prochlorperazine (COMPAZINE) suppository 12.5 mg    [Auto Hold] senna-docusate (SENOKOT-S/PERICOLACE) 8.6-50 MG per tablet 1 tablet    Or    [Auto Hold] senna-docusate (SENOKOT-S/PERICOLACE) 8.6-50 MG per tablet 2 tablet    [Auto Hold] sertraline (ZOLOFT) tablet 25 mg    sodium chloride (PF) 0.9% PF flush 3 mL    sodium chloride (PF) 0.9% PF flush 3 mL    sodium chloride (PF) 0.9% PF flush 5-10 mL    sodium chloride (PF) 0.9% PF flush 5-10 mL    sodium chloride bacteriostatic 0.9 % flush 0-1 mL     Facility-Administered Medications Ordered in Other Encounters   Medication    albumin human 5 % injection    ePHEDrine injection    fentaNYL (PF) (SUBLIMAZE) injection    lactated ringers infusion    phenylephrine (JOSE-SYNEPHRINE) injection    phenylephrine 0.2 mg/mL (mcg/kg/min) drip    Plasma-Lyte A (electrolyte A) BOLUS    propofol (DIPRIVAN) injection 10 mg/mL vial    rocuronium injection    sodium bicarbonate 8.4 % injection    vasopressin 1 unit/mL syringe       Allergies:     Allergies   Allergen Reactions    Lidocaine Other (See Comments)     Lungs filled with fluid. ? Anaphylaxis    Lisinopril Cough     cough    Naltrexone Nausea and Vomiting       Review of Systems:     A  "comprehensive 10 point review of systems (constitutional, ENT, cardiac, peripheral vascular, respiratory, GI, , musculoskeletal, skin, neurological) was performed and found to be negative except as described in this note.      Physical Exam:     Vital Signs: BP (!) 149/71   Pulse 60   Temp 98.4  F (36.9  C) (Oral)   Resp 14   Ht 1.702 m (5' 7\")   Wt 64.4 kg (141 lb 15.6 oz)   SpO2 100%   BMI 22.24 kg/m    General: Resting comfortably in bed, awake, alert, no apparent distress, appears stated age.    Musculoskeletal:  LLE: Gangrenous changes of the left great toe with dusky change to approximately the 1st MTP. There are chronic appearing wounds without erythema or duskiness over the dorsum of the 2nd and 3rd toes. Able to fire GSC/TA/EHL/FHL.  SILT in sural, saphenous, deep peroneal, superficial peroneal, and tibial nerve distributions. Dorsalis pedis and posterior tibial arteries 1-2+, foot warm and perfused.      Imaging:     3 views of the left foot demonstrate no acute fracture or dislocation of the joints. Atherosclerotic changes of visible vessels.     Data:     CBC:  Lab Results   Component Value Date    WBC 6.4 10/24/2023    HGB 11.3 (L) 10/24/2023     10/24/2023     BMP:  Lab Results   Component Value Date     10/24/2023    POTASSIUM 4.2 10/24/2023    CHLORIDE 104 10/24/2023    CO2 18 (L) 10/24/2023    BUN 13.9 10/24/2023    CR 0.70 10/24/2023    ANIONGAP 13 10/24/2023    SHERYL 8.8 10/24/2023    GLC 82 10/24/2023     Inflammatory Markers:  Lab Results   Component Value Date    WBC 6.4 10/24/2023    WBC 6.0 10/23/2023    WBC 6.4 10/22/2023       "

## 2023-10-24 NOTE — ANESTHESIA CARE TRANSFER NOTE
Patient: Darian Engle    Procedure: Procedure(s):  ENDARTERECTOMY, FEMORAL  ANGIOGRAM  ANGIOPLASTY, Left Lower Extremity atherectomy       Diagnosis: Dry gangrene (H) [I96]  Diagnosis Additional Information: No value filed.    Anesthesia Type:   General     Note:    Oropharynx: oropharynx clear of all foreign objects and spontaneously breathing  Level of Consciousness: awake  Oxygen Supplementation: face mask  Level of Supplemental Oxygen (L/min / FiO2): 6  Independent Airway: airway patency satisfactory and stable  Dentition: dentition unchanged  Vital Signs Stable: post-procedure vital signs reviewed and stable  Report to RN Given: handoff report given  Patient transferred to: PACU    Handoff Report: Identifed the Patient, Identified the Reponsible Provider, Reviewed the pertinent medical history, Discussed the surgical course, Reviewed Intra-OP anesthesia mangement and issues during anesthesia, Set expectations for post-procedure period and Allowed opportunity for questions and acknowledgement of understanding    Vitals:  Vitals Value Taken Time   BP     Temp     Pulse 82 10/24/23 1844   Resp     SpO2 100 % 10/24/23 1844   Vitals shown include unfiled device data.    Electronically Signed By: ADIEL Sam CRNA  October 24, 2023  6:45 PM

## 2023-10-24 NOTE — ANESTHESIA POSTPROCEDURE EVALUATION
Patient: Darian Engle    Procedure: Procedure(s):  ENDARTERECTOMY, FEMORAL  ANGIOGRAM  ANGIOPLASTY, Left Lower Extremity atherectomy       Anesthesia Type:  General    Note:  Disposition: Admission   Postop Pain Control: Uneventful            Sign Out: Well controlled pain   PONV:    Neuro/Psych: Uneventful            Sign Out: Acceptable/Baseline neuro status   Airway/Respiratory: Uneventful            Sign Out: Acceptable/Baseline resp. status   CV/Hemodynamics: Uneventful            Sign Out: Acceptable CV status; No obvious hypovolemia; No obvious fluid overload   Other NRE: NONE   DID A NON-ROUTINE EVENT OCCUR? No    Event details/Postop Comments:  No complications.           Last vitals:  Vitals Value Taken Time   /68 10/24/23 1834   Temp     Pulse 86 10/24/23 1836   Resp     SpO2 100 % 10/24/23 1836   Vitals shown include unfiled device data.    Electronically Signed By: Keegan Mesa MD  October 24, 2023  6:37 PM

## 2023-10-24 NOTE — PLAN OF CARE
Goal Outcome Evaluation:    Temp: 98.2  F (36.8  C) Temp src: Oral BP: (!) 149/43 Pulse: 72   Resp: 14 SpO2: 97 % O2 Device: None (Room air)         Time of care: 1900-0730  Reason for admission: LLE gangrene     NEURO: A&Ox4, forgetful. No cms changes.   RESPIRATORY: denies SOB, sats 97% on RA  CARDIAC:  denies cardiac chest pain. 2 episodes of HTN >160, prn hydralazine effective.   GI/: LBM 10/22. Voiding not saving. Urinary incontinence.   DIET: 2 gm Na, NPO @ MN  PAIN/NAUSEA: moderate pain x2. 10mg oxycodone effective. Zofran x1 for nausea  INCISION/DRAINS/SKIN: dry scabs to BLE, L toe wound drsg changed x1. No new deficits.   IV ACCESS: PIV SL  ACTIVITY: ind in room, SBA in halls  LAB: reviewed   CHANGES: no acute major changes  PLAN: continue w/ POC, OR this AM.

## 2023-10-24 NOTE — PROGRESS NOTES
Brief Orthopedic Progress Note    Patient was not seen or evaluated as he was in the OR today. Will plan to formally evaluate him in the morning. Patient to be discussed with staff, Jerardo Thornton MD, for future OR planning. In the interim, once out of the OR will plan to obtain xrays of the left foot to aid in operative planning. Will update formal plan after seeing patient.     Antwan Rodrigues MD  Orthopedic Surgery Resident

## 2023-10-24 NOTE — BRIEF OP NOTE
[facility]    Brief Operative Note    Pre-operative diagnosis: Dry gangrene (H) [I96]  Post-operative diagnosis Same as pre-operative diagnosis    Procedure: ENDARTERECTOMY, FEMORAL, Left - Leg  ANGIOGRAM, Left - Leg  ANGIOPLASTY, Left Lower Extremity atherectomy, Left - Leg    Surgeon: Surgeon(s) and Role:     * Chuck Felix MBBS - Primary     * Licha Batista MD - Resident - Assisting     * Mario Sanchez - Resident - Assisting  Anesthesia: General   Estimated Blood Loss: 250 mL from 10/24/2023  8:06 AM to 10/24/2023  6:28 PM      Drains: None  Specimens:   ID Type Source Tests Collected by Time Destination   1 : Left Femoral Plaque Tissue Other SURGICAL PATHOLOGY EXAM Chuck Felix MBBS 10/24/2023 11:36 AM      Findings:   None  Complications: None.  Implants:   Implant Name Type Inv. Item Serial No.  Lot No. LRB No. Used Action   GRAFT PATCH VASC XENOSURE BIOLOGIC 0.8X08CM E0.8P8 - [id number]  GRAFT PATCH VASC XENOSURE BIOLOGIC 0.8X08CM E0.8P8  [employer]ASC[employer] IN [id number] Left 1 Implanted           To SICU   Pain - iv dilaudid prn, oxy prn, tylenol  OK for CLD ADAT  Bedrest tonight  Hep gtt 200u low dose at 8pm   Postop labs - cbc, bmp, mg, phos, coags   Keep dinh overnight

## 2023-10-24 NOTE — ANESTHESIA PROCEDURE NOTES
Airway       Patient location during procedure: OR  Staff -        Anesthesiologist:  Behrens, Christopher J, MD       Resident/Fellow: Adelso Middleton MD       Performed By: resident  Consent for Airway        Urgency: elective  Indications and Patient Condition       Indications for airway management: gris-procedural       Induction type:intravenous       Mask difficulty assessment: 1 - vent by mask    Final Airway Details       Final airway type: endotracheal airway       Successful airway: ETT - single and Oral  Endotracheal Airway Details        ETT size (mm): 8.0       Cuffed: yes       Successful intubation technique: video laryngoscopy       VL Blade Size: Glidescope 3       Grade View of Cords: 1       Adjucts: stylet       Position: Center       Measured from: gums/teeth       Secured at (cm): 24       Bite block used: None    Post intubation assessment        Placement verified by: capnometry and equal breath sounds        Number of attempts at approach: 2       Number of other approaches attempted: 0       Secured with: pink tape       Ease of procedure: easy       Dentition: Unchanged and Intact    Additional Comments       First attempt by medical student.        
Arterial Line Procedure Note    Pre-Procedure   Staff -        Anesthesiologist:  Behrens, Christopher J, MD       Resident/Fellow: Adelso Middleton MD       Performed By: resident       Location: OR       Pre-Anesthestic Checklist: patient identified, IV checked, risks and benefits discussed, informed consent, monitors and equipment checked, pre-op evaluation and at physician/surgeon's request  Timeout:       Correct Patient: Yes        Correct Procedure: Yes        Correct Site: Yes        Correct Position: Yes   Line Placement:   This line was placed Post Induction  Procedure   Procedure: arterial line       Laterality: right       Insertion Site: radial.  Sterile Prep        Standard elements of sterile barrier followed       Skin prep: Chloraprep  Insertion/Injection        Technique: ultrasound guided and Seldinger Technique        1. Ultrasound was used to evaluate the access site.       2. Artery evaluated via ultrasound for patency/adequacy.       3. Using real-time ultrasound the needle/catheter was observed entering the artery/vein.       5. The visualized structures were anatomically normal.       6. There were no apparent abnormal pathologic findings.       Catheter Type/Size: 20 G, 1.75 in/4.5 cm quick cath (integral wire)  Narrative        Tegaderm dressing used.       Complications: None apparent,        Arterial waveform: Yes    Comments:  IBP MAP lower due to wide pulse pressure (DBP mid 30s).        
PROVIDER:[TOKEN:[[id number]]]

## 2023-10-24 NOTE — PROGRESS NOTES
"VASCULAR SURGERY PROGRESS NOTE    Subjective:  To OR this morning, received 2 doses of hydralazine overnight for HTN.    Objective:    Intake/Output Summary (Last 24 hours) at 10/24/2023 0830  Last data filed at 10/23/2023 1154  Gross per 24 hour   Intake --   Output 550 ml   Net -550 ml     Labs:  ROUTINE IP LABS (Last four results)  BMP  Recent Labs   Lab 10/24/23  0658 10/24/23  0538 10/23/23  2228 10/23/23  1723 10/23/23  0811 10/23/23  0548 10/22/23  0832 10/22/23  0514 10/21/23  0854 10/21/23  0657     --   --   --   --  136  --  137  --  137   POTASSIUM 4.2  --   --   --   --  4.0  --  4.0  --  3.9   CHLORIDE 104  --   --   --   --  106  --  107  --  106   SHERYL 8.8  --   --   --   --  8.9  --  8.7*  --  8.5*   CO2 18*  --   --   --   --  21*  --  21*  --  20*   BUN 13.9  --   --   --   --  12.9  --  12.1  --  13.0   CR 0.70  --   --   --   --  0.77  --  0.73  --  0.70   GLC 83 83 93 99   < > 82   < > 87   < > 89    < > = values in this interval not displayed.     CBC  Recent Labs   Lab 10/24/23  0658 10/23/23  0548 10/22/23  0514 10/21/23  0657   WBC 6.4 6.0 6.4 7.9   RBC 3.83* 3.84* 3.85* 3.79*   HGB 12.1* 12.2* 12.1* 11.9*   HCT 36.2* 37.2* 37.1* 36.1*   MCV 95 97 96 95   MCH 31.6 31.8 31.4 31.4   MCHC 33.4 32.8 32.6 33.0   RDW 13.6 13.6 13.7 13.7    256 225 226     INRNo lab results found in last 7 days.  PHYSICAL EXAM:  BP (!) 149/71   Pulse 60   Temp 98.4  F (36.9  C) (Oral)   Resp 14   Ht 1.702 m (5' 7\")   Wt 64.4 kg (141 lb 15.6 oz)   SpO2 100%   BMI 22.24 kg/m    General: The patient is alert and oriented. Appropriate.   Psych: Pleasant affect, answers questions appropriately  Skin: Color appropriate for race, warm, dry.  Respiratory: The patient does not require supplemental oxygen. Breathing unlabored  GI:  Abdomen soft, nontender to light palpation.  Incision: Left antecubital site without swelling or bruising  Extremities: LLE with monophasic faint PT present in neutral " position, warm, with dry gangrene of first toe.    ASSESSMENT / PLAN:  Darian Engle is a 73 year old male with PMH  of CAD s/p CAB w/ LIMA and L GSV (2004), benign cystic neoplasm of the pancreas, T2DM, HTN, and active smoking who has dry gangrene of his L first toe. S/p angiogram 10/17 demonstrating SFA & SFA stent (known) occlusion with peroneal and PT runoff - both vessels with disease. Cardiac eval completed, normal EF and no cardiac interventions required. His cardiac risk based on RCRI score, the risk of major cardiac risk complications is ~10.1% 30 day risk of death, MI, or cardiac arrest. Upon further discussion with patient and family, it was determined that best course of action would be to undergo femoral endarterectomy, angioplasty, possible atherectomy, possible stent placement in the left lower extremity on 10/24/23. To OR today.      Neuro:   - tylenol sched 975 q8, oxy 5 q4 prn  - continue PTA donepezil, sertraline    Resp:   - encourage IS     CV:   - continue home coreg 25 BID, statin, asa 81, pletal, losartan 100 daily, increased home Imdur to 120mg, Norvasc 10mg daily.    - continues to be hypertensive, paged cardiology 2 times (10/23 and 10/24) for advice on initiation of next med (maxed on current regimen)  - holding home eliquis postop: lovenox therapeutic BID given paroxysmal afib (chadsvasc 6) Holding tonight's dose of lovenox due to OR tomorrow   - cardiology for preop cardiac risk assessment completed:              - stress MRI 10/18: normal biventricular function with prior inferior and inferoseptal infarction from RCA disease and a small apical infarction from LAD disease, chronic in nature, no interventions needed.    - functional METs is unknown due to limitation from leg pain. The patient's RCRI score is 2 [history of ischemic heart disease, history of cerebrovascular disease]. Based on RCRI score, the risk of major cardiac risk complications is ~10.1% 30 day risk of death, MI,  or cardiac arrest   - prns for HTN for SBP>160    Peripheral vascular:   - PAD, L great toe dry gangrene, in OR today for PAD procedure  - L CFA endarterectomy, possible atherectomy, possible stent placement in the left lower extremity on Tuesday 10/24, which would help patient heal a BKA.    - Podiatry/ortho consult for evaluation for L great toe amp: pending discussion with ortho staff on level of amputation/type of intervention      GI:   - Sodium restricted diet (on 1200mg @ home per PCP). NPO for OR  - Home omeprazole  - bowel reg (sched doc/senna BID while on narcotics, prn miralax)     :   - no dinh     Endo:   - hold metformin for now given upcoming procedure, may consider resuming over the weekend if needed, normoglycemic for the last 3 days  - sliding scale insulin in mean time, medium intensity     Misc:   - PT consult   - wound cares: please betadine paint L great toe daily and wrap with gauze/kerlix and keep covered.     Discussed pt history, exam, assessment with Dr. Batista who will discuss with staff      Roxann Fontanez, [facility]  Vascular Surgery  Pager: [phone]  [email or web address]  Send message or 10 digit call back number Securely via [employer] with the Vocera Web Console (learn more here)

## 2023-10-24 NOTE — OR NURSING
Attempted to call pt's wife, no answer.  Pt states she is aware of  her procedure and will be here this am.

## 2023-10-24 NOTE — PROGRESS NOTES
[facility]   Cardiology Progress Note    Assessment & Plan   Darian Engle is a 73 year old male with PMH of CAD s/p CABG w/ LIMA, T2DM, HTN, benign pancreas neoplasm who was admitted on 10/17/2023 for dry gangrene L toe. Cardiology consulted for hypertension.     # Resistant Hypertension  Patient with history of HTN managed outpatient with Coreg, Imdur, and losartan. Patient otherwise normotensive during clinic visits and reported presyncope previously. Has had SBP spikes in 200s throughout this hospitalization. While inpatient he is on amlodipine, 10 mg daily, Imdur increased to 120 mg daily, losartan increased to 100 mg daily. Has been given hydralazine 10 mg PRN. Suspect pain may be contributing to spikes in BP as well as known severe PAD.       - recommend adding aldactone 25 mg daily  - suggest measuring BP in one arm consistently given vascular disease   - pain management per primary  - will plan to see patient tomorrow when out of OR    Pt was seen and examined with staff attending, who agrees with the assessment and plan.    Andrew Patton, MS4    Oriana Alfaro, APRN, CNP  [facility]     Interval History   Darian Engle is a 73 year old male with PMH of CAD s/p CABG w/ LIMA, T2DM, HTN, benign pancreas neoplasm who was admitted on 10/17/2023 for dry gangrene L toe. SBP has increased to 200s, usually in the mornings. Patient has also been taking oxycodone for pain PRN.     BP outpatient has been managed with PTA meds with SBP in 110s. Patient also reported presyncope symptoms previously.     ROS: not completed, patient in the OR.    Data reviewed today: I reviewed all new labs and imaging results over the last 24 hours. I personally reviewed:    Physical Exam   Temp: 98.4  F (36.9  C) Temp src: Oral BP: (!) 149/71 Pulse: 60   Resp: 14 SpO2: 100 % O2 Device: None (Room air)    Vitals:    10/17/23 0936 10/17/23 1739   Weight: 68.2 kg (150 lb 5.7 oz) 64.4 kg (141 lb 15.6 oz)     Vital Signs with Ranges  Temp:   "[98  F (36.7  C)-98.8  F (37.1  C)] 98.4  F (36.9  C)  Pulse:  [58-72] 60  Resp:  [14-18] 14  BP: (136-189)/(41-71) 149/71  SpO2:  [97 %-100 %] 100 %  I/O last 3 completed shifts:  In: -   Out: 550 [Emesis/NG output:550]     , Blood pressure (!) 149/71, pulse 60, temperature 98.4  F (36.9  C), temperature source Oral, resp. rate 14, height 1.702 m (5' 7\"), weight 64.4 kg (141 lb 15.6 oz), SpO2 100%.  141 lbs 15.62 oz  No physical exam, patient in OR.     Medications    [Auto Hold] - MEDICATION INSTRUCTIONS -      lactated ringers      norepinephrine        [Auto Hold] acetaminophen  975 mg Oral Q8H    [Auto Hold] amLODIPine  10 mg Oral Daily    [Auto Hold] aspirin  81 mg Oral Daily    [Auto Hold] atorvastatin  40 mg Oral Daily    [Auto Hold] carvedilol  25 mg Oral BID    ceFAZolin  2 g Intravenous See Admin Instructions    [Auto Hold] cilostazol  100 mg Oral BID    [Auto Hold] donepezil  10 mg Oral At Bedtime    [Held by provider] enoxaparin ANTICOAGULANT  1 mg/kg Subcutaneous Q12H    [Auto Hold] insulin aspart  1-7 Units Subcutaneous TID AC    [Auto Hold] insulin aspart  1-5 Units Subcutaneous At Bedtime    [Auto Hold] isosorbide mononitrate  120 mg Oral Daily    [Auto Hold] losartan  100 mg Oral Daily    [Auto Hold] pantoprazole  40 mg Oral QAM AC    [Auto Hold] senna-docusate  1 tablet Oral BID    Or    [Auto Hold] senna-docusate  2 tablet Oral BID    [Auto Hold] sertraline  25 mg Oral Daily    sodium chloride (PF)  3 mL Intracatheter Q8H       Data   Recent Labs   Lab 10/24/23  0927 10/24/23  0658 10/24/23  0538 10/23/23  0811 10/23/23  0548 10/22/23  0832 10/22/23  0514   WBC  --  6.4  --   --  6.0  --  6.4   HGB 11.3* 12.1*  --   --  12.2*  --  12.1*   MCV  --  95  --   --  97  --  96   PLT  --  280  --   --  256  --  225    135  --   --  136  --  137   POTASSIUM 4.2 4.2  --   --  4.0  --  4.0   CHLORIDE  --  104  --   --  106  --  107   CO2  --  18*  --   --  21*  --  21*   BUN  --  13.9  --   --  12.9  " --  12.1   CR  --  0.70  --   --  0.77  --  0.73   ANIONGAP  --  13  --   --  9  --  9   SHERYL  --  8.8  --   --  8.9  --  8.7*   GLC 82 83 83   < > 82   < > 87    < > = values in this interval not displayed.       No results found for this or any previous visit (from the past 24 hour(s)).

## 2023-10-25 NOTE — OR NURSING
Per Vascular Surgery Resident Dr. Batista, no need to do pulse checks in feet (DP &TB) in post op. Pt does not have palpable/dopplerable pulses in feet

## 2023-10-25 NOTE — PROGRESS NOTES
CLINICAL NUTRITION SERVICES - ASSESSMENT NOTE     Nutrition Prescription    RECOMMENDATIONS FOR MDs/PROVIDERS TO ORDER:  - Recommend increasing bowel regimen, last BM 10/19  - MVI-M daily - will order as recommendation for provider review & signature   - Encourage small, frequent PO intake     Malnutrition Status:    Moderate malnutrition in the context of chronic illness/disease    Recommendations already ordered by Registered Dietitian (RD):  Ensure Enlive BID - chocolate   Calorie counts 10/26 - 10/28    Future/Additional Recommendations:  -If calorie counts show less than 75% of estimated intakes (~1275 kcal/day, 75 g protein/day), may need to consider EN if w/in pt's GOC  -Monitor nutrition-related findings and follow pt per protocol     REASON FOR ASSESSMENT  Darian Engle is a/an 73 year old male assessed by the dietitian for LOS    Patient admitted for dry gangrene of his L first toe. Transferred to ICU after OR yesterday (s/p L femoral endarterectomy and angiogram with angioplasty and atherectomy) for post angiogram for cardiac eval, pain control, and wound cares.       MEDICAL HISTORY  PMH of CAD s/p CAB w/ LIMA and L GSV (2004), benign cystic neoplasm of the pancreas, T2DM, HTN, and active smoking     NUTRITION HISTORY  Pt reports no changes to appetite or intake from PTA. Pt's family memeber at bedside, who reports that pt has had n/v for the last few days, and PTA she has Ensure at home for pt, and he drinks it 'when he remembers', and, that pt had a 100 - 150 lb wt loss about a year ago d/t reduced intake resulting from pain from pancreatic cysts.     CURRENT NUTRITION ORDERS  Diet:  Clear Liquid, pre-op 2 gm sodium diet   Intake/Tolerance: Variable, 25 - 75% majority of time, meals BID. 7-day avg of 740 kcal/day (44% estimated kcal needs) and 32 g protein/day (31% estimated protein needs) per RS Meal order review if pt were eating 100%      GI  Daily emesis for the past 4 days  Last BM 10/19      MEDICATIONS  Sliding scale insulin, senna-docusate, PRN zofran.     LABS  Electrolytes  Potassium (mmol/L)   Date Value   10/25/2023 4.5   10/24/2023 4.7   10/24/2023 4.2   02/21/2023 4.1   05/13/2022 5.1   09/21/2021 4.5   09/02/2020 4.0   08/02/2019 4.8   11/26/2018 4.4     Potassium POCT (mmol/L)   Date Value   10/24/2023 4.1   10/24/2023 4.2   10/24/2023 4.2     Phosphorus (mg/dL)   Date Value   10/25/2023 4.0   10/24/2023 3.8   10/24/2023 3.0   10/23/2023 3.6   10/22/2023 3.4   09/02/2020 3.6    Blood Glucose  Glucose (mg/dL)   Date Value   10/25/2023 125 (H)   10/24/2023 167 (H)   10/24/2023 83   02/21/2023 96   05/13/2022 164 (H)   09/02/2020 157 (H)   08/02/2019 231 (H)   11/26/2018 163 (H)   12/18/2017 163 (H)   02/03/2017 83     GLUCOSE BY METER POCT (mg/dL)   Date Value   10/25/2023 86   10/25/2023 119 (H)   10/24/2023 173 (H)   10/24/2023 161 (H)   10/24/2023 83     Glucose Whole Blood POCT (mg/dL)   Date Value   10/24/2023 80   10/24/2023 85   10/24/2023 82     Hemoglobin A1C (%)   Date Value   10/17/2023 5.5   06/19/2023 5.9 (H)   02/21/2023 5.5   09/08/2022 5.8 (H)   05/13/2022 7.5 (H)   04/09/2021 7.0 (H)   01/04/2021 8.1 (H)   09/02/2020 8.2 (H)   12/03/2019 8.6 (H)   08/02/2019 9.5 (H)    Inflammatory Markers  WBC (10e9/L)   Date Value   06/03/2021 7.1   02/03/2017 9.8     WBC Count (10e3/uL)   Date Value   10/25/2023 13.1 (H)   10/24/2023 14.0 (H)   10/24/2023 16.0 (H)     Albumin (g/dL)   Date Value   02/21/2023 3.4   05/13/2022 3.2 (L)   09/02/2020 3.7      Magnesium (mg/dL)   Date Value   10/25/2023 2.5 (H)   10/24/2023 1.6 (L)   10/24/2023 1.8   06/03/2021 1.8     Sodium (mmol/L)   Date Value   10/25/2023 135   10/24/2023 134 (L)   10/24/2023 135   09/02/2020 141   08/02/2019 141   11/26/2018 141     Sodium POCT (mmol/L)   Date Value   10/24/2023 136   10/24/2023 136   10/24/2023 135    Renal  Urea Nitrogen (mg/dL)   Date Value   10/25/2023 18.6   10/24/2023 15.0   10/24/2023 13.9  "  02/21/2023 12   05/13/2022 31 (H)   09/02/2020 15   08/02/2019 26   11/26/2018 16     Creatinine (mg/dL)   Date Value   10/25/2023 0.76   10/24/2023 0.78   10/24/2023 0.70   09/02/2020 0.92   08/02/2019 0.99   11/26/2018 0.87     Additional  Triglycerides (mg/dL)   Date Value   07/13/2021 90   09/02/2020 93   08/02/2019 79   04/03/2018 89     Ketones Urine (mg/dL)   Date Value   08/02/2019 Negative        RESPIRATORY  Supplemental O2, nasal cannula      SKIN  Surgical incision(s) noted, gangrene L toe     ANTHROPOMETRICS  Height: 170.2 cm (5' 7\")  Most Recent Weight: 67.8 kg (149 lb 7.6 oz)    IBW: 67.3 kg   101%IBW   Body mass index is 23.41 kg/m . BMI Category: Normal BMI    Weight History:   Wt stable over the past year   Wt Readings from Last 15 Encounters:   10/25/23 67.8 kg (149 lb 7.6 oz)   07/31/23 68.5 kg (151 lb)   06/19/23 68.7 kg (151 lb 8 oz)   02/21/23 69.9 kg (154 lb 3.2 oz)   12/01/22 69.4 kg (153 lb)   09/08/22 67.9 kg (149 lb 12.8 oz)   05/27/22 79.6 kg (175 lb 6.4 oz)   05/13/22 79.2 kg (174 lb 9.6 oz)   04/26/22 84 kg (185 lb 3.2 oz)   03/15/22 91.6 kg (202 lb)   12/28/21 93.9 kg (207 lb)   09/21/21 93 kg (205 lb)   07/12/21 94.3 kg (208 lb)   06/03/21 93 kg (205 lb)   04/09/21 90.7 kg (200 lb)       ASSESSED NUTRITION NEEDS  Dosing Weight: 68 kg (Actual BW)   Estimated Energy Needs: 1700 -  2040 kcals/day (25 - 30 kcals/kg)  Justification: Maintenance  Estimated Protein Needs: 102 - 136 grams protein/day (1.5 - 2 grams of pro/kg)  Justification: Increased needs and Wound healing  Estimated Fluid Needs: 1700 - 2040 mL/day (1 mL/kcal)   Justification: Maintenance    PHYSICAL FINDINGS  See malnutrition section below.  Non-healing wound     MALNUTRITION  % Intake: </= 50% for >/= 5 days (severe)  % Weight Loss: Weight loss does not meet criteria for malnutrition   Subcutaneous Fat Loss: Upper arm:  mild   Muscle Loss: Temporal:  moderate, Thoracic region (clavicle, acromium bone, deltoid, " trapezius, pectoral):  moderate, Upper leg (quadricep, hamstring):  moderate, Patellar region:  moderate, and Posterior calf:  moderate  Fluid Accumulation/Edema: None noted  Malnutrition Diagnosis: Moderate malnutrition in the context of chronic illness/disease    NUTRITION DIAGNOSIS  Inadequate protein-energy intake related to nausea/vomiting, NPO for OR as evidenced by diet order review, meal order review    INTERVENTIONS  Implementation  Nutrition education for recommended modifications   Medical food supplement therapy  Multivitamin/mineral supplement therapy     Goals  Patient to consume % of nutritionally adequate meal trays TID, or the equivalent with supplements/snacks.        Monitoring/Evaluation  Progress toward goals will be monitored and evaluated per protocol.    Kaila Harris, MPH, RDN, LD  4E (SICU) RD pager: [phone] Ascom: [id number]  Weekend/Holiday RD pager [phone]

## 2023-10-25 NOTE — PROGRESS NOTES
[facility]  Vascular Surgery Attending    POD# 1: Left Common Femoral Endarterectomy, Atherectomy SFA/Popliteal and tibioperoneal trunk. PTA Left SFA. Angiogram    Flow restored via Peroneal artery as primary runoff with collaterals filling PT and ankle. Flow noted in lateral plantar artery and flow into foot ; digital ateries not noted to have flow.    Awake, alert.  C/O pain in left calf when palpated.  Pain in left foot also noted however appears different in nature cp pre op; no longer sharp.    Left groin: soft, no swelling; dressing clean and dry.    No swelling left thigh,calf.  Soft left calf muscle to palpation. Note tender to pal[pation.    Biphasic Left PT Signal.    A/P  Doing well  Troponin check  xfer to step down.  Heplock IV  Note he has been on 200 unit(s)/hr iv heparin at constant rate at my behest for small vessel vasodilatory effect.  Given need for anticoagulation for a fib will increase iv heparin to therapeutic level.  Patient will need TMA;  He should have sufficient perfusion to heal a TMA.   This is his last shot at limb salvage. He understands and accepts that there is a possibility that a TMA may not heal given his pedal disease.  The durability of yesterdays procedure is limited given the extent of disease.and nature of intervention He should therefor undergo TMA -- Friday 27 at earliest or early next week.   I will contact Orthopedic attending to discuss.    - Note Dr Norberto gardiner.Awaiting reply.  - I am away till Nov 7; Dr Sanchez and on call vascular surgeons will be covering in my absence.

## 2023-10-25 NOTE — PLAN OF CARE
Admitted/transferred from: PACU  Reason for admission/transfer: Post femoral endartectomy  Patient status upon admission/transfer: Patient alert, on 4 liters FiO2, O x 4.  Rt femoral site and left foot with dressings intact.  Right radial karl in place.  Having Intermittent bouts of nausea/emesis.    Interventions: Groin and CMS checks per order.  Hydralazine and Labetalol given twice to keep SBP < 160.    Pain control difficult.  Scheduled tylenol, 5 mg oxycodone, IV dilaudid.   Additional one time dose of  IV dilaudid and increasing   To 10 mg Oxycodone ordered.  MIV at 100 ml/hr.  Straight rate, low dose heparin gtt began at 200 unit(s)/hr.    Plan: discontinue dinh this morning.  Continue antiemetics as needed,  Advance diet as able. OOB today.   2 RN skin assessment: completed by Writeflorina and Aranza OTT RN  Result of skin assessment and interventions/actions: blanchable redness on coccyx.  Sacral mepilex in place.    Assist weight shifting as needed.  Elevated left foot.   Height, weight, drug calc weight: done  Patient belongings: One bag from previous floor.   MDRO education (if applicable): N/A

## 2023-10-25 NOTE — CONSULTS
Cardiology Inpatient Consultation  October 25, 2023    Reason for Consult:  A cardiology consult was requested by Dr. Batista from the Vascular service to provide clinical guidance regarding HTN management.    Assessment and Recommendation:  Darian Engle is a 73 year old male with a PMhx of CAD s/p CABG (LIMA-LAD, SVG-OM1, SVG-OM2, SVG-diag 2004), T2DM, HTN, benign neoplasm of pancreas and PAD with dry gangrene of L)toe who is now s/p L)femoral endarterectomy, angioplasty 10/24. Cardiology was consulted for assistance with resistant HTN 10/24 (pt in OR so seen formally today)    # Resistant HTN  # CAD s/p CABG x 4  # PAD  Cardiology was previously consulted for pre-op risk stratification and BP control and had started patient on Norvasc, increased Losartan and increased Imdur.  Per chart review (and pt verification), about 6 months ago he had some syncopal episodes and dizziness that his cardiologist felt was related to orthostatic HOTN. He was then taken off Nifedipine. Patient reports has not had any further issues with passing out since then.  While in hospital, BP remains elevated, was briefly on Nicardipine gtt overnight. BP this am, 130's-160's/50's. He reports pain has greatly improved since days prior, but continues to have some pain in left leg  - Continue to monitor BP today  - If more BP control needed, would start Aldactone 25 mg daily  - Continue Norvasc 10 mg daily, Coreg 25 mg BID, Losartan 100 mg daily, Imdur 120 mg daily    Patient seen and discussed with Dr. Mireles, who agrees with above plan.      Thank you for consulting the cardiovascular services at the [facility]. Please do not hesitate to call us with any questions.     Oriana CHUN, CNP  [facility] Cardiology Consult Team  [phone]    HPI:   Darian Engle is a 73 year old male with a PMhx of CAD s/p CABG (LIMA-LAD, SVG-OM1, SVG-OM2, SVG-diag 2004), T2DM, HTN, benign neoplasm of pancreas and PAD with  dry gangrene of L)toe who is now s/p L)femoral endarterectomy, angioplasty 10/24. Cardiology was consulted for assistance with resistant HTN 10/24 (pt in OR so seen formally today)    Cardiology was originally consulted 10/17 for pre-op risk stratification prior to vascular surgery. He was hypertensive at that time, cards had recommended starting Norvasc 10, increasing Imdur to 120 mg (from 60) and increasing Losartan to 100 mg daily (from 50 mg). His PTA Nifedipine is also on hold (and remains on hold).     BP continues to fluctuate, yesterday 149-170/54-71. We had recommended starting Aldactone 25 mg daily, this has not been started yet. He is s/p L)fem endarterectomy, was briefly on NIcardipine overnight, now off. BP since off gtt, has been 134-167/45-54.     At the time of interview, the patient denies chest pain, dyspnea at rest or with exertion, orthopnea, PND, palpitations, lightheadedness, or syncope. He continues to endorse some pain in LLE, but reports has been improving.     Review of Systems:    Complete review of systems was performed and negative except per HPI.    PMH:  Past Medical History:   Diagnosis Date    Arthritis March 2018    joint pain both hands    Diabetes (H)     Heart disease 1991    Swiss-angioplasty    Hypertension      Active Problems:  Patient Active Problem List    Diagnosis Date Noted    Dry gangrene (H) 10/17/2023     Priority: Medium    Cyst of pancreas 06/19/2023     Priority: Medium    Dementia without behavioral disturbance (H) 02/21/2023     Priority: Medium    PAF (paroxysmal atrial fibrillation) (H) 02/21/2023     Priority: Medium    Orthostatic hypotension 05/23/2022     Priority: Medium    Syncope and collapse 05/22/2022     Priority: Medium    Angina pectoris (H24) 05/13/2022     Priority: Medium    Obstructive sleep apnea syndrome 01/20/2021     Priority: Medium     Seen at [facility]; diagnosed with moderate TALA by Dr. Herve Louise; Sept  2019.      Type 2 diabetes mellitus without retinopathy (H) 06/27/2018     Priority: Medium    Bilateral incipient cataracts 06/27/2018     Priority: Medium    PAD (peripheral artery disease) (H24) 12/18/2017     Priority: Medium    Claudication in peripheral vascular disease (H24) 07/20/2017     Priority: Medium    Acute ST elevation myocardial infarction (STEMI) due to occlusion of right coronary artery (H) 05/08/2017     Priority: Medium    CAD, multiple vessel 01/27/2017     Priority: Medium    Essential hypertension with goal blood pressure less than 140/90 01/27/2017     Priority: Medium    Type 2 diabetes mellitus with complication, with long-term current use of insulin (H) 01/27/2017     Priority: Medium    Hyperlipidemia LDL goal <100 01/27/2017     Priority: Medium    Gastroesophageal reflux disease without esophagitis 01/27/2017     Priority: Medium    Callahan's esophagus 09/12/2016     Priority: Medium    Binge eating disorder 02/12/2016     Priority: Medium     Formatting of this note might be different from the original.  [place] intake 2/2016      Periodic limb movement disorder (PLMD) 12/03/2014     Priority: Medium     Formatting of this note might be different from the original.  Per [facility]  PLMs and may benefit from further evaluation with blood tests for Ferritin and Iron levels.      Abnormal electrocardiography 05/16/2014     Priority: Medium    Dyslipidemia 03/21/2011     Priority: Medium     Formatting of this note might be different from the original.  05/07/13, Lipids:  TC 99, TG 89, LDL 58, HDL 23      Alcohol abuse 11/13/2007     Priority: Medium     Formatting of this note might be different from the original.  Previous treatment  ; Alcohol Abuse, episodic      Microalbuminuria 02/07/2007     Priority: Medium    Postsurgical aortocoronary bypass status 04/23/2004     Priority: Medium     Formatting of this note might be different from the original.  4/04 Dr. Powell [facility]  ;  AORTOCORONARY BYPASS STATUS(aka CABG)      Reflux esophagitis 2003     Priority: Medium     Social History:  Social History     Tobacco Use    Smoking status: Every Day     Packs/day: 0.50     Years: 5.00     Additional pack years: 0.00     Total pack years: 2.50     Types: Cigarettes     Start date: 1968     Last attempt to quit: 7/15/2016     Years since quittin.2    Smokeless tobacco: Former   Vaping Use    Vaping Use: Never used   Substance Use Topics    Alcohol use: Yes     Comment: binge, two months sober    Drug use: Yes     Types: Marijuana     Comment: uses Marijuana for pain     Family History:  Family History   Problem Relation Age of Onset    Glaucoma Mother     Macular Degeneration Mother     Macular Degeneration Other     Lung Cancer Brother        Medications:   acetaminophen  975 mg Oral Q8H    amLODIPine  10 mg Oral Daily    aspirin  81 mg Oral Daily    atorvastatin  40 mg Oral Daily    carvedilol  25 mg Oral BID    ceFAZolin  1 g Intravenous Q8H    cilostazol  100 mg Oral BID    donepezil  10 mg Oral At Bedtime    gabapentin  100 mg Oral TID    insulin aspart  1-7 Units Subcutaneous TID AC    insulin aspart  1-5 Units Subcutaneous At Bedtime    isosorbide mononitrate  120 mg Oral Daily    losartan  100 mg Oral Daily    pantoprazole  40 mg Oral QAM AC    senna-docusate  1 tablet Oral BID    Or    senna-docusate  2 tablet Oral BID    sertraline  25 mg Oral Daily    sodium chloride (PF)  3 mL Intracatheter Q8H        - MEDICATION INSTRUCTIONS -      heparin 200 Units/hr (10/25/23 0700)    lactated ringers 70 mL/hr at 10/24/23 2145    niCARdipine Stopped (10/24/23 2315)    sodium chloride 100 mL/hr at 10/25/23 0424       Physical Exam:  Temp:  [97  F (36.1  C)-98.8  F (37.1  C)] 98.8  F (37.1  C)  Pulse:  [64-96] 66  Resp:  [8-28] 12  BP: (115-169)/(40-82) 134/45  MAP:  [44 mmHg-96 mmHg] 56 mmHg  Arterial Line BP: ()/(26-60) 124/28  SpO2:  [95 %-100 %] 97 %    Intake/Output Summary  (Last 24 hours) at 10/25/2023 0820  Last data filed at 10/25/2023 0700  Gross per 24 hour   Intake 2344.22 ml   Output 1435 ml   Net 909.22 ml     GEN: pleasant, no acute distress  HEENT: no icterus  CV: RRR, normal s1/s2, no murmurs/rubs/s3/s4, no heave.   CHEST: clear to ausculation bilaterally, no rales or wheezing  ABD: soft, non-tender, normal active bowel sounds  EXTR: pulses intact, LLE currently wrapped; did not remove dressing. No clubbing, cyanosis or edema.   NEURO: alert oriented, speech fluent/appropriate, motor grossly nonfocal    Diagnostics:  All labs and imaging were reviewed, of note:    CMP  Recent Labs   Lab 10/25/23  0420 10/25/23  0418 10/24/23  2137 10/24/23  1952/23  1938 10/24/23  1411 10/24/23  1157 10/24/23  0927 10/24/23  0658 10/23/23  0811 10/23/23  0548     --   --   --  134* 136 136   < > 135  --  136   POTASSIUM 4.5  --   --   --  4.7 4.1 4.2   < > 4.2  --  4.0   CHLORIDE 104  --   --   --  100  --   --   --  104  --  106   CO2 20*  --   --   --  15*  --   --   --  18*  --  21*   ANIONGAP 11  --   --   --  19*  --   --   --  13  --  9   * 119* 173* 161* 167* 80 85   < > 83   < > 82   BUN 18.6  --   --   --  15.0  --   --   --  13.9  --  12.9   CR 0.76  --   --   --  0.78  --   --   --  0.70  --  0.77   GFRESTIMATED >90  --   --   --  >90  --   --   --  >90  --  >90   SHERYL 8.1*  --   --   --  8.1*  --   --   --  8.8  --  8.9   MAG 2.5*  --   --   --  1.6*  --   --   --  1.8  --  1.7   PHOS 4.0  --   --   --  3.8  --   --   --  3.0  --  3.6    < > = values in this interval not displayed.     CBC  Recent Labs   Lab 10/25/23  0420 10/24/23  2235 10/24/23  1938 10/24/23  1411 10/24/23  0927 10/24/23  0658   WBC 13.1* 14.0* 16.0*  --   --  6.4   RBC 3.19* 3.45* 3.77*  --   --  3.83*   HGB 10.2* 11.0* 12.0* 11.4*   < > 12.1*   HCT 30.2* 33.2* 35.9*  --   --  36.2*   MCV 95 96 95  --   --  95   MCH 32.0 31.9 31.8  --   --  31.6   MCHC 33.8 33.1 33.4  --   --  33.4   RDW  "14.0 13.9 13.9  --   --  13.6    250 266  --   --  280    < > = values in this interval not displayed.     INR  Recent Labs   Lab 10/24/23  1938   INR 1.08     Arterial Blood Gas  Recent Labs   Lab 10/24/23  1411 10/24/23  1157 10/24/23  0927   PH 7.37 7.40 7.38   PCO2 36 36 35   PO2 166* 168* 165*   HCO3 21 22 20*   O2PER 36.0 37.0 40.0       No results found for: \"TROPI\", \"TROPONIN\", \"TROPR\", \"TROPN\"      EKG 10/18/2023: NSR HR 67      Transthoracic echocardiogram 9/21/2023:   Interpretation Summary  Examination done in the setting of severe hypertension.  Global and regional left ventricular function is normal with an EF of 55-60%.  Right ventricular function and size are normal.  No significant valvular abnormalities.  Right ventricular systolic pressure is 32mmHg above the right atrial pressure.  No pericardial effusion is present.  This study was compared with the study from 3/1/2021. The mitral and aortic  regurgitation were both present on the last study but are worsened due to the  patient's severe hypertension.    Medical Decision Making   60 MINUTES SPENT BY ME on the date of service doing chart review, history, exam, documentation & further activities per the note.       "

## 2023-10-25 NOTE — PROGRESS NOTES
"VASCULAR SURGERY PROGRESS NOTE    Subjective:  HTN overnight in SICU required PRN hydralazine and labetalol, nicardipine drip off. Underwent left femoral endarterectomy and LLE atherectomy. Pain medicated with dilaudid, oxycodone, scheduled tylenol.     Objective:    Intake/Output Summary (Last 24 hours) at 10/25/2023 0858  Last data filed at 10/25/2023 0700  Gross per 24 hour   Intake 2344.22 ml   Output 1435 ml   Net 909.22 ml       Labs:  ROUTINE IP LABS (Last four results)  BMP  Recent Labs   Lab 10/25/23  0420 10/25/23  0418 10/24/23  2137 10/24/23  1952/23  1938 10/24/23  1411 10/24/23  1157 10/24/23  0927 10/24/23  0658 10/23/23  0811 10/23/23  0548     --   --   --  134* 136 136   < > 135  --  136   POTASSIUM 4.5  --   --   --  4.7 4.1 4.2   < > 4.2  --  4.0   CHLORIDE 104  --   --   --  100  --   --   --  104  --  106   SHERYL 8.1*  --   --   --  8.1*  --   --   --  8.8  --  8.9   CO2 20*  --   --   --  15*  --   --   --  18*  --  21*   BUN 18.6  --   --   --  15.0  --   --   --  13.9  --  12.9   CR 0.76  --   --   --  0.78  --   --   --  0.70  --  0.77   * 119* 173* 161* 167* 80 85   < > 83   < > 82    < > = values in this interval not displayed.     CBC  Recent Labs   Lab 10/25/23  0420 10/24/23  2235 10/24/23  1938 10/24/23  1411 10/24/23  0927 10/24/23  0658   WBC 13.1* 14.0* 16.0*  --   --  6.4   RBC 3.19* 3.45* 3.77*  --   --  3.83*   HGB 10.2* 11.0* 12.0* 11.4*   < > 12.1*   HCT 30.2* 33.2* 35.9*  --   --  36.2*   MCV 95 96 95  --   --  95   MCH 32.0 31.9 31.8  --   --  31.6   MCHC 33.8 33.1 33.4  --   --  33.4   RDW 14.0 13.9 13.9  --   --  13.6    250 266  --   --  280    < > = values in this interval not displayed.     INR  Recent Labs   Lab 10/24/23  1938   INR 1.08     PHYSICAL EXAM:  /45   Pulse 66   Temp 98.8  F (37.1  C) (Oral)   Resp 12   Ht 1.702 m (5' 7\")   Wt 67.8 kg (149 lb 7.6 oz)   SpO2 97%   BMI 23.41 kg/m    General: The patient is alert and " oriented. Appropriate.   Psych: Pleasant affect, answers questions appropriately  Skin: Color appropriate for race, warm, dry.  Respiratory: The patient does require supplemental oxygen. Breathing unlabored  GI:  Abdomen soft, nontender to light palpation.  Incision: Left antecubital site without swelling or bruising  Extremities: LLE with monophasic faint PT present, warm, with dry gangrene of first toe. Left calf compartments soft, tender to light palpation. Without erythema or swelling.     ASSESSMENT / PLAN:  Darian Engle is a 73 year old male with PMH  of CAD s/p CAB w/ LIMA and L GSV (2004), benign cystic neoplasm of the pancreas, T2DM, HTN, and active smoking who has dry gangrene of his L first toe. S/p angiogram 10/17 demonstrating SFA & SFA stent (known) occlusion with peroneal and PT runoff - both vessels with disease. Cardiac eval completed, normal EF and no cardiac interventions required. His cardiac risk based on RCRI score, the risk of major cardiac risk complications is ~10.1% 30 day risk of death, MI, or cardiac arrest. S/p femoral endarterectomy, angioplasty, atherectomy in left lower extremity on 10/24/23.      Neuro:   - tylenol sched 975 q8, oxy 5 q4 prn, gabapentin 100 TID, robaxin PRN, atarax PRN.   - continue PTA donepezil, sertraline    Resp:   - encourage IS     CV:   - continue home coreg 25 BID, statin, asa 81, pletal, losartan 100 daily, increased home Imdur to 120mg, Norvasc 10mg daily. Will add aldactone 25 mg daily if remains HTN today.   - holding home eliquis postop: stopped lovenox, started heparin drip post vascular intervention but also with paroxysmal afib (chadsvasc 6)   - cardiology for preop cardiac risk assessment completed:              - stress MRI 10/18: normal biventricular function with prior inferior and inferoseptal infarction from RCA disease and a small apical infarction from LAD disease, chronic in nature, no interventions needed.    - functional METs is  unknown due to limitation from leg pain. The patient's RCRI score is 2 [history of ischemic heart disease, history of cerebrovascular disease]. Based on RCRI score, the risk of major cardiac risk complications is ~10.1% 30 day risk of death, MI, or cardiac arrest   - prns for HTN for SBP>160    Peripheral vascular:   - PAD, L great toe dry gangrene, s/p femoral endarterectomy, angioplasty, atherectomy in left lower extremity on 10/24/23 which should help heal left foot incisions.   - Podiatry/ortho following for L great toe amp: pending discussion with ortho staff on level of amputation/type of intervention: intervention late this week or early next week.   - Heparin drip low dose with therapeutic goals initiated     GI:   - Sodium restricted diet (on 1200mg @ home per PCP)  - Home omeprazole   - bowel reg (sched doc/senna BID while on narcotics, prn miralax)     :   - no dinh     Endo:   - hold metformin for now given upcoming procedure, may consider resuming if needed, mostly normoglycemic  - sliding scale insulin in mean time, medium intensity     Misc:   - PT consult   - wound cares: please betadine paint L great toe daily and wrap with gauze/kerlix and keep covered.     ADDENDUM: Checked troponin this morning, patient elevated lactate yesetrday after surgery. First Troponin at 88, repeat was 138. Spoke with cardiology fellow on call who asked to stop checking troponins as it is expected after surgery. No other interventions needed. We will repeat one more troponin level tomorrow morning and stop trending them.    Discussed pt history, exam, assessment with Dr. Batista who will discuss with staff      oRxann Fontanez, [facility]  Vascular Surgery  Pager: [phone]  [email or web address]  Send message or 10 digit call back number Securely via [employer] with the Vocera Web Console (learn more here)

## 2023-10-25 NOTE — PLAN OF CARE
Major Shift Events: Floor orders placed. Mercado removed, Art line removed. Heparin protocol in place.     Neuro: Fully intact. Pain decreased from 10/10 this morning to 2/10 at end of shift. PRN dilaudid and Robaxin given. Scheduled tylenol.  CV: Troponin 136 increased from previous lab. NSR, SBP stayed within goal of <160. Afebrile.  Resp: RA, LS clear.  GI: Regular diet. Nausea intermittently. Compazine and Zofran given with good effect. No BM this shift.  : Voiding regularly. No retention.  Skin: Scattered bruising. Scattered scabs on both shins. Left groin and left brachial incision. Black left toe.  IV: 2 Left PIV  Drips: Heparin 650units/hr, NS 10ml/hr    Plan: Transfer to floor when bed is available. Follow Heparin protocol. Control pain.    For complete assessment and vital data see flowsheets.

## 2023-10-25 NOTE — OP NOTE
Date of surgery: Tuesday, 24 October 2023    Location: [facility]    Surgeon: Dr Felix    Assistant: Dr Batista    Anesthesiologist: Staff anesthesia    Anesthesia: General anesthesia    Preop diagnosis: Left leg atherosclerosis with gangrene    Postop diagnosis: Left leg atherosclerosis with gangrene    Procedure:  Left common femoral endarterectomy (bovine pericardium patch closure) and profundoplasty  Antegrade 6 Zambian sheath placement in the left common femoral artery  Atherectomy of left superficial femoral artery, popliteal artery and tibioperoneal trunk  Drug-coated balloon angioplasty of left superficial femoral artery  Filter /embolic protection device placed in left popliteal artery  Filter /embolic protection device placed in left peroneal artery  Left leg angiogram    Devices used  [employer] atherectomy device 2.0 solid crown  Emboshield porter 6 embolic protection 7.2mm  Emboshield porter 6 embolic protection 5.0mm  6mm x 10cm drug-coated balloon  0.8cm x 8cm bovine pericardial patch    Indication: This is a 73-year-old gentleman that I had originally seen in the Thursday afternoon fellows clinic who presented with dry gangrene of his left great toe.  He had been seen at [facility] and was advised primary amputation and came to see us for second opinion.  He was noted on CT imaging to have heavily calcified aorta iliac segment with an occluded left common femoral artery.  His vessels were noted to be generally heavily calcified.  I was unable to adequately visualize his tibial runoff and therefore performed angiogram via a left brachial artery approach last week.  This showed left common femoral artery occlusion with a very well-developed left profunda femoris artery.  The SFA was occluded with 2 stents noted proximally and distally.  His popliteal artery reconstituted in the below-knee popliteal artery and was also heavily calcified.  The peroneal artery was  the primary runoff with a heavily calcified posterior tibial artery distally coursing into the lateral plantar artery.  He was admitted postprocedure and underwent cardiology evaluation.  This revealed some small areas of reversible defects on stress imaging and was deemed not to require further intervention as per cardiology.  I discussed the options both with the patient and his wife.  They were undecided about primary amputation versus limb salvage attempt but finally opted for the limb salvage attempt.  Given the reversible ischemic findings in combination with a marginal target artery I decided against bypass as an option (he had contralateral greater saphenous vein that was of adequate diameter with a possible composite sequential bypass with contralateral greater saphenous vein).  I therefore decided on a common femoral endarterectomy with an attempt at endovascular revascularization using atherectomy given the calcified vessels and in-stent occlusions.  I quoted him a success rate of successful limb salvage attempt (meaning revascularization with a healed transmetatarsal amputation) at approximately 30%.  The patient and his wife expressed understanding of this and the risks and benefits were fully discussed including but not limited to death, myocardial infarction, pneumonia, renal failure requiring dialysis, eventual major amputation.    Procedure: The patient was brought to the operating room placed supine on the table.  General anesthesia and IV antibiotics were administered by the anesthetic staff.  Timeout was performed with the anesthetic and nursing staff.  The left arm antecubital area, abdomen and entirety of the left leg were prepped and draped in sterile fashion.  Vertical incision was made in the left groin with a #10 blade with further dissection by electrocautery and Metzenbaum scissors.  The common femoral artery, proximal SFA and profunda femoris were all dissected free.  The profunda  femoris artery was dissected down into the 2 main bifurcated branches.  The external iliac artery was circumferentially dissected free.  The crossing vein was tied off between 0 silk ties.  Distally soft portion in the profunda femoris artery was noted.  The common femoral artery as expected was heavily calcified.  Large amount of calcification was also noted in the external iliac artery.  Palpating this quite proximally did not reveal an entirely soft area.  However there was a soft anterior spot of approximately 1 to 2 cm in the external iliac artery just proximal to the inguinal ligament that had been mobilized.  Please note that there was a fair amount of scar tissue in the left groin.  Vessel loops were placed around all major branches.  7000 units of IV heparin was administered and after 3-minute time,  2 profunda clamps were placed on each profunda artery branch and the Satinsky clamp was used to occlude the external iliac artery.  11 blade was used to make an arteriotomy in the common femoral artery and this was extended with large Gonzalez scissors proximally to the level of the inguinal ligament where the external iliac artery was noted to be soft and distally into the profunda femoris artery.  Luiz elevator was used to endarterectomize the common femoral artery.  The plaque was removed in piecemeal fashion and all sent to pathology.  Heparinized saline was injected onto the endarterectomized surface to ensure that there were no flaps that were removed with fine pickups.  Bovine pericardial patch was then sewn using 5-0 and 6-0 Prolene in a four-quadrant type fashion.  Distally at the profunda femoris artery I had already placed some tacking sutures on 1 branch of the profunda femoris artery to ensure no flap would be produced.  Distally the patch was parachuted.  Prior to completing the suture line the vessels were forward and back flushed and forward flushed.  In particular good bleeding was noted  proximally.  Heparinized saline was injected into the area and the suture line was then completed.  Following completion of the suture line flow was restored into the side branches with subsequent restoration of flow into the sfa and profunda femoris artery. Please note that prior to sewing the patch I had used a Carson elevator and a tonsil clamp to remove plaque in the proximal SFA.  This also produced some wire that was sharply transected from the stent.  The endarterectomized segment of the SFA would have been approximately 3 cm.  I did this to allow easy wire access into the proximal SFA   There was a good Doppler signal in the profunda femoris artery.  2 areas of suture line bleeding were controlled with 6-0 Prolene.  At this point we turned our attention to the endovascular portion of the case.  A micropuncture needle was used to access the bovine pericardial patch in the common femoral artery in an antegrade direction.  Micropuncture wire was advanced into the SFA and exchanged for the micropuncture sheath.  Hand-held contrast was then injected that showed the profunda femoris artery and SFA.  Please note that there was a vessel loop around the proximal profunda femoris artery that had been pulled up likely accounting for the visible narrowing in the profunda femoris artery proximally.  The starter wire from the sheath 6 Spanish sheath was then passage via the micropuncture sheath into the SFA.  This was followed by a 6 Spanish sheath that was 4 cm in length.  This was parked into the SFA.  From here a stiff angled Glidewire was passage via the sheath and managed to go the entire length of the SFA and popliteal artery.  This appeared to take a course into the peroneal artery.  This was followed by a quick cross catheter and I placed the quick cross catheter in the below-knee popliteal artery; from here hand-held contrast was injected that revealed a patent tibioperoneal trunk.  The origin of the anterior tibial  artery may have been patent but was certainly diseased and occluded thereafter.  Right at this origin there appeared to be a heavy portion of calcification in the proximalmost tibioperoneal trunk.  This was then noted to divide into the peroneal artery and posterior tibial artery.  The peroneal artery was the dominant flow and the did not appear to be focal areas of calcified occlusion in the posterior tibial artery.  The peroneal artery then filled into the posterior tibial artery at the ankle.  At this point I tried to use the quick cross catheter and the 035 stiff angled Glidewire to park this in the posterior tibial artery.  However the wire always went into the peroneal artery.  The quick cross catheter was advanced into the peroneal artery and the Glidewire exchanged for the 018 Viper wire as part of the Brandfolder atherectomy system requirement for filter use. T  Hand-held contrast was injected then via the 6 Israeli sheath that was within the proximal stent and this revealed diseased segments of the SFA.  There was a long segment approximately and distally in the SFA.  These areas were both atherectomized in sequential fashion using low, medium and high speeds.  The portion of the sheath in the proximal stent I elected to treat near the completion of the case.  At this point the embolic protection device was removed.  New embolic protection device was placed in the proximal peroneal artery.  The atherectomy device was then used to treat 2 segmental areas in the below-knee popliteal artery and focally in the tibioperoneal trunk on low and medium speeds..  I then pulled the 6 Israeli sheath back and atherectomized the occluded stent proximally.  The embolic protection device was then removed.  Quick cross catheter was then placed over the wire into the peroneal artery and used to exchange the Viper wire for a stiff 018 wire.  The 6 mm x 10 cm drug-coated balloon was then used in the proximal most portion of the  SFA into the occluded stent proximally.  Completion angiogram was then taken that showed flow in the SFA, popliteal artery, with dominant flow into the peroneal artery.  There was also filling of the posterior tibial artery and delayed filling of the posterior tibial artery at the ankle into the plantar arteries and to the midfoot.  Please note that the previous images taken Intra-Op that show poor flow into the peroneal which had improved by the completion picture.   At this point I felt we had adequately restored flow.  The wires were removed.  A temporary side-biting clamp was placed on the endarterectomized common femoral artery and a 6 Portuguese sheath removed.  The arteriotomy site was closed using interrupted 6-0 Prolene sutures.  The clamp was released.  Good pulsatile flow was noted in the profunda femoris artery and the SFA.  Attention was then turned to hemostasis.  This was controlled with Bovie electrocautery.  No major arterial bleeding was noted.  He appeared oozy in the bed of the arteries and Floseal was liberally placed in this area.  The femoral sheath was then closed using interrupted 2-0 Vicryl sutures.  The groin was then closed in multiple layers using 2-0 and 3-0 Vicryl.  Skin was closed using interrupted 4-0 nylon vertical mattress sutures.  Please note that the endarterectomized patch extends to the level of the inguinal ligament.  The patient did not have a PT signal at the conclusion of the case following removal of the drapes.  The patient was successfully extubated and transferred to the recovery room.  Please note that the patient received multiple boluses of 1000 and 2000 units of heparin on an hourly basis.  The patient was hemodynamically stable and required drips for hypertension (note not for hypotension).

## 2023-10-25 NOTE — PROGRESS NOTES
SURGICAL ICU PROGRESS NOTE  10/25/2023        Date of Service (when I saw the patient): 10/25/2023    ASSESSMENT:  Darian Engle is a 73 year old male who was admitted on 10/17/2023. He has a hx of CAD s/p CAB w/ LIMA and L GSV (2004), benign cystic neoplasm of the pancreas, T2DM, HTN, and active smoking who has dry gangrene of his L first toe, now s/p L femoral endarterectomy and angiogram with angioplasty and atherectomy.      CHANGES and MAJOR THINGS TODAY:   -Titrated off of nicardipine overnight, continue to optimize oral regimen  -significant pain to left lower extremity, optimize pain regimen      PLAN:    Neurological:  # Acute postoperative pain-- revascularization pain to left lower extremity   # Acute on chronic pain   - Monitor neurological status. Delirium prevention and precautions.   - Pain: On scheduled tylenol, prn oxy and dilaudid    - adding gabapentin 100mg TID, significant revascularization pain   - PTA Sertraline and donepezil resumed  - Melatonin     Pulmonary:  # Post operative respiratory support    - 2L via NC; supplemental oxygen to keep saturation above 92 %.Incentive spirometer while awake, out of bed as tolerates. Wean O2 as able.      Cardiovascular:    # CAD S/P CAB w/LIMA and L GSV 2004  # HTN  # PAD s/p Femoral endarterectomy and angiogram with angioplasty and atherectomy  - Monitor hemodynamic status. Goal to maintain a SBP < 160  - Initially required nicardipine drip, since titrated off.   - PRN Labetalol, hydralazine  - PTA Amlodipine, carvedilol, cilostazol   - PTA Isosorbide and Losartan held, will resume today, kidney function remains WNL.    -Cardiology consulted, appreciate their recommendations for oral regimen optimization   - Cards recommend adding aldactone 25 mg daily-- primary team did add today   -PTA ASA 81mg and atorvastatin   - Currently on a set rate heparin drip, defer to vascular surgery for management    -PTA Apixaban held     GI/Nutrition:    # Protein  calorie deficit malnutrition    - Encourage oral intake  - Bowel regimen: Miralax and senna  - Pantoprazole-- therapeutically interchanged for omeprazole      Fluids/Electrolytes:  - LR @ 70ml/hr for IV fluid hydration- KVO today, encourage oral hydration   - electrolyte replacement protocol  In place.   - essentially euvolemic for the past 24 hours, goal to maintain euvolemia     Renal:   - Urine output  adequate . Will continue to monitor intake and output.  - Monitor BMP     Endocrine:  # Benign cystic neoplasm of pancreas   # Stress hyperglycemia   # Diabetes Mellitus type 2   - PTA medications: Metformin held at this time   - currently on medium sliding scale insulin,  Goal to keep BG < 180 for optimal wound healing. Essentially euglycemic     Infectious disease:   # Mild Leukocytosis   - likely stress response to surgery, revascularization   - afebrile  - Lactic acid 2.3, repeat 0.8    - Antibiotics:  - will complete perioperative cefazolin     Hematology:    # Acute blood loss anemia   - Hemoglobin 10.2. Monitor and trend.   - Threshold for transfusion if hgb <7.0 or signs/symptoms of hypoperfusion.       Musculoskeletal/Skin:  # Weakness and deconditioning of critical illness  # Dry Gangrene of L 1st Toe   - Orthopedic surgery consulted for L toe, appreciate recommendations   - Physical and occupational therapy consults.  - dilgent cares to prevent skin breakdown and wound formation.      General Cares/Prophylaxis:    DVT Prophylaxis: set rate heparin drip  GI Prophylaxis: PPI  Restraints: Restraints for medical healing needed: NO    Lines/ tubes/ drains:  - Right radial Bairdford- remove today   - PIV x2  - Mercado-remove today     Disposition:  - Transfer out of ICU today     Patient seen, findings and plan discussed with surgical ICU staff, Dr. Balbuena .    Time spent on this Encounter   Billing:  I spent 25 minutes bedside and on the inpatient unit today managing the critical care of Darian SERGIO Engle in  relation to the issues listed in this note.      Cecelia Scott CNP  Surgical ICU  Clinically Significant Risk Factors            # Hypomagnesemia: Lowest Mg = 1.6 mg/dL in last 2 days, will replace as needed  # Anion Gap Metabolic Acidosis: Highest Anion Gap = 19 mmol/L in last 2 days, will monitor and treat as appropriate   # Coagulation Defect: INR = 1.08 (Ref range: 0.85 - 1.15) and/or PTT = 173 Seconds (Ref range: 22 - 38 Seconds), will monitor for bleeding    # Hypertension: Noted on problem list     # Dementia: noted on problem list        # Financial/Environmental Concerns: none            ====================================  INTERVAL HISTORY:   Examined while resting in bed. States pain 10/10 to left lower extremity, denies sob, chest pain, nausea or vomiting at this time.       OBJECTIVE:   1. VITAL SIGNS:   Temp:  [97  F (36.1  C)-98.8  F (37.1  C)] 98.8  F (37.1  C)  Pulse:  [64-96] 69  Resp:  [8-28] 18  BP: (115-169)/(40-82) 138/46  MAP:  [44 mmHg-96 mmHg] 66 mmHg  Arterial Line BP: ()/(32-60) 145/33  SpO2:  [95 %-100 %] 100 %  Resp: 18      2. INTAKE/ OUTPUT:   I/O last 3 completed shifts:  In: 1270.5 [I.V.:1020.5]  Out: 1030 [Urine:730; Emesis/NG output:50; Blood:250]    3. PHYSICAL EXAMINATION:  General: Resting in bed, interacts appropriately with exam   Neuro: A&Ox3, NAD, HOLLIS   Pulm/Resp: Clear breath sounds bilaterally without rhonchi, crackles or wheeze, breathing non-labored, 2L NC  CV: RRR  Abdomen: Soft, non-distended, non-tender, no rebound tenderness or guarding, no masses  :  dinh catheter in place, urine yellow and clear--plan to remove today   Incisions/Skin: left lower extremity with kerlex drsg, CDI  MSK/Extremities: No  peripheral edema, moving all extremities, no signals noted to left lower extremity, left calf is warm, soft, painful to touch,    4. INVESTIGATIONS:   Arterial Blood Gases   Recent Labs   Lab 10/24/23  1411 10/24/23  1157 10/24/23  0927   PH 7.37 7.40 7.38    PCO2 36 36 35   PO2 166* 168* 165*   HCO3 21 22 20*     Complete Blood Count   Recent Labs   Lab 10/25/23  0420 10/24/23  2235 10/24/23  1938 10/24/23  1411 10/24/23  0927 10/24/23  0658   WBC 13.1* 14.0* 16.0*  --   --  6.4   HGB 10.2* 11.0* 12.0* 11.4*   < > 12.1*    250 266  --   --  280    < > = values in this interval not displayed.     Basic Metabolic Panel  Recent Labs   Lab 10/25/23  0420 10/25/23  0418 10/24/23  2137 10/24/23  1952/23  1938 10/24/23  1411 10/24/23  1157 10/24/23  0927 10/24/23  0658 10/23/23  0811 10/23/23  0548     --   --   --  134* 136 136   < > 135  --  136   POTASSIUM 4.5  --   --   --  4.7 4.1 4.2   < > 4.2  --  4.0   CHLORIDE 104  --   --   --  100  --   --   --  104  --  106   CO2 20*  --   --   --  15*  --   --   --  18*  --  21*   BUN 18.6  --   --   --  15.0  --   --   --  13.9  --  12.9   CR 0.76  --   --   --  0.78  --   --   --  0.70  --  0.77   * 119* 173* 161* 167* 80 85   < > 83   < > 82    < > = values in this interval not displayed.     Liver Function Tests  Recent Labs   Lab 10/24/23  1938   INR 1.08     Pancreatic Enzymes  No lab results found in last 7 days.  Coagulation Profile  Recent Labs   Lab 10/24/23  1938   INR 1.08   *         5. RADIOLOGY:   Recent Results (from the past 24 hour(s))   XR Pelvis Port 1/2 Views    Narrative    EXAM: XR PELVIS PORT 1/2 VIEWS  LOCATION: [facility]  DATE: 10/24/2023    INDICATION: Extra instruments.  COMPARISON: None.      Impression    IMPRESSION: Mercado catheter with tip in the urinary bladder. Both hips negative for fracture. Vascular stent on the left. No additional foreign bodies are identified.   XR Foot Left G/E 3 Views    Narrative    EXAM: XR FOOT LEFT G/E 3 VIEWS  LOCATION: [facility]  DATE: 10/24/2023    INDICATION: left great toe dry gangrene, preop planning for amputation  COMPARISON: None       Impression    IMPRESSION: Wound dressing around the distal aspect of the great toe obscures some bony detail. No definite evidence for osteomyelitis. No evidence for acute fracture or dislocation. Hammertoe deformities.       =========================================

## 2023-10-25 NOTE — PROGRESS NOTES
Brief Orthopedic Progress Note    Discussion with Dr. Thornton regarding OR planning. Given the urgent need for intervention, Orthopedics would like to have patient on [facility] for an add on case tomorrow afternoon/evening for 1st MTPJ disarticulation and 1st Metatarsal resection.     Appreciate assistance in transferring patient overnight to [facility] or coordinating ride to bring patient tomorrow during the day with ride back to his room on [address] after PACU clearance. Patient to be NPO at midnight with plan to consent in the AM.     Antwan Rodrigues MD  Orthopedic Surgery Resident

## 2023-10-26 NOTE — PROGRESS NOTES
Brief Progress Note    Chart checked.     BP well controlled on current regimen. Aldactone not yet added. Can add per initial recs if needed for additional BP control.     Cardiology will sign off at this time. Please reach out if you have further questions.     Javier Rojo   Cardiology Fellow  This note was created using Dragon dictation software, so please excuse any mistakes and incorrect syntax and semantics.

## 2023-10-26 NOTE — ANESTHESIA POSTPROCEDURE EVALUATION
Patient: Darian Engle    Procedure: Procedure(s):  Amputate toe(s), all transmetatarsal amputation       Anesthesia Type:  No value filed.    Note:  Disposition: Inpatient   Postop Pain Control: Uneventful            Sign Out: Well controlled pain   PONV: No   Neuro/Psych: Uneventful            Sign Out: Acceptable/Baseline neuro status   Airway/Respiratory: Uneventful            Sign Out: Acceptable/Baseline resp. status   CV/Hemodynamics: Uneventful            Sign Out: Acceptable CV status; No obvious hypovolemia; No obvious fluid overload   Other NRE: NONE   DID A NON-ROUTINE EVENT OCCUR? No           Last vitals:  Vitals Value Taken Time   /62 10/26/23 1645   Temp 37.1  C (98.7  F) 10/26/23 1645   Pulse 65 10/26/23 1657   Resp 17 10/26/23 1657   SpO2 100 % 10/26/23 1657   Vitals shown include unfiled device data.    Electronically Signed By: Ward Hurley MD  October 26, 2023  4:58 PM

## 2023-10-26 NOTE — ANESTHESIA PREPROCEDURE EVALUATION
Anesthesia Pre-Procedure Evaluation    Patient: Darian Engle   MRN: [id number] : 1950        Procedure : Procedure(s):  Amputate toe(s), all transmetatarsal amputation          Past Medical History:   Diagnosis Date    Arthritis 2018    joint pain both hands    Diabetes (H)     Heart disease     Ethel-angioplasty    Hypertension       Past Surgical History:   Procedure Laterality Date    ANGIOGRAM Left 10/17/2023    Procedure: Left lower extremity angiogram via Left brachial artery approach;  Surgeon: Chuck Felix MBBS;  Location: [facility]    ANGIOGRAM Left 10/24/2023    Procedure: ANGIOGRAM;  Surgeon: Chuck Felix MBBS;  Location: [facility]    ANGIOPLASTY Left 10/24/2023    Procedure: ANGIOPLASTY, Left Lower Extremity atherectomy;  Surgeon: Chuck Felix MBBS;  Location: [facility] OR    CARDIAC SURGERY  [address] [place] [address]    ENDARTERECTOMY FEMORAL Left 10/24/2023    Procedure: ENDARTERECTOMY, FEMORAL;  Surgeon: hCuck Felix MBBS;  Location: [facility] OR    ENHANCE LASER REFRACTIVE BILATERAL EXISTING PT IN PARAMETERS      EYE SURGERY      [facility]    IR OR ANGIOGRAM  10/17/2023    VASCULAR SURGERY      [facility]      Allergies   Allergen Reactions    Lidocaine Other (See Comments)     Lungs filled with fluid. ? Anaphylaxis    Lisinopril Cough     cough    Naltrexone Nausea and Vomiting      Social History     Tobacco Use    Smoking status: Every Day     Packs/day: 0.50     Years: 5.00     Additional pack years: 0.00     Total pack years: 2.50     Types: Cigarettes     Start date: 1968     Last attempt to quit: 7/15/2016     Years since quittin.2    Smokeless tobacco: Former   Substance Use Topics    Alcohol use: Yes     Comment: binge, two months sober      Wt Readings from Last 1 Encounters:   10/26/23 67.9 kg (149 lb 11.1 oz)        Anesthesia Evaluation   Pt has had prior anesthetic. Type: General and MAC.    No  history of anesthetic complications       ROS/MED HX  ENT/Pulmonary:     (+) sleep apnea,               tobacco use, Past use,                      Neurologic:     (+)   dementia,                             Cardiovascular: Comment: # Severe PAD with dry gangrene of LLE  # CAD s/p 4v CABG LIMA-LAD, sequential SVG-OM1 and OM 2, SVG-diagonal in 2004  # pAF    RCRI score is 2 [history of ischemic heart disease, history of cerebrovascular disease]. Based on RCRI score, the risk of major cardiac risk complications is ~10.1% 30 day risk of death, MI, or cardiac arrest     (+)  hypertension (on Imdur, coreg, nifedipine, losartan)- -  CAD - past MI CABG-date: x4 in 2004 s/p inferior MI. -   Taking blood thinners                     dysrhythmias (paroxysmal afib),         Previous cardiac testing (10/17/2023)   Echo: Date: 9/21/23 Results:  - Examination done in the setting of severe hypertension.  - Global and regional left ventricular function is normal with an EF of 55-60%.  - Right ventricular function and size are normal.  - No significant valvular abnormalities.  - Right ventricular systolic pressure is 32mmHg above the right atrial pressure.  - No pericardial effusion is present.  - This study was compared with the study from 3/1/2021. The mitral and aortic regurgitation were both present on the last study but are worsened due to the patient's severe hypertension.    Stress Test:  Date: 10/17/23 Results:  - Normal biventricular function with prior inferior and inferoseptal infarction from RCA disease and a small apical infarction from LAD disease.   - Mild gris-infarct ischemia in the apical inferior segment.     ECG Reviewed:  Date: Results:    Cath:  Date: Results:      METS/Exercise Tolerance:     Hematologic:  - neg hematologic  ROS     Musculoskeletal:  - neg musculoskeletal ROS (+)  arthritis,             GI/Hepatic:     (+) GERD,                   Renal/Genitourinary:       Endo:     (+)  type II DM, Last  HgA1c: 5.5, date: 10/17, Using insulin,                 Psychiatric/Substance Use:       Infectious Disease:       Malignancy:       Other:            Physical Exam    Airway        Mallampati: III   TM distance: > 3 FB   Neck ROM: full   Mouth opening: > 3 cm    Respiratory Devices and Support     Nasal Canula      Dental       (+) Minor Abnormalities - some fillings, tiny chips      Cardiovascular   cardiovascular exam normal          Pulmonary   pulmonary exam normal        (+) decreased breath sounds           OUTSIDE LABS:  CBC:   Lab Results   Component Value Date    WBC 10.5 10/26/2023    WBC 13.2 (H) 10/25/2023    HGB 9.9 (L) 10/26/2023    HGB 9.8 (L) 10/25/2023    HCT 30.1 (L) 10/26/2023    HCT 29.3 (L) 10/25/2023     10/26/2023     10/25/2023     BMP:   Lab Results   Component Value Date     10/26/2023     10/25/2023    POTASSIUM 4.0 10/26/2023    POTASSIUM 4.5 10/25/2023    CHLORIDE 108 (H) 10/26/2023    CHLORIDE 104 10/25/2023    CO2 22 10/26/2023    CO2 20 (L) 10/25/2023    BUN 16.1 10/26/2023    BUN 18.6 10/25/2023    CR 0.69 10/26/2023    CR 0.76 10/25/2023     (H) 10/26/2023     (H) 10/26/2023     COAGS:   Lab Results   Component Value Date    PTT 80 (H) 10/26/2023    INR 1.02 10/26/2023    FIBR 350 10/24/2023     POC:   Lab Results   Component Value Date     (H) 04/19/2018     HEPATIC:   Lab Results   Component Value Date    ALBUMIN 3.4 02/21/2023     OTHER:   Lab Results   Component Value Date    PH 7.37 10/24/2023    LACT 0.8 10/25/2023    A1C 5.5 10/17/2023    SHERYL 8.2 (L) 10/26/2023    PHOS 1.9 (L) 10/26/2023    MAG 1.8 10/26/2023    TSH 1.03 09/08/2022       Anesthesia Plan    ASA Status:  3    NPO Status:  NPO Appropriate    Anesthesia Type: General.     - Airway: ETT   Induction: Intravenous.   Maintenance: Balanced.        Consents    Anesthesia Plan(s) and associated risks, benefits, and realistic alternatives discussed. Questions answered and  patient/representative(s) expressed understanding.     - Discussed: Risks, Benefits and Alternatives for BOTH SEDATION and the PROCEDURE were discussed     - Discussed with:  Patient      - Extended Intubation/Ventilatory Support Discussed: No.      - Patient is DNR/DNI Status: No     Use of blood products discussed: No .     Postoperative Care    Pain management: IV analgesics.   PONV prophylaxis: Ondansetron (or other 5HT-3), Dexamethasone or Solumedrol     Comments:           H&P reviewed: Unable to attach H&P to encounter due to EHR limitations. H&P Update: appropriate H&P reviewed, patient examined. No interval changes since H&P (within 30 days).         Ward Hurley MD

## 2023-10-26 NOTE — PROGRESS NOTES
"Orthopedic Surgery Progress Note: 10/26/2023    Subjective:   No acute events overnight. Pain well-controlled. No concerns. Is agreeable to plan for surgical intervention.     Objective:   BP (!) 161/50 (BP Location: Right arm, Cuff Size: Adult Regular)   Pulse 72   Temp 98.3  F (36.8  C) (Oral)   Resp 13   Ht 1.702 m (5' 7\")   Wt 67.9 kg (149 lb 11.1 oz)   SpO2 98%   BMI 23.45 kg/m    I/O this shift:  In: 215.77 [I.V.:215.77]  Out: 475 [Urine:475]  General: NAD. Resting comfortably in bed.  Respiratory: Nonlabored breathing  Musculoskeletal:  LLE: Kerlix wrapping around foot. Fires TA/GSC/EHL/FHL. 1+ DP and PT pulses.     Laboratory Data:  Lab Results   Component Value Date    WBC 10.5 10/26/2023    HGB 9.9 (L) 10/26/2023     10/26/2023    INR 1.02 10/26/2023         Assessment & Plan:   Darian Engle is a 73 year old male with PMH of  CAD s/p CAB w/ LIMA and L GSV (2004), benign cystic neoplasm of the pancreas, T2DM, HTN and tobacco use who has left great toe dry gangrene s/p femoral endarterectomy, angioplasty, and possible atherectomy with possible stent placement in the left lower extremity on 10/24/23  After discussion with Dr. Thornton and Vascular team, we feel he would have the best chance to heal a transmetatarsal amputation, and given the nature of the recent vascular procedure this will need to be completed in an urgent window. Patient is currently in the add-on pool for surgery on [place] today and will be NPO until OR timing decision.    Plan for Today:  - Probable OR this afternoon/evening  - Patient to be consented for surgery  - NPO until OR  - Pain control    Vascular primary  - Plan for OR: Likely 10/26/23 for transmetatarsal amputation with Dr. Thornton  - Anticoagulation/DVT prophylaxis: Per primary, hold starting at 9 am  - Antibiotics: Periop ancef  - Imaging: left foot xrays complete  - Activity: Up as tolerated  - Weight bearing: WBAT LLE  - Pain control: Per primary  - Diet: NPO " until OR today  - Follow-up: TBD  - Disposition: Admitted to SICU, will return after surgery    Orthopedic surgery staff for this patient is Dr. Thornton.    ------------------------------------------------------------------------------------------    Respectfully,    Antwan Rodrigues MD  Orthopedic Surgery PGY1  [phone]    Please page me directly with any questions/concerns during regular weekday hours before 5 pm. If there is no response, if it is a weekend, or if it is during evening hours then please page the orthopedic surgery resident on call.      FOLLOWUP:    Future Appointments   Date Time Provider Department Center   10/26/2023 10:15 AM Rosanna Edwards PT [facility]   1/17/2024  9:30 AM Schuyler Franco DO [facility]

## 2023-10-26 NOTE — BRIEF OP NOTE
[facility] Brief Operative Note    Pre-operative diagnosis: Gangrene of toe of left foot (H) [I96]   Post-operative diagnosis Gangrene of toe of left foot   Procedure: Procedure(s):  Amputate toe(s), all transmetatarsal amputation   Surgeon(s): Surgeon(s) and Role:     * Jerardo Thornton MD - Primary     * Scott Mcclelland PA-C   Estimated blood loss: 20 mL    Specimens: ID Type Source Tests Collected by Time Destination   1 : LEFT Forefoot Tissue Foot, Left SURGICAL PATHOLOGY EXAM Jerardo Thornton MD 10/26/2023  3:49 PM       Findings: See post op report       Plan:  Admit to Inpatient.  Splint to remain on left  lower extremity and NWB at all times.  Pain control per medicine  Ok for anticoagulation to resume tonight per vascular. No contraindications from ortho.  No dressing change on own.  Leave dressing on until 2 weeks follow up appointment.  F/U in clinic in 2 weeks    I was asked by Dr. Thornton to assist with surgery. I positioned and prepped the patient. I retracted soft tissue.   I suctioned fluids when needed. I provided traction for dissection. I helped to ligate blood vessels. I helped Dr. Thornton identify and protect important structures. The procedure was medically necessary for an assistant because Dr. Thornton needed the operative exposure and assistance that I provided. This allowed him to safely and efficiently operate. It was also important that I help ligate blood vessels to maintain hemostasis and reduce the bleeding risk. I helped with the closure of the operative incisions as well as helping with the boot/cast/splint.  The assistance that I provided reduced operative time which meant less general anesthetic for the patient. No qualified residents were available to assist.    Scott Mcclelland PA-C

## 2023-10-26 NOTE — PROGRESS NOTES
"VASCULAR SURGERY PROGRESS NOTE    Subjective:  Troponins are up however without chest pain, no shortness of breath, no overt fatigue or tiredness. No further checks per cardiology and no interventions are needed. Patient is pending OR with ortho this afternoon. Holding heparin at 0900 and NPO since midnight.    Objective:  Intake/Output Summary (Last 24 hours) at 10/26/2023 0702  Last data filed at 10/26/2023 0600  Gross per 24 hour   Intake 2140.72 ml   Output 940 ml   Net 1200.72 ml     Labs:  ROUTINE IP LABS (Last four results)  BMP  Recent Labs   Lab 10/26/23  0443 10/26/23  0439 10/25/23  2100 10/25/23  1701 10/25/23  0919 10/25/23  0420 10/24/23  1950/23  1938 10/24/23  1411 10/24/23  0927 10/24/23  0658   NA  --  138  --   --   --  135  --  134* 136   < > 135   POTASSIUM  --  4.0  --   --   --  4.5  --  4.7 4.1   < > 4.2   CHLORIDE  --  108*  --   --   --  104  --  100  --   --  104   SHERYL  --  8.2*  --   --   --  8.1*  --  8.1*  --   --  8.8   CO2  --  22  --   --   --  20*  --  15*  --   --  18*   BUN  --  16.1  --   --   --  18.6  --  15.0  --   --  13.9   CR  --  0.69  --   --   --  0.76  --  0.78  --   --  0.70   * 110* 128* 126*   < > 125*   < > 167* 80   < > 83    < > = values in this interval not displayed.     CBC  Recent Labs   Lab 10/26/23  0439 10/25/23  1005 10/25/23  0420 10/24/23  2235   WBC 10.5 13.2* 13.1* 14.0*   RBC 3.11* 3.10* 3.19* 3.45*   HGB 9.9* 9.8* 10.2* 11.0*   HCT 30.1* 29.3* 30.2* 33.2*   MCV 97 95 95 96   MCH 31.8 31.6 32.0 31.9   MCHC 32.9 33.4 33.8 33.1   RDW 14.0 14.2 14.0 13.9    233 227 250     INR  Recent Labs   Lab 10/26/23  0439 10/24/23  1938   INR 1.02 1.08     PHYSICAL EXAM:  BP (!) 161/50 (BP Location: Right arm, Cuff Size: Adult Regular)   Pulse 72   Temp 98.3  F (36.8  C) (Oral)   Resp 13   Ht 1.702 m (5' 7.01\")   Wt 67.9 kg (149 lb 11.1 oz)   SpO2 98%   BMI 23.44 kg/m    General: The patient is alert and oriented. Appropriate.   Psych: " Pleasant affect, answers questions appropriately  Skin: Color appropriate for race, warm, dry.  Respiratory: The patient does require supplemental oxygen. Breathing unlabored  GI:  Abdomen soft, nontender to light palpation.  Incision: Left antecubital site without swelling or bruising  Extremities: LLE with doppler PT present and now faint DP present, warm, with dry gangrene of first toe. Left calf compartments soft, tender to light palpation. Without erythema or swelling.     ASSESSMENT / PLAN:  Darian Engle is a 73 year old male with PMH  of CAD s/p CAB w/ LIMA and L GSV (2004), benign cystic neoplasm of the pancreas, T2DM, HTN, and active smoking who has dry gangrene of his L first toe. S/p angiogram 10/17 demonstrating SFA & SFA stent (known) occlusion with peroneal and PT runoff - both vessels with disease. Cardiac eval completed, normal EF and no cardiac interventions required. His cardiac risk based on RCRI score, the risk of major cardiac risk complications is ~10.1% 30 day risk of death, MI, or cardiac arrest. S/p femoral endarterectomy, angioplasty, atherectomy in left lower extremity on 10/24/23. Now pending first transmetatarsal amputation with ortho this afternoon.      Neuro:   - tylenol sched 975 q8, oxy 5 q4 prn, gabapentin 100 TID, robaxin PRN, atarax PRN.   - continue PTA donepezil, sertraline    Resp:   - encourage IS     CV:   - continue home coreg 25 BID, statin, asa 81, pletal, losartan 100 daily, increased home Imdur to 120mg, Norvasc 10mg daily. Aldactone 25 mg daily staring tomorrow.   - holding home eliquis postop: stopped lovenox, started heparin drip post vascular intervention but also with paroxysmal afib (chadsvasc 6)   - cardiology for preop cardiac risk assessment completed:              - stress MRI 10/18: normal biventricular function with prior inferior and inferoseptal infarction from RCA disease and a small apical infarction from LAD disease, chronic in nature, no  interventions needed.    - functional METs is unknown due to limitation from leg pain. The patient's RCRI score is 2 [history of ischemic heart disease, history of cerebrovascular disease]. Based on RCRI score, the risk of major cardiac risk complications is ~10.1% 30 day risk of death, MI, or cardiac arrest   - prns for HTN for SBP>160  - No further troponins check per cardiology this morning and last night. Patient asymptomatic. No interventions, no EKG, no change in meds warranted per cardiology fellow.     Peripheral vascular:   - PAD, L great toe dry gangrene, s/p femoral endarterectomy, angioplasty, atherectomy in left lower extremity on 10/24/23 which should help heal left foot incisions.   - Podiatry/ortho following for L great toe amp: pending discussion with ortho staff on level of amputation/type of intervention: intervention late this week or early next week.   - Heparin drip low dose with therapeutic goals initiated (on hold for OR)    GI:   - Sodium restricted diet (on 1200mg @ home per PCP), now NPO for OR this afternoon.  - Home omeprazole   - bowel reg (sched doc/senna BID while on narcotics, prn miralax)     :   - no dinh     Endo:   - hold metformin for now given upcoming procedure, may consider resuming if needed, mostly normoglycemic  - sliding scale insulin in mean time, medium intensity     Misc:   - PT consult   - wound cares: please betadine paint L great toe daily and wrap with gauze/kerlix and keep covered.     ADDENDUM:     Discussed pt history, exam, assessment with Dr. Batista who will discuss with staff      Roxann Fontanez CNP  Vascular Surgery  Pager: [phone]  [email or web address]  Send message or 10 digit call back number Securely via [employer] with the Vocera Web Console (learn more here)

## 2023-10-26 NOTE — PROGRESS NOTES
Report called to floor   ANES ok with pt HTN as PT needs oral anti-hypertensives that are ordered on the floor. Ok'd to exit PACU   Pt otherwise stable   S/O updated about pt condition.

## 2023-10-26 NOTE — ANESTHESIA PROCEDURE NOTES
Airway       Patient location during procedure: OR       Procedure Start/Stop Times: 10/26/2023 3:31 PM  Staff -        CRNA: Alla Prince       Performed By: SRNAIndications and Patient Condition       Indications for airway management: gris-procedural        Final Airway Details       Final airway type: endotracheal airway       Successful airway: ETT - single  Endotracheal Airway Details        ETT size (mm): 7.5       Successful intubation technique: video laryngoscopy       VL Blade Size: Glidescope 3       Grade View of Cords: 1       Adjucts: stylet       Position: Right       Measured from: gums/teeth       Secured at (cm): 24       Bite block used: None    Post intubation assessment        Placement verified by: capnometry, equal breath sounds and chest rise        Number of attempts at approach: 1       Number of other approaches attempted: 0       Secured with: silk tape       Ease of procedure: easy       Dentition: Intact    Medication(s) Administered   Medication Administration Time: 10/26/2023 3:31 PM

## 2023-10-26 NOTE — ANESTHESIA CARE TRANSFER NOTE
Patient: Darian Engle    Procedure: Procedure(s):  Amputate toe(s), all transmetatarsal amputation       Diagnosis: Gangrene of toe of left foot (H) [I96]  Diagnosis Additional Information: No value filed.    Anesthesia Type:   No value filed.     Note:    Oropharynx: oropharynx clear of all foreign objects and spontaneously breathing  Level of Consciousness: awake  Oxygen Supplementation: face mask  Level of Supplemental Oxygen (L/min / FiO2): 4  Independent Airway: airway patency satisfactory and stable  Dentition: dentition unchanged  Vital Signs Stable: post-procedure vital signs reviewed and stable  Report to RN Given: handoff report given  Patient transferred to: PACU    Handoff Report: Identifed the Patient, Identified the Reponsible Provider, Reviewed the pertinent medical history, Discussed the surgical course, Reviewed Intra-OP anesthesia mangement and issues during anesthesia, Set expectations for post-procedure period and Allowed opportunity for questions and acknowledgement of understanding      Vitals:  Vitals Value Taken Time   /62    Temp     Pulse 66    Resp 14    SpO2 100%        Electronically Signed By: ADIEL Chung CRNA  October 26, 2023  4:44 PM

## 2023-10-26 NOTE — PLAN OF CARE
Major Shift Events:  Patient A&Ox4, able to make needs known, uses call light well. Confused at times right after waking up. Standby assist with walker. Normal sinus rhythm, ST depression at baseline. Maintaining systolic goals <160. Pulses regular in radial, doppler for pedal pulses and posterior tibial. Patient on RA, lungs sounds clear. NPO at midnight for left foot amputation on 10/26 @ 3pm on [place], otherwise for 10/27. Voids spontaneously without difficulty. No BM this shift. He has the left groin site CDI, scattered bruising, and bilateral shin scabs that are healed over with no drainage. Two right PIV, TKO and heparin infusing.     Plan: Continue with heparin protocol. Transfer to [facility] when bed available.     For vital signs and complete assessments, please see documentation flowsheets.  Goal Outcome Evaluation:

## 2023-10-27 NOTE — PROGRESS NOTES
Pt fell while attempting to ambulate to bathroom. Pt did not attempt to call for help before attempting transfer. Staff assist to bed with pericares for incontinence. VSS, neuros intact. Provider contacted and pt assessed. Head CT ordered stat and results interpreted. Q1Hx4 neuro checks ordered; WDL. Pt reoriented to room and call light system. Risks of self-transfer discussed and safety plan established.

## 2023-10-27 NOTE — CONSULTS
"[facility]  Consult Note - Hospitalist Service, GOLD TEAM 3  Date of Admission:  10/17/2023  Consult Requested by: Orthopedics  Reason for Consult: \"Other medical conditions\"    Assessment & Plan   Darian Engle is a 73 year old male with a PMH significant for CAD s/p CAB with LIMA  and L GSV, benign cystic neoplasm of pancreas, diabetes mellitus, type 2, HTN, tobacco use who was admitted on 10/17/2023 to Vascular Surgery service for dry gangrene of left great toe.     Dry gangrene of left great toe   S/p femoral endarterectomy, angioplasty, atherectomy of LLE 10/24/2023  S/p left transmetatarsal amputation 10/26/2023  PVD  Patient follows with Vascular Surgery in clinic. Underwent angiogram 10/17 which revealed occluded SFA and SFA stent (known) with peroneal and PT runoff with both vessels with disease. He was admitted initially to Vascular Surgery service for cardiac evaluation, wound cares and plan for surgical management. Underwent the above procedure. Orthopedics consulted and performed amputation as mentioned above. Doing post-operatively   -Management per Vascular Surgery and Orthopedics  -PTA cilostazol  -Splint on LLE and NWB at all times   -Leave dressing on until follow up appointment   -PT and OT  -Follow up with Orth in 2 weeks  -Would defer resumption of oral anticoagulation to primary team and Ortho, Hgb is stable, no contraindication from Medicine standpoint     Acute blood loss anemia   Hgb appears typically ~12, but not appears stable at ~10. Suspect decrease in the setting of recent procedures, however staying stable. No hypotension or signs or symptoms of active bleeding.     CAD hx of inferior MI (2004) s/p CABG x 4 LIMA -LAD, sequential SVG-OM1 and OM 2, SVG-diagonal  Resistant HTN  Paroxysmal atrial fibrillation   Follows with [facility]. Last seen 08/08/2023. Cardiology consulted as inpatient for pre-op risk stratification and " "for blood pressure control. During hospitalization, was briefly on nicardipine gtt to control blood pressures. Cardiology recommended starting aldactone, which patient started 10/27. Per chart review, during last Cardiologist visit, patient was taken off of nifedipine at that time due to concern for syncopal episodes and hypotension. Blood pressure fluctuating from, highest in last 24 hours was 174/66, most recent 161/63. Notes was having increased pain 10/26, which was likely contributing.   -Recommendations per Cardiology   -Started spironolactone 25mg daily   -Continue Imdur 120mg, losartan 100mg daily, amlodipine 10mg daily, carvedilol 25mg BID  -Continue ASA 81mg daily   -Resumption of Eliquis as mentioned above     HLD: PTA atrovastatin  Dementia: PTA donepezil   Depression: PTA sertraline   Possible mitral regurgitation: noted on Cardiology exam 08/0/2023, recommended at that tie to have repeat TTE performed in 6 months   Bilateral carotid artery stenosis: No concern for CVA or TIA  Pancreatic mass: follows with serial MRI surveillance imaging, thought to be benign   Tobacco use: encourage smoking cessation   TALA  Diabetes mellitus, type 2, A1c 5.5 10/2023: PTA metformin; continue slliding scale       The patient's care was discussed with the Patient, Orthopedics Consultant(s), and Primary team.    Thank you for the consult, Medicine will sign off at this time. If further questions arise, please reach out to Medicine team.     Clinically Significant Risk Factors                  # Hypertension: Noted on problem list     # Dementia: noted on problem list        # Financial/Environmental Concerns: none         Teena De Jesus PA-C  Hospitalist Service, GOLD TEAM 3  Securely message with [employer] (more info)  Text page via [employer] Paging/Directory   See signed in provider for up to date coverage information  ______________________________________________________________________    Chief Complaint   \"I'm doing much " "better today\"    History is obtained from the patient    History of Present Illness   Darian Engle is a 73 year old male with a PMH significant for CAD s/p CAB with LIMA  and L GSV, benign cystic neoplasm of pancreas, diabetes mellitus, type 2, HTN, tobacco use who was admitted on 10/17/2023 to Vascular Surgery service for dry gangrene of left great toe.     Feeling well. Notes pain is not as severe as it was yesterday, much better controled. No dizziness, headaches, changes in vision, chest pain, palpitations, dyspnea. No abdominal pain, nausea. Has not had bowel movement yet, but passing gas. Urinating appropriately. No fever or chills.       Past Medical History    Past Medical History:   Diagnosis Date    Arthritis March 2018    joint pain both hands    Diabetes (H)     Heart disease 1991    Glenn-angioplasty    Hypertension        Past Surgical History   Past Surgical History:   Procedure Laterality Date    AMPUTATE TOE(S) Left 10/26/2023    Procedure: Amputate toe(s), all transmetatarsal amputation;  Surgeon: Jerardo Thornton MD;  Location: [facility] OR    ANGIOGRAM Left 10/17/2023    Procedure: Left lower extremity angiogram via Left brachial artery approach;  Surgeon: Chuck Felix MBBS;  Location: [facility] OR    ANGIOGRAM Left 10/24/2023    Procedure: ANGIOGRAM;  Surgeon: Chuck Felix MBBS;  Location: [facility] OR    ANGIOPLASTY Left 10/24/2023    Procedure: ANGIOPLASTY, Left Lower Extremity atherectomy;  Surgeon: Chuck Felix MBBS;  Location: [facility] OR    CARDIAC SURGERY  2004    [facility]    ENDARTERECTOMY FEMORAL Left 10/24/2023    Procedure: ENDARTERECTOMY, FEMORAL;  Surgeon: Chuck Felix MBBS;  Location: [facility] OR    ENHANCE LASER REFRACTIVE BILATERAL EXISTING PT IN PARAMETERS  2000    EYE SURGERY  2000    [facility]    IR OR ANGIOGRAM  10/17/2023    VASCULAR SURGERY  2017    [facility]       Medications   I have reviewed this patient's " current medications       Review of Systems    The 10 point Review of Systems is negative other than noted in the HPI or here.    Social History   I have reviewed this patient's social history and updated it with pertinent information if needed.  Social History     Tobacco Use    Smoking status: Every Day     Packs/day: 0.50     Years: 5.00     Additional pack years: 0.00     Total pack years: 2.50     Types: Cigarettes     Start date: 1968     Last attempt to quit: 7/15/2016     Years since quittin.2    Smokeless tobacco: Former   Vaping Use    Vaping Use: Never used   Substance Use Topics    Alcohol use: Yes     Comment: binge, two months sober    Drug use: Yes     Types: Marijuana     Comment: uses Marijuana for pain         Family History   I have reviewed this patient's family history and updated it with pertinent information if needed.  Family History   Problem Relation Age of Onset    Glaucoma Mother     Macular Degeneration Mother     Macular Degeneration Other     Lung Cancer Brother          Allergies   Allergies   Allergen Reactions    Lidocaine Other (See Comments)     Lungs filled with fluid. ? Anaphylaxis    Lisinopril Cough     cough    Naltrexone Nausea and Vomiting        Physical Exam   Vital Signs: Temp: 98.9  F (37.2  C) Temp src: Oral BP: (!) 161/63 Pulse: 79   Resp: 18 SpO2: 100 % O2 Device: None (Room air) Oxygen Delivery: 2 LPM  Weight: 149 lbs 11.08 oz    General Appearance: Sitting up in bed, left leg elevated, in no acute distress, cooperative, alert, awake   HEENT: normocephalic, atraumatic, anicteric sclera   Respiratory: breathing comfortably on RA, no crackles, wheezing, rhonchi appreciated   Cardiovascular: RRR with nl S1/S2, no murmurs, clicks, rubs or gallops, 2+ radial pulse on RUE, no signs of peripheral edema bilaterally (LLE with dressing and splint up to knee)  GI: soft, non-distended, normoactive bowel sounds, non-tender per palpation   Skin: scab appreciated on right  knee, no signs or warmth, erythema, drainage, no jaundice and no other rashes or lesions on uncovered surfaces; scar on central check appreciated   Neuro: sensation in tact on bilateral lower extremities, grossly non-focal  MSK: dressing in place on LLE which is c/d/I no other bony deformities     Medical Decision Making       50 MINUTES SPENT BY ME on the date of service doing chart review, history, exam, documentation & further activities per the note.      Data     I have personally reviewed the following data over the past 24 hrs:    10.5  \   10.2 (L)   / 256     136 105 17.2 /  122 (H)   4.7 20 (L) 0.63 (L) \       NOTE: Data reviewed over the past 24 hrs contributes toward MDM complexity

## 2023-10-27 NOTE — PROGRESS NOTES
Calorie Count  Intake recorded for: 10/26  Total Kcals: 0 Total Protein: 0g  Kcals from Hospital Food: 0   Protein: 0g  Kcals from Outside Food (average):0 Protein: 0g  # Meals Ordered from Kitchen: 0  # Meals Recorded: No food intake recorded  # Supplements Recorded: 0

## 2023-10-27 NOTE — PROGRESS NOTES
Neuro: A&Ox4. Is forgetful at times, became irritable during shift due to being woken up abruptly has a history of confusion/dementia.   Cardiac: SR. Was given labetalol due to SBP greater than 180. BP has been stable since med admin.    Respiratory: Sating 98% on 2L.  GI/: Adequate urine output. No BM during shift   Diet/appetite: Tolerating NPO diet.   Activity:  Assist of 1, up to chair. Sits on side of bed to use urinal. Is not weight bearing on left leg.   Pain: At acceptable level on current regimen.   Skin: No new deficits noted.  LDA's: 2 PIVs on right arm.     Plan: Continue with POC. Notify primary team with changes.

## 2023-10-27 NOTE — PROGRESS NOTES
"Brief vascular surgery note:    Was called to patient's bedside for patient fall. Mr. Engle was trying to get out of bed and fell. He injured his head, with traumatic now slightly raised hematoma on forehead (see uploaded picture), patient stated he \"hit\" his head on the side of the bed. Neurologically intact, alert, oriented x3, strength 5/5 in all extremities, PERRLA, no deficits. Patient is on heparin drip and given head injury we will complete a head CT to ensure no bleeding occurs. Additionally, we will monitor patient closely with neuro exam every 1 hour for next 4 hours. Patient know he is non-weight bearing on LLE, he also knows not to get out of bed without assistance.      ADDENDUM: CT head demonstrated:  1. No acute intracranial pathology.   2. Small soft tissue hematoma about the right frontal bone without associated calvarial fracture.  3. 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

## 2023-10-27 NOTE — PROGRESS NOTES
"Orthopedic Surgery Progress Note: 10/27/2023    Subjective:   No acute events overnight. Pain well-controlled. Tolerating a diet. +flatus, no BM. No new concerns or complaints. Denies SOB, chest Pain, fevers, chills.     Objective:   BP (!) 155/51 (BP Location: Right arm, Cuff Size: Adult Regular)   Pulse 79   Temp 98.4  F (36.9  C) (Oral)   Resp 16   Ht 1.702 m (5' 7.01\")   Wt 67.9 kg (149 lb 11.1 oz)   SpO2 100%   BMI 23.44 kg/m    No intake/output data recorded.  General: NAD. Resting comfortably in bed.  Respiratory: Nonlabored breathing  Musculoskeletal:  LLE: Short leg splint in place, C/D/I. No sensation to light touch over dressings. Lower leg compartments soft and compressible.     Laboratory Data:  Lab Results   Component Value Date    WBC 10.5 10/27/2023    HGB 10.2 (L) 10/27/2023     10/27/2023    INR 1.02 10/26/2023         Assessment & Plan:   Darian Engle is a 73 year old male with PMH of  CAD s/p CAB w/ LIMA and L GSV (2004), benign cystic neoplasm of the pancreas, T2DM, HTN and tobacco use who has left great toe dry gangrene s/p femoral endarterectomy, angioplasty, and possible atherectomy with possible stent placement in the left lower extremity on 10/24/23 who is now s/p left transmetatarsal amputation with Dr Thornton on 10/26/23. Progressing appropriately postoperatively.     Plan for Today:  - PT/OT  - Pain control  - Ok to discharge from ortho standpoint if passing PT    Vascular Primary  Up with assistive device  Splint to remain on left  lower extremity and NWB at all times.  Pain control per primary  Ok for anticoagulation to resume tonight per vascular. No contraindications from ortho.  No dressing change on own.  Leave dressing on until 2 weeks follow up appointment.  PT/OT - Eval and treat  F/U in clinic in 2 weeks  If cleared by PT/OT and otherwise medically stable, patient is appropriate for discharge from an orthopedic standpoint.     Orthopedic surgery staff for this " patient is Dr. Thornton.    ------------------------------------------------------------------------------------------    Respectfully,    Antwan Rodrigues MD  Orthopedic Surgery PGY1  [phone]    Please page me directly with any questions/concerns during regular weekday hours before 5 pm. If there is no response, if it is a weekend, or if it is during evening hours then please page the orthopedic surgery resident on call.      FOLLOWUP:    Future Appointments   Date Time Provider Department Center   10/27/2023 10:30 AM Denise Morley, [facility]   1/17/2024  9:30 AM Schuyler Franco DO [facility]

## 2023-10-27 NOTE — OP NOTE
Date of surgery: 10/27/2023      Preoperative diagnosis: Left foot great toe chronic ulcer     Postoperative diagnosis: Left foot great toe chronic ulcer     Procedure: Left foot transmetatarsal amputation     Surgeons: Jerardo Thornton MD     Assistants: Andrew Mcclelland PA-C     Complications: None     Drains: None     EBL: Less than 20 cc     Anesthesia: General endotracheal     Indications: Please refer to clinic note for further details     Procedure note: On 10/27/2023 patient was taken to surgery.  Preoperative antibiotics were administered to the patient prior to arrival to the OR     After successful induction of general endotracheal anesthesia he  was placed supine on the table.  The left lower extremity was prepped and draped in sterile fashion.  After exsanguination by gravity, tourniquet cuff was inflated to 300 mmHg on the proximal third of the left thigh.      The pause for the cause was performed according to our institutional policy which confirmed laterality of the procedure.     A fishmouth incision was made across the forefoot region for the left foot.  Subtenons were dissected.  With exposure of the first through fifth metatarsal  With an oscillating saw we will proceed with a transection of the metatarsal heads with an oscillating saw with a slightly oblique try x-ray from dorsal and distal to plantar proximal.  Eventually the ulcer as well as the metatarsal heads and toes were excised and placed on the back table.    Patient requires some treatment of the metatarsal edges as he presents with a very thin subcutaneous tissue both plantarly and dorsally and this would allow us to decrease any pressure points.    Tourniquet Was deflated and we had basically no bleeding to speak off.  We proceeded with closure of the wound in layers.  Drains were applied patient is placed in a plaster splint.  Patient was transferred in stable condition to PACU.     Plan: Patient will remain nonweightbearing until further  notice.  He will return to clinic in 2 weeks for suture removal.  In the presence of a completely healed wound he will proceed with activities as tolerated without any restrictions.  Patient will be granted the possibility for skipping the 6-week appointment assuming that the wound is well-healed.  In the eventuality of him returning to the 6-week appointment no x-rays were obtained.      Patient will not require to proceed with the use of special orthotic or shoe.  Patient is not expected to require any physical therapy however that is his desire that will be performed without any restrictions    Patient will not require any plain x-rays during the postoperative course    In the eventuality of him having a wound problem he will be evaluated by Dr. Reyna for wound management.    Jerardo Thornton MD

## 2023-10-27 NOTE — PROGRESS NOTES
Transfer  Transferred from: PACU  Via:bed  Reason for transfer: Pt appropriate for 6B- improved patient condition  Family: Aware of transfer  Belongings: Received with pt  Chart: Received with pt  Medications: Meds received from old unit with pt  Code Status verified on armband: yes  2 RN Skin Assessment Completed By: . R arm bruised, 2 abrasions on L elbow, abrasion on L knee, redness blanchable on sacrum with mepilex.  Med rec completed: no  Suction/Ambu bag/Flowmeter at bedside: yes    Report received from: Joaquin RN  Pt status: Stable

## 2023-10-27 NOTE — PROGRESS NOTES
10/27/23 1130   Appointment Info   Signing Clinician's Name / Credentials (PT) Denise Morley DPT   Rehab Comments (PT) NWB LLE, re-eval   Living Environment   People in Home spouse   Current Living Arrangements house   Home Accessibility no concerns   Transportation Anticipated family or friend will provide   Living Environment Comments Pt lives in a 2 bedroom house with his spouse; reports no stair concerns. They have arranged for obtaining a wheelchair and shower chair. Doorways to bathrooms not wide enough for wheelchair, wife states.   Self-Care   Usual Activity Tolerance good   Current Activity Tolerance fair   Regular Exercise No   Equipment Currently Used at Home walker, rolling   Fall history within last six months yes   Number of times patient has fallen within last six months 2   Activity/Exercise/Self-Care Comment Pt IND to K[place] with use of FWW at baseline. Occasionally uses walker   General Information   Onset of Illness/Injury or Date of Surgery 10/26/23   Referring Physician Roxann Fontanez   Patient/Family Therapy Goals Statement (PT) To go home   Pertinent History of Current Problem (include personal factors and/or comorbidities that impact the POC) Darian Engle is a 73 year old male with PMH of  CAD s/p CAB w/ LIMA and L GSV (2004), benign cystic neoplasm of the pancreas, T2DM, HTN and tobacco use who has left great toe dry gangrene s/p femoral endarterectomy, angioplasty, and possible atherectomy with possible stent placement in the left lower extremity on 10/24/23 who is now s/p left transmetatarsal amputation with Dr Thornton on 10/26/23. Progressing appropriately postoperatively.   Existing Precautions/Restrictions fall;cardiac   Weight-Bearing Status - LLE nonweight-bearing   Weight-Bearing Status - RLE full weight-bearing   Cognition   Affect/Mental Status (Cognition) WFL   Orientation Status (Cognition) oriented x 4   Follows Commands (Cognition) WFL   Pain Assessment   Patient Currently in  Pain Yes, see Vital Sign flowsheet   Integumentary/Edema   Integumentary/Edema other (describe)   Integumentary/Edema Comments LLE splinted, dry and intact   Posture    Posture Forward head position;Protracted shoulders;Kyphosis   Range of Motion (ROM)   ROM Comment BLE WFL   Strength (Manual Muscle Testing)   Strength Comments Generalized weakness in BLEs, functionally seen in RLE   Bed Mobility   Bed Mobility supine-sit   Comment, (Bed Mobility) sup>sit SBA with increased time   Transfers   Transfers sit-stand transfer;bed-chair transfer   Comment, (Transfers) STS Sita to FWW, CGA bed>chair   Gait/Stairs (Locomotion)   Comment, (Gait/Stairs) Amb x 4' with FWW and CGA, hop to pattern   Balance   Sitting Balance: Static normal balance   Sitting Balance: Dynamic good balance   Standing Balance: Static good balance   Standing Balance: Dynamic fair balance   Balance Quick Add Sitting balance: Static;Sitting balance: dynamic;Standing balance: static;Standing balance: dynamic   Sensory Examination   Sensory Perception patient reports no sensory changes   Coordination   Coordination no deficits were identified   Muscle Tone   Muscle Tone no deficits were identified   Clinical Impression   Criteria for Skilled Therapeutic Intervention Yes, treatment indicated   PT Diagnosis (PT) Impaired functional mobility   Influenced by the following impairments Generalized weakness, NWB LLE precautions, pain, impaired balance   Functional limitations due to impairments Decreased IND with transfers, gait   Clinical Presentation (PT Evaluation Complexity) evolving   Clinical Presentation Rationale Clinical judgment, [facility]   Clinical Decision Making (Complexity) moderate complexity   Planned Therapy Interventions (PT) balance training;gait training;strengthening;transfer training;progressive activity/exercise;risk factor education;home program guidelines   Risk & Benefits of therapy have been explained evaluation/treatment results  reviewed;care plan/treatment goals reviewed;risks/benefits reviewed;patient;spouse/significant other   PT Total Evaluation Time   PT Eval, Re-eval Minutes (16807) 12   Physical Therapy Goals   PT Frequency Daily   PT Predicted Duration/Target Date for Goal Attainment 11/17/23   PT Goals Transfers;Bed Mobility;Gait   PT: Bed Mobility Independent;Supine to/from sit;Within precautions   PT: Transfers Modified independent;Sit to/from stand;Bed to/from chair;Assistive device;Within precautions   PT: Gait 10 feet;Supervision/stand-by assist;Assistive device;Within precautions   PT Discharge Planning   PT Plan Blocked pivot transfer and STS training, progress amb with chair follow, car transfer training   PT Discharge Recommendation (DC Rec) home with assist;home with home care physical therapy   PT Rationale for DC Rec Pt below functional baseline, limited by recent surgical pain and fatigue. Anticipate by time of DC, pt will be able to discharge to home with assist from wife. Suspect he will only functionally be able to ambulate (via hop-to pattern with FWW) <20', thus will require wheelchair for longer distances in home and in community. Pt currently requiring CGA-Sita x 1 with FWW for all upright mobility. Will continue to update recs over weekend as indicated.   PT Brief overview of current status A x 1 pivot with FWW   PT Equipment Needed at Discharge gait belt;walker, rolling;wheelchair;wheelchair cushion   Total Session Time   Timed Code Treatment Minutes 20   Total Session Time (sum of timed and untimed services) 32

## 2023-10-27 NOTE — PROGRESS NOTES
"VASCULAR SURGERY PROGRESS NOTE    Subjective:  Feeling much better this morning, pain is well controlled.  Now postop day 1 from amputation of all transmetatarsals. LLE with extensive dressing and splint on.    Objective:  Intake/Output Summary (Last 24 hours) at 10/27/2023 0938  Last data filed at 10/27/2023 0925  Gross per 24 hour   Intake 1870 ml   Output 900 ml   Net 970 ml     Labs:  ROUTINE IP LABS (Last four results)  BMP  Recent Labs   Lab 10/27/23  0455 10/26/23  2237 10/26/23  1723 10/26/23  1452 10/26/23  0443 10/26/23  0439 10/25/23  0919 10/25/23  0420 10/24/23  1952/23  1938     --   --   --   --  138  --  135  --  134*   POTASSIUM 4.7  --   --   --   --  4.0  --  4.5  --  4.7   CHLORIDE 105  --   --   --   --  108*  --  104  --  100   SHERYL 8.2*  --   --   --   --  8.2*  --  8.1*  --  8.1*   CO2 20*  --   --   --   --  22  --  20*  --  15*   BUN 17.2  --   --   --   --  16.1  --  18.6  --  15.0   CR 0.63*  --   --   --   --  0.69  --  0.76  --  0.78   * 154* 120* 110*   < > 110*   < > 125*   < > 167*    < > = values in this interval not displayed.     CBC  Recent Labs   Lab 10/27/23  0455 10/26/23  0439 10/25/23  1005 10/25/23  0420   WBC 10.5 10.5 13.2* 13.1*   RBC 3.23* 3.11* 3.10* 3.19*   HGB 10.2* 9.9* 9.8* 10.2*   HCT 31.1* 30.1* 29.3* 30.2*   MCV 96 97 95 95   MCH 31.6 31.8 31.6 32.0   MCHC 32.8 32.9 33.4 33.8   RDW 13.8 14.0 14.2 14.0    233 233 227     INR  Recent Labs   Lab 10/26/23  0439 10/24/23  1938   INR 1.02 1.08     PHYSICAL EXAM:  BP (!) 155/51 (BP Location: Right arm, Cuff Size: Adult Regular)   Pulse 79   Temp 98.4  F (36.9  C) (Oral)   Resp 16   Ht 1.702 m (5' 7.01\")   Wt 67.9 kg (149 lb 11.1 oz)   SpO2 100%   BMI 23.44 kg/m    General: The patient is alert and oriented. Appropriate.   Psych: Pleasant affect, answers questions appropriately  Skin: Color appropriate for race, warm, dry.  Respiratory: The patient does require supplemental oxygen. " Breathing unlabored  GI:  Abdomen soft, nontender to light palpation.  Incision: Left antecubital site without swelling or bruising  Extremities: LLE unable to assess doppler signals due to extensive layered dressing from OR with ortho. LLE appears well perfused and warm to knee (where dressing ends from foot up). Yesterday, prior to going to OR, patient had with doppler PT present and now faint DP present, warm, with dry gangrene of first toe.    ASSESSMENT / PLAN:  Darian Engle is a 73 year old male with PMH  of CAD s/p CAB w/ LIMA and L GSV (2004), benign cystic neoplasm of the pancreas, T2DM, HTN, and active smoking who has dry gangrene of his L first toe. S/p angiogram 10/17 demonstrating SFA & SFA stent (known) occlusion with peroneal and PT runoff - both vessels with disease. Cardiac eval completed, normal EF and no cardiac interventions required. His cardiac risk based on RCRI score, the risk of major cardiac risk complications is ~10.1% 30 day risk of death, MI, or cardiac arrest. S/p femoral endarterectomy, angioplasty, atherectomy in left lower extremity on 10/24/23. Now s/p left transmetatarsal amputation with Dr Thornton on 10/26/23.      Neuro:   - tylenol sched 975 q8, oxy 5 q4 prn, gabapentin 100 TID, robaxin PRN, atarax PRN.   - continue PTA donepezil, sertraline    Resp:   - encourage IS     CV:   - continue home coreg 25 BID, statin, asa 81, pletal, losartan 100 daily, increased home Imdur to 120mg, Norvasc 10mg daily, Aldactone 25 mg daily  - holding home eliquis postop: stopped lovenox, started heparin drip post vascular intervention but also with paroxysmal afib (chadsvasc 6)   - cardiology for preop cardiac risk assessment completed:              - stress MRI 10/18: normal biventricular function with prior inferior and inferoseptal infarction from RCA disease and a small apical infarction from LAD disease, chronic in nature, no interventions needed.    - functional METs is unknown due to  limitation from leg pain. The patient's RCRI score is 2 [history of ischemic heart disease, history of cerebrovascular disease]. Based on RCRI score, the risk of major cardiac risk complications is ~10.1% 30 day risk of death, MI, or cardiac arrest   - prns for HTN for SBP>160  - No further troponins check per cardiology this morning and last night. Patient asymptomatic. No interventions, no EKG, no change in meds warranted per cardiology fellow.     Peripheral vascular:   - PAD, L great toe dry gangrene, s/p femoral endarterectomy, angioplasty, atherectomy in left lower extremity on 10/24/23 which should help heal left foot incisions.   - Ortho following s/p TMA 10/26/23: F/U in clinic in 2 weeks    - Heparin drip low dose with therapeutic goals    GI:   - Sodium restricted diet (on 1200mg @ home per PCP)  - Home omeprazole   - bowel reg (sched doc/senna BID while on narcotics, prn miralax)     :   - no dinh     Endo:   - hold metformin for now given upcoming procedure, may consider resuming if needed, mostly normoglycemic  - sliding scale insulin in mean time, medium intensity     Misc:   - PT/OT consult     Discussed pt history, exam, assessment with Dr. Sanchez, vascular surgery staff      Roxann Fontanez CNP  Vascular Surgery  Pager: [phone]  [email or web address]  Send message or 10 digit call back number Securely via [employer] with the [employer] Web Console (learn more here)

## 2023-10-28 NOTE — PLAN OF CARE
"BP (!) 151/53 (BP Location: Left arm, Cuff Size: Adult Regular)   Pulse 58   Temp 98.2  F (36.8  C) (Oral)   Resp 11   Ht 1.702 m (5' 7.01\")   Wt 67.9 kg (149 lb 11.1 oz)   SpO2 96%   BMI 23.44 kg/m      Shift events: No acute events. VSS. Pain control acceptable with current orders. Slept well. Replace Mag and K.    Neuro: A&Ox4; neuros intact with no deficits noted.  CV: Sinus rhythm/wojciech; labetalol x1 for SBP >160.  Resp: RA.  GI: Loose stool yesterday; no BM overnight.  : Voids spontaneously; adequate UOP.  Diet/nutrition: Regular diet  Endo: ACHS BGM with sliding scale.  Skin: Left groin catheter site covered with no drainage/hematoma. Left toe amputation site dressing intact; ortho to manage first change.  Activity: Non weight bearing on LLE. Bedrest overnight.  LDA: PIV x1.  Gtts: Heparin at 1100 unit/hr overnight. Anti Xa supratherapeutic this AM; paused now, restart at 0715 at 900 unit/hr. Recheck ordered for 1315.        "

## 2023-10-28 NOTE — PROGRESS NOTES
Care Coordinator  D/I: Darian Engle is a 73 year old male with PMH of  CAD s/p CAB w/ LIMA and L GSV (2004), benign cystic neoplasm of the pancreas, T2DM, HTN and tobacco use who has left great toe dry gangrene s/p femoral endarterectomy, angioplasty, and possible atherectomy with possible stent placement in the left lower extremity on 10/24/23 who is now s/p left transmetatarsal amputation with Dr Thornton on 10/26/23. Progressing appropriately postoperatively note per Dr Antwan Rodrigues orthopedics 10/27/23.  A: PT recs HHPT/assist: PT said working with car transfers but didn't say he could do it with assist of his wife or can she assist him with pivot transfers____?  P: I have sent electronic referral to [facility] for: HHPT/OT wait for acceptance--per voice message from Lenora, Noah  has accepted pt for HHPT/OT. I have added to AVS orders.  Per PT pt needs Equipment of: Gait belt___Rolling knee scooter (on AVS) and 2 wheeled walker(on AVS) and a wheelchair_____. Per chart: pt fell and hit his head 10/27 afternoon--had Head CT and neuro check ordered.  Will follow.

## 2023-10-28 NOTE — PROGRESS NOTES
Neuro: A&Ox4.   Cardiac: SR. VSS.   Respiratory: Sating 100% on RA.  GI/: Adequate urine output. BM X1, large and loose  Diet/appetite: Tolerating regular diet. Appetite poor.  Activity:  Assist of 2, up to chair. Sits on side of bed to use urinal. NWB on L leg.  Pain: At acceptable level on current regimen.   Skin: See LDA.  LDA's: 2 PIVs R arm.    Plan: Continue with POC. Notify primary team with changes.

## 2023-10-28 NOTE — PROGRESS NOTES
Calorie Count  Intake recorded for: 10/27  Total Kcals: 1775 Total Protein: 87g  Kcals from Hospital Food: 1696   Protein: 82g  Kcals from Outside Food (average):79 Protein: 5g  # Meals Ordered from Kitchen: 1 meal +  outside food   # Meals Recorded: 1 meal + outside food   (Meal - 100% 2 breakfast tacos w/ sausage, eggs, cheese, salsa, and sour cream, Honey Nut Cheerios, cantaloupe, 1 8oz 1% milk, coffee w/ cream and sugar)  (Outside - 25% 1 hamburger from home)  # Supplements Recorded: 100% 1 Ensure Enlive

## 2023-10-28 NOTE — PROGRESS NOTES
"Orthopedic Surgery Progress Note: 10/28/2023    Subjective:   Patient fell while getting into bed yesterday and hit his head. CT head negative. Denies injury to left foot.     Objective:   BP (!) 151/53 (BP Location: Left arm, Cuff Size: Adult Regular)   Pulse 58   Temp 98.2  F (36.8  C) (Oral)   Resp 11   Ht 1.702 m (5' 7.01\")   Wt 67.9 kg (149 lb 11.1 oz)   SpO2 96%   BMI 23.44 kg/m    No intake/output data recorded.  General: NAD. Resting comfortably in bed.  Respiratory: Nonlabored breathing  Musculoskeletal:  LLE: Short leg splint in place, C/D/I. No sensation to light touch over dressings. Lower leg compartments soft and compressible.     Laboratory Data:  Lab Results   Component Value Date    WBC 9.4 10/28/2023    HGB 9.5 (L) 10/28/2023     10/28/2023    INR 1.02 10/26/2023         Assessment & Plan:   Darian Engle is a 73 year old male with PMH of  CAD s/p CAB w/ LIMA and L GSV (2004), benign cystic neoplasm of the pancreas, T2DM, HTN and tobacco use who has left great toe dry gangrene s/p femoral endarterectomy, angioplasty, and possible atherectomy with possible stent placement in the left lower extremity on 10/24/23 who is now s/p left transmetatarsal amputation with Dr Thornton on 10/26/23. Progressing appropriately postoperatively.     Plan for Today:  - PT/OT  - Pain control  - Ok to discharge from ortho standpoint if passing PT and medically stable    Vascular Primary  Up with assistive device  Splint to remain on left  lower extremity and NWB at all times.  Pain control per primary  Ok for anticoagulation to resume tonight per vascular. No contraindications from ortho.  No dressing change on own.  Leave dressing on until 2 weeks follow up appointment.  PT/OT - Eval and treat  F/U in clinic in 2 weeks  If cleared by PT/OT and otherwise medically stable, patient is appropriate for discharge from an orthopedic standpoint.     Orthopedic surgery staff for this patient is Dr." Thornton.    ------------------------------------------------------------------------------------------    Respectfully,    Lui Haddad MD  Orthopedic Surgery PGY1  [phone]    Please page me directly with any questions/concerns during regular weekday hours before 5 pm. If there is no response, if it is a weekend, or if it is during evening hours then please page the orthopedic surgery resident on call.      FOLLOWUP:    Future Appointments   Date Time Provider Department Center   10/27/2023 10:30 AM Denies Morlye, PT [facility]   1/17/2024  9:30 AM Schuyler Franco DO [facility]

## 2023-10-28 NOTE — PROGRESS NOTES
"VASCULAR SURGERY PROGRESS NOTE    Subjective:  Feeling very well today, no acute complaints. States pain is 1-2/10 in LLE at level of amputation, no other pain.  He did fall yesterday and hit his head, was an unwitnessed fall, states it happened while walking from the bathroom, and he was able to grab rail of bed to slow fall.     Objective:  Intake/Output Summary (Last 24 hours) at 10/27/2023 0938  Last data filed at 10/27/2023 0925  Gross per 24 hour   Intake 1870 ml   Output 900 ml   Net 970 ml     Labs:  ROUTINE IP LABS (Last four results)  BMP  Recent Labs   Lab 10/28/23  1140 10/28/23  0738 10/28/23  0531 10/27/23  2235 10/27/23  0812 10/27/23  0455 10/26/23  0443 10/26/23  0439 10/25/23  0919 10/25/23  0420   NA  --   --  138  --   --  136  --  138  --  135   POTASSIUM  --   --  3.8  --   --  4.7  --  4.0  --  4.5   CHLORIDE  --   --  107  --   --  105  --  108*  --  104   SHERYL  --   --  8.3*  --   --  8.2*  --  8.2*  --  8.1*   CO2  --   --  23  --   --  20*  --  22  --  20*   BUN  --   --  14.2  --   --  17.2  --  16.1  --  18.6   CR  --   --  0.61*  --   --  0.63*  --  0.69  --  0.76   * 115* 143* 165*   < > 137*   < > 110*   < > 125*    < > = values in this interval not displayed.     CBC  Recent Labs   Lab 10/28/23  0531 10/27/23  0455 10/26/23  0439 10/25/23  1005   WBC 9.4 10.5 10.5 13.2*   RBC 2.93* 3.23* 3.11* 3.10*   HGB 9.5* 10.2* 9.9* 9.8*   HCT 28.3* 31.1* 30.1* 29.3*   MCV 97 96 97 95   MCH 32.4 31.6 31.8 31.6   MCHC 33.6 32.8 32.9 33.4   RDW 13.6 13.8 14.0 14.2    256 233 233     INR  Recent Labs   Lab 10/26/23  0439 10/24/23  1938   INR 1.02 1.08     PHYSICAL EXAM:  /48 (BP Location: Left arm, Cuff Size: Adult Regular)   Pulse 54   Temp 97.7  F (36.5  C) (Oral)   Resp 12   Ht 1.702 m (5' 7.01\")   Wt 67.9 kg (149 lb 11.1 oz)   SpO2 96%   BMI 23.44 kg/m    General: The patient is alert and oriented. Appropriate.   Psych: Pleasant affect, answers questions " appropriately  Skin: Color appropriate for race, warm, dry.  Respiratory: The patient does not require supplemental oxygen. Breathing unlabored  Abdomen: Nontender, soft, nondistended.   Incision: Left antecubital site without swelling or bruising  Extremities: LLE unable to assess doppler signals due to extensive layered dressing from OR with ortho. LLE appears well perfused and warm to knee (where dressing ends from foot up).   Neuro: CN II-XII in tact, strength 5/5 BUE, RLE.   MSK: No tenderness to palpation on any long bones, no tenderness on palpation of ribs or chest. Pelvis stable.   Skin:No lesions appreciated on skin, has small quarter sized hematoma on r anterior head.       ASSESSMENT / PLAN:  Darian Engle is a 73 year old male with PMH  of CAD s/p CAB w/ LIMA and L GSV (2004), benign cystic neoplasm of the pancreas, T2DM, HTN, and active smoking who has dry gangrene of his L first toe. S/p angiogram 10/17 demonstrating SFA & SFA stent (known) occlusion with peroneal and PT runoff - both vessels with disease. Cardiac eval completed, normal EF and no cardiac interventions required. His cardiac risk based on RCRI score, the risk of major cardiac risk complications is ~10.1% 30 day risk of death, MI, or cardiac arrest. S/p femoral endarterectomy, angioplasty, atherectomy in left lower extremity on 10/24/23. Now s/p left transmetatarsal amputation with Dr Thornton on 10/26/23.  He had an unwitnessed fall 10/27 where he did hit his head, negative head CT with exception of extracranial hematoma.  Neurochecks ordered, exam negative for other injuries.      Neuro:   - tylenol sched 975 q8, oxy 5 q4 prn, gabapentin 100 TID, robaxin PRN, atarax PRN.   - continue PTA donepezil, sertraline    Resp:   - encourage IS     CV:   - continue home coreg 25 BID, statin, asa 81, pletal, losartan 100 daily, increased home Imdur to 120mg, Norvasc 10mg daily, Aldactone 25 mg daily  -Transition to home eilquis today 5mg BID,  please give first dose at 8pm then stop hep gtt.   - cardiology for preop cardiac risk assessment completed:              - stress MRI 10/18: normal biventricular function with prior inferior and inferoseptal infarction from RCA disease and a small apical infarction from LAD disease, chronic in nature, no interventions needed.    - functional METs is unknown due to limitation from leg pain. The patient's RCRI score is 2 [history of ischemic heart disease, history of cerebrovascular disease]. Based on RCRI score, the risk of major cardiac risk complications is ~10.1% 30 day risk of death, MI, or cardiac arrest   - prns for HTN for SBP>160    Peripheral vascular:   - PAD, L great toe dry gangrene, s/p femoral endarterectomy, angioplasty, atherectomy in left lower extremity on 10/24/23 which should help heal left foot incisions.   - Ortho following s/p TMA 10/26/23: F/U in clinic in 2 weeks      GI:   - Sodium restricted diet (on 1200mg @ home per PCP)  - Home omeprazole   - bowel reg (sched doc/senna BID while on narcotics, prn miralax)     :   - no dinh     Endo:   - restart home metformin 10/28  - sliding scale insulin prn    Misc:   - PT/OT consult  - appreciate evaluation   - Per notes: recommending home with home PT as well as with assist.   - Would like PT to see patient today and assess following fall, if safe to discharge may potentially go homt 10/29 vs. 10/30.     Discussed pt history, exam, assessment with vascular surgery staff    Richar Batista MD   Vascular Surgery PGY3

## 2023-10-29 NOTE — PLAN OF CARE
Goal Outcome Evaluation:  PT to work with wife today to determine safe discharge plan. Pt is NWB LLE AAT and per PT wishes to go home.  If he is cleared to discharge with wife--needs to be able to get in and out of his car and in/out of his home, he will need DME: gait belt and Wheelchair(rental), they are NOT recommending a knee scooter--per PT notes at this time.  P: Follow up PT session today for recs____. Pt to follow up with Orthopedic surgeon Dr Thornton in 2 weeks--I tried calling the [facility] scheduling line and per Gely she cannot schedule for them and the clinics are closed so the RN CC needs to call on 10/30/23.  [facility]  has accepted pt for HHPT/OT. Will follow.

## 2023-10-29 NOTE — PROGRESS NOTES
Calorie Count  Intake recorded for: 10/28  Total Kcals: 1716 Total Protein: 60g  Kcals from Hospital Food: 1716  Protein: 60g  Kcals from Outside Food (average):0 Protein: 0g  # Meals Ordered from Kitchen: 3 meals  # Meals Recorded: 3 meals  (First - 100% banana, 1 8oz 1% milk, coffee w/ cream and sugar, 50% 1 breakfast taco w/ sausage, egg, and cheese)  (Second - 100% caesar salad, 50% coffee w/ cream and sugar, 25% chicken tikka masala)  (Third - 100% 1 8oz whole milk, coffee w/ cream and sugar, 50% chicken noodle soup, mini corn dogs, tater tots, 25% caesar salad)  # Supplements Recorded: 50% 1 Ensure Enlive

## 2023-10-29 NOTE — PLAN OF CARE
Neuro: A&Ox4. Call light appropriate.   Cardiac: SR/SB - HR 50-60s. SBP up to 170s prior to AM BP meds, no prns needed as BP improved after morning meds. Afebrile.   Respiratory: Sating >97% on RA.  GI/: Adequate urine output via urinal. No BM this shift.   Diet/appetite: Tolerating regular diet on calorie counts. Eating fairly well.  Activity:  Assist of 1-2, up to chair and in halls w/ PT. NWB on LLE.   Pain: At acceptable level on current regimen - prn oxycodone provided per MAR for LLE pain.   Skin: No new deficits noted.  LDA's: PIV x2     Plan: Continue with POC. Notify primary team with changes.     Goal Outcome Evaluation:      Plan of Care Reviewed With: patient    Overall Patient Progress: no changeOverall Patient Progress: no change    Outcome Evaluation: Transitioned to oral apixiban      Problem: Pain Acute  Goal: Optimal Pain Control and Function  Intervention: Prevent or Manage Pain  Recent Flowsheet Documentation  Taken 10/28/2023 2000 by Suri Waddell RN  Medication Review/Management: medications reviewed  Taken 10/28/2023 1600 by Suri Waddell RN  Medication Review/Management: medications reviewed  Taken 10/28/2023 1200 by Suri Waddell RN  Medication Review/Management: medications reviewed

## 2023-10-29 NOTE — PROGRESS NOTES
10/29/23 0900   Appointment Info   Signing Clinician's Name / Credentials (OT) Artur Kemp OTR/L   Rehab Comments (OT) [facility].   Living Environment   People in Home spouse   Current Living Arrangements house   Home Accessibility no concerns   Transportation Anticipated family or friend will provide   Living Environment Comments Pt lives in a slab on grade single level home. Pt has a walk in shower with a grab bar and a shower chair available.   Self-Care   Usual Activity Tolerance good   Current Activity Tolerance fair   Regular Exercise No   Equipment Currently Used at Home grab bar, tub/shower;walker, rolling   Fall history within last six months yes   Number of times patient has fallen within last six months 1   General Information   Onset of Illness/Injury or Date of Surgery 10/17/23   Referring Physician Roxann Fontanez APRN CNP   Patient/Family Therapy Goal Statement (OT) Pt would like to return home as soon as possible.   Additional Occupational Profile Info/Pertinent History of Current Problem Darian Engle is a 73 year old male with PMH of  CAD s/p CAB w/ LIMA and L GSV (2004), benign cystic neoplasm of the pancreas, T2DM, HTN and tobacco use who has left great toe dry gangrene s/p femoral endarterectomy, angioplasty, and possible atherectomy with possible stent placement in the left lower extremity on 10/24/23 who is now s/p left transmetatarsal amputation with Dr Thornton on 10/26/23. Progressing appropriately postoperatively.   Existing Precautions/Restrictions fall   Left Upper Extremity (Weight-bearing Status) full weight-bearing (FWB)   Right Upper Extremity (Weight-bearing Status) full weight-bearing (FWB)   Left Lower Extremity (Weight-bearing Status) non weight-bearing (NWB)   Right Lower Extremity (Weight-bearing Status) full weight-bearing (FWB)   Cognitive Status Examination   Orientation Status person;place;other (see comments)  (October, 24th, 2023 Stated.)   Visual Perception   Visual  Impairment/Limitations WFL;corrective lenses full-time   Sensory   Sensory Quick Adds sensation intact   Pain Assessment   Patient Currently in Pain No   Range of Motion Comprehensive   General Range of Motion bilateral upper extremity ROM WNL   Strength Comprehensive (MMT)   Comment, General Manual Muscle Testing (MMT) Assessment Pt presents with generalized weakness throughout.   Bed Mobility   Comment (Bed Mobility) CGA and vc's.   Transfers   Transfer Comments CGA/Min A and vc's with FWW.   Activities of Daily Living   BADL Assessment/Intervention upper body dressing;lower body dressing;grooming;toileting;bathing   Bathing Assessment/Intervention   Branch Level (Bathing) set up;verbal cues;moderate assist (50% patient effort)   Comment, (Bathing) Per clinical judgement.   Upper Body Dressing Assessment/Training   Branch Level (Upper Body Dressing) set up;verbal cues   Lower Body Dressing Assessment/Training   Comment, (Lower Body Dressing) Per clinical judgement.   Branch Level (Lower Body Dressing) set up;verbal cues;moderate assist (50% patient effort)   Grooming Assessment/Training   Branch Level (Grooming) set up;verbal cues   Toileting   Branch Level (Toileting) set up;verbal cues;moderate assist (50% patient effort)   Clinical Impression   Criteria for Skilled Therapeutic Interventions Met (OT) Yes, treatment indicated   OT Diagnosis Decreased independence with functional transfers and ADLs. Pt   OT Problem List-Impairments impacting ADL problems related to;activity tolerance impaired   Assessment of Occupational Performance 3-5 Performance Deficits   Identified Performance Deficits Decreased independence with functional transfers and ADLS/IADLS.   Planned Therapy Interventions (OT) IADL retraining;ADL retraining;cognition;bed mobility training;ROM;strengthening;stretching;transfer training;home program guidelines;progressive activity/exercise   Clinical Decision Making  Complexity (OT) problem focused assessment/low complexity   Risk & Benefits of therapy have been explained evaluation/treatment results reviewed;care plan/treatment goals reviewed;patient   OT Total Evaluation Time   OT Eval, Low Complexity Minutes (20348) 10   OT Goals   Therapy Frequency (OT) 6 times/week   OT Predicted Duration/Target Date for Goal Attainment 11/05/23   OT Discharge Planning   OT Plan OT: Cognitive screen, Functional transfers, Dressing, ADLS.   OT Discharge Recommendation (DC Rec) Transitional Care Facility  (Pt wants to return home, not rehab.)   OT Rationale for DC Rec Pt is currently below baseline function. Pt will benefit from continued therapy to progress, Strength, endurance and independence/safety with functional transfers/ADLS.   OT Brief overview of current status CGA for bed mobility, CGA/Min A and vc's with FWW for STS from bed/chair, Mod A and vc's with FWW and grab bars STS from toilet, shower chair.   OT Equipment Needed at Discharge gait belt;shower chair   Total Session Time   Total Session Time (sum of timed and untimed services) 10

## 2023-10-29 NOTE — PROGRESS NOTES
"Orthopedic Surgery Progress Note: 10/29/2023    Subjective:   NAEON. Reports increased pain in foot.    Objective:   /48 (BP Location: Right arm)   Pulse 61   Temp 97.9  F (36.6  C) (Oral)   Resp 15   Ht 1.702 m (5' 7.01\")   Wt 69 kg (152 lb 1.9 oz)   SpO2 100%   BMI 23.82 kg/m    No intake/output data recorded.  General: NAD. Resting comfortably in bed.  Respiratory: Nonlabored breathing  Musculoskeletal:  LLE: Short leg splint in place, C/D/I. No sensation to light touch over dressings. Lower leg compartments soft and compressible.     Laboratory Data:  Lab Results   Component Value Date    WBC 7.4 10/29/2023    HGB 10.0 (L) 10/29/2023     10/29/2023    INR 1.02 10/26/2023         Assessment & Plan:   Darian Egnle is a 73 year old male with PMH of  CAD s/p CAB w/ LIMA and L GSV (2004), benign cystic neoplasm of the pancreas, T2DM, HTN and tobacco use who has left great toe dry gangrene s/p femoral endarterectomy, angioplasty, and possible atherectomy with possible stent placement in the left lower extremity on 10/24/23 who is now s/p left transmetatarsal amputation with Dr Thornton on 10/26/23. Progressing appropriately postoperatively.     Plan for Today:  - PT/OT  - Pain control  - Ok to discharge from ortho standpoint if passing PT and medically stable    Vascular Primary  Up with assistive device  Splint to remain on left  lower extremity and NWB at all times.  Pain control per primary  Ok for anticoagulation to resume tonight per vascular. No contraindications from ortho.  No dressing change on own.  Leave dressing on until 2 weeks follow up appointment.  PT/OT - Eval and treat  F/U in clinic in 2 weeks  If cleared by PT/OT and otherwise medically stable, patient is appropriate for discharge from an orthopedic standpoint.     Orthopedic surgery staff for this patient is  " Thornton.    ------------------------------------------------------------------------------------------    Respectfully,    Lui Haddad MD  Orthopedic Surgery PGY1  [phone]    Please page me directly with any questions/concerns during regular weekday hours before 5 pm. If there is no response, if it is a weekend, or if it is during evening hours then please page the orthopedic surgery resident on call.      FOLLOWUP:    Future Appointments   Date Time Provider Department Center   10/27/2023 10:30 AM Denise Morley, PT [facility]   1/17/2024  9:30 AM Schuyler Franco DO [facility]

## 2023-10-29 NOTE — PROGRESS NOTES
"Care Coordinator  D/I: Pt had OT session without wife approx 10am and then a PT session w/Lakeisha B--she informs me that pt does have dementia, and she has concerns with him going home as he did NOT do as well as yesterday. Lakeisha and Artur report that pt wants to go home, but they will talk with wife as they do not think this is a safe plan. Pt has an SUV that is higher but they do NOT think he can get in and out safely. Does he have steps? Yes see PT note. He could NOT get off the toilet without assist today--wife unable to get him off without PT assisting her. Lakeisha report wife did not sleep last night. He does NOT have grab bars in the shower/tub per Lakeisha.Lakeisha feels he is a high fall risk.  P: PT/OT rec TCU.  IF he progresses to home Equipment Needs from [employer]:  18\" wide Wheelchair with standard cushion--Wife was to measure home Bathroom door width and has not done this_____.    WE can issue to him to take home:  Raised Toilet Seat  Gait belt    Has: 4 WW at home    P: I have informed Licha Batista [facility] Surg Resident : informed me that pt's spouse's mother is having health issues and it is likely that frequently spouse will not be home to assist pt .    I have informed the weekend 6B sw and updated sticky notes.     "

## 2023-10-29 NOTE — PLAN OF CARE
[facility]   Intensive Care Unit Daily Note    Name: Shemar Almanza (Tanesha, Baby1 Priti)  YOB: 2016    History of Present Illness    AGA male infant born at 1559 grams and ~30-32 wks estimated PMA (unknown dates) by , Spontaneous due to PTL.  Mother with history of Grave's disease (low TSH level on admission for delivery), unclear history of treatment during pregnancy. Mother also with history of THC and opioid use.    Patient Active Problem List   Diagnosis     Single liveborn, born in hospital, delivered     Prematurity     Need for observation and evaluation of  for sepsis     Malnutrition (H)     Respiratory failure of      In utero drug exposure        Interval History   No acute concerns noted.    Assessment & Plan   Overall Status:  24 day old  LBW male infant who is now ~ 33w3d PMA. (Santoyo score at ~24h of age correlated with 30-32 wks gestation). This patient is no longer critically ill now in RA working who continues to require coordinated care for prematurity including feeding evaluation and continuous cardiorespiratory monitoring.     Access: none.  (PIV-out; PICC- out.)    FEN:    Vitals:    18 0100 18 0100 18 0100   Weight: (!) 1.67 kg (3 lb 10.9 oz) (!) 1.69 kg (3 lb 11.6 oz) (!) 1.73 kg (3 lb 13 oz)     Weight change: 0.02 kg (0.7 oz)  11% change from BW    Malnutrition. Poor weight gain.  In: ~156 ml/kg/d, ~135 kcal/kg/d,   Out: adequate UOP, + stool    Continue:  - TF goal 160-170 ml/kg/day, higher volume given poor growth.   - Full enteral feeds dBM 26 fortified with HMF4/Neo2 + added LP (no MBM given illicit substance use). Changed to 26kcal  given poor weight gain   - working on PO - 56%  - HOB up for FANTA/emesis  - Vitamin D   - Review with dietician and lactation specialists - see separate notes.   - Monitor I/O, daily weights, growth     +CAH screen -  Neuro: A&Ox4. Calls appropriately.   Cardiac: SR/SB. PRN hydralazine x1.    Respiratory: Sating > 95% on RA.  GI/: Adequate urine output. No BM this shift.   Diet/appetite: Tolerating regular diet.  Activity:  Assist of 1-2, up to edge of bed. NWB on LLE.   Pain: At acceptable level on current regimen. PRN meds given per MAR for LLE pain.   Skin: No new deficits noted.  LDA's: PIV x2.     Notified vasc surg resident at 0610 that pt reporting 10/10 pain on LLE at amputation site. IV dilaudid given 1 hr prior w/ no relief per pt. PRN atarax and robaxin given at this time, pt reporting some relief. Team to address alternative pain options during rounds.     Plan: Continue with POC. Notify primary team with changes.      electrolytes have been nl    Osteopenia of prematurity: at risk. On standard fortification. OT involved. Alk phos normal at 14d NBS, no planned repeat.    Lab Results   Component Value Date    ALKPHOS 270 2018     Endocrine: Hx of maternal Grave's disease with unclear prenatal treatment history. Initial infant studies on : thyroid receptor Ab negative. TSH 3.43, T4 2.04, T3 73.  Mild elevation T4 has decreased. Endo consulted and have low suspicion for  Graves, no need to repeat labs. Their team signed off as of .      Respiratory:  Resolved failure due to RDS, initially requiring nCPAP but intubated at ~24h of age for increased FiO2 needs and WOB. Now s/p surfactant x 2. SIMV rate 20 FiO2 21% off .   Extubated to nCPAP.  Successful off CPAP 1/10. Off high flow .    Now remains stable in RA, and we are monitoring resp status.    Apnea of Prematurity:  No ABDS. Occasional SR HR alarms.  - Discontinued caffeine        Cardiovascular:  Good BP and perfusion. No murmur.  - Continue routine CR monitoring.    ID:  No current infectious concerns.  - Monitor for signs/symptoms of sepsis.    History: initial septic eval unrevealing. Off amp/gent after 48h coverage.    Hematology:   Initial polycythemia. Longterm, is at risk for Anemia of Prematurity/ Phlebotomy.  - Fe supplementation.  - Monitor serial hemoglobin levels with 30d NBS on   - next ferritin     No results for input(s): HGB in the last 168 hours.  Hyperbilirubinemia: At risk. Maternal BT O neg, BBT A neg. Ab neg. Mother rec'd Rhogam. Phototherapy .-.  Mild rebound- now down off phototx, and we are monitoring clinically.    CNS:  At risk for IVH/PVL.  ~1wk HUS no IVH.  - Obtain screening head ultrasounds at ~35-36 wks GA (eval for PVL).     Toxicology: Testing indicated due to maternal hx of positive prenatal drug screen (THC and opioids)  Infant urine tox: + opiates, negative  PCP/Cannabinoids/Cocaine/Amphetamines  meconium tox: + methadone, THC, codeine, morphine.  KULDIP scores remained low and stopped scoring 2018.  - review with SW. CPS involved.    ROP:  At risk due to prematurity. First exam scheduled with Peds Ophthalmology in .    Thermoregulation: Stable with current support.   - Continue to monitor temperature and provide thermal support as indicated.     HCM[place]   #[place] - + CAH screen and elevated aa's.  Possible false positive. Repeating screen per state lab recommendation at 14 and 30d of age.  Repeat #2 at 14d- normal.  - Send repeat NMS at 30 days old due to low birthweight  - Obtain hearing/CCHD screens PTD.  - Obtain carseat trial PTD.  - Continue standard NICU cares and family education plan.    Immunizations     Immunization History   Administered Date(s) Administered     Hep B, Peds or Adolescent 2018     Hepb Ig, Im (hbig) 2018          Medications   Current Facility-Administered Medications   Medication     ferrous sulfate (ELMER-IN-SOL) oral drops 5 mg     cholecalciferol (vitamin D/D-VI-SOL) liquid 200 Units     glycerin (PEDI-LAX) Suppository 0.125 suppository     sucrose (SWEET-EASE) solution 0.2-2 mL     breast milk for bar code scanning verification 1 Bottle     cyclopentolate-phenylephrine (CYCLOMYDRYL) 0.2-1 % ophthalmic solution 1 drop     tetracaine (PONTOCAINE) 0.5 % ophthalmic solution 1 drop          Physical Exam - Attending Physician   GENERAL: NAD, male infant  RESPIRATORY: Chest CTA, no retractions.   CV: RRR, no murmur, good perfusion throughout.   ABDOMEN: soft, non-distended, no masses.   CNS: Normal tone for GA. AFOF. MAEE.        Communications   Parents:  Updated after rounds. See SW note for social history details.     PCPs:   Infant PCP: Discuss with parents  Maternal OB PCP: No prenatal care  Delivering Provider:   Shae Montgomery  updated via [employer] 2018      Health Care Team:  Patient  discussed with the care team.    A/P, imaging studies, laboratory data, medications and family situation reviewed.  Preet Bowens MD, MD

## 2023-10-29 NOTE — PROGRESS NOTES
"VASCULAR SURGERY PROGRESS NOTE    Subjective:  Increased pain in L foot at amp site this am to 10/10 pain with resolution eventually after pain medication. Reports came out of nowhere, however is now more manageable at 1-2/10. States he would feel comfortable going home today and would like to do so.     Objective:  Intake/Output Summary (Last 24 hours) at 10/27/2023 0938  Last data filed at 10/27/2023 0925  Gross per 24 hour   Intake 1870 ml   Output 900 ml   Net 970 ml     Labs:  ROUTINE IP LABS (Last four results)  BMP  Recent Labs   Lab 10/29/23  0746 10/29/23  0424 10/28/23  2133 10/28/23  1648 10/28/23  0738 10/28/23  0531 10/27/23  0812 10/27/23  0455 10/26/23  0443 10/26/23  0439   NA  --  136  --   --   --  138  --  136  --  138   POTASSIUM  --  4.3  --   --   --  3.8  --  4.7  --  4.0   CHLORIDE  --  108*  --   --   --  107  --  105  --  108*   SHERYL  --  8.2*  --   --   --  8.3*  --  8.2*  --  8.2*   CO2  --  21*  --   --   --  23  --  20*  --  22   BUN  --  11.0  --   --   --  14.2  --  17.2  --  16.1   CR  --  0.60*  --   --   --  0.61*  --  0.63*  --  0.69   * 105* 138* 121*   < > 143*   < > 137*   < > 110*    < > = values in this interval not displayed.     CBC  Recent Labs   Lab 10/29/23  0424 10/28/23  1303 10/28/23  0531 10/27/23  0455   WBC 7.4 9.4 9.4 10.5   RBC 3.11* 2.96* 2.93* 3.23*   HGB 10.0* 9.4* 9.5* 10.2*   HCT 30.0* 28.7* 28.3* 31.1*   MCV 97 97 97 96   MCH 32.2 31.8 32.4 31.6   MCHC 33.3 32.8 33.6 32.8   RDW 13.4 13.6 13.6 13.8    286 271 256     INR  Recent Labs   Lab 10/26/23  0439 10/24/23  1938   INR 1.02 1.08     PHYSICAL EXAM:  BP (!) 152/51 (BP Location: Right arm, Cuff Size: Adult Regular)   Pulse 79   Temp 98.2  F (36.8  C) (Oral)   Resp 21   Ht 1.702 m (5' 7.01\")   Wt 69 kg (152 lb 1.9 oz)   SpO2 100%   BMI 23.82 kg/m    General: The patient is alert and oriented. Appropriate.   Psych: Pleasant affect, answers questions appropriately  Skin: Color appropriate " for race, warm, dry.  Respiratory: The patient does not require supplemental oxygen. Breathing unlabored  Abdomen: Nontender, soft, nondistended.   Incision: Left antecubital site without swelling or bruising  Extremities: LLE examined, unable to access PT to assess doppler signals due to hard splint, could not find DP or AT signal anteriorly(AT chronically occluded on angio).  L foot is warm and well perfused however with well approximated incision of TMA, with decent amount of blood on dressings, no active bleeding from wound. See pictures below.   Neuro: CN II-XII grossly in tact.           ASSESSMENT / PLAN:  Darian Engle is a 73 year old male with PMH  of CAD s/p CAB w/ LIMA and L GSV (2004), benign cystic neoplasm of the pancreas, T2DM, HTN, and active smoking who has dry gangrene of his L first toe. S/p angiogram 10/17 demonstrating SFA & SFA stent (known) occlusion with peroneal and PT runoff - both vessels with disease. Cardiac eval completed, normal EF and no cardiac interventions required. His cardiac risk based on RCRI score, the risk of major cardiac risk complications is ~10.1% 30 day risk of death, MI, or cardiac arrest. S/p femoral endarterectomy, angioplasty, atherectomy in left lower extremity on 10/24/23. Now s/p left transmetatarsal amputation with Dr Thornton on 10/26/23.  He had an unwitnessed fall 10/27 where he did hit his head, negative head CT with exception of extracranial hematoma.  Neurochecks ordered, exam negative for other injuries. He is doing well, progressing appropriately, PT recommending home with home PT. We will arrange for discharge and if PT agrees he is safe for discharge we will plan to do so today.      Neuro:   - tylenol sched 975 q8, oxy 5 q4 prn, gabapentin 100 TID, robaxin PRN, atarax PRN.   - continue PTA donepezil, sertraline    Resp:   - encourage IS     CV:   - continue home coreg 25 BID, statin, asa 81, pletal, losartan 100 daily, increased home Imdur to 120mg,  Norvasc 10mg daily, Aldactone 25 mg daily  -Continue home eliquis 5mg BID  - cardiology for preop cardiac risk assessment completed:              - stress MRI 10/18: normal biventricular function with prior inferior and inferoseptal infarction from RCA disease and a small apical infarction from LAD disease, chronic in nature, no interventions needed.    - functional METs is unknown due to limitation from leg pain. The patient's RCRI score is 2 [history of ischemic heart disease, history of cerebrovascular disease]. Based on RCRI score, the risk of major cardiac risk complications is ~10.1% 30 day risk of death, MI, or cardiac arrest   - prns for HTN for SBP>160    Peripheral vascular:   - PAD, L great toe dry gangrene, s/p femoral endarterectomy, angioplasty, atherectomy in left lower extremity on 10/24/23 which should help heal left foot incisions.   - Ortho following s/p TMA 10/26/23: F/U in clinic in 2 weeks    - Follow-up w/ vascular surgery clinic for wound check 2 weeks, fellow clinic 4 weeks with LLE duplex  and ABIs at that time    GI:   - Sodium restricted diet (on 1200mg @ home per PCP)  - Home omeprazole   - bowel reg (sched doc/senna BID while on narcotics, prn miralax)     :   - no dinh     Endo:   - restart home metformin 10/28  - sliding scale insulin prn    Misc:   - PT/OT consult  - appreciate evaluation   - Per notes: recommending home with home PT as well as with assist.   - Would like PT to see patient today and assess following fall, if safe to discharge may potentially be able to discharge today, will prepare orders.     Discussed pt history, exam, assessment with vascular surgery staff    Richar Batista MD   Vascular Surgery PGY3

## 2023-10-30 NOTE — CONSULTS
"Pain Service Consultation Note  [facility]      Patient Name: Darian Engle  MRN: [id number]   Age: 73 year old  Sex: male  Date: October 30, 2023                                      Reviewed: Yes    Referring Provider:  vascular  Referring Service:  vascular surgery  Reason for Consultation: post-op pain    Assessment/Recommendations:  73 year old male PMH of  CAD, T2DM, HTN and tobacco use admitted 10/17 post-angiogram with dry gangrene on the left great toe, who is now s/p left transmetatarsal amputation on 10/26/23. Per consulting service, patient has been tapering opioid use over the last few days with 60 mg of Oxy 10/28, 35 mg of oxycodone 10/29 and 20 mg oxycodone this morning 10/30 as well as one dose of PRN hydromorphone.    Current pain medications include:  -Acetaminophen 650mg qh6prn  - oxycodone 5-10mg q4h prn  - gabapentin 100mg TID  - acetaminophen 975 TID    Patient has history of dementia and recent fall while in hospital. Given this, in addition to patient's age and limited ability to provide history, would be hesitant to increase opioid dosing at this time as risks (sedation, falls, altered mental status) would likely outweigh benefits in this relatively opioid naive patient.     Plan:   -discontinue prn acetaminophen  -schedule acetaminophen 975 mg PO q6h  -increase gabapentin to 300mg TID as tolerated and monitor for sedation or dizziness with increased dose  - continue oxycodone prn 5-10mg q4h prn.     Thank you for the opportunity to participate in the care of Darian Engle  Pain Service will continue to follow.    Discussed with attending anesthesiologist  Primary Service Contacted with Recommendations? Yes    Please Page the Pain Team Via Amcom: \"PAIN MANAGEMENT ACUTE INPATIENT/ [facility]\"    Sheila Renteria, MS4  10/30/2023      Chief Complaint:  Left foot pain     History of Present Illness:  Darian Engle is a 73 year old male with left foot " pain s/p transmetatarsal amputation 10/26/23.  The pain is reported to be acute, localized to the incision site and without radiation or phantom pain qualities. He denies burning, tingling or shooting pains. He states the pain is constant and not worsened with physical therapy maneuvers. Current pain is rated at 8/10 and goal is 3/10. Patient initially gave goal of 0/10 pain but was amenable to a goal of 3/10 following education around expected post-operative pain. Patient noted to have had a fall around 10/27, he denies injuring his foot at this time, but may be in part related to sedating medications.       Pain Assessment:   Description of Pain: deep, constant, throbbing  Location: left foot  Current Pain: 8/10  Goal: 0-3/10  Baseline Pain Level: 8/10  Relieving/exacerbating factors: none identified   Radiating: no   Localized: yes  Constant: yes  Intermittent: no        Past Medical History:  Past Medical History:   Diagnosis Date    Arthritis 2018    joint pain both hands    Diabetes (H)     Heart disease     Boyd-angioplasty    Hypertension          Family History:    Family History   Problem Relation Age of Onset    Glaucoma Mother     Macular Degeneration Mother     Macular Degeneration Other     Lung Cancer Brother        Social History:  Social History     Tobacco Use    Smoking status: Every Day     Packs/day: 0.50     Years: 5.00     Additional pack years: 0.00     Total pack years: 2.50     Types: Cigarettes     Start date: 1968     Last attempt to quit: 7/15/2016     Years since quittin.2    Smokeless tobacco: Former   Substance Use Topics    Alcohol use: Yes     Comment: binge, two months sober         Review of Systems:  Complete ROS reviewed. Unless otherwise noted, all other systems found to be negative.        Laboratory Results:  Recent Labs   Lab Test 10/30/23  0606 10/27/23  0455 10/26/23  0439   INR  --   --  1.02      < > 233   PTT  --   --  80*   BUN 15.7   < >  16.1    < > = values in this interval not displayed.         Allergies:  Allergies   Allergen Reactions    Lidocaine Other (See Comments)     Lungs filled with fluid. ? Anaphylaxis    Lisinopril Cough     cough    Naltrexone Nausea and Vomiting         Pain Medications:      Current Pain Related Medications:  Medications related to Pain Management (From now, onward)      Start     Dose/Rate Route Frequency Ordered Stop    10/29/23 0000  acetaminophen (TYLENOL) 325 MG tablet         975 mg Oral EVERY 8 HOURS 10/29/23 1107      10/29/23 0000  gabapentin (NEURONTIN) 100 MG capsule         100 mg Oral 3 TIMES DAILY 10/29/23 1107      10/29/23 0000  tiZANidine (ZANAFLEX) 2 MG tablet         2 mg Oral 3 TIMES DAILY PRN 10/29/23 1107      10/29/23 0000  senna-docusate (SENOKOT-S/PERICOLACE) 8.6-50 MG tablet         1 tablet Oral 2 TIMES DAILY 10/29/23 1107      10/29/23 0000  oxyCODONE (ROXICODONE) 5 MG tablet         5-10 mg Oral EVERY 6 HOURS PRN 10/29/23 1715      10/27/23 0800  polyethylene glycol (MIRALAX) Packet 17 g         17 g Oral DAILY 10/26/23 1939      10/26/23 1938  magnesium hydroxide (MILK OF MAGNESIA) suspension 30 mL         30 mL Oral DAILY PRN 10/26/23 1939      10/26/23 1938  bisacodyl (DULCOLAX) suppository 10 mg         10 mg Rectal DAILY PRN 10/26/23 1939      10/26/23 1938  lidocaine 1 % 0.1-1 mL         0.1-1 mL Other EVERY 1 HOUR PRN 10/26/23 1939      10/26/23 1938  lidocaine (LMX4) cream          Topical EVERY 1 HOUR PRN 10/26/23 1939      10/25/23 0851  hydrOXYzine (ATARAX) tablet 25 mg        See Hyperspace for full Linked Orders Report.    25 mg Oral EVERY 6 HOURS PRN 10/25/23 0851      10/25/23 0851  hydrOXYzine (ATARAX) tablet 50 mg        See Hyperspace for full Linked Orders Report.    50 mg Oral EVERY 6 HOURS PRN 10/25/23 0851      10/25/23 0850  methocarbamol (ROBAXIN) tablet 500 mg         500 mg Oral EVERY 6 HOURS PRN 10/25/23 0850      10/25/23 0800  gabapentin (NEURONTIN) capsule  "100 mg         100 mg Oral 3 TIMES DAILY 10/25/23 0758      10/25/23 0559  oxyCODONE (ROXICODONE) tablet 5-10 mg         5-10 mg Oral EVERY 4 HOURS PRN 10/25/23 0559      10/25/23 0253  acetaminophen (TYLENOL) tablet 650 mg         650 mg Oral EVERY 6 HOURS PRN 10/25/23 0253      10/25/23 0251  lidocaine (LMX4) cream          Topical EVERY 1 HOUR PRN 10/25/23 0251      10/18/23 0800  aspirin (ASA) chewable tablet 81 mg         81 mg Oral DAILY 10/17/23 1433      10/17/23 2000  senna-docusate (SENOKOT-S/PERICOLACE) 8.6-50 MG per tablet 1 tablet        See Hyperspace for full Linked Orders Report.    1 tablet Oral 2 TIMES DAILY 10/17/23 1429      10/17/23 2000  senna-docusate (SENOKOT-S/PERICOLACE) 8.6-50 MG per tablet 2 tablet        See Hyperspace for full Linked Orders Report.    2 tablet Oral 2 TIMES DAILY 10/17/23 1429      10/17/23 1430  acetaminophen (TYLENOL) tablet 975 mg         975 mg Oral EVERY 8 HOURS 10/17/23 1429      10/17/23 1428  polyethylene glycol (MIRALAX) Packet 17 g         17 g Oral DAILY PRN 10/17/23 1429                Physical Exam:  Vitals: BP (!) 141/45 (BP Location: Left arm, Cuff Size: Adult Regular)   Pulse 65   Temp 36.7  C (98.1  F) (Oral)   Resp 18   Ht 1.702 m (5' 7.01\")   Wt 66.7 kg (147 lb 0.8 oz)   SpO2 99%   BMI 23.03 kg/m      Physical Exam:   CONSTITUTIONAL/GENERAL APPEARANCE:  NAD, laying in recliner at bedside  EYES: EOMI, sclera anicteric  ENT/NECK: atraumatic, lips and oral mucous membranes dry  RESPIRATORY: non-labored breathing. No cough, wheeze  MUSCULOSKELETAL/BACK/SPINE/EXTREMITIES: Moves all extremities purposefully. No obvious swelling below the left knee. Left leg bandaged below the knee.    NEURO: Alert and Oriented x3. Answers questions appropriately with some redirection for history related questions.   SKIN/VASCULAR EXAM:  No jaundice, scattered crusted, healing lesions on lower extremities.          Physician Attestation   I, Celsa Muñoz MD, was " present with the medical student who participated in the service and in the documentation of the note.  I have verified the history and personally performed the physical exam and medical decision making.  I agree with the assessment and plan of care as documented in the note and I have made edits in the body of the note itself as needed.        Please see A&P for additional details of medical decision making.    I have personally reviewed the following data over the past 24 hrs:    8.1  \   9.8 (L)   / 325     136 106 15.7 /  134 (H)   4.0 21 (L) 0.70 \         Celsa Muñoz MD  Date of Service (when I saw the patient): 10/30/23

## 2023-10-30 NOTE — PROGRESS NOTES
"VASCULAR SURGERY PROGRESS NOTE    Subjective:  Episode of vomiting this morning, received his opioid for 10/10 pain in LLE and rest of his PO meds around 8:10 AM and soon after 9AM experienced vomiting. Potentially related to PO medications, no leukocytosis, no fevers. With increased p.o. oxycodone requirements, required 60 mg of Oxy 2 days ago, 35 mg of oxycodone yesterday and already 20 mg this morning with PRN hydromorphone. Will consult acute pain.     Objective:  Intake/Output Summary (Last 24 hours) at 10/30/2023 0712  Last data filed at 10/30/2023 0623  Gross per 24 hour   Intake 180 ml   Output 1575 ml   Net -1395 ml     Labs:  ROUTINE IP LABS (Last four results)  BMP  Recent Labs   Lab 10/30/23  0606 10/29/23  2153 10/29/23  1804 10/29/23  1533 10/29/23  0746 10/29/23  0424 10/28/23  0738 10/28/23  0531 10/27/23  0812 10/27/23  0455     --   --   --   --  136  --  138  --  136   POTASSIUM 4.0  --   --   --   --  4.3  --  3.8  --  4.7   CHLORIDE 106  --   --   --   --  108*  --  107  --  105   SHERYL 8.4*  --   --   --   --  8.2*  --  8.3*  --  8.2*   CO2 21*  --   --   --   --  21*  --  23  --  20*   BUN 15.7  --   --   --   --  11.0  --  14.2  --  17.2   CR 0.70  --   --   --   --  0.60*  --  0.61*  --  0.63*   GLC 91 105* 131* 154*   < > 105*   < > 143*   < > 137*    < > = values in this interval not displayed.     CBC  Recent Labs   Lab 10/30/23  0606 10/29/23  0424 10/28/23  1303 10/28/23  0531   WBC 8.1 7.4 9.4 9.4   RBC 3.03* 3.11* 2.96* 2.93*   HGB 9.8* 10.0* 9.4* 9.5*   HCT 29.4* 30.0* 28.7* 28.3*   MCV 97 97 97 97   MCH 32.3 32.2 31.8 32.4   MCHC 33.3 33.3 32.8 33.6   RDW 13.6 13.4 13.6 13.6    308 286 271     INR  Recent Labs   Lab 10/26/23  0439 10/24/23  1938   INR 1.02 1.08     PHYSICAL EXAM:  BP (!) 145/59 (BP Location: Right arm)   Pulse 80   Temp 98.2  F (36.8  C) (Oral)   Resp 19   Ht 1.702 m (5' 7.01\")   Wt 66.7 kg (147 lb 0.8 oz)   SpO2 99%   BMI 23.03 kg/m    General: " The patient is alert and oriented. Appropriate.   Psych: Pleasant affect, answers questions appropriately  Skin: Color appropriate for race, warm, dry.  Respiratory: The patient does not require supplemental oxygen. Breathing unlabored  Abdomen: Nontender, soft, nondistended.   Incision: Left antecubital site without swelling or bruising  Extremities: LLE examined, unable to access PT to assess doppler signals due to hard splint, could not find DP or AT signal anteriorly(AT chronically occluded on angio).  L foot is warm and well perfused however with well approximated incision of TMA, with decent amount of blood on dressings, no active bleeding from wound. See pictures below.   Neuro: CN II-XII grossly in tact.           ASSESSMENT / PLAN:  Darian Engle is a 73 year old male with PMH  of CAD s/p CAB w/ LIMA and L GSV (2004), benign cystic neoplasm of the pancreas, T2DM, HTN, and active smoking who has dry gangrene of his L first toe. S/p angiogram 10/17 demonstrating SFA & SFA stent (known) occlusion with peroneal and PT runoff - both vessels with disease. Cardiac eval completed, normal EF and no cardiac interventions required. His cardiac risk based on RCRI score, the risk of major cardiac risk complications is ~10.1% 30 day risk of death, MI, or cardiac arrest. S/p femoral endarterectomy, angioplasty, atherectomy in left lower extremity on 10/24/23. Now s/p left transmetatarsal amputation with Dr Thornton on 10/26/23. He had an unwitnessed fall 10/27 where he did hit his head, negative head CT with exception of extracranial hematoma. Neurochecks ordered, exam negative for other injuries. He is doing well, progressing appropriately, PT recommending TCU, will plan to discharge to TCU.     Neuro:   -Tylenol sched 975 q8, oxy 5 q4 prn, gabapentin 100 TID, robaxin PRN, atarax PRN.   - Increased p.o. oxycodone requirements, required 60 mg of Oxycodone on 10/28/23, 35 mg of oxycodone on 10/29/23 and already 20 mg this  AM, likely phantom pain per ortho.  - Acute pain consulted as recommended by ortho.   - Continue PTA donepezil, sertraline    Resp:   - encourage IS     CV:   - continue home coreg 25 BID, statin, asa 81, pletal, losartan 100 daily, increased home Imdur to 120mg, Norvasc 10mg daily, Aldactone 25 mg daily  -Continue home eliquis 5mg BID  - cardiology for preop cardiac risk assessment completed:              - stress MRI 10/18: normal biventricular function with prior inferior and inferoseptal infarction from RCA disease and a small apical infarction from LAD disease, chronic in nature, no interventions needed.    - functional METs is unknown due to limitation from leg pain. The patient's RCRI score is 2 [history of ischemic heart disease, history of cerebrovascular disease]. Based on RCRI score, the risk of major cardiac risk complications is ~10.1% 30 day risk of death, MI, or cardiac arrest   - prns for HTN for SBP>160    Peripheral vascular:   - PAD, L great toe dry gangrene, s/p femoral endarterectomy, angioplasty, atherectomy in left lower extremity on 10/24/23 which should help heal left foot incisions.   - Ortho following s/p TMA 10/26/23: F/U in clinic in 2 weeks    - Follow-up w/ vascular surgery clinic for wound check 2 weeks, fellow clinic 4 weeks with LLE duplex  and ABIs at that time    GI:   - Sodium restricted diet (on 1200mg @ home per PCP)  - Home omeprazole   - bowel reg (sched doc/senna BID while on narcotics, prn miralax)  - Nausea while on multiple PO pain relievers, zofran    :   - no dinh     Endo:   - restarted home metformin 10/28  - sliding scale insulin prn    Misc:   - PT/OT consult  - appreciate evaluation for TCU  - Per notes: recommending home with home PT as well as with assist.   - Patient now agreeable to TCU, he does not have appropriate support at home for discharge to home, appreciate Sw for TCU placement.     Discussed pt history, exam, assessment with vascular surgery  staff    Roxann Fontanez, CNP  Vascular Surgery  Pager: [phone]  [email or web address]  Send message or 10 digit call back number Securely via Soundtracker with the Soundtracker Web Console (learn more here)

## 2023-10-30 NOTE — DISCHARGE SUMMARY
Vascular Surgery Discharge Summary    NAME: Darian Engle   MRN: [id number]   : 1950     DATE OF ADMISSION: 10/17/2023     PRE/POSTOPERATIVE DIAGNOSES:    Left leg atherosclerosis with gangrene   Left foot great toe chronic ulcer, 10/27/2023     PROCEDURES PERFORMED,   :   1. Left common femoral endarterectomy (bovine pericardium patch closure) and profundoplasty  2. Antegrade 6 Estonian sheath placement in the left common femoral artery  3. Atherectomy of left superficial femoral artery, popliteal artery and tibioperoneal trunk  4. Drug-coated balloon angioplasty of left superficial femoral artery  5. Filter /embolic protection device placed in left popliteal artery  6. Filter /embolic protection device placed in left peroneal artery  7. Left leg angiogram    10/27/2023   Left foot transmetatarsal amputation    INTRAOPERATIVE FINDINGS: None    POSTOPERATIVE COMPLICATIONS: None     DATE OF DISCHARGE: 10/30/23    HOSPITAL COURSE: Darian Engle is a 73 year old male who on 10/24/2023 and 10/27/2023 underwent the above-named procedures. He tolerated the procedure well and postoperatively was transferred to the general post-surgical unit.  Unfortunately, given the new amputation, the following day patient experienced a fall, without complications. The remainder of his course was essentiallly uncomplicated.  Prior to discharge, his pain was controlled well with oxycodone, gabapentin, acetaminophem. He was able to perform ADLs and ambulate independently without difficulty, and had full return of bowel and bladder function.  On 10/31/23, he was discharged to [place] ([phone]) in stable condition.    Physical Exam:  General: The patient is alert and oriented. Appropriate.   Psych: Pleasant affect, answers questions appropriately  Skin: Color appropriate for race, warm, dry.  Respiratory: The patient does not require supplemental oxygen. Breathing  unlabored  Abdomen: Nontender, soft, nondistended.   Incision: Left antecubital site without swelling or bruising  Extremities: LLE examined, unable to access PT to assess doppler signals due to hard splint, could not find DP or AT signal anteriorly(AT chronically occluded on angio).  L foot is warm and well perfused however with well approximated incision of TMA, with decent amount of blood on dressings, no active bleeding from wound. See pictures below.   Neuro: CN II-XII grossly in tact.     DISCHARGE INSTRUCTIONS:  Diet  - You may return to your regular diet. We always encourage taking fiber as well as staying well-hydrated.   - Be sure to drink plenty of fluids.     Activity  - Per PT recommendations  - No lifting, pushing, or pulling greater than 10 pounds and no strenuous exercise for 4-6 weeks.   - We encourage throat lozenges or cough drops if you develop a cough.  - No driving while on narcotic pain medications (such as oxycodone, hydrocodone, hydromorphone, tramadol)  - No driving until you are able to fully twist to both sides quickly and without any pain    Wound Care  - Inspect your wounds daily for signs of infection (increased redness, drainage, pain)  - Keep your wound clean and dry, inspect it daily for the above signs.  - No dressing change on own. Leave dressing on until 2 weeks follow up appointment.  You have a dressing on left foot, leave as he is. This was placed by orthopedics team, to be managed by them.    Call Vascular surgery [facility] for:  - Temperature > 101F, chills, rigors, dizziness  - Redness around or purulent drainage from wound  - Inability to tolerate diet, nausea or vomiting  - You stop passing gas, develop significant bloating, abdominal pain  - Have blood in stools/vomit  - Have severe diarrhea/constipation  - Any other questions or concerns.    Medication Instructions  Some of your medications may have changed. Please take only prescribed and resumed medications.     We  recommend you take Tylenol around the clock for baseline pain control (this was prescribed for you). Then take narcotic pain medication only as needed if you were prescribed any. Once you are no longer needing narcotics, you may take the Tylenol as needed. Do not take more than 4,000 mg of acetaminophen (Tylenol) from all sources to reduce the risk of liver damage.     Follow up:    PCP:  Follow up with PCP in 5-7 days for post-hospitalization follow up. You may call to set this up - most clinics will be able to get you an appointment within the week if you let them know you were recently hospitalized and need to see your PCP.     Vascular:  Our team will reach out to you to schedule follow-up in vascular surgery clinic within 4 weeks. You will also have arterial studies done prior to your clinic visit.  We will call you to coordinate this.    Orthopedics:  You also need to be seen by orthopedics team.  You underwent left transmetatarsal amputation with Dr Thornton on 10/26/23.  Per Dr. Thornton's team, no dressing change on own. Leave dressing on until 2 weeks follow up appointment.  You have a dressing on left foot, leave as he is.  This was placed by orthopedics team, to be managed by them.    With general vascular surgery questions, concerns, or to request/change an appointment, please call:      Vascular Call Center  [phone]    To contact someone after 5 pm, on a weekend, or on a Holiday, please call:  [facility]  [phone], option 4 to have the Attending Physician for Vascular Surgery Service paged.        DISCHARGE MEDICATIONS:     Review of your medicines      START taking      Dose / Directions   acetaminophen 325 MG tablet  Commonly known as: TYLENOL  Used for: PAD (peripheral artery disease) (H24)      Dose: 975 mg  Take 3 tablets (975 mg) by mouth every 8 hours Do this for the first few days postoperatively, as pain improves, can transition to taking 1-2 tabs q4-6 hours as  needed.  Quantity: 90 tablet  Refills: 0     amLODIPine 10 MG tablet  Commonly known as: NORVASC  Used for: Essential hypertension with goal blood pressure less than 140/90      Dose: 10 mg  Take 1 tablet (10 mg) by mouth daily  Quantity: 90 tablet  Refills: 0     gabapentin 100 MG capsule  Commonly known as: NEURONTIN  Used for: PAD (peripheral artery disease) (H24)      Dose: 100 mg  Take 1 capsule (100 mg) by mouth 3 times daily  Quantity: 180 capsule  Refills: 0     oxyCODONE 5 MG tablet  Commonly known as: ROXICODONE  Used for: PAD (peripheral artery disease) (H24)      Dose: 5-10 mg  Take 1-2 tablets (5-10 mg) by mouth every 6 hours as needed for moderate pain  Quantity: 20 tablet  Refills: 0     senna-docusate 8.6-50 MG tablet  Commonly known as: SENOKOT-S/PERICOLACE  Used for: PAD (peripheral artery disease) (H24)      Dose: 1 tablet  Take 1 tablet by mouth 2 times daily While taking narcotic pain medications (oxycodone)  Quantity: 90 tablet  Refills: 0     spironolactone 25 MG tablet  Commonly known as: ALDACTONE  Used for: Essential hypertension with goal blood pressure less than 140/90      Dose: 25 mg  Take 1 tablet (25 mg) by mouth daily  Quantity: 90 tablet  Refills: 0     tiZANidine 2 MG tablet  Commonly known as: ZANAFLEX  Used for: PAD (peripheral artery disease) (H24)      Dose: 2 mg  Take 1 tablet (2 mg) by mouth 3 times daily as needed for muscle spasms  Quantity: 90 tablet  Refills: 0        CONTINUE these medicines which may have CHANGED, or have new prescriptions. If we are uncertain of the size of tablets/capsules you have at home, strength may be listed as something that might have changed.      Dose / Directions   losartan 50 MG tablet  Commonly known as: COZAAR  This may have changed: how much to take  Used for: Essential hypertension with goal blood pressure less than 140/90      Dose: 100 mg  Take 2 tablets (100 mg) by mouth daily  Quantity: 90 tablet  Refills: 2        CONTINUE these  medicines which have NOT CHANGED      Dose / Directions   Accu-Chek SmartView test strip  Used for: Type 2 diabetes mellitus with complication, with long-term current use of insulin (H)  Generic drug: blood glucose      TEST THREE TIMES DAILY OR AS DIRECTED  Quantity: 300 strip  Refills: 3     apixaban ANTICOAGULANT 5 MG tablet  Commonly known as: ELIQUIS ANTICOAGULANT  Used for: PAF (paroxysmal atrial fibrillation) (H)      Dose: 5 mg  Take 1 tablet (5 mg) by mouth 2 times daily  Quantity: 60 tablet  Refills: 2     aspirin 81 MG tablet  Commonly known as: ASA  Used for: CAD, multiple vessel      Dose: 81 mg  Take 81 mg by mouth daily  Quantity: 30 tablet  Refills: 0     atorvastatin 40 MG tablet  Commonly known as: LIPITOR  Used for: Hyperlipidemia LDL goal <100      Dose: 40 mg  Take 1 tablet (40 mg) by mouth daily For cholesterol.  Quantity: 90 tablet  Refills: 3     blood glucose monitoring lancets  Used for: Type 2 diabetes mellitus with complication, with long-term current use of insulin (H)      USE TO TEST THREE TIMES DAILY OR AS DIRECTED  Quantity: 306 each  Refills: 3     carvedilol 25 MG tablet  Commonly known as: COREG      Dose: 25 mg  Take 25 mg by mouth 2 times daily  Refills: 0     cilostazol 50 MG tablet  Commonly known as: PLETAL      Dose: 100 mg  Take 100 mg by mouth 2 times daily  Refills: 0     donepezil 10 MG tablet  Commonly known as: ARICEPT  Used for: MCI (mild cognitive impairment) with memory loss      Dose: 10 mg  Take 1 tablet (10 mg) by mouth At Bedtime  Quantity: 90 tablet  Refills: 1     Fish Oil Omega-3 1000 MG Caps      Dose: 1,000 mg  Take 1,000 mg by mouth daily  Refills: 0     isosorbide mononitrate 30 MG 24 hr tablet  Commonly known as: IMDUR  Used for: CAD, multiple vessel      TAKE 2 TABLETS(60 MG) BY MOUTH DAILY  Quantity: 180 tablet  Refills: 1     magnesium oxide 200 MG Tabs      Dose: 200 mg  Take 200 mg by mouth daily  Refills: 0     metFORMIN 500 MG 24 hr  tablet  Commonly known as: GLUCOPHAGE XR  Used for: Type 2 diabetes mellitus with complication, with long-term current use of insulin (H)      TAKE 1 TABLET(500 MG) BY MOUTH TWICE DAILY WITH MEALS  Quantity: 180 tablet  Refills: 0     multivitamin tablet      Dose: 1 tablet  Take 1 tablet by mouth daily  Refills: 0     nitroGLYcerin 0.4 MG sublingual tablet  Commonly known as: NITROSTAT  Used for: CAD, multiple vessel      PLACE 1 TABLET UNDER THE TONGUE EVERY 5 MINUTES FOR 3 DOSES, IF SYMPTOMS PERSIST 5 MINUTES AFTER 1ST DOSE CALL 911  Quantity: 25 tablet  Refills: 0     omeprazole 20 MG DR capsule  Commonly known as: PriLOSEC  Used for: Gastroesophageal reflux disease without esophagitis      Dose: 20 mg  Take 1 capsule (20 mg) by mouth daily  Quantity: 90 capsule  Refills: 3     sertraline 25 MG tablet  Commonly known as: ZOLOFT  Used for: Poor appetite, Other fatigue      TAKE ONE TABLET BY MOUTH DAILY FOR DEPRESSION AND TO HELP APPETITE  Quantity: 90 tablet  Refills: 1     SM Vitamin D3 50 MCG Caps  Generic drug: cholecalciferol      Dose: 2 capsule  Take 2 capsules by mouth daily  Refills: 0        STOP taking    NIFEdipine ER 60 MG 24 hr tablet  Commonly known as: ADALAT CC              Where to get your medicines      These medications were sent to [facility] [place], [place] - [address]  [address] [address]    Phone: [phone]     acetaminophen 325 MG tablet    amLODIPine 10 MG tablet    gabapentin 100 MG capsule    losartan 50 MG tablet    senna-docusate 8.6-50 MG tablet    spironolactone 25 MG tablet    tiZANidine 2 MG tablet     Some of these will need a paper prescription and others can be bought over the counter. Ask your nurse if you have questions.    Bring a paper prescription for each of these medications    oxyCODONE 5 MG tablet

## 2023-10-30 NOTE — TELEPHONE ENCOUNTER
Apt scheduled in about 2 wk with Dr. Thornton. Called pt's unit and notified them.       -MONI Osorio- Orthopedics      ----- Message from Antwan Rodrigues MD sent at 10/30/2023  2:54 PM CDT -----  Regarding: Clinic FU with Dr. Thornton  To whom it may concern,    Please schedule this patient for orthopaedic follow up with Dr. Thornton, in 2 weeks; with repeat left foot xrays before clinic. The patient is s/p left transmetatarsal amputation, on 10/26/23.     Thank you for your help    Very Respectfully,    Antwan Rodrigues MD  Orthopaedic Surgery  [phone]

## 2023-10-30 NOTE — PROGRESS NOTES
Surgery cross-cover notified that pt. Is currently experiencing 10+ sharp pain to left foot. Not due for PRN Oxy yet. Pt. Had PRN IV Dilaudid prior but that was d/c'd. Could we do a one time dose of IV Dilaudid? Will wait for response.    1536: Since pt may discharge, we will try and hold off on more IV pain medication. Will treat with Atarax and Robaxin.

## 2023-10-30 NOTE — PLAN OF CARE
Occupational Therapy Discharge Summary    Reason for therapy discharge:    Discharged to transitional care facility.    Progress towards therapy goal(s). See goals on Care Plan in [facility] electronic health record for goal details.  Goals partially met.  Barriers to achieving goals:   discharge from facility.    Therapy recommendation(s):    Continued therapy is recommended.  Rationale/Recommendations:  increase functional endurance and ADL independence at rehab.

## 2023-10-30 NOTE — PROGRESS NOTES
Neuro: A&Ox4. Makes needs known. Agitated at times.   Cardiac: SR. VSS.   Respiratory: Sating 90s on RA.  GI/: Adequate urine output.   Diet/appetite: Tolerating reg diet. Eating well.  Activity:  Assist of 1, up to chair.   Pain: At acceptable level on current regimen. Pain in foot treating with prn oxy.   Skin: No new deficits noted. No issues.   LDA's: PIV.     Plan: Continue with POC. Notify primary team with changes. Was going to leave today, but had large emesis episode while vascular was assessing him for discharge. Team wants to keep him overnight.

## 2023-10-30 NOTE — PROGRESS NOTES
Care Management Discharge Note    Discharge Date: 11/01/2023       Discharge Disposition: Transitional Care    [facility]  [address] [place]  [address]  P: [phone]  F: [phone]     Discharge Services: None    Discharge DME:  (TBD)    Discharge Transportation: family or friend will provide    Private pay costs discussed: Not applicable    Does the patient's insurance plan have a 3 day qualifying hospital stay waiver?  No    PAS Confirmation Code: [id number]  Patient/family educated on Medicare website which has current facility and service quality ratings: yes    Education Provided on the Discharge Plan: Yes  Persons Notified of Discharge Plans: facility, provider, pt,   Patient/Family in Agreement with the Plan: yes    Handoff Referral Completed: Yes    Additional Information:    Spoke to admissions at [facility] ([phone]) who informed they can accept pt for admission. Informed they'd need discharge orders by 4:00 to be able to accept him for admission today, and they'd need him at the facility by 7:30 at the latest today.    Paged provider for discharge orders.     provided facility with PAS and social security by request.    Updated pt and spouse at bedside. Spouse will provide transport.    Discharge orders sent via Epic.    IMM reviewed with pt and spouse at bedside, declined a copy. Faxed to HIM, form in chart.    Bedside paged provider for signed narc script needed.     Called facility who confirmed receipt of orders, just needing narc script. Informed spouse planning to leave hospital around 4:00 this afternoon - facility confirmed this is fine. Updated spouse at bedside of discharge time of 4:00.    Signed narc script faxed to [phone].    Handoff complete.    JUANA Brown  Float   Covering 6b - [phone]

## 2023-10-30 NOTE — PROGRESS NOTES
"Orthopedic Surgery Progress Note: 10/30/2023    Subjective:   No acute events overnight. Patient reportedly having increased pain in left foot, but this is improved at bedside. Frustrated after spending past few weeks in the hospital. Doesn't like the food, but also having episodes of vomiting.     Objective:   BP (!) 158/53 (BP Location: Right arm)   Pulse 63   Temp 98.9  F (37.2  C) (Oral)   Resp 17   Ht 1.702 m (5' 7.01\")   Wt 66.7 kg (147 lb 0.8 oz)   SpO2 100%   BMI 23.03 kg/m    I/O this shift:  In: 50 [I.V.:50]  Out: 100 [Emesis/NG output:100]  General: NAD. Resting comfortably in bed.  Respiratory: Nonlabored breathing  Musculoskeletal:    LLE: Short leg splint in place, C/D/I. Incision without evidence of dehiscence, small amount of dried bloody drainage. Lower leg compartments soft and compressible.     Laboratory Data:  Lab Results   Component Value Date    WBC 8.1 10/30/2023    HGB 9.8 (L) 10/30/2023     10/30/2023    INR 1.02 10/26/2023         Assessment & Plan:   Darian Engle is a 73 year old male with PMH of  CAD s/p CAB w/ LIMA and L GSV (2004), benign cystic neoplasm of the pancreas, T2DM, HTN and tobacco use who has left great toe dry gangrene s/p femoral endarterectomy, angioplasty, and possible atherectomy with possible stent placement in the left lower extremity on 10/24/23 who is now s/p left transmetatarsal amputation with Dr Thornton on 10/26/23. Progressing appropriately postoperatively. Patient's increase pain is appropriate postop after his block wore off. Patient comfortable with current pain medication. If symptoms worsen and pain needs increase, would recommend considering a pain consult for further assistance controlling his pain.     Plan for Today:  - PT/OT  - Pain control  - Disposition planning  - Ok to discharge from ortho standpoint pending PO pain control    Vascular Primary  Up with assistive device  Splint to remain on left  lower extremity and NWB at all " times.  Pain control per primary  Ok for anticoagulation to resume per vascular. No contraindications from ortho.  No dressing change on own.  Leave dressing on until 2 weeks follow up appointment.  PT/OT - Eval and treat  F/U in clinic in 2 weeks  If cleared by PT/OT and otherwise medically stable, patient is appropriate for discharge from an orthopedic standpoint.      Orthopedics will plan to peripheralize patient at this time, please reach out to myself or orthopedic on call with any new or worsening concerns.     Orthopedic surgery staff for this patient is Dr. Thornton.    ------------------------------------------------------------------------------------------    Respectfully,    Antwan Rodrigues MD  Orthopedic Surgery PGY1  [phone]    Please page me directly with any questions/concerns during regular weekday hours before 5 pm. If there is no response, if it is a weekend, or if it is during evening hours then please page the orthopedic surgery resident on call.      FOLLOWUP:    Future Appointments   Date Time Provider Department Center   10/30/2023  3:00 PM Denise Morley PT [facility]   1/17/2024  9:30 AM Schuyler Franco DO [facility]

## 2023-10-30 NOTE — DISCHARGE INSTRUCTIONS
Discharge Instructions    Diet  - You may return to your regular diet. We always encourage taking fiber as well as staying well-hydrated.   - Be sure to drink plenty of fluids.     Activity  - Per PT recommendations  - No lifting, pushing, or pulling greater than 10 pounds and no strenuous exercise for 4-6 weeks.   - We encourage throat lozenges or cough drops if you develop a cough.  - No driving while on narcotic pain medications (such as oxycodone, hydrocodone, hydromorphone, tramadol)  - No driving until you are able to fully twist to both sides quickly and without any pain    Wound Care  - Inspect your wounds daily for signs of infection (increased redness, drainage, pain)  - Keep your wound clean and dry, inspect it daily for the above signs.  - No dressing change on own. Leave dressing on until 2 weeks follow up appointment.  You have a dressing on left foot, leave as he is. This was placed by orthopedics team, to be managed by them.    Call Vascular surgery [facility] for:  - Temperature > 101F, chills, rigors, dizziness  - Redness around or purulent drainage from wound  - Inability to tolerate diet, nausea or vomiting  - You stop passing gas, develop significant bloating, abdominal pain  - Have blood in stools/vomit  - Have severe diarrhea/constipation  - Any other questions or concerns.    Medication Instructions  Some of your medications may have changed. Please take only prescribed and resumed medications.     We recommend you take Tylenol around the clock for baseline pain control (this was prescribed for you). Then take narcotic pain medication only as needed if you were prescribed any. Once you are no longer needing narcotics, you may take the Tylenol as needed. Do not take more than 4,000 mg of acetaminophen (Tylenol) from all sources to reduce the risk of liver damage.     Follow up:    PCP:  Follow up with PCP in 5-7 days for post-hospitalization follow up. You may call to set this up - most clinics will  be able to get you an appointment within the week if you let them know you were recently hospitalized and need to see your PCP.     Vascular:  Our team will reach out to you to schedule follow-up in vascular surgery clinic within 4 weeks. You will also have arterial studies done prior to your clinic visit.  We will call you to coordinate this.    Orthopedics:  You also need to be seen by orthopedics team.  You underwent left transmetatarsal amputation with Dr Thornton on 10/26/23.  Per Dr. Thornton's team, no dressing change on own. Leave dressing on until 2 weeks follow up appointment.  You have a dressing on left foot, leave as he is.  This was placed by orthopedics team, to be managed by them.    With general vascular surgery questions, concerns, or to request/change an appointment, please call:      Vascular Call Center  [phone]    To contact someone after 5 pm, on a weekend, or on a Holiday, please call:  [facility]  [phone], option 4 to have the Attending Physician for Vascular Surgery Service paged.

## 2023-10-30 NOTE — PLAN OF CARE
"BP (!) 145/59 (BP Location: Right arm)   Pulse 80   Temp 98.2  F (36.8  C) (Oral)   Resp 19   Ht 1.702 m (5' 7.01\")   Wt 66.7 kg (147 lb 0.8 oz)   SpO2 99%   BMI 23.03 kg/m      VSS. Afebrile. Room air. Alert alert and oriented x 4. Using call light appropriately. PRN oxy 10 mg given once for left foot pain. See previous MD notification regarding pain not controlled with Oxy. Tolerating diet with no n/v. Voiding per urinal. No BM overnight. Right PIV saline locked. Left foot dressing c/d/I. Non-weight bearing to left foot/leg. Repositioning self in bed. Continue to monitor.  "

## 2023-10-30 NOTE — PLAN OF CARE
DISCHARGE                         10/30/23.  ----------------------------------------------------------------------------  Discharged to: [facility]  Via: Private transportation  Accompanied by: Family  Discharge Instructions: regular diet, activity as tolerated with NWB to E, medications, follow up appointments, when to call the MD, aftercare instructions.  Prescriptions: Prescriptions sent to [facility], oxycodone prescription signed by provider faxed and sent with pt.   Follow Up Appointments: arranged; information given  Belongings: All sent with pt  IV: d/c'd  Telemetry: d/c'd  Pt exhibits understanding of above discharge instructions; all questions answered.    Discharge Paperwork: Signed, copied, and sent to TCU with patient. Report called to [facility].     Goal Outcome Evaluation:      Plan of Care Reviewed With: patient    Overall Patient Progress: improvingOverall Patient Progress: improving    Outcome Evaluation: Patient stable for transfer to rehab unit

## 2023-10-30 NOTE — PROGRESS NOTES
Care Management Follow Up    Length of Stay (days): 13    Expected Discharge Date: 10/30/2023     Concerns to be Addressed: discharge planning  Patient plan of care discussed at interdisciplinary rounds: Yes    Anticipated Discharge Disposition: Transitional Care     Anticipated Discharge Services: None  Anticipated Discharge DME:  (TBD)    Patient/family educated on Medicare website which has current facility and service quality ratings: yes  Education Provided on the Discharge Plan:  yes  Patient/Family in Agreement with the Plan: yes    Referrals Placed by CM/SW:      Pending:  [facility]  [address] [place] [address]  Ph: [phone]  F: [phone]  Liaison Kika - [phone]  10.30 referral sent    [facility]  [address] [place]  [address]  P: [phone]  F: [phone]   10.30 referral sent    [facility]  [address] [place] [address]  Admissions Raghu: [phone]  10.30 referral sent    [facility]  [address] [place] [address]  Admissions Kia: [phone]  Fax: [phone]  10.30 referral sent    Declined:  [facility]  [address] [place]  [address]  P: [phone]  F: [phone]   10.30 referral sent  Declined - cannot meet pt's needs    Private pay costs discussed: Not applicable    Additional Information:    Provider informed pt med ready and now agreeable to TCU.    Met with pt at bedside to discuss TCU recs. Pt now agreeable to TCU. SW provided Medicare Care Compare list to pt, explained rating system. Pt requested SW return in half an hour so they can review and choose 5+ options for referrals.    10:30: gathered pt preferences and sent referrals. Pt informed they anticipate family providing transport at discharge    JUANA Brown BSSERGIO  Float   Covering 6b - [phone]

## 2023-10-30 NOTE — PLAN OF CARE
Physical Therapy Discharge Summary    Reason for therapy discharge:    Discharged to transitional care facility.    Progress towards therapy goal(s). See goals on Care Plan in [facility] electronic health record for goal details.  Goals not met.  Barriers to achieving goals:   discharge from facility.    Therapy recommendation(s):    Continued therapy is recommended.  Rationale/Recommendations:  To reduce fall risk and increase independence with functional mobility .

## 2023-10-31 NOTE — PROGRESS NOTES
Assessment & Plan     Hospital discharge follow-up  Patient not safe to return to home.  We discussed home care versus TCU.  Do not think home care would be adequate at this point as he really cannot transfer without assistance.  Patient and wife in agreement and they do not feel safe bringing him home.  Did call and discussed with care coordinator IFEOMA Adam.  Advised best way to reenter TCU would be through ER safe discharge planning considering this at this time sensitive. I am in agreement and reviewed with patient and wife.  Wife will transfer to ER.  They are concerned about his medications, should be able to get set up through ER in TCU but if any issues or needing fills okay to route to me.      Dry gangrene (H)  Status post amputation, wound intact and appears to be doing well.    PAD (peripheral artery disease) (H24)  Recent endarterectomy, stable.    Essential hypertension  Under control today.    History of transmetatarsal amputation of left foot (H)  Per above, rewrapped in clinic.           MED REC REQUIRED  Post Medication Reconciliation Status:  Discharge medications reconciled, continue medications without change      Maribell Archuleta PA-C  [facility] is [facility] 73 year [facility], presenting for the following health issues:  Hospital F/U ([facility])      10/31/2023     2:44 PM   Additional Questions   Roomed by Janett   Accompanied by Spouse       HPI       Hospital Follow-up Visit:    Hospital/Nursing Home/IP Rehab Facility:   Date of Admission: 10/17/23  Date of Discharge: 10/30/23  Reason(s) for Admission: Amputate Toes    Was your hospitalization related to COVID-19? No   Problems taking medications regularly:  None  Medication changes since discharge: None  Problems adhering to non-medication therapy:  None    Summary of hospitalization:  [facility] discharge summary  "reviewed  Diagnostic Tests/Treatments reviewed.  Follow up needed: none currently  Other Healthcare Providers Involved in Patient s Care: Vascular, orthopedics  Update since discharge: Worsened.    Patient was discharged to TCU and left AMA today.  Regretting that decision.  Does not feel safe at home.  Unable to do simple daily tasks such as going to the bathroom without assistance and wife unable to continue to help.  He do not have any medications filled since discharge.  They did unwrap his foot partially and were unable to rewrap it as well.  Unsure if it is bleeding too much.    Objective    /63 (BP Location: Left arm, Patient Position: Chair, Cuff Size: Adult Regular)   Pulse 76   Temp 98.4  F (36.9  C) (Oral)   Resp 14   Ht 1.702 m (5' 7.01\")   SpO2 99%   BMI 23.03 kg/m    Body mass index is 23.03 kg/m .  Physical Exam   GENERAL: healthy, alert and no distress  RESP: Normal effort  Lower leg: Dressing and splint partially in place.  Over dorsum of foot is warm, dry, intact.  Surgical site intact.  Amount of dried blood.  Rewrapped with ABD and Ace.                      "

## 2023-10-31 NOTE — PROGRESS NOTES
Clinic Care Coordination Contact  Care Coordination Transition Communication    Clinical Data: Patient was hospitalized at [facility] from 10/17 to 10/30 with diagnosis of Dry gangrene.     PRE/POSTOPERATIVE DIAGNOSES:    Left leg atherosclerosis with gangrene Tuesday, 24 October 2023  Left foot great toe chronic ulcer, 10/27/2023      PROCEDURES PERFORMED,   Tuesday, 24 October 2023:   Left common femoral endarterectomy (bovine pericardium patch closure) and profundoplasty  Antegrade 6 French sheath placement in the left common femoral artery  Atherectomy of left superficial femoral artery, popliteal artery and tibioperoneal trunk  Drug-coated balloon angioplasty of left superficial femoral artery  Filter /embolic protection device placed in left popliteal artery  Filter /embolic protection device placed in left peroneal artery  Left leg angiogram     10/27/2023   Left foot transmetatarsal amputation    Assessment: Patient has transitioned to TCU/ARU for short term rehabilitation:    Facility Name: [facility]   Transition Communication:  Notified facility of Primary Care- Care Coordination support via Epic in basket message.    Plan: Care Coordinator will await notification from facility staff informing of patient's discharge plans/needs. Care Coordinator will review chart and outreach to facility staff every 4 weeks and as needed.     Kia Colvin RN, BSN, PHN Care Coordinator  [facility]   Phone: [phone]

## 2023-10-31 NOTE — PLAN OF CARE
Occupational Therapy Discharge Summary    Reason for therapy discharge:    Discharged to transitional care facility.    Progress towards therapy goal(s). See goals on Care Plan in [facility] electronic health record for goal details.  Goals partially met.  Barriers to achieving goals:   discharge from facility.    Therapy recommendation(s):    Continued therapy is recommended.  Rationale/Recommendations:  Increase functional endurance and ADL independence at rehab.

## 2023-10-31 NOTE — LETTER
TCU Hand In    Patient name: Darian Engle  PCP: Marcos Gonzalez  PCP Care Coordinator's information (phone number/email): Kia Colvin RN, BSN, PHN Care Coordinator  [facility] [place], and Lizette [facility]   Phone: [phone]   Primary family contact: Ramandeep [phone]      Patient Care Team:  Mracos Gonzalez MD as PCP - General (Family Practice)  Schuyler Franco DO as Assigned Neuroscience Provider  Marcos Gonzalez MD as Assigned PCP  Shobha Ruggiero RPH as Pharmacist (Pharmacist)  Shobha Ruggiero RPH as Assigned MTM Pharmacist  Pauline Zimmer DPM as Assigned Surgical Provider  Marcos Gonzalez MD as Assigned Pain Medication Provider  Kia Colvin RN as Clinic Care Coordinator (Primary Care - CC)    Advanced Directive: Y    Social History     Socioeconomic History    Marital status:      Spouse name: Not on file    Number of children: Not on file    Years of education: Not on file    Highest education level: Not on file   Occupational History    Not on file   Tobacco Use    Smoking status: Every Day     Packs/day: 0.50     Years: 5.00     Additional pack years: 0.00     Total pack years: 2.50     Types: Cigarettes     Start date: 1968     Last attempt to quit: 7/15/2016     Years since quittin.2    Smokeless tobacco: Former   Vaping Use    Vaping Use: Never used   Substance and Sexual Activity    Alcohol use: Yes     Comment: binge, two months sober    Drug use: Yes     Types: Marijuana     Comment: uses Marijuana for pain    Sexual activity: Not Currently     Partners: Female   Other Topics Concern    Parent/sibling w/ CABG, MI or angioplasty before 65F 55M? Yes     Comment: brother   Social History Narrative    Not on file     Social Determinants of Health     Financial Resource Strain: Not on file   Food Insecurity: Not on file   Transportation Needs: Not on file   Physical Activity: Not on file   Stress: Not on file   Social Connections: Not on file    Interpersonal Safety: Not on file   Housing Stability: Not on file     EVA score: Average    Please contact me with discharge planning info as I am following patient through transitions of care    -Kia Colvin RN

## 2023-10-31 NOTE — Clinical Note
Good afternoon Maribell, I am sending you this note as an FYI. I hope my note provides clarity. You will be seeing patient and his wife this afternoon. They are needing current prescriptions sent to a local pharmacy, and requesting home care for physical therapy. We tried to get patient in with PCP, however there was no availability and they are needing assistance today.  They are open to another follow up call from me later this week, which I am happy to do.  Thank you for your help! Kia Colvin RN, BSN, PHN Care Coordinator [facility]  Phone: [phone]

## 2023-10-31 NOTE — PATIENT INSTRUCTIONS
You are not safe at home.  You need to present to the ER for safe discharge planning.  You need to be back in a TCU.

## 2023-10-31 NOTE — PROGRESS NOTES
"Clinic Care Coordination Contact    Patient was identified on the IP hand off report, with discharge plan to TCU. CC RN sent in CC transition communication via End[employer] in basket message with TCU hand in info.  [facility] TCU sent back the following information: This patient chose to discharge from the facility last evening at 10:30pm via Wife, AMA.     CC RN called patient, CC RN contacted patient, introduced self, role, care coordination program and reason for call. Patient reports being in pain. Patient and wife were on the line, Angelica and she is also on patients consent to communicate. They report that patient had a \"meltdown\" at TCU and patient left AMA. Patient is now home today and has no discharge summary (CC RN did let him know it is available on his mychart, we did not get a chance to review it fully) no medications, and they are requesting home care specifically for physical therapy. CC RN asked if patient was safe at home and if his basic needs are met, patient and wife stated yes.  They are agreeable to seeing a provider to address his medications and home care request this afternoon. We conference called post hospital priority scheduling line and secured an in person visit today. Patient and wife will attend.     Plan: Patient agreeable to CC RN follow up call in 2-4 business days.   Kia Colvin RN, BSN, PHN Care Coordinator  [facility]   Phone: [phone]   "

## 2023-11-02 NOTE — PROGRESS NOTES
Clinic Care Coordination Contact  Care Coordination Transition Communication    Clinical Data: Patient was hospitalized at [facility] from 10/31 to 11/1 with diagnosis of Generalized weakness   Partial traumatic amputation of left midfoot, subsequent encounter.     Assessment: Patient has transitioned to TCU/ARU for short term rehabilitation:    Facility Name: [facility]   Transition Communication:  Notified facility of Primary Care- Care Coordination support via Epic fax.    Plan: Care Coordinator will await notification from facility staff informing of patient's discharge plans/needs. Care Coordinator will review chart and outreach to facility staff every 4 weeks and as needed.     Kia Colvin RN, BSN, PHN Care Coordinator  [facility]   Phone: [phone]

## 2023-11-02 NOTE — TELEPHONE ENCOUNTER
See MyC messages. Attempted to contact nursing staff at [facility] to discuss concerns. Left my direct number with  staff and requested a callback from nursing.    DARREN SolimanN, RN  RN Care Coordinator  [facility] - [facility] phone: [phone]  Fax: [phone]

## 2023-11-02 NOTE — LETTER
TCU Hand In    Patient name: Darian Engle  PCP: Marcos Gonzalez  PCP Care Coordinator's information (phone number/email): Kia Colvin RN, BSN, PHN Care Coordinator  [facility] [place], and Lizette Carbajal   Phone: [phone]   Primary family contact: Ramandeep [phone]    Patient Care Team:  Marcso Gonzalez MD as PCP - General (Family Medicine)  Schuyler Franco DO as Assigned Neuroscience Provider  Marcos Gonzalez MD as Assigned PCP  Shobha Ruggiero RPH as Pharmacist (Pharmacist)  Shobha Ruggiero RPH as Assigned MTM Pharmacist  Pauline Zimmer DPM as Assigned Surgical Provider  Marcos Gonzalez MD as Assigned Pain Medication Provider  Kia Colvin RN as Clinic Care Coordinator (Primary Care - CC)  Kia Colvin RN as Lead Care Coordinator (Primary Care - CC)      Advanced Directive: Y    Social History     Socioeconomic History    Marital status:      Spouse name: Not on file    Number of children: Not on file    Years of education: Not on file    Highest education level: Not on file   Occupational History    Not on file   Tobacco Use    Smoking status: Every Day     Packs/day: 0.50     Years: 5.00     Additional pack years: 0.00     Total pack years: 2.50     Types: Cigarettes     Start date: 1968     Last attempt to quit: 7/15/2016     Years since quittin.3    Smokeless tobacco: Former   Vaping Use    Vaping Use: Never used   Substance and Sexual Activity    Alcohol use: Yes     Comment: binge, two months sober    Drug use: Yes     Types: Marijuana     Comment: uses Marijuana for pain    Sexual activity: Not Currently     Partners: Female   Other Topics Concern    Parent/sibling w/ CABG, MI or angioplasty before 65F 55M? Yes     Comment: brother   Social History Narrative    Not on file     Social Determinants of Health     Financial Resource Strain: Low Risk  (10/31/2023)    Financial Resource Strain     Within the past 12 months, have you or your  family members you live with been unable to get utilities (heat, electricity) when it was really needed?: No   Food Insecurity: Low Risk  (10/31/2023)    Food Insecurity     Within the past 12 months, did you worry that your food would run out before you got money to buy more?: No     Within the past 12 months, did the food you bought just not last and you didn t have money to get more?: No   Transportation Needs: Low Risk  (10/31/2023)    Transportation Needs     Within the past 12 months, has lack of transportation kept you from medical appointments, getting your medicines, non-medical meetings or appointments, work, or from getting things that you need?: No   Physical Activity: Not on file   Stress: Not on file   Social Connections: Not on file   Interpersonal Safety: Not on file   Housing Stability: Low Risk  (10/31/2023)    Housing Stability     Do you have housing? : Yes     Are you worried about losing your housing?: No       EVA score: Average    Please contact me with discharge planning info.     -Kia Colvin RN

## 2023-11-03 NOTE — TELEPHONE ENCOUNTER
Reviewed chart, noted that spouse had also reached out to [facility] via MyC yesterday and spoke with their team with similar concerns.  Vas surg RN spoke with TCU RN, Kassy, who stated that they received blood sugar/insulin orders from TCU attending MD. No additional orders are needed. Vas surg RN also sent patient MyC message with this information but it has not been read.     Writer called [facility] TCU and spoke with admissions, was told to contact RN line - writer was unable to get a hold of RN but did leave message asking them to call our office back to discuss insulin needs. However, all medication orders should be coming from TCU provider as they are being cared for at outside facility (TCU) - even if primary care faxes insulin orders over, TCU staff do not have to carry these orders out if the TCU provider does not feel it is appropriate.     Edit: called TCU back and was able to speak with RN. RN states that they have insulin orders for patient, sliding scale insulin orders present. Checking BGs before every meal.      Angela Mosley, BSN, RN  [facility]

## 2023-11-03 NOTE — TELEPHONE ENCOUNTER
Routing to provider that did post-hospital visit this week and sent him back to TCU.    Routing to provider to advise.  Veronica BANKSN, RN

## 2023-11-03 NOTE — TELEPHONE ENCOUNTER
Called TCU and spoke with nurse Elena who is caring for this pt. They have necessary pain medication orders and obtained blood sugar/insulin orders from TCU attending MD. Confirmed no additional orders are needed at this time per nursing. Pt's pain is well-managed on current regimen - Kassy states pt denied pain today and was not showing non-verbal signs of pain. Oxycodone appears to be effective for breakthrough pain per TCU nurse. No acute concerns at this time. I did provide our clinic callback number should they need anything additional from our team.    DARREN SolimanN, RN  RN Care Coordinator  [facility] Vascular Surgery - [facility] phone: [phone]  Fax: [phone]

## 2023-11-03 NOTE — TELEPHONE ENCOUNTER
Looks like his blood sugars have mostly been under control even while hospitalized, but we can continue the sliding scale they were using while in the TCU. He likely will not need to continue this at home.    Check glucose TID before meals.  Correction Scale - MEDIUM INSULIN RESISTANCE DOSING     Do Not give Correction Insulin if Pre-Meal BG less than 140.   For Pre-Meal  - 189 give 1 unit.   For Pre-Meal  - 239 give 2 units.   For Pre-Meal  - 289 give 3 units.   For Pre-Meal  - 339 give 4 units.   For Pre-Meal - 399 give 5 units.   For Pre-Meal -449 give 6 units   For Pre-Meal BG greater than or equal to 450 give 7 units.   To be given with prandial insulin, and based on pre-meal blood glucose.     Notify provider if glucose greater than or equal to 350 mg/dL after administration of correction dose.   If given at mealtime, administer within 30 minutes of start of meal.

## 2023-11-05 PROBLEM — I99.9 VASCULOPATHY: Status: ACTIVE | Noted: 2023-01-01

## 2023-11-06 NOTE — MEDICATION SCRIBE - ADMISSION MEDICATION HISTORY
Medication Scribe Admission Medication History    Admission medication history is complete. The information provided in this note is only as accurate as the sources available at the time of the update.    Information Source(s): Facility ([facility] medication list/MAR via in-person    Pertinent Information: Completed medication hx per MAR scanned in today 11/6/2023. Added Trazodone 50MG Tab per patient MAR. Also writer added Famotidine 10MG Tab per outside medication reconciliation date 11/02/2023.      Changes made to PTA medication list:  Added: Trazodone 50MG Tab           Famotidine 10MG Tab  Deleted: None  Changed: None    Medication Affordability:  Not including over the counter (OTC) medications, was there a time in the past 3 months when you did not take your medications as prescribed because of cost?: Unable to Assess    Allergies reviewed with patient and updates made in EHR: no    Medication History Completed By: Kim Mahmood 11/6/2023 9:22 AM    PTA Med List   Medication Sig Last Dose    ACCU-CHEK SMARTVIEW test strip TEST THREE TIMES DAILY OR AS DIRECTED     blood glucose monitoring (ACCU-CHEK FASTCLIX) lancets USE TO TEST THREE TIMES DAILY OR AS DIRECTED     famotidine (PEPCID) 10 MG tablet Take 1 tablet (10 mg) by mouth twice a day Indications: GERD, substitution for home med. Unknown    nitroGLYcerin (NITROSTAT) 0.4 MG sublingual tablet PLACE 1 TABLET UNDER THE TONGUE EVERY 5 MINUTES FOR 3 DOSES, IF SYMPTOMS PERSIST 5 MINUTES AFTER 1ST DOSE CALL 911 Unknown    oxyCODONE (ROXICODONE) 5 MG tablet Take 1-2 tablets (5-10 mg) by mouth every 6 hours as needed for moderate pain Past Month    tiZANidine (ZANAFLEX) 2 MG tablet Take 1 tablet (2 mg) by mouth 3 times daily as needed for muscle spasms Past Month    traZODone (DESYREL) 50 MG tablet Take 50 mg by mouth at bedtime Unknown

## 2023-11-06 NOTE — PROGRESS NOTES
VASCULAR SURGERY PROGRESS NOTE    Subjective:  Patient is very disappointed in the care he received at TCU.  Noted he did more PT at the hospital and would likely do more at home than at TCU.    Objective:No intake or output data in the 24 hours ending 11/06/23 0855  Labs:  ROUTINE IP LABS (Last four results)  BMP  Recent Labs   Lab 11/06/23  0734 11/06/23  0554 11/06/23  0540 11/05/23 2203   NA  --  137  --  137   POTASSIUM  --  4.3  --  4.4   CHLORIDE  --  104  --  104   SHERYL  --  8.7*  --  8.8   CO2  --  21*  --  19*   BUN  --  18.8  --  21.9   CR  --  0.77  --  0.89   GLC 80 79 88 66*     CBC  Recent Labs   Lab 11/05/23 2203   WBC 8.0   RBC 4.51   HGB 14.5   HCT 43.6   MCV 97   MCH 32.2   MCHC 33.3   RDW 13.4        INR  Recent Labs   Lab 11/05/23 2203   INR 1.68*     PHYSICAL EXAM:  /55   Pulse 73   Temp 98.2  F (36.8  C) (Oral)   Resp 18   SpO2 98%   General: The patient is alert and oriented. Appropriate. No acute distress  Psych: pleasant affect, answers questions appropriately  Skin: Color appropriate for race, warm, dry.  Respiratory: The patient does not require supplemental oxygen. Breathing unlabored  GI:  Abdomen soft, nontender to light palpation.  Incision: Left groin incision with intact sutures, no drainage, well approximated, without signs or symptoms of infection.   Extremities: LLE 2+ pitting edema, monophasic PT and peroneal signals, warm, large blister on TMA site (see media tab) now bursted, scatter abrasions on shin, sensory and motor function intact.     ASSESSMENT / PLAN:  Darian Engle is a 73M with recent L fem endarterectomy and atherectomy followed by TMA for dry gangrene of the left great toe who presented to the ED with blistering wound of the TMA site. Patient has monophasic PT and peroneal signals on exam. L groin incision C/D/I, with palpable femoral pulse. From a vascular surgery perspective, patient has diffuse and extensive atherosclerosis burden on his  lower extremities. His recent Left Common Femoral Endarterectomy, Atherectomy SFA/Popliteal and tibioperoneal trunk, PTA Left SFA, and  Angiogram were a last resort/attempt at limb salvage. Please see notes from October for full details.  -We recommend allowing for demarcation and monitoring of TMA healing. No vascular surgery interventions are further warranted. Will defer to orthopedics should additional amputation be required in the future.   -Continue with best medical therapy, aspirin, statin, okay with Eliquis.  -Agree with admit to medicine for IV antibiotics and work-up of possible osteomyelitis.  -Please engage walk for wound care given blister on TMA and will defer to orthopedics for further incisional care.    Discussed pt history, exam, assessment and plan with Dr. Holt who will discuss with Dr. Sanchez of the vascular surgery service.    Roxann Fontanez CNP  Vascular Surgery  Pager: [phone]  [email or web address]  Send message or 10 digit call back number Securely via [employer] with the [employer] Web Console (learn more here)

## 2023-11-06 NOTE — CONSULTS
[facility] Orthopedic Consultation    Darian Engle MRN# [id number]   Age: 73 year old YOB: 1950   Date of Admission:  11/5/2023    Reason for consult: Foot wound   Requesting physician: Ward Gipson MD              Impression and Recommendation:   Impression:  Darian Engle is a 73 year old male with DM2, peripheral artery disease, dry gangrene, CAD, paroxysmal atrial fibrillation on chronic anticoagulation, malnutrition who has been admitted for evaulation and management of left foot blistering wound adjacent to incision s/p left foot transmetatarsal amputation on 10/27/2023. Afebrile. WBC 15. CRP 9. ESR 25. Incision appears to be well healing without evidence of infection. Decompressed the blister and the patients pain significantly improved, which is reassuring. The patients mildly elevated labs can possible be postsurgical or as a result of an infection.  Patient is currently not septic and no need for emergent surgical intervention. Would recommend treating with IV antibiotics and trending labs     Recomendations:    Medicine Primary, appreciate cares  Activity: WBAT and ROM as tolerated.   DVT PPx: Per primary.   Antibiotics: Recommend broad spectrum IV antibiotic for possible infection  Labs: Follow inflammatory labs; repeat WBC, CRP  until trending down.  Imaging: Plain films of left foot (completed). No further imaging needed at this time. Recommend against advanced imaging at this time.  Dispo: Per Primary team. No current plans for OR.  Follow Up:  [facility]    Orthopedic surgery staff is Dr. Swenson.     Marychuy Stout DO  PGY-2  Orthopaedic Surgery  [employer]         Chief Complaint:   Left foot pain status post transmetatarsal amputation         History of Present Illness:   This patient is a 73 year old male with DMII, PAD, dry gangrene, CAD, hyperlipidemia, hypertension, paroxysmal atrial fibrillation on chronic anticoagulation, pancreatic cyst, moderate protein  calorie malnutrition who presents to the [facility] emergency department as a transfer from [place] with concerns for a left foot infection.  The patient was recently admitted from 10/17/2023 to 10/30/2023 for a gangrenous left foot.  During this admission he underwent a left common femoral endarterectomy and profundoplasty, sheath placement drug-coated balloon angioplasty on 10/24/2023.  In addition he had underwent a left foot transmetatarsal amputation on 10/27/2023 his admission was complicated by him falling post amputation.    During our visit today, the patient reports he had left foot pain since surgery on 10/27/23 that has only very subtly improved. States that the pain rated at 9/10 throbbing and dull ache constantly. Did not notice the present of the blister adjacent to his incision. Denies drainage from wound.     History obtained from patient interview and chart review.        Past Medical History:     Past Medical History:   Diagnosis Date     Arthritis March 2018    joint pain both hands     Diabetes (H)      Heart disease 1991    [place]-angioplasty     Hypertension              Past Surgical History:     Past Surgical History:   Procedure Laterality Date     AMPUTATE TOE(S) Left 10/26/2023    Procedure: Amputate toe(s), all transmetatarsal amputation;  Surgeon: Jerardo Thornton MD;  Location: [facility] OR     ANGIOGRAM Left 10/17/2023    Procedure: Left lower extremity angiogram via Left brachial artery approach;  Surgeon: Chuck Felix MBBS;  Location: [facility] OR     ANGIOGRAM Left 10/24/2023    Procedure: ANGIOGRAM;  Surgeon: Chuck Felix MBBS;  Location: [facility] OR     ANGIOPLASTY Left 10/24/2023    Procedure: ANGIOPLASTY, Left Lower Extremity atherectomy;  Surgeon: Chuck Felix MBBS;  Location: [facility] OR     CARDIAC SURGERY  [address] [place] [address]     ENDARTERECTOMY FEMORAL Left 10/24/2023    Procedure: ENDARTERECTOMY, FEMORAL;  Surgeon: Chuck Felxi MBBS;   Location: [facility] OR     ENHANCE LASER REFRACTIVE BILATERAL EXISTING PT IN PARAMETERS  2000     EYE SURGERY  2000    [facility]     IR OR ANGIOGRAM  10/17/2023     IR OR ANGIOGRAM  10/24/2023     VASCULAR SURGERY  2017    [facility]             Social History:   Tobacco use: endorses  Alcohol use: endorses, currently sober          Family History:   No family history of anesthesia, bleeding or clotting complications.           Allergies:     Allergies   Allergen Reactions     Lidocaine Other (See Comments)     Lungs filled with fluid. ? Anaphylaxis     Lisinopril Cough     cough     Naltrexone Nausea and Vomiting            Physical Exam:     BP (!) 147/56   Pulse 82   Temp 99.1  F (37.3  C) (Oral)   Resp 18   SpO2 100%   General: awake, alert, cooperative, no apparent distress  Respiratory: breathing non-labored  Cardiovascular: warm and well perfused  Musculoskeletal:  LLE: No gross deformity. Transmetatarsal amputation. Sutures in place over incision. Incision appears to be healing appropriately. No drainage, erythema about the wound. There is a blister about the proximal and lateral aspect of the incision. Blister was decompressed. No purulent drainage. No significant gris-wound erythema. No fluctuance or expressible purulence/drainage.  Sensation intact over tibial, sural, saphenous, and SPN.  Fires TA/Gastroc. Dorsalis pedis and posterior tibial arteries are dopplerable,with DP strong and PT weak.        Media Information      Document Information    Other:  Photograph      11/05/2023 9:54 PM   Attached To:   Hospital Encounter on 11/5/23     Source Information    Ward Gipson MD   Emergency Dept        Media Information      Document Information    Other:  Photograph      11/05/2023 9:54 PM   Attached To:   Hospital Encounter on 11/5/23     Source Information    Ward Gipson MD   Emergency Dept              Imaging:   Review of left foot films from 11/5/2023  "demonstrate transmetatarsal amputation without any evidence of fracture or dislocation.          Laboratory date:   CBC:  Lab Results   Component Value Date    WBC 8.0 11/05/2023    HGB 14.5 11/05/2023     11/05/2023       BMP:  Lab Results   Component Value Date     11/05/2023    POTASSIUM 4.4 11/05/2023    CHLORIDE 104 11/05/2023    CO2 19 (L) 11/05/2023    BUN 21.9 11/05/2023    CR 0.89 11/05/2023    ANIONGAP 14 11/05/2023    SHERYL 8.8 11/05/2023    GLC 66 (L) 11/05/2023       Inflammatory Markers:  Lab Results   Component Value Date    WBC 8.0 11/05/2023       Cultures:  No results for input(s): \"CULT\" in the last 168 hours.        "

## 2023-11-06 NOTE — PROVIDER NOTIFICATION
Notified MD at 1200 PM regarding changes in vital signs.      Orders were not obtained.    Comments: Pt's BPs soft 85/42 MAP 58, asymptomatic, held 1200 meds. Awaiting reply.

## 2023-11-06 NOTE — PROGRESS NOTES
Assumed care from 0910-6150    BP (!) 85/42 (BP Location: Right arm)   Pulse 73   Temp 98.2  F (36.8  C) (Oral)   Resp 15   SpO2 99%     Pt A&Ox3 easily reoriented, bed alarm on when family's not at bedside. Pt hypotensive in the afternoon, see provider notification. All other VSS on RA. Given oxycodone x1 for pain. BL PIV SL in between IV ABX. Left foot wound changed per WOC. LLE with tenderness, +2 edema, pulses intact & no changes this shift. Voiding spont, not saving. Pt c/o constipation, given scheduled meds. BGs checked ACHS, no insulin given per sliding scale. Pt with poor PO appetite, encouraged pt & family to order lunch. Up with SBA in room with walker. Pt & family's mood labile, they're very worried about care post admission, reassurance provided. Continue POC.

## 2023-11-06 NOTE — PHARMACY-VANCOMYCIN DOSING SERVICE
Pharmacy Vancomycin Initial Note  Date of Service 2023  Patient's  1950  73 year old, male    Indication: Skin and Soft Tissue Infection    Current estimated CrCl = Estimated Creatinine Clearance: 71.8 mL/min (based on SCr of 0.89 mg/dL).    Creatinine for last 3 days  2023: 10:03 PM Creatinine 0.89 mg/dL    Recent Vancomycin Level(s) for last 3 days  No results found for requested labs within last 3 days.      Vancomycin IV Administrations (past 72 hours)        No vancomycin orders with administrations in past 72 hours.                    Nephrotoxins and other renal medications (From now, onward)      Start     Dose/Rate Route Frequency Ordered Stop    23 0800  vancomycin (VANCOCIN) 750 mg in sodium chloride 0.9 % 250 mL intermittent infusion         750 mg  over 90 Minutes Intravenous EVERY 12 HOURS 23 0119              Contrast Orders - past 72 hours (72h ago, onward)      None            [employer] Prediction of Planned Initial Vancomycin Regimen    Loading dose: 1500 mg  Regimen: 750 mg IV every 12 hours  Exposure target: AUC24 (range)400-600 mg/L.hr   AUC24,ss: 494 mg/L.hr  Probability of AUC24 > 400: 72 %  Ctrough,ss: 16.1 mg/L  Probability of Ctrough,ss > 20: 31 %  Probability of nephrotoxicity (Lodise CESAR ): 11 %      Plan:  Started vancomycin 1500 mg x 1 dose at OSH will continue 750 mg IV q12h.   Vancomycin monitoring method: AUC  Vancomycin therapeutic monitoring goal: 400-600 mg*h/L  Pharmacy will check vancomycin levels as appropriate in 1-3 Days.    Serum creatinine levels will be ordered daily for the first week of therapy and at least twice weekly for subsequent weeks.      Cynthia Davidson, PharmD, BCPS

## 2023-11-06 NOTE — ED PROVIDER NOTES
ED Provider Note  [facility]      History     Chief Complaint   Patient presents with    Foot infection     HPI  Darian Engle is a 73 year old male with a history of PAD, dry gangrene, CAD, DM 2, dyslipidemia, hyperlipidemia, essential hypertension, paroxysmal atrial fibrillation on chronic anticoagulation, pancreatic cyst, moderate protein calorie malnutrition who presents to the ED BIBA as a transfer from [place] with foot infection for vascular and ortho consult. Originally went in to [facility] d/t pain. Was started on vancomyocin, but has not completeed course yet.    Per chart review, pt was admitted from 10/17/23 to 10/30/23 for gangrenous left leg, and underwent left common femoral endarterectomy and profundoplasty, sheath placement drug-coated balloon angioplasty on 10/24/2023. Subsequently had a left foot transmetatarsal amputation on 10/27/2023. He was discharged to rehab facility on 10/30.   Admission was complicated by him falling post amputation.    XR Foot Port Left 3 Views (11/5/23)  IMPRESSION: AP, lateral, and oblique images acquired.    Recent transmetatarsal amputation. Mildly heterogeneous density to the overlying soft tissues. Limited sensitivity/specificity for detection of infection.   Advanced vascular calcifications. Degenerative changes.     US ART EXTREMITY LOW LT NO RHONDA (11/5/23)  IMPRESSION:   1. Monophasic waveforms throughout the majority of the left lower extremity arterial system compatible with at least moderate obstruction. There is elevated peak systolic velocity within the common femoral artery and proximal superficial femoral artery suggestive of 20 to 49% stenosis. Flow is seen throughout the left superficial femoral artery on the current examination which is in contradistinction to a previously seen occluded stented left superficial femoral artery on angiogram 8/16/2023. Correlation with any interval revascularization recommended. Flow  within the popliteal artery is low. There is no flow visualized within the peroneal artery, anterior tibial or dorsalis pedis artery. There is diffuse subcutaneous edema. Traditional angiographic evaluation may be useful for further evaluation as noted on prior runoff examination.     Past Medical History  Past Medical History:   Diagnosis Date    Arthritis March 2018    joint pain both hands    Diabetes (H)     Heart disease 1991    [place]-angioplasty    Hypertension      Past Surgical History:   Procedure Laterality Date    AMPUTATE TOE(S) Left 10/26/2023    Procedure: Amputate toe(s), all transmetatarsal amputation;  Surgeon: Jerardo Thornton MD;  [facility]    ANGIOGRAM Left 10/17/2023    Procedure: Left lower extremity angiogram via Left brachial artery approach;  Surgeon: Chuck Felix MBBS;  [facility]    ANGIOGRAM Left 10/24/2023    Procedure: ANGIOGRAM;  Surgeon: Chuck Felix MBBS;  [facility]    ANGIOPLASTY Left 10/24/2023    Procedure: ANGIOPLASTY, Left Lower Extremity atherectomy;  Surgeon: Chuck Felix MBBS;  [facility]    CARDIAC SURGERY  [address] [place] [address]    ENDARTERECTOMY FEMORAL Left 10/24/2023    Procedure: ENDARTERECTOMY, FEMORAL;  Surgeon: Chuck Felix MBBS;  [facility]    ENHANCE LASER REFRACTIVE BILATERAL EXISTING PT IN PARAMETERS  2000    EYE SURGERY  2000    [facility]    IR OR ANGIOGRAM  10/17/2023    IR OR ANGIOGRAM  10/24/2023    VASCULAR SURGERY  2017    [facility]     ACCU-CHEK SMARTVIEW test strip  acetaminophen (TYLENOL) 325 MG tablet  amLODIPine (NORVASC) 10 MG tablet  apixaban ANTICOAGULANT (ELIQUIS ANTICOAGULANT) 5 MG tablet  aspirin 81 MG tablet  atorvastatin (LIPITOR) 40 MG tablet  blood glucose monitoring (ACCU-CHEK FASTCLIX) lancets  carvedilol (COREG) 25 MG tablet  cholecalciferol (SM VITAMIN D3) 50 MCG (2000 UT) CAPS  cilostazol (PLETAL) 50 MG tablet  donepezil (ARICEPT)  10 MG tablet  gabapentin (NEURONTIN) 100 MG capsule  isosorbide mononitrate (IMDUR) 30 MG 24 hr tablet  losartan (COZAAR) 50 MG tablet  magnesium oxide 200 MG TABS  metFORMIN (GLUCOPHAGE XR) 500 MG 24 hr tablet  multivitamin (CENTRUM SILVER) tablet  nitroGLYcerin (NITROSTAT) 0.4 MG sublingual tablet  Omega-3 Fatty Acids (FISH OIL OMEGA-3) 1000 MG CAPS  omeprazole (PRILOSEC) 20 MG DR capsule  oxyCODONE (ROXICODONE) 5 MG tablet  senna-docusate (SENOKOT-S/PERICOLACE) 8.6-50 MG tablet  sertraline (ZOLOFT) 25 MG tablet  spironolactone (ALDACTONE) 25 MG tablet  tiZANidine (ZANAFLEX) 2 MG tablet      Allergies   Allergen Reactions    Lidocaine Other (See Comments)     Lungs filled with fluid. ? Anaphylaxis    Lisinopril Cough     cough    Naltrexone Nausea and Vomiting     Family History  Family History   Problem Relation Age of Onset    Glaucoma Mother     Macular Degeneration Mother     Macular Degeneration Other     Lung Cancer Brother      Social History   Social History     Tobacco Use    Smoking status: Every Day     Packs/day: 0.50     Years: 5.00     Additional pack years: 0.00     Total pack years: 2.50     Types: Cigarettes     Start date: 1968     Last attempt to quit: 7/15/2016     Years since quittin.3    Smokeless tobacco: Former   Vaping Use    Vaping Use: Never used   Substance Use Topics    Alcohol use: Yes     Comment: binge, two months sober    Drug use: Yes     Types: Marijuana     Comment: uses Marijuana for pain      Past medical history, past surgical history, medications, allergies, family history, and social history were reviewed with the patient. No additional pertinent items.      A complete review of systems was performed with pertinent positives and negatives noted in the HPI, and all other systems negative.    Physical Exam   BP: (!) 147/56  Pulse: 82  Temp: 99.1  F (37.3  C)  Resp: 18  SpO2: 100 %  Physical Exam  Vital Signs Reviewed  Gen: Well nourished, well developed, resting  comfortably, no acute distress  HEENT: NC/AT, PERRL, EOMI, MMM  Neck: Supple, FROM  CV: Regular Rate, no murmur/rub/gallop  Lungs/Chest: Normal Effort, CTAB  Abd: Non-distended, non-tender  MSK/Back: FROM  Neuro: A&Ox3, GCS 15, CN II-XII unremarkable  Skin: Warm, Dry            ED Course, Procedures, & Data      Patient seen and evaluated  Available transfer notes reviewed  Vascular and orthopedic surgery consulted  Admitted to medicine  Notified by RN the patient was trying to leave AMA.  Had a elly discussion with the patient about why he was transferred to this institution.  He agreed to be admitted.  Expressed concern that he would be unable to sleep and I stated I would do my best to help him with this within reason.    Procedures                     Results for orders placed or performed during the hospital encounter of 11/05/23   XR Foot Left 2 Views     Status: None    Narrative    EXAM: XR FOOT LEFT 2 VIEWS  LOCATION: [facility]  DATE: 11/5/2023    INDICATION: pain, concerns for infection  COMPARISON: 10/24/2023      Impression    IMPRESSION: Status post transmetatarsal resection of the distal foot. The resection edge are ill-defined, possibly postsurgical in nature but underlying osteomyelitis is not excluded. Contrast enhancement MRI of the foot with the more helpful. No acute   fracture or dislocation. Significant atherosclerotic calcification seen in the foot.   INR     Status: Abnormal   Result Value Ref Range    INR 1.68 (H) 0.85 - 1.15   Partial thromboplastin time     Status: Abnormal   Result Value Ref Range    aPTT 78 (H) 22 - 38 Seconds   Comprehensive metabolic panel     Status: Abnormal   Result Value Ref Range    Sodium 137 135 - 145 mmol/L    Potassium 4.4 3.4 - 5.3 mmol/L    Carbon Dioxide (CO2) 19 (L) 22 - 29 mmol/L    Anion Gap 14 7 - 15 mmol/L    Urea Nitrogen 21.9 8.0 - 23.0 mg/dL    Creatinine 0.89 0.67 - 1.17 mg/dL    GFR Estimate >90 >60  mL/min/1.73m2    Calcium 8.8 8.8 - 10.2 mg/dL    Chloride 104 98 - 107 mmol/L    Glucose 66 (L) 70 - 99 mg/dL    Alkaline Phosphatase 97 40 - 129 U/L    AST 29 0 - 45 U/L    ALT 10 0 - 70 U/L    Protein Total 6.3 (L) 6.4 - 8.3 g/dL    Albumin 3.1 (L) 3.5 - 5.2 g/dL    Bilirubin Total 0.7 <=1.2 mg/dL   Lactic acid whole blood     Status: Normal   Result Value Ref Range    Lactic Acid 0.8 0.7 - 2.0 mmol/L   CBC with platelets and differential     Status: Abnormal   Result Value Ref Range    WBC Count 8.0 4.0 - 11.0 10e3/uL    RBC Count 4.51 4.40 - 5.90 10e6/uL    Hemoglobin 14.5 13.3 - 17.7 g/dL    Hematocrit 43.6 40.0 - 53.0 %    MCV 97 78 - 100 fL    MCH 32.2 26.5 - 33.0 pg    MCHC 33.3 31.5 - 36.5 g/dL    RDW 13.4 10.0 - 15.0 %    Platelet Count 248 150 - 450 10e3/uL    % Neutrophils 86 %    % Lymphocytes 7 %    % Monocytes 6 %    % Eosinophils 1 %    % Basophils 0 %    % Immature Granulocytes 0 %    NRBCs per 100 WBC 0 <1 /100    Absolute Neutrophils 6.8 1.6 - 8.3 10e3/uL    Absolute Lymphocytes 0.6 (L) 0.8 - 5.3 10e3/uL    Absolute Monocytes 0.5 0.0 - 1.3 10e3/uL    Absolute Eosinophils 0.0 0.0 - 0.7 10e3/uL    Absolute Basophils 0.0 0.0 - 0.2 10e3/uL    Absolute Immature Granulocytes 0.0 <=0.4 10e3/uL    Absolute NRBCs 0.0 10e3/uL   CBC with platelets differential     Status: Abnormal    Narrative    The following orders were created for panel order CBC with platelets differential.  Procedure                               Abnormality         Status                     ---------                               -----------         ------                     CBC with platelets and d...[[id number]]  Abnormal            Final result                 Please view results for these tests on the individual orders.     Medications   HYDROmorphone (PF) (DILAUDID) injection 0.5 mg (0.5 mg Intravenous $Given 11/5/23 7688)   ondansetron (ZOFRAN) injection 4 mg (4 mg Intravenous $Given 11/5/23 0097)   vancomycin (VANCOCIN) 750 mg  in sodium chloride 0.9 % 250 mL intermittent infusion (has no administration in time range)   traZODone (DESYREL) tablet 50 mg (has no administration in time range)   lactated ringers BOLUS 1,000 mL (0 mLs Intravenous Stopped 11/6/23 0011)   hydrOXYzine (ATARAX) tablet 50 mg (50 mg Oral $Given 11/5/23 1032)     Labs Ordered and Resulted from Time of ED Arrival to Time of ED Departure   INR - Abnormal       Result Value    INR 1.68 (*)    PARTIAL THROMBOPLASTIN TIME - Abnormal    aPTT 78 (*)    COMPREHENSIVE METABOLIC PANEL - Abnormal    Sodium 137      Potassium 4.4      Carbon Dioxide (CO2) 19 (*)     Anion Gap 14      Urea Nitrogen 21.9      Creatinine 0.89      GFR Estimate >90      Calcium 8.8      Chloride 104      Glucose 66 (*)     Alkaline Phosphatase 97      AST 29      ALT 10      Protein Total 6.3 (*)     Albumin 3.1 (*)     Bilirubin Total 0.7     CBC WITH PLATELETS AND DIFFERENTIAL - Abnormal    WBC Count 8.0      RBC Count 4.51      Hemoglobin 14.5      Hematocrit 43.6      MCV 97      MCH 32.2      MCHC 33.3      RDW 13.4      Platelet Count 248      % Neutrophils 86      % Lymphocytes 7      % Monocytes 6      % Eosinophils 1      % Basophils 0      % Immature Granulocytes 0      NRBCs per 100 WBC 0      Absolute Neutrophils 6.8      Absolute Lymphocytes 0.6 (*)     Absolute Monocytes 0.5      Absolute Eosinophils 0.0      Absolute Basophils 0.0      Absolute Immature Granulocytes 0.0      Absolute NRBCs 0.0     LACTIC ACID WHOLE BLOOD - Normal    Lactic Acid 0.8     BLOOD CULTURE   BLOOD CULTURE     XR Foot Left 2 Views   Final Result   IMPRESSION: Status post transmetatarsal resection of the distal foot. The resection edge are ill-defined, possibly postsurgical in nature but underlying osteomyelitis is not excluded. Contrast enhancement MRI of the foot with the more helpful. No acute    fracture or dislocation. Significant atherosclerotic calcification seen in the foot.             Critical care  was not performed.     Medical Decision Making  The patient's presentation was of high complexity (a chronic illness severe exacerbation, progression, or side effect of treatment).    The patient's evaluation involved:  ordering and/or review of 3+ test(s) in this encounter (see separate area of note for details)  discussion of management or test interpretation with another health professional (hospitalist, vascular surgery, orthopedic surgery)    The patient's management necessitated high risk (a parenteral controlled substance) and high risk (a decision regarding hospitalization).    Assessment & Plan    Darian Engle is a 73 year old male with a history of PAD, dry gangrene, CAD, DM 2, dyslipidemia, hyperlipidemia, essential hypertension, paroxysmal atrial fibrillation on chronic anticoagulation, pancreatic cyst, moderate protein calorie malnutrition who presents to the ED BIBA as a transfer from [place] with foot infection for vascular and ortho consult. Originally went in to [place] d/t pain. Was started on vancomyocin, but has not completeed course yet.  Arrival vital signs are reassuring.  Additional labs and blood cultures were ordered.  Labs are largely reassuring.  Vascular surgery was consulted to see patient.  Orthopedic surgery consult as well.  Orthopedic surgery despite Dr. Swenson excepting in transfer requested patient be admitted to medicine.  Medicine service excepted the patient.  Patient will need additional vancomycin as his initial dose from the outside hospital was not completed upon arrival here this was ordered.  Patient is already received Zosyn.    Was an episode of the patient wanted to leave AGAINST MEDICAL ADVICE.  I did elly discussion with the patient about possibilities of additional tissue loss if there is an acute infection that is worsening.  He expressed concern about ability to sleep in the hospital.  Medications were ordered to help with this.  He ultimately agreed to  stay in the institution.  His wife was very relieved about this.    His wife also expresses severe concerns about his treatment at the facility he was discharged to follow-up and expresses reservations about returning there after discharge.    I have reviewed the nursing notes. I have reviewed the findings, diagnosis, plan and need for follow up with the patient.    New Prescriptions    No medications on file       Final diagnoses:   Foot infection       Ward Gipson Jr., MD   [facility] EMERGENCY DEPARTMENT  11/5/2023     Ward Gipson MD  11/06/23 0223

## 2023-11-06 NOTE — PROGRESS NOTES
Patient orginally scheduled to see Dr Thornton on 11/7/2023. This should be a nurse visit, and then see Dr Thornton at 6 week post op. Patient also currently in the hospital. The appointment has been rescheduled to the nurse schedule next week. Will show on discharge paperwork and I will attempt to contact the unit to let them know.  Avis Hart ATC

## 2023-11-06 NOTE — ED TRIAGE NOTES
Patient BIBA from [facility] for foot infection. Patient is transferred for Vascular and Orthopedic Consult.     Triage Assessment (Adult)       Row Name 11/05/23 5869          Triage Assessment    Airway WDL WDL        Respiratory WDL    Respiratory WDL WDL        Skin Circulation/Temperature WDL    Skin Circulation/Temperature WDL WDL        Cardiac WDL    Cardiac WDL WDL        Cognitive/Neuro/Behavioral WDL    Cognitive/Neuro/Behavioral WDL WDL

## 2023-11-06 NOTE — ED NOTES
Bed: ED27  Expected date: 11/5/23  Expected time:   Means of arrival:   Comments:  Darian Spence; LLE pain and decreased pulses; will need vascular and orthopedics consults; accepted by Dr. Swenson from orthopedics.     Ramy Clark MD  11/05/23 5770

## 2023-11-06 NOTE — CONSULTS
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 Labs significant for mildly elevated WBC, CRP, and ESR. Patient has monophasic PT and peroneal signals on exam. L groin incision C/D/I.    RECOMMENDATIONS:    - No acute Vascular Surgery intervention indicated at this time  - Please ensure patient's aspirin and atorvastatin are restarted  - Agree with admit to medicine for IV antibiotics and work-up of possible osteomyelitis  - Vascular Surgery will continue to follow      Discussed with Vascular Surgery fellow Dr. Calvo and staff Dr. Laura Duke MD  Surgery Resident      =======================    History of Present Illness:    Darian Engle is a 73 year old male with hx of CAD s/p CAB with LIMA  and L GSV, benign cystic neoplasm of pancreas, diabetes mellitus, type 2, HTN, and tobacco use, now presenting with new blistering wound of his L TMA site. He states that he first noticed the wound yesterday when the bandages were taken off. He states that he had not had his dressing changed after the first day at the facility. He denies any drainage from the wound. Per patient, he also had to crawl to go to the bathroom. He does not recall any wounds or skin breakdown around the amputation site. He denies any fevers, chills, cough, or shortness of breath. He is having diarrhea due to his bowel regimen. He has had pins-and-needles in the left foot, which is not new for him.    Past Medical History:  Past Medical History:   Diagnosis Date    Arthritis March 2018    joint pain both hands    Diabetes (H)     Heart disease 1991    [place]-angioplasty    Hypertension        Past Surgical History  Past Surgical History:   Procedure Laterality Date    AMPUTATE  TOE(S) Left 10/26/2023    Procedure: Amputate toe(s), all transmetatarsal amputation;  Surgeon: Jerardo Thornton MD;  [facility]    ANGIOGRAM Left 10/17/2023    Procedure: Left lower extremity angiogram via Left brachial artery approach;  Surgeon: Chuck Felix MBBS;  [facility]    ANGIOGRAM Left 10/24/2023    Procedure: ANGIOGRAM;  Surgeon: Chuck Felix MBBS;  [facility]    ANGIOPLASTY Left 10/24/2023    Procedure: ANGIOPLASTY, Left Lower Extremity atherectomy;  Surgeon: Chuck Felix MBBS;  [facility]    CARDIAC SURGERY  [address] [place] [address]    ENDARTERECTOMY FEMORAL Left 10/24/2023    Procedure: ENDARTERECTOMY, FEMORAL;  Surgeon: Chuck Felix MBBS;  [facility]    ENHANCE LASER REFRACTIVE BILATERAL EXISTING PT IN PARAMETERS  2000    EYE SURGERY  2000    [facility]    IR OR ANGIOGRAM  10/17/2023    IR OR ANGIOGRAM  10/24/2023    VASCULAR SURGERY  2017    [facility]       Family History:  Family History   Problem Relation Age of Onset    Glaucoma Mother     Macular Degeneration Mother     Macular Degeneration Other     Lung Cancer Brother        Social History:  Social History     Social History Narrative    Not on file        Medications:  Current Outpatient Medications   Medication Sig Dispense Refill    ACCU-CHEK SMARTVIEW test strip TEST THREE TIMES DAILY OR AS DIRECTED 300 strip 3    acetaminophen (TYLENOL) 325 MG tablet Take 3 tablets (975 mg) by mouth every 8 hours Do this for the first few days postoperatively, as pain improves, can transition to taking 1-2 tabs q4-6 hours as needed. 90 tablet 0    amLODIPine (NORVASC) 10 MG tablet Take 1 tablet (10 mg) by mouth daily 90 tablet 0    apixaban ANTICOAGULANT (ELIQUIS ANTICOAGULANT) 5 MG tablet Take 1 tablet (5 mg) by mouth 2 times daily 60 tablet 2    aspirin 81 MG tablet Take 81 mg by mouth daily 30 tablet     atorvastatin (LIPITOR) 40 MG tablet Take 1 tablet  (40 mg) by mouth daily For cholesterol. 90 tablet 3    blood glucose monitoring (ACCU-CHEK FASTCLIX) lancets USE TO TEST THREE TIMES DAILY OR AS DIRECTED 306 each 3    carvedilol (COREG) 25 MG tablet Take 25 mg by mouth 2 times daily      cholecalciferol (SM VITAMIN D3) 50 MCG (2000 UT) CAPS Take 2 capsules by mouth daily      cilostazol (PLETAL) 50 MG tablet Take 100 mg by mouth 2 times daily      donepezil (ARICEPT) 10 MG tablet Take 1 tablet (10 mg) by mouth At Bedtime 90 tablet 1    gabapentin (NEURONTIN) 100 MG capsule Take 1 capsule (100 mg) by mouth 3 times daily 180 capsule 0    isosorbide mononitrate (IMDUR) 30 MG 24 hr tablet TAKE 2 TABLETS(60 MG) BY MOUTH DAILY 180 tablet 1    losartan (COZAAR) 50 MG tablet Take 2 tablets (100 mg) by mouth daily 90 tablet 2    magnesium oxide 200 MG TABS Take 200 mg by mouth daily      metFORMIN (GLUCOPHAGE XR) 500 MG 24 hr tablet TAKE 1 TABLET(500 MG) BY MOUTH TWICE DAILY WITH MEALS 180 tablet 0    multivitamin (CENTRUM SILVER) tablet Take 1 tablet by mouth daily      nitroGLYcerin (NITROSTAT) 0.4 MG sublingual tablet PLACE 1 TABLET UNDER THE TONGUE EVERY 5 MINUTES FOR 3 DOSES, IF SYMPTOMS PERSIST 5 MINUTES AFTER 1ST DOSE CALL 911 25 tablet 0    Omega-3 Fatty Acids (FISH OIL OMEGA-3) 1000 MG CAPS Take 1,000 mg by mouth daily      omeprazole (PRILOSEC) 20 MG DR capsule Take 1 capsule (20 mg) by mouth daily 90 capsule 3    oxyCODONE (ROXICODONE) 5 MG tablet Take 1-2 tablets (5-10 mg) by mouth every 6 hours as needed for moderate pain 20 tablet 0    senna-docusate (SENOKOT-S/PERICOLACE) 8.6-50 MG tablet Take 1 tablet by mouth 2 times daily While taking narcotic pain medications (oxycodone) 90 tablet 0    sertraline (ZOLOFT) 25 MG tablet TAKE ONE TABLET BY MOUTH DAILY FOR DEPRESSION AND TO HELP APPETITE 90 tablet 1    spironolactone (ALDACTONE) 25 MG tablet Take 1 tablet (25 mg) by mouth daily 90 tablet 0    tiZANidine (ZANAFLEX) 2 MG tablet Take 1 tablet (2 mg) by mouth 3  times daily as needed for muscle spasms 90 tablet 0       Allergies:  Allergies   Allergen Reactions    Lidocaine Other (See Comments)     Lungs filled with fluid. ? Anaphylaxis    Lisinopril Cough     cough    Naltrexone Nausea and Vomiting       Review of Symptoms:  A 10 point review of symptoms has been conducted and is negative except for that mentioned in the above HPI.    Physical Exam:    Temp:  [99.1  F (37.3  C)] 99.1  F (37.3  C)  Pulse:  [82] 82  Resp:  [18] 18  BP: (147)/(56) 147/56  SpO2:  [100 %] 100 %    Gen: NAD, resting comfortably, irritated  HEENT: NCAT, sclera anicteric  CV: RRR by pedal signal  Resp: nonlabored respirations, comfortable on room air  Groin: L groin wound C/D/I with no evidence of infection  Ext: LLE 2+ pitting edema, weakly monophasic PT and peroneal signals, warm, large blister on TMA site (see media tab), scatter abrasions on shin  Neuro: no focal deficits. LLE sensorimotor intact    Labs:  CBC RESULTS:   Recent Labs   Lab Test 11/05/23  2203   WBC 8.0   RBC 4.51   HGB 14.5   HCT 43.6   MCV 97   MCH 32.2   MCHC 33.3   RDW 13.4        Lactate 0.8  CRP: 9.0  ESR: 25    Imaging:  Xray L Foot:   IMPRESSION: AP, lateral, and oblique images acquired.      Recent transmetatarsal amputation. Mildly heterogeneous density to the overlying soft tissues. Limited sensitivity/specificity for detection of infection.     Advanced vascular calcifications. Degenerative changes.       Arterial Duplex LLE:  FINDINGS:     LEFT LOWER EXTREMITY:    Waveforms  /  Velocities (in cm/sec):     External Iliac Artery:  Triphasic. 100     Common Femoral Artery: Triphasic. 184     Profunda Femoral Artery: Monophasic. 19     Proximal SFA: Monophasic. 163     Mid SFA: Monophasic. 60     Distal SFA: Monophasic. 24     Popliteal Artery: Monophasic. 14     Posterior Tibial Artery: Monophasic. 13     Peroneal Artery: Not visualized.     Anterior Tibial Artery: Not visualized.     Dorsalis Pedis Artery: Not  visualized.     Other: Extensive subcutaneous edema.

## 2023-11-06 NOTE — CONSULTS
[facility] Nurse Inpatient Assessment     Consulted for: left foot       Patient History (according to provider note(s):      Darian Engle is a 73 year old male admitted on 11/5/2023. He has a past medical history of PMH  of CAD s/p CAB w/ LIMA and L GSV (2004), HTN, HDL, atrial fibrillation on AC, PAD, dry gangrene, benign cystic neoplasm of the pancreas, TALA not on CPAP, dementia and DM II. He was transferred from [place] for orthopedic and vascular consult. Denies chest pain, abdominal pain, nausea or vomiting.     Assessment:      Areas visualized during today's visit: Lower extremities     Wound location: L foot, TMA incision site and scattered scabs to L shin/ankle       Last photo: 11/6  Wound due to: Surgical Wound  Wound history/plan of care: pt with PAD s/p TMA of L foot, now with infection here for abx, vascular and ortho following   Wound base: Foot 90 % purple and blister, 10 % superficial scab, incision well approximated. shin with scabs and fibrin over scattered wound beds      Palpation of the wound bed: normal      Drainage: small     Description of drainage: serous     Measurements (length x width x depth, in cm): entire area to foot measures about  4  x 7  x  0.1 cm      Tunneling: N/A     Undermining: N/A  Periwound skin: Edematous      Color: dusky      Temperature: normal   Odor: none  Pain: severe, sharp and tender  Pain interventions prior to dressing change: slow and gentle cares   Treatment goal: Infection control/prevention  STATUS: initial assessment  Supplies ordered: gathered and at bedside       Treatment Plan:     L foot wound(s): Daily cleanse with normal saline and dry thoroughly, paint incision line and any other open areas to LLE with betadine solution and allow to dry, cover blistered area to dorsal foot with xeroform ([id number]) gauze then ABD pad and secure with kerlix.      Orders: Written    RECOMMEND PRIMARY TEAM  ORDER: None, at this time, vascular and ortho already following   Education provided: plan of care  Discussed plan of care with: Patient, Family, and Nurse  [facility] nurse follow-up plan: weekly  Notify WOC if wound(s) deteriorate.  Nursing to notify the Provider(s) and re-consult the WO Nurse if new skin concern.    DATA:     Current support surface: Standard  Standard gel/foam mattress (IsoFlex, Atmos air, etc)  Containment of urine/stool: Continent of bladder and Continent of bowel  BMI: There is no height or weight on file to calculate BMI.   Active diet order: Orders Placed This Encounter      Moderate Consistent Carb (60 g CHO per Meal) Diet     Output: No intake/output data recorded.     Labs:   Recent Labs   Lab 11/06/23  0932 11/06/23  0554 11/05/23  2203   ALBUMIN  --  3.1* 3.1*   HGB 8.9*  --  14.5   INR  --   --  1.68*   WBC 14.1*  --  8.0     Pressure injury risk assessment:   Sensory Perception: 4-->no impairment  Moisture: 3-->occasionally moist  Activity: 3-->walks occasionally  Mobility: 3-->slightly limited  Nutrition: 3-->adequate  Friction and Shear: 3-->no apparent problem  Moustapha Score: 19      Pager no longer is use, please contact through [employer] group: [facility] Nurse [place]   Dept. Office Number: [phone]

## 2023-11-06 NOTE — PROGRESS NOTES
Patient was a bit confused, asking multiple times if why is he in the hospital and wanted to get out of bed. Explained to patient the fall risk and redirected multiple times with minimal effect. MD made aware.

## 2023-11-06 NOTE — PROGRESS NOTES
Lab called to notify Hemoglobin of 8.8 from 14.5 last night. Lab put an order for redraw. Kiley Dubon MD made aware.

## 2023-11-06 NOTE — H&P
[facility]    History and Physical - Hospitalist Service, GOLD TEAM 8       Date of Admission:  11/5/2023    Assessment & Plan      Darian Engle is a 73 year old male admitted on 11/5/2023. He has a past medical history of PMH  of CAD s/p CAB w/ LIMA and L GSV (2004), HTN, HDL, atrial fibrillation on AC, PAD, dry gangrene, benign cystic neoplasm of the pancreas, TALA not on CPAP, dementia and DM II. He was transferred from [place] for orthopedic and vascular consult. Denies chest pain, abdominal pain, nausea or vomiting.      # Left foot infection  Admitted from 10/17/23 to 10/30/23 for gangrenous left leg, and underwent left common femoral endarterectomy and profundoplasty, sheath placement drug-coated balloon angioplasty on 10/24/2023. 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 He was discharged to rehab facility on 10/30, but left AMA after he felt that he was waiting in the lobby too long.  He was then admitted to [facility] from 10/31/2023 to 11/1/23 due to weakness, and discharged to TCU, went back to [place] 11/5/23 and he was transferred to the [facility]. WBC 8 and he is afebrile. He received 1000 ml bolus and started on vancomycin. X-ray of foot showing no new fractures but possible osteomyelitis.  - Ortho consult  - Vascular consult  - Follow blood cultures  - Continue vancomycin   - Oxy and tylenol for pain    # PAD  # HTN  # Atrial fibrillation on AC  MR cardiac 10/18/23 shows left ventricle is normal in cavity size and moderate concentric hypertrophy and LVEF is 67%. EKGs have shown SR. Wife was called for med rec. Patient was sent with MAR from facility.  - PTA amlodipine, apixaban, asa, atorvastatin, coreg, pletal, losartan, spirolactone, and isosorbide.     # DM II  Last A1C 5.5 on 11/6/23  - Hold metformin  - Medium sliding scale    # Dementia  Answers question well, but forgetful and does not know medical  history well  - PTA Aricept    # Anemia  History of anemia with Hbg around 10. He was 14.5 on admission  - CBC        Diet:  CHO  DVT Prophylaxis: Low Risk/Ambulatory with no VTE prophylaxis indicated  [place] Catheter: Not present  Lines: None     Cardiac Monitoring: None  Code Status:  Full code         Disposition Plan      Expected Discharge Date: 11/07/2023                The patient's care was discussed with the Attending Physician, ADIEL [facility]  Hospitalist Service, [facility] 8  [facility]  Securely message with [employer] (more info)  Text page via [facility] Paging/Directory   See signed in provider for up to date coverage information    ______________________________________________________________________    Chief Complaint   Left foot infection    History is obtained from the patient, chart review and wife    History of Present Illness   Darian Engel is a 73 year old male who has a history of PMH  of CAD s/p CAB w/ LIMA and L GSV (2004), HTN, atrial fibrillation on AC, PAD, dry gangrene, benign cystic neoplasm of the pancreas, and DM II. He was transferred from [place] for orthopedic and vascular consult. He was seen by both services tonight and no immediate interventions planned. He has been hospitalized and been to rehab due to PAD, dry gangrene and foot infection. He ambulates with the use of a walker and states that he is mostly independent with activities and his wife helps him when needed. He is forgetful and relies and wife to provide history and medication reconciliation.       Past Medical History    Past Medical History:   Diagnosis Date    Arthritis March 2018    joint pain both hands    Diabetes (H)     Heart disease 1991    [place]-angioplasty    Hypertension        Past Surgical History   Past Surgical History:   Procedure Laterality Date    AMPUTATE TOE(S) Left 10/26/2023    Procedure: Amputate toe(s), all  transmetatarsal amputation;  Surgeon: Jerardo Thornton MD;  Location: [facility] OR    ANGIOGRAM Left 10/17/2023    Procedure: Left lower extremity angiogram via Left brachial artery approach;  Surgeon: Chuck Felix MBBS;  Location: [facility] OR    ANGIOGRAM Left 10/24/2023    Procedure: ANGIOGRAM;  Surgeon: Chuck Felix MBBS;  Location: [facility] OR    ANGIOPLASTY Left 10/24/2023    Procedure: ANGIOPLASTY, Left Lower Extremity atherectomy;  Surgeon: Chuck Felix MBBS;  Location: [facility] OR    CARDIAC SURGERY  [address] [place] [address]    ENDARTERECTOMY FEMORAL Left 10/24/2023    Procedure: ENDARTERECTOMY, FEMORAL;  Surgeon: Chuck Felix MBBS;  Location: [facility] OR    ENHANCE LASER REFRACTIVE BILATERAL EXISTING PT IN PARAMETERS  2000    EYE SURGERY  2000    [facility]    IR OR ANGIOGRAM  10/17/2023    IR OR ANGIOGRAM  10/24/2023    VASCULAR SURGERY  2017    [facility]       Prior to Admission Medications   Prior to Admission Medications   Prescriptions Last Dose Informant Patient Reported? Taking?   ACCU-CHEK SMARTVIEW test strip   No No   Sig: TEST THREE TIMES DAILY OR AS DIRECTED   Omega-3 Fatty Acids (FISH OIL OMEGA-3) 1000 MG CAPS   Yes No   Sig: Take 1,000 mg by mouth daily   acetaminophen (TYLENOL) 325 MG tablet   No No   Sig: Take 3 tablets (975 mg) by mouth every 8 hours Do this for the first few days postoperatively, as pain improves, can transition to taking 1-2 tabs q4-6 hours as needed.   amLODIPine (NORVASC) 10 MG tablet   No No   Sig: Take 1 tablet (10 mg) by mouth daily   apixaban ANTICOAGULANT (ELIQUIS ANTICOAGULANT) 5 MG tablet   No No   Sig: Take 1 tablet (5 mg) by mouth 2 times daily   aspirin 81 MG tablet   Yes No   Sig: Take 81 mg by mouth daily   atorvastatin (LIPITOR) 40 MG tablet   No No   Sig: Take 1 tablet (40 mg) by mouth daily For cholesterol.   blood glucose monitoring (ACCU-CHEK FASTCLIX) lancets   No No   Sig: USE TO TEST THREE TIMES DAILY OR  AS DIRECTED   carvedilol (COREG) 25 MG tablet   Yes No   Sig: Take 25 mg by mouth 2 times daily   cholecalciferol ( VITAMIN D3) 50 MCG (2000 UT) CAPS   Yes No   Sig: Take 2 capsules by mouth daily   cilostazol (PLETAL) 50 MG tablet   Yes No   Sig: Take 100 mg by mouth 2 times daily   donepezil (ARICEPT) 10 MG tablet   No No   Sig: Take 1 tablet (10 mg) by mouth At Bedtime   gabapentin (NEURONTIN) 100 MG capsule   No No   Sig: Take 1 capsule (100 mg) by mouth 3 times daily   isosorbide mononitrate (IMDUR) 30 MG 24 hr tablet   No No   Sig: TAKE 2 TABLETS(60 MG) BY MOUTH DAILY   losartan (COZAAR) 50 MG tablet   No No   Sig: Take 2 tablets (100 mg) by mouth daily   magnesium oxide 200 MG TABS   Yes No   Sig: Take 200 mg by mouth daily   metFORMIN (GLUCOPHAGE XR) 500 MG 24 hr tablet   No No   Sig: TAKE 1 TABLET(500 MG) BY MOUTH TWICE DAILY WITH MEALS   multivitamin (CENTRUM SILVER) tablet   Yes No   Sig: Take 1 tablet by mouth daily   nitroGLYcerin (NITROSTAT) 0.4 MG sublingual tablet   No No   Sig: PLACE 1 TABLET UNDER THE TONGUE EVERY 5 MINUTES FOR 3 DOSES, IF SYMPTOMS PERSIST 5 MINUTES AFTER 1ST DOSE CALL 911   omeprazole (PRILOSEC) 20 MG DR capsule   No No   Sig: Take 1 capsule (20 mg) by mouth daily   oxyCODONE (ROXICODONE) 5 MG tablet   No No   Sig: Take 1-2 tablets (5-10 mg) by mouth every 6 hours as needed for moderate pain   senna-docusate (SENOKOT-S/PERICOLACE) 8.6-50 MG tablet   No No   Sig: Take 1 tablet by mouth 2 times daily While taking narcotic pain medications (oxycodone)   sertraline (ZOLOFT) 25 MG tablet   No No   Sig: TAKE ONE TABLET BY MOUTH DAILY FOR DEPRESSION AND TO HELP APPETITE   spironolactone (ALDACTONE) 25 MG tablet   No No   Sig: Take 1 tablet (25 mg) by mouth daily   tiZANidine (ZANAFLEX) 2 MG tablet   No No   Sig: Take 1 tablet (2 mg) by mouth 3 times daily as needed for muscle spasms      Facility-Administered Medications: None        Review of Systems    The 10 point Review of Systems  is negative other than noted in the HPI or here.      Physical Exam   Vital Signs: Temp: 99.1  F (37.3  C) Temp src: Oral BP: (!) 147/56 Pulse: 82   Resp: 18 SpO2: 100 % O2 Device: None (Room air)    Weight: 0 lbs 0 oz    Physical Exam   Constitutional:   Well nourished, well developed, resting comfortably   Cardiovascular: Regular rate and rhythm without murmurs or gallops  Pulmonary/Chest: Clear to auscultation bilaterally, with no wheezes or retractions. No respiratory distress.  GI: Soft with good bowel sounds.  Non-tender, non-distended, with no guarding, no rebound, no peritoneal signs.   Musculoskeletal:  Amputation, left foot with large blister  Neurological: Alert and oriented to person, place, and time, but forgetful. Nonfocal exam  Psychiatric:  Normal mood and affect.      Medical Decision Making     55 MINUTES SPENT BY ME on the date of service doing chart review, history, exam, documentation & further activities per the note.      Data     I have personally reviewed the following data over the past 24 hrs:    8.0  \   14.5   / 248     137 104 21.9 /  66 (L)   4.4 19 (L) 0.89 \     ALT: 10 AST: 29 AP: 97 TBILI: 0.7   ALB: 3.1 (L) TOT PROTEIN: 6.3 (L) LIPASE: N/A     Procal: N/A CRP: N/A Lactic Acid: 0.8       INR:  1.68 (H) PTT:  78 (H)   D-dimer:  N/A Fibrinogen:  N/A       Imaging results reviewed over the past 24 hrs:   Recent Results (from the past 24 hour(s))   XR Foot Port Left 3 Views    Narrative    EXAM: XR FOOT LT 3 VIEW    DATE: 11/5/2023 6:00 PM    CLINICAL DATA: Pain - Infection? Had midfoot amputation and now with erythema and pain of the midfoot.    COMPARISON: None available    Impression    IMPRESSION: AP, lateral, and oblique images acquired.      Recent transmetatarsal amputation. Mildly heterogeneous density to the overlying soft tissues. Limited sensitivity/specificity for detection of infection.    Advanced vascular calcifications. Degenerative changes.    REPORT SIGNED BY   Ramy Downs   US ART EXTREMITY LOW LT NO RHONDA    Narrative    EXAM:  US ART EXTREMITY LOW LT NO RHONDA    DATE:  11/5/2023 6:12 PM    CLINICAL DATA: 73-year-old male, pain, decreased RHONDA, nonhealing wounds. History of single-vessel runoff to the bilateral feet with occlusion of the bilateral anterior and likely posterior tibial arteries. Long segment occlusion of the stented left superficial femoral artery with reconstitution of the popliteal artery. Severe calcified atherosclerotic disease within the right common femoral artery with moderate to severe stenosis at the bifurcation. Multifocal moderate to severe stenoses of the left common femoral artery with near occlusion versus occlusion just central to the bifurcation. Occlusion of the central aspect of the superior mesenteric artery with distal reconstitution.    COMPARISON: CT angiogram with runoff 8/16/2023.    TECHNIQUE:  Real time grey scale, color flow, and spectral doppler ultrasound of the left lower extremity arterial structures was performed.    FINDINGS:  Terminology:  Degrees of stenosis are graded as mild, moderate and severe. (Semin Vasc Surg. 2013 Jun-Sep;26(2-3):)  Normal (< 20%): Peak systolic velocity < 150 cm/s, ratio <1.5  Mild (20 - 49%): Peak systolic velocity < 200 cm/s, ratio 1.5 - 2.0  Moderate (50 - 75%): Peak systolic velocity 200-300 cm/s, ratio 2.0 - 4.0  Severe (> 75%): Peak systolic velocity > 300 cm/s, ratio > 4.0    Waveforms:  Normal (Triphasic, Biphasic, Sharp monophasic): No significant proximal stenosis  Moderately monophasic: Moderate-severe proximal stenosis  Severely monophasic: Severe proximal stenosis or occlusion    LEFT LOWER EXTREMITY:    Waveforms  /  Velocities (in cm/sec):    External Iliac Artery:  Triphasic. 100    Common Femoral Artery: Triphasic. 184    Profunda Femoral Artery: Monophasic. 19    Proximal SFA: Monophasic. 163    Mid SFA: Monophasic. 60    Distal SFA: Monophasic. 24    Popliteal Artery:  Monophasic. 14    Posterior Tibial Artery: Monophasic. 13    Peroneal Artery: Not visualized.    Anterior Tibial Artery: Not visualized.    Dorsalis Pedis Artery: Not visualized.    Other: Extensive subcutaneous edema.    Impression    IMPRESSION:  1.  Monophasic waveforms throughout the majority of the left lower extremity arterial system compatible with at least moderate obstruction. There is elevated peak systolic velocity within the common femoral artery and proximal superficial femoral artery suggestive of 20 to 49% stenosis. Flow is seen throughout the left superficial femoral artery on the current examination which is in contradistinction to a previously seen occluded stented left superficial femoral artery on angiogram 8/16/2023. Correlation with any interval revascularization recommended. Flow within the popliteal artery is low. There is no flow visualized within the peroneal artery, anterior tibial or dorsalis pedis artery. There is diffuse subcutaneous edema. Traditional angiographic evaluation may be useful for further evaluation as noted on prior runoff examination.    REPORT SIGNED BY DR. Alla Rome   XR Foot Left 2 Views    Narrative    EXAM: XR FOOT LEFT 2 VIEWS  LOCATION: [facility]  DATE: 11/5/2023    INDICATION: pain, concerns for infection  COMPARISON: 10/24/2023      Impression    IMPRESSION: Status post transmetatarsal resection of the distal foot. The resection edge are ill-defined, possibly postsurgical in nature but underlying osteomyelitis is not excluded. Contrast enhancement MRI of the foot with the more helpful. No acute   fracture or dislocation. Significant atherosclerotic calcification seen in the foot.

## 2023-11-07 NOTE — PLAN OF CARE
Vitals:    23 1133 23 1309 23 1312 23 1340   BP: (!) 104/27 (!) 109/33 (!) 109/34 125/40   BP Location: Right arm Right arm  Left arm   Pulse: 65 67 66 64   Resp: 18 16  16   Temp: 98.3  F (36.8  C) 97.9  F (36.6  C)  98  F (36.7  C)   TempSrc: Oral Oral  Oral   SpO2: 99% 99%  100%       Neuro: alert oriented this shift     VS:afebrile, soft BP, sating 100% on room air, non labor breathing, MD paged regarding     B-108 below the parameters     Cardiac: 64    Pain/Nausea: denies nausea, complain of pain, Oxy tylenol and Toradol given with some relief,                          MD paged and notified,    Lines/Drains: PIV left SL, right PIV Zosyn and vancomycin antibiotic, TKO    Incision/Wound: left wound dressing changed, dusky and purple, superficial scab,     GI/: using urinal voiding, and had multiple  large BM     Diet/Appetite: very poor appetite,      Activity: up on recliner chair and back to bed     Plan: ID consult, manage pain and BP, continue with plan of care.

## 2023-11-07 NOTE — PROGRESS NOTES
"   11/07/23 1327   Appointment Info   Signing Clinician's Name / Credentials (PT) AWAIS Mir   Student Supervision Direct supervision provided;Direct Patient Contact Provided;Therapy services provided with the co-signing licensed therapist guiding and directing the services, and providing the skilled judgement and assessment throughout the session   Rehab Comments (PT) WBAT LLE, low BP   Living Environment   People in Home spouse   Current Living Arrangements house   Home Accessibility no concerns   Transportation Anticipated family or friend will provide   Living Environment Comments Pt lives in one level home with no BERNARDO. All needs met on main level. No accessibility concerns. Has shower/tub combo and walk in shower.   Self-Care   Usual Activity Tolerance good   Current Activity Tolerance fair   Regular Exercise No   Equipment Currently Used at Home grab bar, tub/shower;walker, rolling   Fall history within last six months yes   Number of times patient has fallen within last six months 1   Activity/Exercise/Self-Care Comment Pt IND at baseline with ADLs and self cares   General Information   Onset of Illness/Injury or Date of Surgery 11/05/23   Referring Physician Nicolas Figueredo MD   Patient/Family Therapy Goals Statement (PT) return home   Pertinent History of Current Problem (include personal factors and/or comorbidities that impact the POC) Per EMR: \"Darian Engle is a 73 year old male admitted on 11/5/2023. He has a past medical history of PMH  of CAD s/p CAB w/ LIMA and L GSV (2004), HTN, HDL, atrial fibrillation on AC, PAD, dry gangrene, benign cystic neoplasm of the pancreas, TALA not on CPAP, dementia and DM II. He was transferred from [place] for orthopedic and vascular consult. Denies chest pain, abdominal pain, nausea or vomiting.\"   Existing Precautions/Restrictions fall;cardiac   Weight-Bearing Status - LUE full weight-bearing   Weight-Bearing Status - RUE full weight-bearing "   Weight-Bearing Status - LLE weight-bearing as tolerated   Weight-Bearing Status - RLE full weight-bearing   Cognition   Affect/Mental Status (Cognition) confused;low arousal/lethargic   Orientation Status (Cognition) oriented to;person;place   Follows Commands (Cognition) delayed response/completion;repetition of directions required   Pain Assessment   Patient Currently in Pain Yes, see Vital Sign flowsheet   Integumentary/Edema   Integumentary/Edema other (describe)   Integumentary/Edema Comments L LE wrapped, clean, dry, and intact   Posture    Posture Forward head position;Protracted shoulders;Kyphosis   Range of Motion (ROM)   Range of Motion ROM is WFL   Strength (Manual Muscle Testing)   Strength (Manual Muscle Testing) strength is WFL   Bed Mobility   Bed Mobility sit-supine   Sit-Supine Sharon (Bed Mobility) minimum assist (75% patient effort);1 person assist   Bed Mobility Limitations decreased ability to use legs for bridging/pushing   Impairments Contributing to Impaired Bed Mobility impaired balance;pain;decreased strength   Assistive Device (Bed Mobility) bed rails   Transfers   Transfers sit-stand transfer   Maintains Weight-bearing Status (Transfers) able to maintain   Transfer Safety Concerns Noted decreased sequencing ability   Impairments Contributing to Impaired Transfers impaired balance;pain;decreased strength   Sit-Stand Transfer   Sit-Stand Sharon (Transfers) moderate assist (50% patient effort);1 person assist;verbal cues   Assistive Device (Sit-Stand Transfers) walker, front-wheeled   Gait/Stairs (Locomotion)   Sharon Level (Gait) minimum assist (75% patient effort);1 person assist   Assistive Device (Gait) walker, front-wheeled   Distance in Feet (Gait) 3'   Pattern (Gait) other (see comments)   Comment, (Gait/Stairs) Pt demonstrated hopping gait with 4WW d/t pain in L foot   Balance   Sitting Balance: Static good balance   Sitting Balance: Dynamic fair balance    Standing Balance: Static poor balance   Standing Balance: Dynamic poor balance   Sensory Examination   Sensory Perception patient reports no sensory changes   Clinical Impression   Criteria for Skilled Therapeutic Intervention Yes, treatment indicated   PT Diagnosis (PT) impaired functional mobility   Influenced by the following impairments pain, WBAT L LE, decreased strength, balance, and activity tolerance   Functional limitations due to impairments bed mobility, transfers, ambulation   Clinical Presentation (PT Evaluation Complexity) evolving   Clinical Presentation Rationale per clinical judgement   Clinical Decision Making (Complexity) moderate complexity   Planned Therapy Interventions (PT) balance training;bed mobility training;gait training;home exercise program;manual therapy techniques;neuromuscular re-education;orthotic fitting/training;patient/family education;postural re-education;prosthetic fitting/training;ROM (range of motion);strengthening;stretching;transfer training;progressive activity/exercise   Risk & Benefits of therapy have been explained evaluation/treatment results reviewed;care plan/treatment goals reviewed;risks/benefits reviewed;current/potential barriers reviewed;participants voiced agreement with care plan;participants included;patient   PT Total Evaluation Time   PT Eval, Moderate Complexity Minutes (19117) 8   Physical Therapy Goals   PT Frequency Daily   PT Predicted Duration/Target Date for Goal Attainment 12/06/23   PT Goals Bed Mobility;Transfers;Gait   PT: Bed Mobility Independent;Supine to/from sit;Within precautions   PT: Transfers Modified independent;Sit to/from stand;Bed to/from chair   PT: Gait Modified independent;50 feet;Within precautions;Assistive device   Therapeutic Activity   Therapeutic Activities: dynamic activities to improve functional performance Minutes (52889) 8   Symptoms Noted During/After Treatment Increased pain;Significant change in vital signs    Treatment Detail/Skilled Intervention Pt in recliner upon arrival, agreeable to therapy, ok'd per RN with note of low BP. Pt demonstrated low arousal and alertness on this day. HR and O2 stable. BP in recliner 119/45 (72). Instructed pt to perform ankle pumps. Pt performed R ankle pumps only d/t pain in L foot. Pt required constant cueing both verbal and visual for ankle pumps and to continue ankle pumps. After ankle pumps BP in recliner: 110/25 (60). RN called to room. Pt sit>stand to FWW with ModA. Pt ambulated 3' using hopping technique with FWW to bed. Pt Kendra stand>sit for controlled descent. Pt ModAx1 at LE for sit>supine. Pt placed in trendelenberg in bed. RN entered room and paged medical team. Pt BP in trendelenber/33 then 125/40 after 2 minutes. Session terminated, pt trendelenberg in bed with RN in room.   PT Discharge Planning   PT Plan monitor BP, trial knee scooter for ambulation, transfers   PT Discharge Recommendation (DC Rec) Transitional Care Facility   PT Rationale for DC Rec Pt requires up to ModAx1 for bed mobility, transfers, and ambulation. Pt has spouse at home but unable to provide level of assist for pt's current need. Recommend TCU to improve strength, activity tolerance, balance, and relearn ambulation s/p amputation.   PT Brief overview of current status up to ModAx1   Total Session Time   Timed Code Treatment Minutes 8   Total Session Time (sum of timed and untimed services) 16

## 2023-11-07 NOTE — CONSULTS
[facility] ID SERVICE: NEW CONSULTATION    Patient:  Darian Engle, Date of birth 1950, Medical record number [id number]  Date of Admission: 11/5/2023  Date of Visit:  11/7/2023  Requesting Provider: Mariano Dutton         Assessment and Recommendations:     ID Problem List:  1. MSSA bacteremia  2. Left foot post-surgical site infection - presumed MSSA infection  3. PAD  4. A-fib on AC    Recommendations:  -- Stop vancomycin and pip/tazo  -- Start IV cefazolin 2g Q8H   -- Diagnostics:   a. TTE for evaluation of endocarditis, would consider EVER later in hospital admission depending on clinical course  b. Daily Bcx until negative for 48-72 hours  c. Recommend holding off on MRI of left foot for now      Discussion:  72 yo M with recent history of gangrenous left leg s/p transmetatarsal amputation on 10/27/2023 who now presents with Staph Aureus bacteremia and elevated inflammatory markers (). Suspect that the source of his MSSA bacteremia is left foot post-surgical soft tissue infection given his clinical course and that the patient was noncompliant following amputation (left TCU AMA shortly after his surgery). Overall lower suspicion for infective endocarditis given he does not have established risk factors other than current MSSA bacteremia (no IVDU, prior heart valve surgery, prolonged bacteremia). However, given soft murmur on exam (unclear if this is new finding, mild aortic regurgitation was noted on 09/21 echo), would recommend TTE for now and could consider EVER if his bacteremia fails to resolve. Also considering the possibility of Left foot osteomyelitis as possible source for MSSA bacteremia. I think acute left foot osteomyelitis is less likely at this time given acuity of infection and recent surgical pathology margins which did not show evidence of infection. Given that he is clinically stable, without new fevers/chills, and with known positive MSSA blood cultures, it would be  appropriate to stop the vancomycin and piperacillin-tazobactam and treat with MSSA directed therapy with cefazolin. Will monitor for any worsening of soft tissue infection after stopping pip/tazo. Vascular surgery is following and currently evaluating whether to proceed with conservative management vs BKA.       Thank you for this consult. ID team will continue to follow this patient. Please reach out if any questions or concerns.   Patient was seen and staffed with Dr. Hayden.    CHAPIN Cevallos  [employer]     Patient was seen by both CHAPIN Cevallos and Yariel Hayden MD (attending physician). I independently confirmed the examination and medical decision making. I agree with the assessment and plan of care as documented in the note. I personally reviewed vital signs, medications, labs and imaging.     Yariel Haydne MD  Infectious Diseases  [phone]       History of Present Illness:     Darian Engle is a 72 yo M with PMHx of PMH of CAD s/p CAB w/ LIMA and L GSV (2004), HTN, HDL, atrial fibrillation on AC, PAD, dry gangrene, benign cystic neoplasm of the pancreas, TALA not on CPAP, dementia and DM II. Per chart review, the patient was admitted from 10/17/23 to 10/30/23 for gangrenous left leg, and underwent left common femoral endarterectomy and profundoplasty, sheath placement drug-coated balloon angioplasty on 10/24/2023. 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 He was discharged to rehab facility on 10/30, but left AMA after he felt that he was waiting in the lobby too long.  He was then admitted to [facility] from 10/31/2023 to 11/1/23 due to weakness, and discharged to TCU, went back to [place] 11/5/23 and he was transferred to the [facility].  On 11/05 his Bcx were positive for Staph Aureus and he was started on Vancomycin. X-ray of foot showing no new fractures but cannot rule out osteomyelitis. Ortho recommending against  advanced imaging. Has been afebrile since admission. WBC 8 on admission.     Today, he states that he feels very fatigued and sleepy. Denies new fever, chills, or night sweats. Not experiencing new pains -- no chest pain, abdominal pain, bone pain, or headaches. No history of prosthetic heart valves or joint replacement.          Review of Systems:     Complete 12 point review of systems negative except as noted in HPI.         Recent Antimicrobials::   Zosyn  - Present  Vancomysin  - Present        Past Medical History:     Past Medical History:   Diagnosis Date     Arthritis 2018    joint pain both hands     Diabetes (H)      Heart disease     [place]-angioplasty     Hypertension          Allergies:      Allergies   Allergen Reactions     Lidocaine Other (See Comments)     Lungs filled with fluid. ? Anaphylaxis     Lisinopril Cough     cough     Naltrexone Nausea and Vomiting          Family History:   Reviewed and noncontributory.   Family History   Problem Relation Age of Onset     Glaucoma Mother      Macular Degeneration Mother      Macular Degeneration Other      Lung Cancer Brother           Social History:     Social History     Socioeconomic History     Marital status:      Spouse name: Not on file     Number of children: Not on file     Years of education: Not on file     Highest education level: Not on file   Occupational History     Not on file   Tobacco Use     Smoking status: Every Day     Packs/day: 0.50     Years: 5.00     Additional pack years: 0.00     Total pack years: 2.50     Types: Cigarettes     Start date: 1968     Last attempt to quit: 7/15/2016     Years since quittin.3     Smokeless tobacco: Former   Vaping Use     Vaping Use: Never used   Substance and Sexual Activity     Alcohol use: Yes     Comment: binge, two months sober     Drug use: Yes     Types: Marijuana     Comment: uses Marijuana for pain     Sexual activity: Not Currently     Partners:  Female   Other Topics Concern     Parent/sibling w/ CABG, MI or angioplasty before 65F 55M? Yes     Comment: brother   Social History Narrative     Not on file     Social Determinants of Health     Financial Resource Strain: Low Risk  (10/31/2023)    Financial Resource Strain      Within the past 12 months, have you or your family members you live with been unable to get utilities (heat, electricity) when it was really needed?: No   Food Insecurity: Low Risk  (10/31/2023)    Food Insecurity      Within the past 12 months, did you worry that your food would run out before you got money to buy more?: No      Within the past 12 months, did the food you bought just not last and you didn t have money to get more?: No   Transportation Needs: Low Risk  (10/31/2023)    Transportation Needs      Within the past 12 months, has lack of transportation kept you from medical appointments, getting your medicines, non-medical meetings or appointments, work, or from getting things that you need?: No   Physical Activity: Not on file   Stress: Not on file   Social Connections: Not on file   Interpersonal Safety: Not on file   Housing Stability: Low Risk  (10/31/2023)    Housing Stability      Do you have housing? : Yes      Are you worried about losing your housing?: No            Physical Exam:     /40 (BP Location: Left arm)   Pulse 64   Temp 98  F (36.7  C) (Oral)   Resp 16   SpO2 100%      Exam:  GENERAL:  Sitting in bed; frequently drifting asleep mid conversation  Head: normocephalic, atraumatic.   EYES: PERRLA, EOMI, conjunctiva clear, anicteric sclerae.    ENT:  Oropharynx is moist without exudates or ulcers.  NECK:  Supple.  LUNGS:  Breathing comfortably, on room air, clear to auscultation bilaterally, no w/r/r.  CARDIOVASCULAR:  Regular rate and rhythm, Soft murmur at the right upper sternal border  ABDOMEN:  Normal bowel sounds, soft, nontender. No appreciable hepatosplenomegaly.   : no suprapubic or flank  tendterness.   EXT: Extremities warm and without edema.  SKIN:  No acute rashes.    NEUROLOGIC:  Grossly nonfocal.     Labs, Microbiology and Imaging studies are reviewed.          Laboratory Data:   Metabolic Studies       Recent Labs   Lab Test 11/07/23  1116 11/07/23  0753 11/07/23  0559 11/06/23  0734 11/06/23  0554 11/06/23  0540 11/05/23  2203 10/25/23  0919 10/25/23  0915 10/17/23  1854 10/17/23  1455   NA  --   --  136  --  137  --  137   < >  --    < >  --    POTASSIUM  --   --  4.3  --  4.3  --  4.4   < >  --    < >  --    CHLORIDE  --   --  105  --  104  --  104   < >  --    < >  --    CO2  --   --  19*  --  21*  --  19*   < >  --    < >  --    ANIONGAP  --   --  12  --  12  --  14   < >  --    < >  --    BUN  --   --  17.8  --  18.8  --  21.9   < >  --    < >  --    CR  --   --  0.86  --  0.77  --  0.89   < >  --    < > 0.65*   GFRESTIMATED  --   --  >90  --  >90  --  >90   < >  --    < > >90   GLC 79   < > 63*   < > 79   < > 66*   < >  --    < >  --    A1C  --   --   --   --   --   --   --   --   --   --  5.5   SHERYL  --   --  8.7*  --  8.7*  --  8.8   < >  --    < >  --    PHOS  --   --   --   --   --   --  3.0   < >  --    < >  --    MAG  --   --   --   --   --   --  1.5*   < >  --    < > 1.4*   LACT  --   --   --   --   --   --  0.8  --  0.8   < >  --    PCAL  --   --   --   --   --   --  0.08*  --   --   --   --     < > = values in this interval not displayed.       Hepatic Studies    Recent Labs   Lab Test 11/06/23  0554 11/05/23 2203 02/21/23  1112   BILITOTAL 0.5 0.7  --    ALKPHOS 87 97  --    PROTTOTAL 6.2* 6.3*  --    ALBUMIN 3.1* 3.1* 3.4   AST 29 29  --    ALT 9 10  --        Hematology Studies      Recent Labs   Lab Test 11/07/23  0016 11/06/23  0932 11/05/23  2203 10/30/23  0606 10/29/23  0424 10/28/23  1303 06/03/21  1747 02/03/17  1551   WBC 11.9* 14.1* 8.0 8.1 7.4 9.4   < > 9.8   ANEU  --   --   --   --   --   --   --  6.0   ALYM  --   --   --   --   --   --   --  2.6   SARAH  --   --    "--   --   --   --   --  0.9   AEOS  --   --   --   --   --   --   --  0.2   HGB 7.7* 8.9* 14.5 9.8* 10.0* 9.4*   < > 13.9   HCT 23.9* 26.6* 43.6 29.4* 30.0* 28.7*   < > 41.3    389 248 325 308 286   < > 256    < > = values in this interval not displayed.       Medication levels  No lab results found.    Invalid input(s): \"AMIK\"    Inflammatory Markers    Recent Labs   Lab Test 11/06/23  0554   SED 51*       Body fluid stats  No lab results found.    Microbiology:  Culture   Date Value Ref Range Status   11/06/2023 No growth after 12 hours  Preliminary   11/06/2023 No growth after 12 hours  Preliminary   11/05/2023 Positive on the 1st day of incubation (A)  Preliminary   11/05/2023 Staphylococcus aureus (AA)  Preliminary     Comment:     1 of 2 bottles   11/05/2023 No growth after 1 day  Preliminary       Last Culture results with specimen source  Culture Micro   Date Value Ref Range Status   08/02/2019 >100,000 colonies/mL  Staphylococcus epidermidis   (A)  Final    Specimen Description   Date Value Ref Range Status   08/02/2019 Midstream Urine  Final        Last check of C difficile  No results found for: \"CDBPCT\"    Urine Studies     Recent Labs   Lab Test 08/02/19  0849   URINEPH 6.0   NITRITE Positive*   LEUKEST Negative   WBCU 5-10*         Imaging:  Recent Results (from the past 48 hour(s))   XR Foot Port Left 3 Views    Narrative    EXAM: XR FOOT LT 3 VIEW    DATE: 11/5/2023 6:00 PM    CLINICAL DATA: Pain - Infection? Had midfoot amputation and now with erythema and pain of the midfoot.    COMPARISON: None available    Impression    IMPRESSION: AP, lateral, and oblique images acquired.      Recent transmetatarsal amputation. Mildly heterogeneous density to the overlying soft tissues. Limited sensitivity/specificity for detection of infection.    Advanced vascular calcifications. Degenerative changes.    REPORT SIGNED BY DR. Ramy Downs   US ART EXTREMITY LOW LT NO RHONDA    Narrative    EXAM:  US ART " EXTREMITY LOW LT NO RHONDA    DATE:  11/5/2023 6:12 PM    CLINICAL DATA: 73-year-old male, pain, decreased RHONDA, nonhealing wounds. History of single-vessel runoff to the bilateral feet with occlusion of the bilateral anterior and likely posterior tibial arteries. Long segment occlusion of the stented left superficial femoral artery with reconstitution of the popliteal artery. Severe calcified atherosclerotic disease within the right common femoral artery with moderate to severe stenosis at the bifurcation. Multifocal moderate to severe stenoses of the left common femoral artery with near occlusion versus occlusion just central to the bifurcation. Occlusion of the central aspect of the superior mesenteric artery with distal reconstitution.    COMPARISON: CT angiogram with runoff 8/16/2023.    TECHNIQUE:  Real time grey scale, color flow, and spectral doppler ultrasound of the left lower extremity arterial structures was performed.    FINDINGS:  Terminology:  Degrees of stenosis are graded as mild, moderate and severe. (Semin Vasc Surg. 2013 Jun-Sep;26(2-3):)  Normal (< 20%): Peak systolic velocity < 150 cm/s, ratio <1.5  Mild (20 - 49%): Peak systolic velocity < 200 cm/s, ratio 1.5 - 2.0  Moderate (50 - 75%): Peak systolic velocity 200-300 cm/s, ratio 2.0 - 4.0  Severe (> 75%): Peak systolic velocity > 300 cm/s, ratio > 4.0    Waveforms:  Normal (Triphasic, Biphasic, Sharp monophasic): No significant proximal stenosis  Moderately monophasic: Moderate-severe proximal stenosis  Severely monophasic: Severe proximal stenosis or occlusion    LEFT LOWER EXTREMITY:    Waveforms  /  Velocities (in cm/sec):    External Iliac Artery:  Triphasic. 100    Common Femoral Artery: Triphasic. 184    Profunda Femoral Artery: Monophasic. 19    Proximal SFA: Monophasic. 163    Mid SFA: Monophasic. 60    Distal SFA: Monophasic. 24    Popliteal Artery: Monophasic. 14    Posterior Tibial Artery: Monophasic. 13    Peroneal Artery: Not  visualized.    Anterior Tibial Artery: Not visualized.    Dorsalis Pedis Artery: Not visualized.    Other: Extensive subcutaneous edema.    Impression    IMPRESSION:  1.  Monophasic waveforms throughout the majority of the left lower extremity arterial system compatible with at least moderate obstruction. There is elevated peak systolic velocity within the common femoral artery and proximal superficial femoral artery suggestive of 20 to 49% stenosis. Flow is seen throughout the left superficial femoral artery on the current examination which is in contradistinction to a previously seen occluded stented left superficial femoral artery on angiogram 8/16/2023. Correlation with any interval revascularization recommended. Flow within the popliteal artery is low. There is no flow visualized within the peroneal artery, anterior tibial or dorsalis pedis artery. There is diffuse subcutaneous edema. Traditional angiographic evaluation may be useful for further evaluation as noted on prior runoff examination.    REPORT SIGNED BY DR. Alla Rome   XR Foot Left 2 Views    Narrative    EXAM: XR FOOT LEFT 2 VIEWS  LOCATION: [facility]  DATE: 11/5/2023    INDICATION: pain, concerns for infection  COMPARISON: 10/24/2023      Impression    IMPRESSION: Status post transmetatarsal resection of the distal foot. The resection edge are ill-defined, possibly postsurgical in nature but underlying osteomyelitis is not excluded. Contrast enhancement MRI of the foot with the more helpful. No acute   fracture or dislocation. Significant atherosclerotic calcification seen in the foot.

## 2023-11-07 NOTE — PROGRESS NOTES
Occupational Therapy Evaluation 11/07/23 0900   Appointment Info   Signing Clinician's Name / Credentials (OT) STEFANI Bhatia   Student Supervision On-site supervision provided       Present no   Living Environment   People in Home spouse   Current Living Arrangements house   Home Accessibility no concerns   Transportation Anticipated family or friend will provide   Living Environment Comments Pt lives in a one level home with wife and does not have any acessibility concerns. Pt has both a tub shower  and a walk in shower.   Self-Care   Usual Activity Tolerance good   Current Activity Tolerance fair   Regular Exercise No   Equipment Currently Used at Home grab bar, tub/shower;walker, rolling   Fall history within last six months yes   Number of times patient has fallen within last six months 1   Activity/Exercise/Self-Care Comment Pt is IND with ADLs at baseline   Instrumental Activities of Daily Living (IADL)   Previous Responsibilities meal prep;housekeeping;laundry;medication management   IADL Comments Pt reports IND with IADLs at baseline and shares responsibilities with wife as needed.   General Information   Onset of Illness/Injury or Date of Surgery 11/05/23   Referring Physician Nicolas Figueredo MD   Patient/Family Therapy Goal Statement (OT) Pt would like to d/c home.   Additional Occupational Profile Info/Pertinent History of Current Problem Darian Engle is a 73 year old male admitted on 11/5/2023. He has a past medical history of PMH  of CAD s/p CAB w/ LIMA and L GSV (2004), HTN, HDL, atrial fibrillation on AC, PAD, dry gangrene, benign cystic neoplasm of the pancreas, TALA not on CPAP, dementia and DM II. He was transferred from [place] for orthopedic and vascular consult. Denies chest pain, abdominal pain, nausea or vomiting.   Existing Precautions/Restrictions fall;weight bearing  (WBAT LLE)   Limitations/Impairments safety/cognitive   Left Upper Extremity  (Weight-bearing Status) full weight-bearing (FWB)   Right Upper Extremity (Weight-bearing Status) full weight-bearing (FWB)   Left Lower Extremity (Weight-bearing Status) weight-bearing as tolerated (WBAT)   Right Lower Extremity (Weight-bearing Status) full weight-bearing (FWB)   General Observations and Info Pt greeted supine in bed and agreeable to therapy.   Cognitive Status Examination   Orientation Status person;place   Cognitive Status Comments Pt oriented to place and person, however was unable to recall the date. Pt reports concerns with cognition and confusion at baseline.   Visual Perception   Visual Impairment/Limitations WFL;corrective lenses full-time   Sensory   Sensory Quick Adds left LE   Sensory Comments Pt reports LLE N/T at baseline.   Pain Assessment   Patient Currently in Pain Yes, see Vital Sign flowsheet   Range of Motion Comprehensive   General Range of Motion no range of motion deficits identified   Comment, General Range of Motion BUE ROM WFL   Strength Comprehensive (MMT)   General Manual Muscle Testing (MMT) Assessment other (see comments)   Comment, General Manual Muscle Testing (MMT) Assessment Generalized weakness   Transfers   Transfers sit-stand transfer   Sit-Stand Transfer   Sit-Stand [place] (Transfers) minimum assist (75% patient effort)   Sit/Stand Transfer Comments min A + 4WW   Balance   Balance Assessment no deficits identified   Balance Comments Pt ambulated ~5ft with 4WW with left knee rested on 4WW chair without any LOB/SOB   Activities of Daily Living   BADL Assessment/Intervention lower body dressing;grooming;toileting   Bathing Assessment/Intervention   [place] Level (Bathing) minimum assist (75% patient effort);verbal cues   Comment, (Bathing) Per clinical judgement   Upper Body Dressing Assessment/Training   [place] Level (Upper Body Dressing) verbal cues;minimum assist (75% patient effort)   Comment, (Upper Body Dressing) Per clinical judgement    Lower Body Dressing Assessment/Training   [place] Level (Lower Body Dressing) maximum assist (25% patient effort);verbal cues   Grooming Assessment/Training   [place] Level (Grooming) verbal cues;minimum assist (75% patient effort)   Comment, (Grooming) Per clinical judgement   Toileting   [place] Level (Toileting) verbal cues;minimum assist (75% patient effort)   Clinical Impression   Criteria for Skilled Therapeutic Interventions Met (OT) Yes, treatment indicated   OT Diagnosis Decreased activity tolerance with ADL completion and functional mobility primarily limited by LLE pain.   Influenced by the following impairments LLE pain and generalized weakness.   OT Problem List-Impairments impacting ADL problems related to;activity tolerance impaired;cognition;pain;strength   Assessment of Occupational Performance 1-3 Performance Deficits   Planned Therapy Interventions (OT) ADL retraining;IADL retraining;cognition;strengthening;home program guidelines;progressive activity/exercise   Clinical Decision Making Complexity (OT) problem focused assessment/low complexity   Risk & Benefits of therapy have been explained evaluation/treatment results reviewed;care plan/treatment goals reviewed;patient   OT Total Evaluation Time   OT Eval, Low Complexity Minutes (05371) 9   OT Goals   Therapy Frequency (OT) 5 times/week   OT Predicted Duration/Target Date for Goal Attainment 11/14/23   OT: Hygiene/Grooming modified independent;from wheelchair   OT: Lower Body Dressing Independent;including set-up/clothing retrieval   OT: Transfer Modified independent   OT: Toilet Transfer/Toileting Modified independent;toilet transfer;cleaning and garment management   OT: Cognitive Patient/caregiver will verbalize understanding of cognitive assessment results/recommendations as needed for safe discharge planning   Self-Care/Home Management   Self-Care/Home Mgmt/ADL, Compensatory, Meal Prep Minutes (63390) 26   Symptoms Noted  During/After Treatment (Meal Preparation/Planning Training) increased pain   Treatment Detail/Skilled Intervention Facilitated increased IND with ADL completion. Pt greeted EOB, with nursing staff present, agreeable to therapy. Pt BP before activity was 114/60. Pt donned gown with SBA. Pt donned RLE sock with SBA and required max A with donning LLE sock. Pt ambulated to/from bathroom with CGA+ 4WW with left knee resting on 4WW seat without any LOB/SOB. Pt completed toileting/pericares with SBA however required max A with donning/doffing briefs. Pt required CGA using GB for toilet transfers.   Therapeutic Activities   Treatment Detail/Skilled Intervention OT facilitated functional transfers to progress activity tolerance. Pt required min A for sit<>stand with 4WW. Pt c/o of dizziness and sat back down. /39. Pt completed SPT from bed > recliner with Min A using 4WW, rested L knee on 4WW seat 2/2 pain with WB. Pt was slightly confused during session today, and thought it was lunch time at 0900. Pt left seated in recliner with call light available and chair alarm on.   Therapeutic Activity Minutes (95588) 10   Symptoms noted during/after treatment increased pain   OT Discharge Planning   OT Plan monitor cog, standing ADLs, LB dressing, toileting   OT Discharge Recommendation (DC Rec) Transitional Care Facility   OT Rationale for DC Rec Pt is currently CGA+4WW for functional mobility and max A for LB dressing d/t LLE pain. Pt has decreased activity tolerance with ADL completion and functional mobility primarily limited by LLE pain and cognitive concerns, Would recommend TCU at this time to increase ADL independence and home safety.   OT Brief overview of current status CGA-Min A + 4WW for functional mobility and max A for LB dressing   Total Session Time   Timed Code Treatment Minutes 36   Total Session Time (sum of timed and untimed services) 45

## 2023-11-07 NOTE — PROGRESS NOTES
Orthopedic Surgery Progress Note 11/07/2023    Subjective:  NAEON. Pain well controlled. Denies fever or chills.     Objective:  Temp: 98.1  F (36.7  C) Temp src: Oral BP: 121/41 Pulse: 67   Resp: 17 SpO2: 99 % O2 Device: None (Room air)      Exam:  Gen: No acute distress, resting comfortably in bed. Alert and oriented, responds to questions appropriately.  Resp: Non-labored on RA.  MSK:  LLE:  - Dressings c/d/i  - SILT diminished in tibial/sural/saphenous/DP/SP nerves  - Fires quad, TA, EHL, FHL, GaSC  - PT/DP pulses 2+, foot wwp    Recent Labs   Lab 11/07/23  0016 11/06/23  0932 11/05/23  2203   WBC 11.9* 14.1* 8.0   HGB 7.7* 8.9* 14.5    389 248     Erythrocyte Sedimentation Rate   Date Value Ref Range Status   11/06/2023 51 (H) 0 - 20 mm/hr Final     CRP: 110>104>108    Assessment: Darian Engle is a 73 year old male with DM2, peripheral artery disease, dry gangrene, CAD, paroxysmal atrial fibrillation on chronic anticoagulation, malnutrition who has been admitted for evaulation and management of left foot blistering wound adjacent to incision s/p left foot transmetatarsal amputation on 10/27/2023.     Plan:  Continue to trend WBC, CRP    Orthopedics will continue to monitor the patient peripherally. Please reach out to our team if questions or concerns arise.    Plan:  Medicine Primary, appreciate cares  Activity: WBAT and ROM as tolerated.   DVT PPx: Per primary.   Antibiotics: Recommend broad spectrum IV antibiotic for possible infection  Labs: Follow inflammatory labs; repeat WBC, CRP  until trending down.  Imaging: Plain films of left foot (completed). No further imaging needed at this time. Recommend against advanced imaging at this time.  Dispo: Per Primary team. No current plans for OR.  Follow Up:  11/13 with orthopedic nurse for wound check    --  Lui Haddad MD  Orthopedic Surgery PGY1  [phone]    Please page me directly with any questions/concerns during regular weekday hours  before 5 pm. If there is no response, if it is a weekend, or if it is during evening hours then please page the orthopedic surgery resident on call.     Future Appointments   Date Time Provider Department [place]   11/7/2023 10:30 AM Danielle Jamison OTR [facility] O   11/7/2023  4:00 PM Clarita Pollard, PT [facility] O   11/13/2023 12:00 PM Nurse, [facility] Ortho [facility]   11/28/2023 10:20 AM [email or web address]   11/28/2023 11:20 AM [facility]   11/28/2023  1:00 PM Roxann Fontanez APRN CNP [facility]   1/17/2024  9:30 AM Schuyler Franco,  [facility]

## 2023-11-07 NOTE — PLAN OF CARE
/43 (BP Location: Right arm)   Pulse 69   Temp 98  F (36.7  C) (Oral)   Resp 18   SpO2 100%     Patient slept on/off through the night. Disoriented to time. Bed alarm On. Agitated at beginning of shift and was stating he was going to leave AMA. Patient is redirectable. LLE metatarsal amputation-dressing CDI. Pain managed with PRN oxycodone, scheduled tylenol, and PRN tizanidine. PIV SL and PIV infusing intermittent ABX. Occasional incontinence of bowel and bladder. Uses commode and bedside urinal. Called appropriately-intermittently.     Goal Outcome Evaluation: Ongoing.       Plan of Care Reviewed With: patient    Overall Patient Progress: no change            178

## 2023-11-07 NOTE — PROGRESS NOTES
[facility]  Vascular Surgery Attending  Pt seen and examined with wife at bedside.    14 Days postop: Left CFA Endarterectomy & Atherectomy SFA, popliteal and tibioperoneal trunk.    11 days postop Left  TMA.    C/o left foot pain- worse in distal foot.  Note pt was in rehab and left to go home due to wife concern with level of care at facility.    Noted blister on foot and returned.    O/E    Left groin: clean, dry and intact with sutures in place. No erythema  Palpable femoral pulse    Left TMA Site: Duskiness of entire suture line. No duskiness of plantar surface, Note area oif duskiness on dorsal surface of foot in continuuity with suture line. ?Underlying ecchymosis  No erythema of suture line.  (Note picture in media from yesterday)- appears unchanged from today.    ADMISSION WBC 14  down to 11.9 today.  Positive blood cultures  from 11/5. (Staph Aureus) and negative 11/6 cultures    On apixaban and vancomycin    nOTE 10/5 ARTERIAL DUPLEX.    A/P  S/p rEVASCULARISTAION AND tma  At this point his TMA incision has not completely broken down and infarcted....... note area of duskiness (blunt knock to area).  ....  Given this localised finding,  his TMA is not completely ischemic with no hope of healing. At this point for a limb salvage option, he should have a trial of hyperbaric oxygen.  Continue IV Antibiotics. Clinically his TMA site does not appear infected and I would not advise a guillotine / open amputation for possible source control.    I discussed with the patient and his wife about the option of continuing to pursue limb salvage:  - this would invlove conservative measures with iv antibiotics and hyperbaric oxygen. If this is not available at [facility], then possibility of OP option at outside facility woiuld have to be looked into. If hyperbarics is not feasible then further time would be needed before declaring the foot non-salvageable at this point.  - The other option is major amputation AKA /  LUCIA.  aT THIS POINT, THEY WISH To continue to pursue limb salvage.    I discussed with hospitalist

## 2023-11-07 NOTE — PROVIDER NOTIFICATION
7B Darian Engle,     Lab called and is seeking clarification on CBC lab-ordered as STAT and also timed at 0654. Do you need this lab STAT?   Thanks,     Mariela RIOS -[phone]     Paged: Erlinda Morales MD

## 2023-11-07 NOTE — PROGRESS NOTES
[facility]    Medicine Progress Note - Hospitalist Service, GOLD TEAM 8    Date of Admission:  11/5/2023    Assessment & Plan    Darian Engle is a 73 year old male admitted on 11/5/2023. He has a past medical history of PMH  of CAD s/p CAB w/ LIMA and L GSV (2004), HTN, HDL, atrial fibrillation on AC, PAD, dry gangrene, benign cystic neoplasm of the pancreas, TALA not on CPAP, dementia and DM II. He was transferred from [place] for orthopedic and vascular consult. Denies chest pain, abdominal pain, nausea or vomiting.      Today's plan   -Spoke to vascular surgeon and they wanted the patient to be treated conservatively with antibiotics and hyperbaric oxygen on discharge  -May consult ID to determine if antibiotics would be sufficient to address the infection or source control would be necessary?  -Added iv ketorolac prn for pain control   -Ordered TTE to rule out vegetations      Left foot infection  Admitted from 10/17/23 to 10/30/23 for gangrenous left leg, and underwent left common femoral endarterectomy and profundoplasty, sheath placement drug-coated balloon angioplasty on 10/24/2023. 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 He was discharged to rehab facility on 10/30, but left AMA after he felt that he was waiting in the lobby too long.  He was then admitted to [facility] from 10/31/2023 to 11/1/23 due to weakness, and discharged to TCU, went back to [place] 11/5/23 and he was transferred to the [facility]. WBC 8 and he is afebrile. He received 1000 ml bolus and started on vancomycin. X-ray of foot showing no new fractures but possible osteomyelitis.  - Ortho consult  - Vascular consult  - Follow blood cultures  - Continue vancomycin   - Oxy and tylenol for pain  -Added ketorolac for breakthrough pain      PAD  HTN  Atrial fibrillation on AC  MR cardiac 10/18/23 shows left ventricle is normal in cavity size and  moderate concentric hypertrophy and LVEF is 67%. EKGs have shown SR. Wife was called for med rec. Patient was sent with MAR from facility.  - PTA amlodipine, apixaban, asa, atorvastatin, coreg, pletal, losartan, spirolactone, and isosorbide.      DM II  Last A1C 5.5 on 11/6/23  - Hold metformin  - Medium sliding scale     Dementia  Answers question well, but forgetful and does not know medical history well  - PTA Aricept     Anemia  History of anemia with Hbg around 10. He was 14.5 on admission  - CBC          Diet: Moderate Consistent Carb (60 g CHO per Meal) Diet    DVT Prophylaxis: Apixaban for dvt ppx   [place] Catheter: Not present  Lines: None     Cardiac Monitoring: None  Code Status: Full Code      Clinically Significant Risk Factors            # Hypomagnesemia: Lowest Mg = 1.5 mg/dL in last 2 days, will replace as needed   # Hypoalbuminemia: Lowest albumin = 3.1 g/dL at 11/6/2023  5:54 AM, will monitor as appropriate  # Coagulation Defect: INR = 1.68 (Ref range: 0.85 - 1.15) and/or PTT = 78 Seconds (Ref range: 22 - 38 Seconds), will monitor for bleeding    # Hypertension: Noted on problem list     # Dementia: noted on problem list        # Financial/Environmental Concerns:           Disposition Plan      Expected Discharge Date: 11/10/2023                    Mariano Dutton MD  Hospitalist Service, [address]  Securely message with [employer] (more info)  Text page via [facility] Paging/Directory   See signed in provider for up to date coverage information  ______________________________________________________________________    Interval History   Patient was comfortable, no new abdominal pain, difficulty breathing, fevers or malaise     Physical Exam   Vital Signs: Temp: 98.3  F (36.8  C) Temp src: Oral BP: (!) 104/27 Pulse: 65   Resp: 18 SpO2: 99 % O2 Device: None (Room air)    Weight: 0 lbs 0 oz  Patient was comfortable, oriented times three had some  worsening pain after an exam   Lungs are clear   S1 and s2 heard   Abdomen was soft and non distended   left foot was wrapped in bandage     Medical Decision Making       39 MINUTES SPENT BY ME on the date of service doing chart review, history, exam, documentation & further activities per the note.      Data     I have personally reviewed the following data over the past 24 hrs:    11.9 (H)  \   7.7 (L)   / 330     136 105 17.8 /  79   4.3 19 (L) 0.86 \     Procal: N/A CRP: 108.00 (H) Lactic Acid: N/A

## 2023-11-07 NOTE — PROGRESS NOTES
Care Management Initial Consult    General Information  Assessment completed with:  pt's spouse (Angelica) via phone, # [phone]  Primary Care Provider verified and updated as needed:   Dr. Gonzalez  Readmission within the last 30 days:   yes  Advance Care Planning:     has ACP docs     Communication Assessment  Patient's communication style: spoken language  - English       Cognitive  Cognitive/Neuro/Behavioral: WDL except, orientation  Level of Consciousness: confused  Arousal Level: opens eyes spontaneously  Orientation: disoriented to, place  Mood/Behavior: agitated, anxious, restless  Best Language: 0 - No aphasia  Speech: clear    Living Environment:   People in home:    Pt lives with his spouse, Angelica  Current living Arrangements:     Pt lives in a one level home in [place], [place]. The home has 0 BERNARD[address] and once inside the phone everything is accessible from the main level. The main level has a kitchen, living room, bedrooms and bathroom. The full bath has a walk in shower and a walk-in bath tub. Pt also has a hospital bed at home.   Able to return to prior arrangements:  yes     Family/Social Support:  Care provided by:  wife, Angelica  Provides care for:  no one.      Description of Support System:    Pt's spouse reports that they have family for moral support, but no one to help. Angelica's mother is currently in hospice.      Current Resources:   Patient receiving home care services:  no  Community Resources:  no  Equipment currently used at home: grab bar, tub/shower, walker, rolling  Supplies currently used at home:  unknown    Employment/Financial:  Employment Status:     retired, not working.   Financial Concerns:   Wife states that she is concerned about the cost of all this, and is trying to re-finance their house to help.   Does the patient's insurance plan have a 3 day qualifying hospital stay waiver?  No    Lifestyle & Psychosocial Needs:  Social Determinants of Health     Food Insecurity: Low Risk   "(10/31/2023)    Food Insecurity     Within the past 12 months, did you worry that your food would run out before you got money to buy more?: No     Within the past 12 months, did the food you bought just not last and you didn t have money to get more?: No   Depression: Not at risk (6/19/2023)    PHQ-2     PHQ-2 Score: 0   Housing Stability: Low Risk  (10/31/2023)    Housing Stability     Do you have housing? : Yes     Are you worried about losing your housing?: No   Tobacco Use: High Risk (10/31/2023)    Patient History     Smoking Tobacco Use: Every Day     Smokeless Tobacco Use: Former     Passive Exposure: Not on file   Financial Resource Strain: Low Risk  (10/31/2023)    Financial Resource Strain     Within the past 12 months, have you or your family members you live with been unable to get utilities (heat, electricity) when it was really needed?: No   Alcohol Use: Not on file   Transportation Needs: Low Risk  (10/31/2023)    Transportation Needs     Within the past 12 months, has lack of transportation kept you from medical appointments, getting your medicines, non-medical meetings or appointments, work, or from getting things that you need?: No   Physical Activity: Not on file   Interpersonal Safety: Not on file   Stress: Not on file   Social Connections: Not on file       Functional Status:  Prior to admission patient needed assistance: Patient was not working or driving PTH. Patient was helping with the cooking, but the wife was managing his medications.    Mental Health Status:  Pt's spouse reports not history of mental health concerns. Wife stated that pt's dementia is out of control and that he has been hallucinating. Seeing things that aren't there like ants in his food and caterpillars crawling around him.   She reports that pt has not been eating well due to seeing the \"ants.\"    Chemical Dependency Status:  Pt's spouse reports that pt likes to drink. He was drinking and smoking pot to manage his pain " "at home.   Pt also smokes cigarettes on a daily basis.     Values/Beliefs:  Spiritual, Cultural Beliefs, Yarsani Practices, Values that affect care:       None reported.        Additional Information:    , covering for EB as a float SW was asked to complete an assessment on patient needing help with discharge planning and placement when medically ready.     SW spoke with pt's spouse via phone to complete the assessment due to pt struggling with confusion and pain.   Angelica stated that she is concerned that pt is in so much pain and it seems the staff is unable to help manage. Angelica also stated that pt is having hallucinations that she is concerned about.     TCU is recommended by OT, but spouse is refusing any TCU placement after there \"horrific\" experience at [facility] in [place], [place]. Angelica states that she took him out of there AMA after one night in the facility. She reports that the TCU was not changing him and he sat in feces, he was not fed, and not cared for well at all. She adamantly stated that pt will come home and will not go to a TCU.    SW/RNCC will continue to follow and assist with any other discharge needs that may arise.    SARAHI Lu, LGSW  Coverage   [facility]  [email or web address]      "

## 2023-11-07 NOTE — PROGRESS NOTES
Antimicrobial Stewardship Team Note    Antimicrobial Stewardship Program - A joint venture between [facility] and  Physicians to optimize antibiotic management.  NOT a formal consult - Restricted Antimicrobial Review     Patient: Darian Engle  MRN: [id number]  Allergies: Lidocaine, Lisinopril, and Naltrexone    Brief Summary: Darian Engle is a 73 year old male with a past medical history of CAD s/p CABG in 2004, atrial fibrillation, HTN, HLD, T2DM, benign cystic neoplasm of the pancreas, TALA, dry gangrene, and dementia. He was previously admitted from 10/17/2023 to 10/30/2023 for gangrenous left leg where we underwent left common femoral endarterectomy and profundoplasty, balloon angioplasty and a left foot transmetatarsal amputation on 10/27/2023. He was discharged on 10/30/2023 to a rehab facility but left AMA. The next day he presented to [facility] for weakness and was then discharged to TCU on 11/3/2023, but returned back to [facility] on 11/5/2023 for LE pain with X-ray of left foot showing possible osteomyelitis. He was started on empiric vancomycin and Zosyn for possible osteomyelitis and transferred to [facility] for vascular and ortho consult.    History of Present Illness: Patient presented with pain in the left foot but otherwise no other concerns. Physical examination revealed a large blister about the proximal and lateral aspect of the incision, without purulent drainage or erythema. Blister has since bursted. Patient remained afebrile (Tmax 99.1) and hemodynamically stable. Labs were notable for WBC 8.0, with slight up trend, procalcitonin 0.08, and . X-ray of left foot on 11/5 showed status post transmetatarsal resection of the distal foot with resection edge that are ill-defined, possibly postsurgical in nature but underlying osteomyelitis is not excluded. No acute fracture or dislocation and significant atherosclerotic calcification seen in  the foot. Blood culture here grew 1/4 bottles for Staph aureus, mecA gene not detected on Verigene. Repeat Bcx on 11/6 pending. OSH blood cultures from 11/5 are also growing GPC in clusters, likely Staph aureus. Identification of GPC pending per OSH microbiology lab technician as sample was sent to [facility] for further work up. Patient has been evaluated by vascular surgery and orthopedics with no plans for surgical interventions. Current plan is to continue empiric Zosyn and vancomycin with workup for possible osteomyelitis.          Active Anti-infective Medications   (From admission, onward)                 Start     Stop    11/06/23 1100  piperacillin-tazobactam  3.375 g,   Intravenous,   EVERY 6 HOURS        Possible post-op infection, Skin and Soft Tissue Infection       --    11/06/23 0800  vancomycin (VANCOCIN) injection  750 mg,   Intravenous,   EVERY 12 HOURS        Skin and Soft Tissue Infection       --                  Assessment: Possible Osteomyelitis complicated by Staphylococcus aureus bacteremia   Patient presented with severe pain of the left foot that has been persistent since recent left foot transmetatarsal amputation. Has remained afebrile with all other VS within normal limits with mild leukocytosis and elevated CRP.  There is a concern for osteomyelitis at the surgical site pending further workup. Patient was also found to have Staph aureus bacteremia here and GPC in clusters at the OSH. Staphylococcus aureus in the blood culture, even in 1 out 4 bottles, represents a true bacteremia especially in the setting of recent amputation with ongoing pain and blister at the site. MecA gene was not detected on Verigene indicating MSSA therefore optimization of MSSA bacteremia with beta-lactam is indicated. We recommend stopping vancomycin and Zosyn and starting cefazolin for MSSA treatment optimization. There is no evidence of MRSA or Pseudomonas on current or previous cultures to  warrant ongoing empiric anti-MRSA and anti-Pseudomonal agent, respectively. Given Staph aureus bacteremia and possible osteomyelitis, we highly recommend ID consult for further management.     Note: primary team consulted ID team after antimicrobial stewardship team rounded on patient. Defer all recommendations to the ID consult service.      Recommendations:  Stop vancomycin and Zosyn   Start cefazolin 2 grams every 8 hours   ID consult for further management of Staphylococcus aureus bacteremia and possible osteomyelitis     Pharmacy took the following actions: Called/paged provider, Electronic note created.    Discussed with ID Staff - Rui Galloway MD and Tessa Cannon, PharmD, BCIDP  Amanda RahmanD Candidate 2024  [phone]    Vital Signs/Clinical Features:  Vitals         11/05 0700 11/06 0659 11/06 0700 11/07 0659 11/07 0700 11/07 1433   Most Recent      Temp ( F) 98.2 -  99.1    97.9 -  98.2    97.9 -  98.3     98 (36.7) 11/07 1340    Pulse 73 -  82    69 -  75    64 -  67     64 11/07 1340    Resp 16 -  18    15 -  18    16 -  18     16 11/07 1340    /55 -  164/59    85/42 -  147/50    104/27 -  125/40     125/40 11/07 1340    SpO2 (%) 98 -  100    99 -  100    99 -  100     100 11/07 1340            Labs  Estimated Creatinine Clearance: 74.3 mL/min (based on SCr of 0.86 mg/dL).  Recent Labs   Lab Test 10/28/23  0531 10/29/23  0424 10/30/23  0606 11/05/23 2203 11/06/23  0554 11/07/23  0559   CR 0.61* 0.60* 0.70 0.89 0.77 0.86       Recent Labs   Lab Test 02/03/17  1551 06/03/21  1747 10/28/23  1303 10/29/23  0424 10/30/23  0606 11/05/23 2203 11/06/23  0932 11/07/23  0016   WBC 9.8   < > 9.4 7.4 8.1 8.0 14.1* 11.9*   ANEU 6.0  --   --   --   --   --   --   --    ALYM 2.6  --   --   --   --   --   --   --    SARAH 0.9  --   --   --   --   --   --   --    AEOS 0.2  --   --   --   --   --   --   --    HGB 13.9   < > 9.4* 10.0* 9.8* 14.5 8.9* 7.7*   HCT 41.3   < > 28.7* 30.0* 29.4* 43.6 26.6*  23.9*   MCV 90   < > 97 97 97 97 98 100      < > 286 308 325 248 389 330    < > = values in this interval not displayed.       Recent Labs   Lab Test 09/02/20  0825 05/13/22  0740 02/21/23  1112 11/05/23 2203 11/06/23  0554   BILITOTAL  --   --   --  0.7 0.5   ALKPHOS  --   --   --  97 87   ALBUMIN 3.7 3.2* 3.4 3.1* 3.1*   AST  --   --   --  29 29   ALT  --   --   --  10 9       Recent Labs   Lab Test 10/24/23  0927 10/24/23  1157 10/24/23  1411 10/24/23  1938 10/25/23  0915 11/05/23 2203 11/06/23  0554 11/07/23  0559   PCAL  --   --   --   --   --  0.08*  --   --    LACT 0.9 0.7 0.6 2.3* 0.8 0.8  --   --    CRPI  --   --   --   --   --  110.00* 104.00* 108.00*   SED  --   --   --   --   --   --  51*  --              Culture Results:  7-Day Micro Results       Procedure Component Value Units Date/Time    Blood Culture Hand, Left [[id number]]  (Normal) Collected: 11/06/23 1834    Order Status: Completed Lab Status: Preliminary result Updated: 11/07/23 0816    Specimen: Blood from Hand, Left      Culture No growth after 12 hours    Blood Culture Arm, Right [[id number]]  (Normal) Collected: 11/06/23 1833    Order Status: Completed Lab Status: Preliminary result Updated: 11/07/23 0816    Specimen: Blood from Arm, Right      Culture No growth after 12 hours    Body fluid, unsp Aerobic Bacterial Culture Routine     Order Status: Canceled Lab Status: No result     Specimen: Body fluid, unsp from Foot, Left     Blood Culture Peripheral Blood [[id number]]  (Abnormal) Collected: 11/05/23 2203    Order Status: Completed Lab Status: Preliminary result Updated: 11/07/23 0944    Specimen: Peripheral Blood      Culture Positive on the 1st day of incubation      Staphylococcus aureus     Comment: 1 of 2 bottles       Blood Culture Peripheral Blood [[id number]]  (Normal) Collected: 11/05/23 2203    Order Status: Completed Lab Status: Preliminary result Updated: 11/06/23 2246    Specimen: Peripheral Blood       Culture No growth after 1 day    Verigene GP Panel [[id number]]  (Abnormal) Collected: 11/05/23 2203    Order Status: Completed Lab Status: Final result Updated: 11/06/23 2015    Specimen: Peripheral Blood      Staphylococcus aureus Detected     Comment: Positive for Staphylococcus aureus and negative for the mecA gene (not resistant to methicillin) by Verigene multiplex nucleic acid test. Final identification and antimicrobial susceptibility testing will be verified by standard methods.        Staphylococcus epidermidis Not Detected     Staphylococcus lugdunensis Not Detected     Enterococcus faecalis Not Detected     Enterococcus faecium Not Detected     Streptococcus species Not Detected     Streptococcus agalactiae Not Detected     Streptococcus anginosus group Not Detected     Streptococcus pneumoniae Not Detected     Streptococcus pyogenes Not Detected     Listeria species Not Detected    Narrative:      Specimen tested with Verigene multiplex, gram-positive blood culture nucleic acid test for the following targets: Staphylococcus aureus, Staphylococcus epidermidis, Staphylococcus lugdunensis, other Staphylococcus species, Enterococcus faecalis, Enterococcus faecium, Streptococcus species, Streptococcus agalactiae, Streptococcus anginosus group, Streptococcus pneumoniae, Streptococcus pyogenes, Listeria species, mecA (methicillin resistance), and Jamie/vanB (vancomycin resistance).            Recent Labs   Lab Test 08/02/19  0849   URINEPH 6.0   NITRITE Positive*   LEUKEST Negative   WBCU 5-10*                         Imaging: XR Foot Left 2 Views    Result Date: 11/5/2023  EXAM: XR FOOT LEFT 2 VIEWS LOCATION: [facility] DATE: 11/5/2023 INDICATION: pain, concerns for infection COMPARISON: 10/24/2023     IMPRESSION: Status post transmetatarsal resection of the distal foot. The resection edge are ill-defined, possibly postsurgical in nature but underlying  osteomyelitis is not excluded. Contrast enhancement MRI of the foot with the more helpful. No acute fracture or dislocation. Significant atherosclerotic calcification seen in the foot.    US ART EXTREMITY LOW LT NO RHONDA    Result Date: 11/5/2023  EXAM:  US ART EXTREMITY LOW LT NO RHONDA DATE:  11/5/2023 6:12 PM CLINICAL DATA: 73-year-old male, pain, decreased RHONDA, nonhealing wounds. History of single-vessel runoff to the bilateral feet with occlusion of the bilateral anterior and likely posterior tibial arteries. Long segment occlusion of the stented left superficial femoral artery with reconstitution of the popliteal artery. Severe calcified atherosclerotic disease within the right common femoral artery with moderate to severe stenosis at the bifurcation. Multifocal moderate to severe stenoses of the left common femoral artery with near occlusion versus occlusion just central to the bifurcation. Occlusion of the central aspect of the superior mesenteric artery with distal reconstitution.   COMPARISON: CT angiogram with runoff 8/16/2023. TECHNIQUE:  Real time grey scale, color flow, and spectral doppler ultrasound of the left lower extremity arterial structures was performed. FINDINGS: Terminology: Degrees of stenosis are graded as mild, moderate and severe. (Semin Vasc Surg. 2013 Jun-Sep;26(2-3):) Normal (< 20%): Peak systolic velocity < 150 cm/s, ratio <1.5 Mild (20 - 49%): Peak systolic velocity < 200 cm/s, ratio 1.5 - 2.0 Moderate (50 - 75%): Peak systolic velocity 200-300 cm/s, ratio 2.0 - 4.0 Severe (> 75%): Peak systolic velocity > 300 cm/s, ratio > 4.0 Waveforms: Normal (Triphasic, Biphasic, Sharp monophasic): No significant proximal stenosis Moderately monophasic: Moderate-severe proximal stenosis Severely monophasic: Severe proximal stenosis or occlusion LEFT LOWER EXTREMITY:   Waveforms  /  Velocities (in cm/sec): External Iliac Artery:  Triphasic. 100 Common Femoral Artery: Triphasic. 184 Profunda  Femoral Artery: Monophasic. 19 Proximal SFA: Monophasic. 163 Mid SFA: Monophasic. 60 Distal SFA: Monophasic. 24 Popliteal Artery: Monophasic. 14 Posterior Tibial Artery: Monophasic. 13 Peroneal Artery: Not visualized. Anterior Tibial Artery: Not visualized. Dorsalis Pedis Artery: Not visualized. Other: Extensive subcutaneous edema.    IMPRESSION: 1.  Monophasic waveforms throughout the majority of the left lower extremity arterial system compatible with at least moderate obstruction. There is elevated peak systolic velocity within the common femoral artery and proximal superficial femoral artery suggestive of 20 to 49% stenosis. Flow is seen throughout the left superficial femoral artery on the current examination which is in contradistinction to a previously seen occluded stented left superficial femoral artery on angiogram 8/16/2023. Correlation with any interval revascularization recommended. Flow within the popliteal artery is low. There is no flow visualized within the peroneal artery, anterior tibial or dorsalis pedis artery. There is diffuse subcutaneous edema. Traditional angiographic evaluation may be useful for further evaluation as noted on prior runoff examination. REPORT SIGNED BY DR. Alla Rome    XR Foot Port Left 3 Views    Result Date: 11/5/2023  EXAM: XR FOOT LT 3 VIEW DATE: 11/5/2023 6:00 PM CLINICAL DATA: Pain - Infection? Had midfoot amputation and now with erythema and pain of the midfoot. COMPARISON: None available    IMPRESSION: AP, lateral, and oblique images acquired.   Recent transmetatarsal amputation. Mildly heterogeneous density to the overlying soft tissues. Limited sensitivity/specificity for detection of infection. Advanced vascular calcifications. Degenerative changes. REPORT SIGNED BY DR. Ramy Downs

## 2023-11-08 NOTE — PLAN OF CARE
Vitals:    11/08/23 0700 11/08/23 1230 11/08/23 1551 11/08/23 1700   BP: 109/46 139/43 (!) 166/54 133/49   BP Location: Left arm Left arm Right arm Right arm   Patient Position: Sitting      Pulse: 78 82 85 77   Resp: 18 18 18 18   Temp: 98  F (36.7  C) 98.2  F (36.8  C) 97.7  F (36.5  C) 98  F (36.7  C)   TempSrc: Oral Oral Oral Oral   SpO2: 99% 100% 99% 100%       Neuro: alert and oriented throughout this shift     VS: afebrile vitally soft BP this AM, held blood pressure med, sating 100%    BG: below the parameter,     Cardiac: 77    Pain/Nausea: complain of nausea, Zofran with some relief     Lines/Drains: PIV SL     Incision/Wound: foot wound dressing changed     GI/: voided and had BM     Diet/Appetite: very poor appetite, wife brought food, did not eat much     Activity: up to wheelchair     Plan:  Left foot: Unchanged appearance of the plantar surface.  Unchanged area of duskiness on the dorsal surface with no surrounding erythema. On new antibiotic, continue with plan of care.

## 2023-11-08 NOTE — PROGRESS NOTES
[facility]  Vascular Surgery Attending  Pt seen and examined today 11/8/23 approx 10/11 am    His pain appears better controlled today.  Afebrile    Left foot: Unchanged appearance of the plantar surface.  Unchanged area of duskiness on the dorsal surface with no surrounding erythema.    WBC 11  Blood cultures 11/5/2023: Staph aureus  Blood cultures 11/6/2023: No growth    A/P  Appears that his pain is better controlled today  Continue IV antibiotics  Foot appears unchanged will be discharged with antibiotics when deemed appropriate by ID; note due for transthoracic echo.   Ideally has outpatient hyperbaric oxygen at [facility] if this is logistically able to be done.

## 2023-11-08 NOTE — PROGRESS NOTES
[facility]    Medicine Progress Note - Hospitalist Service, GOLD TEAM 8    Date of Admission:  11/5/2023    Assessment & Plan   Darian Engle is a 73 year old male admitted on 11/5/2023. He has a past medical history of PMH  of CAD s/p CAB w/ LIMA and L GSV (2004), HTN, HDL, atrial fibrillation on AC, PAD, dry gangrene, benign cystic neoplasm of the pancreas, TALA not on CPAP, dementia and DM II. He was transferred from [place] for orthopedic and vascular consult. Denies chest pain, abdominal pain, nausea or vomiting.      Today's plan   -TTE without any vegetations   -Stopped IV vancomycin and zosyn and plan to treat with cefazolin        Left foot infection  Staph aureus bacteremia 11/6   Admitted from 10/17/23 to 10/30/23 for gangrenous left leg, and underwent left common femoral endarterectomy and profundoplasty, sheath placement drug-coated balloon angioplasty on 10/24/2023. 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 He was discharged to rehab facility on 10/30, but left AMA after he felt that he was waiting in the lobby too long.  He was then admitted to [facility] from 10/31/2023 to 11/1/23 due to weakness, and discharged to TCU, went back to [facility] 11/5/23 and he was transferred to the [facility]. WBC 8 and he is afebrile. He received 1000 ml bolus and started on vancomycin. X-ray of foot showing no new fractures but possible osteomyelitis.  - Ortho consult  - Vascular consult  - Follow blood cultures  - on cefazolin   - Oxy and tylenol for pain  -Added ketorolac for breakthrough pain      PAD  HTN  Atrial fibrillation on AC  MR cardiac 10/18/23 shows left ventricle is normal in cavity size and moderate concentric hypertrophy and LVEF is 67%. EKGs have shown SR. Wife was called for med rec. Patient was sent with MAR from facility.  - PTA amlodipine, apixaban, asa, atorvastatin, coreg, pletal, losartan, spirolactone,  and isosorbide.      DM II  Last A1C 5.5 on 11/6/23  - Hold metformin  - Medium sliding scale     Dementia  Answers question well, but forgetful and does not know medical history well  - PTA Aricept     Anemia  History of anemia with Hbg around 10. He was 14.5 on admission  - CBC          Diet: Moderate Consistent Carb (60 g CHO per Meal) Diet    DVT Prophylaxis: Apixaban for dvt ppx   Mercado Catheter: Not present  Lines: None     Cardiac Monitoring: None  Code Status: Full Code      Clinically Significant Risk Factors              # Hypoalbuminemia: Lowest albumin = 3.1 g/dL at 11/6/2023  5:54 AM, will monitor as appropriate     # Hypertension: Noted on problem list     # Dementia: noted on problem list        # Financial/Environmental Concerns:           Disposition Plan      Expected Discharge Date: 11/10/2023                    Mariano Dutton MD  Hospitalist Service, GOLD TEAM [address] [place] [address]  Securely message with [email or web address] (more info)  Text page via [email or web address] Paging/Directory   See signed in provider for up to date coverage information  ______________________________________________________________________    Interval History   Patient is comfortable, No new events overnight, Patient denies any abdominal pain, difficulty breathing, fevers or malaise     Physical Exam   Vital Signs: Temp: 98.2  F (36.8  C) Temp src: Oral BP: 139/43 Pulse: 82   Resp: 18 SpO2: 100 % O2 Device: None (Room air)    Weight: 0 lbs 0 oz  Patient was comfortable, Oriented times three,   Lungs are clear   S1 and s2 heard   Abdomen was soft and non distended   Right lower extremity was well wrapped     Medical Decision Making       42 MINUTES SPENT BY ME on the date of service doing chart review, history, exam, documentation & further activities per the note.      Data     I have personally reviewed the following data over the past 24 hrs:    11.0  \   7.8 (L)   / 347     136 105 17.2 /  68 (L)   3.9 17  (L) 0.97 \     Procal: N/A CRP: 105.00 (H) Lactic Acid: N/A

## 2023-11-08 NOTE — PROGRESS NOTES
[facility]: PROGRESS NOTE      Patient:  Darian Engle   YOB: 1950, MRN: [id number]  Date of Visit: 11/08/2023  Date of Admission: 11/5/2023  Consult Requester: Mariano Dutton MD          Assessment and Recommendations:     ID Problem List:  1. MSSA bacteremia  2. Left foot post-surgical site infection - presumed MSSA infection  3. PAD  4. A-fib on AC     Recommendations:  -- Continue IV cefazolin 2g Q8H   -  Consideration of 2 weeks vs 4 weeks of therapy pending forthcoming data  -- Diagnostics:   a. TTE negative; would consider EVER later in hospital admission depending on clinical course  b. Daily Bcx until negative for 48-72 hours  c. Recommend holding off on MRI of left foot for now     Discussion:  72 yo M with recent history of gangrenous left leg s/p transmetatarsal amputation on 10/27/2023 who now presents with Staph aureus bacteremia and elevated inflammatory markers (). Suspect that the source of his MSSA bacteremia is left foot post-surgical soft tissue infection given his clinical course and that the patient was noncompliant following amputation (left TCU AMA shortly after his surgery). Overall lower suspicion for infective endocarditis given he does not have established risk factors other than current MSSA bacteremia (no IVDU, prior heart valve surgery, prolonged bacteremia) and TTE is negative. Feel a  EVER is currently less necessary given clinical status and only one positive blood culture. Continue to monitor for any worsening of soft tissue infection after stopping pip/tazo. Vascular surgery is following and currently evaluating whether to proceed with conservative management vs BKA.      Thank you for this consult. ID team will continue to follow this patient. Please reach out if any questions or concerns.   Patient was seen and staffed with Dr. Hayden.     Mike Phipps, MS4  [employer]     Patient was seen by both  Mike Phipps, MS4 and Yariel Hayden MD (attending physician). I independently confirmed the examination and medical decision making. I agree with the assessment and plan of care as documented in the note. I personally reviewed vital signs, medications, labs and imaging.     Yariel Hayden MD  Infectious Diseases  [phone]         Interval History and Events:   No acute overnight events. This morning the patient denies new fevers or chills. No shortness of breath or new/worsening left lower leg pain.          HPI as adopted from initial ID consult on 11/07:     Darian Engle is a 74 yo M with PMHx of PMH of CAD s/p CAB w/ LIMA and L GSV (2004), HTN, HDL, atrial fibrillation on AC, PAD, dry gangrene, benign cystic neoplasm of the pancreas, TALA not on CPAP, dementia and DM II. Per chart review, the patient was admitted from 10/17/23 to 10/30/23 for gangrenous left leg, and underwent left common femoral endarterectomy and profundoplasty, sheath placement drug-coated balloon angioplasty on 10/24/2023. 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 He was discharged to rehab facility on 10/30, but left AMA after he felt that he was waiting in the lobby too long.  He was then admitted to [facility] from 10/31/2023 to 11/1/23 due to weakness, and discharged to TCU, went back to [facility] 11/5/23 and he was transferred to the [facility].  On 11/05 his Bcx were positive for Staph Aureus and he was started on Vancomycin. X-ray of foot showing no new fractures but cannot rule out osteomyelitis. Ortho recommending against advanced imaging. Has been afebrile since admission. WBC 8 on admission.           Review of Systems:   Targeted 5 point ROS was completed with pertinent positives and negatives are detailed above.         Antimicrobial history:   Zosyn 11/06 - 11/07  Vancomysin 11/06 - Present          Physical Examination:   Temp:  [97.6  F (36.4  C)-98.3  F (36.8  C)] 98  F (36.7   C)  Pulse:  [64-78] 78  Resp:  [16-18] 18  BP: (104-131)/(27-46) 109/46  SpO2:  [97 %-100 %] 99 %    I/O last 3 completed shifts:  In: 570 [P.O.:220; I.V.:350]  Out: 350 [Urine:350]    There were no vitals filed for this visit.    GEN Pleasant and cooperative male. Awake, alert, interactive.  Head: Normocephalic, atraumatic.  Eyes: Conjunctiva clear, anicteric sclera  ENT: OP with MMM w/o exudates or erythema, dentition in good repair, no sinus tenderness  Respiratory: No increased work of breathing, CTAB, no crackles or wheezing. On room air.   Cardiovascular: RRR, +S1S2 no MRG No peripheral edema.  GI: Normal bowel sounds, soft, non-distended and non-tender. No appreciable hepatosplenomegaly.   : No suprapubic or CVA tenderness  Skin: Warm, dry, well-perfused. No bruising, bleeding, rashes, or lesions on limited exam.  Musculoskeletal: Extremities grossly normal, non-tender, no edema. Good strength and ROM in bed.   Neuro: A&O. Answers questions appropriately, speech normal. Moves all extremities spontaneously.  Neuropsychiatric: Calm. Affect appropriate to situation.         Medications:       acetaminophen  975 mg Oral Q8H     apixaban ANTICOAGULANT  5 mg Oral BID     aspirin  81 mg Oral Daily     atorvastatin  40 mg Oral Daily     carvedilol  25 mg Oral BID     ceFAZolin  2 g Intravenous Q8H     cilostazol  100 mg Oral BID     donepezil  10 mg Oral At Bedtime     gabapentin  100 mg Oral BID     insulin aspart  1-7 Units Subcutaneous TID AC     insulin aspart  1-5 Units Subcutaneous At Bedtime     isosorbide mononitrate  60 mg Oral Daily     losartan  50 mg Oral Daily     magnesium oxide  200 mg Oral Daily     multivitamin, therapeutic  1 tablet Oral Daily     pantoprazole  40 mg Oral Daily     senna-docusate  1 tablet Oral BID     sertraline  25 mg Oral Daily     Vitamin D3  50 mcg Oral Daily       Antiinfectives:  Anti-infectives (From now, onward)    Start     Dose/Rate Route Frequency Ordered Stop     11/08/23 1000  ceFAZolin (ANCEF) 2 g in 100 mL D5W intermittent infusion         2 g  200 mL/hr over 30 Minutes Intravenous EVERY 8 HOURS 11/08/23 0649            Infusions/Drips:           Laboratory Data:     Microbiology:  Culture   Date Value Ref Range Status   11/06/2023 No growth after 1 day  Preliminary   11/06/2023 No growth after 1 day  Preliminary   11/05/2023 Positive on the 1st day of incubation (A)  Final   11/05/2023 Staphylococcus aureus (AA)  Final     Comment:     1 of 2 bottles   11/05/2023 No growth after 2 days  Preliminary       Inflammatory Markers    Recent Labs   Lab Test 11/06/23  0554   SED 51*       Metabolic Studies     Recent Labs   Lab Test 11/08/23  0812 11/08/23  0633 11/07/23  0753 11/07/23  0559 11/06/23  0734 11/06/23  0554 11/06/23  0540 11/05/23  2203 10/17/23  1854 10/17/23  1455   NA  --  136  --  136  --  137  --  137   < >  --    POTASSIUM  --  3.9  --  4.3  --  4.3  --  4.4   < >  --    CHLORIDE  --  105  --  105  --  104  --  104   < >  --    CO2  --  17*  --  19*  --  21*  --  19*   < >  --    ANIONGAP  --  14  --  12  --  12  --  14   < >  --    BUN  --  17.2  --  17.8  --  18.8  --  21.9   < >  --    CR  --  0.97  --  0.86  --  0.77  --  0.89   < > 0.65*   GFRESTIMATED  --  83  --  >90  --  >90  --  >90   < > >90   GLC 70 67*   < > 63*   < > 79   < > 66*   < >  --    A1C  --   --   --   --   --   --   --   --   --  5.5   SHERYL  --  8.5*  --  8.7*  --  8.7*  --  8.8   < >  --    PHOS  --   --   --   --   --   --   --  3.0   < >  --    MAG  --   --   --   --   --   --   --  1.5*   < > 1.4*   LACT  --   --   --   --   --   --   --  0.8   < >  --     < > = values in this interval not displayed.       Hepatic Studies    Recent Labs   Lab Test 11/06/23  0554 11/05/23  2203 02/21/23  1112   BILITOTAL 0.5 0.7  --    ALKPHOS 87 97  --    ALBUMIN 3.1* 3.1* 3.4   AST 29 29  --    ALT 9 10  --        Pancreatitis testing    Recent Labs   Lab Test 07/13/21  0830   TRIG 90  "      Hematology Studies      Recent Labs   Lab Test 11/08/23  0633 11/07/23  0016 11/06/23  0932 06/03/21  1747 02/03/17  1551   WBC 11.0 11.9* 14.1*   < > 9.8   ANEU  --   --   --   --  6.0   ALYM  --   --   --   --  2.6   SARAH  --   --   --   --  0.9   AEOS  --   --   --   --  0.2   HGB 7.8* 7.7* 8.9*   < > 13.9   HCT 23.9* 23.9* 26.6*   < > 41.3    330 389   < > 256    < > = values in this interval not displayed.       Arterial Blood Gas Testing    Recent Labs   Lab Test 10/24/23  1411   PH 7.37   PCO2 36   PO2 166*   HCO3 21   O2PER 36.0        Urine Studies     Recent Labs   Lab Test 08/02/19  0849   URINEPH 6.0   NITRITE Positive*   LEUKEST Negative   WBCU 5-10*       Vancomycin Levels     Recent Labs   Lab Test 11/08/23  0633   VANCOMYCIN 16.4       Tobramycin levels     No lab results found.    Gentamicin levels    No lab results found.    CSF testing   No lab results found.    Last check of C difficile  No results found for: \"CDBPCT\"    Microbiology  Lab Results   Component Value Date    CULTURE No growth after 1 day 11/06/2023             Imaging:     ECHO (TTE) 11/8/23  Interpretation Summary  No echocardiographic evidence of endocarditis on this transthoracic study.  Consider EVER if clinical suspicion warrants.  Global and regional left ventricular function is normal with an EF of 60-65%.  Right ventricular function, chamber size, wall motion, and thickness are  normal.  Mild aortic insufficiency is present.  This study was compared with the study from 9/21/23 .  No significant changes noted.      "

## 2023-11-09 NOTE — PLAN OF CARE
Goal Outcome Evaluation:      Plan of Care Reviewed With: patient    Overall Patient Progress: no change    Pt was A&Ox4, but then towards end of shift was forgetful. This am was seen rolling self around in wheelchair, but then towards end of shift was working with OT and pt was in chair and became dizzy and had soft Bps needed lift to be brought back to bed. Also had episode of hallucinations, per OT staff. Once back in bed patient A&Ox4 and BP WNL.Team aware of episode. C/o nausea was given PRN Zofran. C/o LLE pain was given Scheduled tylenol and PRN Oxyx1, toradol, and zanaflex. UA needed. Continue with IV abx.

## 2023-11-09 NOTE — PLAN OF CARE
/49 (BP Location: Right arm)   Pulse 87   Temp 98.1  F (36.7  C) (Oral)   Resp 17   SpO2 98%     VSS, 3-10/10 pain in left foot, gave 10 mg oxycodone, toradol , zanaflex and scheduled tylenol with some relief, pain comes in waves per patient, voided and had loose BM, ancef given as ordered, dressing on left foot has a small amount of drainage on the outside of foot, no change in amount of drainage during shift, continue with plan of care

## 2023-11-09 NOTE — PROGRESS NOTES
"Care Management Follow Up    Length of Stay (days): 4    Expected Discharge Date: 11/10/2023     Concerns to be Addressed: discharge planning      Patient plan of care discussed at interdisciplinary rounds: Yes    Anticipated Discharge Disposition: Home with home care      Anticipated Discharge Services: Home Care   Anticipated Discharge DME: TBD    Patient/family educated on Medicare website which has current facility and service quality ratings:  yes  Education Provided on the Discharge Plan: yes   Patient/Family in Agreement with the Plan: Refusing TCU    Referrals Placed by CM/SW:  Home Care   Private pay costs discussed: Not applicable    Additional Information:  PT/OT recommend TCU for discharge.  Pt/wife declining TCU, wife plans to take him home at discharge.  [facility] referral placed,  [facility] is able to accept for RN/PT/OT services.  ID currently recommending IV Abx for 2-4 weeks.  Referral sent to [facility] to check benefits for IV abx coverage if needed at discharge.  Per I \" Pt does not have IV ABX coverage in the home with their [employer] Medicare plan. Drug would be billed to the part D (patient responsible for copay per dispense) and supplies will be self pay. Based on Cefazolin 2gm q8h, total cost is approximately $31.75/day for drug and supplies.\"    Hyperbaric oxygen therapy recommended by vascular surgery.  Called [facility] and they need a referral and clinicals faxed over to review the case.  They also asked for a provider to call the unit to talk with one of their providers about the goal of treatment and plan.  Updated. Dr. Dutton with this information. Referral and clinicals faxed.       [facility]  Phone: [phone]  Fax: [phone]    [facility](RN/PT/OT)  Phone: [phone]  Fax: [phone]      [facility]  Phone: [phone]  Pager: [phone]    SEARCHABLE in [facility] - search CARE COORDINATOR "     [place] [address] [place] (9790-0830) Saturday & Sunday; ([id number]) [facility] Recognized Holidays     Units: 5A, 5B & 5C  Pager: [phone]    Units: 6B, 6C & 6D    Pager: [phone]    Units: 7A, 7B, 7C & 7D    Pager: [phone]    Units: 6A & ICU   Pager: [phone]    Units: 5 Ortho, 5MS & WB ED Pager: [phone]    Units: 6MS, 8A & 10 ICU  Pager [phone]

## 2023-11-09 NOTE — PROGRESS NOTES
[facility]    Medicine Progress Note - Hospitalist Service, GOLD TEAM 8    Date of Admission:  11/5/2023    Assessment & Plan   Darian Engle is a 73 year old male admitted on 11/5/2023. He has a past medical history of PMH  of CAD s/p CAB w/ LIMA and L GSV (2004), HTN, HDL, atrial fibrillation on AC, PAD, dry gangrene, benign cystic neoplasm of the pancreas, TALA not on CPAP, dementia and DM II. He was transferred from [place] for orthopedic and vascular consult. Denies chest pain, abdominal pain, nausea or vomiting.      Today's plan   -Noted small rise in CRP, no focal symptoms but if continues will expand workup, Will perform urinalysis and chest x ray for today   -TTE FROM 11/8 without vegetations   -Noted asymptomatic rise in white count today   -Blood cultures still negative from 11/5   -Per care coordinator patients wife desiring to take patient home, will need to coordinate antibiotic changes        Left foot infection  Staph aureus bacteremia 11/6   Admitted from 10/17/23 to 10/30/23 for gangrenous left leg, and underwent left common femoral endarterectomy and profundoplasty, sheath placement drug-coated balloon angioplasty on 10/24/2023. 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 He was discharged to rehab facility on 10/30, but left AMA after he felt that he was waiting in the lobby too long.  He was then admitted to [facility] from 10/31/2023 to 11/1/23 due to weakness, and discharged to TCU, went back to [facility] 11/5/23 and he was transferred to the [facility]. WBC 8 and he is afebrile. He received 1000 ml bolus and started on vancomycin. X-ray of foot showing no new fractures but possible osteomyelitis.  - Ortho consult  - Vascular consult  - Follow blood cultures  - on cefazolin   - Oxy and tylenol for pain  -Added ketorolac for breakthrough pain      PAD  HTN  Atrial fibrillation on AC  MR cardiac 10/18/23  shows left ventricle is normal in cavity size and moderate concentric hypertrophy and LVEF is 67%. EKGs have shown SR. Wife was called for med rec. Patient was sent with MAR from facility.  - PTA amlodipine, apixaban, asa, atorvastatin, coreg, pletal, losartan, spirolactone, and isosorbide.      DM II  Last A1C 5.5 on 11/6/23  - Hold metformin  - Medium sliding scale     Dementia  Answers question well, but forgetful and does not know medical history well  - PTA Aricept     Anemia  History of anemia with Hbg around 10. He was 14.5 on admission  - CBC          Diet: Moderate Consistent Carb (60 g CHO per Meal) Diet    DVT Prophylaxis: Apixaban for dvt ppx   [place] Catheter: Not present  Lines: None     Cardiac Monitoring: None  Code Status: Full Code      Clinically Significant Risk Factors              # Hypoalbuminemia: Lowest albumin = 3.1 g/dL at 11/6/2023  5:54 AM, will monitor as appropriate     # Hypertension: Noted on problem list     # Dementia: noted on problem list        # Financial/Environmental Concerns:           Disposition Plan      Expected Discharge Date: 11/10/2023                    Mariano Dutton MD  Hospitalist Service, [address]  Securely message with [employer] (more info)  Text page via [facility] Paging/Directory   See signed in provider for up to date coverage information  ______________________________________________________________________    Interval History   Patient is doing well, He does not report any new symptoms, Denies any abdominal pain, difficulty breathing, fevers or malaise     Physical Exam   Vital Signs: Temp: 98  F (36.7  C) Temp src: Oral BP: 138/44 Pulse: 75   Resp: 16 SpO2: 100 % O2 Device: None (Room air)    Weight: 0 lbs 0 oz  Patient was awake and alert   Lungs are clear   S1 and S2 heard   Abdomen was soft and non distended   Right foot was well bandaged and not appearing ischemic this morning     Medical Decision  Making       41 MINUTES SPENT BY ME on the date of service doing chart review, history, exam, documentation & further activities per the note.      Data     I have personally reviewed the following data over the past 24 hrs:    12.4 (H)  \   7.8 (L)   / 384     137 105 14.2 /  90   3.9 16 (L) 0.80 \     Procal: N/A CRP: 129.00 (H) Lactic Acid: N/A

## 2023-11-09 NOTE — PROGRESS NOTES
[facility]: PROGRESS NOTE      Patient:  Darian Engle   YOB: 1950, MRN: [id number]  Date of Visit: 11/09/2023  Date of Admission: 11/5/2023  Consult Requester: Mariano Dutton MD          Assessment and Recommendations:     ID Problem List:  Complicated MSSA bacteremia  -Blood culture 11/5/2023 in 1/4 bottles positive for MSSA  -Blood cultures 11/6/2023 NGTD  -Blood cultures 11/8/2023 NGTD  -Blood cultures 11/9/2023 pending  Left foot post-surgical site infection - presumed MSSA infection  PAD  A-fib on AC     Recommendations:  Continue IV cefazolin 2g q8H for 4 weeks (11/6/2023 - 12/3/2023)  2.   Follow blood cultures     Discussion:  72 yo M with recent history of gangrenous left leg s/p transmetatarsal amputation on 10/27/2023 who now presents with Staph aureus bacteremia and elevated inflammatory markers (). Suspect that the source of his MSSA bacteremia is left foot post-surgical soft tissue infection given his clinical course and that the patient was noncompliant following amputation (left TCU AMA shortly after his surgery). Overall lower suspicion for infective endocarditis given he does not have established risk factors other than current MSSA bacteremia (no IVDU, prior heart valve surgery, prolonged bacteremia) and TTE is negative.  Currently on cefazolin 2 g every 8 hours IV with plans for total 4 weeks duration from 11/6/2023 - 12/3/2023.    ID team will continue to follow this patient. Please reach out if any questions or concerns.     Discussed with ID attending, Dr. Hayden, and the primary team     Eveline Burns DO, PGY 4  Infectious Disease Fellow  [facility]  Pager: [phone]          Interval History and Events:   No acute overnight events.     On evaluation of the patient this a.m., he was walking to the bathroom during my arrival in the room.  Did not sleep well so visibly frustrated.  Only response was that he was still  hurting in his left foot.         HPI as adopted from initial ID consult on 11/07:     Darian Engle is a 72 yo M with PMHx of PMH of CAD s/p CAB w/ LIMA and L GSV (2004), HTN, HDL, atrial fibrillation on AC, PAD, dry gangrene, benign cystic neoplasm of the pancreas, TALA not on CPAP, dementia and DM II. Per chart review, the patient was admitted from 10/17/23 to 10/30/23 for gangrenous left leg, and underwent left common femoral endarterectomy and profundoplasty, sheath placement drug-coated balloon angioplasty on 10/24/2023. 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 He was discharged to rehab facility on 10/30, but left AMA after he felt that he was waiting in the lobby too long.  He was then admitted to [facility] from 10/31/2023 to 11/1/23 due to weakness, and discharged to TCU, went back to [facility] 11/5/23 and he was transferred to the [facility].  On 11/05 his Bcx were positive for Staph Aureus and he was started on Vancomycin. X-ray of foot showing no new fractures but cannot rule out osteomyelitis. Ortho recommending against advanced imaging. Has been afebrile since admission. WBC 8 on admission.           Review of Systems:   Targeted 5 point ROS was completed with pertinent positives and negatives are detailed above.         Antimicrobial history:   Current:  Cefazolin 2 g every 8 hours IV (11/8-present    Previous:  Piperacillin-tazobactam 3.375 g every 6 hours IV 11/06 - 11/08  Vancomysin 11/06 - 11/08          Physical Examination:   Temp:  [97.2  F (36.2  C)-98.7  F (37.1  C)] 97.2  F (36.2  C)  Pulse:  [66-87] 66  Resp:  [16-18] 16  BP: (131-166)/(43-54) 131/46  SpO2:  [98 %-100 %] 98 %    I/O last 3 completed shifts:  In: 1397 [P.O.:1297; I.V.:100]  Out: 575 [Urine:275; Emesis/NG output:300]    There were no vitals filed for this visit.    GEN Pleasant and cooperative male. Awake, alert, interactive.  Head: Normocephalic, atraumatic.  Eyes: Conjunctiva clear,  anicteric sclera  ENT: OP with MMM w/o exudates or erythema, dentition in good repair, no sinus tenderness  Respiratory: No increased work of breathing, CTAB, no crackles or wheezing. On room air.   Cardiovascular: RRR, +S1S2 no MRG.  1+ pitting edema to BLE  GI: Normal bowel sounds, soft, non-distended and non-tender. No appreciable hepatosplenomegaly.   : No suprapubic or CVA tenderness  Skin: Warm, dry, well-perfused. No bruising, bleeding, rashes, or lesions on limited exam.  Musculoskeletal: Extremities grossly normal, non-tender, no edema. Good strength and ROM in bed.   Neuro: A&O. Answers questions appropriately, speech normal. Moves all extremities spontaneously.  Neuropsychiatric: Affect appropriate to situation.         Medications:      acetaminophen  975 mg Oral Q8H    apixaban ANTICOAGULANT  5 mg Oral BID    aspirin  81 mg Oral Daily    atorvastatin  40 mg Oral Daily    carvedilol  25 mg Oral BID    ceFAZolin  2 g Intravenous Q8H    cilostazol  100 mg Oral BID    donepezil  10 mg Oral At Bedtime    gabapentin  100 mg Oral BID    insulin aspart  1-7 Units Subcutaneous TID AC    insulin aspart  1-5 Units Subcutaneous At Bedtime    isosorbide mononitrate  60 mg Oral Daily    losartan  50 mg Oral Daily    magnesium oxide  200 mg Oral Daily    multivitamin, therapeutic  1 tablet Oral Daily    pantoprazole  40 mg Oral Daily    senna-docusate  1 tablet Oral BID    sertraline  25 mg Oral Daily    Vitamin D3  50 mcg Oral Daily       Antiinfectives:  Anti-infectives (From now, onward)      Start     Dose/Rate Route Frequency Ordered Stop    11/08/23 1000  ceFAZolin (ANCEF) 2 g in 100 mL D5W intermittent infusion         2 g  200 mL/hr over 30 Minutes Intravenous EVERY 8 HOURS 11/08/23 0649              Infusions/Drips:           Laboratory Data:     Microbiology:  Culture   Date Value Ref Range Status   11/08/2023 No growth after 12 hours  Preliminary   11/08/2023 No growth after 12 hours  Preliminary    11/06/2023 No growth after 2 days  Preliminary   11/06/2023 No growth after 2 days  Preliminary   11/05/2023 Positive on the 1st day of incubation (A)  Final   11/05/2023 Staphylococcus aureus (AA)  Final     Comment:     1 of 2 bottles   11/05/2023 No growth after 3 days  Preliminary       Inflammatory Markers    Recent Labs   Lab Test 11/06/23  0554   SED 51*       Metabolic Studies     Recent Labs   Lab Test 11/09/23  0800 11/09/23  0630 11/08/23  0812 11/08/23  0633 11/07/23  0753 11/07/23  0559 11/06/23  0540 11/05/23  2203 10/17/23  1854 10/17/23  1455   NA  --  137  --  136  --  136   < > 137   < >  --    POTASSIUM  --  3.9  --  3.9  --  4.3   < > 4.4   < >  --    CHLORIDE  --  105  --  105  --  105   < > 104   < >  --    CO2  --  16*  --  17*  --  19*   < > 19*   < >  --    ANIONGAP  --  16*  --  14  --  12   < > 14   < >  --    BUN  --  14.2  --  17.2  --  17.8   < > 21.9   < >  --    CR  --  0.80  --  0.97  --  0.86   < > 0.89   < > 0.65*   GFRESTIMATED  --  >90  --  83  --  >90   < > >90   < > >90   GLC 91 88   < > 67*   < > 63*   < > 66*   < >  --    A1C  --   --   --   --   --   --   --   --   --  5.5   SHERYL  --  8.5*  --  8.5*  --  8.7*   < > 8.8   < >  --    PHOS  --   --   --   --   --   --   --  3.0   < >  --    MAG  --   --   --   --   --   --   --  1.5*   < > 1.4*   LACT  --   --   --   --   --   --   --  0.8   < >  --     < > = values in this interval not displayed.       Hepatic Studies    Recent Labs   Lab Test 11/06/23 0554 11/05/23 2203 02/21/23  1112   BILITOTAL 0.5 0.7  --    ALKPHOS 87 97  --    ALBUMIN 3.1* 3.1* 3.4   AST 29 29  --    ALT 9 10  --        Pancreatitis testing    Recent Labs   Lab Test 07/13/21  0830   TRIG 90       Hematology Studies      Recent Labs   Lab Test 11/09/23  0630 11/08/23  0633 11/07/23  0016 06/03/21  1747 02/03/17  1551   WBC 12.4* 11.0 11.9*   < > 9.8   ANEU  --   --   --   --  6.0   ALYM  --   --   --   --  2.6   SARAH  --   --   --   --  0.9   AEOS  --   " --   --   --  0.2   HGB 7.8* 7.8* 7.7*   < > 13.9   HCT 24.1* 23.9* 23.9*   < > 41.3    347 330   < > 256    < > = values in this interval not displayed.       Arterial Blood Gas Testing    Recent Labs   Lab Test 10/24/23  1411   PH 7.37   PCO2 36   PO2 166*   HCO3 21   O2PER 36.0        Urine Studies     Recent Labs   Lab Test 08/02/19  0849   URINEPH 6.0   NITRITE Positive*   LEUKEST Negative   WBCU 5-10*       Vancomycin Levels     Recent Labs   Lab Test 11/08/23  0633   VANCOMYCIN 16.4       Tobramycin levels     No lab results found.    Gentamicin levels    No lab results found.    CSF testing   No lab results found.    Last check of C difficile  No results found for: \"CDBPCT\"    Microbiology  Lab Results   Component Value Date    CULTURE No growth after 12 hours 11/08/2023             Imaging:     ECHO (TTE) 11/8/23  Interpretation Summary  No echocardiographic evidence of endocarditis on this transthoracic study.  Consider EVER if clinical suspicion warrants.  Global and regional left ventricular function is normal with an EF of 60-65%.  Right ventricular function, chamber size, wall motion, and thickness are  normal.  Mild aortic insufficiency is present.  This study was compared with the study from 9/21/23 .  No significant changes noted.      "

## 2023-11-09 NOTE — PROGRESS NOTES
Elmer  Vascular Surgery Attending  Pt seen and examined today 11/9/23 approx 9.30/10 am     Feels unchanged cp yesterday. Foot pain better controlled  Afebrile     Left foot: Unchanged appearance of the plantar surface.  Unchanged area of duskiness on the dorsal surface with no surrounding erythema.  Good biphasic PT signal at ankle.  No DP signal.       WBC 12.4 from 11 yesterday   Blood cultures 11/5/2023: Staph aureus  Blood cultures 11/6/2023: No growth   Blood cultures 11/8/2023: No growth  Blood cultures 11/8/2023: No growth    A/P  Appears that his pain is better controlled  Continue IV antibiotics- note switch from zosyn/van to cefazolin  Foot appears unchanged :will be discharged with antibiotics when deemed appropriate by ID. Note increase wbc to 12 from 11; no temp spike; no tachycardia  Negative TTE for vegetations   Ideally has outpatient hyperbaric oxygen at [facility] if this is logistically able to be done.

## 2023-11-09 NOTE — PROGRESS NOTES
Neuro: A&Ox4, forgetful, able to make needs known, and calls appropriately.   Cardiac:No tele, Afebrile, VSS.   Respiratory: Lungs sound are clear, sat>94% on RA  GI/: Voiding spontaneously. Had soft stool x 1  Diet/appetite: Tolerating regular diet. Denies nausea.  Activity: Up with assist x 1, pivot to w/c    Pain: Pain is well managed with current pain regimen  Skin: L foot wound. No new deficits noted.  Lines: Pivx 1, TKO  Drains: None  Replacements:

## 2023-11-10 NOTE — PLAN OF CARE
Cared for pt [id number]:      Status: LLE wounds  Vitals: WNL  Neuro: Alert, forgetful. Bed alarm on  IV: PIV intact  Labs/Electrolytes: none this shift  Resp/trach: RA  Diet: Mod Carb  Bowel status: LBM earlier today  : Incontinent at times (none this shift), otherwise uses urinal.   Skin: LLE dressing C/D,reinforced x1  Pain: LLE pain tolerable with Oxycodone x1 and scheduled Tylenol x1  Activity: In bed this rodrick, per report pt is able to pivot to BSC with A1  Social: No visitors this shift  Plan: Continue to monitor pain control

## 2023-11-10 NOTE — PROGRESS NOTES
Elmer  Vascular Surgery Attending  Pt seen and examined with wife at bedside.     17 Days postop: Left CFA Endarterectomy & Atherectomy SFA, popliteal and tibioperoneal trunk.     14 days postop Left  TMA.          Feels unchanged cp yesterday. Foot pain better controlled  Remains Afebrile     Left foot: Unchanged appearance of the plantar surface.  Unchanged area of duskiness on the dorsal surface with no surrounding erythema.  Good biphasic PT signal at ankle.  No DP signal.        WBC 11.6 from 12.4 from 11 yesterday   Blood cultures 11/5/2023: Staph aureus  Blood cultures 11/6/2023: No growth   Blood cultures 11/8/2023: No growth  Blood cultures 11/8/2023: No growth   Blood cultures 11/8/2023: No growth  Blood cultures 11/9/2023: No growth  Blood cultures 11/9//2023: No growth  Blood cultures 11/10/2023: No growth  Blood cultures 11/10/2023: No growth    A/P  Appears clinically stable  No signs of sepsis.  Foot has remained unchanged for past 4 days.  Note plans to discharge home today with antibiotics  Wife tells me there is a OP appointment for hyperbarics at [facility]  Vascular followup due 11/28.  I d/w patient and wife:  - Time will declare weatehr his foot remains viable or not........ if worsens (foot becomes red/erythema or plantar surface also has incresed duskiness in large area.. or incision breaksdown)..... then he will need major amp  - If suture line remains intact for next 4 weeks and does not breakdown.... and dusky area of foot does not worsen..... then may need to defroof the skin andneed skin graft..... giving hopr for limb salvage...... 10-20% chance of success with this.  - Daily dressing changes to foot: Xeroform/ABD Pad/Kerlex.

## 2023-11-10 NOTE — PROGRESS NOTES
"CLINICAL NUTRITION SERVICES - BRIEF NOTE FOR POSITIVE MST SCORE     Reason for RD note: Pt with positive MST (Malnutrition Screening Tool) score for \"unsure wt loss\"    New Findings/Chart Review:  Wt Readings from Last 20 Encounters:   10/30/23 66.7 kg (147 lb 0.8 oz)   07/31/23 68.5 kg (151 lb)   06/19/23 68.7 kg (151 lb 8 oz)   02/21/23 69.9 kg (154 lb 3.2 oz)   12/01/22 69.4 kg (153 lb)   09/08/22 67.9 kg (149 lb 12.8 oz)   05/27/22 79.6 kg (175 lb 6.4 oz)   05/13/22 79.2 kg (174 lb 9.6 oz)   04/26/22 84 kg (185 lb 3.2 oz)   03/15/22 91.6 kg (202 lb)   12/28/21 93.9 kg (207 lb)   09/21/21 93 kg (205 lb)   07/12/21 94.3 kg (208 lb)   06/03/21 93 kg (205 lb)   04/09/21 90.7 kg (200 lb)   02/15/21 88.5 kg (195 lb)   01/22/21 90.7 kg (200 lb)   01/04/21 93.4 kg (206 lb)   09/16/20 97.1 kg (214 lb)   01/20/20 97.1 kg (214 lb)   No recent wt loss noted per chart review.     Interventions:  None    Nutrition will follow per LOS protocol or sooner if consulted.    Kaila Harris, MPH, RDN, LD  5A ([id number]) + 7B (beds [id number]) RD pager: [phone]  Weekend/Holiday RD pager [phone]    "

## 2023-11-10 NOTE — PROGRESS NOTES
Home Infusion  Received referral from Macarena Hooper RNCC for IV antibiotics.  Benefits verified.  Patient has [facility] Medicare plan and does not have coverage for IV antibiotics at home.  Drug would be billed to the part D (patient responsible for copay per dispense) and supplies will be self pay. Based on Cefazolin 2gm q8h, total cost is approximately $31.75/day for drug and supplies.  Met with patient and spouse at bedside to review home infusion services, review benefits and offer choice of providers.  Patient would like to use [facility] for home infusion and is agreeable to self-pay cost.  Self-pay quote signed.  Confirmed discharge address, phone, and emergency contact information.  Frandy is expected to dc as early as tomorrow and will be going home on IV Cefazolin q8hrs.  He has done home IV therapy before and will need initial teaching.  Provided patient and spouse with information about [facility] services.  Explained about IV Push administration method, showed them the teaching materials and explained that a home nurse will provide additional teaching needed for self-administration.  Informed them about supplies and delivery of supplies, storage of medication, dosing times, plan for SNV and 24/7 availability of I staff while on IV therapy.     Spouse verbalized understanding of all information given.   She is willing to learn and manage home IV therapy.  Questions answered.  Plan for [facility] to provide home care services after discharge.   Will continue to follow until discharge and update pt once final orders are determined.    Thank you for the referral      Bart Richards, RN CRNI  [facility] Nurse Liaison   [email or web address]  Cell: [phone] M-F  [facility] Main: [phone]

## 2023-11-10 NOTE — PROGRESS NOTES
"Prolonged Parenteral/Oral Antibiotic Recommendations and ID Follow up  This template provides final ID recommendations as of this date. If there are clinical changes or questions please call the ID team.     Infectious Diseases Indication: Complicated MSSA bacteremia, ischemic foot with SSTI    Antibiotic Information  Name of Antibiotic Dose of Antibiotic1 Pharmacy to assist with dosing Y/N Anticipated duration Effective start date2 End date   Cefazolin 2g Q8H N 2 weeks 11/6/23 11/19/23   TMP/SMX (Oral) 2 DS tabs BID (~10mg/kg/day) N 2 weeks 11/20/23 12/3/23           1.Dose of antibiotic will need to be renally adjusted if creatinine clearance changes  2.Effective start date is the date of therapy with appropriate spectrum    Method of antibiotic delivery:PICC line. At the end of therapy should the line be removed? Yes. Selecting \"yes\" will function as written order to remove PICC line at the end of therapy.    Tentative plans for disposition: Home    Weekly labs required: CBC with diff, CMP and CRP. Dr. Hayden will follow labs at discharge until ID follow up. Please have labs faxed to ID clinic.    Appointment to be scheduled: Within 10-14 days of discharge. Type of ID Clinic Appointment Virtual Video visit. Appointment will be scheduled with: Donna Ambriz NP. Routine hospital follow up appointments are 30 minutes. If 60 minutes is necessary due to complexity of case indicate that a 60 min appointment is required. Appointment time Appointment Time: 60 minutes. If the patient remains in the hospital at this date please re-consult ID.     Imaging for ID follow up: ID Imaging: No.     ID provider to route this note to the appropriate [facility] pools: [facility] INFECTIOUS DISEASE ADULT [facility], PANDA, [facility] HOME INFUSION    [facility]/ID Clinic Information:  [address]  [place]  [address]  Phone: [phone]  Fax: [phone] (Attention Glenn Dixon)        "

## 2023-11-10 NOTE — PROGRESS NOTES
[facility]: PROGRESS NOTE      Patient:  Darian Engle   YOB: 1950, MRN: [id number]  Date of Visit: 11/10/2023  Date of Admission: 11/5/2023  Consult Requester: Mariano Dutton MD          Assessment and Recommendations:     ID Problem List:  1. Complicated MSSA bacteremia  -Blood culture 11/5/2023 in 1/4 bottles positive for MSSA  -Blood cultures 11/6/2023 NGTD  -Blood cultures 11/8/2023 NGTD  -Blood cultures 11/9/2023 NGTD  2. Left foot post-surgical site infection - presumed MSSA infection  3. PAD  4. A-fib on AC     Recommendations:  -- Continue IV cefazolin 2g Q8H  - Will treat for two total weeks with cefazolin 2g Q8H (through 11/19/23)  - Okay to place PICC line now  - Then, transition treatment to oral TMP/SMX 10mg/kg (based on trimethoprim component) divided into 2 daily doses for an additional two week (11/20/23 through 12/3/23); can pull PICC line once IV abx completed  - Obtain weekly CBC w/ diff, CMP, and CRP while on any antibiotic therapy  - Will request follow up in ID clinic near the end of treatment course        Discussion:  74 yo M with recent history of gangrenous left leg s/p transmetatarsal amputation on 10/27/2023 who now presents with Staph aureus bacteremia and elevated inflammatory markers (). Suspect that the source of his MSSA bacteremia is left foot post-surgical soft tissue infection given his clinical course and that the patient was noncompliant following amputation (left TCU AMA shortly after his surgery). Overall lower suspicion for infective endocarditis given he does not have established risk factors other than current MSSA bacteremia (no IVDU, prior heart valve surgery, prolonged bacteremia) and TTE is negative.  Currently on cefazolin 2 g every 8 hours IV with plans for total 4 weeks duration from 11/6/2023 - 12/3/2023.    Patient with only 1 BCx positive and clinically stable, but with some high risk features for complicated  Staph bacteremia. Notably, he has endovascular stenting in his RLE, and with ongoing LLE ischemia due to PAD. However, he otherwise meets criteria for uncomplicated bacteremia. At this point, I think I would favor a longer treatment course (akin to complicated bacteremia) due to his compromised antibiotic delivery to his left foot wound, which is considered the source of the bacteremia. Also, while we don't currently suspect elly acute osteomyelitis, a longer treatment course is likely to treat an early subclinical osteomyelitis that could be brewing given his ongoing foot wound/ischemia.     Recommend at minimum 2 weeks treatment with IV therapy. Given that extending to 4 weeks is a somewhat softer call anyway, I think the final two weeks could be treated with a transition to oral TMP/SMX.      ID will sign off at this time. Please reach out with any additional questions or concerns.     Yariel Hayden MD  Infectious Diseases  [phone]         Interval History and Events:   No acute overnight events. Afebrile. No new cultures positive. Continues to have foot pain despite pain regimen. Discussed with patient home IV antibiotic plan, with transition to orals half way through treatment. Also discussed with care coordinator and primary team.         HPI as adopted from initial ID consult on 11/07:     Darian Engle is a 74 yo M with PMHx of PMH of CAD s/p CAB w/ LIMA and L GSV (2004), HTN, HDL, atrial fibrillation on AC, PAD, dry gangrene, benign cystic neoplasm of the pancreas, TALA not on CPAP, dementia and DM II. Per chart review, the patient was admitted from 10/17/23 to 10/30/23 for gangrenous left leg, and underwent left common femoral endarterectomy and profundoplasty, sheath placement drug-coated balloon angioplasty on 10/24/2023. 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 He was discharged to rehab facility on 10/30, but left AMA after he felt that he was waiting in the lobby too  long.  He was then admitted to [facility] from 10/31/2023 to 11/1/23 due to weakness, and discharged to TCU, went back to [place] 11/5/23 and he was transferred to the [facility].  On 11/05 his Bcx were positive for Staph Aureus and he was started on Vancomycin. X-ray of foot showing no new fractures but cannot rule out osteomyelitis. Ortho recommending against advanced imaging. Has been afebrile since admission. WBC 8 on admission.           Review of Systems:   Targeted 5 point ROS was completed with pertinent positives and negatives are detailed above.         Antimicrobial history:   Current:  Cefazolin 2 g every 8 hours IV (11/8-present)    Previous:  Piperacillin-tazobactam 3.375g every 6 hours IV 11/06 - 11/08  Vancomysin 11/06 - 11/08          Physical Examination:   Temp:  [97.3  F (36.3  C)-98.1  F (36.7  C)] 97.3  F (36.3  C)  Pulse:  [65-86] 70  Resp:  [16-18] 17  BP: ()/(33-58) 128/46  SpO2:  [100 %] 100 %    I/O last 3 completed shifts:  In: 350 [P.O.:250; I.V.:100]  Out: 350 [Urine:350]    There were no vitals filed for this visit.    GEN: Pleasant and cooperative male. Awake, alert, interactive. Wife at the bedside.  Head: Normocephalic, atraumatic.  Eyes: Conjunctiva clear, anicteric sclera  Skin: Warm, dry, well-perfused (except LLE)  Musculoskeletal: LLE wrapped. Did not undressed bandage today.  Neuro: A&O. Answers questions appropriately, speech normal. Moves all extremities spontaneously.  Neuropsychiatric: Affect appropriate to situation.     Media Information                   Medications:       acetaminophen  975 mg Oral Q8H     apixaban ANTICOAGULANT  5 mg Oral BID     aspirin  81 mg Oral Daily     atorvastatin  40 mg Oral Daily     carvedilol  25 mg Oral BID     ceFAZolin  2 g Intravenous Q8H     cilostazol  100 mg Oral BID     donepezil  10 mg Oral At Bedtime     gabapentin  100 mg Oral BID     insulin aspart  1-7 Units Subcutaneous TID AC     insulin aspart  1-5 Units  Subcutaneous At Bedtime     isosorbide mononitrate  60 mg Oral Daily     losartan  50 mg Oral Daily     magnesium oxide  200 mg Oral Daily     multivitamin, therapeutic  1 tablet Oral Daily     pantoprazole  40 mg Oral Daily     senna-docusate  1 tablet Oral BID     sertraline  25 mg Oral Daily     Vitamin D3  50 mcg Oral Daily       Antiinfectives:  Anti-infectives (From now, onward)      Start     Dose/Rate Route Frequency Ordered Stop    11/08/23 1000  ceFAZolin (ANCEF) 2 g in 100 mL D5W intermittent infusion         2 g  200 mL/hr over 30 Minutes Intravenous EVERY 8 HOURS 11/08/23 0649                   Laboratory Data:     Microbiology:  Culture   Date Value Ref Range Status   11/09/2023 No growth after 1 day  Preliminary   11/09/2023 No growth after 1 day  Preliminary   11/08/2023 No growth after 1 day  Preliminary   11/08/2023 No growth after 1 day  Preliminary   11/06/2023 No growth after 3 days  Preliminary   11/06/2023 No growth after 3 days  Preliminary   11/05/2023 Positive on the 1st day of incubation (A)  Final   11/05/2023 Staphylococcus aureus (AA)  Final     Comment:     1 of 2 bottles   11/05/2023 No growth after 4 days  Preliminary       Inflammatory Markers    Recent Labs   Lab Test 11/06/23  0554   SED 51*     CRP Inflammation   Date Value Ref Range Status   11/09/2023 129.00 (H) <5.00 mg/L Final   11/08/2023 105.00 (H) <5.00 mg/L Final   11/07/2023 108.00 (H) <5.00 mg/L Final   11/06/2023 104.00 (H) <5.00 mg/L Final   11/05/2023 110.00 (H) <5.00 mg/L Final       Metabolic Studies     Recent Labs   Lab Test 11/10/23  0817 11/10/23  0648 11/09/23  0800 11/09/23  0630 11/08/23  0812 11/08/23  0633 11/06/23  0540 11/05/23  2203 10/17/23  1854 10/17/23  1455   NA  --  136  --  137  --  136   < > 137   < >  --    POTASSIUM  --  3.9  --  3.9  --  3.9   < > 4.4   < >  --    CHLORIDE  --  105  --  105  --  105   < > 104   < >  --    CO2  --  18*  --  16*  --  17*   < > 19*   < >  --    ANIONGAP  --  13   --  16*  --  14   < > 14   < >  --    BUN  --  17.5  --  14.2  --  17.2   < > 21.9   < >  --    CR  --  0.96  --  0.80  --  0.97   < > 0.89   < > 0.65*   GFRESTIMATED  --  85  --  >90  --  83   < > >90   < > >90   GLC 90 99   < > 88   < > 67*   < > 66*   < >  --    A1C  --   --   --   --   --   --   --   --   --  5.5   SHERYL  --  8.6*  --  8.5*  --  8.5*   < > 8.8   < >  --    PHOS  --   --   --   --   --   --   --  3.0   < >  --    MAG  --   --   --   --   --   --   --  1.5*   < > 1.4*   LACT  --   --   --   --   --   --   --  0.8   < >  --     < > = values in this interval not displayed.       Hepatic Studies    Recent Labs   Lab Test 11/06/23  0554 11/05/23  2203 02/21/23  1112   BILITOTAL 0.5 0.7  --    ALKPHOS 87 97  --    ALBUMIN 3.1* 3.1* 3.4   AST 29 29  --    ALT 9 10  --        Pancreatitis testing    Recent Labs   Lab Test 07/13/21  0830   TRIG 90       Hematology Studies      Recent Labs   Lab Test 11/10/23  0648 11/09/23  0630 11/08/23  0633 06/03/21  1747 02/03/17  1551   WBC 11.6* 12.4* 11.0   < > 9.8   ANEU  --   --   --   --  6.0   ALYM  --   --   --   --  2.6   SARAH  --   --   --   --  0.9   AEOS  --   --   --   --  0.2   HGB 7.9* 7.8* 7.8*   < > 13.9   HCT 24.7* 24.1* 23.9*   < > 41.3    384 347   < > 256    < > = values in this interval not displayed.       Arterial Blood Gas Testing    Recent Labs   Lab Test 10/24/23  1411   PH 7.37   PCO2 36   PO2 166*   HCO3 21   O2PER 36.0        Urine Studies     Recent Labs   Lab Test 11/09/23  1618 08/02/19  0849   URINEPH 5.5 6.0   NITRITE Negative Positive*   LEUKEST Negative Negative   WBCU 5 5-10*       Vancomycin Levels     Recent Labs   Lab Test 11/08/23  0633   VANCOMYCIN 16.4          Imaging:     ECHO (TTE) 11/8/23  Interpretation Summary  No echocardiographic evidence of endocarditis on this transthoracic study.  Consider EVER if clinical suspicion warrants.  Global and regional left ventricular function is normal with an EF of 60-65%.  Right  ventricular function, chamber size, wall motion, and thickness are  normal.  Mild aortic insufficiency is present.  This study was compared with the study from 9/21/23 .  No significant changes noted.

## 2023-11-10 NOTE — PROGRESS NOTES
Care Management Follow Up    Length of Stay (days): 5    Expected Discharge Date: 11/13/2023     Concerns to be Addressed: discharge planning     Patient plan of care discussed at interdisciplinary rounds: Yes    Anticipated Discharge Disposition: Home with services      Anticipated Discharge Services:  Home Care, Home Infusion  Anticipated Discharge DME:NA      Patient/family educated on Medicare website which has current facility and service quality ratings: yes   Education Provided on the Discharge Plan:yes   Patient/Family in Agreement with the Plan: yes     Referrals Placed by CM/SW: Home Infusion, Home Care   Private pay costs discussed: IV abx    Additional Information:  Received update from ID that pt will need 2 total weeks of IV abx.  [facility], to meet with pt/spouse to review self pay cost of abx.  Will need to coordinate with [facility] a nurse visit for education on IV abx administration prior to discharge.  RNCC will continue to follow and assist with discharge planning.        [facility]  Phone: [phone]  Fax: [phone]     [facility](RN/PT/OT)  Phone: [phone]  Fax: [phone]    [facility](IV abx)  Phone: [phone]  Fax: [phone]    [facility]  Phone: [phone]  Pager: [phone]    SEARCHABLE in [facility] - search CARE COORDINATOR     [place] ([id number]) Saturday & Sunday; ([id number]) [facility] Recognized Holidays     Units: 5A, 5B & 5C  Pager: [phone]    Units: 6B, 6C & 6D    Pager: [phone]    Units: 7A, 7B, 7C & 7D    Pager: [phone]    Units: 6A & ICU   Pager: [phone]    Units: 5 Ortho, 5MS & WB ED Pager: [phone]    Units: 6MS, 8A & 10 ICU  Pager [phone]

## 2023-11-10 NOTE — PLAN OF CARE
Goal Outcome Evaluation:      Plan of Care Reviewed With: patient    Overall Patient Progress: improvingOverall Patient Progress: improving     Temp: 97.9  F (36.6  C) Temp src: Oral BP: 135/43 Pulse: 86   Resp: 18 SpO2: 100 % O2 Device: None (Room air)       A&Ox4, forgetful. Complain of severe pain once overnight; given oxycodone, ketorolac, and tizanidine. Not OOB, reposition self in bed. Left foot wrapped, clean and intact. Left posterior tibial pulse weak with doppler. Right foot pulses WDL. No numbness or tingling. PIV saline locked.

## 2023-11-10 NOTE — PROGRESS NOTES
[facility]    Medicine Progress Note - Hospitalist Service, GOLD TEAM 8    Date of Admission:  11/5/2023    Assessment & Plan   Darian Engle is a 73 year old male admitted on 11/5/2023. He has a past medical history of PMH  of CAD s/p CAB w/ LIMA and L GSV (2004), HTN, HDL, atrial fibrillation on AC, PAD, dry gangrene, benign cystic neoplasm of the pancreas, TALA not on CPAP, dementia and DM II. He was transferred from [place] for orthopedic and vascular consult. Denies chest pain, abdominal pain, nausea or vomiting.      Today's plan   -c/w nafcillin for 4 week course   -Spoke to wife at bedside, Patients family adamant about taking the patient home   -Noted gradual drop in white count      Left foot infection  Staph aureus bacteremia 11/6   Admitted from 10/17/23 to 10/30/23 for gangrenous left leg, and underwent left common femoral endarterectomy and profundoplasty, sheath placement drug-coated balloon angioplasty on 10/24/2023. 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 He was discharged to rehab facility on 10/30, but left AMA after he felt that he was waiting in the lobby too long.  He was then admitted to [facility] from 10/31/2023 to 11/1/23 due to weakness, and discharged to TCU, went back to [place] 11/5/23 and he was transferred to the [facility]. WBC 8 and he is afebrile. He received 1000 ml bolus and started on vancomycin. X-ray of foot showing no new fractures but possible osteomyelitis.  - Ortho consult  - Vascular consult  - Follow blood cultures  - on cefazolin   - Oxy and tylenol for pain  -Added ketorolac for breakthrough pain      PAD  HTN  Atrial fibrillation on AC  MR cardiac 10/18/23 shows left ventricle is normal in cavity size and moderate concentric hypertrophy and LVEF is 67%. EKGs have shown SR. Wife was called for med rec. Patient was sent with MAR from facility.  - PTA amlodipine, apixaban, asa,  atorvastatin, coreg, pletal, losartan, spirolactone, and isosorbide.      DM II  Last A1C 5.5 on 11/6/23  - Hold metformin  - Medium sliding scale     Dementia  Answers question well, but forgetful and does not know medical history well  - PTA Aricept     Anemia  History of anemia with Hbg around 10. He was 14.5 on admission  - CBC          Diet: Moderate Consistent Carb (60 g CHO per Meal) Diet    DVT Prophylaxis: Apixaban for dvt ppx   Mercado Catheter: Not present  Lines: None     Cardiac Monitoring: None  Code Status: Full Code      Clinically Significant Risk Factors              # Hypoalbuminemia: Lowest albumin = 3.1 g/dL at 11/6/2023  5:54 AM, will monitor as appropriate     # Hypertension: Noted on problem list     # Dementia: noted on problem list        # Financial/Environmental Concerns:           Disposition Plan      Expected Discharge Date: 11/13/2023                    Mariano Dutton MD  Hospitalist Service, GOLD TEAM [address] [place] [address]  Securely message with [employer] (more info)  Text page via [employer] Paging/Directory   See signed in provider for up to date coverage information  ______________________________________________________________________    Interval History   Patients doing better, no new abdominal pain, difficulty breathing, fevers or malaise     Physical Exam   Vital Signs: Temp: 97.3  F (36.3  C) Temp src: Oral BP: 128/46 Pulse: 70   Resp: 17 SpO2: 100 % O2 Device: None (Room air)    Weight: 138 lbs 14.4 oz  Patient is awake and alert   Lungs are clear   S1 and s2 heard   Abdomen was soft and non distended     Medical Decision Making       31 MINUTES SPENT BY ME on the date of service doing chart review, history, exam, documentation & further activities per the note.      Data     I have personally reviewed the following data over the past 24 hrs:    11.6 (H)  \   7.9 (L)   / 373     136 105 17.5 /  91   3.9 18 (L) 0.96 \

## 2023-11-10 NOTE — PLAN OF CARE
Goal Outcome Evaluation:      Plan of Care Reviewed With: patient    Overall Patient Progress: no change    Outcome Evaluation: Assumed care from [id number]; remains alert and oriented; pt intermittently confused; VSS; RA; c/o pain in LLE - amputation site. Bed & chair alarm for safety. PIV SL. UA sent. Chest xray completed. Continue POC.    /41 (BP Location: Left arm)   Pulse 74   Temp 98.1  F (36.7  C) (Oral)   Resp 16   SpO2 100%

## 2023-11-11 NOTE — PROGRESS NOTES
Pt had 2 vape pens in their pocket. Writer explained that vape pens are not allowed in the hospital. Pt explained their friend will  the pens tomorrow morning.

## 2023-11-11 NOTE — PROGRESS NOTES
Temp: 99.1  F (37.3  C) Temp src: Oral BP: (!) 147/46 Pulse: 89   Resp: 16 SpO2: 98 % O2 Device: None (Room air)     A&O x4, forgetful, bed alarm on. On RA, VSS. Pain managed with Oxycodone & scheduled Tylenol. Carb consistent diet, but poor appetite. Transfers with assist x 1. Voiding to urinal, no BM today. LLE wound dressing changed. Plan is to discharge home with home infusion. Continue with POC.

## 2023-11-11 NOTE — PLAN OF CARE
Goal Outcome Evaluation:      Plan of Care Reviewed With: patient    Overall Patient Progress: no change    Neuro:A/O x4  intermittent confused  Respiratory:WDL on RA  Cardiac:WDL. Denies chest pain  Diet:consistent carb  GI/:3 loose watery bms overnight. Provider notified, cbc and BMP panels ordered   Incision/Drains: L toe amputation -CDI  IV Access:P piv infusing tko Ns 10 ml/hr   Pain: managed with PRN oxy  Labs:reviewed   VS:Temp: 98.9  F (37.2  C) Temp src: Oral BP: 128/50 Pulse: 87   Resp: 16 SpO2: 94 % O2 Device: None (Room air)     Activity:Assist 1-2  to pivot     New Changes this shift:none  Plan: continue POC

## 2023-11-11 NOTE — PROGRESS NOTES
[facility]    Medicine Progress Note - Hospitalist Service, GOLD TEAM 8    Date of Admission:  11/5/2023    Assessment & Plan   Darian Engle is a 73 year old male admitted on 11/5/2023. He has a past medical history of PMH  of CAD s/p CAB w/ LIMA and L GSV (2004), HTN, HDL, atrial fibrillation on AC, PAD, dry gangrene, benign cystic neoplasm of the pancreas, TALA not on CPAP, dementia and DM II. He was transferred from [place] for orthopedic and vascular consult. Denies chest pain, abdominal pain, nausea or vomiting.      Today's plan   -Placed order to have picc line placed   -Attempting to arrange outpatient hyperbaric chamber treatments and OPAT      Left foot infection  Staph aureus bacteremia 11/6   Admitted from 10/17/23 to 10/30/23 for gangrenous left leg, and underwent left common femoral endarterectomy and profundoplasty, sheath placement drug-coated balloon angioplasty on 10/24/2023. 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 He was discharged to rehab facility on 10/30, but left AMA after he felt that he was waiting in the lobby too long.  He was then admitted to [facility] from 10/31/2023 to 11/1/23 due to weakness, and discharged to TCU, went back to [place] 11/5/23 and he was transferred to the [facility]. WBC 8 and he is afebrile. He received 1000 ml bolus and started on vancomycin. X-ray of foot showing no new fractures but possible osteomyelitis.  - Ortho consult  - Vascular consult  - Follow blood cultures  - on cefazolin   - Oxy and tylenol for pain  -Added ketorolac for breakthrough pain      PAD  HTN  Atrial fibrillation on AC  MR cardiac 10/18/23 shows left ventricle is normal in cavity size and moderate concentric hypertrophy and LVEF is 67%. EKGs have shown SR. Wife was called for med rec. Patient was sent with MAR from facility.  - PTA amlodipine, apixaban, asa, atorvastatin, coreg, pletal, losartan,  spirolactone, and isosorbide.      DM II  Last A1C 5.5 on 11/6/23  - Hold metformin  - Medium sliding scale     Dementia  Answers question well, but forgetful and does not know medical history well  - PTA Aricept     Anemia  History of anemia with Hbg around 10. He was 14.5 on admission  - CBC          Diet: Moderate Consistent Carb (60 g CHO per Meal) Diet    DVT Prophylaxis: Apixaban for dvt ppx   Mercado Catheter: Not present  Lines: None     Cardiac Monitoring: None  Code Status: Full Code      Clinically Significant Risk Factors              # Hypoalbuminemia: Lowest albumin = 3.1 g/dL at 11/6/2023  5:54 AM, will monitor as appropriate     # Hypertension: Noted on problem list     # Dementia: noted on problem list        # Financial/Environmental Concerns:           Disposition Plan     Expected Discharge Date: 11/13/2023                    Mariano Dutton MD  Hospitalist Service, GOLD TEAM [address] [place] [address]  Securely message with [employer] (more info)  Text page via [employer] Paging/Directory   See signed in provider for up to date coverage information  ______________________________________________________________________    Interval History   Patient is doing well, denies any abdominal pain, difficulty breathing, fevers or malaise     Physical Exam   Vital Signs: Temp: 98.6  F (37  C) Temp src: Oral BP: 139/41 Pulse: 67   Resp: 16 SpO2: 99 % O2 Device: None (Room air)    Weight: 138 lbs 14.4 oz  Patient is awake and alert, Wife at bedside   Lungs are clear   S1 and s2 heard   Abdomen was soft and non distended     Medical Decision Making       24 MINUTES SPENT BY ME on the date of service doing chart review, history, exam, documentation & further activities per the note.      Data     I have personally reviewed the following data over the past 24 hrs:    10.9  \   7.2 (L)   / 330     136 108 (H) 13.8 /  120 (H)   4.0 18 (L) 0.71 \

## 2023-11-11 NOTE — PROCEDURES
[facility]    Single Lumen PICC Placement    Date/Time: 11/11/2023 3:11 PM    Performed by: Shelly Quinn RN  Authorized by: Mariano Dutton MD  Indications: vascular access      UNIVERSAL PROTOCOL   Site Marked: Yes  Prior Images Obtained and Reviewed:  Yes  Required items: Required blood products, implants, devices and special equipment available    Patient identity confirmed:  Verbally with patient, arm band, provided demographic data, hospital-assigned identification number and anonymous protocol, patient vented/unresponsive  NA - No sedation, light sedation, or local anesthesia  Confirmation Checklist:  Patient's identity using two indicators, relevant allergies, procedure was appropriate and matched the consent or emergent situation and correct equipment/implants were available  Time out: Immediately prior to the procedure a time out was called    Universal Protocol: the Joint Commission Universal Protocol was followed    Preparation: Patient was prepped and draped in usual sterile fashion       ANESTHESIA    Anesthesia:  Local infiltration  Local Anesthetic:  Lidocaine 1% without epinephrine  Anesthetic Total (mL):  3.5      SEDATION    Patient Sedated: No        Preparation: skin prepped with ChloraPrep  Skin prep agent: skin prep agent completely dried prior to procedure  Sterile barriers: maximum sterile barriers were used: cap, mask, sterile gown, sterile gloves, and large sterile sheet  Hand hygiene: hand hygiene performed prior to central venous catheter insertion  Type of line used: PICC  Catheter type: single lumen  Lumen type: non-valved and power PICC  Catheter size: 4 Fr  Brand: Bard  Lot number: [id number]  Placement method: venipuncture, MST and ultrasound  Number of attempts: 1  Difficulty threading catheter: no  Successful placement: yes  Orientation: right  Catheter to Vein (%): 20  Location: basilic vein (0.72 cm vein diameter)  Tip  Location: [facility]  Arm circumference: adults 10 cm  Extremity circumference: 25  Visible catheter length: 1.5  Total catheter length: 43  Dressing and securement: adhesive securement device, blood cleaned with CHG, chlorhexidine disc applied, alcohol impregnated caps, dressing applied, glue, statlock, site cleansed, sterile dressing applied and transparent dressing  Post procedure assessment: blood return through all ports, free fluid flow and placement verified by 3CG technology  PROCEDURE   Disposal: sharps and needle count correct at the end of procedure, needles and guidewire disposed in sharps container
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
## 2023-11-12 NOTE — PROGRESS NOTES
[facility]    Medicine Progress Note - Hospitalist Service, GOLD TEAM 8    Date of Admission:  11/5/2023    Assessment & Plan   Darian Engle is a 73 year old male admitted on 11/5/2023. He has a past medical history of PMH  of CAD s/p CAB w/ LIMA and L GSV (2004), HTN, HDL, atrial fibrillation on AC, PAD, dry gangrene, benign cystic neoplasm of the pancreas, TALA not on CPAP, dementia and DM II. He was transferred from [place] for orthopedic and vascular consult. Denies chest pain, abdominal pain, nausea or vomiting.      Today's plan   -Attempting to get teaching for home infusion   -Patients prior MRI abdomen scans reviewed showed signs of chronic pancreatitis, Ordered fecal elastase   -Noted to have some questionable changes of complex cyst that need serial follow up      Left foot infection  Staph aureus bacteremia 11/6   Admitted from 10/17/23 to 10/30/23 for gangrenous left leg, and underwent left common femoral endarterectomy and profundoplasty, sheath placement drug-coated balloon angioplasty on 10/24/2023. 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 He was discharged to rehab facility on 10/30, but left AMA after he felt that he was waiting in the lobby too long.  He was then admitted to [facility] from 10/31/2023 to 11/1/23 due to weakness, and discharged to TCU, went back to [place] 11/5/23 and he was transferred to the [facility]. WBC 8 and he is afebrile. He received 1000 ml bolus and started on vancomycin. X-ray of foot showing no new fractures but possible osteomyelitis.  - Ortho consult  - Vascular consult  - Follow blood cultures  - on cefazolin   - Oxy and tylenol for pain  -Added ketorolac for breakthrough pain      PAD  HTN  Atrial fibrillation on AC  MR cardiac 10/18/23 shows left ventricle is normal in cavity size and moderate concentric hypertrophy and LVEF is 67%. EKGs have shown SR. Wife was called for  med rec. Patient was sent with MAR from facility.  - PTA amlodipine, apixaban, asa, atorvastatin, coreg, pletal, losartan, spirolactone, and isosorbide.      DM II  Last A1C 5.5 on 11/6/23  - Hold metformin  - Medium sliding scale     Dementia  Answers question well, but forgetful and does not know medical history well  - PTA Aricept     Anemia  History of anemia with Hbg around 10. He was 14.5 on admission  - CBC          Diet: Moderate Consistent Carb (60 g CHO per Meal) Diet  Snacks/Supplements Adult: Other; vanilla Ensure Shake daily at 10 AM (send strawberry when able), cabrera Magic Cup at PM snack time daily; Between Meals    DVT Prophylaxis: Apixaban for dvt ppx   Mercado Catheter: Not present  Lines: PRESENT      PICC 11/11/23 Single Lumen Right Basilic Antibiotics. PICC was ready to use.-Site Assessment: WDL      Cardiac Monitoring: None  Code Status: Full Code      Clinically Significant Risk Factors              # Hypoalbuminemia: Lowest albumin = 3.1 g/dL at 11/6/2023  5:54 AM, will monitor as appropriate     # Hypertension: Noted on problem list     # Dementia: noted on problem list        # Financial/Environmental Concerns:           Disposition Plan     Expected Discharge Date: 11/13/2023                    Mariano Dutton MD  Hospitalist Service, GOLD TEAM [address] [place] [address]  Securely message with [employer] (more info)  Text page via [facility] Paging/Directory   See signed in provider for up to date coverage information  ______________________________________________________________________    Interval History   Patient is awake and alert, He denies any abdominal pain, difficulty breathing, fevers or malaise     Physical Exam   Vital Signs: Temp: 97.5  F (36.4  C) Temp src: Oral BP: (!) 185/61 Pulse: 70   Resp: 16 SpO2: 100 % O2 Device: None (Room air)    Weight: 138 lbs 14.4 oz  Patient is awake and alert,   Lungs are clear   S1 and s2 heard   Abdomen was soft and non  distended     Medical Decision Making       32 MINUTES SPENT BY ME on the date of service doing chart review, history, exam, documentation & further activities per the note.      Data     I have personally reviewed the following data over the past 24 hrs:    9.8  \   7.7 (L)   / 338     136 107 11.5 /  95   3.9 20 (L) 0.64 (L) \

## 2023-11-12 NOTE — PLAN OF CARE
Goal Outcome Evaluation:      Plan of Care Reviewed With: patient    Overall Patient Progress: no change    Neuro:A/O x4  intermittent confused  Respiratory:WDL on RA  Cardiac:WDL. Denies chest pain  Diet:consistent carb  GI/: WDL. Voiding adequately   Incision/Drains: L toe amputation -CDI  IV Access:P piv infusing tko Ns 10 ml/hr   Pain: managed with PRN oxy and toradol  Labs:reviewed   VS:Temp: 97.9  F (36.6  C) Temp src: Oral BP: (!) 136/39 Pulse: 67   Resp: 16 SpO2: 99 % O2 Device: None (Room air)     Activity:Assist 1-2  to pivot     New Changes this shift:none  Plan: continue POC

## 2023-11-12 NOTE — PROGRESS NOTES
Temp: 98.1  F (36.7  C) Temp src: Oral BP: 132/42 Pulse: 63   Resp: 16 SpO2: 99 % O2 Device: None (Room air)     A&O x4, forgetful, bed alarm on. On RA, VSS. Pain managed with Oxycodone, Tizanidine & scheduled Tylenol. Carb consistent diet, but poor appetite. Transfers with assist x 1. Voiding to urinal, no BM today. LLE wound dressing changed. PICC line was placed. Plan is to discharge home tomorrow with home infusion. Continue with POC.

## 2023-11-12 NOTE — PROGRESS NOTES
CLINICAL NUTRITION SERVICES - ASSESSMENT NOTE     Nutrition Prescription    RECOMMENDATIONS FOR MDs/PROVIDERS TO ORDER:  Recommend checking a fecal pancreatic elastase (requires a solid/formed stool - not accurate with watery BM)    If only having loose stools (per patient/SO this is the case) - could consider trial of enzymes.  Would recommend trying Creon 24 - 2 to 3 with meals, 1 with snacks.  Monitor for change in stool consistency, weight and PO tolerance over a 1 month trial.      Consider appetite stimulant.  Weight loss may also be related to generalized poor appetite over the last ~1 month for which he was mostly hospitalized/at rehab.     Malnutrition Status:    Severe malnutrition in the context of acute illness    Recommendations already ordered by Registered Dietitian (RD):  Trial Ensure Plus Shake, Magic Cup supplements     Future/Additional Recommendations:  Monitor ongoing weight trends.  May need to consider supplemental nutrition if patient continues to lose weight and has minimal PO intake.      REASON FOR ASSESSMENT  Darian Engle is a/an 73 year old male assessed by the dietitian for Provider Order -     h/o chronic pancreatitis, Pt loosing weight, does he need creon     NUTRITION HISTORY  PMH includes CAD s/p CAB, benign cystic neoplasm of pancreas, DM2.  Team wondering if patient requires Creon d/t weight loss.  Noted recent hospitalization from 10/17-10/30 for gangrenous left leg, had underwent left common femoral endarterectomy and profundoplasty, sheath placement drug-coated balloon angioplasty on 10/24/2023. 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  Was at rehab on 10/30, but left AMA.  Since then has been hospitalized twice at [facility] with a short rehab stay in between.  Overall, mainly hospitalized over the last ~1 month with likely less than adequate oral intake.      Pt and SO report poor appetite over the last month.  Prior to the last month, his  "appetite was very good and he was always snacking.     CURRENT NUTRITION ORDERS  Diet: Moderate Consistent Carbohydrate  Intake/Tolerance: Poor appetite.  He reports having 0-1 loose stools daily.  Has multiple food options in front of him, all uneaten.     LABS   (elevated) 11/9    MEDICATIONS  MVI  Protonix  Med sliding scale insulin  MagOx 200 mg daily  Vit D3 50 mcg daily    ANTHROPOMETRICS  Height: 5'7\"  Most Recent Weight: 63 kg (138 lb 14.4 oz)  - 11/10  IBW: 67 kg  BMI: Normal BMI  Weight History: Weight loss of 12# (7.8%) within last ~3 months.  Weight loss may be related to poor appetite/intake throughout hospitalizations/rehab.    Wt Readings from Last 15 Encounters:   11/10/23 63 kg (138 lb 14.4 oz)   10/30/23 66.7 kg (147 lb 0.8 oz)   07/31/23 68.5 kg (151 lb)   06/19/23 68.7 kg (151 lb 8 oz)   02/21/23 69.9 kg (154 lb 3.2 oz)   12/01/22 69.4 kg (153 lb)   09/08/22 67.9 kg (149 lb 12.8 oz)   05/27/22 79.6 kg (175 lb 6.4 oz)   05/13/22 79.2 kg (174 lb 9.6 oz)   04/26/22 84 kg (185 lb 3.2 oz)   03/15/22 91.6 kg (202 lb)   12/28/21 93.9 kg (207 lb)   09/21/21 93 kg (205 lb)   07/12/21 94.3 kg (208 lb)   06/03/21 93 kg (205 lb)   Dosing Weight: 63 kg (actual wt)     ASSESSED NUTRITION NEEDS  Estimated Energy Needs: 5886-1016 kcals/day (25 - 30 kcals/kg)+  Justification: Maintenance  Estimated Protein Needs: 76-95 grams protein/day (1.2 - 1.5 grams of pro/kg)  Justification: Increased needs  Estimated Fluid Needs: per MD    PHYSICAL FINDINGS  See malnutrition section below.    MALNUTRITION  % Intake: </=50% for >/= 1 month (severe)  % Weight Loss: > 7.5% in 3 months (severe)  Subcutaneous Fat Loss: Upper arm:  moderate and Lower arm:  moderate  Muscle Loss: Upper arm (bicep, tricep):  severe and Upper leg (quadricep, hamstring):  severe  Fluid Accumulation/Edema: None noted  Malnutrition Diagnosis: Severe malnutrition in the context of acute illness    NUTRITION DIAGNOSIS  Unintended weight loss " related to reduced appetite as evidenced by weight loss of 7.8% within the last 3 months.      INTERVENTIONS  Implementation  Nutrition Education: Provided education on RD recommendations (see above).  Provided high calorie/high protein recipes for wife to use at home.     Medical food supplement therapy     Goals  Weight maintenance >63 kg     Monitoring/Evaluation  Progress toward goals will be monitored and evaluated per protocol.  Casandra Malhotra, MS, RD, LD, CCTD, CNSC  7A and 7B beds [phone], pager [phone]  Weekend pager [phone]

## 2023-11-12 NOTE — PLAN OF CARE
Goal Outcome Evaluation:      Plan of Care Reviewed With: patient    Overall Patient Progress: decliningOverall Patient Progress: declining    Outcome Evaluation: See RD note for recommendations.

## 2023-11-13 NOTE — PHARMACY
[facility]  Parenteral ANtibiotic Review at Departure from Acute Care Collaborative Note     Patient: Darian Engle  MRN: [id number]  Allergies: Lidocaine, Lisinopril, and Naltrexone    Current Location:   OPAT to be provided by: [facility]       Line Type: PICC    Diagnosis/Indications: Complicated MSSA bacteremia, ischemic foot with SSTI  Organism(s): MSSA  MRDO? No  Pending Cultures/Microbiological Tests: yes Serial blood cultures still pending for confirmation of clearance.    Inpatient ID involved in developing OPAT plan: Yes - discharge OPAT plan has no changes from ID provider, Dr. Hayden, OPAT plan charted on 11/10/23    Outpatient ID Follow-up: ID OPAT Clinic Referral Placed ([facility] Ph: [phone] and Fax: [phone]) - appointment not scheduled, but needed  Designated Provider: Catracho    Antimicrobial Regimen / Route Anticipated  Duration Start Date Stop /  Reassess Date   Cefazolin 2g every 8 hours/IV 2 weeks 11/6/23 11/19/23   Trimethoprim + Sulfamethoxazole 2 DS tablets (160/800mg) every 12 hours/PO 2 weeks 11/20/23 12/3/23     Laboratory Tests and Monitoring Frequency: CBC with Diff, CMP, CRP Once Weekly    ID Pharmacist Interventions: None                          Moshe Guerra, PharmD  Pager: [phone]

## 2023-11-13 NOTE — PLAN OF CARE
Physical Therapy Discharge Summary    Reason for therapy discharge:    All goals and outcomes met, no further needs identified.  No further expectations of functional progress.  Patient/family request discontinuation of services.  Pt requiring up to CGA for bed mobility, transfers to power WHC, and short distance ambulation with FWW. Pt has spouse at home willing and able to assist with mobility as needed. Pt reports plan to utilize power WHC for mobilization both within home and community until able to tolerate weight through L LE. Pt reports no need for further PT d/t mobilizing with WHC.    Progress towards therapy goal(s). See goals on Care Plan in [facility] electronic health record for goal details.  Goals partially met.  Barriers to achieving goals:   limited tolerance for therapy. And mobilizing safely with power WHC.    Therapy recommendation(s):    Continued therapy is recommended.  Rationale/Recommendations:  Recommend  PT to further progress strength, activity tolerance, and balance to return to baseline activity levels and PLOF.

## 2023-11-13 NOTE — PROGRESS NOTES
Hospital Medicine  Patient seen at nursing request.  He is cantankerous and verbal about not wanting to stay here.  He has clear dementia.  Assessment:  warrants 1:1 sitter and no sedation medications.  Solomon Valdovinos MD

## 2023-11-13 NOTE — PLAN OF CARE
Occupational Therapy Discharge Summary    Reason for therapy discharge:    Discharged to home.    Progress towards therapy goal(s). See goals on Care Plan in [facility] electronic health record for goal details.  Goals partially met.  Barriers to achieving goals:   discharge from facility.    Therapy recommendation(s):    Continued therapy is recommended.  Rationale/Recommendations:  recommend further therapy at this time to increase ADL independence and home safety..

## 2023-11-13 NOTE — DISCHARGE SUMMARY
[facility]  Hospitalist Discharge Summary      Date of Admission:  11/5/2023  Date of Discharge:  11/13/2023  1:01 PM  Discharging Provider: Mariano Dutton MD  Discharge Service: Hospitalist Service, GOLD TEAM 8    Discharge Diagnoses   Left foot infection       Clinically Significant Risk Factors     # Severe Malnutrition: based on nutrition assessment      Follow-ups Needed After Discharge   Follow-up Appointments     Adult [facility] Follow-up and recommended labs and tests      Primary care within a month   ID within a month   Vascular surgery within a month     Appointments on [place] and/or [facility] (with [facility] or [facility]   provider or service). Call [phone] if you haven't heard regarding   these appointments within 7 days of discharge.        {Additional follow-up instructions/to-do's for PCP    :cbc,  cmp     Unresulted Labs Ordered in the Past 30 Days of this Admission       Date and Time Order Name Status Description    11/13/2023  1:00 AM Blood Culture Special Organism In process     11/13/2023  1:00 AM Blood Culture Special Organism In process     11/12/2023  8:48 AM Cancer antigen 19-9 In process     11/12/2023  6:34 AM Blood Culture Special Organism Preliminary     11/12/2023  1:00 AM Blood Culture Special Organism Preliminary     11/11/2023  1:00 AM Blood Culture Special Organism Preliminary     11/11/2023  1:00 AM Blood Culture Special Organism Preliminary     11/10/2023  1:00 AM Blood Culture Special Organism Preliminary     11/10/2023  1:00 AM Blood Culture Special Organism Preliminary     11/9/2023  1:01 AM Blood Culture Special Organism Preliminary     11/9/2023  1:01 AM Blood Culture Special Organism Preliminary     11/8/2023  6:11 PM Blood Culture Arm, Right Preliminary     11/8/2023  6:11 PM Blood Culture Arm, Left Preliminary         These results will be followed up by primary care, vascular surgery, GI     Discharge Disposition    Discharged to home  Condition at discharge: Stable    Hospital Course   Darian Engle is a 73 year old male admitted on 11/5/2023. He has a past medical history of PMH  of CAD s/p CAB w/ LIMA and L GSV (2004), HTN, HDL, atrial fibrillation on AC, PAD, dry gangrene, benign cystic neoplasm of the pancreas, TALA not on CPAP, dementia and DM II. He was transferred from [place] for orthopedic and vascular consult.     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/26  vascular team was consulted, They did not feel like the foot was ischemic and he had just a superimposed infection, Did discuss findings with Dr Felix who did not recommend imaging, but rather mentioned conservative treatment of infection and using hyperbaric oxygen therapy on discharge to help save the limb tissue,      Blood cultures from 11/5 came back positive for staphylococcus aureus, under the guidance of ID team the antibiotics were narrowed to cefazolin with plans to treat for a course for 2 weeks and then transition to oral bactrim for 2 more weeks, and plans to undergo hyperbaric oxygen treatment at [facility] and have close follow up with vascular team to do everything possibly to save the limb. Patient is been discharged with outpatient follow ups.      Left foot infection  Staph aureus bacteremia 11/6   Admitted from 10/17/23 to 10/30/23 for gangrenous left leg, and underwent left common femoral endarterectomy and profundoplasty, sheath placement drug-coated balloon angioplasty on 10/24/2023. 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   Name of Antibiotic Dose of Antibiotic1 Pharmacy to assist with dosing Y/N Anticipated duration Effective start date2 End date   Cefazolin 2g Q8H N 2 weeks 11/6/23 11/19/23   TMP/SMX (Oral) 2 DS tabs BID (~10mg/kg/day) N 2 weeks 11/20/23 12/3/23                 Continue with hyperbaric oxygen  therapy at [facility]      PAD  HTN  Atrial fibrillation on AC  MR cardiac 10/18/23 shows left ventricle is normal in cavity size and moderate concentric hypertrophy and LVEF is 67%. EKGs have shown SR. Wife was called for med rec. Patient was sent with MAR from facility.  - PTA amlodipine, apixaban, asa, atorvastatin, coreg, pletal, losartan, and isosorbide.      DM II  Last A1C 5.5 on 11/6/23  - Hold metformin  - Medium sliding scale     Dementia  Answers question well, but forgetful and does not know medical history well  - PTA Aricept     Anemia  History of anemia with Hbg around 10. He was 14.5 on admission  - CBC    Consultations This Hospital Stay   VASCULAR SURGERY ADULT IP CONSULT  ORTHOPAEDIC SURGERY ADULT/PEDS IP CONSULT  PHARMACY TO DOSE VANCO  WOUND OSTOMY CONTINENCE NURSE  IP CONSULT  PHYSICAL THERAPY ADULT IP CONSULT  OCCUPATIONAL THERAPY ADULT IP CONSULT  INFECTIOUS DISEASE GENERAL ADULT IP CONSULT  VASCULAR ACCESS FOR PICC PLACEMENT ADULT IP CONSULT  OPAT PHARMACY IP CONSULT  NUTRITION SERVICES ADULT IP CONSULT    Code Status   Prior    Time Spent on this Encounter   I, Mariano Dutton MD, personally saw the patient today and spent greater than 30 minutes discharging this patient.       Mariano Dutton MD  [facility] [address] [address]  Phone: [phone]  ______________________________________________________________________    Physical Exam   Vital Signs: Temp: 98.1  F (36.7  C) Temp src: Oral BP: (!) 156/48 Pulse: 68   Resp: 17 SpO2: 99 % O2 Device: None (Room air)    Weight: 138 lbs 14.4 oz  Patient is awake and alert   Lungs are clear   S1 and s2 heard   Abdomen was soft and non distended        Primary Care Physician   Marcos Gonzalez    Discharge Orders      HYPERBARIC OXYGEN THERAPY     Home Care Referral      Home Infusion Referral      OPAT enrollment and ID Clinic Referral      Primary Care - Care Coordination Referral      Activity    Your activity  upon discharge: activity as tolerated     Adult [facility] Follow-up and recommended labs and tests    Primary care within a month   ID within a month   Vascular surgery within a month     Appointments on [place] and/or [facility] (with [facility] or [facility] provider or service). Call [phone] if you haven't heard regarding these appointments within 7 days of discharge.     Reason for your hospital stay    Dear Darian Engle,    Your were hospitalized at [facility] with Left foot infection and treated with antibiotics.  Over your hospitalization your condition improved and today you are ready to be discharged.  You should continue to improve but if you develop fever, shortness of breath, nausea, vomiting or foot pain please seek medical attention.    We are suggesting the following medication changes:  IV Cefazolin via your picc line till 11/19/2023   Oral bactrim from 11/20/2023-12/3/2023     Please get the following tests done:  CBC,BMP within a week     Please set up an appointment with:  Primary care within a month   ID within a month   Vascular surgery within a month   Hyperbaric oxygen therapy at [facility] within a month and then as per there schedule   It was a pleasure meeting with you today. Thank you for allowing me and my team the privilege of caring for you during your hospitalization. You are the reason we are here, and I truly hope we provided you with the excellent service you deserve. Please let us know if there is anything else we can do for you so that we can be sure you are leaving completely satisfied with your care experience.    If you have questions call your primary care physician.    Sincerely,    Mariano Dutton MD   Hospitalist  [facility]     Diet    Follow this diet upon discharge: Orders Placed This Encounter      Snacks/Supplements Adult: Other; vanilla Ensure Shake daily at 10 AM (send strawberry when able), cabrera Magic Cup at PM snack time daily;  Between Meals      Moderate Consistent Carb (60 g CHO per Meal) Diet       Significant Results and Procedures   Most Recent 3 CBC's:  Recent Labs   Lab Test 11/13/23  0624 11/12/23  0634 11/11/23  0450   WBC 9.5 9.8 10.9   HGB 7.6* 7.7* 7.2*   MCV 97 96 97    338 330     Most Recent 3 BMP's:  Recent Labs   Lab Test 11/13/23  0739 11/13/23  0624 11/13/23  0203 11/12/23  0808 11/12/23  0634 11/11/23  0818 11/11/23  0604   NA  --  139  --   --  136  --  136   POTASSIUM  --  3.8  --   --  3.9  --  4.0   CHLORIDE  --  107  --   --  107  --  108*   CO2  --  18*  --   --  20*  --  18*   BUN  --  9.9  --   --  11.5  --  13.8   CR  --  0.61*  --   --  0.64*  --  0.71   ANIONGAP  --  14  --   --  9  --  10   SHERYL  --  8.5*  --   --  8.8  --  8.4*   GLC 81 83 85   < > 80   < > 101*    < > = values in this interval not displayed.     Most Recent 2 LFT's:  Recent Labs   Lab Test 11/06/23  0554 11/05/23 2203   AST 29 29   ALT 9 10   ALKPHOS 87 97   BILITOTAL 0.5 0.7       Discharge Medications   Discharge Medication List as of 11/13/2023 10:55 AM        START taking these medications    Details   melatonin 1 MG TABS tablet Take 1 tablet (1 mg) by mouth nightly as needed for sleep, Disp-30 tablet, R-1, E-Prescribe      polyethylene glycol (MIRALAX) 17 GM/Dose powder Take 17 g by mouth daily, Disp-510 g, R-1, E-Prescribe           CONTINUE these medications which have CHANGED    Details   ceFAZolin (ANCEF) intermittent infusion 2 g in 100 mL dextrose PRE-MIX Inject 100 mLs (2 g) into the vein every 8 hours for 8 days, Disp-2400 mL, R-0, E-Prescribe      sulfamethoxazole-trimethoprim (BACTRIM DS) 800-160 MG tablet Take 2 tablets by mouth 2 times daily, Disp-56 tablet, R-0, E-Prescribe           CONTINUE these medications which have NOT CHANGED    Details   ACCU-CHEK SMARTVIEW test strip TEST THREE TIMES DAILY OR AS DIRECTED, Disp-300 strip, R-3, EDITH, E-Prescribe      blood glucose monitoring (ACCU-CHEK FASTCLIX) lancets USE  TO TEST THREE TIMES DAILY OR AS DIRECTED, Disp-306 each, R-3, E-Prescribe      nitroGLYcerin (NITROSTAT) 0.4 MG sublingual tablet PLACE 1 TABLET UNDER THE TONGUE EVERY 5 MINUTES FOR 3 DOSES, IF SYMPTOMS PERSIST 5 MINUTES AFTER 1ST DOSE CALL 911, Disp-25 tablet, R-0, E-Prescribe      acetaminophen (TYLENOL) 325 MG tablet Take 3 tablets (975 mg) by mouth every 8 hours Do this for the first few days postoperatively, as pain improves, can transition to taking 1-2 tabs q4-6 hours as needed., Disp-90 tablet, R-0, E-Prescribe      amLODIPine (NORVASC) 10 MG tablet Take 1 tablet (10 mg) by mouth daily, Disp-90 tablet, R-0, E-Prescribe      apixaban ANTICOAGULANT (ELIQUIS ANTICOAGULANT) 5 MG tablet Take 1 tablet (5 mg) by mouth 2 times daily, Disp-60 tablet, R-2, E-Prescribe      aspirin 81 MG tablet Take 81 mg by mouth daily, Disp-30 tablet, Historical      atorvastatin (LIPITOR) 40 MG tablet Take 1 tablet (40 mg) by mouth daily For cholesterol., Disp-90 tablet, R-3, E-Prescribe      carvedilol (COREG) 25 MG tablet Take 25 mg by mouth 2 times daily, Historical      cholecalciferol ( VITAMIN D3) 50 MCG (2000 UT) CAPS Take 2 capsules by mouth daily, Historical      cilostazol (PLETAL) 100 MG tablet Take 100 mg by mouth 2 times daily, Historical      donepezil (ARICEPT) 10 MG tablet Take 1 tablet (10 mg) by mouth At Bedtime, Disp-90 tablet, R-1, E-Prescribe      isosorbide mononitrate (IMDUR) 30 MG 24 hr tablet TAKE 2 TABLETS(60 MG) BY MOUTH DAILY, Disp-180 tablet, R-1, E-Prescribe      losartan (COZAAR) 50 MG tablet Take 2 tablets (100 mg) by mouth daily, Disp-90 tablet, R-2, E-Prescribe      magnesium oxide 200 MG TABS Take 200 mg by mouth daily, Historical      metFORMIN (GLUCOPHAGE XR) 500 MG 24 hr tablet TAKE 1 TABLET(500 MG) BY MOUTH TWICE DAILY WITH MEALS, Disp-180 tablet, R-0, E-Prescribe      multivitamin (CENTRUM SILVER) tablet Take 1 tablet by mouth daily, Historical      Omega-3 Fatty Acids (FISH OIL OMEGA-3)  1000 MG CAPS Take 1,000 mg by mouth daily, Historical      omeprazole (PRILOSEC) 20 MG DR capsule Take 1 capsule (20 mg) by mouth daily, Disp-90 capsule, R-3, E-Prescribe      senna-docusate (SENOKOT-S/PERICOLACE) 8.6-50 MG tablet Take 1 tablet by mouth 2 times daily While taking narcotic pain medications (oxycodone), Disp-90 tablet, R-0, E-Prescribe      sertraline (ZOLOFT) 25 MG tablet TAKE ONE TABLET BY MOUTH DAILY FOR DEPRESSION AND TO HELP APPETITE, Disp-90 tablet, R-1, E-Prescribe      famotidine (PEPCID) 10 MG tablet Take 1 tablet (10 mg) by mouth twice a day Indications: GERD, substitution for home med., Historical      gabapentin (NEURONTIN) 100 MG capsule Take 1 capsule (100 mg) by mouth 3 times daily, Disp-180 capsule, R-0, E-Prescribe      oxyCODONE (ROXICODONE) 5 MG tablet Take 1-2 tablets (5-10 mg) by mouth every 6 hours as needed for moderate pain, Disp-20 tablet, R-0, Local Print      spironolactone (ALDACTONE) 25 MG tablet Take 1 tablet (25 mg) by mouth daily, Disp-90 tablet, R-0, E-Prescribe      tiZANidine (ZANAFLEX) 2 MG tablet Take 1 tablet (2 mg) by mouth 3 times daily as needed for muscle spasms, Disp-90 tablet, R-0, E-Prescribe      traZODone (DESYREL) 50 MG tablet Take 50 mg by mouth at bedtime, Historical           Allergies   Allergies   Allergen Reactions    Lidocaine Other (See Comments)     Lungs filled with fluid. ? Anaphylaxis    Lisinopril Cough     cough    Naltrexone Nausea and Vomiting

## 2023-11-13 NOTE — PLAN OF CARE
Goal Outcome Evaluation:      Plan of Care Reviewed With: patient    Overall Patient Progress: declining    Neuro:A/O x4  intermittent confused  Respiratory:WDL on RA  Cardiac:WDL. Denies chest pain  Diet:consistent carb  GI/: WDL. Voiding adequately   Incision/Drains: L toe amputation -CDI  IV Access: R PICC SL; R PIV SL  Pain: managed with PRN oxy   Labs:reviewed   VS:Temp: 97.9  F (36.6  C) Temp src: Oral BP: (!) 136/39 Pulse: 67   Resp: 16 SpO2: 99 % O2 Device: None (Room air)     Activity:Assist 1-2  to pivot     New Changes this shift:none  Plan: pending DC today

## 2023-11-13 NOTE — PROGRESS NOTES
Care Management Discharge Note    Discharge Date: 11/13/2023       Discharge Disposition: Home with services     Discharge Services: Home Infusion, Home Care, OP Hyperbaric treatment     Discharge DME:  No new needs    Discharge Transportation: family or friend will provide    Private pay costs discussed: Not applicable    Does the patient's insurance plan have a 3 day qualifying hospital stay waiver?  Yes     Which insurance plan 3 day waiver is available? Alternative insurance waiver    Will the waiver be used for post-acute placement? No    PAS Confirmation Code:  NA  Patient/family educated on Medicare website which has current facility and service quality ratings: yes    Education Provided on the Discharge Plan:  yes  Persons Notified of Discharge Plans: spouse, pt  Patient/Family in Agreement with the Plan: yes     Handoff Referral Completed: Yes    Additional Information:  Received update that pt is medically ready for discharge to home today.  Pt will be on IV abx through 11/19.  [facility] will provide a nurse visit at the patients home this afternoon to complete education on IV abx administration.  Pt will need to receive 12p dose at the hospital and then from [facility] stand point is ok for discharge.  Bedside RN updated.  Orders in place. Called and spoke with [facility] Hyperbaric unit, they will call the wife Wednesday to arrange outpt appointments for the Hyperbaric Oxygen treatment.  RNCC will remain available if further needs arise.      [facility](RN/PT/OT)  Phone: [phone]  Fax: [phone]     [facility](IV abx)  Phone: [phone]  Fax: [phone]     [facility]  Phone: [phone]  Pager: [phone]    SEARCHABLE in [facility] - search CARE COORDINATOR     [place] ([id number]) Saturday & Sunday; ([id number]) [facility] Recognized Holidays     Units: 5A, 5B & 5C  Pager: [phone]    Units: 6B, 6C & 6D    Pager: [phone]    Units: 7A, 7B,  7C & 7D    Pager: [phone]    Units: 6A & ICU   Pager: [phone]    Units: 5 Ortho, 5MS & WB ED Pager: [phone]    Units: 6MS, 8A & 10 ICU  Pager [phone]

## 2023-11-13 NOTE — PLAN OF CARE
Goal Outcome Evaluation:      Plan of Care Reviewed With: patient    Overall Patient Progress: no changeOverall Patient Progress: no change    Outcome Evaluation: A&O x3 this AM. Disoreinted to time. Pt as well as wife are upset at overall POC, stating inadequate pain control, care transition. Adamantly wants to go home. R PIV removed. R PICC infused Ancef prior to discharge. Discharge teaching performed. Oral oxy 10 mg given. Pt adequate for discharge

## 2023-11-13 NOTE — PROGRESS NOTES
Temp: 98.1  F (36.7  C) Temp src: Oral BP: (!) 134/33 (nurse notified) Pulse: 73   Resp: 22 SpO2: 99 % O2 Device: None (Room air)     A&O x4, forgetful, bed alarm on, agitated at times. On RA, VSS. Pain managed with Oxycodone, Tizanidine & scheduled Tylenol. Carb consistent diet, but poor appetite. Transfers with assist x 1. Voiding to urinal, no BM today. LLE wound dressing changed. PICC line in  place infusing TKO with abx. Plan is to discharge home tomorrow with home infusion. Continue with POC.

## 2023-11-14 NOTE — TELEPHONE ENCOUNTER
[facility] Call Center    Phone Message    May a detailed message be left on voicemail: yes     Reason for Call: Migel Greenwood calling for a follow up appt - please call back [phone] Thank you    Action Taken: Message routed to:  [facility] ([facility]): Vasc2    Travel Screening: Not Applicable

## 2023-11-14 NOTE — TELEPHONE ENCOUNTER
Brief vascular surgery note:    Received message from Mr. Engle's wife, frustrated about his care. I called her back this afternoon to inquire and learn more about the situation.  His wife is very sick and unable to take care of him.  She feels that she is not able to take care of his wounds either, wondering if he could receive nursing care at home. Had home infusion team visiting yesterday afternoon and he kicked them out. The team will try to come back tomorrow. Upon chart review, patient is set up with PT, OT, RN via [facility]. I will reach out to our  and determine if there are ways to help Mr. Engle go to [facility] for hyperbaric oxygen therapy as he does not drive and his wife kobe not be able to help with transportation.    Roxann Fontanez, CNP  Vascular Surgery  Pager: [phone]  [email or web address]  Send message or 10 digit call back number Securely via Net[employer] with the [employer] Web Console (learn more here)      
2.05

## 2023-11-14 NOTE — PROGRESS NOTES
Social Work - Intervention  [facility]  Data/Intervention:    Patient Name: Darian Engle Goes By: Frandy    /Age: 1950 (73 year old)     Visit Type: telephone  Referral Source: Vascular Surgery- SONY Hackett  Reason for Referral: Transportation resources    Collaborated With:    -Frandy- [phone]  -Frandy's wife Angelica with Frandy's verbal permission (consent to communicate is also on file)  -[facility]-  [phone]     Psychosocial Information/Concerns:  Message received from SONY Hackett noting that Frandy's wife is ill and not able to transport him to hyperbaric treatments at [facility] and is inquiring if Social Work can assist with transportation resources.     Per chart review Frandy discharged from the hospital yesterday and had home RN, PT/OT, and home infusion set up and a home infusion visit was supposed to occur yesterday afternoon at home for IV abx teaching. It is noted in the chart that TCU was recommended from the hospital but Frandy and his wife refused TCU.      Intervention/Education/Resources Provided:  I contacted Frandy and introduced myself and noted the reason for my call and Frandy confirmed with his wife Angelica in the background that they do need transportation resources. Frandy gave verbal permission for me to speak with Angelica and said she schedules everything.   I then spoke with Angelica and provided her with contact information for [facility] [employer] transportation services, explained the cost piece, and explained needing to call a week prior to an appt. I also advised speaking with the [facility] SW who covers the hyperbaric unit to see if they have any other specific resources available.   Angelica asked about getting care-giving help for Frandy being that she is ill herself (she described not being able to stand up for long without feeling like she will pass out or vomit). I expressed concern that perhaps Angelica needs to seek medical care. Angelica reported that Frandy needs help with food  preparation and getting to and from the bathroom. I explained that unfortunately Medicare would not cover these things and attempted to explain it would be a private pay service but Angelica interjected and said if it has to be a family member that is fine. Angelica also said there is supposed to be a home care nurse coming and later said a home infusion nurse is coming tomorrow at 11am. Angelica explained that Frandy kicked the nurse out yesterday when they came for post discharge teaching and Angelica said she was not feeling well and passed out so isn't sure what happened after that. Angelica said she is not well enough to administer the IV abx but has been giving pain meds and doing wound cares. I explained that for IV abx to be done at home, there does need to be a family member or friend who can administer them and Angelica said she will ask Frandy's sister for help and noted that the sister's daughter was at their home as we were speaking. I let Angelica know I will contact [facility] to update them and ensure they have Frandy on their radar and Angelica was appreciative of this. I let Angelica know that home care may not have reached out yet for scheduling as they have a couple days to make contact and Angelica reported home care will be providing PT/OT.   Angelica denied further needs or questions at this time.     I updated SONY Hackett as to my conversation with Frandy and Angelica.     I contacted [facility] and spoke IFEOMA [place] and explained the situation with Frandy and noted wanting to update her team and make sure Frandy is on their radar. Kathrin reported that when the nurse arrived at the house yesterday Angelica did not cooperate with teaching but then did agree to let a nurse return. Kathrin noted that the nurse yesterday gave Frandy his dose of IV abx. Kathrin said she will follow up with Frandy.      Assessment/Plan:  No follow up is planned at this time but I will remain available if further assistance is needed.    Kiera Kingsley, SARAHI, [facility]  Social  [facility]  Ph: [phone], Pgr: [phone]  11/14/2023

## 2023-11-14 NOTE — TELEPHONE ENCOUNTER
[facility] nursing management aware of pt situation. Patient relations number sent to [facility] and vascular team also updated.    THANH Soliman, RN  RN Care Coordinator  [facility] Vascular Surgery - [facility] phone: [phone]  Fax: [phone]

## 2023-11-14 NOTE — TELEPHONE ENCOUNTER
Returned call. Informed TCU pt discharged to home yesterday. Vascular follow-up already scheduled.    THANH Soliman, RN  RN Care Coordinator  [facility] Vascular Surgery - [facility] phone: [phone]  Fax: [phone]

## 2023-11-15 NOTE — PROGRESS NOTES
Clinic Care Coordination Contact    Situation: Patient chart reviewed by care coordinator.    Background: patient was in identified status with care coordination.     Assessment: ADT notification received due to writer on case team. Patient was discharged from IP to home. With [facility], [facility] home care and OP hyper[place]ic [place] [facility]. After review of chart patient/wife were contacted by MCKENZIE HAYES 11/14/23 with care management and resources/information were given/provided.     Plan/Recommendations: CC RN will not duplicate care management.     Kia Colvin RN, BSN, PHN Care Coordinator  [place] [place]   Phone: [phone]

## 2023-11-15 NOTE — TELEPHONE ENCOUNTER
Received message from [facility]:     went home from the hospital and there was a minor mixup at the visit. In addition, the wife was very ill. Patient missed 2 doses of Ancef yesterday. Mixup has been resolved and wife is feeling better and has additional help.  As a secondary issue, wife notes they are running out of pain meds already - I am not sure if this is managed by your office.    Notified [facility] that Pt needs to get in contact with the specialist/surgeon that did Vascular surgery for pain med refill requests.     Pt also sent roxannet, writer responded and sent to the Vascular team.

## 2023-11-15 NOTE — PROGRESS NOTES
Virtual Visit Details    Type of service:  Video Visit   Video Start Time:  9:00  AM  Video End Time: 9:28 AM      Originating Location (pt. Location): Home    Distant Location (provider location):  On-site  Platform used for Video Visit: Well      [facility]  Infectious Disease Outpatient ID Clinic Note     Patient:  Darian Engle, Date of birth 1950  Medical record number [id number]  Date of Visit:  11/16/2023        Assessment and Recommendations   Problem List:    Complicated MSSA bacteremia  -Blood culture 11/5/2023 in 1/4 bottles positive for MSSA  -Blood cultures 11/6/2023 NGTD  -Blood cultures 11/8/2023 NGTD  -Blood cultures 11/9/2023 NGTD  Left foot post-surgical site infection - presumed MSSA infection  PAD  A-fib on AC            Recommendations:   Will treat for two total weeks with cefazolin 2g Q8H (through 11/19/23)   Transition treatment to oral TMP/SMX 10mg/kg (based on trimethoprim component) divided into 2 daily doses for an additional two week (11/20/23 through 12/3/23); can pull PICC line once IV abx completed   Continue weekly: CBC w/diff, CMP, and CRP while on antibiotics  [place] [facility]  Follow up with Dr. Gonzalez tomorrow  Follow up with vascular surgery 11/28  Follow up: infectious disease around 12/3      Tolerating antibiotics well and no issues with PICC line. Missed 2 doses when they went to the ER for patient's wife who develop URI symptoms.  Both are feeling well today. Labs unremarkable and overall CRP trending down. For treatment of bacteremia will continue with current plan as per above. Gave wife and Frandy red flag symptoms for which to reach out to us or have low threshold to return to the hospital.    For left extremity wound, plans to see Dr. Gonzalez, patient's surgeon, tomorrow for post-hospital follow up. Patient's wife endorses need for frequent dressing changes d/t sanguinous drainage and describes wet appearance of  "skin to top of surgical wound. Describes being able to move the top layer of skin around with the Q-tip as she applies the betadine. Pain has been significant since leaving the hospital and the oxycodone barely takes the edge off the pain. Per wife, the \"black\" tissue, is progressing along the side of the foot. Has close follow up with both orthopedic and vascular surgery for management of ongoing left leg gangrenous wound. Plan is to attempt to save limb.       I spent 43 minutes as part of a visit on the date of the encounter doing chart review, history and exam, documentation, and care coordination.      ADIEL Kee, CNP  Infectious Diseases  Pager# [phone] or Murali (preferred)          Interval History:   Discharged on 11/13. CBC and CMP unremarkable, CRP overall trending down, though still significantly elevated  Changes bandages at least twice per day; sanguinous drainage bleeding through 2-3 layers of gauze    How are you taking your antibiotics? IV ancef  Any missed doses? yes - how many missed doses? 2    3-4 partially formed stools/day. Denies fevers or abdominal cramping, no fecal urgency    Line: PICC or Port? No pain, redness, swelling, or difficulties with infusions.    Surgical site? Left foot, top of foot skin is \"coming off\", skin is still wet and black. Bloody drainage from the top of the foot. Says the \"black color might be spread along the side of the foot\". Pain is very bad, already went through pain medication and got refill for oxycodone (barely takes the pain down.) No foul odor per wife or purulent drainage.     Denies headache, fever, chills, night sweats, fatigue, sore throat, cough, dyspnea, nausea, vomiting, abdominal pain, diarrhea, dysuria, myalgias, arthralgias, lymphadenopathy, new or worsening paresthesias or neuropathy, acute rash, or new wounds.        History of Present Illness:   Per Dr. Hayden's note    72 yo M with recent history of gangrenous left leg s/p " transmetatarsal amputation on 10/27/2023 who now presents with Staph aureus bacteremia and elevated inflammatory markers (). Suspect that the source of his MSSA bacteremia is left foot post-surgical soft tissue infection given his clinical course and that the patient was noncompliant following amputation (left TCU AMA shortly after his surgery). Overall lower suspicion for infective endocarditis given he does not have established risk factors other than current MSSA bacteremia (no IVDU, prior heart valve surgery, prolonged bacteremia) and TTE is negative.  Currently on cefazolin 2 g every 8 hours IV with plans for total 4 weeks duration from 11/6/2023 - 12/3/2023.     Patient with only 1 BCx positive and clinically stable, but with some high risk features for complicated Staph bacteremia. Notably, he has endovascular stenting in his RLE, and with ongoing LLE ischemia due to PAD. However, he otherwise meets criteria for uncomplicated bacteremia. At this point, I think I would favor a longer treatment course (akin to complicated bacteremia) due to his compromised antibiotic delivery to his left foot wound, which is considered the source of the bacteremia. Also, while we don't currently suspect elly acute osteomyelitis, a longer treatment course is likely to treat an early subclinical osteomyelitis that could be brewing given his ongoing foot wound/ischemia.     Recommend at minimum 2 weeks treatment with IV therapy. Given that extending to 4 weeks is a somewhat softer call anyway, I think the final two weeks could be treated with a transition to oral TMP/SMX.       Infectious Diseases Indication: Complicated MSSA bacteremia, ischemic foot with SSTI     Antibiotic Information  Name of Antibiotic Dose of Antibiotic1 Pharmacy to assist with dosing Y/N Anticipated duration Effective start date2 End date   Cefazolin 2g Q8H N 2 weeks 11/6/23 11/19/23   TMP/SMX (Oral) 2 DS tabs BID (~10mg/kg/day) N 2 weeks 11/20/23  12/3/23                             Review of Systems:     12-point ROS performed; pertinent positives and negatives per above         Medications & Allergies:     Current Outpatient Medications   Medication    ACCU-CHEK SMARTVIEW test strip    acetaminophen (TYLENOL) 325 MG tablet    amLODIPine (NORVASC) 10 MG tablet    apixaban ANTICOAGULANT (ELIQUIS ANTICOAGULANT) 5 MG tablet    aspirin 81 MG tablet    atorvastatin (LIPITOR) 40 MG tablet    blood glucose monitoring (ACCU-CHEK FASTCLIX) lancets    carvedilol (COREG) 25 MG tablet    ceFAZolin (ANCEF) intermittent infusion 2 g in 100 mL dextrose PRE-MIX    cholecalciferol (SM VITAMIN D3) 50 MCG (2000 UT) CAPS    cilostazol (PLETAL) 100 MG tablet    donepezil (ARICEPT) 10 MG tablet    famotidine (PEPCID) 10 MG tablet    gabapentin (NEURONTIN) 100 MG capsule    isosorbide mononitrate (IMDUR) 30 MG 24 hr tablet    losartan (COZAAR) 50 MG tablet    magnesium oxide 200 MG TABS    melatonin 1 MG TABS tablet    metFORMIN (GLUCOPHAGE XR) 500 MG 24 hr tablet    multivitamin (CENTRUM SILVER) tablet    nitroGLYcerin (NITROSTAT) 0.4 MG sublingual tablet    Omega-3 Fatty Acids (FISH OIL OMEGA-3) 1000 MG CAPS    omeprazole (PRILOSEC) 20 MG DR capsule    ondansetron (ZOFRAN ODT) 4 MG ODT tab    oxyCODONE (ROXICODONE) 5 MG tablet    polyethylene glycol (MIRALAX) 17 GM/Dose powder    senna-docusate (SENOKOT-S/PERICOLACE) 8.6-50 MG tablet    sertraline (ZOLOFT) 25 MG tablet    [START ON 11/20/2023] sulfamethoxazole-trimethoprim (BACTRIM DS) 800-160 MG tablet    tiZANidine (ZANAFLEX) 2 MG tablet    traZODone (DESYREL) 50 MG tablet     No current facility-administered medications for this visit.         Allergies   Allergen Reactions    Lidocaine Other (See Comments)     Lungs filled with fluid. ? Anaphylaxis    Lisinopril Cough     cough    Naltrexone Nausea and Vomiting            Physical Exam:     B/P: Data Unavailable, T: Data Unavailable, P: Data Unavailable, R: Data  "Unavailable  There were no vitals filed for this visit.    Physical Examination:  GENERAL: Healthy, alert and no distress  EYES: Eyes grossly normal to inspection.  No discharge or erythema, or obvious scleral/conjunctival abnormalities.  RESP: No audible wheeze, cough, or visible cyanosis.  No visible retractions or increased work of breathing.    SKIN: Left foot with necrosis, moderate sanguinous drainage on bandages, difficult to assess through phone video camera. Visible skin clear. No significant rash, abnormal pigmentation or lesions.  NEURO: Cranial nerves grossly intact.  Mentation and speech appropriate for age.  PSYCH: Mentation appears normal, affect normal/bright, judgement and insight intact, normal speech and appearance well-groomed.     Microbiology Data   Pertinent Micro:    Culture   Date Value Ref Range Status   11/13/2023 No growth after 1 day  Preliminary   11/13/2023 No growth after 1 day  Preliminary   11/12/2023 No growth after 3 days  Preliminary   11/12/2023 No growth after 3 days  Preliminary   11/11/2023 No growth after 4 days  Preliminary   11/11/2023 No growth after 4 days  Preliminary   11/10/2023 No growth after 4 days  Preliminary   11/10/2023 No growth after 4 days  Preliminary   11/09/2023 No growth after 5 days  Preliminary   11/09/2023 No growth after 5 days  Preliminary   11/08/2023 No Growth  Final   11/08/2023 No Growth  Final   11/06/2023 No Growth  Final   11/06/2023 No Growth  Final   11/05/2023 Positive on the 1st day of incubation (A)  Final   11/05/2023 Staphylococcus aureus (AA)  Final     Comment:     1 of 2 bottles   11/05/2023 No Growth  Final       Last check of C difficile  No results found for: \"CDBPCT\"    Urine Studies     Recent Labs   Lab Test 11/09/23  1618 08/02/19  0849   URINEPH 5.5 6.0   NITRITE Negative Positive*   LEUKEST Negative Negative   WBCU 5 5-10*       CSF testing   No lab results found.    Invalid input(s): \"CADAM\", \"EVPCR\", \"ENTPCR\", " "\"ENTEROVIRUS\"    Laboratory Data:   Metabolic Studies       Recent Labs   Lab Test 11/13/23  0739 11/13/23  0624 11/12/23  0808 11/12/23  0634 11/06/23 0540 11/05/23  2203 10/25/23  0919 10/25/23  0915 10/17/23  1854 10/17/23  1455   NA  --  139  --  136   < > 137   < >  --    < >  --    POTASSIUM  --  3.8  --  3.9   < > 4.4   < >  --    < >  --    CHLORIDE  --  107  --  107   < > 104   < >  --    < >  --    CO2  --  18*  --  20*   < > 19*   < >  --    < >  --    ANIONGAP  --  14  --  9   < > 14   < >  --    < >  --    BUN  --  9.9  --  11.5   < > 21.9   < >  --    < >  --    CR  --  0.61*  --  0.64*   < > 0.89   < >  --    < > 0.65*   GFRESTIMATED  --  >90  --  >90   < > >90   < >  --    < > >90   GLC 81 83   < > 80   < > 66*   < >  --    < >  --    A1C  --   --   --   --   --   --   --   --   --  5.5   SHERYL  --  8.5*  --  8.8   < > 8.8   < >  --    < >  --    PHOS  --   --   --   --   --  3.0   < >  --    < >  --    MAG  --   --   --   --   --  1.5*   < >  --    < > 1.4*   LACT  --   --   --   --   --  0.8  --  0.8   < >  --    PCAL  --   --   --   --   --  0.08*  --   --   --   --     < > = values in this interval not displayed.       Hepatic Studies    Recent Labs   Lab Test 11/06/23  0554 11/05/23 2203 02/21/23  1112   BILITOTAL 0.5 0.7  --    ALKPHOS 87 97  --    PROTTOTAL 6.2* 6.3*  --    ALBUMIN 3.1* 3.1* 3.4   AST 29 29  --    ALT 9 10  --        Hematology Studies      Recent Labs   Lab Test 11/13/23  0624 11/12/23  0634 11/11/23  0450 11/10/23  0648 11/09/23  0630 11/08/23  0633 06/03/21  1747 02/03/17  1551   WBC 9.5 9.8 10.9 11.6* 12.4* 11.0   < > 9.8   ANEU  --   --   --   --   --   --   --  6.0   ALYM  --   --   --   --   --   --   --  2.6   SARAH  --   --   --   --   --   --   --  0.9   AEOS  --   --   --   --   --   --   --  0.2   HGB 7.6* 7.7* 7.2* 7.9* 7.8* 7.8*   < > 13.9   HCT 23.5* 23.6* 22.5* 24.7* 24.1* 23.9*   < > 41.3    338 330 373 384 347   < > 256    < > = values in this interval " not displayed.       Medication levels    Recent Labs   Lab Test 11/08/23  0633   VANCOMYCIN 16.4       Inflammatory Markers    Recent Labs   Lab Test 11/06/23  0554   SED 51*       Imaging:  Results for orders placed or performed during the hospital encounter of 11/05/23   XR Foot Left 2 Views    Narrative    EXAM: XR FOOT LEFT 2 VIEWS  LOCATION: [facility]  DATE: 11/5/2023    INDICATION: pain, concerns for infection  COMPARISON: 10/24/2023      Impression    IMPRESSION: Status post transmetatarsal resection of the distal foot. The resection edge are ill-defined, possibly postsurgical in nature but underlying osteomyelitis is not excluded. Contrast enhancement MRI of the foot with the more helpful. No acute   fracture or dislocation. Significant atherosclerotic calcification seen in the foot.   XR Chest 2 Views    Narrative    Examination:  XR CHEST 2 VIEWS    Date:  11/9/2023 4:25 PM     Clinical Information: new worsening in white count, please assess for  infection     Additional Information: none    Comparison: Chest x-ray 10/20/2023    Findings:     PA and lateral views of the chest. Postoperative changes of CABG with  median sternotomy wires. The third most superior wire is fractured in  2 places, best visualized on the lateral radiograph. Surgical clips  over the mediastinum. Normal heart size. Calcifications within the  aortic bulb. No pneumothorax pleural effusion or focal opacities. The  bilateral costophrenic angles are not clearly visualized, but there is  no large pleural effusion. No suspicious osseous lesions.      Impression    Impression:  No radiologic evidence of acute cardiopulmonary process.  Thoracoabdominal aortic atherosclerosis.    I have personally reviewed the examination and initial interpretation  and I agree with the findings.    JORGE LAND MD         SYSTEM ID:  [id number]   XR Chest Port 1 View    Narrative    EXAM: XR CHEST PORT 1  VIEW  2023 4:12 PM     HISTORY: post picc placement       COMPARISON: 2023    TECHNIQUE: Portable frontal radiograph of the chest.    FINDINGS: Right PICC tip is at the low SVC. Median sternotomy, the  third wire is fractured in three locations similar to prior. Coronary  artery stents. Stable normal heart size. No focal airspace opacity. No  pleural effusion or pneumothorax.      Impression    IMPRESSION: Right PICC tip is at the low SVC.    I have personally reviewed the examination and initial interpretation  and I agree with the findings.    ORA BUENO MD         SYSTEM ID:  [id number]   Echo Complete     Value    LVEF  60-65%    Narrative    [id number]  [id number]  [id number]  [id number]^CHASE^EDY     [facility]  Echocardiography Laboratory  [address] [place] [id number]     Name: MELINDA BROOKS  MRN: [id number]  : 1950  Study Date: 2023 10:12 AM  Age: 73 yrs  Gender: Male  Patient Location: [facility]  Reason For Study: Endocarditis  Ordering Physician: EDY STONE  Performed By: Anu Thrasher RDCS     BSA: 1.8 m2  Height: 67 in  Weight: 147 lb  BP: 125/40 mmHg  ______________________________________________________________________________  Procedure  Echocardiogram with two-dimensional, color and spectral Doppler performed.  ______________________________________________________________________________  Interpretation Summary  No echocardiographic evidence of endocarditis on this transthoracic study.  Consider EVER if clinical suspicion warrants.  Global and regional left ventricular function is normal with an EF of 60-65%.  Right ventricular function, chamber size, wall motion, and thickness are  normal.  Mild aortic insufficiency is present.  This study was compared with the study from 23 .  No significant changes noted.  ______________________________________________________________________________  Left Ventricle  Global and regional  left ventricular function is normal with an EF of 60-65%.  Left ventricular size is normal. Left ventricular wall thickness cannot  evaluate.     Right Ventricle  Right ventricular function, chamber size, wall motion, and thickness are  normal.     Atria  The atria cannot be assessed.     Mitral Valve  Mild mitral annular calcification is present. Mild mitral insufficiency is  present.     Aortic Valve  Moderate aortic valve calcification is present. Mild aortic insufficiency is  present.     Tricuspid Valve  The tricuspid valve cannot be assessed. Trace tricuspid insufficiency is  present. The peak velocity of the tricuspid regurgitant jet is not obtainable.  Pulmonary artery systolic pressure cannot be assessed.     Pulmonic Valve  The valve leaflets are not well visualized. On Doppler interrogation, there is  no significant stenosis or regurgitation.     Vessels  The inferior vena cava was normal in size with preserved respiratory  variability. The aorta root is normal. IVC diameter <2.1 cm collapsing >50%  with sniff suggests a normal RA pressure of 3 mmHg.     Pericardium  No pericardial effusion is present.     Compared to Previous Study  This study was compared with the study from 9/21/23 . No significant changes  noted.  ______________________________________________________________________________  Report approved by: Opal Rico 11/08/2023 12:44 PM     ______________________________________________________________________________

## 2023-11-15 NOTE — TELEPHONE ENCOUNTER
Marcos Rivas MD; Roxann Fontanez APRN CNPJust now (9:16 AM)     THOMPSON Gonzalez,    We are reaching out about mutual patient Frandy Spence. Patient has had a complex vascular course, and was recently discharged to home for IV antibiotics and home care. TCU was recommended but pt's wife refused placement dt a poor experience at a rehab facility prior to hospitalization.    Patient is having issues with home care, transportation, etc. Pt's wife is concerned about her ability to care for pt at home right now. We did involve our  who was able to provide some resources to the family, but ultimately we recommend pt follows up with his PCP for long-term management of his home care / social issues / etc.    Pts wife was instructed to reach out to your office, but I also wanted to include you on this message thread. Can your nursing team/schedulers please reach out to this patient and help get him set up for follow-up with you ASAP?    Thank you very much!    THANH Soliman, RN  RN Care Coordinator  [facility] Vascular Surgery - [facility] phone: [phone]  Fax: [phone]     Roxann Hernandez APRN CNP; Chuck Felix MBBS3 minutes ago (9:12 AM)     LA  I will send a message to PCP also.    Roxann Santo APRN CNP  You; Chuck Felix MBBS6 minutes ago (9:09 AM)     VANGIE Mckeon, maybe reach out to Mr. Spence at the end of the week and see if they contacted their PCP. I asked his wife yesterday to reach out to their PCP and establish follow up as they can work with hospice, SW, community resources, etc for their home needs.  Thank you,  Roxann Aparicio APRN CNP; Chuck Felix MBBS1 hour ago (8:11 AM)     LA  Sounds good! Let me know if anything else to do.    Linda     Dhadwal, Chuck, MBBS  You; Roxann Fontanez APRN CNP1 hour ago (8:02 AM)     ZENY Mckeon  Don't involve Ortho. They cut foot off at my request. He does not need to followup with ortho.  Any medical /  weight /social issues then PCP please.  Otherwise it's us.  Thanks  Roxann Ellison APRN CNP17 hours ago (3:38 PM)     LA  Thanks Roxann Bose APRN CNP  You; Chuck Felix MBBS17 hours ago (3:19 PM)     VANGIE  I suggested that to his wife, but she's so sick that I am not sure she will be able to do anything. He will need long term care at home.     You  Roxann Fontanez APRN CNP; Chuck Felix MBBS18 hours ago (3:09 PM)     LA  Perhaps we should get ortho and PCP involved since they were more heavily involved in pt care - thogts?    Roxann Santo APRN CNP  You; Chuck Felix MBBS18 hours ago (2:55 PM)     VANGIE  I called the patient today, his wife is very upset because she is very sick and feels that she cannot take care of him. They had home infusion coming yesterday afternoon and Mr. Engle kicked them out. She also verbalized concerns with inability to transport him to [facility] for [place]baric oxygen therapy. I sent an in basket message to our  and see if there is any ways that we can help. I also dropped the brief note in his chart.  Thank youRoxann

## 2023-11-15 NOTE — TELEPHONE ENCOUNTER
Home Care is calling regarding an established patient with [facility].       Requesting orders from: Marcos Gonzalez  Provider is following patient: No       Orders Requested    Skilled Nursing  Request for initial certification (first set of orders)   Frequency:  1x/wk for 8 wks  3 PRNs: This is for labs, medication management, diabetes, cardiac, pain and safety post hospitalization    2. Physical Therapy  Request for initial evaluation and treatment (one time) (first set of orders)   Frequency:  Eval    3. Occupational Therapy  Request for initial evaluation and treatment (one time) (first set of orders)   Frequency:  Eval    4. Patient is also requesting a refill of oxycodone  oxyCODONE (ROXICODONE) 5 MG tablet 10 tablet 0 11/13/2023  No   Sig - Route: Take 1-2 tablets (5-10 mg) by mouth every 6 hours as needed for moderate pain      He is not taking this as needed he is taking this every 6 hours no matter what. He is out of pain medication.     Information was gathered and will be sent to provider to confirm provider will be following patient.  RN will contact Home Care with information after provider review.  Confirmed ok to leave a detailed message with call back.  Contact information confirmed and updated as needed.    Safia Noriega RN    Adult [facility] Follow-up and recommended labs and tests      Primary care within a month

## 2023-11-15 NOTE — TELEPHONE ENCOUNTER
Chely RN notified of provider message as written. She requests we call pt with information.      Pt notified of provider message as written. Appointment made for Friday.  Alla BANKSN, RN

## 2023-11-15 NOTE — TELEPHONE ENCOUNTER
I approve of requested home care orders. Can't do narcotic refill without a provider appointment. Can see another provider if needed.    Marcos Gonzalez MD

## 2023-11-16 NOTE — NURSING NOTE
Is the patient currently in the state of [place]? YES    Visit mode:VIDEO    If the visit is dropped, the patient can be reconnected by: VIDEO VISIT: Send to e-mail at: [email or web address]    Will anyone else be joining the visit? YES: How would they like to receive their invitation? Send to e-mail: DUNCAN  (If patient encounters technical issues they should call [phone]    How would you like to obtain your AVS? MyChart    Are changes needed to the allergy or medication list? Pt stated no changes to allergies and Pt stated no med changes    Reason for visit: RECHECK    Tyra COLBERTF

## 2023-11-17 NOTE — PROGRESS NOTES
.ASSESSMENT / PLAN:  (D64.9) Anemia, unspecified type  (primary encounter diagnosis)  Comment: likely from blood loss s/p partial amputation  Plan: ferrous sulfate (FEROSUL) 325 (65 Fe) MG tablet        Start iron. Continue eating meat. Recheck in 3 months      (I10) Essential hypertension with goal blood pressure less than 140/90  Comment: over treatment   Plan: will 1/2 losartan from 100 to 50mg and continue self-monitor. Push fluids    (E11.8,  Z79.4) Type 2 diabetes mellitus with complication, with long-term current use of insulin (H)  Comment: over treatment   Plan: continue diet and lower metformin. Recheck in 3 months      (I99.9) Vasculopathy  Plan: patient needs to quit smoking and all ALCOHOL.     (I96) Dry gangrene (H)  Comment: not improving?  Plan: patient has appointment with vascular surgery. Likely will needs more surgery. See above. Back to er if worse. Continue meds.        Subjective   Frandy is a 73 year old, presenting for the following health issues:  Hospital F/U      HPI         Hospital Follow-up Visit:  Follow-up hospitalization for foot infection. Placed on long term antibiotics. Metformin lowered - hga1c 5.5. home  blood pressure 134/54. . Seeing ID/vascular surgery again in next month.. no chest pain or shortness of breath. No fevers or chills. No pain.   Smoking 1/2 ppd - cutting. Occasionally ALCOHOL.   No iron supplement.  Eats meat.   Seen cardiology and heart was stable recently.   Here with wife. No more surgery planned.   Had partial amputation already.    Patient at home with wife. 3 daughters - helpful.   PICU -RN  going out.        He has a past medical history of PMH  of CAD s/p CAB w/ LIMA and L GSV (2004), HTN, HDL, atrial fibrillation on AC, PAD, anemia, dry gangrene, benign cystic neoplasm of the pancreas, TALA not on CPAP, dementia and DM II.   Hospital/Nursing Home/IP Rehab Facility: [facility]  Date of Admission: 11/5/2023  Date  of Discharge: 11/13/2023  Reason(s) for Admission: Foot infection    Was your hospitalization related to COVID-19? No   Problems taking medications regularly:  None  Medication changes since discharge: None  Problems adhering to non-medication therapy:  None    Summary of hospitalization:  CareEverywhere information obtained and reviewed  Diagnostic Tests/Treatments reviewed.  Follow up needed: see aove  Other Healthcare Providers Involved in Patient s Care:         Homecare  Update since discharge: fluctuating course.         Plan of care communicated with patient and family               Objective    There were no vitals taken for this visit.  There is no height or weight on file to calculate BMI.  BP (!) 77/43   Pulse 66   Temp 99.1  F (37.3  C) (Oral)   SpO2 98%    Physical Exam   GENERAL: healthy, alert and no distress  EYES: Eyes grossly normal to inspection, PERRL and conjunctivae and sclerae normal  HENT: ear canals and TM's normal, nose and mouth without ulcers or lesions  NECK: no adenopathy, no asymmetry, masses, or scars and thyroid normal to palpation  RESP: lungs clear to auscultation - no rales, rhonchi or wheezes  CV: regular rate and rhythm, normal S1 S2, no S3 or S4, no murmur, click or rub, no peripheral edema and peripheral pulses strong  ABDOMEN: soft, nontender, no hepatosplenomegaly, no masses and bowel sounds normal  MS: some black eschar distal foot stump  PSYCH: mentation appears a little slow.  affect normal/bright

## 2023-11-18 PROBLEM — T14.8XXA WOUND INFECTION: Status: ACTIVE | Noted: 2023-01-01

## 2023-11-18 PROBLEM — L08.9 WOUND INFECTION: Status: ACTIVE | Noted: 2023-01-01

## 2023-11-18 NOTE — ED TRIAGE NOTES
Pt BIBA for worsening foot wound post amputation.  Pt has had decreased appetite and N/V for the last 24 hrs.  Zofran given enroute

## 2023-11-18 NOTE — H&P
[facility]    History and Physical - Medicine Service, MAROON TEAM        Date of Admission:  11/17/2023    Assessment & Plan      Darian Engle is a 73 year old male with PMH 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     # Recurrent left foot cellulitis  # S/p transmetatarsal amputation 2/2 wet gangrene (10/27/23) c/b staph aureus bacteremia   # PAD s/p left common femoral endarterectomy and balloon angioplasty with stent placement (10/24/23)  Pt presents with 2-3 day history of worsening foot pain with malaise and nausea. Foot appears slightly more swollen with some fluctuance and scant purulent discharge. Labs notable for elevated CRP, though trending down from last check on 11/15, and WBC 11.5, elevated from previous check. MRI on admission was negative for osteomyelitis. Vascular surgery was consulted and recommended broad spectrum abx and checking duplex ultrasound of both extremities. Pt has been on Cefazolin since 11/5/23 for staph aureus bacteremia which developed after the left TMA, following with infectious disease clinic. There are notes that he may have missed doses due to dementia impairing his ability to manage his own medications and his wife getting sick.   - Vancomycin + Cefepime   - Vascular surgery following; will review MRI in AM, no definitive surgical plans at this moment.   - Duplex ultrasound of BLE with RHONDA (ordered for 11/18)  - Blood cx pending   - trend CRP q48  - PICC line remains in place in RUE  - PTA ASA, Atorvastatin, Cilostazol  - Pain control: PTA Gabapentin 100mg TID, scheduled tylenol, prn oxycodone, prn dilaudid for breakthrough pain    # Chronic normocytic anemia  Hgb 7.2, MCV 97. Hgb baseline appears 7-8. Suspect anemia  of chronic disease.     # H/o HTN  - hold PTA Amlodipine, Coreg iso initial hypotension     # Dementia  Unclear patient's baseline. He is unable to answer many questions regarding timing of symptoms and medications. Wife, Violet, manages his medications and helps him make medical decisions.   - Continue PTA Donepezil.     Chronic  # T2DM- Last A1c 5.5% 10/17/23. Hold PTA Metformin.  # CAD- PTA Atorvastatin, ASA  # TALA- CPAP at night   # A Fib- rate controlled, PTA Eliquis   # GERD- PTA Pantoprazole  # Anxiety- PTA Sertraline         Diet:  Regular   DVT Prophylaxis: DOAC  Mercado Catheter: Not present  Lines: PRESENT             Cardiac Monitoring: None  Code Status: Full Code      Clinically Significant Risk Factors Present on Admission               # Drug Induced Coagulation Defect: home medication list includes an anticoagulant medication  # Drug Induced Platelet Defect: home medication list includes an antiplatelet medication   # Hypertension: Noted on problem list   # Dementia: noted on problem list        # Financial/Environmental Concerns:           Disposition Plan      Expected Discharge Date: 11/19/2023                The patient's care was discussed with the day team.    Neelima Patel MD  Medicine Service, [facility]  Securely message with Vocera (more info)  Text page via [facility] Paging/Directory   See signed in provider for up to date coverage information    ______________________________________________________________________    Chief Complaint   Left foot pain    History is obtained from the patient    History of Present Illness   Darian Engle is a 73 year old male who presents to the ED via ambulance for evaluation of left foot pain, decreased appetite, nausea and vomiting for past 2-3 days. The pain has been worsening over the past few days. He feels generally unwell with nausea and one bout of emesis at home. He thinks his foot looks  worse than before. He is unable to answer most questions with very specific answers as he reports his memory is poor. He reports smoking daily and drinking 2-3 beers daily. He lives at home with his wife.     ED course:  - Initial VS: BP 77/43, HR 66, SpO2 99% RA  - Initial labs notable for lactate 0.8, WBC 11.5, Hgb 7.2, CRP 39, ESR 52  - MRI without evidence of osteomyelitis, does show soft tissue edema, no fluid collections.   - Received dilaudid x2, cefepime, vancomycin     Per ED provider note:  Darian Engle is a 73 year old male with past medical history notable for PAD, dry gangrene, CAD s/p CAB w/ LIMA and L GSV (2004), DM2, dyslipidemia, HLD, HTN, paroxysmal atrial fibrillation on chronic anticoagulation, benign cystic neoplasm of the pancreas, TALA (not on CPAP) and moderate protein calorie malnutrition brought in by ambulance with concerns of infection at the site of left foot amputation as well as decreased appetite, nausea, and vomiting.      Patient explains that he developed acute left foot pain approximately 2 to 3 days ago, and the pain has been progressively worse since onset.  He denies subjective or objective fever or chills but feels generally malaised, nauseous and reports that he had a bout of nonbloody emesis.  He denies diarrhea, dysuria, or abdominal pain.  He notes that his foot appears slightly more swollen and erythematous, especially around the surgical incision, where there is fluctuance and a small amount of purulent drainage.     Per chart review, patient was recently admitted to [facility] from 11/5/[facility]-11/13/23. 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 Blood cultures from 11/5 came back positive for staphylococcus aureus, under the guidance of ID team the antibiotics were narrowed to cefazolin with plans to treat for a course for 2 weeks and then transition to oral bactrim for 2 more weeks. Team planned for the patient to undergo hyperbaric  oxygen treatment at [facility] and have close follow up with vascular team to do everything possibly to save the limb. Patient was discharged with outpatient follow ups.        Past Medical History    Past Medical History:   Diagnosis Date    Arthritis March 2018    joint pain both hands    Diabetes (H)     Heart disease 1991    [place]-angioplasty    Hypertension        Past Surgical History   Past Surgical History:   Procedure Laterality Date    AMPUTATE TOE(S) Left 10/26/2023    Procedure: Amputate toe(s), all transmetatarsal amputation;  Surgeon: Jerardo Thornton MD;  Location: [facility] OR    ANGIOGRAM Left 10/17/2023    Procedure: Left lower extremity angiogram via Left brachial artery approach;  Surgeon: Chuck Felix MBBS;  Location: [facility] OR    ANGIOGRAM Left 10/24/2023    Procedure: ANGIOGRAM;  Surgeon: Chuck Felix MBBS;  Location: [facility] OR    ANGIOPLASTY Left 10/24/2023    Procedure: ANGIOPLASTY, Left Lower Extremity atherectomy;  Surgeon: Chuck Felix MBBS;  Location: [facility] OR    CARDIAC SURGERY  [address] [place] [address]    ENDARTERECTOMY FEMORAL Left 10/24/2023    Procedure: ENDARTERECTOMY, FEMORAL;  Surgeon: Chuck Felix MBBS;  Location: [facility] OR    ENHANCE LASER REFRACTIVE BILATERAL EXISTING PT IN PARAMETERS  2000    EYE SURGERY  2000    [facility]    IR OR ANGIOGRAM  10/17/2023    IR OR ANGIOGRAM  10/24/2023    VASCULAR SURGERY  2017    [facility]       Prior to Admission Medications   Prior to Admission Medications   Prescriptions Last Dose Informant Patient Reported? Taking?   ACCU-CHEK SMARTVIEW test strip   No No   Sig: TEST THREE TIMES DAILY OR AS DIRECTED   Omega-3 Fatty Acids (FISH OIL OMEGA-3) 1000 MG CAPS   Yes No   Sig: Take 1,000 mg by mouth daily   acetaminophen (TYLENOL) 325 MG tablet   No No   Sig: Take 3 tablets (975 mg) by mouth every 8 hours Do this for the first few days postoperatively, as pain improves, can transition to taking  1-2 tabs q4-6 hours as needed.   amLODIPine (NORVASC) 10 MG tablet   No No   Sig: Take 1 tablet (10 mg) by mouth daily   apixaban ANTICOAGULANT (ELIQUIS ANTICOAGULANT) 5 MG tablet   No No   Sig: Take 1 tablet (5 mg) by mouth 2 times daily   aspirin 81 MG tablet   Yes No   Sig: Take 81 mg by mouth daily   atorvastatin (LIPITOR) 40 MG tablet   No No   Sig: Take 1 tablet (40 mg) by mouth daily For cholesterol.   blood glucose monitoring (ACCU-CHEK FASTCLIX) lancets   No No   Sig: USE TO TEST THREE TIMES DAILY OR AS DIRECTED   carvedilol (COREG) 25 MG tablet   Yes No   Sig: Take 25 mg by mouth 2 times daily   ceFAZolin (ANCEF) intermittent infusion 2 g in 100 mL dextrose PRE-MIX   No No   Sig: Inject 100 mLs (2 g) into the vein every 8 hours for 8 days   cholecalciferol ( VITAMIN D3) 50 MCG (2000 UT) CAPS   Yes No   Sig: Take 2 capsules by mouth daily   cilostazol (PLETAL) 100 MG tablet   Yes No   Sig: Take 100 mg by mouth 2 times daily   donepezil (ARICEPT) 10 MG tablet   No No   Sig: Take 1 tablet (10 mg) by mouth At Bedtime   famotidine (PEPCID) 10 MG tablet   No No   Sig: Take 1 tablet (10 mg) by mouth 2 times daily   ferrous sulfate (FEROSUL) 325 (65 Fe) MG tablet   No No   Sig: Take 1 tablet (325 mg) by mouth daily (with breakfast) Iron supplement   gabapentin (NEURONTIN) 100 MG capsule   No No   Sig: Take 1 capsule (100 mg) by mouth 3 times daily   isosorbide mononitrate (IMDUR) 30 MG 24 hr tablet   No No   Sig: TAKE 2 TABLETS(60 MG) BY MOUTH DAILY   losartan (COZAAR) 50 MG tablet   No No   Sig: Take 2 tablets (100 mg) by mouth daily   magnesium oxide 200 MG TABS   Yes No   Sig: Take 200 mg by mouth daily   melatonin 1 MG TABS tablet   No No   Sig: Take 1 tablet (1 mg) by mouth nightly as needed for sleep   metFORMIN (GLUCOPHAGE XR) 500 MG 24 hr tablet   No No   Sig: TAKE 1 TABLET(500 MG) BY MOUTH TWICE DAILY WITH MEALS   multivitamin (CENTRUM SILVER) tablet   Yes No   Sig: Take 1 tablet by mouth daily    naloxone (NARCAN) 4 MG/0.1ML nasal spray   No No   Sig: Spray 1 spray (4 mg) into one nostril alternating nostrils as needed for opioid reversal every 2-3 minutes until assistance arrives   nitroGLYcerin (NITROSTAT) 0.4 MG sublingual tablet   No No   Sig: PLACE 1 TABLET UNDER THE TONGUE EVERY 5 MINUTES FOR 3 DOSES, IF SYMPTOMS PERSIST 5 MINUTES AFTER 1ST DOSE CALL 911   omeprazole (PRILOSEC) 20 MG DR capsule   No No   Sig: Take 1 capsule (20 mg) by mouth daily   ondansetron (ZOFRAN ODT) 4 MG ODT tab   No No   Sig: Take 1 tablet (4 mg) by mouth every 6 hours as needed for nausea or vomiting   oxyCODONE (ROXICODONE) 5 MG tablet   No No   Sig: Take 1-2 tablets (5-10 mg) by mouth every 6 hours as needed for moderate pain   oxyCODONE IR (ROXICODONE) 10 MG tablet   No No   Sig: Take 1 tablet (10 mg) by mouth every 6 hours as needed for severe pain   polyethylene glycol (MIRALAX) 17 GM/Dose powder   No No   Sig: Take 17 g by mouth daily   senna-docusate (SENOKOT-S/PERICOLACE) 8.6-50 MG tablet   No No   Sig: Take 1 tablet by mouth 2 times daily While taking narcotic pain medications (oxycodone)   sertraline (ZOLOFT) 25 MG tablet   No No   Sig: TAKE ONE TABLET BY MOUTH DAILY FOR DEPRESSION AND TO HELP APPETITE   spironolactone (ALDACTONE) 25 MG tablet   Yes No   sulfamethoxazole-trimethoprim (BACTRIM DS) 800-160 MG tablet   No No   Sig: Take 2 tablets by mouth 2 times daily   tiZANidine (ZANAFLEX) 2 MG tablet   No No   Sig: Take 1 tablet (2 mg) by mouth 3 times daily as needed for muscle spasms   traZODone (DESYREL) 50 MG tablet   No No   Sig: Take 1 tablet (50 mg) by mouth at bedtime      Facility-Administered Medications: None          Physical Exam   Vital Signs:     BP: (!) 140/60       SpO2: 99 % O2 Device: None (Room air)    Weight: 0 lbs 0 oz    Constitutional: awake, alert, cooperative, no apparent distress, and appears stated age  Respiratory: No increased work of breathing, good air exchange, clear to  auscultation bilaterally, no crackles or wheezing  Cardiovascular: Normal apical impulse, regular rate and rhythm, normal S1 and S2, no S3 or S4, and no murmur noted  GI: normal bowel sounds, non-distended, and non-tender  Skin: left foot with healing surgical incision with areas of erythema and necrosis. Foot stump appears swollen and is tender to the touch.   Musculoskeletal: no lower extremity pitting edema present  motor strength is 5 out of 5 all extremities bilaterally  Neurologic: Mental Status Exam:  Level of Alertness:   awake  Orientation:   person, place  Memory:   normal  Fund of Knowledge:  abnormal - poor  Attention/Concentration:  normal        Data     I have personally reviewed the following data over the past 24 hrs:    11.5 (H)  \   7.2 (L)   / 361     136 106 16.2 /  89   4.4 20 (L) 1.10 \     Procal: N/A CRP: 39.30 (H) Lactic Acid: 0.8         Imaging results reviewed over the past 24 hrs:   Recent Results (from the past 24 hour(s))   Foot XR, G/E 3 views, left    Narrative    EXAM: XR FOOT LEFT G/E 3 VIEWS  LOCATION: [facility]  DATE: 11/17/2023    INDICATION: Eval for subq air or osteo  COMPARISON: 11/05/2023      Impression    IMPRESSION: Previous amputations through the mid shafts of the first through fifth metatarsals, unchanged. No evidence for destructive changes or radiographic evidence for osteomyelitis. Extensive vascular calcifications. Plantar calcaneal spur.   MR Foot Left w/o & w Contrast    Narrative    EXAM: MR FOOT LEFT W/O and W CONTRAST  LOCATION: [facility]  DATE: 11/18/2023    INDICATION: Amputation 2 weeks ago, eval for osteomyelitis  COMPARISON: Radiographs 11/17/2023  TECHNIQUE: Routine. Additional postgadolinium T1 sequences were obtained.  IV CONTRAST: 6 mL gadavist    FINDINGS:     JOINTS AND BONES:   -First through fifth transmetatarsal amputations. No marrow edema. Normal T1  marrow signal.     TENDONS:   -Postsurgical changes related to transmetatarsal amputation. No tenosynovitis.    LIGAMENTS:   -Lisfranc ligament: Intact. No subluxation.    MUSCLES AND SOFT TISSUES:   -Soft tissue edema and enhancement around the amputation site. No fluid collection. Mildly increased signal in the foot musculature.      Impression    IMPRESSION:  1.  No evidence for osteomyelitis.  2.  Soft tissue edema and enhancement at the amputation site which could indicate cellulitis. No fluid collection.

## 2023-11-18 NOTE — PHARMACY-VANCOMYCIN DOSING SERVICE
Pharmacy Vancomycin Initial Note  Date of Service 2023  Patient's  1950  73 year old, male    Indication: Skin and Soft Tissue Infection    Current estimated CrCl = Estimated Creatinine Clearance: 52.5 mL/min (based on SCr of 1.1 mg/dL).    Creatinine for last 3 days  11/15/2023: 11:50 AM Creatinine 0.80 mg/dL  2023:  9:23 PM Creatinine 1.10 mg/dL    Recent Vancomycin Level(s) for last 3 days  No results found for requested labs within last 3 days.      Vancomycin IV Administrations (past 72 hours)        No vancomycin orders with administrations in past 72 hours.                    Nephrotoxins and other renal medications (From now, onward)      Start     Dose/Rate Route Frequency Ordered Stop    23  vancomycin (VANCOCIN) 1,500 mg in 0.9% NaCl 250 mL intermittent infusion         1,500 mg  over 90 Minutes Intravenous ONCE 23  vancomycin place aguillon - receiving intermittent dosing         1 each Intravenous SEE ADMIN INSTRUCTIONS 23              Contrast Orders - past 72 hours (72h ago, onward)      None                    Plan:  Give vancomycin 1500mg IV x1 now. Will dose intermittently based on levels for now due to concern for DEEPIKA on admit   Vancomycin monitoring method: Trough (Method 2 = manual dose calculation)  Vancomycin therapeutic monitoring goal: 10-15 mg/L  Pharmacy will check vancomycin levels as appropriate in 1-3 Days.    Serum creatinine levels will be ordered daily for the first week of therapy and at least twice weekly for subsequent weeks.      Janes Johnson, Pharmd, BCPS

## 2023-11-18 NOTE — CONSULTS
VASCULAR SURGERY ED CONSULT NOTE  11/17/2023      REASON FOR CONSULTATION: Concern for surgical site infection s/p left foot amputation     ASSESSMENT: Darian Engle is a 73 year old male with past medical history notable for PAD, dry gangrene, CAD s/p CAB w/ LIMA and L GSV (2004), DM2, dyslipidemia, HLD, HTN, paroxysmal atrial fibrillation on chronic anticoagulation, benign cystic neoplasm of the pancreas, TALA (not on CPAP) and moderate protein calorie malnutrition brought in by ambulance with concerns of infection at the site of recent left foot amputation (in October 2023) as well as decreased appetite.     PLAN:  - Recommend admission to medicine team, sincerely appreciate recs and cares  - Continue broad spectrum IV antibiotics   - Obtain duplex ultrasound of BLE with ABIs  - Multimodal pain control   - Will review MRI results     Discussed with fellow Dr. Calvo who will discuss with staff Dr. Sanchez.     Ashley Lucia MD PGY2  General Surgery Resident  - - - - - - - - - - - - - - - - - - - - - - - - - - - - - - - - - - - - - - - - - - - - - - - - - - - - - - - - - - - - - - - - - - - - - - - -     HISTORY PRESENTING ILLNESS: Darian Engle is a 73 year old male with past medical history notable for PAD, dry gangrene, CAD s/p CAB w/ LIMA and L GSV (2004), DM2, dyslipidemia, HLD, HTN, paroxysmal atrial fibrillation on chronic anticoagulation, benign cystic neoplasm of the pancreas, TALA (not on CPAP) and moderate protein calorie malnutrition brought in by ambulance with concerns of infection at the site of recent left foot amputation (in October 2023) as well as decreased appetite, nausea, and vomiting.     Regarding his left foot surgical history - he was admitted for from 10/17/23-10/30/23 for gangrenous left leg and underwent left femoral endarterectomy and profundoplasty and balloon angioplasty on 10/24/23 followed by left foot transmetatarsal amputation on 10/27/2023.  He was readmitted on  11/6 with concerns for left foot infection. Blood cultures at that time were positive for staph aureus. Per ID he was continued on a 2 week course of IV cefazolin with plans to transition to oral bactrim for 2 weeks on 11/19. There was also consideration for hyperbaric oxygen treatment at [facility] [place] with close follow up with the vascular surgery team.     Today, he states that since his most recently hospitalization, he has had persistent pain in his left foot. The pain acutely worsened over the last day what brought him to the hospital. He denies associated fevers or chills, endorses decreased appetite but no nausea or vomiting. When asked if he is taking his scheduled IV pain medications, he states he doesn't know. Per chart review, was seen by ID on 11/15 at which time he stated he had missed 2 doses of ancef due to his wife being sick and needing to subsequently go to the ER.     Otherwise, having regular bowel movements. No issues with urination. Denies chest pain or shortness of breath. Lives at home with his wife and tries to move around with a walker.     REVIEW OF SYSTEMS: 10 point ROS neg other than the symptoms noted above in the HPI.    PAST MEDICAL HISTORY:    has a past medical history of Arthritis (March 2018), Diabetes (H), Heart disease (1991), and Hypertension.    He has no past medical history of Cancer (H), Cerebral infarction (H), Congestive heart failure (H), COPD (chronic obstructive pulmonary disease) (H), Depressive disorder, History of blood transfusion, Thyroid disease, or Uncomplicated asthma.    SURGICAL HISTORY:    has a past surgical history that includes Enhance Laser Refractive Bilateral Existing Pt In Parameters (2000); Cardiac surgery (2004); colonoscopy; Eye surgery (2000); vascular surgery (2017); Angiogram (Left, 10/17/2023); IR OR Angiogram (10/17/2023); Endarterectomy femoral (Left, 10/24/2023); Angiogram (Left, 10/24/2023); Angioplasty (Left, 10/24/2023); Amputate toe(s) (Left,  10/26/2023); and IR OR Angiogram (10/24/2023).    SOCIAL HISTORY:    reports that he has been smoking cigarettes. He started smoking about 55 years ago. He has a 2.50 pack-year smoking history. He has quit using smokeless tobacco. He reports current alcohol use. He reports current drug use. Drug: Marijuana.    FAMILY HISTORY: No bleeding/clotting disorders nor problems with anesthesia.     ALLERGIES:      Allergies   Allergen Reactions    Lidocaine Other (See Comments)     Lungs filled with fluid. ? Anaphylaxis    Lisinopril Cough     cough    Naltrexone Nausea and Vomiting       MEDICATIONS:  No current facility-administered medications on file prior to encounter.  ACCU-CHEK SMARTVIEW test strip, TEST THREE TIMES DAILY OR AS DIRECTED  acetaminophen (TYLENOL) 325 MG tablet, Take 3 tablets (975 mg) by mouth every 8 hours Do this for the first few days postoperatively, as pain improves, can transition to taking 1-2 tabs q4-6 hours as needed.  amLODIPine (NORVASC) 10 MG tablet, Take 1 tablet (10 mg) by mouth daily  apixaban ANTICOAGULANT (ELIQUIS ANTICOAGULANT) 5 MG tablet, Take 1 tablet (5 mg) by mouth 2 times daily  aspirin 81 MG tablet, Take 81 mg by mouth daily  atorvastatin (LIPITOR) 40 MG tablet, Take 1 tablet (40 mg) by mouth daily For cholesterol.  blood glucose monitoring (ACCU-CHEK FASTCLIX) lancets, USE TO TEST THREE TIMES DAILY OR AS DIRECTED  carvedilol (COREG) 25 MG tablet, Take 25 mg by mouth 2 times daily  ceFAZolin (ANCEF) intermittent infusion 2 g in 100 mL dextrose PRE-MIX, Inject 100 mLs (2 g) into the vein every 8 hours for 8 days  cholecalciferol ( VITAMIN D3) 50 MCG (2000 UT) CAPS, Take 2 capsules by mouth daily  cilostazol (PLETAL) 100 MG tablet, Take 100 mg by mouth 2 times daily  donepezil (ARICEPT) 10 MG tablet, Take 1 tablet (10 mg) by mouth At Bedtime  famotidine (PEPCID) 10 MG tablet, Take 1 tablet (10 mg) by mouth 2 times daily  ferrous sulfate (FEROSUL) 325 (65 Fe) MG tablet, Take 1  tablet (325 mg) by mouth daily (with breakfast) Iron supplement  gabapentin (NEURONTIN) 100 MG capsule, Take 1 capsule (100 mg) by mouth 3 times daily  isosorbide mononitrate (IMDUR) 30 MG 24 hr tablet, TAKE 2 TABLETS(60 MG) BY MOUTH DAILY  losartan (COZAAR) 50 MG tablet, Take 2 tablets (100 mg) by mouth daily  magnesium oxide 200 MG TABS, Take 200 mg by mouth daily  melatonin 1 MG TABS tablet, Take 1 tablet (1 mg) by mouth nightly as needed for sleep  metFORMIN (GLUCOPHAGE XR) 500 MG 24 hr tablet, TAKE 1 TABLET(500 MG) BY MOUTH TWICE DAILY WITH MEALS  multivitamin (CENTRUM SILVER) tablet, Take 1 tablet by mouth daily  naloxone (NARCAN) 4 MG/0.1ML nasal spray, Spray 1 spray (4 mg) into one nostril alternating nostrils as needed for opioid reversal every 2-3 minutes until assistance arrives  nitroGLYcerin (NITROSTAT) 0.4 MG sublingual tablet, PLACE 1 TABLET UNDER THE TONGUE EVERY 5 MINUTES FOR 3 DOSES, IF SYMPTOMS PERSIST 5 MINUTES AFTER 1ST DOSE CALL 911  Omega-3 Fatty Acids (FISH OIL OMEGA-3) 1000 MG CAPS, Take 1,000 mg by mouth daily  omeprazole (PRILOSEC) 20 MG DR capsule, Take 1 capsule (20 mg) by mouth daily  ondansetron (ZOFRAN ODT) 4 MG ODT tab, Take 1 tablet (4 mg) by mouth every 6 hours as needed for nausea or vomiting  oxyCODONE (ROXICODONE) 5 MG tablet, Take 1-2 tablets (5-10 mg) by mouth every 6 hours as needed for moderate pain  oxyCODONE IR (ROXICODONE) 10 MG tablet, Take 1 tablet (10 mg) by mouth every 6 hours as needed for severe pain  polyethylene glycol (MIRALAX) 17 GM/Dose powder, Take 17 g by mouth daily  senna-docusate (SENOKOT-S/PERICOLACE) 8.6-50 MG tablet, Take 1 tablet by mouth 2 times daily While taking narcotic pain medications (oxycodone)  sertraline (ZOLOFT) 25 MG tablet, TAKE ONE TABLET BY MOUTH DAILY FOR DEPRESSION AND TO HELP APPETITE  spironolactone (ALDACTONE) 25 MG tablet,   [START ON 11/20/2023] sulfamethoxazole-trimethoprim (BACTRIM DS) 800-160 MG tablet, Take 2 tablets by mouth  "2 times daily  tiZANidine (ZANAFLEX) 2 MG tablet, Take 1 tablet (2 mg) by mouth 3 times daily as needed for muscle spasms  traZODone (DESYREL) 50 MG tablet, Take 1 tablet (50 mg) by mouth at bedtime        PHYSICAL EXAMINATION:  Temp:  [99.1  F (37.3  C)] 99.1  F (37.3  C)  Pulse:  [66] 66  BP: ()/(43-60) 140/60  SpO2:  [98 %-99 %] 99 %    General: Aox3, resting comfortably in bed  Resp: nonlabored breathing on room air   CV: regular rates, normotensive   Abdomen: soft, NT, ND  Extremities: LLE amputation wound with evidence of necrosis and overlying erythema, pitting edema, and significant tenderness to palpation. No purulent drainage from wound. Biphasic PT signal of left foot, no DP signal. Palpable right DP and PT pulse.    LABS: Reviewed.   Arterial Blood Gases   No lab results found in last 7 days.  Complete Blood Count   Recent Labs   Lab 11/17/23  2123 11/15/23  1150 11/13/23  0624 11/12/23  0634   WBC 11.5* 7.1 9.5 9.8   HGB 7.2* 7.5* 7.6* 7.7*    375 338 338     Basic Metabolic Panel  Recent Labs   Lab 11/17/23  2123 11/15/23  1150 11/13/23  0739 11/13/23  0624 11/12/23  0808 11/12/23  0634    139  --  139  --  136   POTASSIUM 4.4 3.8  --  3.8  --  3.9   CHLORIDE 106 107  --  107  --  107   CO2 20* 25  --  18*  --  20*   BUN 16.2 7.5*  --  9.9  --  11.5   CR 1.10 0.80  --  0.61*  --  0.64*   GLC 89 114* 81 83   < > 80    < > = values in this interval not displayed.     Liver Function Tests  Recent Labs   Lab 11/15/23  1150   AST 26   ALT 5   ALKPHOS 91   BILITOTAL 0.3   ALBUMIN 2.6*     Pancreatic Enzymes  No lab results found in last 7 days.  Coagulation Profile  No lab results found in last 7 days.  Lactate  Invalid input(s): \"LACTATE\"    IMAGING:  Recent Results (from the past 24 hour(s))   Foot XR, G/E 3 views, left    Narrative    EXAM: XR FOOT LEFT G/E 3 VIEWS  LOCATION: [facility]  DATE: 11/17/2023    INDICATION: Eval for subq air or " osteo  COMPARISON: 11/05/2023      Impression    IMPRESSION: Previous amputations through the mid shafts of the first through fifth metatarsals, unchanged. No evidence for destructive changes or radiographic evidence for osteomyelitis. Extensive vascular calcifications. Plantar calcaneal spur.

## 2023-11-18 NOTE — ED PROVIDER NOTES
[place] EMERGENCY DEPARTMENT ([facility])    11/17/23       ED PROVIDER NOTE    History     Chief Complaint   Patient presents with    Wound Infection     Amputation site on L foot appears to have worsening infection per pt's wife.  Pt has had decreased appetite and N/V over the last 24 hrs.  He had approx 4-5 emesis in the last 24 hrs.     HPI  Darian Engle is a 73 year old male with past medical history notable for PAD, dry gangrene, CAD s/p CAB w/ LIMA and L GSV (2004), DM2, dyslipidemia, HLD, HTN, paroxysmal atrial fibrillation on chronic anticoagulation, benign cystic neoplasm of the pancreas, TALA (not on CPAP) and moderate protein calorie malnutrition brought in by ambulance with concerns of infection at the site of left foot amputation as well as decreased appetite, nausea, and vomiting.     Patient explains that he developed acute left foot pain approximately 2 to 3 days ago, and the pain has been progressively worse since onset.  He denies subjective or objective fever or chills but feels generally malaised, nauseous and reports that he had a bout of nonbloody emesis.  He denies diarrhea, dysuria, or abdominal pain.  He notes that his foot appears slightly more swollen and erythematous, especially around the surgical incision, where there is fluctuance and a small amount of purulent drainage.    Per chart review, patient was recently admitted to [facility] from 11/5/23-11/13/23. 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 Blood cultures from 11/5 came back positive for staphylococcus aureus, under the guidance of ID team the antibiotics were narrowed to cefazolin with plans to treat for a course for 2 weeks and then transition to oral bactrim for 2 more weeks. Team planned for the patient to undergo hyperbaric oxygen treatment at [facility] and have close follow up with vascular team to do everything possibly to save the limb. Patient was discharged with  outpatient follow ups.      XR Foot Port Left 3 Views (11/5/23)  IMPRESSION: AP, lateral, and oblique images acquired. Recent transmetatarsal amputation. Mildly heterogeneous density to the overlying soft tissues. Limited sensitivity/specificity for detection of infection. Advanced vascular calcifications. Degenerative changes.      US ART EXTREMITY LOW LT NO RHONDA (11/5/23)  IMPRESSION:   1. Monophasic waveforms throughout the majority of the left lower extremity arterial system compatible with at least moderate obstruction. There is elevated peak systolic velocity within the common femoral artery and proximal superficial femoral artery suggestive of 20 to 49% stenosis. Flow is seen throughout the left superficial femoral artery on the current examination which is in contradistinction to a previously seen occluded stented left superficial femoral artery on angiogram 8/16/2023. Correlation with any interval revascularization recommended. Flow within the popliteal artery is low. There is no flow visualized within the peroneal artery, anterior tibial or dorsalis pedis artery. There is diffuse subcutaneous edema. Traditional angiographic evaluation may be useful for further evaluation as noted on prior runoff examination.     Past Medical History  Past Medical History:   Diagnosis Date    Arthritis March 2018    joint pain both hands    Diabetes (H)     Heart disease 1991    Mcnary-angioplasty    Hypertension      Past Surgical History:   Procedure Laterality Date    AMPUTATE TOE(S) Left 10/26/2023    Procedure: Amputate toe(s), all transmetatarsal amputation;  Surgeon: Jerardo Thornton MD;  Location: [facility] OR    ANGIOGRAM Left 10/17/2023    Procedure: Left lower extremity angiogram via Left brachial artery approach;  Surgeon: Chuck Felix MBBS;  Location: [facility] OR    ANGIOGRAM Left 10/24/2023    Procedure: ANGIOGRAM;  Surgeon: Chuck Felix MBBS;  Location: [facility] OR    ANGIOPLASTY Left 10/24/2023    Procedure:  ANGIOPLASTY, Left Lower Extremity atherectomy;  Surgeon: Chuck Felix MBBS;  Location: [facility] OR    CARDIAC SURGERY  2004    [facility]    ENDARTERECTOMY FEMORAL Left 10/24/2023    Procedure: ENDARTERECTOMY, FEMORAL;  Surgeon: Chuck Felix MBBS;  Location: [facility] OR    ENHANCE LASER REFRACTIVE BILATERAL EXISTING PT IN PARAMETERS  2000    EYE SURGERY  2000    [facility]    IR OR ANGIOGRAM  10/17/2023    IR OR ANGIOGRAM  10/24/2023    VASCULAR SURGERY  2017    [facility]     ACCU-CHEK SMARTVIEW test strip  acetaminophen (TYLENOL) 325 MG tablet  amLODIPine (NORVASC) 10 MG tablet  apixaban ANTICOAGULANT (ELIQUIS ANTICOAGULANT) 5 MG tablet  aspirin 81 MG tablet  atorvastatin (LIPITOR) 40 MG tablet  blood glucose monitoring (ACCU-CHEK FASTCLIX) lancets  carvedilol (COREG) 25 MG tablet  ceFAZolin (ANCEF) intermittent infusion 2 g in 100 mL dextrose PRE-MIX  cholecalciferol ( VITAMIN D3) 50 MCG (2000 UT) CAPS  cilostazol (PLETAL) 100 MG tablet  donepezil (ARICEPT) 10 MG tablet  famotidine (PEPCID) 10 MG tablet  ferrous sulfate (FEROSUL) 325 (65 Fe) MG tablet  gabapentin (NEURONTIN) 100 MG capsule  isosorbide mononitrate (IMDUR) 30 MG 24 hr tablet  losartan (COZAAR) 50 MG tablet  magnesium oxide 200 MG TABS  melatonin 1 MG TABS tablet  metFORMIN (GLUCOPHAGE XR) 500 MG 24 hr tablet  multivitamin (CENTRUM SILVER) tablet  naloxone (NARCAN) 4 MG/0.1ML nasal spray  nitroGLYcerin (NITROSTAT) 0.4 MG sublingual tablet  Omega-3 Fatty Acids (FISH OIL OMEGA-3) 1000 MG CAPS  omeprazole (PRILOSEC) 20 MG DR capsule  ondansetron (ZOFRAN ODT) 4 MG ODT tab  oxyCODONE (ROXICODONE) 5 MG tablet  oxyCODONE IR (ROXICODONE) 10 MG tablet  polyethylene glycol (MIRALAX) 17 GM/Dose powder  senna-docusate (SENOKOT-S/PERICOLACE) 8.6-50 MG tablet  sertraline (ZOLOFT) 25 MG tablet  spironolactone (ALDACTONE) 25 MG tablet  [START ON 11/20/2023] sulfamethoxazole-trimethoprim (BACTRIM DS)  800-160 MG tablet  tiZANidine (ZANAFLEX) 2 MG tablet  traZODone (DESYREL) 50 MG tablet      Allergies   Allergen Reactions    Lidocaine Other (See Comments)     Lungs filled with fluid. ? Anaphylaxis    Lisinopril Cough     cough    Naltrexone Nausea and Vomiting     Family History  Family History   Problem Relation Age of Onset    Glaucoma Mother     Macular Degeneration Mother     Macular Degeneration Other     Lung Cancer Brother      Social History   Social History     Tobacco Use    Smoking status: Every Day     Packs/day: 0.50     Years: 5.00     Additional pack years: 0.00     Total pack years: 2.50     Types: Cigarettes     Start date: 1968     Last attempt to quit: 7/15/2016     Years since quittin.3    Smokeless tobacco: Former   Vaping Use    Vaping Use: Some days    Substances: THC    Devices: Disposable   Substance Use Topics    Alcohol use: Yes     Comment: binge, two months sober    Drug use: Yes     Types: Marijuana     Comment: uses Marijuana for pain      Past medical history, past surgical history, medications, allergies, family history, and social history were reviewed with the patient. No additional pertinent items.          Physical Exam     BP: (!) 140/60  SpO2: 99 %    Physical Exam  Vital signs reviewed.  GENERAL: Alert. Conversant.  EYES: PERRL. Conjunctiva normal. Lids symmetric.  HEAD: Nose and ears atraumatic. Mucous membranes moist.  NECK: Symmetric. Atraumatic.  CARDIAC: Normal peripheral pulses. No lower extremity edema.  RESPIRATORY: Normal respiratory effort. No audible stridor or wheezing.  ABDOMINAL: Soft, Nontender.  SKIN: Warm, Dry.   LEFT FOOT: The left foot is status post amputation of all of 5 digits, and there is a healing surgical incision across the anterior foot stump.  The incision is black, necrotic, and there is fluctuance with palpation.  There is a small amount of purulent drainage from the incision.  The foot near the incision is slightly erythematous,  swollen, and tender.  NEURO: CNIII-XII grossly intact.  PSYCH: Oriented to person, place, and time. Mood and affect appropriate and congruent.      ED Course, Procedures, & Data     ED Course as of 11/17/23 2357 Fri Nov 17, 2023 2208 I received a call back from the surgical team.  I discussed the case with the surgical resident who will come to evaluate the patient and provide further recommendations.   2248 I discussed the need for MRI with the current MRI tech, who will call in his colleague to perform the exam.   2257 Signed out to oncoming physician pending:  - MRI  - Surgery recs  - Likely admission to surgery.   2356 Patient has reportedly had difficulty holding still for MRI scan due to pain.  Before going to the MRI scan patient was resting comfortably in his cot.  I will cycle other patients to the MRI scanner while addressing this patient's pain once he returns to the emergency department.  Repaged general surgery for any update on their recommendations.     Procedures                  Results for orders placed or performed during the hospital encounter of 11/17/23   Foot XR, G/E 3 views, left     Status: None    Narrative    EXAM: XR FOOT LEFT G/E 3 VIEWS  LOCATION: [facility]  DATE: 11/17/2023    INDICATION: Eval for subq air or osteo  COMPARISON: 11/05/2023      Impression    IMPRESSION: Previous amputations through the mid shafts of the first through fifth metatarsals, unchanged. No evidence for destructive changes or radiographic evidence for osteomyelitis. Extensive vascular calcifications. Plantar calcaneal spur.   Basic metabolic panel     Status: Abnormal   Result Value Ref Range    Sodium 136 135 - 145 mmol/L    Potassium 4.4 3.4 - 5.3 mmol/L    Chloride 106 98 - 107 mmol/L    Carbon Dioxide (CO2) 20 (L) 22 - 29 mmol/L    Anion Gap 10 7 - 15 mmol/L    Urea Nitrogen 16.2 8.0 - 23.0 mg/dL    Creatinine 1.10 0.67 - 1.17 mg/dL    GFR Estimate 72 >60  mL/min/1.73m2    Calcium 8.2 (L) 8.8 - 10.2 mg/dL    Glucose 89 70 - 99 mg/dL   Erythrocyte sedimentation rate auto     Status: Abnormal   Result Value Ref Range    Erythrocyte Sedimentation Rate 52 (H) 0 - 20 mm/hr   CRP inflammation     Status: Abnormal   Result Value Ref Range    CRP Inflammation 39.30 (H) <5.00 mg/L   Lactic acid whole blood     Status: Normal   Result Value Ref Range    Lactic Acid 0.8 0.7 - 2.0 mmol/L   CBC with platelets and differential     Status: Abnormal   Result Value Ref Range    WBC Count 11.5 (H) 4.0 - 11.0 10e3/uL    RBC Count 2.27 (L) 4.40 - 5.90 10e6/uL    Hemoglobin 7.2 (L) 13.3 - 17.7 g/dL    Hematocrit 22.1 (L) 40.0 - 53.0 %    MCV 97 78 - 100 fL    MCH 31.7 26.5 - 33.0 pg    MCHC 32.6 31.5 - 36.5 g/dL    RDW 13.6 10.0 - 15.0 %    Platelet Count 361 150 - 450 10e3/uL    % Neutrophils 81 %    % Lymphocytes 8 %    % Monocytes 10 %    % Eosinophils 0 %    % Basophils 0 %    % Immature Granulocytes 1 %    NRBCs per 100 WBC 0 <1 /100    Absolute Neutrophils 9.4 (H) 1.6 - 8.3 10e3/uL    Absolute Lymphocytes 0.9 0.8 - 5.3 10e3/uL    Absolute Monocytes 1.1 0.0 - 1.3 10e3/uL    Absolute Eosinophils 0.0 0.0 - 0.7 10e3/uL    Absolute Basophils 0.0 0.0 - 0.2 10e3/uL    Absolute Immature Granulocytes 0.1 <=0.4 10e3/uL    Absolute NRBCs 0.0 10e3/uL   Extra Tube     Status: None    Narrative    The following orders were created for panel order Extra Tube.  Procedure                               Abnormality         Status                     ---------                               -----------         ------                     Extra Blue Top Tube[[id number]]                              Final result                 Please view results for these tests on the individual orders.   Extra Blue Top Tube     Status: None   Result Value Ref Range    Hold Specimen [facility]    Extra Tube     Status: None    Narrative    The following orders were created for panel order Extra Tube.  Procedure                                Abnormality         Status                     ---------                               -----------         ------                     Extra Red Top Tube[[id number]]                               Final result                 Please view results for these tests on the individual orders.   Extra Red Top Tube     Status: None   Result Value Ref Range    Hold Specimen [facility]    CBC with platelets differential     Status: Abnormal    Narrative    The following orders were created for panel order CBC with platelets differential.  Procedure                               Abnormality         Status                     ---------                               -----------         ------                     CBC with platelets and d...[[id number]]  Abnormal            Final result                 Please view results for these tests on the individual orders.     Medications   ondansetron (ZOFRAN) injection 4 mg (has no administration in time range)   vancomycin (VANCOCIN) 1,500 mg in 0.9% NaCl 250 mL intermittent infusion (1,500 mg Intravenous $New Bag 11/17/23 2303)   pharmacy alert - intermittent dosing (has no administration in time range)   vancomycin place aguillon - receiving intermittent dosing (has no administration in time range)   oxyCODONE (ROXICODONE) tablet 5 mg (5 mg Oral $Given 11/17/23 2314)   HYDROmorphone (DILAUDID) injection 1 mg (has no administration in time range)   HYDROmorphone (DILAUDID) injection 1 mg (1 mg Intravenous $Given 11/17/23 2056)   ceFEPIme (MAXIPIME) 2 g vial to attach to  mL bag for ADULTS or 50 mL bag for PEDS (0 g Intravenous Stopped 11/17/23 2321)     Labs Ordered and Resulted from Time of ED Arrival to Time of ED Departure   BASIC METABOLIC PANEL - Abnormal       Result Value    Sodium 136      Potassium 4.4      Chloride 106      Carbon Dioxide (CO2) 20 (*)     Anion Gap 10      Urea Nitrogen 16.2      Creatinine 1.10      GFR Estimate 72      Calcium 8.2 (*)     Glucose 89      ERYTHROCYTE SEDIMENTATION RATE AUTO - Abnormal    Erythrocyte Sedimentation Rate 52 (*)    CRP INFLAMMATION - Abnormal    CRP Inflammation 39.30 (*)    CBC WITH PLATELETS AND DIFFERENTIAL - Abnormal    WBC Count 11.5 (*)     RBC Count 2.27 (*)     Hemoglobin 7.2 (*)     Hematocrit 22.1 (*)     MCV 97      MCH 31.7      MCHC 32.6      RDW 13.6      Platelet Count 361      % Neutrophils 81      % Lymphocytes 8      % Monocytes 10      % Eosinophils 0      % Basophils 0      % Immature Granulocytes 1      NRBCs per 100 WBC 0      Absolute Neutrophils 9.4 (*)     Absolute Lymphocytes 0.9      Absolute Monocytes 1.1      Absolute Eosinophils 0.0      Absolute Basophils 0.0      Absolute Immature Granulocytes 0.1      Absolute NRBCs 0.0     LACTIC ACID WHOLE BLOOD - Normal    Lactic Acid 0.8     BLOOD CULTURE   BLOOD CULTURE     Foot XR, G/E 3 views, left   Final Result   IMPRESSION: Previous amputations through the mid shafts of the first through fifth metatarsals, unchanged. No evidence for destructive changes or radiographic evidence for osteomyelitis. Extensive vascular calcifications. Plantar calcaneal spur.      MR Foot Left w/o & w Contrast    (Results Pending)          Critical care was not performed.     Medical Decision Making  The patient's presentation was of high complexity (an acute health issue posing potential threat to life or bodily function).    The patient's evaluation involved:  ordering and/or review of 3+ test(s) in this encounter (see separate area of note for details)  discussion of management or test interpretation with another health professional (see separate area of note for details)    The patient's management necessitated high risk (a decision regarding hospitalization).    Assessment & Plan    Presentation is concerning for postoperative surgical site infection as well as possible sepsis and/or osteomyelitis.    I will cover broadly with antibiotics, send inflammatory markers, collect  blood cultures, provide fluid bolus (especially given nausea and vomiting) and attempt to acquire an MRI of the left foot to better evaluate for possible underlying osteomyelitis.    I paged surgery for their consult given general surgery is covering vascular surgery tonight.    Presentation is less typical of necrotizing fasciitis or compartment syndrome given soft compartments and lack of tracking subcutaneous infection.    I have reviewed the nursing notes. I have reviewed the findings, diagnosis, plan and need for follow up with the patient.    New Prescriptions    No medications on file       Final diagnoses:   None     Joaquin Shepard MD  [facility] EMERGENCY DEPARTMENT  11/17/2023     Joaquin Shepard MD  11/17/23 1329       Joaquin Shepard MD  11/17/23 7375

## 2023-11-18 NOTE — PHARMACY-VANCOMYCIN DOSING SERVICE
Pharmacy Vancomycin Note  Date of Service 2023  Patient's  1950   73 year old, male    Indication: Skin and Soft Tissue Infection  Day of Therapy: 2  Current vancomycin regimen:  intermittent dosing  Current vancomycin monitoring method: Trough (Method 2 = manual dose calculation)  Current vancomycin therapeutic monitoring goal: 10-15 mg/L    Current estimated CrCl = Estimated Creatinine Clearance: 69.6 mL/min (based on SCr of 0.83 mg/dL).    Creatinine for last 3 days  2023:  9:23 PM Creatinine 1.10 mg/dL  2023:  1:36 PM Creatinine 0.83 mg/dL    Recent Vancomycin Levels (past 3 days)  2023:  1:36 PM Vancomycin 11.6 ug/mL    Vancomycin IV Administrations (past 72 hours)                     vancomycin (VANCOCIN) 1,500 mg in 0.9% NaCl 250 mL intermittent infusion (mg) 1,500 mg New Bag 23 2303                    Nephrotoxins and other renal medications (From now, onward)      Start     Dose/Rate Route Frequency Ordered Stop    23 1530  vancomycin (VANCOCIN) 750 mg in sodium chloride 0.9 % 250 mL intermittent infusion         750 mg  over 90 Minutes Intravenous EVERY 12 HOURS 23 1500                 Contrast Orders - past 72 hours (72h ago, onward)      Start     Dose/Rate Route Frequency Stop    23 0155  gadobutrol (GADAVIST) injection 7.5 mL         7.5 mL Intravenous ONCE 23 0157            Interpretation of levels and current regimen:  Vancomycin level is reflective of therapeutic level    Has serum creatinine changed greater than 50% in last 72 hours: No    Urine output:  unable to determine    Renal Function: Improving    InsightRX Prediction of Planned New Vancomycin Regimen  Regimen: 750 mg IV every 12 hours.  Start time: 14:54 on 2023  Exposure target: AUC24 (range)400-600 mg/L.hr   AUC24,ss: 516 mg/L.hr  Probability of AUC24 > 400: 87 %  Ctrough,ss: 16.6 mg/L  Probability of Ctrough,ss > 20: 26 %  Probability of nephrotoxicity (Lodise  CESAR 2009): 12 %    Plan:  Transition to 750 mg IV every 12 hours.  Vancomycin monitoring method: AUC given improvement renal function   Vancomycin therapeutic monitoring goal: 400-600 mg*h/L  Pharmacy will check vancomycin levels as appropriate in 1-3 Days.  Serum creatinine levels will be ordered daily for the first week of therapy and at least twice weekly for subsequent weeks.    Deirdre Blunt, AmandaD

## 2023-11-18 NOTE — ED TRIAGE NOTES
Pt BIBA for worsening foot wound post amputation.  Pt has had decreased appetite and N/V for the last 24 hrs.  Zofran given enroute.

## 2023-11-18 NOTE — ED NOTES
Signed out from Dr. Shepard; Frandy is a 73-year-old male who comes in with acute left foot pain in the setting of recent amputation.  Amputation has been complicated by recent infection and was discharged on antibiotics.  Patient is awaiting MRI results and final recommendations from vascular surgery.    Vascular surgery would like ultrasound of the extremity, ordered for tomorrow including ABIs.  They plan to review MRI in the morning.  They would like to continue his current IV antibiotics.  They recommended admission to medicine.    MD Grace Johnson Evan Martin, MD  11/18/23 0408

## 2023-11-19 NOTE — CONSULTS
[facility]  WOC Nurse Inpatient Assessment     Consulted for: LLE    Summary: Pt closely followed by vascular team see notes below. WOC reviewed notes, spoke with spouse and reviewed results/picture with the spouse. Recommend to continue with current dressing order and follow with vascular. WOC will sign off.    Patient History (according to provider note(s):      Darian Engle is a 73 year old male with past medical history notable for PAD, dry gangrene, CAD s/p CAB w/ LIMA and L GSV (2004), DM2, dyslipidemia, HLD, HTN, paroxysmal atrial fibrillation on chronic anticoagulation, benign cystic neoplasm of the pancreas, TALA (not on CPAP) and moderate protein calorie malnutrition brought in by ambulance with concerns of infection at the site of recent left foot amputation (in October 2023) as well as decreased appetite.      PLAN:  - Recommend admission to medicine team, sincerely appreciate recs and cares  - Continue broad spectrum IV antibiotics   - Obtain duplex ultrasound of BLE with ABIs  - Multimodal pain control   - Will review MRI results     Assessment:      Areas visualized during today's visit:  LLE              Treatment Plan:     LLE wound(s): Per vascular team.    Orders: Reviewed    RECOMMEND PRIMARY TEAM ORDER: Podiatry consult and Vascular consult- MD notifed  Education provided: plan of care and wound progress  Discussed plan of care with: Patient and Family  WOC nurse follow-up plan: signing off  Notify WOC if wound(s) deteriorate.  Nursing to notify the Provider(s) and re-consult the WOC Nurse if new skin concern.    DATA:     Current support surface: Standard  Standard gel/foam mattress (IsoFlex, Atmos air, etc)  Containment of urine/stool: Continent of bowel  BMI: There is no height or weight on file to calculate BMI.   Active diet order: Orders Placed This Encounter      Regular Diet Adult     Output: I/O last 3 completed shifts:  In: 700  [P.O.:700]  Out: 1200 [Urine:1200]     Labs:   Recent Labs   Lab 11/19/23  0455 11/17/23  2123 11/15/23  1150   ALBUMIN  --   --  2.6*   HGB 6.9*   < > 7.5*   WBC 9.8   < > 7.1    < > = values in this interval not displayed.     Pressure injury risk assessment:   Sensory Perception: 3-->slightly limited  Moisture: 3-->occasionally moist  Activity: 2-->chairfast  Mobility: 2-->very limited  Nutrition: 2-->probably inadequate  Friction and Shear: 2-->potential problem  Moustapha Score: 14    Shani Cruz RN   Pager no longer is use, please contact through [facility]ural[facility] Carrion group: [facility] Nurse east   Dept. Office Number: [phone]

## 2023-11-19 NOTE — INTERIM SUMMARY
Cross Cover Note    Notified by nursing staff of continued worsening delusions, visual hallucinations and tremulousness not alleviated by oral zyprexa. Nursing staff covered his window to remove reflections from his room and noted that he was able to settle down after this. Evaluated pt at bedside, he was lying in bed and was alert and oriented. Noted that he got agitated earlier when he saw a figure outside his window and noted that it was saying harmful things to him making him more anxious.     Pt is noted to drink 2-3 beers per day per H&P however pt denied any alcohol use when asked tonight. Denied any previous withdrawals. No tremors noted at this time, VSS stable.     Assessment and Plan:  Could be multifactorial delirium iso sepsis and dementia vs. alcohol w/d. Given that pt had visual and auditory hallucinations, has been in the ED for 2 days without EtOH, and was noted to be tremulous earlier will initiate EtOH w/d protocol recognizing the risk that his dementia/delirium may construe CIWA scores and sedating medications like ativan could worsen his delirium.   - CIWA scores  - Ativan PRN per CIWA protocol, if CIWA scores remain low can remove ativan from MAR

## 2023-11-19 NOTE — PROVIDER NOTIFICATION
5225. R.M. Pt is requesting Tizanidine tablet 2mg, is it possible to get a PRN dose? Marianna Reyes RN 5A.    5225. R.M. Pt is having delusions & hallucinations, VIET, RN hesitant to give Zyprexa w/o psych consult. Also, PICC blood return is sluggish-can heparin lock be added? Sorry to bother again. Marianna Reyes RN 5A.

## 2023-11-19 NOTE — PLAN OF CARE
"Pt arrived from the ED approximately 2000. Presenting with worsening vomiting/nausea & increased pain d/t gangrenous LLE.   Pt is oriented self and place, forgets situation, somewhat oriented to date/time. Pt is cooperative, but very forgetful. Bed & chair alarms ON.  VSS on RA, except for elevated BP.  Pain is somewhat managed with oxycodone, dilaudid, tylenol, & tinzanidine. LLE elevated on pillow, warm packs in pillowcase on top of leg also help.  Gave zofran PO for nausea x1. Pt said they were feeling a return of appetite this am. Regular diet in active orders, however, previous admission orders have Pt on diabetic/carb restricted diet.   Pt has intermittent fine tremors when awake and spastic limb jerks when asleep.   Pt has Hx of dementia and intermittent confusion. Pt sees 'animals/creatures' crawling around them on the bed, but knows they are not real when they 'disappear' in the bed linens.   Pt is not able to be redirected or recognize that other visual hallucination of people with pitchforks & hockey sticks are not outside of the window starring and threatening him. Charge & several other RN s pinned sheets up to cover the windows, this helped relieve some agitation.      Team paged this AM, \"5225 R.M. Critical lab report Hgb 6.9, no blood orders on file pls advise. Marianna Reyes RN 5A.\" Passed on to day shift RN.       Spouse left home phone number on white board, this number is better to use overnight. Gave permission to leave a voice message.  Cont POC.            "

## 2023-11-19 NOTE — ED PROVIDER NOTES
5225. R.M. Pt has no signed/held orders, just arrived on floor from ED. Pls add orders. Chely Cabral RN 5A.

## 2023-11-19 NOTE — PROGRESS NOTES
[facility]    History and Physical - Medicine Service, [facility] TEAM 3       Date of Admission:  11/17/2023    Assessment & Plan      Darian Engle is a 73 year old male admitted on 11/17/2023 with recurrent left foot cellulitis. On Vancomycin and Cefepime. Vascular surgery to evaluate if foot is salvageable; pending RHONDA.    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 Foot appears slightly more swollen with some fluctuance and scant purulent discharge. Labs notable for elevated CRP, though trending down from last check on 11/15, and WBC 11.5, elevated from previous check. MRI on admission was negative for osteomyelitis. Vascular surgery was consulted and recommended broad spectrum abx and checking duplex ultrasound of both extremities. Pt has been on Cefazolin since 11/5/23 for staph aureus bacteremia which developed after the left TMA, following with infectious disease clinic. There are notes that he may have missed doses due to dementia impairing his ability to manage his own medications and his wife getting sick.   - Vancomycin (11/18-   - Cefepime (11/18 -   - Vascular surgery  - will review MRI in AM, no definitive surgical plans at this moment.   - Duplex ultrasound of BLE with RHONDA (ordered for 11/18)  - Blood cx pending   - trend CRP q48  - PICC line remains in place in  RUE  - PTA ASA, Atorvastatin, Cilostazol  - Pain control: PTA Gabapentin 100mg TID, scheduled tylenol, prn oxycodone, prn dilaudid for breakthrough pain    # Chronic normocytic anemia  Hgb 7.2, MCV 97. Hgb baseline appears 7-8. Suspect anemia of chronic disease.     # H/o HTN  - hold PTA Amlodipine, Coreg iso initial hypotension     # Dementia  # Agitation  # Insomnia  Unclear patient's baseline. He is unable to answer many questions regarding timing of symptoms and medications. Wife, Violet, manages his medications and helps him make medical decisions.   - Continue PTA Donepezil  - Zyprexa PRN  - Melatonin PRN    Chronic  # T2DM- Last A1c 5.5% 10/17/23. Hold PTA Metformin.  # CAD- PTA Atorvastatin, ASA  # TALA- CPAP at night   # A Fib- rate controlled, PTA Eliquis   # GERD- PTA Pantoprazole  # Anxiety- PTA Sertraline         Diet: Regular Diet AdultRegular   DVT Prophylaxis: DOAC  Mercado Catheter: Not present  Lines: PRESENT      PICC 11/11/23 Single Lumen Right Basilic Antibiotics. PICC was ready to use.-Site Assessment: WDL      Cardiac Monitoring: None  Code Status: Full Code      Clinically Significant Risk Factors                  # Hypertension: Noted on problem list     # Dementia: noted on problem list        # Financial/Environmental Concerns: none         Disposition Plan     Expected Discharge Date: 11/19/2023      Destination: home with family;home with help/services          The patient's care was discussed with the day team.    Nicolas Suggs MD PhD  Medicine Service, [facility] TEAM [address] [place] [address]  Securely message with [employer] (more info)  Text page via [facility] Paging/Directory   See signed in provider for up to date coverage information    ______________________________________________________________________    Interval Events   Patient was agitated  Given Zyprexa  Started on CIWA protocol overnight  Drinks 2 - 3 beers per night every night  Received 1 unit pRBC  for Hb < 7    Physical Exam   Vital Signs: Temp: 98.3  F (36.8  C) Temp src: Oral BP: (!) 143/48 Pulse: 78   Resp: 16 SpO2: 96 % O2 Device: None (Room air)    Weight: 0 lbs 0 oz    Constitutional: awake, alert, cooperative, no apparent distress, and appears stated age  Respiratory: No increased work of breathing, good air exchange, clear to auscultation bilaterally, no crackles or wheezing  Cardiovascular: Normal apical impulse, regular rate and rhythm, normal S1 and S2, no S3 or S4, and no murmur noted  GI: normal bowel sounds, non-distended, and non-tender  Skin: left foot with healing surgical incision with areas of erythema and necrosis. Foot stump appears swollen and is tender to the touch.   Musculoskeletal: no lower extremity pitting edema present  motor strength is 5 out of 5 all extremities bilaterally  Neurologic: Mental Status Exam:  Level of Alertness:   awake  Orientation:   person, place  Memory:   normal  Fund of Knowledge:  abnormal - poor  Attention/Concentration:  normal      9.8  \   6.9 (LL)   / 344     136 110 (H) 14.6 /  74   4.6 18 (L) 0.77 \     Procal: N/A CRP: 28.10 (H) Lactic Acid: N/A

## 2023-11-19 NOTE — PLAN OF CARE
NEURO: Alert and confused. CIWA- 6. And 6- np PRN lorazepam needed. Flat affect.   RESPIRATORY: LS clear, SpO2>92% on RA.   CARDIO: VSS. Hemoglobin 6.9- blood consent was signed today. Awaiting blood product.   GI/: Patient nauseated throughout the day- dry heaving intermittently, no emesis. Patient received zofran, TUMS and compazine. Poor appetite- little to no intake.  SKIN: BLE- several scabs. LLE (foot) - dressing changed- area cleansed per at home regime ( betadine+xeroform+ abd+kerlix) . WOCN signed off- but vascular and podiatry consults added for tomorrow.   ACTIVITY:  Pivot to chair with assist 1-2. Patient up in chair for much of morning.   LINES: PICC infusing TKO- site WNL.   PLAN:  Continue POC and notify MD with concerns.

## 2023-11-19 NOTE — CONSULTS
Care Management Initial Consult    General Information  Assessment completed with: Patient, Spouse or significant other, -chart review,    Type of CM/SW Visit: Initial Assessment    Primary Care Provider verified and updated as needed: Yes   Readmission within the last 30 days: lack of support   Return Category: Exacerbation of disease  Reason for Consult: discharge planning  Advance Care Planning: Advance Care Planning Reviewed: present on chart          Communication Assessment  Patient's communication style: spoken language (English or Bilingual)             Cognitive  Cognitive/Neuro/Behavioral: .WDL except, mood/behavior, orientation  Level of Consciousness: confused, intermittent confusion  Arousal Level: opens eyes spontaneously  Orientation: disoriented to, situation, place  Mood/Behavior: agitated, cooperative, restless  Best Language: 0 - No aphasia  Speech: clear, spontaneous, illogical    Living Environment:   People in home: spouse  Kadie  Current living Arrangements: house (singel level no stairs)      Able to return to prior arrangements: no       Family/Social Support:  Care provided by: self, spouse/significant other  Provides care for: no one, no one, unable/limited ability to care for self  Marital Status:   Wife  Angelica     Description of Support System: Supportive, Involved    Support Assessment: Lacks adequate physical care    Current Resources:   Patient receiving home care services: Yes  Skilled Home Care Services: Skilled Nursing, Home Health Aid, Physical Therapy  Community Resources: Home Infusion, Home Care (OP HyperBaric Treatments at [facility] [place] [place])  Equipment currently used at home:    Supplies currently used at home: Wound Care Supplies    Employment/Financial:  Employment Status: retired        Financial Concerns: none   Referral to Financial Worker: No       Does the patient's insurance plan have a 3 day qualifying hospital stay waiver?  Yes     Which insurance plan 3 day waiver  is available? Alternative insurance waiver    Will the waiver be used for post-acute placement? Undetermined at this time    Lifestyle & Psychosocial Needs:  Social Determinants of Health     Food Insecurity: Low Risk  (11/16/2023)    Food Insecurity     Within the past 12 months, did you worry that your food would run out before you got money to buy more?: No     Within the past 12 months, did the food you bought just not last and you didn t have money to get more?: No   Depression: Not at risk (11/16/2023)    PHQ-2     PHQ-2 Score: 0   Housing Stability: Low Risk  (11/16/2023)    Housing Stability     Do you have housing? : Yes     Are you worried about losing your housing?: No   Tobacco Use: High Risk (11/17/2023)    Patient History     Smoking Tobacco Use: Every Day     Smokeless Tobacco Use: Former     Passive Exposure: Not on file   Financial Resource Strain: Low Risk  (11/16/2023)    Financial Resource Strain     Within the past 12 months, have you or your family members you live with been unable to get utilities (heat, electricity) when it was really needed?: No   Alcohol Use: Not on file   Transportation Needs: Low Risk  (11/16/2023)    Transportation Needs     Within the past 12 months, has lack of transportation kept you from medical appointments, getting your medicines, non-medical meetings or appointments, work, or from getting things that you need?: No   Physical Activity: Not on file   Interpersonal Safety: Not on file   Stress: Not on file   Social Connections: Not on file       Functional Status:  Prior to admission patient needed assistance:   Dependent ADLs:: Ambulation-walker  Dependent IADLs:: Cleaning, Cooking, Laundry, Shopping, Meal Preparation, Medication Management, Money Management, Transportation       Mental Health Status:  Mental Health Status: Current Concern  Mental Health Management: Medication    Chemical Dependency Status:  Chemical Dependency Status: Current Concern          "    Values/Beliefs:  Spiritual, Cultural Beliefs, Advent Practices, Values that affect care: no               Additional Information:  73 year old male with PMH 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     Pt recently discharge on 11/13 with home infusion, home care and OP Hyperbaric treatment at [facility].    [facility](RN/PT/OT)  Phone: [phone]  Fax: [phone]   ** unclear if services started, spouse and Pt stated no but clinic notes indicate they came but Frandy turned them away    [facility](IV abx)  Phone: [phone]  Fax: [phone]   * Spouse reports they did do the Abx at home, per chart review Abx were scheduled to be complete on 11/19    Per chart review, Pt's spouse reported on 11/14 that she could not drive the Pt to his OP treatments and reported they declined home care but home infusion was able to come in, education was declined. Clinic SW provided  [facility] transportation services    RNCC met with Pt and his spouse at the bedside. Pt was not feeling well and only engaged minimally in the visit, his wife answered most of the questions. She  stated care was not going well at home, she expressed feelings of care giver burnout, her own illness concerns and that her mother is currently on hospice care.     Angelica stated she takes Frandy to all his apt and does all his medication but he has been too sick to do anything. She reports he has been vomiting and having severe diarrhea at home. She reports that is why they called 911 and came to the hospital. Frandy stated, his foot was \"fine, I don't think we are worried about that.\"     Frandy and his wife did admit they need more help and would consider going to TCU. They stated they would not go to [facility] and " would only consider [facility] in [place]. This is where Frandy's mother jonna is at, receiving hospice care.     RNCC asked Frandy if was using drugs or drinking in the home. Frandy and his wife confirmed he smokes marijuana 3-4 times daily and drinks espresso martini shots, 3-4 daily. Denies concerns or worries regarding substance use.     Discharge plan and needs are yet to be determined. Care management will continue to follow and support safe discharge planning as needed.     Mari Christie RN  RNCC Float   [phone]

## 2023-11-19 NOTE — PHARMACY-CONSULT NOTE
Pharmacy Delirium Chart Review    Upon chart review, the following medications may contribute to possible patient delirium: clonidine, donepezil, famotidine, lorazepam, olanzapine, trazodone.  Please consult unit pharmacist with further questions.    [facility]  Phone/Pager: [phone]

## 2023-11-20 NOTE — PLAN OF CARE
Goal Outcome Evaluation:    Patient is A&O x 4. Pt is on CIWA protocol, no confusion noted during the shift, forgetful at times, denies nausea. Regular diet, poor PO intake. PIV and PICC line patent. Hgb 6.9, 1 unit of PRBC given, no difficulties during blood transfusion. Pt is voiding spontaneously in urinal, no BM during the shift. Pt still needs 2 RN skin assessment, and dressing change. Dressing on left foot is clean and dry. Bed alarm on, call light in reach.

## 2023-11-20 NOTE — PROGRESS NOTES
Resident/Fellow Attestation   I, Nicolas Suggs MD PhD, was present with the medical/LIVE student who participated in the service and in the documentation of the note.  I have verified the history and personally performed the physical exam and medical decision making.  I agree with the assessment and plan of care as documented in the note.      Nicolas Suggs MD PhD  PGY1  Date of Service (when I saw the patient): 11/20/23    [facility]    History and Physical - Medicine Service, [facility] TEAM 3       Date of Admission:  11/17/2023    Assessment & Plan   Darian Engle is a 73 year old male admitted on 11/17/2023 with recurrent left foot cellulitis. On Vancomycin and Cefepime. Vascular surgery to evaluate if foot is salvageable; pending RHONDA.    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) c/b staph aureus bacteremia, PVD s/p endovascular stent (10/2023), A Fib on Eliquis, HTN, T2DM, CAD s/p CAB (2004), TALA, dementia    Today:  - ID consulted, recs appreciated   - Discontinue vancomycin and cefepime   - Start cefazolin 2g q8   - Consider PO TMP-SMX depending on RHONDA  - Gabapentin increased to 300mg TID  - One time PO furosemide 40mg for LE edema that may be exacerbating pain  - Hypoglycemic protocol initiated + Ensure supplementation   - CXR to ensure proper placement of PICC per vasc surg rec    # Recurrent left foot cellulitis  # S/p transmetatarsal amputation 2/2 wet gangrene (10/27/23) c/b staph aureus bacteremia   # PAD s/p left common femoral endarterectomy and balloon angioplasty with stent placement (10/24/23)  Pt presented with 2-3 day history of worsening foot pain with malaise and nausea. Foot appears slightly more swollen with some fluctuance and scant purulent discharge. Labs notable for elevated CRP, though trending down from last check on 11/15, and WBC 11.5, elevated from previous check. MRI on admission was negative for osteomyelitis.  Vascular surgery was consulted and recommended broad spectrum abx and checking duplex ultrasound of both extremities. Pt has been on Cefazolin since 11/5/23 for staph aureus bacteremia which developed after the left TMA, following with infectious disease clinic. There are notes that he may have missed doses due to dementia impairing his ability to manage his own medications and his wife getting sick. Placed on broad spectrum until 11/20 at which point ID recommended the move back to cefazolin.   - Vancomycin (11/18 - 11/20)   - Cefepime (11/18 - 11/20)  - Cefazolin (11/20 - *)  - Vascular surgery, recs appreciated  - Will review MRI, no definitive surgical plans at this moment.   - Duplex ultrasound of BLE with RHONDA is bilaterally non-compressible (> 1.4)  - ID consulted, recs appreciated   - Discontinue vancomycin + cefepime   - Restart cefazolin due to low concern of new infection but suspicion of compromised vascular supply   - Consider PO TMP-SMX depending on RHONDA  - Blood cx pending   - Trend CRP q48  - Continue PTA ASA, atorvastatin, cilostazol  - Pain control:   - PTA Gabapentin 300mg TID  - Scheduled tylenol  - PRN oxycodone   - PRN dilaudid for breakthrough pain  - 1x furosemide 40mg PO for LE edema that may be exacerbating pain  - PICC line in RUE, CXR performed to ensure proper placement     # Chronic normocytic anemia  Hgb 7.2, MCV 97. Hgb baseline appears 7-8. Suspect anemia of chronic disease.   - Transfused 1 U pRBC pm 11/19 which stabilized Hgb to 8.4    # H/o HTN  PTA meds initially held due to hypotension on admission.   - Restarted PTA amlodipine 11/19  - Restarted PTA carvedilol 11/18  - Hold PTA losartan      # Dementia  # Agitation  # Insomnia  Unclear patient's baseline. He is unable to answer many questions regarding timing of symptoms and medications. Wife, Violet, manages his medications and helps him make medical decisions.   - Continue PTA donepezil  - PRN olanzapine   - PRN melatonin  - CIWA  protocol discontinued     Chronic  # T2DM: Last A1c 5.5% (10/17/23), hold PTA metformin  # CAD: Continue PTA atorvastatin, ASA  # TALA: CPAP at night   # A Fib: Rate controlled, continue PTA Eliquis   # GERD: PTA pantoprazole  # Anxiety: PTA sertraline       Diet: Regular Diet Adult  Snacks/Supplements Adult: Ensure Enlive; Between Meals  DVT Prophylaxis: Apixaban 5mg BID  Mercado Catheter: Not present  Lines: PRESENT      PICC 11/11/23 Single Lumen Right Basilic Antibiotics. PICC was ready to use.-Site Assessment: WDL      Cardiac Monitoring: None  Code Status: Full Code      Clinically Significant Risk Factors                  # Hypertension: Noted on problem list     # Dementia: noted on problem list        # Financial/Environmental Concerns: none         Disposition Plan      Expected Discharge Date: 11/24/2023      Destination: home with family;home with help/services          The patient's care was discussed with the day team.    KINGS GUERRERO  Medicine Service, [facility] TEAM 3  [facility]  Securely message with Vocera (more info)  Text page via [employer] Paging/Directory   See signed in provider for up to date coverage information    ______________________________________________________________________    Interval Events  Mr. Sarabia was interviewed in his room. He was sitting up comfortably in bed. Things went fine over night and he was able to sleep a few hours. States that pain is 9/10 which wakes him up intermittently. Denies other symptoms including fever, chills, headache, change in vision or hearing, chest pain or pressure, cough, abdominal pain, nausea/vomiting, change in stools or urination, change in extremity weakness or sensation, dizziness or lightheadedness. States that he has not had an appetite since before the admission. At home he would vomit up food. Here he has not vomited but intake has been limited. Informed that transfusion was successful last  night and Hgb has stabilized. States he does not know what that means. Able to provide a decent history and understands vascular surgery will be providing recommendations based on imaging as to whether or not further amputation should be carried out. States that foot pain on palpation around the ankle area has improved; however, towards the end of the visit begins wincing and requests hydromorphone for breakthrough 10/10 pain before going off to do RHONDA and US.     The team came back later in the day and wife Violet was at bedside. He returned from the RHONDA and US which went well. States his pain is at a 1/10 right now. When informed he gauged it at 9/10 a few hours ago he states he has a bad memory. Violet states that it responds well to medication but has been taking longer to work and works for a shorter length of time now. They are agreeable to an increase in gabapentin and one time dosing of furosemide to take some fluid off. In terms of eating, she confirms that he had vomiting with food intake PTA but has not vomited here. They agree with the plan to supplement and he will try to push himself to eat.        Physical Exam   Vital Signs: Temp: 97.7  F (36.5  C) Temp src: Oral BP: (!) 141/47 Pulse: 65   Resp: 20 SpO2: 99 % O2 Device: None (Room air)    Weight: 0 lbs 0 oz    Constitutional: Awake, alert, cooperative, no apparent distress, and appears stated age.  Respiratory: No increased work of breathing, good air exchange, clear to auscultation bilaterally, no crackles or wheezing.  Cardiovascular: Normal apical impulse, regular rate and rhythm, normal S1 and S2, no S3 or S4, and no murmur noted.  GI: Normal bowel sounds, non-distended, and non-tender.  Skin: Left foot with healing surgical incision with areas of erythema and necrosis. Foot stump appears swollen and is tender to the touch.   Musculoskeletal: LE pitting edema 2-3+ bilaterally up to patella. Motor strength is 5 out of 5 all extremities  bilaterally.  Neurologic: Oriented to person, place, and time.   Memory: Impaired for events around the time of admission and several days prior.   Fund of Knowledge: Poor, some understanding of condition.   Attention/Concentration: Adequate for conversation.       10.6  \   8.4 (L)   / 357     135 108 (H) 14.6 /  72   4.6 17 (L) 0.73 \

## 2023-11-20 NOTE — PLAN OF CARE
A&Ox4, drowsy and forgetful w/Hx of dementia. Intermittent confusion r/t condition. Education and explanation of abx treatment and hospital stay provided.   CIWA protocol discontinued.  Pain is somewhat managed with PRN IV dilaudid, oral oxycodone, tylenol, & tizanadine.   Denies nausea. Regular diet w/poor intake. Pt stated they do not have appetite.  PIV SL. PICC infusing, blood return noted w/lab draw.   Voiding spontaneously via bedside urinal.  Active bowel sounds, no BM during the shift.   Two nurse skin assessment completed with Mercedes FLETCHER RN   WOK consult today for gangrenous left foot. Dressing clean, dry, intact. Scattered bruising and scabs bilateral on all extremities, +1-+2 edema lower extremities, elevated.   Bed & chair alarm on, remind to use call light for assistance.  Cont POC.

## 2023-11-20 NOTE — PHARMACY-VANCOMYCIN DOSING SERVICE
Pharmacy Vancomycin Note  Date of Service 2023  Patient's  1950   73 year old, male    Indication: Skin and Soft Tissue Infection  Day of Therapy: 4  Current vancomycin regimen:  750 mg IV q12h  Current vancomycin monitoring method: AUC  Current vancomycin therapeutic monitoring goal: 400-600 mg*h/L    InsightRX Prediction of Current Vancomycin Regimen  Regimen: 750 mg IV every 12 hours.  Start time: 14:58 on 2023  Exposure target: AUC24 (range)400-600 mg/L.hr   AUC24,ss: 472 mg/L.hr  Probability of AUC24 > 400: 83 %  Ctrough,ss: 14.9 mg/L  Probability of Ctrough,ss > 20: 11 %  Probability of nephrotoxicity (Lodise CESAR ): 10 %    Current estimated CrCl = Estimated Creatinine Clearance: 82.7 mL/min (based on SCr of 0.73 mg/dL).    Creatinine for last 3 days  2023:  9:23 PM Creatinine 1.10 mg/dL  2023:  1:36 PM Creatinine 0.83 mg/dL  2023:  4:55 AM Creatinine 0.77 mg/dL  2023:  3:24 AM Creatinine 0.73 mg/dL    Recent Vancomycin Levels (past 3 days)  2023:  1:36 PM Vancomycin 11.6 ug/mL  2023: 10:29 AM Vancomycin 18.3 ug/mL    Vancomycin IV Administrations (past 72 hours)                     vancomycin (VANCOCIN) 750 mg in sodium chloride 0.9 % 250 mL intermittent infusion (mg) 750 mg New Bag 23 0258     750 mg New Bag 23 1611     750 mg New Bag  0231     750 mg New Bag 23 1619    vancomycin (VANCOCIN) 1,500 mg in 0.9% NaCl 250 mL intermittent infusion (mg) 1,500 mg New Bag 23 2303                    Nephrotoxins and other renal medications (From now, onward)      Start     Dose/Rate Route Frequency Ordered Stop    23 1230  furosemide (LASIX) tablet 40 mg         40 mg Oral ONCE 23 1206      23 1530  vancomycin (VANCOCIN) 750 mg in sodium chloride 0.9 % 250 mL intermittent infusion         750 mg  over 90 Minutes Intravenous EVERY 12 HOURS 23 1500                 Contrast Orders - past 72 hours (72h ago,  onward)      Start     Dose/Rate Route Frequency Stop    11/18/23 0155  gadobutrol (GADAVIST) injection 7.5 mL         7.5 mL Intravenous ONCE 11/18/23 0157            Interpretation of levels and current regimen:  Vancomycin level is reflective of -600    Has serum creatinine changed greater than 50% in last 72 hours: No    Urine output:  unable to determine    Renal Function: Stable    InsightRX Prediction of Planned New Vancomycin Regimen  Regimen: 750 mg IV every 12 hours.  Start time: 14:58 on 11/20/2023  Exposure target: AUC24 (range)400-600 mg/L.hr   AUC24,ss: 472 mg/L.hr  Probability of AUC24 > 400: 83 %  Ctrough,ss: 14.9 mg/L  Probability of Ctrough,ss > 20: 11 %  Probability of nephrotoxicity (Lodise CESAR 2009): 10 %      Plan:  Continue Current Dose of 750 mg every 12 hours  Vancomycin monitoring method: AUC  Vancomycin therapeutic monitoring goal: 400-600 mg*h/L  Pharmacy will check vancomycin levels as appropriate in 1-3 Days.  Serum creatinine levels will be ordered daily for the first week of therapy and at least twice weekly for subsequent weeks.    Yasmine Marrero [facility]

## 2023-11-20 NOTE — CONSULTS
[facility] ID Service: Initial Consultation     Patient:  Darian Engle, Date of birth 1950, Medical record number [id number]  Date of Visit:  November 20, 2023  Consult Requested by: Dr. Blevins         Assessment and Recommendations:   ID PROBLEM LIST:  Complicated MSSA bacteremia  Blood culture 11/5/2023 in 1/4 bottles positive for MSSA  Blood cultures 11/6-11/13/2023 negative  Blood cultures 11/17/2023 NGTD  Left foot post-surgical site infection - presumed MSSA infection    RECOMMENDATIONS:  Discontinue empiric vancomycin IV (dosing per pharmacy) and cefepime 2g IV every 8 hours.  Resume cefazolin 2g IV every 8 hours to continue treating MSSA while awaiting results of RHONDA to determine blood flow    DISCUSSION:    Darian Engle is a 73y M with pmhx PAD with recent L foot amputation 10/26/2023 and recent admission for post-surgical site infection, was started on antibiotics for MSSA bacteremia with a plan for a total four week course, two weeks of IV cefazolin (11/6-11/19) followed by two weeks of oral TMP/SMX (11/20-12/3). He presented 11/17 for concerns for L foot infection with acutely worsening pain and started on empiric vancomycin and IV cefepime. ID was consulted 11/20 for an antibiotic deescalation plan. Patient has remained afebrile thus far this admission, with CRP continuing to downtrend and WBC only mildly elevated on admission and since downtrending. Imaging thus far has been negative for osteomyelitis (XR L foot and MRI L foot), and blood cultures thus far have been negative aside from initial blood cultures 11/5 positive for staph aureus in 1/4 bottles. Therefore, there is low concern for new infection at this time so would recommend transitioning back to IV cefazolin. Currently there is no need for MRSA or pseudomonas coverage. Original plan had been to transition to oral TMP/SMX today (11/20) however given concerns for compromised blood supply, will continue with IV  antibiotics to increase chances of distribution to foot. Will await results of RHONDA US and plan for timing of further deescalation to oral TMP/SMX.     Thank you for this consult. ID will continue to follow. Don't hesitate to call with questions.     Patient was discussed with the attending Dr. Galloway and primary team today.     Amber Ventura MD  PGY-1  Medicine-Pediatrics Resident  [facility]  Infectious Disease Team       History of Present Illness:     Darian Engle is a 73y M with pmhx PAD, dry gangrene, CAD s/p CAB w/ LIMA and L GSV (2004), DM2, HLD, HTN, paroxysmal atrial fibrillation on chronic anticoagulation, benign cystic neoplasm of the pancreas, TALA (not on SPAP), moderate malnutrition who presented 11/17 for concerns of infection of L foot amputation site, decreased appetite, nausea, vomiting, one episode of diarrhea. Acute left foot pain 2-3d prior to admission, has been getting progressively worse. Increased swelling, erythema, some fluctuance and small amount of drainage.     Admitted at [facility] 10/17-23-10/30/23 for atherosclerotic PAD and gangrenous left leg, underwent left common femoral endarterectomy + angioplasty and stent 10/24/23 followed by left foot amputation 10/26/2023. Was readmitted at [facility] 11/5/23-11/13/23 for concern for left foot infection. Blood cultures 11/5 were positive for staph aureus. ID was consulted and antibiotics were narrowed to IV cefazolin 2g q8h planning for a 2 week course (11/6-11/19) then transition to oral TMP/SMX for additional 2 weeks (11/20-12/3). Had planned for hyperbaric oxygen treatment at [facility], close follow up with vascular team.     Patient was seen in ID clinic 11/16, had missed 2 doses of cefazolin due to ED visit for wife's illness.     Presented to ED 11/17 and started on empiric vancomycin IV (dosing per pharmacy) and cefepime 2g IV q8h. Vascular surgery recommending RHONDA to assess vascular supply to the foot however was not able to be  completed over the weekend. Blood cultures obtained on admission with no growth to date. PICC from 11/11 remains in place. Admission labs notable for elevated but downtrending CRP, mildly elevated WBC 11.5. XR and MRI were negative for osteomyelitis.     Patient currently reports ongoing poor appetite but no nausea. Has vomited since being admitted but has not had further episodes of diarrhea. No cough, fever, chills, joint pain, rashes.    Per ID consult note 11/10/2023: 72 yo M with recent history of gangrenous left leg s/p transmetatarsal amputation on 10/27/2023 who now presents with Staph aureus bacteremia and elevated inflammatory markers (). Suspect that the source of his MSSA bacteremia is left foot post-surgical soft tissue infection given his clinical course and that the patient was noncompliant following amputation (left TCU AMA shortly after his surgery). Overall lower suspicion for infective endocarditis given he does not have established risk factors other than current MSSA bacteremia (no IVDU, prior heart valve surgery, prolonged bacteremia) and TTE is negative.  Currently on cefazolin 2 g every 8 hours IV with plans for total 4 weeks duration from 11/6/2023 - 12/3/2023.     Patient with only 1 BCx positive and clinically stable, but with some high risk features for complicated Staph bacteremia. Notably, he has endovascular stenting in his RLE, and with ongoing LLE ischemia due to PAD. However, he otherwise meets criteria for uncomplicated bacteremia. At this point, I think I would favor a longer treatment course (akin to complicated bacteremia) due to his compromised antibiotic delivery to his left foot wound, which is considered the source of the bacteremia. Also, while we don't currently suspect elly acute osteomyelitis, a longer treatment course is likely to treat an early subclinical osteomyelitis that could be brewing given his ongoing foot wound/ischemia.     Recommend final two weeks  of treatment with transition to oral TMP/SMX.           Review of Systems:   Full 12 point ROS obtained, pertinent positives and negatives as above.       Past Medical History:     Past Medical History:   Diagnosis Date    Arthritis March 2018    joint pain both hands    Diabetes (H)     Heart disease 1991    [place]-angioplasty    Hypertension      Past Surgical History:   Procedure Laterality Date    AMPUTATE TOE(S) Left 10/26/2023    Procedure: Amputate toe(s), all transmetatarsal amputation;  Surgeon: Jerardo Thornton MD;  Location: [facility] OR    ANGIOGRAM Left 10/17/2023    Procedure: Left lower extremity angiogram via Left brachial artery approach;  Surgeon: Chuck Felix MBBS;  Location: [facility] OR    ANGIOGRAM Left 10/24/2023    Procedure: ANGIOGRAM;  Surgeon: Chuck Felix MBBS;  Location: [facility] OR    ANGIOPLASTY Left 10/24/2023    Procedure: ANGIOPLASTY, Left Lower Extremity atherectomy;  Surgeon: Chuck Felix MBBS;  Location: [facility] OR    CARDIAC SURGERY  2004    [facility]    ENDARTERECTOMY FEMORAL Left 10/24/2023    Procedure: ENDARTERECTOMY, FEMORAL;  Surgeon: Chuck Felix MBBS;  Location: [facility] OR    ENHANCE LASER REFRACTIVE BILATERAL EXISTING PT IN PARAMETERS  2000    EYE SURGERY  2000    [facility]    IR OR ANGIOGRAM  10/17/2023    IR OR ANGIOGRAM  10/24/2023    VASCULAR SURGERY  2017    [facility]         Allergies:      Allergies   Allergen Reactions    Lidocaine Other (See Comments)     Lungs filled with fluid. ? Anaphylaxis    Lisinopril Cough     cough    Naltrexone Nausea and Vomiting            Current Antimicrobials:     Current:  - Cefepime 2g IV every 8 hours (11/17- )  - Vancomycin IV (dosing per pharmacy) (11/17- )    Prior:  - Cefazolin 2g IV every 8 hours (11/6-11/17)       Family History:   Reviewed and noncontributory       Social History:     Social History     Socioeconomic History    Marital status:      Spouse  name: Not on file    Number of children: Not on file    Years of education: Not on file    Highest education level: Not on file   Occupational History    Not on file   Tobacco Use    Smoking status: Every Day     Packs/day: 0.50     Years: 5.00     Additional pack years: 0.00     Total pack years: 2.50     Types: Cigarettes     Start date: 1968     Last attempt to quit: 7/15/2016     Years since quittin.3    Smokeless tobacco: Former   Vaping Use    Vaping Use: Some days    Substances: THC    Devices: Disposable   Substance and Sexual Activity    Alcohol use: Yes     Comment: binge, two months sober    Drug use: Yes     Types: Marijuana     Comment: uses Marijuana for pain    Sexual activity: Not Currently     Partners: Female   Other Topics Concern    Parent/sibling w/ CABG, MI or angioplasty before 65F 55M? Yes     Comment: brother   Social History Narrative    Not on file     Social Determinants of Health     Financial Resource Strain: Low Risk  (2023)    Financial Resource Strain     Within the past 12 months, have you or your family members you live with been unable to get utilities (heat, electricity) when it was really needed?: No   Food Insecurity: Low Risk  (2023)    Food Insecurity     Within the past 12 months, did you worry that your food would run out before you got money to buy more?: No     Within the past 12 months, did the food you bought just not last and you didn t have money to get more?: No   Transportation Needs: Low Risk  (2023)    Transportation Needs     Within the past 12 months, has lack of transportation kept you from medical appointments, getting your medicines, non-medical meetings or appointments, work, or from getting things that you need?: No   Physical Activity: Not on file   Stress: Not on file   Social Connections: Not on file   Interpersonal Safety: Not on file   Housing Stability: Low Risk  (2023)    Housing Stability     Do you have housing? :  Yes     Are you worried about losing your housing?: No              Physical Exam:   Ranges forvital signs:  Temp:  [97.7  F (36.5  C)-98.4  F (36.9  C)] 97.7  F (36.5  C)  Pulse:  [62-78] 64  Resp:  [16-20] 20  BP: (137-160)/(43-56) 148/46  SpO2:  [95 %-100 %] 99 %  GENERAL:  awake, alert, sitting in bed in no acute distress.   ENT:  Head is normocephalic, atraumatic. Oropharynx is moist  LUNGS:  Clear to auscultation bilaterally, no crackles or wheezing.  CARDIOVASCULAR:  Regular rate and rhythm with no murmurs.  ABDOMEN: soft, nontender, nondistended.  EXT: Left foot recently dressed by WO with bandages c/d/i, tender to palpation more at base of hallux leading to medial edge of foot.  SKIN:  No acute rashes.   NEUROLOGIC:  Grossly nonfocal.         Laboratory Data:   Reviewed.  Pertinent for: WBC 10.6 (9.8), plt 357 (344), Cr 0.73 (0.77), CRP 28.1 (39.3)    Culture data:   blood culture positive for staph aureus (MSSA) 1/2 bottles  - all blood cultures negative   blood culture NGTD         Imagin/5 XR L foot: ill-defined resection edge, osteomyelitis  not excluded   XR L foot: No evidence for osteomyelitis   MR L foot: no osteomyelitis, some soft tissue edema and enhancement at amputation site possible cellulitis, no fluid collection

## 2023-11-20 NOTE — PLAN OF CARE
Patient rated his pain a 10 this morning, got prn Oxycodone, has scheduled Tylenol and gabapentin, also got an IV dose of dilaudid for breakthrough. Appears comfortable this afternoon. Left foot dressing changed this afternoon, has amputated toes, personal wheelchair is in the room. Patient is refusing most foods, no appetite. Blood sugars had been in the 60's, was 72 at 2pm. Per MD check if symptomatic. PICC is at TKO in between antibiotics. Voiding, no BM this shift. Spouse at bedside. Xray pending, per vascular PICC line is slightly out from insertion site when placed.

## 2023-11-21 NOTE — PLAN OF CARE
Dressing changed on left lower foot this morning, patient had a podiatry consult today. Scheduled Tylenol, gabapentin and prn Oxycodone are managing pain. Had some loose stools today, voiding, not saved. Easy stand and 2 assist to the bathroom, worked with PT this morning. Drank a nutritional shake but didn't eat any meals. IV lasix given this afternoon, continues on IV antibiotics. Bed alarm on as patient is forgetful.

## 2023-11-21 NOTE — PROGRESS NOTES
[id number]    /46 (BP Location: Left arm)   Pulse 65   Temp 98  F (36.7  C) (Oral)   Resp 18   SpO2 98%       Activity: AX1  Pain: Left foot Managed by x1 prn oxycodone  Neuro: AOX4  Cardiac: WDL  Respiratory: RA, no accessory muscle use.   GI/: Denies nausea, no BM this shift. Voiding spontaneously  Diet: Regular  Lines: LPIV, PICC infusing TKO.   Wounds: No new skin issues  Labs/imaging: Reviewed. No replacements given      No acute changes this shift. Pt sleeping between cares.   Continue to monitor and follow POC

## 2023-11-21 NOTE — PROGRESS NOTES
[facility]    History and Physical - Medicine Service, MAREL[place] TEAM 3       Date of Admission:  11/17/2023    Assessment & Plan   Darian Engle is a 73 year old male admitted on 11/17/2023 with recurrent left foot cellulitis. On Vancomycin and Cefepime. Vascular surgery to evaluate if foot is salvageable; pending RHONDA.    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) c/b staph aureus bacteremia, PVD s/p endovascular stent (10/2023), A Fib on Eliquis, HTN, T2DM, CAD s/p CAB (2004), TALA, dementia    Today:  - Podiatry consulted    - No interventionwhile IP   - If stable, can discharge   - Follow up OP with Dr. Thornton 11/28  - ID recs cefazolin 2g IV q8 to complete full 4 week course as originally planned through 12/3  - Nutrition consulted  - Albumin and pre-albumin today to evaluate for third-spacing due to low colloidosmotic pressure   - Likely due to malnutrition   - 1x furosemide 40mg IV  - Mag + Phos check  - Restarted PTA losartan 100mg daily    # Recurrent left foot cellulitis  # S/p transmetatarsal amputation 2/2 wet gangrene (10/27/23) c/b staph aureus bacteremia   # PAD s/p left common femoral endarterectomy and balloon angioplasty with stent placement (10/24/23)  Pt presented with 2-3 day history of worsening foot pain with malaise and nausea. Foot appears slightly more swollen with some fluctuance and scant purulent discharge. Labs notable for elevated CRP, though trending down from last check on 11/15, and WBC 11.5, elevated from previous check. MRI on admission was negative for osteomyelitis. Vascular surgery was consulted and recommended broad spectrum abx and checking duplex ultrasound of both extremities. Pt has been on Cefazolin since 11/5/23 for staph aureus bacteremia which developed after the left TMA, following with infectious disease clinic. There are notes that he may have missed doses due to dementia impairing his ability to  manage his own medications and his wife getting sick. Placed on broad spectrum until 11/20 at which point ID recommended transition back to cefazolin.   - Vancomycin (11/18 - 11/20)   - Cefepime (11/18 - 11/20)  - Cefazolin (11/20 - *)  - Vascular surgery following, recs appreciated  - Duplex ultrasound of BLE with RHONDA is bilaterally non-compressible (> 1.4)  - Not a revascularization candidate, further management per podiatry  - ID consulted, recs appreciated   - Discontinue vancomycin + cefepime   - Restart cefazolin due to low concern of new infection but suspicion of compromised vascular supply, complete 4 week course as originally planned through 12/3   - No longer considering PO TMP-SMX due to RHONDA > 1.4  - Podiatry consulted, recs appreciated   - No IP intervention   - OP follow up w/ Dr. Thornton 11/28 to evaluate for further amputation   - Blood cx pending   - Continue PTA ASA, atorvastatin, cilostazol  - Pain control:   - PTA Gabapentin 300mg TID  - Scheduled tylenol  - PRN oxycodone   - PRN dilaudid for breakthrough pain  - PICC line in RUE, CXR performed to ensure proper placement     # Malnutrition  # LE edema  Likely 2/2 to 3rd spacing with malnutrition rather than HF. Endorses poor apetite since admission and has BGs down to 65. ECHO 11/08 with EF 60-65% which is baseline for him. No clinical signs of HF.  - Albumin down to 2.7 from baseline 3.1  - Prealbumin pending  - 1x furosemide 40mg PO improved edema  - 1x furosemide 40mg IV today  - Check Mag and Phos  - Hypoglycemia protocol  - Ensure supplementation     # Chronic normocytic anemia  Hgb 7.2, MCV 97. Hgb baseline appears 7-8. Suspect anemia of chronic disease.   - Transfused 1 U pRBC pm 11/19 which stabilized Hgb to 8.4    # H/o HTN  PTA meds initially held due to hypotension on admission.   - Restarted PTA amlodipine 11/19  - Restarted PTA carvedilol 11/18  - Restarted PTA losartan 11/21      # Dementia  # Agitation  # Insomnia  Unclear patient's  baseline. He is unable to answer many questions regarding timing of symptoms and medications. Wife, Violet, manages his medications and helps him make medical decisions.   - Continue PTA donepezil  - PRN olanzapine   - PRN melatonin  - CIWA protocol discontinued     Chronic  # T2DM: Last A1c 5.5% (10/17/23), hold PTA metformin  # CAD: Continue PTA atorvastatin, ASA  # TALA: CPAP discontinued due to non-use  # A Fib: Rate controlled, continue PTA Eliquis   # GERD: PTA pantoprazole  # Anxiety: PTA sertraline       Diet: Regular Diet Adult  Snacks/Supplements Adult: Ensure Enlive; Between Meals  DVT Prophylaxis: Apixaban 5mg BID  Mercado Catheter: Not present  Lines: PRESENT      PICC 11/11/23 Single Lumen Right Basilic Antibiotics. PICC was ready to use.-Site Assessment: WDL      Cardiac Monitoring: None  Code Status: Full Code      Clinically Significant Risk Factors                  # Hypertension: Noted on problem list     # Dementia: noted on problem list        # Financial/Environmental Concerns: none         Disposition Plan     Expected Discharge Date: 11/24/2023      Destination: home with family;home with help/services          The patient's care was discussed with the day team.    KINGS GUERRERO  Medicine Service, [facility] TEAM 3  [facility]  Securely message with [employer] (more info)  Text page via [facility] Paging/Directory   See signed in provider for up to date coverage information    ______________________________________________________________________    Interval Events  Mr. Sarabia was interviewed in his room. He states he slept better today and pain is at around 8/10 rather than 9/10. Does not endorse fever or chills, chest pain or pressure, cough, nausea or vomiting, diarrhea. States that he had a small solid BM this morning. Feels that swelling in ankles improved minorly with diuretic dose given yesterday. Continues to have little appetite. Unopened cake  frosting on his bedside table. Endorses that he understands the plan is for podiatry to see him next.      Physical Exam   Vital Signs: Temp: 98  F (36.7  C) Temp src: Oral BP: 130/46 Pulse: 65   Resp: 18 SpO2: 98 % O2 Device: None (Room air)    Weight: 0 lbs 0 oz    Constitutional: Awake, alert, cooperative, no apparent distress, and appears stated age.  Respiratory: No increased work of breathing, good air exchange, clear to auscultation bilaterally, no crackles or wheezing.  Cardiovascular: Normal apical impulse, regular rate and rhythm, normal S1 and S2, no S3 or S4, and no murmur noted.  GI: Normal bowel sounds, non-distended, and non-tender.  Skin: Left foot with healing surgical incision with areas of erythema and necrosis. Foot stump appears edematous and is tender to the touch. Tenderness resolves near ankle.   Musculoskeletal: LE pitting edema 2-3+ bilaterally up to ankle, 1-2+ up to patella. Motor strength is 5 out of 5 all extremities bilaterally.  Neurologic: Oriented to person, place, and time.   Memory: Impaired for events around the time of admission and several days prior.   Fund of Knowledge: Poor, some understanding of condition.   Attention/Concentration: Adequate for conversation.       10.6  \   8.4 (L)   / 357     136 108 (H) 14.9 /  74   4.3 17 (L) 0.79 \

## 2023-11-21 NOTE — PROGRESS NOTES
[facility] ID Service: Follow Up Note      Patient:  Darian Engle, Date of birth 1950, Medical record number [id number]  Date of Visit:  November 21, 2023         Assessment and Recommendations:   ID Problem List:  Complicated MSSA bacteremia  Blood culture 11/5/2023 in 1/4 bottles positive for MSSA  Blood cultures 11/6-11/13/2023 negative  Blood cultures 11/17/2023 NGTD  Left foot post-surgical site infection - presumed MSSA cellulitis    Recommendations:  Continue cefazolin 2g IV every 8 hours with plan to complete full 4 week course as originally planned through 12/3  Close ID follow up once discharged    Discussion:    Darian Engle is a 73y M with pmhx PAD with recent L foot amputation 10/26/2023 and recent admission for post-surgical site infection, was started on antibiotics for MSSA bacteremia with a plan for a total four week course, two weeks of IV cefazolin (11/6-11/19) followed by two weeks of oral TMP/SMX (11/20-12/3). He presented 11/17 for concerns for L foot infection with acutely worsening pain and started on empiric vancomycin and IV cefepime. ID was consulted 11/20 for an antibiotic deescalation plan. Patient has remained afebrile thus far this admission, with CRP continuing to downtrend and WBC only mildly elevated on admission and since downtrending. Imaging thus far has been negative for osteomyelitis (XR L foot and MRI L foot), and blood cultures thus far have been negative aside from initial blood cultures 11/5 positive for staph aureus in 1/4 bottles. Therefore, there is low concern for new infection at this time so would recommend transitioning back to IV cefazolin. Currently there is no need for MRSA or pseudomonas coverage. Original plan had been to transition to oral TMP/SMX 11/20 however given concerns for compromised blood supply now confirmed by RHONDA US, will continue with IV antibiotics to increase chances of distribution to foot. Plan to complete full 4 week  course as originally planned with IV cefazolin through 12/3. Presumed cellulitis in left foot would also be covered by cefazolin. Patient does not currently have osteomyelitis however is at high risk of developing osteomyelitis, will need close ID follow up once discharged, would need broader coverage if he ends up developing osteomyelitis.     Recs were discussed with the ID attending Dr. Galloway and primary team today. Don't hesitate to call with questions.     Amber Ventura MD  PGY-1  Medicine-Pediatrics Resident  [facility]  Infectious Disease Team        Interval History:     No acute events overnight. Patient continues to have pain, managed by PRN PO oxy and IV dilaudid. Continues to have poor appetite. Able to drink ensure today. LE swelling improved after diuretic dose yesterday. RHONDA completed. No nausea, abd pain, diarrhea, fevers, chills, cough, joint pain. Wondering if something will happen with his foot today.         Review of Systems:   Full 9 pt ROS obtained, pertinent positives and negatives as above.          Current Antimicrobials   Current:  - Cefazolin 2g IV every 8 hours (11/20- )    Prior:  - Cefepime 2g IV every 8 hours (11/17-11/20)  - Vancomycin IV (dosing per pharmacy) (11/17-11/20)  - Cefazolin 2g IV every 8 hours (11/6-11/17)         Physical Exam:   Ranges for vital signs:  Temp:  [98  F (36.7  C)-98.4  F (36.9  C)] 98  F (36.7  C)  Pulse:  [65-81] 81  Resp:  [16-18] 18  BP: (130-145)/(41-52) 134/49  SpO2:  [98 %-100 %] 100 %    Intake/Output Summary (Last 24 hours) at 11/21/2023 0931  Last data filed at 11/20/2023 1848  Gross per 24 hour   Intake --   Output 1350 ml   Net -1350 ml     Exam:  GENERAL:  awake, alert, sitting in bed in no acute distress.   ENT:  Head is normocephalic, atraumatic. Oropharynx is moist  LUNGS:  Clear to auscultation, no crackles or wheezes. Breathing comfortably on room air.  CARDIOVASCULAR:  Regular rate and rhythm with no murmurs  ABDOMEN:   Normal bowel sounds, soft, nontender, nondistended.  EXT: Extremities warm, improved lower extremity edema. Wound recently dressed (c/d/I), images in media tab  SKIN:  No acute rashes.  Line is in place without any surrounding erythema.  NEUROLOGIC:  Grossly nonfocal.         Laboratory Data:   Reviewed.  Pertinent for: Cr 0.79 (0.73)    Culture data:   blood culture positive for staph aureus (MSSA) 1/2 bottles  - all blood cultures negative   blood culture NGTD         Imagin/5 XR L foot: ill-defined resection edge, osteomyelitis  not excluded   XR L foot: No evidence for osteomyelitis   MR L foot: no osteomyelitis, some soft tissue edema and enhancement at amputation site possible cellulitis, no fluid collection   US RHONDA: bilateral dorsalis pedis arteries not detected. Bilateral resting RHONDA not compressible. R occlusive flat first digit PPG. L first digit PPG not detected

## 2023-11-21 NOTE — PROGRESS NOTES
"Care Management Follow Up    Length of Stay (days): 3    Expected Discharge Date: 11/23/2023     Concerns to be Addressed: patient refuses services, discharge planning   (Per initial CMA, patient and spouse report they do not decline services, they were not offered/started)  Patient plan of care discussed at interdisciplinary rounds: Yes    Anticipated Discharge Disposition: Transitional Care (PT/OT recommendations pending), home infusion for IV antibiotics (if discharge home)     Anticipated Discharge Services: None  Anticipated Discharge DME:  wound care supplies, IV antibiotic supplies supplied by [facility] if discharge home    Patient/family educated on Medicare website which has current facility and service quality ratings: no  Education Provided on the Discharge Plan:  plan TBD  Patient/Family in Agreement with the Plan: plan TBD    Referrals Placed by CM/SW:      -[facility]    -Awaiting PT/OT recommendation for discharge disposition    Private pay costs discussed: Not applicable    Additional Information:  Pt recently discharge on 11/13 with home infusion, home care and OP Hyperbaric treatment at [facility].     ______________________________________________________________________________    Per initial CMA, patient's spouse cannot drive him to any OP appointments.  Patient's wife expressed feelings of care giver burn out during initial CMA, has her own illness concerns, and her mother is also on hospice care.  Patient & wife expressed during initial CMA that they need more help and would consider discharge to a TCU.  They stated they will NOT consider going to [facility] (had an experience there they referred to as \"horrific\" in 11/7/23 care management note.  Patient's wife stated TCU staff was not changing patient, he was not being fed, and not being cared for well),  and wife took patient out of [facility].  They also stated they WILL ONLY consider going to [facility] in [place], as this is where " "patient's mother in law is receiving hospice care (previous RNCC note says [facility], but this is incorrect.  They want to go to [facility]).    ______________________________________________________________________________      [facility](RN/PT/OT)  Phone: [phone]  Fax: [phone]    Writer confirmed with [facility] that they did see him at home.  They did an intake appointment with him on 11/15/23.       [facility](IV abx)  Phone: [phone]  Fax: [phone]  (Per initial CMA) * Spouse reports they did do the Abx at home, per chart review Abx were scheduled to be complete on 11/19      Patient still open to [facility] for IV abx.  Per 11/9/23 care management note, coverage for IV antibiotics as below:      Per I \" Pt does not have IV ABX coverage in the home with their [employer] Medicare plan. Drug would be billed to the part D (patient responsible for copay per dispense) and supplies will be self pay. Based on Cefazolin 2gm q8h, total cost is approximately $31.75/day for drug and supplies.\"       Discharge plan and needs are yet to be determined, unsure if antibiotic plan will change for discharge. RNCC will continue to follow and support safe discharge planning as needed.           Nathaly Torres, RN, BSN  RN Care Coordinator    5A Medical Oncology/5C non-BMT  5A beds 8727-6960  5C beds 5538-6339 (non-BMT)  [address] [address]  [email or web address]  Gender pronouns: she/her  Pager: [phone]    "

## 2023-11-21 NOTE — PLAN OF CARE
Goal Outcome Evaluation:      Plan of Care Reviewed With: patient, spouse     [id number] A&Ox4 intermittent confusion, bed/chair alarm on. VSS, rated 9-10/10, oxy and IV dilaudid given for pain management with some relief. Dressing changed done at 2200 per ordered. PICC infusing TKO between  Abx. Voiding well using urinal at bedside. Reg diet no appetite, able to make his needs known. Cont POC

## 2023-11-21 NOTE — PLAN OF CARE
Problem: Care Coordination Assessment  Goal: Care Coordinator Assessment  Description: -Safe discharge placement   Goal Outcome Evaluation:  Outcome: Progressing

## 2023-11-21 NOTE — CONSULTS
[facility]  Orthopedic Surgery Consult    Name: Darian Engle  Age: 73 year old  MRN: [id number]  YOB: 1950    Reason for Consult: Poor wound healing s/p left TMT amputation    Requesting Provider: Jim Aceves MD    Assessment and Plan:     Assessment:  73 year-old-male s/p  left femoral endarterectomy and profundoplasty and balloon angioplasty on 10/24/23 and left TMT amputation on 10/27/23 with Dr. Thornton complicated by recent admission for MSSA bacteremia with evidence of poor wound healing.    Medical Decision Making:  Currently afebrile, vital signs stable. Blood cultures NGTD x 3 days. Inflammatory markers downtrending. Leukocytosis resolved. No evidence of acute infection of left foot. Vascular consulted and do not plan on any further intervention. ID recommends continued IV antibiotics. Poor wound healing could be related to malnutrition. Nutrition consulted. No plan for urgent surgical intervention. Plan for follow up with Dr. Thornton at outpatient appointment on 11/28 for further discussion of treatment options.    Plan:  Medicine Primary  - No plan for surgical intervention during this admission  - Anticoagulation/DVT prophylaxis: per primary  - Antibiotics: per ID  - Imaging: Completed  - Activity: As tolerated  - Weight bearing: WBAT LLE  - Pain control: per primary  - Diet: ok from orthopedic perspective, nutrition consult  - Follow-up: Outpatient follow-up with Dr. Thornton on 11/28  - Disposition: Ok to discharge from orthopedic perspective once medically stable    Staff: MD Lui Staples MD  Orthopedic Surgery Resident  [phone]    Please page me directly with any questions/concerns during regular weekday hours before 5 pm. If there is no response, if it is a weekend, or if it is during evening hours then please page the orthopedic surgery resident on call.    History of Present Illness:     Patient was seen and examined by me.  History, PMH, Meds, SH, complete ROS (10 organ systems) and PE reviewed with patient and prior medical records.      Darian Engle is a 73-year-old male with PMH of left foot gangrene s/p transmetatarsal amputation on 10/27/23 with Dr. Thornton, c/b staph aureus bacteremia, PVD s/p endovascular stent on 10/20/23, A Fib on Eliquis, HTN, T2DM, CAD s/p CAB (2004), TALA, dementia admitted to medicine service for concern of worsening left foot cellulitis and poor left foot wound healing. Patient was recently admitted 11/5/2023 - 11/13/2023 for MSSA bacteremia treated with IV cefazolin. Orthopedics evaluated the patient and performed decompression of blister along superior aspect of surgical incision. There was low concern for surgical site infection. Patient was discharged to TCU on IV antibiotics and plan for hypobaric [facility]. Patient reports having incontinence while at TCU where the wound was saturated with feces. He also endorses missing doses of IV antibiotics. He has been experiencing increased pain in left foot and was concerned about possible infection. He was admitted to the hospital on 11/17 for further evaluation. He denies fever or chills.      Past Medical History:     Past Medical History:   Diagnosis Date    Arthritis March 2018    joint pain both hands    Diabetes (H)     Heart disease 1991    Foss-angioplasty    Hypertension        Past Surgical History:     Past Surgical History:   Procedure Laterality Date    AMPUTATE TOE(S) Left 10/26/2023    Procedure: Amputate toe(s), all transmetatarsal amputation;  Surgeon: Jerardo Thornton MD;  Location: [facility] OR    ANGIOGRAM Left 10/17/2023    Procedure: Left lower extremity angiogram via Left brachial artery approach;  Surgeon: Chuck Felix MBBS;  Location: [facility] OR    ANGIOGRAM Left 10/24/2023    Procedure: ANGIOGRAM;  Surgeon: Chuck Felix MBBS;  Location: [facility] OR    ANGIOPLASTY Left 10/24/2023    Procedure: ANGIOPLASTY, Left Lower  Extremity atherectomy;  Surgeon: Chuck Felix MBBS;  Location: [facility] OR    CARDIAC SURGERY  2004    [facility]    ENDARTERECTOMY FEMORAL Left 10/24/2023    Procedure: ENDARTERECTOMY, FEMORAL;  Surgeon: Chuck Felix MBBS;  Location: [facility] OR    ENHANCE LASER REFRACTIVE BILATERAL EXISTING PT IN PARAMETERS      EYE SURGERY      [facility]    IR OR ANGIOGRAM  10/17/2023    IR OR ANGIOGRAM  10/24/2023    VASCULAR SURGERY      [facility]       Social History:     Social History     Socioeconomic History    Marital status:    Tobacco Use    Smoking status: Every Day     Packs/day: 0.50     Years: 5.00     Additional pack years: 0.00     Total pack years: 2.50     Types: Cigarettes     Start date: 1968     Last attempt to quit: 7/15/2016     Years since quittin.3    Smokeless tobacco: Former   Vaping Use    Vaping Use: Some days    Substances: THC    Devices: Disposable   Substance and Sexual Activity    Alcohol use: Yes     Comment: binge, two months sober    Drug use: Yes     Types: Marijuana     Comment: uses Marijuana for pain    Sexual activity: Not Currently     Partners: Female   Other Topics Concern    Parent/sibling w/ CABG, MI or angioplasty before 65F 55M? Yes     Comment: brother     Social Determinants of Health     Financial Resource Strain: Low Risk  (2023)    Financial Resource Strain     Within the past 12 months, have you or your family members you live with been unable to get utilities (heat, electricity) when it was really needed?: No   Food Insecurity: Low Risk  (2023)    Food Insecurity     Within the past 12 months, did you worry that your food would run out before you got money to buy more?: No     Within the past 12 months, did the food you bought just not last and you didn t have money to get more?: No   Transportation Needs: Low Risk  (2023)    Transportation Needs     Within the past 12  months, has lack of transportation kept you from medical appointments, getting your medicines, non-medical meetings or appointments, work, or from getting things that you need?: No   Housing Stability: Low Risk  (11/16/2023)    Housing Stability     Do you have housing? : Yes     Are you worried about losing your housing?: No       Family History:     Family History   Problem Relation Age of Onset    Glaucoma Mother     Macular Degeneration Mother     Macular Degeneration Other     Lung Cancer Brother        Medications:     Current Facility-Administered Medications   Medication    acetaminophen (TYLENOL) tablet 975 mg    amLODIPine (NORVASC) tablet 10 mg    apixaban ANTICOAGULANT (ELIQUIS) tablet 5 mg    aspirin (ASA) chewable tablet 81 mg    atorvastatin (LIPITOR) tablet 40 mg    bisacodyl (DULCOLAX) suppository 10 mg    calcium carbonate (TUMS) chewable tablet 1,000 mg    carvedilol (COREG) tablet 25 mg    ceFAZolin (ANCEF) 2 g in 100 mL D5W intermittent infusion    cilostazol (PLETAL) tablet 100 mg    cloNIDine (CATAPRES) tablet 0.1 mg    glucose gel 15-30 g    Or    dextrose 50 % injection 25-50 mL    Or    glucagon injection 1 mg    donepezil (ARICEPT) tablet 10 mg    famotidine (PEPCID) tablet 10 mg    flumazenil (ROMAZICON) injection 0.2 mg    folic acid (FOLVITE) tablet 1 mg    furosemide (LASIX) injection 40 mg    gabapentin (NEURONTIN) capsule 300 mg    HYDROmorphone (PF) (DILAUDID) injection 0.3 mg    [Held by provider] isosorbide mononitrate (IMDUR) 24 hr tablet 60 mg    lidocaine (LMX4) cream    lidocaine 1 % 0.1-1 mL    LORazepam (ATIVAN) injection 1-2 mg    [Held by provider] losartan (COZAAR) tablet 100 mg    melatonin tablet 1 mg    multivitamin w/minerals (THERA-VIT-M) tablet 1 tablet    OLANZapine (zyPREXA) injection 2.5 mg    ondansetron (ZOFRAN ODT) ODT tab 4 mg    oxyCODONE (ROXICODONE) tablet 5-10 mg    pantoprazole (PROTONIX) EC tablet 40 mg    Patient is already receiving anticoagulation  with heparin, enoxaparin (LOVENOX), warfarin (COUMADIN)  or other anticoagulant medication    polyethylene glycol (MIRALAX) Packet 17 g    prochlorperazine (COMPAZINE) injection 5 mg    Or    prochlorperazine (COMPAZINE) tablet 5 mg    Or    prochlorperazine (COMPAZINE) suppository 12.5 mg    QUEtiapine (SEROquel) tablet 25 mg    senna-docusate (SENOKOT-S/PERICOLACE) 8.6-50 MG per tablet 1 tablet    sertraline (ZOLOFT) tablet 25 mg    sodium chloride (PF) 0.9% PF flush 3 mL    sodium chloride (PF) 0.9% PF flush 3 mL    thiamine (B-1) tablet 100 mg    tiZANidine (ZANAFLEX) tablet 2 mg    traZODone (DESYREL) tablet 50 mg       Allergies:     Allergies   Allergen Reactions    Lidocaine Other (See Comments)     Lungs filled with fluid. ? Anaphylaxis    Lisinopril Cough     cough    Naltrexone Nausea and Vomiting       Review of Systems:     A comprehensive 10 point review of systems (constitutional, ENT, cardiac, peripheral vascular, respiratory, GI, , musculoskeletal, skin, neurological) was performed and found to be negative except as described in this note.      Physical Exam:     Vital Signs: BP (!) 150/51 (BP Location: Left arm)   Pulse 80   Temp 98.2  F (36.8  C) (Oral)   Resp 20   SpO2 100%   General: Resting comfortably in bed, awake, alert, no apparent distress, appears stated age.    Musculoskeletal:  LLE: Surgical incision intact, no evidence of dehiscence or drainage. Necrotic tissue surrounding surgical incision. Fires TA/Gastroc. Diminished sensation in sural, saphenous, deep peroneal, superficial peroneal, and tibial nerve distributions     Media Information         Media Information         Media Information        Imaging:     XR and L foot demonstrates previous transmetatarsal amputations of the first through fifth metatarsals. No evidence for destructive changes or radiographic evidence for osteomyelitis. Extensive vascular calcifications. Plantar calcaneal spur.     MR L foot demonstrates First  through fifth transmetatarsal amputations. No marrow edema. Normal T1 marrow signal. Soft tissue edema and enhancement around the amputation site. No fluid collection. No evidence for osteomyelitis.     Data:     CBC:  Lab Results   Component Value Date    WBC 10.6 11/20/2023    HGB 8.4 (L) 11/20/2023     11/20/2023     BMP:  Lab Results   Component Value Date     11/21/2023    POTASSIUM 4.3 11/21/2023    CHLORIDE 108 (H) 11/21/2023    CO2 17 (L) 11/21/2023    BUN 14.9 11/21/2023    CR 0.79 11/21/2023    ANIONGAP 11 11/21/2023    SHERYL 8.6 (L) 11/21/2023    GLC 70 11/21/2023     Inflammatory Markers:  Lab Results   Component Value Date    WBC 10.6 11/20/2023    WBC 9.8 11/19/2023    WBC 11.5 (H) 11/18/2023    SED 52 (H) 11/17/2023    SED 51 (H) 11/06/2023    CRP - 60>39>28

## 2023-11-22 NOTE — PROGRESS NOTES
Vascular surgery update:    Chart reviewed after ortho eval. Pt noted to have downtrending inflammatory markers, normal WBC, no acute infection but with continued gangrenous changes to left amputation site.     No further revascularization options available for pt at this time as he is s/p left femoral endarterectomy and profundoplasty and balloon angioplasty on 10/24/23 followed by left TMT amputation on 10/27/23 with Dr. Thornton . Dr Thornton currently evaluating pt for potential amputation revision vs more proximal amputation.     Pt has follow up with Dr Mariee on 11/28- agree with this plan at this time.   Please cont asa, eliquis, and pletal daily  Weight bearing restrictions per ortho  Abx per ID and primary team  Pt also has follow up with Roxann Fontanez NP with vascular surgery on 11/28- plan to follow up with pt at this time  OK for discharge from vascular surgery perspective, please contact with any further questions.     D/W Dr Laure Holt,   Fellow

## 2023-11-22 NOTE — PLAN OF CARE
/45 (BP Location: Left arm)   Pulse 75   Temp 98.3  F (36.8  C) (Oral)   Resp 20   SpO2 96%    Time: 3593-5639  Reason for admission: Wound infection, L, foot.   Activity: assist of one person with mobility.   Pain: denied pain or discomfort.   Neuro: alert and oriented.   Cardiac: WDL  Resp: denied SOB, lung sounds clear bilaterally.   GI/: Regular diet, voids without difficulties, bowel sounds present x four quadrants.   Lines: R PICC, TKO infusing.   Skin: WDL X L, foot wound.   Labs/Imaging: : no lab or imaging this shift.   New changes to shift: no change.   Plan: continue with current plan of cares.

## 2023-11-22 NOTE — PROGRESS NOTES
S: Pt seen bedside at [facility] room 5224 in good spirits. O: I see the EPIC order for the offloading shoe to LT due to toe amputations. I see a large amount of bandages. A: He was fit with a size medium DH2 shoe with no pegs removed. Instructions were given and seemed to be understood. P: TEOFILO PRN. G: The goal is to provide a soft shoe with room for his bandages.  Electronically signed Eduardo Evangelista CPO, LPO.

## 2023-11-22 NOTE — PROGRESS NOTES
Care Management Follow Up    Length of Stay (days): 4    Expected Discharge Date: 11/23/2023     Concerns to be Addressed: patient refuses services, discharge planning   (Pt and Spouse report they do not decline services, they were not offered/started) TCU  Patient plan of care discussed at interdisciplinary rounds: Yes    Anticipated Discharge Disposition: Transitional Care     Anticipated Discharge Services: None  Anticipated Discharge DME:  (wound care)    Patient/family educated on Medicare website which has current facility and service quality ratings: Yes  Education Provided on the Discharge Plan:    Patient/Family in Agreement with the Plan: yes    Jovany Meier sent a referral ,called TCU and left a message with admissions asking for call back.  Brooke called back the facility does not take patient's that are receiving hyperbaric treatments.  Brooke did have questions about patient's wound to.  SW let her know that she would talk to patient and MD about the hyperbaric treatments.  3:30 PM: Vascular feels hyperbaric oxygen is not necessary and can be held if this is a discharge barrier.  3:40 PM MCKENZIE called Brooke in admissions and left a message stating this and that the wound care is basic wound care and that the wound is not open.  SW asked if she would call her back.    Referrals Pl aced by CM/MCKENZIE:    Private pay costs discussed: Not applicable    Additional Information:  8:00 am: MCKENZIE received a phone call from pt's wife after hours on 8/21 stating that pt has been waiting for care coordination all day and that they have to leave at 8:30 am on 11/22 for appointments. Recommendations from PT weren't put in until late afternoon 11/21 for TCU.     8:20 am: MCKENZIE called pt's MD to ask about plan for pt. MD stated that he didn't know pt had appointments today either. MD talked with pt and his wife this morning and they do want pt to go to TCU. Their first choice is [facility] in [place], because they have a family member there.  MD asked if pt could get assistance in rescheduling hyperbaric appointment. MCKENZIE said she will pass this on to the RNCC.  As of now holding on making another hyperbaric appointment.    8:30 am SW will call and send referral  to their desired TCU.    MCKENZIE will meet with pt this morning about TCU    11:00 am: MD called and asked if it was possible for pt to leave the hospital for a few hours and then return with out being discharged and then have to be admitted again.   12:30 pm: Per . Pt has recommendations for TCU so is unsafe to leave the hospital except for transport to TCU.  will not sign off for pt leaving for a few hours this evening    1:44 pm: MCKENZIE talked with Srinivasa with [facility] about hyperbaric chamber. She is going to look at the pt she also suggested LTAC as a possibility. Pt doesn't meet LTAC criteria.    Pt would like to know if [facility] would accept pt if he didn't have hyperbaric treatments.  MCKENZIE called [facility] and left a message    Patient may now be receiving a J-tube for nutrition.  MCKENZIE will check to see if [facility] takes patients on NG tube.    SW to follow and assist with any other discharge needs that may arise.  JUANA Jackman   5A beds:3592-40  5C beds 2017-32 (no BMT pt's)     Phone: [phone]  Pager: [phone]

## 2023-11-22 NOTE — PROGRESS NOTES
ID brief note    Chart reviewed    Noted plan for outpt visit w Dr Thornton to discuss amputation options. No vascular sx interventions available.    Afebrile, stable w normal WBc on Cefazolin    RHONDA indicates non compressible vessels, would continue IV Cefazolin. High risk of developing osteo even if no current osteo, would closely monitor outpt      Recommendations:  1. Continue cefazolin 2g IV every 8 hours with plan to complete full 4 week course as originally planned through 12/3  2. Close ID follow up once discharged  3. OPAT and follow up plan as below  4. Inpatient ID will sign off, please call if questions    I have reviewed interval events, vital, labs, images, cultures and antibiotics      Rui Galloway  Staff Physician  Division of Infectious Diseases        Primary team:  Place order panel  [facility] Pharmacy ([facility]) Review and ID Care Transition   Contact ID teams about any missed ID clinic appointments and/or ongoing therapy needs.    Prolonged Parenteral/Oral Antibiotic Recommendations and ID Follow up  This template provides final ID recommendations as of this date.     Infectious Diseases Indication: Complicated MSSA bacteremia, ischemic foot with SSTI    Antibiotic Information  Name of Antibiotic Dose of Antibiotic1 Anticipated duration Effective start date2 End date   Cefazolin  2g Iv Q8H 4 weeks total 11/6/23 12/3/23                 1.Dose of antibiotic will need to be renally adjusted if creatinine clearance changes  2.Effective start date is the date of adequate therapy with appropriate spectrum    Method of antibiotic delivery:PICC line.At the end of therapy should the line used for antimicrobials be removed or de-accessed?TBD    Weekly labs required: Creatinine, AST/ALT, CBC with diff and CRP. Dr. Galloway will follow labs at discharge until ID follow up. Fax labs to ID clinic.    Are there pending microbial tests: No       If additional appointments are needed later in the antibiotic course  specify the provider Anaiyil or other available provider, timing of visit 2-4 weeks, and the type of visit In-Person visit.     ID provider route note: MARION RN Care Coordinator [facility] ID Clinic Information:  Phone: [phone]  Fax: [phone] (Attention Glenn Dixon)

## 2023-11-22 NOTE — PROGRESS NOTES
Resident/Fellow Attestation   I, Keerthi Aceves MD, was present with the medical/LIVE student who participated in the service and in the documentation of the note.  I have verified the history and personally performed the physical exam and medical decision making.  I agree with the assessment and plan of care as documented in the note.      Patient's dispo was discussed extensively today with him, and wife.  In the setting of recent readmission following discharge on the 14th, we noted that probably the patient is not ready to return home, and wife and him were agreeable.  The patient's medical needs are further increased because of his poor nutrition, which certainly affects his capacity to heal from surgery.  Nutrition evaluated him today and given the very poor oral intake we discussed with the family, decided they would like to pursue tube feeds, starting today. Will be monitoring refeeding labs.    Keerthi Aceves MD  PGY3  Date of Service (when I saw the patient): 11/22/23      [facility]    History and Physical - Medicine Service, [facility] TEAM 3       Date of Admission:  11/17/2023    Assessment & Plan   Darian Engle is a 73 year old male admitted on 11/17/2023 with recurrent left foot cellulitis. On Vancomycin and Cefepime. Vascular surgery to evaluate if foot is salvageable; pending RHONDA.    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) c/b staph aureus bacteremia, PVD s/p endovascular stent (10/2023), A Fib on Eliquis, HTN, T2DM, CAD s/p CAB (2004), TALA, dementia    Today:  - PT recs for TCU placed late afternoon yesterday  - Had scheduled hyperbaric O2 therapy at [facility] as well as house closing meeting today which he missed due to being IP   - Asked permission to leave the hospital and return without being discharged and readmitted  - Per , he is unsafe to leave hospital except for transport to TCU  - Per SW, does  not meet criteria for LTAC currently  - Only interested in TCU called [facility]  - Limited transportation abilities   - Reached out to [facility] surg and ortho surg/podiatry about necessity for hyperbaric O2 therapy as it is an impediment to discharge to TCU   - Recommended that this can be delayed if impediment to discharge and should be discussed with OP follow up providers next week (11/28) instead  - NG tube placement, per nutrition recs, adult formula drip feeding   - AXR  - Mag, phos, potas levels   - Glucose POCT    # Recurrent left foot cellulitis  # S/p transmetatarsal amputation 2/2 wet gangrene (10/27/23) c/b staph aureus bacteremia   # PAD s/p left common femoral endarterectomy and balloon angioplasty with stent placement (10/24/23)  Pt presented with 2-3 day history of worsening foot pain with malaise and nausea. Foot appears slightly more swollen with some fluctuance and scant purulent discharge. Labs notable for elevated CRP, though trending down from last check on 11/15, and WBC 11.5, elevated from previous check. MRI on admission was negative for osteomyelitis. Vascular surgery was consulted and recommended broad spectrum abx and checking duplex ultrasound of both extremities. Pt has been on Cefazolin since 11/5/23 for staph aureus bacteremia which developed after the left TMA, following with infectious disease clinic. There are notes that he may have missed doses due to dementia impairing his ability to manage his own medications and his wife getting sick. Placed on broad spectrum until 11/20 at which point ID recommended transition back to cefazolin.   - Vancomycin (11/18 - 11/20)   - Cefepime (11/18 - 11/20)  - Cefazolin (11/20 - *)  - Vascular surgery following, recs appreciated  - Duplex ultrasound of BLE with RHONDA is bilaterally non-compressible (> 1.4)  - Not a revascularization candidate, further management per podiatry  - ID consulted, recs appreciated   - Discontinue vancomycin  + cefepime   - Restart cefazolin due to low concern of new infection but suspicion of compromised vascular supply, complete 4 week course as originally planned through 12/3   - No longer considering PO TMP-SMX due to RHONDA > 1.4  - Podiatry consulted, recs appreciated   - No IP intervention   - OP follow up w/ Dr. Thornton 11/28 to evaluate for further amputation   - Blood cx pending   - Continue PTA ASA, atorvastatin, cilostazol  - Pain control:   - PTA Gabapentin 300mg TID  - Scheduled tylenol  - PRN oxycodone   - PRN dilaudid for breakthrough pain  - PICC line in RUE, CXR performed to ensure proper placement     # Malnutrition  #Pancreatic Mass  # LE edema  Likely 2/2 to 3rd spacing with malnutrition rather than HF. Endorses poor apetite since admission and has BGs down to 65. ECHO 11/08 with EF 60-65% which is baseline for him. No clinical signs of HF. Per note from Dr. Patel Ambrose 09/12/23, pt had stable appearance of complex cystic mass in the uncinate process of the pancreas. The stability and negative prior FNA is reassuring. However, persistent enhancing mural thickening is concerning for malignant degeneration into pancreatic adenocarcinoma. Differential includes a complex side branch intraductal papillary mucinous neoplasm (IPMN) and serous cystic neoplasm. Recommended continued close imaging surveillance with follow-up MRI in 6 months. Per [facility] Dr. 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 FNA showed showed uncinate process cyst was negative for high-grade dysplasia and malignancy while pancreatic body cyst was non-diagnostic. Concluded stable IPMN with concerning nodularity, no symptoms, marginal surgical candidate. MRI in 6 months, eus in 12 months for surveillance. Prior biopsy dated 12/13/2022 showed no definitive high grade dysplasia of the pancreatic body cyst and low-grade mucinous neoplasia of the uncinate process of the  pancreas. The pt was scheduled to follow up with Dr. Govind Ho (medical oncology/general surgery, see note from 01/09/2023) after March biopsy but uncertain if this occurred.   - Albumin down to 2.7 from baseline 3.1  - Prealbumin down to 11  - LE edema has improved with furosemide 2x which seems to help pain as well  - Hypoglycemia protocol  - NG tube placement today, per nutrition recs, adult formula drip feeding   - AXR  - Mag, phos, potas levels   - Glucose POCT    # Chronic normocytic anemia  Hgb 7.2, MCV 97. Hgb baseline appears 7-8. Suspect anemia of chronic disease.   - Transfused 1 U pRBC pm 11/19 which stabilized Hgb to 8.4    # H/o HTN  PTA meds initially held due to hypotension on admission.   - Restarted PTA amlodipine 11/19  - Restarted PTA carvedilol 11/18  - Restarted PTA losartan 11/21      # Dementia  # Agitation  # Insomnia  Unclear patient's baseline. He is unable to answer many questions regarding timing of symptoms and medications. Wife, Violet, manages his medications and helps him make medical decisions.   - Continue PTA donepezil  - PRN olanzapine   - PRN melatonin  - CIWA protocol discontinued     Chronic  # T2DM: Last A1c 5.5% (10/17/23), hold PTA metformin  # CAD: Continue PTA atorvastatin, ASA  # TALA: CPAP discontinued due to non-use  # A Fib: Rate controlled, continue PTA Eliquis   # GERD: PTA pantoprazole  # Anxiety: PTA sertraline       Diet: Regular Diet Adult  Snacks/Supplements Adult: Ensure Enlive; Between Meals  DVT Prophylaxis: Apixaban 5mg BID  Mercado Catheter: Not present  Lines: PRESENT      PICC 11/11/23 Single Lumen Right Basilic Antibiotics. PICC was ready to use.-Site Assessment: WDL      Cardiac Monitoring: None  Code Status: Full Code      Clinically Significant Risk Factors            # Hypomagnesemia: Lowest Mg = 1.5 mg/dL in last 2 days, will replace as needed   # Hypoalbuminemia: Lowest albumin = 2.7 g/dL at 11/21/2023  5:24 AM, will monitor as appropriate     #  Hypertension: Noted on problem list     # Dementia: noted on problem list        # Financial/Environmental Concerns: none         Disposition Plan     Expected Discharge Date: 11/23/2023      Destination: home with family;home with help/services          The patient's care was discussed with the day team.    KINGS GUERRERO  Medicine Service, FATIMAH TEAM 3  [facility]  Securely message with [employer] (more info)  Text page via [employer] Paging/Directory   See signed in provider for up to date coverage information    ______________________________________________________________________    Interval Events  Mr. Engle was interviewed in his room (0800). He does feel like his pain has improved rating it at 4/10 which is down from the consistent 8-9 the past two days. He also feels that the swelling in his legs has improved with furosemide treatments. In terms of discharge, he would like to go home for a few days, but is also interested in TCU. Has been to one before called [facility] that he will not return to because he was mistreated there. At this point his wife walks in. She states she is unhappy that he has not been discharged yet, because he has a hyperberic oxygen appointment at [facility] at 1100 and they are closing on their house today. She feels that nothing has been done to improve his foot this admission. Reassured that we are attempting to discharge as soon as possible. She is adamant that he cannot go to any other TCU than [facility] in [place] where her mother is for hospice care. States that he was treated horribly at [facility] and this is what led to his infection. Also adamant that he needs hyperbaric O2 treatments. Reassured that we will reach out to vascular surgery and podiatry/ortho about the necessity of hyperbaric O2. They ask about possibility of leaving this afternoon to take care of house closing and then coming back this evening. Informed we will  talk to social work about it.     Physical Exam   Vital Signs: Temp: 98.3  F (36.8  C) Temp src: Oral BP: 123/47 Pulse: 75   Resp: 20 SpO2: 96 % O2 Device: None (Room air)    Weight: 0 lbs 0 oz    Constitutional: Awake, alert, cooperative, no apparent distress, and appears stated age.  Respiratory: No increased work of breathing, good air exchange, clear to auscultation bilaterally, no crackles or wheezing.  Cardiovascular: Normal apical impulse, regular rate and rhythm, normal S1 and S2, no S3 or S4, and no murmur noted.  GI: Normal bowel sounds, non-distended, and non-tender.  Skin: Left foot with healing surgical incision with areas of erythema and necrosis. Foot stump appears edematous and is tender to the touch. Tenderness resolves near ankle.   Musculoskeletal: LE trace edema much improved since yesterday. Motor strength is 5 out of 5 all extremities bilaterally.  Neurologic: Oriented to person, place, and time.   Memory: Impaired for events around the time of admission and several days prior.   Fund of Knowledge: Poor, some understanding of condition.   Attention/Concentration: Adequate for conversation.       7.6  \   9.5 (L)   / 372     137 107 15.2 /  98   3.8 21 (L) 0.79 \     ALT: N/A AST: N/A AP: N/A TBILI: N/A   ALB: N/A TOT PROTEIN: N/A LIPASE: N/A

## 2023-11-22 NOTE — PROVIDER NOTIFICATION
Called Dr. Aceves: You ordered an NJ, but that must be placed in IR & that won't happen until Friday due to Thanksgiving.  Can we try an NG?    OUTCOME:  Dr. Aceves will order NG for tube feeding.

## 2023-11-22 NOTE — PHARMACY-CONSULT NOTE
Pharmacy Tube Feeding Consult    Medication reviewed for administration by feeding tube and for potential food/drug interactions.    Recommendation: No changes are needed at this time.     Pharmacy will continue to follow as new medications are ordered.   Amanda HicksD

## 2023-11-22 NOTE — PLAN OF CARE
Goal Outcome Evaluation:      /45 (BP Location: Left arm)   Pulse 66   Temp 97.9  F (36.6  C) (Oral)   Resp 16   SpO2 98%     [id number]     Neuro: A&Ox4, with forgetfulness. Pt's wife at bedside.  Cardiac: SR. VSS.   Respiratory: Sating 98% on RA.  GI/: Adequate urine output.  Diet/appetite: Tolerating regular diet. Eating poorly.  Activity:  Assist of one up to chair, with chair alarm on.  Pain: At acceptable level on current regimen.   Skin: No new deficits noted.  LDA's:PICC and PIV.  Plan: Continue with POC. Notify primary team with changes.

## 2023-11-22 NOTE — PROGRESS NOTES
11/22/23 1502   Appointment Info   Signing Clinician's Name / Credentials (OT) Annmarie Quigley OTR/L   Rehab Comments (OT) LLE WBAT   Living Environment   People in Home spouse   Current Living Arrangements house   Home Accessibility no concerns   Transportation Anticipated family or friend will provide   Living Environment Comments Pt lives with his spouse in a H with no BERNARDO. Pt has a walk-in shower and walk-in tub.   Self-Care   Usual Activity Tolerance moderate   Current Activity Tolerance moderate   Regular Exercise No   Equipment Currently Used at Home walker, standard;walker, rolling;wheelchair, manual;wheelchair, power;raised toilet seat;shower chair  (mainly uses manual wheelchair)   Fall history within last six months yes   Number of times patient has fallen within last six months 10   Activity/Exercise/Self-Care Comment Pt reports Mod I with ADLs since previous admission. Uses manual wheelchair for mobility, able to tx IND   Instrumental Activities of Daily Living (IADL)   Previous Responsibilities meal prep;housekeeping;laundry;medication management   IADL Comments Pt reports IND with IADLs at baseline and shares responsibilities with wife as needed.   General Information   Onset of Illness/Injury or Date of Surgery 11/17/23   Referring Physician Elijah Blevins,    Patient/Family Therapy Goal Statement (OT) to be able to walk again   Additional Occupational Profile Info/Pertinent History of Current Problem Darian Engle is a 73 year old male admitted on 11/17/2023 with recurrent left foot cellulitis. On Vancomycin and Cefepime. Vascular surgery to evaluate if foot is salvageable; pending RHONDA.   Existing Precautions/Restrictions fall;weight bearing   Limitations/Impairments safety/cognitive   Left Upper Extremity (Weight-bearing Status) full weight-bearing (FWB)   Right Upper Extremity (Weight-bearing Status) full weight-bearing (FWB)   Left Lower Extremity (Weight-bearing Status)  "weight-bearing as tolerated (WBAT)   Right Lower Extremity (Weight-bearing Status) full weight-bearing (FWB)   General Observations and Info Pt greeted supine in bed w/ wife present, agreeable to therapy.   Cognitive Status Examination   Orientation Status person;place  (year)   Cognitive Status Comments Pt oriented to place, person, and year. Pt stating \"Dec 4\" as date. However, pt able to state Thanksgiving is tomorrow. With cues, pt determined correct month. Previous admission, pt reported concerns with cognition and confustion at baseline.   Visual Perception   Visual Impairment/Limitations WFL;corrective lenses full-time   Sensory   Sensory Quick Adds left LE   Sensory Comments LLE n/t at pain at baseline   Pain Assessment   Patient Currently in Pain Yes, see Vital Sign flowsheet   Posture   Posture forward head position;protracted shoulders   Range of Motion Comprehensive   General Range of Motion no range of motion deficits identified   Strength Comprehensive (MMT)   Comment, General Manual Muscle Testing (MMT) Assessment generalized weakness   Muscle Tone Assessment   Muscle Tone Quick Adds No deficits were identified   Coordination   Upper Extremity Coordination No deficits were identified   Gross Motor Coordination No deficits were identified   Fine Motor Coordination No deficits were identified   Bed Mobility   Bed Mobility supine-sit   Supine-Sit [place] (Bed Mobility) supervision;verbal cues   Comment (Bed Mobility) SBA   Transfers   Transfers sit-stand transfer   Transfer Comments CGA with VCs   Sit-Stand Transfer   Sit/Stand Transfer Comments CGA with VCs   Balance   Balance Comments Min-mod A to maintain static standing balance w/o UE support   Activities of Daily Living   BADL Assessment/Intervention toileting;grooming;lower body dressing   Bathing Assessment/Intervention   [place] Level (Bathing) set up;supervision   Comment, (Bathing) per clinical judgment   Assistive Devices (Bathing) " shower chair   Lower Body Dressing Assessment/Training   Fine Level (Lower Body Dressing) set up;verbal cues   Comment, (Lower Body Dressing) to don L sock over bandages   Position (Lower Body Dressing) edge of bed sitting   Grooming Assessment/Training   Fine Level (Grooming) set up   Position (Grooming) edge of bed sitting   Toileting   Fine Level (Toileting) verbal cues;minimum assist (75% patient effort)   Comment, (Toileting) per clinical judgment   Clinical Impression   Criteria for Skilled Therapeutic Interventions Met (OT) Yes, treatment indicated   OT Diagnosis impaired IND with I/ADLs and functional transfers   OT Problem List-Impairments impacting ADL problems related to;activity tolerance impaired;balance;cognition;strength;pain   Assessment of Occupational Performance 1-3 Performance Deficits   Identified Performance Deficits toileting, bathing, functional transfers   Planned Therapy Interventions (OT) ADL retraining;IADL retraining;cognition;strengthening;home program guidelines;progressive activity/exercise   Clinical Decision Making Complexity (OT) problem focused assessment/low complexity   Risk & Benefits of therapy have been explained evaluation/treatment results reviewed;care plan/treatment goals reviewed;participants voiced agreement with care plan;participants included;patient;spouse/significant other   Clinical Impression Comments Pt presents below baseline level of function and would benefit from IP OT to progress safety/IND with I/ADLs and functional transfers.   OT Total Evaluation Time   OT Eval Low Complexity Minutes (66207) 9   OT Goals   Therapy Frequency (OT) 5 times/week   OT Predicted Duration/Target Date for Goal Attainment 12/20/23   OT: Hygiene/Grooming while standing;minimal assist   OT: Transfer Modified independent   OT: Toilet Transfer/Toileting Modified independent;toilet transfer;cleaning and garment management   OT: Goal 2 Pt will verbalize and  demonstrate independence with BUE AROM HEP in prep for transfers/ADLS.   OT Discharge Planning   OT Discharge Recommendation (DC Rec) Transitional Care Facility   OT Rationale for DC Rec Pt is currently not ambulating and is on a wheelchair d/t increased LLE pain. Per ortho, pt is WBAT to LLE, but pt is fearful and guarded of LLE and has not put any weight on foot in over a week. Pt with decreased activity tolerance, impacting safety/IND with I/ADL completiong.Would recommend TCU at this time to increase ADL independence and home safety.   OT Brief overview of current status pivot transfers with CGA   OT Equipment Needed at Discharge other (see comments)  (TBD)

## 2023-11-22 NOTE — PLAN OF CARE
Status: admitted for wound infection on L foot  Vitals: VSS  Neuros: forgetful and [place].  IV: PIV SL'd, R PICC at tko between abx  Labs/Electrolytes: WNL  Resp/trach: WNL  Diet: tolerating regular diet with poor PO intake. Only ate one applesauce entire shift along with candy from home  Bowel status: LBM 11/21  : voiding without difficulty  Skin: L foot dressing changed by writer this shift  Pain: pain managed with oxycodone  Activity: up with 2 and easy stand to go to bathroom  Social: no visitors or phone calls during shift

## 2023-11-22 NOTE — PROGRESS NOTES
"CLINICAL NUTRITION SERVICES - ASSESSMENT NOTE     Nutrition Prescription    RECOMMENDATIONS FOR MDs/PROVIDERS TO ORDER:   Intake improvement vs nutrition support within 2-3 days.    Malnutrition Status:   Severe malnutrition in the context of chronic illness.     Recommendations already ordered by Registered Dietitian (RD):   Provided education on nutrition support options and role of RD   Will order snacks/supplements of   - Ensure Enlive at Breakfast  - Boost Soothe at Lunch  - Magic cup at Dinner     If tube feeding is needed:   Osmolite 1.5 Dez (or equivalent) @ goal of  55ml/hr  (1320ml/day) provides: 1980 kcals, 83 g PRO, 1005 ml free H20, 268 g CHO, and 0 g fiber daily.   - Initiate @ 10 ml/hr and advance by 10 ml q8hr as tolerated  - Do not start or advance until lytes (Mg++,K+) WNL and phos>2.0  - Recommend 30-60 ml q4hr fluid flushes for tube patency. Additional fluids and/or adjustments per MD.    - Elevated HOB with gastric feeds     Future/Additional Recommendations:   Monitor TF tolerance     REASON FOR ASSESSMENT   Darian Engle is a/an 73 year old male assessed by the dietitian for positive Admission Nutrition Risk Screen and Provider Order  - \"Malnutrition, has not had appetite since admission, transient hypoglycemia, poor wound healing\"  - Reported weight loss of 14-23 lbs and reported decreased appetite.     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 Pt stated that he hasn't been eating because he doesn't \"feel like it\". Discussed importance of adequate intake and both pt and wife stated understanding. Pt " "was agreeable to trying a variety of supplements and working on increasing intake with use of outside foods. Did discuss nutrition support options (tube feeding and IV nutrition), and plan to start calorie counts to determine if nutrition support is needed. Spoke with Medicine [facility] staff member and informed them of plan and recommendation to start tube feeding if intake does not meet needs within 1-2 days.     After new consult was sent for tube feeding management, decided to not order calorie counts, and will place orders for tube feeding.  - labs, multivitamin, and thiamine orders already in place      Nutrition/GI: Last BM noted 11/22.  Skin: Moustapha score 17 with nutrition marked as probably inadequate per flowsheets. Mild to moderate edema noted in flowsheets. WOC consulted, for concerns for infection with left food amputation from October 2023, and singed off on 11/19.     CURRENT NUTRITION ORDERS   Diet: Regular  Snacks/supplement: Ensure Enlive between meals.   Intake/Tolerance: Per flowsheets, intake documentation likely missing some meals/snacks, but on average 0% of meals consumed.     LABS   Labs Reviewed   11/21/23 18:50 11/22/23 04:46   Sodium 137 137   Potassium 4.1 3.8   Creatinine 0.81 0.79   GFR Estimate >90 >90   Magnesium 1.5 (L)    WBC  7.6   Hemoglobin  9.5 (L)   Platelet Count  372   MCV  94      11/21/23 18:50 11/22/23 04:46 11/22/23 08:07   Glucose 108 (H) 98    Glucose by meter POCT   112 (H)     MEDICATIONS   Medications reviewed  - Lipitor  - Cefazolin in D5W  - Famotidine  - Folic Acid  - Lasix  - Thera-Vit-M  - Protonix  - Miralax  - Senokot  - Zoloft  - Thiamine   - Tums PRN  - Dilaudid PRN  - Zofran PRN  - Oxycodone PRN  - Compazine PRN    ANTHROPOMETRICS  Height: 170.2 cm (5' 7\")   Most Recent Weight: 60.3 kg (133 lb)  IBW: 67.3 kg  BMI: 20.83 - Normal BMI  Weight History:   Pt with 18.5 lb (12.2 %) weight loss over 5 months.    Wt Readings from Last Encounters:   11/16/23 60.3 kg " (133 lb)   11/10/23 63 kg (138 lb 14.4 oz)   10/30/23 66.7 kg (147 lb 0.8 oz)   07/31/23 68.5 kg (151 lb)   06/19/23 68.7 kg (151 lb 8 oz)   02/21/23 69.9 kg (154 lb 3.2 oz)   12/01/22 69.4 kg (153 lb)   09/08/22 67.9 kg (149 lb 12.8 oz)     Dosing Weight: 60 kg - actual   Amputation not accounted for in dosing weight calculation due to minimal nutritional impact (only end of foot removed).    ASSESSED NUTRITION NEEDS   Estimated Energy Needs: 1789-0580 kcals/day (30 - 35 kcals/kg )  Justification: Increased needs  Estimated Protein Needs: 70-90+ grams protein/day (1.2 - 1.5+ grams of pro/kg)  Justification: Increased needs for Wound healing  Estimated Fluid Needs: 4272-0145 mL/day (25 - 30 mL/kg)   Justification: Maintenance    PHYSICAL FINDINGS   See malnutrition section below.    MALNUTRITION   % Intake: </= 50% for >/= 5 days (severe)  % Weight Loss: > 10% in 6 months (severe)  Subcutaneous Fat Loss: Global Severe  Muscle Loss: Global Severe  Fluid Accumulation/Edema: Moderate  Malnutrition Diagnosis: Severe malnutrition in the context of chronic illness.     NUTRITION DIAGNOSIS   Inadequate oral intake related to decreased appetite as evidenced by pt self report of decreased appetite impacting intake.       INTERVENTIONS   Implementation   Provided education on nutrition support options and role of RD   Will order snacks/supplements of   - Ensure Enlive at Breakfast  - Boost Soothe at Lunch  - Magic cup at Dinner     If tube feeding is needed:   Osmolite 1.5 Dez (or equivalent) @ goal of  55ml/hr  (1320ml/day) provides: 1980 kcals, 83 g PRO, 1005 ml free H20, 268 g CHO, and 0 g fiber daily.   - Initiate @ 10 ml/hr and advance by 10 ml q8hr as tolerated  - Do not start or advance until lytes (Mg++,K+) WNL and phos>2.0  - Recommend 30-60 ml q4hr fluid flushes for tube patency. Additional fluids and/or adjustments per MD.    - Elevated HOB with gastric feeds     Goals   Intake improvement vs nutrition support  within 2-3 days.  Total avg nutritional intake to meet a minimum of 30 kcal/kg and 1.2 g PRO/kg daily (per dosing wt 60 kg).     Monitoring/Evaluation   Progress toward goals will be monitored and evaluated per protocol.  Katerina Kincaid RD, LD   Available on [employer] [place] and Pager  5A ([phone]) + 6A pager [phone]  Weekend pager [phone]

## 2023-11-23 NOTE — PROGRESS NOTES
[facility]    History and Physical - Medicine Service, [facility] TEAM 3       Date of Admission:  11/17/2023    Assessment & Plan   Darian Engle is a 73 year old male admitted on 11/17/2023 with recurrent left foot cellulitis. On cefazolin IV. No surgical intervention at this time per surgical consultants. NGT to be placed for feeds to start. Pending placement.    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) c/b staph aureus bacteremia, PVD s/p endovascular stent (10/2023), A Fib on Eliquis, HTN, T2DM, CAD s/p CAB (2004), TALA, dementia    Today:  - PT recs for TCU  - NGT tube taken out by patient overnight due to Dementia  - NGT to be replaced, patient is agreeable to start feeds  - See below for nutritional goals  - Hypotensive to 80s then spontaneously to 90s on his own, given 250 LR    # Recurrent left foot cellulitis  # S/p transmetatarsal amputation 2/2 wet gangrene (10/27/23) c/b staph aureus bacteremia   # PAD s/p left common femoral endarterectomy and balloon angioplasty with stent placement (10/24/23)  Pt presented with 2-3 day history of worsening foot pain with malaise and nausea. Foot appears slightly more swollen with some fluctuance and scant purulent discharge. Labs notable for elevated CRP, though trending down from last check on 11/15, and WBC 11.5, elevated from previous check. MRI on admission was negative for osteomyelitis. Vascular surgery was consulted and recommended broad spectrum abx and checking duplex ultrasound of both extremities. Pt has been on Cefazolin since 11/5/23 for staph aureus bacteremia which developed after the left TMA, following with infectious disease clinic. There are notes that he may have missed doses due to dementia impairing his ability to manage his own medications and his wife getting sick. Placed on broad spectrum until 11/20 at which point ID recommended transition back to cefazolin.   -  Vancomycin (11/18 - 11/20)   - Cefepime (11/18 - 11/20)  - Cefazolin (11/20 - *)  - Vascular surgery following, recs appreciated  - Duplex ultrasound of BLE with RHONDA is bilaterally non-compressible (> 1.4)  - Not a revascularization candidate, further management per podiatry  - ID consulted, recs appreciated   - Discontinue vancomycin + cefepime   - Restart cefazolin due to low concern of new infection but suspicion of compromised vascular supply, complete 4 week course as originally planned through 12/3   - No longer considering PO TMP-SMX due to RHONDA > 1.4  - Podiatry consulted, recs appreciated   - No IP intervention   - OP follow up w/ Dr. Thornton 11/28 to evaluate for further amputation   - Blood cx pending   - Continue PTA ASA, atorvastatin, cilostazol  - Pain control:   - PTA Gabapentin 300mg TID  - Scheduled tylenol  - PRN oxycodone   - PRN dilaudid for breakthrough pain  - PICC line in RUE, CXR performed to ensure proper placement     # Malnutrition  #Pancreatic Mass  # LE edema  Likely 2/2 to 3rd spacing with malnutrition rather than HF. Endorses poor apetite since admission and has BGs down to 65. ECHO 11/08 with EF 60-65% which is baseline for him. No clinical signs of HF. Per note from Dr. Patel Ambrose 09/12/23, pt had stable appearance of complex cystic mass in the uncinate process of the pancreas. The stability and negative prior FNA is reassuring. However, persistent enhancing mural thickening is concerning for malignant degeneration into pancreatic adenocarcinoma. Differential includes a complex side branch intraductal papillary mucinous neoplasm (IPMN) and serous cystic neoplasm. Recommended continued close imaging surveillance with follow-up MRI in 6 months. Per [facility] Dr. 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 FNA showed showed uncinate process cyst was negative for high-grade dysplasia and malignancy while pancreatic  body cyst was non-diagnostic. Concluded stable IPMN with concerning nodularity, no symptoms, marginal surgical candidate. MRI in 6 months, eus in 12 months for surveillance. Prior biopsy dated 12/13/2022 showed no definitive high grade dysplasia of the pancreatic body cyst and low-grade mucinous neoplasia of the uncinate process of the pancreas. The pt was scheduled to follow up with Dr. Govind Ho (medical oncology/general surgery, see note from 01/09/2023) after March biopsy but uncertain if this occurred.   - Albumin down to 2.7 from baseline 3.1  - Prealbumin down to 11  - LE edema has improved with furosemide 2x which seems to help pain as well  - Hypoglycemia protocol  - NG tube placement today, per nutrition recs, adult formula drip feeding  - Nutritional goals  - Osmolite 1.5 Dez (or equivalent) @ goal of  55ml/hr  (1320ml/day)  - Initiate @ 10 ml/hr and advance by 10 ml q8hr as tolerated  - Do not start or advance until lytes (Mg++,K+) WNL and phos>2.0  - Recommend 30-60 ml q4hr fluid flushes for tube patency. Additional fluids and/or adjustments per MD.    - Elevated HOB with gastric feeds   - Intake improvement vs nutrition support within 2-3 days.  - Total avg nutritional intake to meet a minimum of 30 kcal/kg and 1.2 g PRO/kg daily (per dosing wt 60 kg).    # Chronic normocytic anemia  Hgb 7.2, MCV 97. Hgb baseline appears 7-8. Suspect anemia of chronic disease.   - Transfused 1 U pRBC pm 11/19 which stabilized Hgb to 8.4    # H/o HTN  PTA meds initially held due to hypotension on admission.   - Restarted PTA amlodipine 11/19  - Restarted PTA carvedilol 11/18  - Restarted PTA losartan 11/21      # Dementia  # Agitation  # Insomnia  Unclear patient's baseline. He is unable to answer many questions regarding timing of symptoms and medications. Wife, Violet, manages his medications and helps him make medical decisions.   - Continue PTA donepezil  - PRN olanzapine   - PRN melatonin  - CIWA protocol  discontinued     Chronic  # T2DM: Last A1c 5.5% (10/17/23), hold PTA metformin  # CAD: Continue PTA atorvastatin, ASA  # TALA: CPAP discontinued due to non-use  # A Fib: Rate controlled, continue PTA Eliquis   # GERD: PTA pantoprazole  # Anxiety: PTA sertraline       Diet: Regular Diet Adult  Snacks/Supplements Adult: Other; Ensure Enlive for Breakfast, Boost Soothe for Lunch, Magic cup (orange if able) for Dinner.; With Meals  Adult Formula Drip Feeding: Continuous Osmolite 1.5; Nasogastric tube; Goal Rate: Start at 15 mL/hr and advance by 10 mL/hr Q8hrs until goal of 55 mL/hr is achieved.; mL/hr; Do not advance tube feeding rate unless K+ is = or > 3.0, Mg++ is = or > ...  DVT Prophylaxis: Apixaban 5mg BID  [place]ercado Catheter: Not present  Lines: PRESENT      PICC 11/11/23 Single Lumen Right Basilic Antibiotics. PICC was ready to use.-Site Assessment: WDL      Cardiac Monitoring: None  Code Status: Full Code      Clinically Significant Risk Factors            # Hypomagnesemia: Lowest Mg = 1.2 mg/dL in last 2 days, will replace as needed   # Hypoalbuminemia: Lowest albumin = 2.7 g/dL at 11/21/2023  5:24 AM, will monitor as appropriate     # Hypertension: Noted on problem list     # Dementia: noted on problem list     # Severe Malnutrition: based on nutrition assessment    # Financial/Environmental Concerns: none         Disposition Plan     Expected Discharge Date: 11/23/2023      Destination: home with family;home with help/services          The patient's care was discussed with Dr. Rob Suggs MD PhD  Medicine Service, [facility] TEAM [address] [place] [address]  Securely message with [email or web address] (more info)  Text page via [facility] Paging/Directory   See signed in provider for up to date coverage information    ______________________________________________________________________    Interval Events  Patient took out NGT overnight due to ongoing sundowning  Patient is agreeable to  another NGT  Does not remember the events  States he will try and remember this is for his own good  [place] dizzy today and was given 250 LR for soft blood pressure    Physical Exam   Vital Signs: Temp: 97.5  F (36.4  C) Temp src: Oral BP: 120/41 Pulse: 63   Resp: 12 SpO2: 99 % O2 Device: None (Room air)    Weight: 0 lbs 0 oz    Constitutional: Awake, alert, cooperative, no apparent distress, and appears stated age.  Respiratory: No increased work of breathing, good air exchange, clear to auscultation bilaterally, no crackles or wheezing.  Cardiovascular: Normal apical impulse, regular rate and rhythm, normal S1 and S2, no S3 or S4, and no murmur noted.  Skin: Left foot with healing surgical incision with areas of erythema and necrosis. Foot stump appears edematous and is tender to the touch. Tenderness resolves near ankle.   Musculoskeletal: LE trace edema much improved overall.  Neurologic: Oriented to person, place, and time.   Memory: Impaired for events during evenings.         9.1  \   8.1 (L)   / 384     136 106 14.5 /  102 (H)   4.2; 4.2 20 (L) 0.69 \

## 2023-11-23 NOTE — PLAN OF CARE
/42   Pulse 65   Temp 98  F (36.7  C) (Oral)   Resp 16   SpO2 100%    Time: 9822-2039  Reason for admission: Wound infection, L, foot.   Activity: assist of one person.   Pain: C/o L foot pain, received scheduled Tylenol and PRN Oxycodone for pain management, and it was effective.    Neuro: alert and oriented.   Cardiac: WDL  Resp: denied SOB, lung sounds clear bilaterally.   GI/: regular/TF, voids without difficulties, bowel sounds present x four quadrants.   Lines: R PICC, TKO infusing.   Skin: WDL X, L foot wound.   Labs/Imaging: no lab or imaging thi shift.   New changes to shift: no change.  Plan: continue with current plan of cares.

## 2023-11-23 NOTE — PROVIDER NOTIFICATION
Provider Notification    Re: Hypotensive to 80/35 and 90/38, feeling lightheaded/dizzy. Tired but arouses easily, Alert and Oriented x4. Imdur Held. Can he get a bolus?    Action: Notified Dr. Suggs via [employer]    Response: LR Bolus of 250 ordered, patient will be restarting TF soon, awaiting Xray verification

## 2023-11-23 NOTE — PROVIDER NOTIFICATION
Patient pulled his NG tube out 30cm, patient is non-compliant at this time, refused to talk, tube feeding stopped, treatment team, Fariba Minaya MD, notified.

## 2023-11-23 NOTE — PLAN OF CARE
"[id number]    Soft pressures, provider notified. Received 1 time bolus of 250 ml LR. Intermittently tachycardic, afebrile and on RA. Rated max 10/10 L foot pain related to amputation/infection. Pain managed with PRN oxy & dilaudid with little effectiveness. Lowest pain was 4/10. Denies nausea, although appetite is very poor. Pt reports that \"everything tastes wrong.\" New NG tube placed late morning, however TF were not reinitiated due to delay in Ab XR results confirming tube placement. Pre-med dressing change w/ PRN dilaudid with effectiveness. Declined bowel meds as pt reporting looser stools since starting on TF last night. Replaced phos for count of 2.7, recheck will be with evening labs. Voiding with adequate UOP, LBM 11/22. Up with assist x2 in w/ Kaila Steady. Awaiting TCU placement. Continue to monitor & w/ POC.     Goal Outcome Evaluation:      Plan of Care Reviewed With: patient, spouse    Overall Patient Progress: no change    Outcome Evaluation: New NG placed today, soft pressures. Recieved 1 time bolus      "

## 2023-11-23 NOTE — PROGRESS NOTES
NG is inserted for TF initiation. X-ray order for verification is released for. Awaiting radiology to verify placement. Mag 1.2. MD is paged to decide on replacement prior to initiating TF. Pt denies pain and currently sleeping.

## 2023-11-24 NOTE — PROGRESS NOTES
Care Management Follow Up    Length of Stay (days): 6    Expected Discharge Date: 11/27/2023     Concerns to be Addressed: patient refuses services, discharge planning   (Pt and Spouse report they do not decline services, they were not offered/started)  Patient plan of care discussed at interdisciplinary rounds: Yes    Anticipated Discharge Disposition: Transitional Care     Anticipated Discharge Services: None  Anticipated Discharge DME:  (wound care)    Patient/family educated on Medicare website which has current facility and service quality ratings: no  Education Provided on the Discharge Plan:    Patient/Family in Agreement with the Plan: yes    Referrals Placed by CM/SW:    Private pay costs discussed: Not applicable    Pending:  -[facility], [place]  [phone]  -[facility]  [phone]  -[facility], [place]  [phone]  -[facility]  [phone]    Hold on admissions due to COVID:   [facility]  [address] [place] [id number]  [phone]  11/24: Brooke called SW. Admissions are on hold due to staffing outbreak of covid. -tucker MCKENZIE will call back Monday    Additional Information:  Pt is now on NG tube.     MCKENZIE met with pt and his wife. Sw updated them on the hold on admissions at [facility]. SW brought a list of facilities near [place]. Pt's wife was hesitant to send referrals due to pt's last experience at a TCU. Frandy, pt, said to go a head and send the referrals, because he wants to get stronger so he can go home.   MCKENZIE sent 4 referrals out.     SW to follow and assist with any other discharge needs that may arise.  JUANA Jackman   5A beds:5220-40  5C beds 5417-32 (no BMT pt's)     Phone: [phone]  Pager: [phone]

## 2023-11-24 NOTE — PROVIDER NOTIFICATION
5A 5224. R.M. Pt with diastolic in the 30's.Asymptomatic  Within notifying parameters  Thanks  socorro  [phone]

## 2023-11-24 NOTE — PLAN OF CARE
Goal Outcome Evaluation:    Alert and oriented, A2 with sarasteady. Pt wife is at bedside for support. VSS, On RA. Denies SOB, NV, chest pain and dizziness. Report left foot pain 10/10, managed with Oxy,tylenol, tramadol and IV dilaudid. PIV and port, tko. Pt has NJ tube, om continuous TF, running at 25ml/hr, to be increased Q8 by 10 ml as tolerated. Pt is able to swallow medication. Left foot wound dressing change BID, see sticky note for instruction on dressing change.

## 2023-11-24 NOTE — TELEPHONE ENCOUNTER
Reason for Call:  Form, our goal is to have forms completed with 72 hours, however, some forms may require a visit or additional information.    Type of letter, form or note:  Home Health Certification    Who is the form from?: Home care    Where did the form come from: form was faxed in    What clinic location was the form placed at?: [facility]    Where the form was placed: Given to physician    What number is listed as a contact on the form?: [phone]       Additional comments: Placed in your basket    Call taken on 11/24/2023 at 6:52 AM by Mari Price MA

## 2023-11-24 NOTE — PROGRESS NOTES
Resident/Fellow Attestation   I, Keerthi Aceves MD, was present with the medical/LIVE student who participated in the service and in the documentation of the note.  I have verified the history and personally performed the physical exam and medical decision making.  I agree with the assessment and plan of care as documented in the note.      Keerthi Aceves MD  PGY3  Date of Service (when I saw the patient): 11/24/23    [facility]    History and Physical - Medicine Service, MAROON TEAM 3       Date of Admission:  11/17/2023    Assessment & Plan   Darian Engle is a 73 year old male admitted on 11/17/2023 with recurrent left foot cellulitis. On cefazolin IV. No surgical intervention at this time per surgical consultants. NGT to be placed for feeds to start. Pending placement.    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 Thornton     # Recurrent left foot cellulitis  # S/p transmetatarsal amputation 2/2 wet gangrene (10/27/23) c/b staph aureus bacteremia   # PAD s/p left common femoral endarterectomy and balloon angioplasty with stent placement (10/24/23)  Pt presented with 2-3 day history of worsening foot pain with malaise and nausea. Foot appears slightly more swollen with some fluctuance and scant purulent discharge. Labs notable for elevated CRP, though trending down from last check on 11/15, and WBC 11.5,  elevated from previous check. MRI on admission was negative for osteomyelitis. Vascular surgery was consulted and recommended broad spectrum abx and checking duplex ultrasound of both extremities. Pt has been on Cefazolin since 11/5/23 for staph aureus bacteremia which developed after the left TMA, following with infectious disease clinic. There are notes that he may have missed doses due to dementia impairing his ability to manage his own medications and his wife getting sick. Placed on broad spectrum until 11/20 at which point ID recommended transition back to cefazolin.   - Vancomycin (11/18 - 11/20)   - Cefepime (11/18 - 11/20)  - Cefazolin (11/20 - *)  - Vascular surgery following, recs appreciated  - Duplex ultrasound of BLE with RHONDA is bilaterally non-compressible (> 1.4)  - Not a revascularization candidate, further management per podiatry  - ID consulted, recs appreciated   - Discontinue vancomycin + cefepime   - Restart cefazolin due to low concern of new infection but suspicion of compromised vascular supply, complete 4 week course as originally planned through 12/3   - No longer considering PO TMP-SMX due to RHONDA > 1.4  - Podiatry consulted, recs appreciated   - No IP intervention   - OP follow up w/ Dr. Thornton 11/28 to evaluate for further amputation   - Blood cx pending   - Continue PTA ASA, atorvastatin, cilostazol  - Pain control:   - PTA Gabapentin 300mg TID  - Scheduled tylenol  - PRN oxycodone   - PRN dilaudid for breakthrough pain  - PICC line in RUE, CXR performed to ensure proper placement     # Malnutrition  #Pancreatic Mass  # LE edema  Likely 2/2 to 3rd spacing with malnutrition rather than HF. Endorses poor apetite since admission and has BGs down to 65. ECHO 11/08 with EF 60-65% which is baseline for him. No clinical signs of HF. Per note from Dr. Patel Ambrose 09/12/23, pt had stable appearance of complex cystic mass in the uncinate process of the pancreas. The stability and negative prior  FNA is reassuring. However, persistent enhancing mural thickening is concerning for malignant degeneration into pancreatic adenocarcinoma. Differential includes a complex side branch intraductal papillary mucinous neoplasm (IPMN) and serous cystic neoplasm. Recommended continued close imaging surveillance with follow-up MRI in 6 months. Per [facility] Dr. 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 FNA showed showed uncinate process cyst was negative for high-grade dysplasia and malignancy while pancreatic body cyst was non-diagnostic. Concluded stable IPMN with concerning nodularity, no symptoms, marginal surgical candidate. MRI in 6 months, eus in 12 months for surveillance. Prior biopsy dated 12/13/2022 showed no definitive high grade dysplasia of the pancreatic body cyst and low-grade mucinous neoplasia of the uncinate process of the pancreas. The pt was scheduled to follow up with Dr. Govind Ho (medical oncology/general surgery, see note from 01/09/2023) after March biopsy but uncertain if this occurred.   - Albumin down to 2.7 from baseline 3.1  - Prealbumin down to 11  - LE edema has improved with furosemide 2x which seems to help pain as well  - Hypoglycemia protocol  - NG tube placement per nutrition recs, adult formula drip feeding  - Monitor refeeding labs  - Nutritional goals  - Osmolite 1.5 Dez (or equivalent) @ goal of  55ml/hr  (1320ml/day)  - Initiate @ 10 ml/hr and advance by 10 ml q8hr as tolerated  - Do not start or advance until lytes (Mg++,K+) WNL and phos>2.0  - Recommend 30-60 ml q4hr fluid flushes for tube patency. Additional fluids and/or adjustments per MD.    - Elevated HOB with gastric feeds   - Intake improvement vs nutrition support within 2-3 days.  - Total avg nutritional intake to meet a minimum of 30 kcal/kg and 1.2 g PRO/kg daily (per dosing wt 60 kg).    # Chronic normocytic anemia  Hgb 7.2, MCV 97. Hgb  baseline appears 7-8. Suspect anemia of chronic disease.   - Transfused 1 U pRBC pm 11/19 which stabilized Hgb to 8.4    # H/o HTN  PTA meds initially held due to hypotension on admission.   - Restarted PTA amlodipine 11/19  - Restarted PTA carvedilol 11/18  - Restarted PTA losartan 11/21      # Dementia  # Agitation  # Insomnia  Unclear patient's baseline. He is unable to answer many questions regarding timing of symptoms and medications. Wife, Violet, manages his medications and helps him make medical decisions.   - Continue PTA donepezil  - PRN olanzapine   - PRN melatonin  - CIWA protocol discontinued     Chronic  # T2DM: Last A1c 5.5% (10/17/23), hold PTA metformin  # CAD: Continue PTA atorvastatin, ASA  # TALA: CPAP discontinued due to non-use  # A Fib: Rate controlled, continue PTA Eliquis   # GERD: PTA pantoprazole  # Anxiety: PTA sertraline       Diet: Regular Diet Adult  Snacks/Supplements Adult: Other; Ensure Enlive for Breakfast, Boost Soothe for Lunch, Magic cup (orange if able) for Dinner.; With Meals  Adult Formula Drip Feeding: Continuous Osmolite 1.5; Nasogastric tube; Goal Rate: Start at 15 mL/hr and advance by 10 mL/hr Q8hrs until goal of 55 mL/hr is achieved.; mL/hr; Do not advance tube feeding rate unless K+ is = or > 3.0, Mg++ is = or > ...  DVT Prophylaxis: Apixaban 5mg BID  Mercado Catheter: Not present  Lines: PRESENT      PICC 11/11/23 Single Lumen Right Basilic Antibiotics. PICC was ready to use.-Site Assessment: WDL      Cardiac Monitoring: None  Code Status: Full Code      Clinically Significant Risk Factors            # Hypomagnesemia: Lowest Mg = 1.2 mg/dL in last 2 days, will replace as needed   # Hypoalbuminemia: Lowest albumin = 2.7 g/dL at 11/21/2023  5:24 AM, will monitor as appropriate     # Hypertension: Noted on problem list     # Dementia: noted on problem list     # Severe Malnutrition: based on nutrition assessment    # Financial/Environmental Concerns: none         Disposition  Plan      Expected Discharge Date: 11/27/2023      Destination: home with family;home with help/services            KINGS GUERRERO  Medicine Service, FATIMAH TEAM 3  [facility]  Securely message with InOpen (more info)  Text page via AirPair Paging/Directory   See signed in provider for up to date coverage information    The patient's care was discussed with Dr. Win Rivas.     ______________________________________________________________________    Interval Events  Mr. Engle was interviewed in his room. He was resting comfortably. States that his pain is at 2/10 which is the lowest he has endorsed to me. Describes it now as a burning or aching sensation rather than the acute sharp stabs. Still cannot put pressure on it without it being extremely tender, but does not endorse pain on light palpation to the dorsum. He does believe it is improving and thinks he feels well enough to go home, but understands the main impediment now is his malnutrition. States that the tube feeds having been going fine and does not remember pulling it out the evening of 11/22. Does see how that could have happened though as this is a new thing for him and it is scary. Reassured that this is a understandable reaction and that we appreciate the effort he is putting in. Denies fever, chills, headache, change in hearing or vision, cough, chest pain or pressure, nausea or vomiting, abdominal pain, change in urination, weakness or change of sensation of the extremities. Informed that he had a periods of low BP yesterday for which we gave fluids. Denies lightheadedness, dizziness, or loss of consciousness.      The team came back later in the day and his pain was at 10/10. States that it has been dropping down to 3-4 with medication but by the time his next meds are due he is usually back at 10. Other than this he feels fine and is tolerating the tube feedings. No nausea, vomiting,  constipation, diarrhea. Plan to see how he tolerates PT session and if he cannot then he should receive hydromorphone and try PT again. No other concerns. BP responded nicely to fluid bolus yesterday.     Physical Exam   Vital Signs: Temp: 98  F (36.7  C) Temp src: Oral BP: 133/48 Pulse: 75   Resp: 18 SpO2: 100 % O2 Device: None (Room air)    Weight: 131 lbs 6.4 oz    Constitutional: Awake, alert, cooperative, no apparent distress, and appears stated age.  Respiratory: No increased work of breathing, good air exchange, clear to auscultation bilaterally, no crackles or wheezing.  Cardiovascular: Normal apical impulse, regular rate and rhythm, normal S1 and S2, no S3 or S4, and no murmur noted.  Skin: Left foot with healing surgical incision with areas of erythema and necrosis. Foot stump appears edematous and is tender to the touch. Tenderness resolves near ankle.   Musculoskeletal: LE trace edema much improved overall.  Neurologic: Oriented to person, place, and time.   Memory: Impaired for events during evenings.         8.7  \   7.4 (L)   / 364     134 (L) 105 16.2 /  153 (H)   3.7; 3.7 22 0.83 \

## 2023-11-24 NOTE — PROGRESS NOTES
Care Management Follow Up    Length of Stay (days): 6    Expected Discharge Date: 11/27/2023     Concerns to be Addressed: patient refuses services, discharge planning   (Pt and Spouse report they do not decline services, they were not offered/started)  Patient plan of care discussed at interdisciplinary rounds: Yes    Anticipated Discharge Disposition: Transitional Care     Anticipated Discharge Services: None  Anticipated Discharge DME:  (wound care)    Patient/family educated on Medicare website which has current facility and service quality ratings: no  Education Provided on the Discharge Plan:    Patient/Family in Agreement with the Plan: yes    Referrals Placed by CM/SW:    Private pay costs discussed: Not applicable    Additional Information:  [facility] notified by [facility] liaison that they noted pt will discharge on TF and ran a pre-emptive benefit check. Per [facility] pt does not meet criteria for his TF to be covered by his insurance. Current plan for discharge to TCU.       Shwetha Price, RN   Nurse Coordinator    Covering for: 5A  Phone: *[phone]    Social Work and Care Management Department       SEARCHABLE in [facility] - search CARE COORDINATOR       [place] [facility] [place] ([phone]) Saturday & Sunday; ([phone]) [facility] Holidays     Units: 5A, 5B & 5C  Pager: [phone]    Units: 6B, 6C & 6D    Pager: [phone]    Units: 7A, 7B, 7C & 7D    Pager: [phone]    Units: 6A & ICU   Pager: [phone]    Units: 5 Ortho, 5MS & WB ED Pager: [phone]    Units: 6MS, 8A & 10 ICU  Pager [phone]

## 2023-11-24 NOTE — PROGRESS NOTES
Time: 1500 - 1900  Neuro: A&Ox4.   Cardiac: VSS. Denies CP   Respiratory: Sating well on RA. No BM this shift.  Diet/appetite: Tolerating regular diet. FT @ 15 mL/hr FWF 60 mL Q4h  Activity:  Assist of 2, up to chair.  Pain: At acceptable level on current regimen.   Skin: No new deficits noted.  LDA's: LPIV SL, PICC single lumen TKO    Plan: Continue with POC. Notify primary team with changes.

## 2023-11-24 NOTE — PLAN OF CARE
3143-1253    VSS, afebrile and on RA. A&Ox4, although forgetful at times. Denies SOB/nausea. Rated max 10/10, managed with PRN oxy x1 and IV dilaudid x1 with some effectiveness. Worked with PT doing sitting exercises. Dressing changed per WOC order. TF ramped up to 35 ml/hr, next ramp up will be at 1800. Tolerating well. Declined bowel meds this morning, reported loose stool. Voiding with adequate UOP. Replaced Mag today, recheck this evening. Up w/ assist x2 w/ Kaila Steady. Wife at bedside for part of shift. Awaiting TCU placement. Continue to monitor & w/ POC.       Goal Outcome Evaluation:      Plan of Care Reviewed With: patient    Overall Patient Progress: no change    Outcome Evaluation: Continue w/ TF ramp-up

## 2023-11-25 NOTE — PLAN OF CARE
A&Ox4 soft Bps OVSS on RA. Pain managed w/ oxycodone. PIV saline locked, PICC TKO. NG running TF @ 45ml/hr, was going to increase TF to goal 55ml/hr @ 0200, but pt reported nausea. Bmx2 this shift in bedside commode, jem steady and 2 assist for transport. L foot covered in dressing.      Goal Outcome Evaluation:  Plan of Care Reviewed With: patient  Overall Patient Progress: no change

## 2023-11-25 NOTE — PLAN OF CARE
5419-2821    Soft pressures, provider notified. OVSS, afebrile and on RA. A&Ox4, although forgetful at times and needs to be reoriented. Denies SOB. Complained of nausea this morning and had 100 ml emesis. IV compazine given x1 with effectiveness and TF rate decreased to 35 ml/hr. Provider notified. Pt declined any additional PRN antiemetics. Rated max 10/10 L foot pain, gave oxy x1 and IV dilaudid x1 prior to dressing change. Interventions appeared effective. Had loose stool this morning, bowel meds held. Voiding with adequate UOP. Up with assist x2 with Kaila Steady. Wife at bedside through most of shift. Continue to monitor & w/ POC.     Goal Outcome Evaluation:    Overall Patient Progress: no changeOverall Patient Progress: no change    Outcome Evaluation: Nausea w/ emesis this morning. TF running at 35 ml/hr

## 2023-11-25 NOTE — PROVIDER NOTIFICATION
"Pgd. Intern Es at 1136 via [employer]    \"Most recent BP was 98/40. Denies any dizziness Did you want to set any parameters for morning heart meds? Seems to consistently drop in afternoon.\"    Kaila [phone]    Outcome: Page acknowledged, intern to speak with resident.   "

## 2023-11-25 NOTE — PROVIDER NOTIFICATION
"Pgd. Intern Es at 0849 via Vocera    \"FYI: Pt had 100 ml emesis this morning. Gave IV compazine and decreased rate of TF. Thanks\"    Kaila [phone]    Outcome: Provider acknowledged pg.     Additonal page    \"Now that on TF, pt starting to have loose stools, can we swtich  scheduled bowel meds to PRN? Thanks\"    Outcome: Senna to remain scheduled, miralax switched to PRN  "

## 2023-11-25 NOTE — PROGRESS NOTES
[facility]    Progress Note - Medicine Service, FATIMAH TEAM 3       Date of Admission:  11/17/2023    Assessment & Plan   Darian Engle is a 73 year old male admitted on 11/17/2023 with recurrent left foot cellulitis. On cefazolin IV. No surgical intervention at this time per surgical consultants. NGT to be placed for feeds to start. Pending placement.     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 Thornton      # Recurrent left foot cellulitis  # S/p transmetatarsal amputation 2/2 wet gangrene (10/27/23) c/b staph aureus bacteremia   # PAD s/p left common femoral endarterectomy and balloon angioplasty with stent placement (10/24/23)  Pt presented with 2-3 day history of worsening foot pain with malaise and nausea. Foot appears slightly more swollen with some fluctuance and scant purulent discharge. Labs notable for elevated CRP, though trending down from last check on 11/15, and WBC 11.5, elevated from previous check. MRI on admission was negative for osteomyelitis. Vascular surgery was consulted and recommended broad spectrum abx and checking duplex ultrasound of both extremities. Pt has been on Cefazolin since 11/5/23 for staph aureus bacteremia which developed after the left TMA, following with infectious disease clinic. There are notes that he may have missed doses due to dementia impairing his ability to manage his own medications and his wife getting sick. Placed on broad spectrum until 11/20 at which point ID recommended transition back to cefazolin.   -  Vancomycin (11/18 - 11/20)   - Cefepime (11/18 - 11/20)  - Cefazolin (11/20 - *)  - Vascular surgery following, recs appreciated  - Duplex ultrasound of BLE with RHONDA is bilaterally non-compressible (> 1.4)  - Not a revascularization candidate, further management per podiatry  - ID consulted, recs appreciated              - Discontinue vancomycin + cefepime              - Restart cefazolin due to low concern of new infection but suspicion of compromised vascular supply, complete 4 week course as originally planned through 12/3              - No longer considering PO TMP-SMX due to RHONDA > 1.4  - Podiatry consulted, recs appreciated              - No IP intervention              - OP follow up w/ Dr. Thornton 11/28 to evaluate for further amputation   - Blood cx pending   - Continue PTA ASA, atorvastatin, cilostazol  - Pain control:   - PTA Gabapentin 300mg TID  - Scheduled tylenol  - PRN oxycodone   - PRN dilaudid for breakthrough pain  - PICC line in RUE, CXR performed to ensure proper placement      # Malnutrition  #Pancreatic Mass  # LE edema  Likely 2/2 to 3rd spacing with malnutrition rather than HF. Endorses poor apetite since admission and has BGs down to 65. ECHO 11/08 with EF 60-65% which is baseline for him. No clinical signs of HF. Per note from Dr. Patel Ambrose 09/12/23, pt had stable appearance of complex cystic mass in the uncinate process of the pancreas. The stability and negative prior FNA is reassuring. However, persistent enhancing mural thickening is concerning for malignant degeneration into pancreatic adenocarcinoma. Differential includes a complex side branch intraductal papillary mucinous neoplasm (IPMN) and serous cystic neoplasm. Recommended continued close imaging surveillance with follow-up MRI in 6 months. Per [facility] Dr. 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 FNA showed showed uncinate process cyst was negative  for high-grade dysplasia and malignancy while pancreatic body cyst was non-diagnostic. Concluded stable IPMN with concerning nodularity, no symptoms, marginal surgical candidate. MRI in 6 months, eus in 12 months for surveillance. Prior biopsy dated 12/13/2022 showed no definitive high grade dysplasia of the pancreatic body cyst and low-grade mucinous neoplasia of the uncinate process of the pancreas. The pt was scheduled to follow up with Dr. Govind Ho (medical oncology/general surgery, see note from 01/09/2023) after March biopsy but uncertain if this occurred.   - Albumin down to 2.7 from baseline 3.1  - Prealbumin down to 11  - LE edema has improved with furosemide 2x which seems to help pain as well  - Hypoglycemia protocol  - NG tube placement per nutrition recs, adult formula drip feeding  - Monitor refeeding labs  - Nutritional goals  - Osmolite 1.5 Dez (or equivalent) @ goal of  55ml/hr  (1320ml/day)  - Initiate @ 10 ml/hr and advance by 10 ml q8hr as tolerated  - Do not start or advance until lytes (Mg++,K+) WNL and phos>2.0  - Recommend 30-60 ml q4hr fluid flushes for tube patency. Additional fluids and/or adjustments per MD.    - Elevated HOB with gastric feeds   - Intake improvement vs nutrition support within 2-3 days.  - Total avg nutritional intake to meet a minimum of 30 kcal/kg and 1.2 g PRO/kg daily (per dosing wt 60 kg).     # Chronic normocytic anemia  Hgb 7.2, MCV 97. Hgb baseline appears 7-8. Suspect anemia of chronic disease.   - Transfused 1 U pRBC pm 11/19 which stabilized Hgb to 8.4     # H/o HTN  PTA meds initially held due to hypotension on admission.   - Restarted PTA amlodipine 11/19  - Restarted PTA carvedilol 11/18  - Restarted PTA losartan 11/21       # Dementia  # Agitation  # Insomnia  Unclear patient's baseline. He is unable to answer many questions regarding timing of symptoms and medications. Wife, Violet, manages his medications and helps him make medical decisions.   - Continue  PTA donepezil  - PRN olanzapine   - PRN melatonin  - CIWA protocol discontinued      Chronic  # T2DM: Last A1c 5.5% (10/17/23), hold PTA metformin  # CAD: Continue PTA atorvastatin, ASA  # TALA: CPAP discontinued due to non-use  # A Fib: Rate controlled, continue PTA Eliquis   # GERD: PTA pantoprazole  # Anxiety: PTA sertraline         Diet: Regular Diet Adult  Snacks/Supplements Adult: Other; Ensure Enlive for Breakfast, Boost Soothe for Lunch, Magic cup (orange if able) for Dinner.; With Meals  Adult Formula Drip Feeding: Continuous Osmolite 1.5; Nasogastric tube; Goal Rate: Start at 15 mL/hr and advance by 10 mL/hr Q8hrs until goal of 55 mL/hr is achieved.; mL/hr; Do not advance tube feeding rate unless K+ is = or > 3.0, Mg++ is = or > ...    DVT Prophylaxis: DOAC  Mercado Catheter: Not present  Fluids: None  Lines: PRESENT      PICC 11/11/23 Single Lumen Right Basilic Antibiotics. PICC was ready to use.-Site Assessment: WDL      Cardiac Monitoring: None  Code Status: Full Code      Clinically Significant Risk Factors              # Hypoalbuminemia: Lowest albumin = 2.7 g/dL at 11/21/2023  5:24 AM, will monitor as appropriate     # Hypertension: Noted on problem list     # Dementia: noted on problem list     # Severe Malnutrition: based on nutrition assessment    # Financial/Environmental Concerns: none         Disposition Plan     Expected Discharge Date: 11/27/2023      Destination: home with family;home with help/services          The patient's care was discussed with the Attending Physician, Dr. Rivas .    Keerthi Aceves MD  Medicine Service, [facility] TEAM 3  [facility]  Securely message with [employer] (more info)  Text page via [facility] Paging/Directory   See signed in provider for up to date coverage information  ______________________________________________________________________    Interval History   No acute events overnight, yesterday's notes reviewed AM.  Small emesis this morning, resolved with compazine and a decrease in tube feeds to 35. Wife at bedside, updated over the latest medical changes. We will plan on having vascular seeing the patient on the 28th her if not discharged, to avoid missing the appointment.    Physical Exam   Vital Signs: Temp: 97.9  F (36.6  C) Temp src: Oral BP: 98/40 Pulse: 71   Resp: 18 SpO2: 98 % O2 Device: None (Room air)    Weight: 136 lbs .38 oz    Constitutional: Awake, alert, cooperative, no apparent distress, and appears stated age.  Respiratory: No increased work of breathing, good air exchange, clear to auscultation bilaterally, no crackles or wheezing.  Cardiovascular: Normal apical impulse, regular rate and rhythm, normal S1 and S2, no S3 or S4, and no murmur noted.  Skin: Left foot with healing surgical incision with areas of erythema and necrosis. Foot stump appears edematous and is tender to the touch. Tenderness resolves near ankle.   Musculoskeletal: LE trace edema much improved overall.  Neurologic: Oriented to person, place, and time.   Memory: Impaired for events during evenings.     Medical Decision Making       Please see A&P for additional details of medical decision making.      Data   ------------------------- PAST 24 HR DATA REVIEWED -----------------------------------------------    I have personally reviewed the following data over the past 24 hrs:    9.4  \   7.8 (L)   / 361     135 105 20.6 /  109 (H)   4.3; 4.3 21 (L) 0.81 \       Imaging results reviewed over the past 24 hrs:   No results found for this or any previous visit (from the past 24 hour(s)).

## 2023-11-25 NOTE — PLAN OF CARE
[id number].A&Ox4, forgetful. Bed alarm on.BP's soft-team notified and held some am meds. Pain managed with oxycodone x1 and PRN IV dilaudid x2. Denies nausea. TF advanced @ 6pm-currently running @ 45ml/hr via  NG with 60mls Q4 water flushes. Electrolytes WDL.LLE dressing change done per POC. PICC infusing tko. PIV SL. Up with assist of 2. Continue POC.

## 2023-11-26 NOTE — PROGRESS NOTES
Assumed cares from [id number]     Neuro: A&Ox3 disoriented to situation. Pt has intermittent forgetfulness.   Cardiac: No tele, provider notified of diastolic Bps in the 40s  Respiratory: Sating >98 on RA.  GI/: Adequate urine output and loose stool x2.   Diet/appetite: Tolerating Regular diet with TF via NG running @ 45 mls/hr. TF stopped @ 0630 placement verification needed. Provider notified.   Activity:  Assist of 2 with jem steady   Pain: At acceptable level on current regimen. Pt reported L. Foot pain which was rated as 8/10. Provided PRN Oxy x1.   Skin: No new deficits noted.  LDA's: R. PICC, L. PIV    Plan: Continue with POC. Notify primary team with changes.

## 2023-11-26 NOTE — PROGRESS NOTES
Brief Vascular Surgery Progress Note     Messaged about re-evaluation with concern for increasing necrosis at amputation site, patient has outpatient follow up with vascular surgery 11/28 but will likely still be admitted     Chart reviewed and discussed with fellow.     Vascular surgery team to evaluate tomorrow    Shelly Carmona,   General Surgery, PGY-1

## 2023-11-26 NOTE — PROVIDER NOTIFICATION
Time of notification: 2:34 AM  Provider notified:Fariba Minaya    Reason for notification: Pts current BP is 117/41 with a mean  of  79. Should I hold/admin scheduled Clonidine?

## 2023-11-26 NOTE — PROGRESS NOTES
Tube was noted to be clogged after trying to flush it, other nurses assisted but it could not be flushed, team was notified and later MD came and he stated to notify him if anything could be ordered to open the tube, charge was notified and consensus was to pull out the TF and replace it.  MD was notified too. Patient has very poor appetite, wife was here and brought some food which patient did not eat either.  Up in chair, left foot dressing was changed, vascular surgery MD saw patient too and procedure is planned for tomorrow.  Continue to monitor.

## 2023-11-26 NOTE — PLAN OF CARE
[id number].A&Ox4- forgetful. Bed alarm/chair alarm on. Pain somewhat managed with tylenol, oxycodone and IV dilaudid. Denies nausea. TF advanced around 9pm- running @ 45ml/hr via NGT, tolerating.Having loose BMs.Voiding in adequate amounts. Mg replaced this evening. LLE dressing change completed per POC. PICC infusing tko. Calls appropriately at times. Up with assist of 2. Continue POC.

## 2023-11-26 NOTE — PROVIDER NOTIFICATION
Time of notification: 6:34 AM  Provider notified:Fariba Minaya    Reason for notification: Pts NG landmark has changed. Can you order an x-ray to confirm placement??

## 2023-11-26 NOTE — PROGRESS NOTES
Resident/Fellow Attestation   I, Keerthi Aceves MD, was present with the medical/LIVE student who participated in the service and in the documentation of the note.  I have verified the history and personally performed the physical exam and medical decision making.  I agree with the assessment and plan of care as documented in the note.      Patient overall medically stable, awaiting disposition to TCU. Overall still having LLE pain and recurrent lower extremity edema that has been responsive to lasix. Needing feeding tube replacement due to clogging.    Keerthi Aceves MD  PGY3  Date of Service (when I saw the patient): 11/26/23     [facility]    Progress Note - Medicine Service, MAROON TEAM 3       Date of Admission:  11/17/2023    Assessment & Plan   Darian Engle is a 73 year old male admitted on 11/17/2023 with recurrent left foot cellulitis. On cefazolin IV. No surgical intervention at this time per surgical consultants. NGT to be placed for feeds to start. Pending placement.     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 Thornton and Huseyin    - Vasc surg will eval tomorrow   - NG clogged, replaced today     # Recurrent left foot cellulitis  # S/p transmetatarsal amputation 2/2 wet gangrene (10/27/23) c/b staph aureus bacteremia   # PAD s/p left common femoral endarterectomy and balloon angioplasty with stent placement (10/24/23)  Pt presented with 2-3 day history of worsening foot pain with malaise and nausea. Foot appears slightly more  swollen with some fluctuance and scant purulent discharge. Labs notable for elevated CRP, though trending down from last check on 11/15, and WBC 11.5, elevated from previous check. MRI on admission was negative for osteomyelitis. Vascular surgery was consulted and recommended broad spectrum abx and checking duplex ultrasound of both extremities. Pt has been on Cefazolin since 11/5/23 for staph aureus bacteremia which developed after the left TMA, following with infectious disease clinic. There are notes that he may have missed doses due to dementia impairing his ability to manage his own medications and his wife getting sick. Placed on broad spectrum until 11/20 at which point ID recommended transition back to cefazolin.   - Vancomycin (11/18 - 11/20)   - Cefepime (11/18 - 11/20)  - Cefazolin (11/20 - *)  - Vascular surgery following, recs appreciated  - Duplex ultrasound of BLE with RHONDA is bilaterally non-compressible (> 1.4)  - Not a revascularization candidate, further management per podiatry  - ID consulted, recs appreciated              - Discontinue vancomycin + cefepime              - Restart cefazolin due to low concern of new infection but suspicion of compromised vascular supply, complete 4 week course as originally planned through 12/3              - No longer considering PO TMP-SMX due to RHONDA > 1.4  - Podiatry consulted, recs appreciated              - No IP intervention              - OP follow up w/ Dr. Thornton 11/28 to evaluate for further amputation   - Blood cx pending   - Continue PTA ASA, atorvastatin, cilostazol  - Pain control:   - PTA Gabapentin 300mg TID  - Scheduled tylenol  - PRN oxycodone   - PRN dilaudid for breakthrough pain  - PICC line in RUE, CXR performed to ensure proper placement   - Has outpatient appointment with vascular as OP on 11/28, vascular surgery to meet with the patient tomorrow.     # Malnutrition  #Pancreatic Mass  # LE edema  Likely 2/2 to 3rd spacing with malnutrition  rather than HF. Endorses poor apetite since admission and has BGs down to 65. ECHO 11/08 with EF 60-65% which is baseline for him. No clinical signs of HF. Per note from Dr. Patel Ambrose 09/12/23, pt had stable appearance of complex cystic mass in the uncinate process of the pancreas. The stability and negative prior FNA is reassuring. However, persistent enhancing mural thickening is concerning for malignant degeneration into pancreatic adenocarcinoma. Differential includes a complex side branch intraductal papillary mucinous neoplasm (IPMN) and serous cystic neoplasm. Recommended continued close imaging surveillance with follow-up MRI in 6 months. Per [facility] Dr. 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 FNA showed showed uncinate process cyst was negative for high-grade dysplasia and malignancy while pancreatic body cyst was non-diagnostic. Concluded stable IPMN with concerning nodularity, no symptoms, marginal surgical candidate. MRI in 6 months, eus in 12 months for surveillance. Prior biopsy dated 12/13/2022 showed no definitive high grade dysplasia of the pancreatic body cyst and low-grade mucinous neoplasia of the uncinate process of the pancreas. The pt was scheduled to follow up with Dr. Govind Ho (medical oncology/general surgery, see note from 01/09/2023) after March biopsy but uncertain if this occurred.   - Albumin down to 2.7 from baseline 3.1  - Prealbumin down to 11  - LE edema has improved with furosemide which seems to help pain as well  - Hypoglycemia protocol  - NG tube placement per nutrition recs, adult formula drip feeding  - Monitor refeeding labs  - Nutritional goals  - Osmolite 1.5 Dez (or equivalent) @ goal of  55ml/hr  (1320ml/day)  - Initiate @ 10 ml/hr and advance by 10 ml q8hr as tolerated  - Do not start or advance until lytes (Mg++,K+) WNL and phos>2.0  - Recommend 30-60 ml q4hr fluid flushes for tube  patency. Additional fluids and/or adjustments per MD.    - Elevated HOB with gastric feeds   - Intake improvement vs nutrition support within 2-3 days.  - Total avg nutritional intake to meet a minimum of 30 kcal/kg and 1.2 g PRO/kg daily (per dosing wt 60 kg).  - Episode of emesis 11/25 resolved with decreasing drip speed, increasing again as tolerated   - NGT landmark changes 11/26, AXR showing the FT on proper position. Ordered FT replacement     # Chronic normocytic anemia  Hgb 7.2, MCV 97. Hgb baseline appears 7-8. Suspect anemia of chronic disease.   - Transfused 1 U pRBC pm 11/19 which stabilized Hgb to 8.4     # H/o HTN  PTA meds initially held due to hypotension on admission.   - Restarted PTA amlodipine 11/19  - Restarted PTA carvedilol 11/18  - Restarted PTA losartan 11/21       # Dementia  # Agitation  # Insomnia  Unclear patient's baseline. He is unable to answer many questions regarding timing of symptoms and medications. Wife, Violet, manages his medications and helps him make medical decisions.   - Continue PTA donepezil  - PRN olanzapine   - PRN melatonin  - CIWA protocol discontinued      Chronic  # T2DM: Last A1c 5.5% (10/17/23), hold PTA metformin  # CAD: Continue PTA atorvastatin, ASA  # TALA: CPAP discontinued due to non-use  # A Fib: Rate controlled, continue PTA Eliquis   # GERD: PTA pantoprazole  # Anxiety: PTA sertraline         Diet: Regular Diet Adult  Snacks/Supplements Adult: Other; Ensure Enlive for Breakfast, Boost Soothe for Lunch, Magic cup (orange if able) for Dinner.; With Meals  Adult Formula Drip Feeding: Continuous Osmolite 1.5; Nasogastric tube; Goal Rate: Start at 15 mL/hr and advance by 10 mL/hr Q8hrs until goal of 55 mL/hr is achieved.; mL/hr; Do not advance tube feeding rate unless K+ is = or > 3.0, Mg++ is = or > ...    DVT Prophylaxis: DOAC  Mercado Catheter: Not present  Fluids: None  Lines: PRESENT      PICC 11/11/23 Single Lumen Right Basilic Antibiotics. PICC was ready to  use.-Site Assessment: WDL      Cardiac Monitoring: None  Code Status: Full Code      Clinically Significant Risk Factors              # Hypoalbuminemia: Lowest albumin = 2.7 g/dL at 11/21/2023  5:24 AM, will monitor as appropriate     # Hypertension: Noted on problem list     # Dementia: noted on problem list     # Severe Malnutrition: based on nutrition assessment      # Financial/Environmental Concerns: none         Disposition Plan     Expected Discharge Date: 11/27/2023      Destination: home with family;home with help/services          The patient's care was discussed with the Attending Physician, Dr. Rivas .    KINGS GUERRERO  Medicine Service, [facility] TEAM 3  [facility]  Securely message with [employer] (more info)  Text page via [facility] Paging/Directory   See signed in provider for up to date coverage information  ______________________________________________________________________    Interval History   Mr. Engle was interviewed in his room. He was sitting comfortably in his chair getting dressed for the day. Endorses no changes over night. Denies fever, chills, headache, change in vision or hearing, chest pain or pressure, cough, abdominal pain, constipation, weakness or sensation changes of the extremities. Confirms he did have an episode of vomiting yesterday and some residual nausea last night. Informed that we turned the feeding tube rate down yesterday and are working to get it back up to goal speed. It is currently stopped due to landmark changes and he will receive an AXR to confirm correct placement. Also had some soft stools yesterday, but feels like that has resolved. States that pain is typically 8/10 but can get lower than that, really just comes and goes. Informed that he is building up LE edema again which we may treat with furosemide like before.     Physical Exam   Vital Signs: Temp: 97.9  F (36.6  C) Temp src: Oral BP: 102/41 Pulse: 67    Resp: 16 SpO2: 98 % O2 Device: None (Room air)    Weight: 136 lbs .38 oz    Constitutional: Awake, alert, cooperative, no apparent distress, and appears stated age.  Respiratory: No increased work of breathing, good air exchange, clear to auscultation bilaterally, no crackles or wheezing.  Cardiovascular: Normal apical impulse, regular rate and rhythm, normal S1 and S2, no S3 or S4, and no murmur noted.  Skin: Left foot with healing surgical incision with areas of erythema and necrosis. Foot stump appears edematous and is tender to the touch. Tenderness resolves near ankle.   Musculoskeletal: 2+ LE edema.   Neurologic: Oriented to person, place, and time.   Memory: Impaired for events during evenings.     Medical Decision Making       Please see A&P for additional details of medical decision making.      Data   ------------------------- PAST 24 HR DATA REVIEWED -----------------------------------------------    I have personally reviewed the following data over the past 24 hrs:    9.5  \   7.5 (L)   / 337     133 (L) 103 19.8 /  141 (H)   4.4; 4.4 23 0.63 (L) \       Imaging results reviewed over the past 24 hrs:   No results found for this or any previous visit (from the past 24 hour(s)).

## 2023-11-26 NOTE — PROGRESS NOTES
Patient has chest xray to verify the tube feeding placement and th e TF re-started at 12 noon at the rate of 45 ml an hour. MD aware. Patient is on regular diet but has a very low appetite.

## 2023-11-27 NOTE — PLAN OF CARE
[id number].A&Ox4-forgetful. Bed/chair alarm on. Consistently rating pain 9-10/10 this shift. PRN oxycodone and IV dilaudid x2 administered with some relief. Pre-medicated with IV dilaudid before LLE dressing changes. Denies nausea. NGT replaced around 5pm. TF restarted @ 10pm after confirmation of placement- currently infusing @ 45ml/hr with 60mls Q4 water flushes. PICC infusing tko. Voiding and having BMs. Up with assist of 2 with jem munson.Calls appropriately.Continue POC.

## 2023-11-27 NOTE — PROGRESS NOTES
Care Management Follow Up    Length of Stay (days): 9    Expected Discharge Date: 11/29/2023     Concerns to be Addressed: Discharge planning - tcu follow up  Patient plan of care discussed at interdisciplinary rounds: Yes    Anticipated Discharge Disposition: Transitional care     Anticipated Discharge Services: None  Anticipated Discharge DME:  (wound care)    Patient/family educated on Medicare website which has current facility and service quality ratings:  yes  Education Provided on the Discharge Plan:  n/a  Patient/Family in Agreement with the Plan: n/a    Referrals Placed by CM/SW:      [facility]  [address] [address]  P: [phone]  11/27: Left a voicemail asking for an update on pt referral. Left a callback number. Called back twice and still no answer. CHW to continue to follow up.    [facility]  [address], [place] [address]  P: [phone]  11/27: Left a voicemail asking for an update on pt referral. Left a callback number. Called back twice and still no answer. CHW to continue to follow up.    [facility]   [address] [place] [facility] [address]  P: [phone]  11/27: No beds available for the rest of the week and asked CHW to check in again on Monday, 12/4 and have the referral resent, just in case the pt still needs a bed.    [facility]  [address] [place] [address]  [phone]  11/27: Talked to Kika miller liaison, who said that she will look for facilities that can take pt's with NG tubes and will call back when she finds an availability.      Declined:  [facility] in [place]- no beds available      Private pay costs discussed: Not applicable    Additional Information:    CHW provides updates on referral follow-ups above.    CHW will continue to follow.    [email or web address]  [phone]

## 2023-11-27 NOTE — PLAN OF CARE
Goal Outcome Evaluation:      Plan of Care Reviewed With: patient, spouse    Outcome Evaluation: Goal rate for TF started at 0900H    - Alert and oriented, forgetful at times. Spouse at the bedside. Patient went outside with spouse to smoke less than 20 minutes only.  - Pain is managed with po oxycodone. Received IV dilaudid once prior it was discontinued this afternoon.  - Up with assist to SBA of 1 to assist of with walker and GB with transfer to wheelchair.  - TF now at goal rate of 55 ml/hr.   - PICC line right upper arm infusing TKO in between IV abx. PIV- SL.   - BLE with swelling, elevated right LE with pillow while in recliner.  - on Phos replacement, no need for replacement. Recheck in am.  - OVSS, afebrile, voiding and had BM this shift.    PLAN: Continue with the POC. Discharge to TCU per rehab recommendation tomorrow, if bed is available. TF management.

## 2023-11-27 NOTE — PROVIDER NOTIFICATION
"Provider Notification     Fariba Minaya paged via [employer] at 6939:   \"5224 R.M.    Hi, abdominal x-ray resulted at 1742. Please verify and place order that NG is ok to use.    Thanks,   Danelle\"       Response: patient care order placed.   "

## 2023-11-27 NOTE — PLAN OF CARE
Goal Outcome Evaluation:       /43 (BP Location: Left arm)   Pulse 63   Temp 97.9  F (36.6  C) (Oral)   Resp 18   Wt 61.7 kg (136 lb 0.4 oz)   SpO2 100%   BMI 21.30 kg/m          Pt is A&OX4 but can also be forgetful. VSS, pt on RA denies SOB. Oxy, tylenol and dilaudid for pain, pt uses bedside commode/ urinal for voiding and he is voiding adequately, no BM this shift, pt is on continues TF with TF running @45ML/hr and TF gold level is 55ml/hr. Pt has a NG tube for TF only, pt takes po med's orally. Pt uses easy stand X2 person to transfer. No new health concerns this shift, continues plan of care.

## 2023-11-27 NOTE — PROGRESS NOTES
[facility]    Progress Note - Medicine Service, MAROON TEAM 3       Date of Admission:  11/17/2023    Assessment & Plan   Darian Engle is a 73 year old male admitted on 11/17/2023 with recurrent left foot cellulitis. On cefazolin IV. No surgical intervention at this time per surgical consultants. NGT to be placed for feeds to start. Pending placement.     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 Gonzalez's note 11/17  - Not a candidate for any other vascular intervention due to the severity of his PAD, further management of lle to be determined by orthopedics.    # Recurrent left foot cellulitis  # S/p transmetatarsal amputation 2/2 wet gangrene (10/27/23) c/b staph aureus bacteremia   # PAD s/p left common femoral endarterectomy and balloon angioplasty with stent placement (10/24/23)  Pt presented with 2-3 day history of worsening foot pain with malaise and nausea. Foot appears slightly more swollen with some fluctuance and scant purulent discharge. Labs notable for elevated CRP, though trending down from last check on 11/15, and WBC 11.5, elevated from previous check. MRI on admission was negative for osteomyelitis. Vascular surgery was consulted and recommended broad spectrum abx and checking duplex ultrasound of both extremities. Pt has been on Cefazolin since 11/5/23 for staph aureus bacteremia which developed after the left TMA, following with infectious disease clinic. There are notes that he may have missed doses due to dementia impairing his ability to  manage his own medications and his wife getting sick. Placed on broad spectrum until 11/20 at which point ID recommended transition back to cefazolin.   - Vancomycin (11/18 - 11/20)   - Cefepime (11/18 - 11/20)  - Cefazolin (11/20 - *)  - Vascular surgery following, recs appreciated  - Duplex ultrasound of BLE with RHONDA is bilaterally non-compressible (> 1.4)  - Not a revascularization candidate, further management per podiatry  - ID consulted, recs appreciated              - Discontinue vancomycin + cefepime              - Restart cefazolin due to low concern of new infection but suspicion of compromised vascular supply, complete 4 week course as originally planned through 12/3              - No longer considering PO TMP-SMX due to RHONDA > 1.4  - Podiatry consulted, recs appreciated              - No IP intervention              - OP follow up w/ Dr. Thornton 11/28, changed to rounding by inpatient orthopedics team, as patient is still in the hospital.  - Blood cx pending   - Continue PTA ASA, atorvastatin, cilostazol  - Pain control:   - PTA Gabapentin 300mg TID  - Scheduled tylenol  - PRN oxycodone q3  - PRN hydromorphone for breakthrough pain discontinued today  - PICC line in RUE, CXR performed to ensure proper placement   - Has outpatient appointment with vascular as OP on 11/28, vascular surgery to meet with the patient today     # Malnutrition  #Pancreatic Mass  # LE edema  Likely 2/2 to 3rd spacing with malnutrition rather than HF. Endorses poor apetite since admission and has BGs down to 65. ECHO 11/08 with EF 60-65% which is baseline for him. No clinical signs of HF. Per note from Dr. Patel Ambrose 09/12/23, pt had stable appearance of complex cystic mass in the uncinate process of the pancreas. The stability and negative prior FNA is reassuring. However, persistent enhancing mural thickening is concerning for malignant degeneration into pancreatic adenocarcinoma. Differential includes a complex side branch  intraductal papillary mucinous neoplasm (IPMN) and serous cystic neoplasm. Recommended continued close imaging surveillance with follow-up MRI in 6 months. Per [facility] Dr. 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 FNA showed showed uncinate process cyst was negative for high-grade dysplasia and malignancy while pancreatic body cyst was non-diagnostic. Concluded stable IPMN with concerning nodularity, no symptoms, marginal surgical candidate. MRI in 6 months, eus in 12 months for surveillance. Prior biopsy dated 12/13/2022 showed no definitive high grade dysplasia of the pancreatic body cyst and low-grade mucinous neoplasia of the uncinate process of the pancreas. The pt was scheduled to follow up with Dr. Govind Ho (medical oncology/general surgery, see note from 01/09/2023) after March biopsy but uncertain if this occurred.   - Albumin down to 2.7 from baseline 3.1  - Prealbumin down to 11  - LE edema has improved with furosemide which seems to help pain as well  - Hypoglycemia protocol  - NG tube placement per nutrition recs, adult formula drip feeding  - Monitor refeeding labs  - Nutritional goals  - Osmolite 1.5 Dez (or equivalent) @ goal of 55ml/hr  (1320ml/day)  - Initiate @ 10 ml/hr and advance by 10 ml q8hr as tolerated  - Do not start or advance until lytes (Mg++,K+) WNL and phos>2.0  - Recommend 30-60 ml q4hr fluid flushes for tube patency. Additional fluids and/or adjustments per MD.    - Elevated HOB with gastric feeds   - Intake improvement vs nutrition support within 2-3 days.  - Total avg nutritional intake to meet a minimum of 30 kcal/kg and 1.2 g PRO/kg daily (per dosing wt 60 kg).  - Episode of emesis 11/25 resolved with decreasing drip speed, increasing again as tolerated   - NGT landmark changes 11/26, AXR showing the FT in proper position and restarted; however, NGT clogged and replaced pm 11/26     # Chronic normocytic  anemia  Hgb 7.2, MCV 97. Hgb baseline appears 7-8. Suspect anemia of chronic disease.   - Transfused 1 U pRBC pm 11/19 which stabilized Hgb to 8.4     # H/o HTN  PTA meds initially held due to hypotension on admission.   - Restarted PTA amlodipine 11/19  - Restarted PTA carvedilol 11/18  - Restarted PTA losartan 11/21       # Dementia  # Agitation  # Insomnia  Unclear patient's baseline. He is unable to answer many questions regarding timing of symptoms and medications. Wife, Violet, manages his medications and helps him make medical decisions.   - Continue PTA donepezil  - PRN olanzapine   - PRN melatonin  - CIWA protocol discontinued      Chronic  # T2DM: Last A1c 5.5% (10/17/23), hold PTA metformin  # CAD: Continue PTA atorvastatin, ASA  # TALA: CPAP discontinued due to non-use  # A Fib: Rate controlled, continue PTA apixaban    # GERD: PTA pantoprazole  # Anxiety: PTA sertraline         Diet: Regular Diet Adult  Snacks/Supplements Adult: Other; Ensure Enlive for Breakfast, Boost Soothe for Lunch, Magic cup (orange if able) for Dinner.; With Meals  Adult Formula Drip Feeding: Continuous Osmolite 1.5; Nasogastric tube; Goal Rate: Start at 15 mL/hr and advance by 10 mL/hr Q8hrs until goal of 55 mL/hr is achieved.; mL/hr; Do not advance tube feeding rate unless K+ is = or > 3.0, Mg++ is = or > ...    DVT Prophylaxis: DOAC  Mercado Catheter: Not present  Fluids: None  Lines: PRESENT      PICC 11/11/23 Single Lumen Right Basilic Antibiotics. PICC was ready to use.-Site Assessment: WDL      Cardiac Monitoring: None  Code Status: Full Code      Clinically Significant Risk Factors            # Hypomagnesemia: Lowest Mg = 1.6 mg/dL in last 2 days, will replace as needed   # Hypoalbuminemia: Lowest albumin = 2.7 g/dL at 11/21/2023  5:24 AM, will monitor as appropriate     # Hypertension: Noted on problem list     # Dementia: noted on problem list     # Severe Malnutrition: based on nutrition assessment      #  Financial/Environmental Concerns: none         Disposition Plan     Expected Discharge Date: 11/27/2023      Destination: home with family;home with help/services          The patient's care was discussed with the Attending Physician, Dr. Rivas .    [facility]  Medicine Service, FATIMAH TEAM [address] [place] [address]  Securely message with [employer] (more info)  Text page via [employer] Paging/Directory   See signed in provider for up to date coverage information  ______________________________________________________________________    Interval History   Mr. Engle was interviewed in his room. He was resting comfortably in his chair. No acute events over night. Pain around 7-8/10. Meds help but wear off before next round. Informed that he has hydromorphone q3hrs PRN and oxycodone q6hrs PRN. This was written down for him as he states he cannot remember that. Asked if weed can be brought to the hospital. Informed that weed cannot be smoked at the hospital; however, rules surrounding gummies have changed and to ask nurse about most updated rules. States that he has not had gummies but smoking weed helps with pain at home. Denies fever, chills, headache, lightheadedness or dizziness, change in hearing or vision, chest pain or pressure, abdominal pain, nausea or vomiting, change in urination, weakness or numbness of extremities, bruising or rashes. States that stools are small and loose. Had some bleeding from the nose with the constant changing of NGT but it clotted and resolved quickly. Informed that vasc surg will be coming by to evaluate him today. Has also been trying to drink some Ensure.     Team came by later to inform him that one barrier to discharge to a TCU is IV opioids which cannot be used 24 hours prior. He is still having pain but feels the opioids regardless of route, do provide relief. The decision was made to increase oxycodone to q3 and remove hydromorphone. Plan  for today is see vascular surgery and follow their recs.     Physical Exam   Vital Signs: Temp: 97.9  F (36.6  C) Temp src: Oral BP: 131/43 Pulse: 63   Resp: 18 SpO2: 100 % O2 Device: None (Room air)    Weight: 136 lbs .38 oz    Constitutional: Awake, alert, cooperative, no apparent distress, and appears stated age.  Respiratory: No increased work of breathing, good air exchange, clear to auscultation bilaterally, no crackles or wheezing.  Cardiovascular: Normal apical impulse, regular rate and rhythm, normal S1 and S2, no S3 or S4, and no murmur noted.  Skin: Left foot with healing surgical incision with areas of erythema and necrosis. Foot stump appears edematous and is tender to the touch. Tenderness resolves near ankle.   Musculoskeletal: 2+ LE edema.   Neurologic: Oriented to person, place, and time.   Memory: Impaired for events during evenings.     Medical Decision Making       Please see A&P for additional details of medical decision making.      Data   ------------------------- PAST 24 HR DATA REVIEWED -----------------------------------------------    I have personally reviewed the following data over the past 24 hrs:    7.7  \   7.5 (L)   / 358     134 (L) 103 19.6 /  149 (H)   4.7; 4.7 25 0.70 \       Imaging results reviewed over the past 24 hrs:   Recent Results (from the past 24 hour(s))   XR Abdomen Port 1 View    Narrative    EXAM: XR ABDOMEN PORT 1 VIEW  11/26/2023 9:42 AM     HISTORY:  Confirm G Tube placement (landmark changed)       TECHNIQUE: Single frontal radiograph of the abdomen    COMPARISON:  11/23/2023    FINDINGS:   Single AP portable supine view of the abdomen. Enteric tube is in the  stomach..    Air-filled loops of bowel in the right and left upper quadrant. Right  upper quadrant with bowel measuring up to 5.4 cm. Left upper quadrant  with bowel measuring up to 3.3 cm. No pneumatosis or portal venous  gas.      Impression    IMPRESSION:   1. Enteric tube tip and sidehole is in the  stomach  2. Similar appearance of air-filled loops of bowel throughout the  upper abdomen which are nonspecific but can be seen with ileus, or  obstruction.    I have personally reviewed the examination and initial interpretation  and I agree with the findings.    MELVINA IBARRA MD         SYSTEM ID:  [id number]   XR Abdomen 1 View    Narrative    EXAMINATION:  XR ABDOMEN 1 VIEW 11/26/2023     COMPARISON: Same day at 0833 hours.    HISTORY: NG tube placement.    TECHNIQUE: Frontal supine view of the abdomen.    FINDINGS: Enteric tube tip and sidehole project over the left upper  quadrant in the expected area of the stomach, slightly retracted from  prior evaluation. No abnormally dilated loops of bowel, free air, or  pneumatosis in the visualized abdomen.. No portal venous gas.  The  lung bases are unremarkable. Partially visualized median sternotomy  wires are intact.      Impression    IMPRESSION: Enteric tube tip and sidehole project over the expected  location of the stomach. Non-obstructed bowel gas pattern.    I have personally reviewed the examination and initial interpretation  and I agree with the findings.    MERCEDES LEDBETTER DO         SYSTEM ID:  [id number]

## 2023-11-28 NOTE — PLAN OF CARE
Goal Outcome Evaluation:    Time    /51 (BP Location: Left arm)   Pulse 64   Temp 97.9  F (36.6  C) (Oral)   Resp 16   Wt 61.7 kg (136 lb 0.4 oz)   SpO2 99%   BMI 21.30 kg/m      Reason for admission: recurrent left foot cellulitis   Activity: A2 with sarasteady, pt is on chair alarm.  Pain: 10/10 left foot pain, managed with Oxy  Neuro: Alert and oriented, intermittently forgetful  Cardiac: WNL, denies chest pain  Respiratory: denies SOB, no distress observed, on RA   GI/: no BM reported, voids spontaneously  Diet: Tube feeding, waiting for provider to review Xray and place order ok to use. Regular diet, able to get medication orally  Lines: single lumen picc, HL, cdi  Wounds: no new skin issue  Labs/imaging: please review lab in the chart      New changes this shift:     Plan: possible discharge 11/29, awaiting tcu placement.      Continue to monitor and follow POC

## 2023-11-28 NOTE — PROGRESS NOTES
Orthopaedic Surgery Progress Note 11/28/2023    S: Patient remains inpatient due to malnutrition and disposition planning. He reports 8/10 pain left foot.    O:  Temp: 99  F (37.2  C) Temp src: Oral BP: (!) 155/51 Pulse: 68   Resp: 16 SpO2: 96 % O2 Device: None (Room air)      Exam:  Gen: No acute distress, resting comfortably in bed.  Resp: Non-labored breathing  MSK:  LLE:     Media Information       Media Information       Media Information       Media Information        Recent Labs   Lab 11/28/23  0416 11/27/23  0607 11/26/23  0701   WBC 7.5 7.7 9.5   HGB 7.1* 7.5* 7.5*    358 337       Assessment: Darian Engle is a 73 year-old-male s/p  left femoral endarterectomy and profundoplasty and balloon angioplasty on 10/24/23 and left TMT amputation on 10/27/23 with Dr. Thornton complicated by recent admission for MSSA bacteremia with evidence of poor wound healing.    Vascular surgery with plan for AKA LLE later this week or next week given poor healing potential. Orthopedic surgery will sign off at this time. Please reach out with any further questions or concerns.    Patient discussed with Dr. Thornton.    Lui Haddad MD  Orthopaedic Surgery PGY-1  [phone]

## 2023-11-28 NOTE — PROGRESS NOTES
[facility]  Vascular Surgery Attending   Pt seen and examined.    35 Days postop: Left CFA Endarterectomy & Atherectomy SFA, popliteal and tibioperoneal trunk.      32 days postop Left  TMA.    Note readmission after few days when last discharged.  Did not receive hyperbaric oxygen.    The plantar surface side of the TMA now appears dusky cp when last seen by me 18 days ago. On the dorsal surface the area of duskiness is now a dry gangrenous eschar.    At this point , given the duskiness that has developed on the plantar side of the incision, the chances of limb salvage are extremely poor without any adjuncts like hyperbaric oxygen which has not occurred.  I advised them of the above and that I think chances of limb salvage are very poor. The patient and his wife accept this and are in agreement for major amputation.    I advised AKA as maximum likelihood of healing.    Please stop Eloquis- will schedule for Friday in OR for Left AKA if off eloquis.

## 2023-11-28 NOTE — PROGRESS NOTES
[facility]    Progress Note - Medicine Service, MARELODIA TEAM 3       Date of Admission:  11/17/2023    Assessment & Plan   Darian Engle is a 73 year old male admitted on 11/17/2023 with recurrent left foot cellulitis. On cefazolin IV. No surgical intervention at this time per surgical consultants. NGT to be placed for feeds to start. Pending placement.     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) c/b staph aureus bacteremia, PVD s/p endovascular stent (10/2023), A Fib on Eliquis, HTN, T2DM, CAD s/p CAB (2004), TALA, dementia     Today:  - Start low dose mirtazapine in an effort to wean off feeding tubes  - Awaiting orthopedics recommendations regarding left lower extremity stub management  - Otherwise medically ready for discharge    # Recurrent left foot cellulitis  # S/p transmetatarsal amputation 2/2 wet gangrene (10/27/23) c/b staph aureus bacteremia   # PAD s/p left common femoral endarterectomy and balloon angioplasty with stent placement (10/24/23)  Pt presented with 2-3 day history of worsening foot pain with malaise and nausea. Foot appears slightly more swollen with some fluctuance and scant purulent discharge. Labs notable for elevated CRP, though trending down from last check on 11/15, and WBC 11.5, elevated from previous check. MRI on admission was negative for osteomyelitis. Vascular surgery was consulted and recommended broad spectrum abx and checking duplex ultrasound of both extremities. Pt has been on Cefazolin since 11/5/23 for staph aureus bacteremia which developed after the left TMA, following with infectious disease clinic. There are notes that he may have missed doses due to dementia impairing his ability to manage his own medications and his wife getting sick. Placed on broad spectrum until 11/20 at which point ID recommended transition back to cefazolin.   - Vancomycin (11/18 - 11/20)   - Cefepime (11/18 -  11/20)  - Cefazolin (11/20 - 12/3)  - Vascular surgery following, recs appreciated  - Duplex ultrasound of BLE with RHONDA is bilaterally non-compressible (> 1.4)  - Not a revascularization candidate, further management per podiatry  - ID consulted, recs appreciated              - Discontinue vancomycin + cefepime              - Restart cefazolin due to low concern of new infection but suspicion of compromised vascular supply, complete 4 week course as originally planned through 12/3              - No longer considering PO TMP-SMX due to RHONDA > 1.4  - Podiatry consulted, recs appreciated              - No IP intervention              - OP follow up w/ Dr. Thornton 11/28, changed to rounding by inpatient orthopedics team, as patient is still in the hospital.  - Blood cx pending   - Continue PTA ASA, atorvastatin, cilostazol  - Pain control:   - PTA Gabapentin 300mg TID  - Scheduled tylenol  - PRN oxycodone q3  - PRN hydromorphone for breakthrough pain discontinued   - PICC line in RUE, CXR performed to ensure proper placement   - Has outpatient appointment with vascular as OP on 11/28, vascular surgery to meet with the patient today     # Malnutrition  #Pancreatic Mass  # LE edema  Likely 2/2 to 3rd spacing with malnutrition rather than HF. Endorses poor apetite since admission and has BGs down to 65. ECHO 11/08 with EF 60-65% which is baseline for him. No clinical signs of HF. Per note from Dr. Patel Ambrose 09/12/23, pt had stable appearance of complex cystic mass in the uncinate process of the pancreas. The stability and negative prior FNA is reassuring. However, persistent enhancing mural thickening is concerning for malignant degeneration into pancreatic adenocarcinoma. Differential includes a complex side branch intraductal papillary mucinous neoplasm (IPMN) and serous cystic neoplasm. Recommended continued close imaging surveillance with follow-up MRI in 6 months. Per [facility] Dr. 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 FNA showed showed uncinate process cyst was negative for high-grade dysplasia and malignancy while pancreatic body cyst was non-diagnostic. Concluded stable IPMN with concerning nodularity, no symptoms, marginal surgical candidate. MRI in 6 months, eus in 12 months for surveillance. Prior biopsy dated 12/13/2022 showed no definitive high grade dysplasia of the pancreatic body cyst and low-grade mucinous neoplasia of the uncinate process of the pancreas. The pt was scheduled to follow up with Dr. Govind Ho (medical oncology/general surgery, see note from 01/09/2023) after March biopsy but uncertain if this occurred.   - Albumin down to 2.7 from baseline 3.1  - Prealbumin down to 11  - LE edema has improved with furosemide which seems to help pain as well  - Hypoglycemia protocol  - NG tube placement per nutrition recs, adult formula drip feeding  - Monitor refeeding labs  - Nutritional goals  - Osmolite 1.5 Dez (or equivalent) @ goal of 55ml/hr  (1320ml/day)  - Initiate @ 10 ml/hr and advance by 10 ml q8hr as tolerated  - Do not start or advance until lytes (Mg++,K+) WNL and phos>2.0  - Recommend 30-60 ml q4hr fluid flushes for tube patency. Additional fluids and/or adjustments per MD.    - Elevated HOB with gastric feeds   - Intake improvement vs nutrition support within 2-3 days.  - Total avg nutritional intake to meet a minimum of 30 kcal/kg and 1.2 g PRO/kg daily (per dosing wt 60 kg).  - Episode of emesis 11/25 resolved with decreasing drip speed, increasing again as tolerated   - NGT landmark changes 11/26, AXR showing the FT in proper position and restarted; however, NGT clogged and replaced pm 11/26  - Patient and wife will try to wean off feeding tube. We will start low dose mirtazapine and monitor oral intake with the feeding tube in place as the patient tries to wean it off.     # Chronic normocytic anemia  Hgb  7.2, MCV 97. Hgb baseline appears 7-8. Suspect anemia of chronic disease.   - Transfused 1 U pRBC pm 11/19 which stabilized Hgb to 8.4     # H/o HTN  PTA meds initially held due to hypotension on admission.   - Restarted PTA amlodipine 11/19  - Restarted PTA carvedilol 11/18  - Restarted PTA losartan 11/21    - Holding PTA imdur     # Dementia  # Agitation  # Insomnia  Unclear patient's baseline. He is unable to answer many questions regarding timing of symptoms and medications. Wife, Violet, manages his medications and helps him make medical decisions.   - Continue PTA donepezil  - PRN olanzapine   - PRN melatonin  - CIWA protocol discontinued      Chronic  # T2DM: Last A1c 5.5% (10/17/23), hold PTA metformin  # CAD: Continue PTA atorvastatin, ASA  # TALA: CPAP discontinued due to non-use  # A Fib: Rate controlled, continue PTA apixaban    # GERD: PTA pantoprazole  # Anxiety: PTA sertraline         Diet: Regular Diet Adult  Snacks/Supplements Adult: Other; Ensure Enlive for Breakfast, Boost Soothe for Lunch, Magic cup (orange if able) for Dinner.; With Meals  Adult Formula Drip Feeding: Continuous Osmolite 1.5; Nasogastric tube; Goal Rate: Start at 15 mL/hr and advance by 10 mL/hr Q8hrs until goal of 55 mL/hr is achieved.; mL/hr; Do not advance tube feeding rate unless K+ is = or > 3.0, Mg++ is = or > ...    DVT Prophylaxis: DOAC  Mercado Catheter: Not present  Fluids: None  Lines: PRESENT      PICC 11/11/23 Single Lumen Right Basilic Antibiotics. PICC was ready to use.-Site Assessment: WDL      Cardiac Monitoring: None  Code Status: Full Code      Clinically Significant Risk Factors            # Hypomagnesemia: Lowest Mg = 1.6 mg/dL in last 2 days, will replace as needed   # Hypoalbuminemia: Lowest albumin = 2.7 g/dL at 11/21/2023  5:24 AM, will monitor as appropriate     # Hypertension: Noted on problem list     # Dementia: noted on problem list     # Severe Malnutrition: based on nutrition assessment    #  Financial/Environmental Concerns: none         Disposition Plan      Expected Discharge Date: 11/30/2023      Destination: home with family;home with help/services          The patient's care was discussed with the Attending Physician, Dr. Mcadams .    Keerthi Aceves MD  Medicine Service, [address]  Securely message with [employer] (more info)  Text page via [employer] Paging/Directory   See signed in provider for up to date coverage information  ______________________________________________________________________    Interval History   No acute events overnight. Yesterday's notes reviewed AM. Patient seen at bedside with wife. Expressed how much he dislikes the feeding tube and how he would prefer to support himself with oral intake alone. Otherwise pleasant and cooperative with cares.    Physical Exam   Vital Signs: Temp: 97.9  F (36.6  C) Temp src: Oral BP: 123/40 Pulse: 62   Resp: 16 SpO2: 98 % O2 Device: None (Room air)    Weight: 135 lbs 12.8 oz    Constitutional: Awake, alert, cooperative, no apparent distress, and appears stated age.  Respiratory: No increased work of breathing, good air exchange, clear to auscultation bilaterally, no crackles or wheezing.  Cardiovascular: Normal apical impulse, regular rate and rhythm, normal S1 and S2, no S3 or S4, and no murmur noted.  Skin: Left foot with healing surgical incision with areas of erythema and necrosis. Foot stump appears edematous and is tender to the touch. Tenderness resolves near ankle.   Musculoskeletal: 1+ LE edema   Neurologic: Oriented to person, place, and time.   Memory: Impaired for events during evenings.     Medical Decision Making       Please see A&P for additional details of medical decision making.      Data   ------------------------- PAST 24 HR DATA REVIEWED -----------------------------------------------    I have personally reviewed the following data over the past 24  hrs:    7.5  \   7.1 (L)   / 336     134 (L) 104 17.5 /  104 (H)   4.7; 4.7 25 0.63 (L) \       Imaging results reviewed over the past 24 hrs:   Recent Results (from the past 24 hour(s))   XR Abdomen 1 View    Narrative    EXAM: Abdominal radiograph 11/27/2023 5:58 PM    HISTORY: 73 years Male NG tube placement.    COMPARISON: Abdominal radiograph 11/26/2023.    TECHNIQUE: Single frontal semiupright view(s) of the abdomen.    FINDINGS:  Gastric tube tip and sidehole projected over the gastric lumen,  minimally advanced compared to the previous exam.     Nonobstructive bowel gas pattern. No obvious intra-abdominal free air.  No pneumatosis or portal venous gas. No suspicious abdominal  calcifications. Bony structures are intact. Soft tissues are  unremarkable.    Postoperative changes of the chest with fracture of the third most  superior sternotomy wire, stable. The partially visualized lungs are  clear. Right upper extremity PICC with distal tip terminating at the  cavoatrial junction. Cardiac stents are visualized. Atelectatic  basilar calcifications throughout the thoracic and abdominal aorta.  Surgical clip projecting over the left cardiophrenic region.      Impression    IMPRESSION: Enteric tube tip and sidehole projected over the gastric  lumen. Nonobstructive bowel gas pattern.    I have personally reviewed the examination and initial interpretation  and I agree with the findings.    MISBAH NUNES MD         SYSTEM ID:  [id number]

## 2023-11-28 NOTE — PLAN OF CARE
Goal Outcome Evaluation:  BP (!) 155/51 (BP Location: Left arm, Cuff Size: Adult Small)   Pulse 68   Temp 99  F (37.2  C) (Oral)   Resp 16   Wt 61.7 kg (136 lb 0.4 oz)   SpO2 96%   BMI 21.30 kg/m      Care from: [id number]    Intermittently forgetful, able to make needs known. Pain controlled with PRN oxy. Compazine given x1 for nausea, no emesis. Voiding via bedside urinal, no BM this shift. NGT placement verified via xray and provider okayed to use, tube feedings running at 55ml/hr. Regular diet, poor appetite. PICC and PIV SL. L foot dressing changed, CDI. In chair for most of shift, worked with PT. Plan to discharge to TCU. Call light within reach, continue with plan of care.

## 2023-11-28 NOTE — PLAN OF CARE
/46 (BP Location: Left arm)   Pulse 66   Temp 98.6  F (37  C) (Oral)   Resp 16   Wt 61.7 kg (136 lb 0.4 oz)   SpO2 100%   BMI 21.30 kg/m       Patient A&O x3, disoriented to situation, asked why he's in the hospital 3 times this shift. VSS on RA, no report of SOB. Pain managed with scheduled pain medications. NG pulled out 10cm from initial landmark, re-advanced to 64 and Xray to check for placement was completed at 1758, XRAY result pending at this time and patient's NG has been clamped since 1710. Patient was seen smoking cigarette in his room X2 this shift, and he was reminded that smoking in the room is not allowed. Patient can be disconnected from any running IV/tube feeding and can go out to smoke. Patient had nose bleed x2 this shift, provider ordered afrin. Left foot dressing change completed this shift and PICC saline locked. Patient awaiting TCU placement. Continue POC.

## 2023-11-28 NOTE — TELEPHONE ENCOUNTER
Reason for Call:  Form, our goal is to have forms completed with 72 hours, however, some forms may require a visit or additional information.    Type of letter, form or note:  Home Health Certification    Who is the form from?: [facility]    Where did the form come from: form was faxed in    What clinic location was the form placed at?: [place]    Where the form was placed: Given to physician    What number is listed as a contact on the form?:        Additional comments: Cert period 11/15/23-1/13/24    Please route to TC when complete    Call taken on 11/28/2023 at 6:48 AM by Agnela Grande

## 2023-11-28 NOTE — TELEPHONE ENCOUNTER
Faxed back to [facility] [employer] [facility]. Sent to scanning.    Angela Grande,    [facility], [facility]

## 2023-11-28 NOTE — PROVIDER NOTIFICATION
Provider Fariba gardiner    Pt had an x ray to confirm NG tube placement. Could you review and confirm if it is ok to use?    Shine Freeman RN on 11/28/2023 at 3:47 AM

## 2023-11-29 NOTE — PROGRESS NOTES
CLINICAL NUTRITION SERVICES - REASSESSMENT NOTE     Nutrition Prescription    RECOMMENDATIONS FOR MDs/PROVIDERS TO ORDER:   None at this time.     Malnutrition Status:   Severe malnutrition in the context of chronic illness.     Recommendations already ordered by Registered Dietitian (RD):   Discontinue Magic Cup     Future/Additional Recommendations:   Monitor weight/intake trends.      EVALUATION OF THE PROGRESS TOWARD GOALS   Diet: Regular    Snacks/Supplements: Ensure Enlive @ breakfast, Boost Soothe @ Lunch, Magic Cup @ Dinner.     Nutrition Support:   Osmolite 1.5 Dez (or equivalent) @ goal of  55ml/hr  (1320ml/day) provides: 1980 kcals, 83 g PRO, 1005 ml free H20, 268 g CHO, and 0 g fiber daily.     Intake: Per flowsheets, intake documentation likely missing some meals/snacks, but on average 0%-25% of meals consumed.      NEW FINDINGS   Visited with patient in room. Pt reported decreased appetite, but still focusing on eating at minimum 1-2 meals per day plus the scheduled supplements. Pt states that he does not like the Magic Cup and would like the order removed.     Nutrition/GI: Last BM noted today (11/29).   Skin: Moustapha score 17 with nutrition marked as adequate per flowsheets. Trace to moderate edema noted in flowsheets.     LABS   Labs Reviewed   11/28/23 04:16 11/29/23 05:36 11/29/23 09:55   Sodium 134 (L) 134 (L)    Potassium 4.7 4.9    Creatinine 0.63 (L) 0.63 (L)    GFR Estimate >90 >90    Calcium 8.3 (L) 8.2 (L)    Magnesium 1.7 1.7    Phosphorus 3.1 3.4    Glucose 104 (H) 160 (H)    Glucose by meter POCT   158 (H)   WBC 7.5 8.7    Hemoglobin 7.1 (L) 7.1 (L)    Platelet Count 336 341    MCV 96 95      MEDICATIONS   Medications Reviewed  - Atorvastatin  - Cefazolin  - Famotidine  - Folic Acid  - Intermittent Lasix  - Magnesium Sulfate  - Remeron  - Thera-Vit-M  - Protonix  - Senokot  - Zoloft  - Oxycodone PRN  - Compazine PRN    ANTHROPOMETRICS   Weight Trends:   Weight up since admission.   Pt  with 11.2 lb (7.6 %) weight loss over 1 month.      Weight trends this admission:   Date/Time Weight Weight Method   11/28/23 61.6 kg (135 lb 12.8 oz) Standing scale   11/25/23 61.7 kg (136 lb 0.4 oz) Bed scale   11/23/23 59.6 kg (131 lb 6.4 oz) Standing scale     Wt Readings from Last Encounters:   11/28/23 61.6 kg (135 lb 12.8 oz)   11/16/23 60.3 kg (133 lb)   11/10/23 63 kg (138 lb 14.4 oz)   10/30/23 66.7 kg (147 lb 0.8 oz)   07/31/23 68.5 kg (151 lb)   06/19/23 68.7 kg (151 lb 8 oz)   02/21/23 69.9 kg (154 lb 3.2 oz)   12/01/22 69.4 kg (153 lb)   09/08/22 67.9 kg (149 lb 12.8 oz)     MALNUTRITION   % Intake: Decreased intake does not meet criteria  % Weight Loss: > 5% in 1 month (severe)  Subcutaneous Fat Loss: Global Severe  Muscle Loss: Global Severe  Fluid Accumulation/Edema: Moderate  Malnutrition Diagnosis: Severe malnutrition in the context of chronic illness.     Previous Goals   Intake improvement vs nutrition support within 2-3 days.  Total avg nutritional intake to meet a minimum of 30 kcal/kg and 1.2 g PRO/kg daily (per dosing wt 60 kg).  Evaluation: Met    Previous Nutrition Diagnosis   Inadequate oral intake related to decreased appetite as evidenced by pt self report of decreased appetite impacting intake.   Evaluation: No change    CURRENT NUTRITION DIAGNOSIS   Inadequate oral intake related to decreased appetite as evidenced by pt self report of decreased appetite impacting intake.     INTERVENTIONS   Implementation   Discontinue Magic Cup     Goals   Total avg nutritional intake to meet a minimum of 30 kcal/kg and 1.2 g PRO/kg daily (per dosing wt 60 kg).    Monitoring/Evaluation   Progress toward goals will be monitored and evaluated per protocol.    Katerina Kincaid RD, LD   Available on ipsy and Pager  5A [phone]) + 6A pager [phone]  Weekend pager [phone]

## 2023-11-29 NOTE — PROGRESS NOTES
[facility]    Medicine Progress Note - Medicine Service, FATIMAH TEAM 3    Date of Admission:  11/17/2023    Assessment & Plan   Darian Engle is a 73 year old male admitted on 11/17/2023 with recurrent left foot cellulitis. On cefazolin IV. No surgical intervention at this time per surgical consultants. NGT to be placed for feeds to start. Pending placement.     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) c/b staph aureus bacteremia, PVD s/p endovascular stent (10/2023), A Fib on Eliquis, HTN, T2DM, CAD s/p CAB (2004), TALA, dementia     Today:  - Start low dose mirtazapine in an effort to wean off tube feeds, GOC discussion 11/28 and pt would defer a PEG until further notice  - Okay to remove NG today  - Vasc surg planning AKA later this week / early next week, holding ASA and apixaban    # Recurrent left foot cellulitis  # S/p transmetatarsal amputation 2/2 wet gangrene (10/27/23) c/b staph aureus bacteremia   # PAD s/p left common femoral endarterectomy and balloon angioplasty with stent placement (10/24/23)  Pt presented with 2-3 day history of worsening foot pain with malaise and nausea. Foot appears slightly more swollen with some fluctuance and scant purulent discharge. Labs notable for elevated CRP, though trending down from last check on 11/15, and WBC 11.5, elevated from previous check. MRI on admission was negative for osteomyelitis. Vascular surgery was consulted and recommended broad spectrum abx and checking duplex ultrasound of both extremities. Pt has been on Cefazolin since 11/5/23 for staph aureus bacteremia which developed after the left TMA, following with infectious disease clinic. There are notes that he may have missed doses due to dementia impairing his ability to manage his own medications and his wife getting sick. Placed on broad spectrum until 11/20 at which point ID recommended transition back to cefazolin.   -  Vancomycin (11/18 - 11/20)   - Cefepime (11/18 - 11/20)  - Cefazolin (11/20 - 12/3)  - Vascular surgery following, recs appreciated  - Duplex ultrasound of BLE with RHONDA is bilaterally non-compressible (> 1.4)  - Not a revascularization candidate, further management per podiatry  - ID consulted, recs appreciated              - Discontinue vancomycin + cefepime              - Restart cefazolin due to low concern of new infection but suspicion of compromised vascular supply, complete 4 week course as originally planned through 12/3              - No longer considering PO TMP-SMX due to RHONDA > 1.4  - Podiatry consulted, recs appreciated              - No IP intervention              - OP follow up w/ Dr. Thornton 11/28, changed to rounding by inpatient orthopedics team, as patient is still in the hospital.  - Blood cx NGTD   - Continue PTA ASA, atorvastatin, cilostazol  - Pain control:   - PTA Gabapentin 300mg TID  - Scheduled tylenol  - PRN oxycodone q3  - PRN hydromorphone for breakthrough pain discontinued   - PICC line in RUE, CXR performed to ensure proper placement   - Has outpatient appointment with vascular as OP on 11/28, vascular surgery to meet with the patient today     # Malnutrition  #Pancreatic Mass  # LE edema  Likely 2/2 to 3rd spacing with malnutrition rather than HF. Endorses poor apetite since admission and has BGs down to 65. ECHO 11/08 with EF 60-65% which is baseline for him. No clinical signs of HF. Per note from Dr. Patel Ambrose 09/12/23, pt had stable appearance of complex cystic mass in the uncinate process of the pancreas. The stability and negative prior FNA is reassuring. However, persistent enhancing mural thickening is concerning for malignant degeneration into pancreatic adenocarcinoma. Differential includes a complex side branch intraductal papillary mucinous neoplasm (IPMN) and serous cystic neoplasm. Recommended continued close imaging surveillance with follow-up MRI in 6 months. Per  [facility] Dr. 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 FNA showed showed uncinate process cyst was negative for high-grade dysplasia and malignancy while pancreatic body cyst was non-diagnostic. Concluded stable IPMN with concerning nodularity, no symptoms, marginal surgical candidate. MRI in 6 months, eus in 12 months for surveillance. Prior biopsy dated 12/13/2022 showed no definitive high grade dysplasia of the pancreatic body cyst and low-grade mucinous neoplasia of the uncinate process of the pancreas. The pt was scheduled to follow up with Dr. Govind Ho (medical oncology/general surgery, see note from 01/09/2023) after March biopsy but uncertain if this occurred.   - Albumin down to 2.7 from baseline 3.1  - Prealbumin down to 11  - LE edema has improved with furosemide which seems to help pain as well  - Hypoglycemia protocol  - NG tube placement per nutrition recs, adult formula drip feeding  - Monitor refeeding labs  - Nutritional goals  - Osmolite 1.5 Dez (or equivalent) @ goal of 55ml/hr  (1320ml/day)  - Initiate @ 10 ml/hr and advance by 10 ml q8hr as tolerated  - Do not start or advance until lytes (Mg++,K+) WNL and phos>2.0  - Recommend 30-60 ml q4hr fluid flushes for tube patency. Additional fluids and/or adjustments per MD.    - Elevated HOB with gastric feeds   - Intake improvement vs nutrition support within 2-3 days.  - Total avg nutritional intake to meet a minimum of 30 kcal/kg and 1.2 g PRO/kg daily (per dosing wt 60 kg).  - Episode of emesis 11/25 resolved with decreasing drip speed, increasing again as tolerated   - NGT landmark changes 11/26, AXR showing the FT in proper position and restarted; however, NGT clogged and replaced pm 11/26  - Patient and wife will try to wean off feeding tube. We will start low dose mirtazapine and monitor oral intake with the feeding tube in place as the patient tries to wean it off.      # Chronic normocytic anemia  Hgb 7.2, MCV 97. Hgb baseline appears 7-8. Suspect anemia of chronic disease.   - Transfused 1 U pRBC pm 11/19 which stabilized Hgb to 8.4     # H/o HTN  PTA meds initially held due to hypotension on admission.   - Restarted PTA amlodipine 11/19  - Restarted PTA carvedilol 11/18  - Restarted PTA losartan 11/21    - Holding PTA imdur     # Dementia  # Agitation  # Insomnia  Unclear patient's baseline. He is unable to answer many questions regarding timing of symptoms and medications. Wife, Violet, manages his medications and helps him make medical decisions.   - Continue PTA donepezil  - PRN olanzapine   - PRN melatonin  - CIWA protocol discontinued      Chronic  # T2DM: Last A1c 5.5% (10/17/23), hold PTA metformin  # CAD: Continue PTA atorvastatin, ASA  # TALA: CPAP discontinued due to non-use  # A Fib: Rate controlled, continue PTA apixaban    # GERD: PTA pantoprazole  # Anxiety: PTA sertraline           Diet: Regular Diet Adult  Snacks/Supplements Adult: Other; Ensure Enlive for Breakfast, Boost Soothe for Lunch, Magic cup (orange if able) for Dinner.; With Meals  Adult Formula Drip Feeding: Continuous Osmolite 1.5; Nasogastric tube; Goal Rate: Start at 15 mL/hr and advance by 10 mL/hr Q8hrs until goal of 55 mL/hr is achieved.; mL/hr; Do not advance tube feeding rate unless K+ is = or > 3.0, Mg++ is = or > ...    DVT Prophylaxis: Hold for pre-op planning (usually on DOAC)  Mercado Catheter: Not present  Lines: PRESENT      PICC 11/11/23 Single Lumen Right Basilic Antibiotics. PICC was ready to use.-Site Assessment: WDL      Cardiac Monitoring: None  Code Status: Full Code      Clinically Significant Risk Factors              # Hypoalbuminemia: Lowest albumin = 2.7 g/dL at 11/21/2023  5:24 AM, will monitor as appropriate     # Hypertension: Noted on problem list     # Dementia: noted on problem list     # Severe Malnutrition: based on nutrition assessment    # Financial/Environmental  Concerns: none         Disposition Plan     Expected Discharge Date: 11/30/2023      Destination: home with family;home with help/services              Pedro Mcadams, DO  Medicine Service, MAROON TEAM 3  [facility]  Securely message with [employer] (more info)  Text page via [facility] Paging/Directory   See signed in provider for up to date coverage information  ______________________________________________________________________    Interval History   Pt today states he feels a bit better. He thinks he ate better, maybe the mirtazapine helped. Understands the plan for amputation and continuing abx until then.     Physical Exam   Vital Signs: Temp: 98  F (36.7  C) Temp src: Oral BP: 124/47 Pulse: 73   Resp: 16 SpO2: 99 % O2 Device: None (Room air)    Weight: 135 lbs 12.8 oz    Gen: No acute distress, non-toxic, alert  CV: Regular rate, regular rhythm  Pulm: Clear to auscultate bilaterally, breathing non-labored  Abd: Non-tender, non-distended  Ext: Moves all extremities, non-cyanotic, +1 pitting edema  Psych: Normal mood and affect    Medical Decision Making           Data     I have personally reviewed the following data over the past 24 hrs:    8.7  \   7.1 (L)   / 341     134 (L) 103 20.5 /  158 (H)   4.9 24 0.63 (L) \       Lab Results   Component Value Date    WBC 8.7 11/29/2023    HGB 7.1 (L) 11/29/2023    HCT 21.7 (L) 11/29/2023     11/29/2023     (L) 11/29/2023    POTASSIUM 4.9 11/29/2023    CHLORIDE 103 11/29/2023    CO2 24 11/29/2023    BUN 20.5 11/29/2023    CR 0.63 (L) 11/29/2023     (H) 11/29/2023    SED 52 (H) 11/17/2023    AST 26 11/15/2023    ALT 5 11/15/2023    ALKPHOS 91 11/15/2023    BILITOTAL 0.3 11/15/2023    INR 1.68 (H) 11/05/2023       Imaging results reviewed over the past 24 hrs:   No results found for this or any previous visit (from the past 24 hour(s)).

## 2023-11-29 NOTE — PROGRESS NOTES
VASCULAR SURGERY PROGRESS NOTE    Subjective:  Up in chair, with rehab.    Objective:  Intake/Output Summary (Last 24 hours) at 11/29/2023 1147  Last data filed at 11/29/2023 0257  Gross per 24 hour   Intake 755 ml   Output 650 ml   Net 105 ml     Labs:  ROUTINE IP LABS (Last four results)  BMP  Recent Labs   Lab 11/29/23  0955 11/29/23  0536 11/28/23  0416 11/27/23  1836 11/27/23  0607 11/26/23  1816 11/26/23  0701   NA  --  134* 134*  --  134*  --  133*   POTASSIUM  --  4.9 4.7  4.7 4.8 4.7  4.7   < > 4.4  4.4   CHLORIDE  --  103 104  --  103  --  103   SHERYL  --  8.2* 8.3*  --  8.3*  --  8.1*   CO2  --  24 25  --  25  --  23   BUN  --  20.5 17.5  --  19.6  --  19.8   CR  --  0.63* 0.63*  --  0.70  --  0.63*   * 160* 104*  --  149*  --  141*    < > = values in this interval not displayed.     CBC  Recent Labs   Lab 11/29/23  0536 11/28/23  0416 11/27/23  0607 11/26/23  0701   WBC 8.7 7.5 7.7 9.5   RBC 2.29* 2.24* 2.41* 2.40*   HGB 7.1* 7.1* 7.5* 7.5*   HCT 21.7* 21.5* 23.3* 22.7*   MCV 95 96 97 95   MCH 31.0 31.7 31.1 31.3   MCHC 32.7 33.0 32.2 33.0   RDW 14.3 14.4 14.3 14.2    336 358 337     INRNo lab results found in last 7 days.  PHYSICAL EXAM:  /40   Pulse 73   Temp 98  F (36.7  C) (Oral)   Resp 16   Wt 61.6 kg (135 lb 12.8 oz)   SpO2 99%   BMI 21.27 kg/m    General: The patient is alert and oriented. Appropriate. No acute distress  Psych: pleasant affect, answers questions appropriately  Skin: Color appropriate for race, warm, dry.  Respiratory: The patient does not require supplemental oxygen. Breathing unlabored  GI:  Abdomen soft, nontender to light palpation.  Incision: Left foot dressing clean, intact, with necrosis on Left dorsal foot.     ASSESSMENT / PLAN:  Darian Engle is a 73 year-old-male s/p  left femoral endarterectomy and profundoplasty and balloon angioplasty on 10/24/23 and left TMT amputation on 10/27/23 with Dr. Thornton complicated by recent admission for MSSA  bacteremia with evidence of poor wound healing. With challenging social support, inability to access to hyperbaric oxygen therapy or received IV antibiotics at home. Previously declined TCU. Now planned for left AKA.  -Scheduled for OR on Friday, December 1, second case.  Preop orders placed.  -N.p.o. tomorrow after midnight for OR  -Continue to hold Eliquis  -Given ongoing diarrhea, C. difficile is pending. Of note, patient has had multiple bouts of diarrhea over the last few months. If patient has positive C. difficile, we will plan for guillotine amputation of left foot (on Friday December 1st) requiring further left AKA when clear of infection. If C. difficile negative, we will proceed as planned with left AKA.    Discussed pt history, exam, assessment and plan with Dr. Felix of the vascular surgery service, who is in agreement with the above.    Roxann Fontanez [facility]  Vascular Surgery  Pager: [phone]  [email or web address]  Send message or 10 digit call back number Securely via [employer] with the [employer] Web Console (learn more here)

## 2023-11-29 NOTE — PROVIDER NOTIFICATION
Provider Raghu Red MD     FYI  pt had 3 loose stool within one hour    Shine Freeman RN on 11/29/2023 at 4:44 AM

## 2023-11-29 NOTE — PLAN OF CARE
Goal Outcome Evaluation:      Plan of Care Reviewed With: patient    Overall Patient Progress: no change    Outcome Evaluation: AKA planned for Fri 12/1 on OR. Pt conitnues to c/o 10/10 foot pain, PRN oxy to some relief. Dressing for LLE due to be changed, awaiting supplies to arrive on unit. Poor appetite, tolerating TF at 45 mL/hr at goal rate. R/o for c diff d/t 3 loose stools overnight. Writer confirmed with team that testing was needed with lack of other clinical symptoms, team clarified that they want test d/t long term antibiotic treatment. 1 BM on shift, sample collected and sent to lab. Pt to transfer to  this afternoon. Report given to Humera GARVEY RN.

## 2023-11-29 NOTE — PROGRESS NOTES
Vascular surgery attending  [facility]  Patient seen and examined this morning with NP    Appears comfortable and conversational  Noted ongoing diarrhea; RN note from this morning regarding collecting stool sample for C. Difficile.    Plan for above-knee amputation on Friday 1st December.      However if his C. difficile stool sample comes back positive then we will plan for open BKA and defer AKA until his C. difficile is treated; he would be able to have a wound VAC placed and can go to rehab in the interim if this occurs.    However he appears to have chronic diarrhea from his pancreatic issues and I doubt the stool sample will be positive for C. Difficile.    Note Eliquis on hold  Please do not hold aspirin and please continue to give daily aspirin    Hemoglobin 7.1.  For 2 units typed and cross please.

## 2023-11-30 NOTE — PLAN OF CARE
Goal Outcome Evaluation:         Pt is alert and oriented. Forgetful. Assistive x 2. Room air. VSS. Takes PO meds fine. Pt was stating that he was having leg pain. Administered prn oxycodone.  Some relief.Administered scheduled IV Abx.. No Bm this shift. Bed alarm on. Tube feeding running at 45 ml/hr. Pt got a commode near bedside and uses bedside urinal. Continue POC.

## 2023-11-30 NOTE — PROGRESS NOTES
Elmer  Vascular Surgery Attending  Pt seen and examined.    Note negative C. Diff result  Afebrile, Vitals stable    Due for Left AKA tomorrow.  I discussed with patient and his wife was on speaker phone: both in agreement and undertand risks/benefits.  Consent signed by patient who appeared to understand, howver given his forgetfulness / ?dementia telephone consent was also taken on the same form from his wife- witnessed by his RN Kika.    Note he has received 1 unit prbc today. And due for repeat cbc thsi eveniong.  No labs required tomorrow am.    NPO except meds from midnight

## 2023-11-30 NOTE — CONSULTS
Admission/Transfer from: 5A transfer  2 RN skin assessment completed. Yes, Jennifer HILL RN  Significant findings include:     -L foot wound    -Bilateral scab on heals    -Scabs on top of R toes    -Bilateral shin scabs/open sores    -Sacrum dark in color but blanchable redness, small hole on sacrum.     -L Groin site- stiches    -Bilateral Elbow scabs.  [facility] Nurse Consult Ordered? Yes

## 2023-11-30 NOTE — PROGRESS NOTES
[facility]    Progress Note - Medicine Service, FATIMAH TEAM 3       Date of Admission:  11/17/2023    Assessment & Plan   Darian Engle is a 73 year old male admitted on 11/17/2023 with recurrent left foot cellulitis. On cefazolin IV. Planned AKA amputation tomorrow from vascular surgery due to non-healing wound with increased pain needs.     Changes and major things today:  - s/p two pRBC units prior to surgery  - NG tube removed   - NPO at MN for AKA tomorrow    # Recurrent left foot cellulitis  # S/p transmetatarsal amputation 2/2 wet gangrene (10/27/23) c/b staph aureus bacteremia   # PAD s/p left common femoral endarterectomy and balloon angioplasty with stent placement (10/24/23)  Pt presented with 2-3 day history of worsening foot pain with malaise and nausea. Foot appears slightly more swollen with some fluctuance and scant purulent discharge. Labs notable for elevated CRP, though trending down from last check on 11/15, and WBC 11.5, elevated from previous check. MRI on admission was negative for osteomyelitis. Vascular surgery was consulted and recommended broad spectrum abx and checking duplex ultrasound of both extremities. Pt has been on Cefazolin since 11/5/23 for staph aureus bacteremia which developed after the left TMA, following with infectious disease clinic. There are notes that he may have missed doses due to dementia impairing his ability to manage his own medications and his wife getting sick. Placed on broad spectrum until 11/20 at which point ID recommended transition back to cefazolin.   - Vancomycin (11/18 - 11/20)   - Cefepime (11/18 - 11/20)  - Cefazolin (11/20 - 12/3)  - Vascular surgery following, recs appreciated  - Duplex ultrasound of BLE with RHONDA is bilaterally non-compressible (> 1.4)  - Plan for above the knee amputation surgery planned for tomorrow, 12/1/23   Holding apixaban   NPO at midnight except meds   Hgb optimization  before surgery, 2 pRBCs ordered  - ID consulted, recs appreciated              - Discontinue vancomycin + cefepime              - Restart cefazolin due to low concern of new infection but suspicion of compromised vascular supply, complete 4 week course as originally planned through 12/3              - No longer considering PO TMP-SMX due to RHONDA > 1.4  - Podiatry consulted, recs appreciated              - Management by vascular  - Continue PTA ASA, atorvastatin, cilostazol  - Pain control:   - PTA Gabapentin 300mg TID  - Scheduled tylenol  - PRN oxycodone q3  - PICC line in RUE, CXR performed to ensure proper placement      # Malnutrition  #Pancreatic Mass  # LE edema  Likely 2/2 to 3rd spacing with malnutrition rather than HF. Endorses poor apetite since admission and has BGs down to 65. ECHO 11/08 with EF 60-65% which is baseline for him. No clinical signs of HF. Per note from Dr. Patel Ambrose 09/12/23, pt had stable appearance of complex cystic mass in the uncinate process of the pancreas. The stability and negative prior FNA is reassuring. However, persistent enhancing mural thickening is concerning for malignant degeneration into pancreatic adenocarcinoma. Differential includes a complex side branch intraductal papillary mucinous neoplasm (IPMN) and serous cystic neoplasm. Recommended continued close imaging surveillance with follow-up MRI in 6 months. Per [facility] Dr. 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 FNA showed showed uncinate process cyst was negative for high-grade dysplasia and malignancy while pancreatic body cyst was non-diagnostic. Concluded stable IPMN with concerning nodularity, no symptoms, marginal surgical candidate. MRI in 6 months, eus in 12 months for surveillance. Prior biopsy dated 12/13/2022 showed no definitive high grade dysplasia of the pancreatic body cyst and low-grade mucinous neoplasia of the uncinate  process of the pancreas. The pt was scheduled to follow up with Dr. Govind Ho (medical oncology/general surgery, see note from 01/09/2023) after March biopsy but uncertain if this occurred. Feeding tubes tried while inpatient, ultimately opted to switch back to oral intake with mirtazapine use as an appetite stimulant.  - NGT removed on 11/30  - mirtazapine 15mg at bedtime  - continue to monitor oral intake     # Chronic normocytic anemia  Hgb 7.2, MCV 97. Hgb baseline appears 7-8. Suspect anemia of chronic disease.   - Transfused 1 U pRBC pm 11/19 which stabilized Hgb to 8.4     # H/o HTN  PTA meds initially held due to hypotension on admission.   - Restarted PTA amlodipine 11/19  - Restarted PTA carvedilol 11/18  - Restarted PTA losartan 11/21    - Holding PTA imdur     # Dementia  # Agitation  # Insomnia  Unclear patient's baseline. He is unable to answer many questions regarding timing of symptoms and medications. Wife, Violet, manages his medications and helps him make medical decisions.   - Continue PTA donepezil  - PRN olanzapine   - PRN melatonin  - CIWA protocol discontinued      Chronic  # T2DM: Last A1c 5.5% (10/17/23), hold PTA metformin  # CAD: Continue PTA atorvastatin, ASA  # TALA: CPAP discontinued due to non-use  # A Fib: Rate controlled, continue PTA apixaban    # GERD: PTA pantoprazole  # Anxiety: PTA sertraline         Diet: Regular Diet Adult  Snacks/Supplements Adult: Other; Ensure Enlive for Breakfast, Boost Soothe for Lunch.; With Meals  Adult Formula Drip Feeding: Continuous Osmolite 1.5; Nasogastric tube; Goal Rate: 55; mL/hr; Do not advance tube feeding rate unless K+ is = or > 3.0, Mg++ is = or > 1.5, and Phos is = or > 1.9  NPO for Medical/Clinical Reasons Except for: Meds    DVT Prophylaxis: Pneumatic Compression Devices  Mercado Catheter: Not present  Fluids: None  Lines: PRESENT      PICC 11/11/23 Single Lumen Right Basilic Antibiotics. PICC was ready to use.-Site Assessment: WDL       Cardiac Monitoring: None  Code Status: Full Code      Clinically Significant Risk Factors            # Hypomagnesemia: Lowest Mg = 1.4 mg/dL in last 2 days, will replace as needed   # Hypoalbuminemia: Lowest albumin = 2.7 g/dL at 11/21/2023  5:24 AM, will monitor as appropriate     # Hypertension: Noted on problem list     # Dementia: noted on problem list     # Severe Malnutrition: based on nutrition assessment    # Financial/Environmental Concerns: none         Disposition Plan      Expected Discharge Date: 12/06/2023      Destination: home with family;home with help/services  Discharge Comments: Dipo: TCU  Plan: 12/1 amputation        The patient's care was discussed with the Attending Physician, Dr. Lin .    Keerthi Aceves MD  Medicine Service, [address]  Securely message with [employer] (more info)  Text page via [employer] Paging/Directory   See signed in provider for up to date coverage information  ______________________________________________________________________    Interval History   No acute events overnight, yesterday's notes reviewed AM. Patient cooperative and pleasant, feels his pain is well controlled with current oral regimen. Agreeable to NPO status at midnight.    Physical Exam   Vital Signs: Temp: 98.7  F (37.1  C) Temp src: Oral BP: (!) 114/37 (map 89) Pulse: 64   Resp: 16 SpO2: 100 % O2 Device: None (Room air)    Weight: 135 lbs 12.8 oz    Constitutional: Awake, alert, cooperative, no apparent distress, and appears stated age.  Respiratory: No increased work of breathing, good air exchange, clear to auscultation bilaterally, no crackles or wheezing.  Cardiovascular: Normal apical impulse, regular rate and rhythm, normal S1 and S2, no S3 or S4, and no murmur noted.  Skin: Left foot with healing surgical incision with areas of erythema and necrosis. Foot stump appears edematous and is tender to the touch. Tenderness resolves  near ankle.   Musculoskeletal: 1+ LE edema   Neurologic: Oriented to person, place, and time.   Memory: Impaired for events during evenings.     Medical Decision Making       Please see A&P for additional details of medical decision making.      Data   ------------------------- PAST 24 HR DATA REVIEWED -----------------------------------------------    I have personally reviewed the following data over the past 24 hrs:    7.8  \   7.7 (L)   / 342     136 104 15.9 /  140 (H)   5.3 27 0.56 (L) \       Imaging results reviewed over the past 24 hrs:   No results found for this or any previous visit (from the past 24 hour(s)).

## 2023-11-30 NOTE — PLAN OF CARE
Goal Outcome Evaluation:      Plan of Care Reviewed With: patient    Overall Patient Progress: no changeOverall Patient Progress: no change    Outcome Evaluation: Pt transfered from 5A. VSS on RA. Complains of leg pain- PRN oxy given x1. Denies nausea. Regular diet, poor po intake. TF @ 45 ml/hr, pt did not want to have TF advanced to goal. Dressing C/D/I. PICC- TKO inbetween abx. Plan for AKA on Friday 12/1.

## 2023-11-30 NOTE — PHARMACY-ADMISSION MEDICATION HISTORY
Pharmacist Admission Medication History    Admission medication history is complete. The information provided in this note is only as accurate as the sources available at the time of the update.    Information Source(s): Patient, Hospital records, and [facility] via N/A    Pertinent Information: Frandy has been admitted since 11/17/23 and had just discharge from his previous hospital stay on 11/13. All medications were ordered to continue on discharge from his last visit, and the only new medications were antibiotics which would have run their course previous to today's date.     Changes made to PTA medication list:  Added: None  Deleted: None  Changed: None    Medication Affordability:       Allergies reviewed with patient and updates made in EHR: no - done on previous admission(s)    Medication History Completed By: Donovan Francois [facility] 11/30/2023 3:55 PM    PTA Med List   Medication Sig Last Dose    acetaminophen (TYLENOL) 325 MG tablet Take 3 tablets (975 mg) by mouth every 8 hours Do this for the first few days postoperatively, as pain improves, can transition to taking 1-2 tabs q4-6 hours as needed.     amLODIPine (NORVASC) 10 MG tablet Take 1 tablet (10 mg) by mouth daily     apixaban ANTICOAGULANT (ELIQUIS ANTICOAGULANT) 5 MG tablet Take 1 tablet (5 mg) by mouth 2 times daily     aspirin 81 MG tablet Take 81 mg by mouth daily     atorvastatin (LIPITOR) 40 MG tablet Take 1 tablet (40 mg) by mouth daily For cholesterol.     carvedilol (COREG) 25 MG tablet Take 25 mg by mouth 2 times daily     cholecalciferol ( VITAMIN D3) 50 MCG (2000 UT) CAPS Take 2 capsules by mouth daily     cilostazol (PLETAL) 100 MG tablet Take 100 mg by mouth 2 times daily     famotidine (PEPCID) 10 MG tablet Take 1 tablet (10 mg) by mouth 2 times daily     ferrous sulfate (FEROSUL) 325 (65 Fe) MG tablet Take 1 tablet (325 mg) by mouth daily (with breakfast) Iron supplement     gabapentin (NEURONTIN) 100 MG capsule Take 1  capsule (100 mg) by mouth 3 times daily     losartan (COZAAR) 50 MG tablet Take 2 tablets (100 mg) by mouth daily     magnesium oxide 200 MG TABS Take 200 mg by mouth daily     melatonin 1 MG TABS tablet Take 1 tablet (1 mg) by mouth nightly as needed for sleep     metFORMIN (GLUCOPHAGE XR) 500 MG 24 hr tablet TAKE 1 TABLET(500 MG) BY MOUTH TWICE DAILY WITH MEALS     multivitamin (CENTRUM SILVER) tablet Take 1 tablet by mouth daily     nitroGLYcerin (NITROSTAT) 0.4 MG sublingual tablet PLACE 1 TABLET UNDER THE TONGUE EVERY 5 MINUTES FOR 3 DOSES, IF SYMPTOMS PERSIST 5 MINUTES AFTER 1ST DOSE CALL 911     Omega-3 Fatty Acids (FISH OIL OMEGA-3) 1000 MG CAPS Take 1,000 mg by mouth daily     omeprazole (PRILOSEC) 20 MG DR capsule Take 1 capsule (20 mg) by mouth daily     ondansetron (ZOFRAN ODT) 4 MG ODT tab Take 1 tablet (4 mg) by mouth every 6 hours as needed for nausea or vomiting     oxyCODONE (ROXICODONE) 5 MG tablet Take 1-2 tablets (5-10 mg) by mouth every 6 hours as needed for moderate pain     polyethylene glycol (MIRALAX) 17 GM/Dose powder Take 17 g by mouth daily     senna-docusate (SENOKOT-S/PERICOLACE) 8.6-50 MG tablet Take 1 tablet by mouth 2 times daily While taking narcotic pain medications (oxycodone)     sertraline (ZOLOFT) 25 MG tablet TAKE ONE TABLET BY MOUTH DAILY FOR DEPRESSION AND TO HELP APPETITE     spironolactone (ALDACTONE) 25 MG tablet Take 25 mg by mouth daily     tiZANidine (ZANAFLEX) 2 MG tablet Take 1 tablet (2 mg) by mouth 3 times daily as needed for muscle spasms     traZODone (DESYREL) 50 MG tablet Take 1 tablet (50 mg) by mouth at bedtime

## 2023-11-30 NOTE — OP NOTE
Date of surgery: 17 October 2023 (NOTE PRIOR ADMISSION)    Location: Operating room, [facility]    Surgeon: Dr Felix    Assistant: Dr Batista    Anesthesiologist: Staff anesthesia    Anesthesia: Local anesthesia and sedation    Pre-op diagnosis: Left lower extremity atherosclerosis with gangrene    Postop diagnosis: Same    Procedure:  Left brachial artery retrograde sheath placement  Placement of catheter in infrarenal aorta  Second-order catheter placement in left external iliac artery  Radiological supervision interpretation of aorta and bilateral iliofemoral arteries  Radiological supervision and interpretation of left lower extremity angiogram    Access: Ultrasound-guided left retrograde brachial artery  Closure: Digital compression    Indication: This is a 73-year-old gentleman who I seen in the outpatient clinic where he presented for a second opinion for gangrene of his left foot.  He had been seen at another hospital in the [facility] and advised primary above-knee amputation and sought a second opinion here at the [facility].  The patient has bilateral nonpalpable femoral pulses.  He has had a CTA evaluation that shows disease in the bilateral common femoral arteries.  However the CTA was inadequate to accurately delineate the extent of his left tibial disease in order to plan if he has any options for a surgical bypass.  He is therefore brought for left brachial access and angiographic evaluation for diagnostic purposes to evaluate his target vessels.  Informed consent was obtained from the patient with his wife present with the risks fully discussed including but not limited to access site hematoma that may require possible operative intervention, renal failure and distal embolization.    Procedure: The patient was brought to the operating room, placed supine on the table and his left arm was abducted on an armboard.  The left antecubital region was  prepped and draped in sterile fashion.  Timeout was performed with the anesthetic and nursing staff.  Sedation was administered by anesthesia.  Access was obtained following the instillation of subcutaneous lidocaine in the left antecubital fossa with ultrasound-guided access of the brachial artery using a micropuncture kit in a retrograde direction..  The micropuncture wire was noted under ultrasound to be in the brachial artery and following the placement of the micropuncture sheath this was exchanged under fluoroscopy over a wire for a 5 Uzbek sheath.  A Glidewire was then advanced followed by an angled glide cath to position the catheter in the descending thoracic aorta and subsequently parked in the infrarenal aorta.  Hand-held contrast was injected in the distal aorta that delineated the aortic bifurcation.  The wire was then advanced into the left external iliac artery followed by placement of the glide cath in the left external iliac artery.  Hand-held contrast was then injected for segmental evaluation of the left lower extremity.  The findings will be described below.  No intervention was undertaken.  The catheter was then pulled back into the left common iliac artery from with contrast was hand injected.  The catheter was then pulled back into the infrarenal aorta and hand-held contrast injected for angiographic evaluation of the aortoiliac segments.  The catheter was then pulled back over the wire.  Hand-held contrast was injected via the sheath confirming flow into the radial and ulnar arteries.  The catheter was then flushed with heparinized saline.  This was then removed and digital compression applied for hemostasis.  This was noted to be intact after approximately 15 minutes.  A 4 x 4 and Tegaderm dressing was then placed followed by a stiff backboard and Kerlix wrap to prevent flexion extension at the elbow.  There was maintained radial artery signal as per preop status.  The patient tolerated  procedure and was transferred to the recovery room in stable condition.    Radiological supervision interpretation of aorta by iliac angiogram: The infrarenal aorta is patent with no evidence of stenosis and is noted to be heavily calcified.  The bilateral common iliac arteries are patent with no evidence of stenosis and are again heavily calcified.  The left external iliac artery is patent with no evidence of stenosis    Radiological supervision and interpretation of left lower extremity angiogram:  The proximal common femoral artery has approximately a focal 70% area of stenosis, it is patent thereafter however just before the bifurcation it is focally occluded.  The origin of the SFA is occluded.  There appears to be a stent of some 5 cm that is occluded in the proximal SFA.  The profunda femoris artery is patent and is a well-developed vessel.  The SFA is occluded in the proximal and mid one third portions.  There does appear to be reconstitution of flow in the distal SFA however this appears heavily calcified and diseased.  There appears to be a stent in the distal SFA/proximal popliteal artery which is diseased.  The popliteal artery appears patent but heavily diseased.  The anterior tibial artery appears occluded.  The tibioperoneal trunk appears patent but stenosed.  The posterior tibial artery is patent however heavily calcified with few focal areas of significant stenosis.  At the ankle the PT is calcified however flows into the plantar artery which ends at the midfoot.  There is a peroneal artery that appears the dominant tibial vessel that is patent throughout its length however diffusely over small caliber.  There is no filling of contrast into the arch of the foot.

## 2023-11-30 NOTE — CONSULTS
[facility] Nurse Inpatient Assessment     Consulted for: Sacrum    Patient History (according to provider note(s):      Darian Engle is a 73 year-old-male s/p  left femoral endarterectomy and profundoplasty and balloon angioplasty on 10/24/23 and left TMT amputation on 10/27/23 with Dr. Thornton complicated by recent admission for MSSA bacteremia with evidence of poor wound healing. With challenging social support, inability to access to hyperbaric oxygen therapy or received IV antibiotics at home. Previously declined TCU. Now planned for left AKA.  -Scheduled for OR on Friday, December 1, second case.  Preop orders placed.  -N.p.o. tomorrow after midnight for OR  -Continue to hold Eliquis  -Given ongoing diarrhea, C. difficile is pending. Of note, patient has had multiple bouts of diarrhea over the last few months. If patient has positive C. difficile, we will plan for guillotine amputation of left foot (on Friday December 1st) requiring further left AKA when clear of infection. If C. difficile negative, we will proceed as planned with left AKA.    Assessment:      Areas visualized during today's visit: Sacrum/Coccyx, BL buttocks    Wound location: R) buttock        Last photo: 11/30  Wound due to: Friction  Wound history/plan of care: Pt had been incontinent at times. Able to shift weight from side to side.  Wound base: 100 % superficial scab, sacrococcygeal area with blanchable erythema     Palpation of the wound bed: normal      Drainage: none     Description of drainage: none     Measurements (length x width x depth, in cm): 0.3  x 0.5  x  0.0 cm      Tunneling: N/A     Undermining: N/A  Periwound skin: Intact and Erythema- blanchable      Color: normal and consistent with surrounding tissue      Temperature: normal   Odor: none  Pain: denies , none  Pain interventions prior to dressing change: N/A  Treatment goal: Heal   STATUS: initial assessment and  healing  Supplies ordered: discussed with RN and discussed with patient            Treatment Plan:     R) buttock-   Cleanse the area with NS and pat dry.  Apply No sting film barrier to periwound skin.  Cover wound with Sacral Mepilex (#[id number])  Change dressing Q 3 days.  Turn and reposition Q 2hrs side to side only.  Ensure pt has Pal-cushion while sitting up in the chair.  FYI- If pt has constant incontinent loose stools needing dressing changes Q shift please discontinue the Mepilex dressing and apply criticaid barrier paste BID and PRN.      Orders: Reviewed    RECOMMEND PRIMARY TEAM ORDER: None, at this time-   Education provided: plan of care and wound progress  Discussed plan of care with: Patient and Family  [facility] nurse follow-up plan: signing off  Notify WOC if wound(s) deteriorate.  Nursing to notify the Provider(s) and re-consult the WO Nurse if new skin concern.    DATA:     Current support surface: Standard  Standard gel/foam mattress (IsoFlex, Atmos air, etc)  Containment of urine/stool: Continent of bowel  BMI: Body mass index is 21.27 kg/m .   Active diet order: Orders Placed This Encounter      Regular Diet Adult      NPO for Medical/Clinical Reasons Except for: No Exceptions     Output: I/O last 3 completed shifts:  In: 1045 [I.V.:10]  Out: 1150 [Urine:1150]     Labs:   Recent Labs   Lab 11/30/23  1354 11/30/23  0541   HGB 7.7* 7.0*   WBC  --  7.8     Pressure injury risk assessment:   Sensory Perception: 3-->slightly limited  Moisture: 4-->rarely moist  Activity: 2-->chairfast  Mobility: 2-->very limited  Nutrition: 3-->adequate  Friction and Shear: 2-->potential problem  Moustapha Score: 16    Shani Cruz RN   Pager no longer is use, please contact through [employer] group: [facility] Nurse east   Dept. Office Number: [phone]

## 2023-12-01 NOTE — ANESTHESIA PREPROCEDURE EVALUATION
Anesthesia Pre-Procedure Evaluation    Patient: Darian Engle   MRN: [id number] : 1950        Procedure : Procedure(s):  LEFT AMPUTATION, ABOVE KNEE          Past Medical History:   Diagnosis Date    Arthritis 2018    joint pain both hands    Diabetes (H)     Heart disease     [place]-angioplasty    Hypertension       Past Surgical History:   Procedure Laterality Date    AMPUTATE TOE(S) Left 10/26/2023    Procedure: Amputate toe(s), all transmetatarsal amputation;  Surgeon: Jerardo Thornton MD;  Location: [facility]    ANGIOGRAM Left 10/17/2023    Procedure: Left lower extremity angiogram via Left brachial artery approach;  Surgeon: Chuck Felix MBBS;  Location: [facility]    ANGIOGRAM Left 10/24/2023    Procedure: ANGIOGRAM;  Surgeon: Chuck Felix MBBS;  Location: [facility]    ANGIOPLASTY Left 10/24/2023    Procedure: ANGIOPLASTY, Left Lower Extremity atherectomy;  Surgeon: Chuck Felix MBBS;  Location: [facility]    CARDIAC SURGERY  [address] [place] [address]    ENDARTERECTOMY FEMORAL Left 10/24/2023    Procedure: ENDARTERECTOMY, FEMORAL;  Surgeon: Chuck Felix MBBS;  Location: [facility]    ENHANCE LASER REFRACTIVE BILATERAL EXISTING PT IN PARAMETERS      EYE SURGERY      [facility]    IR OR ANGIOGRAM  10/17/2023    IR OR ANGIOGRAM  10/24/2023    VASCULAR SURGERY      [facility]      Allergies   Allergen Reactions    Lidocaine Other (See Comments)     Lungs filled with fluid. ? Anaphylaxis    Lisinopril Cough     cough    Naltrexone Nausea and Vomiting      Social History     Tobacco Use    Smoking status: Every Day     Packs/day: 0.50     Years: 5.00     Additional pack years: 0.00     Total pack years: 2.50     Types: Cigarettes     Start date: 1968     Last attempt to quit: 7/15/2016     Years since quittin.3    Smokeless tobacco: Former   Substance Use Topics    Alcohol use: Yes     Comment: binge, two months sober       Wt Readings from Last 1 Encounters:   11/28/23 61.6 kg (135 lb 12.8 oz)        Anesthesia Evaluation   Pt has had prior anesthetic. Type: General.    History of anesthetic complications       ROS/MED HX  ENT/Pulmonary:     (+) sleep apnea,                                      Neurologic:     (+)   dementia,                             Cardiovascular:     (+)  hypertension- -  CAD angina-  - -   Taking blood thinners Pt has received instructions: Instructions Given to patient: Carlos.                                 METS/Exercise Tolerance:     Hematologic:       Musculoskeletal:       GI/Hepatic: Comment: Pancreatic mass    (+) GERD, Asymptomatic on medication,                  Renal/Genitourinary:       Endo:     (+) type I DM,                     Psychiatric/Substance Use:     (+) psychiatric history other (comment) (Dementia) alcohol abuse      Infectious Disease: Comment: Gangrene lower extremity  Broad spectrum AB      Malignancy:  - neg malignancy ROS     Other:            Physical Exam    Airway        Mallampati: III   TM distance: > 3 FB   Neck ROM: limited   Mouth opening: > 3 cm    Respiratory Devices and Support         Dental       (+) Multiple visibly decayed, broken teeth      Cardiovascular   cardiovascular exam normal          Pulmonary   pulmonary exam normal                OUTSIDE LABS:  CBC:   Lab Results   Component Value Date    WBC 7.5 12/01/2023    WBC 7.8 11/30/2023    HGB 9.3 (L) 12/01/2023    HGB 7.7 (L) 11/30/2023    HCT 27.5 (L) 12/01/2023    HCT 21.9 (L) 11/30/2023     12/01/2023     11/30/2023     BMP:   Lab Results   Component Value Date     12/01/2023     11/30/2023    POTASSIUM 5.2 12/01/2023    POTASSIUM 5.3 11/30/2023    CHLORIDE 105 12/01/2023    CHLORIDE 104 11/30/2023    CO2 26 12/01/2023    CO2 27 11/30/2023    BUN 12.6 12/01/2023    BUN 15.9 11/30/2023    CR 0.58 (L) 12/01/2023    CR 0.56 (L) 11/30/2023    GLC 80 12/01/2023    GLC 86 12/01/2023      COAGS:   Lab Results   Component Value Date    PTT 37 12/01/2023    INR 1.18 (H) 12/01/2023    FIBR 542 (H) 12/01/2023     POC:   Lab Results   Component Value Date     (H) 04/19/2018     HEPATIC:   Lab Results   Component Value Date    ALBUMIN 2.7 (L) 11/21/2023    PROTTOTAL 6.0 (L) 11/15/2023    ALT 5 11/15/2023    AST 26 11/15/2023    ALKPHOS 91 11/15/2023    BILITOTAL 0.3 11/15/2023     OTHER:   Lab Results   Component Value Date    PH 7.37 10/24/2023    LACT 0.8 11/17/2023    A1C 5.5 10/17/2023    SHERYL 9.0 12/01/2023    PHOS 4.0 12/01/2023    MAG 1.8 12/01/2023    TSH 1.03 09/08/2022    SED 52 (H) 11/17/2023       Anesthesia Plan    ASA Status:  4    NPO Status:  NPO Appropriate    Anesthesia Type: General.     - Airway: ETT   Induction: Intravenous.   Maintenance: Balanced.   Techniques and Equipment:     - Lines/Monitors: 2nd IV, Arterial Line     - Blood: T&S     Consents    Anesthesia Plan(s) and associated risks, benefits, and realistic alternatives discussed. Questions answered and patient/representative(s) expressed understanding.     - Discussed:     - Discussed with:  Patient      - Extended Intubation/Ventilatory Support Discussed: Yes.      - Patient is DNR/DNI Status: No     Use of blood products discussed: Yes.     - Discussed with: Patient.     - Consented: consented to blood products     Postoperative Care       PONV prophylaxis: Ondansetron (or other 5HT-3), Dexamethasone or Solumedrol     Comments:              PAC Discussion and Assessment    ASA Classification: 3    Anesthetic techniques and relevant risks discussed: GA  Invasive monitoring and risk discussed: Yes    Possibility and Risk of blood transfusion discussed: Yes  NPO instructions given: NPO after midnight    Needs early admission to pre-op area: No                                            Catrachita Aguilar MD    I have reviewed the pertinent notes and labs in the chart from the past 30 days and (re)examined the  patient.  Any updates or changes from those notes are reflected in this note.

## 2023-12-01 NOTE — PROGRESS NOTES
Post Op Check    12/01/2023    Darian Engle is a 73 year old male with h/o severe PAD, LLE cellulitis and necrotic non-healing wound now POD#0 s/p L AKA.    Pt reports feeling great, no pain. Denies SOB, chest pain, or dizziness.    BP (!) 160/59   Pulse 64   Temp 98.7  F (37.1  C) (Oral)   Resp 12   Wt 61.6 kg (135 lb 12.8 oz)   SpO2 100%   BMI 21.27 kg/m      Gen: A&O x3, NAD  Chest: breathing non-labored  Abdomen: soft, non-tender, non-distended  Incision: clean, dry, intact, ACE wrap secured with primapore tape, no strikethrough blood on ACE. Left groin incision with sutures intact, see uploaded pictures.   Extremities: warm and well perfused    A/P: No acute post-op issues. Continue plan of care per primary team.   - Do not change L AKA dressing until Monday 12/4/23 - to be changed by vascular surgery only  - From a vascular surgery perspective, please start the process for transfer to rehab on Monday 12/4/23  - Resume Eliquis Monday 12/4/23   - Activity up to chair, as tolerated, no need for bedrest  - PT/OT for recovering given new L AKA and rehab requirements    Please call with any questions.    Roxann Fontanez CNP  Vascular Surgery  Pager: [phone]  [email or web address]  Send message or 10 digit call back number Securely via [place] with the Vocera Web Console (learn more here)

## 2023-12-01 NOTE — ANESTHESIA PROCEDURE NOTES
Sciatic Procedure Note    Pre-Procedure   Staff -        Anesthesiologist:  Deacon Murphy MD       Resident/Fellow: Franco Ortiz MD       Performed By: resident       Location: pre-op       Procedure Start/Stop Times: 12/1/2023 7:20 AM and 12/1/2023 7:25 AM       Pre-Anesthestic Checklist: patient identified, IV checked, site marked, risks and benefits discussed, informed consent, monitors and equipment checked, pre-op evaluation, at physician/surgeon's request and post-op pain management  Timeout:       Correct Patient: Yes        Correct Procedure: Yes        Correct Site: Yes        Correct Position: Yes        Correct Laterality: Yes        Site Marked: Yes  Procedure Documentation  Procedure: Sciatic       Diagnosis: POST OPERATIVE PAIN       Laterality: left       Patient Position: prone       Patient Prep/Sterile Barriers: sterile gloves, mask       Skin prep: Chloraprep (gluteal approach).       Needle Type: short bevel       Needle Gauge: 21.        Needle Length (millimeters): 110        Ultrasound guided       1. Ultrasound was used to identify targeted nerve, plexus, vascular marker, or fascial plane and place a needle adjacent to it in real-time.       2. Ultrasound was used to visualize the spread of anesthetic in close proximity to the above referenced structure.       3. A permanent image is entered into the patient's record.    Assessment/Narrative         The placement was negative for: blood aspirated, painful injection and site bleeding       Paresthesias: No.       Bolus given via needle..        Secured via.        Insertion/Infusion Method: Single Shot       Complications: none       Injection made incrementally with aspirations every 5 mL.    Medication(s) Administered   Bupivacaine 0.5% PF (Infiltration) - Infiltration   20 mL - 12/1/2023 7:20:00 AM  Medication Administration Time: 12/1/2023 7:20 AM      FOR [facility] ([facility]) ONLY:   Pain Team Contact information: please  "page the Pain Team Via [facility]. Search \"Pain\". During daytime hours, please page the attending first. At night please page the resident first.      "

## 2023-12-01 NOTE — PLAN OF CARE
[id number] A/O x4; forgetful at times. Stable on RA. No acute transfusion reaction with first unit of PRB. Second unit started and no acute reaction within the first 15 min. Low diastolic pressure continue; MD notified and aware; not a new problem, encouraged better oral intake. NG removed. Pt stating that overall food not tasting like it normally does; encouraged to drink  water and had pt brush teeth. Continue to encourage frequent oral cares. Add biotene PRN? Voiding well in urinal. Pivot to commode and sat in chair this evening. NPO at midnight for procedure. Bed alarm on and call light within reach. Q1hr rounding completed [id number].

## 2023-12-01 NOTE — PROVIDER NOTIFICATION
Paged Dr. Carmona: please place transfer order in. Thanks mary [phone]: [place] [phone] Darian Engle: Please place transfer order in for this pt thanks Mary [phone]

## 2023-12-01 NOTE — ANESTHESIA CARE TRANSFER NOTE
Patient: Darian Engle    Procedure: Procedure(s):  LEFT AMPUTATION, ABOVE KNEE       Diagnosis: Wound infection [T14.8XXA, L08.9]  PAD (peripheral artery disease) (H24) [I73.9]  Vasculopathy [I99.9]  Diagnosis Additional Information: No value filed.    Anesthesia Type:   General     Note:    Oropharynx: oropharynx clear of all foreign objects and spontaneously breathing  Level of Consciousness: awake and drowsy  Oxygen Supplementation: nasal cannula  Level of Supplemental Oxygen (L/min / FiO2): 4  Independent Airway: airway patency satisfactory and stable  Dentition: dentition unchanged  Vital Signs Stable: post-procedure vital signs reviewed and stable  Report to RN Given: handoff report given  Patient transferred to: PACU    Handoff Report: Identifed the Patient, Identified the Reponsible Provider, Reviewed the pertinent medical history, Discussed the surgical course, Reviewed Intra-OP anesthesia mangement and issues during anesthesia, Set expectations for post-procedure period and Allowed opportunity for questions and acknowledgement of understanding      Vitals:  Vitals Value Taken Time   /64 12/01/23 1100   Temp     Pulse 64 12/01/23 1101   Resp 20 12/01/23 1101   SpO2 100 % 12/01/23 1101   Vitals shown include unfiled device data.    Electronically Signed By: ADIEL Harmon CRNA  December 1, 2023  11:02 AM

## 2023-12-01 NOTE — PLAN OF CARE
Goal Outcome Evaluation:       Pt is alert and oriented. Assistive x 2. On room air. Takes meds PO. Pt was stating he was having some leg pain. Administered oxycodone. Some relief. Bed alarm on for safety. Meplix on sacrum. Has 1 PICC single lumen TKO.Uses bed side commode and urinal. Pt has scheduled AKA at 730 in the morning.Continue POC

## 2023-12-01 NOTE — OR NURSING
This RN entered patient pre-op room as transport was dropping off patient via wheelchair.  As this RN entered, the patient was getting up from wheelchair unassisted by transporter and immediately fell from standing onto his right side.  Unable to break fall as transporter, cart and wheelchair blocking access to patient.  Patient denies any pain on his right side after fall, only left leg pain which is baseline.  He confirms he did not hit his head.  Assisted up to cart with help of two RN's.  Dr. Red with [facility] service notified, states no further interventions needed.

## 2023-12-01 NOTE — OR NURSING
Dr. Ortiz notified of pt's SBP >160. Pt did not receive morning BP meds but floor nurse stated she will give them when he gets back there. No new orders placed

## 2023-12-01 NOTE — ANESTHESIA PROCEDURE NOTES
Airway       Patient location during procedure: OR       Procedure Start/Stop Times: 12/1/2023 8:06 AM  Staff -        Anesthesiologist:  Catrachita Aguilar MD       CRNA: Shobha Patino APRN CRNA       Performed By: CRNA  Consent for Airway        Urgency: elective  Indications and Patient Condition       Indications for airway management: gris-procedural       Induction type:intravenous       Mask difficulty assessment: 2 - vent by mask + OA or adjuvant +/- NMBA    Final Airway Details       Final airway type: endotracheal airway       Successful airway: ETT - single  Endotracheal Airway Details        ETT size (mm): 7.5       Cuffed: yes       Successful intubation technique: direct laryngoscopy       DL Blade Type: MAC 3       Grade View of Cords: 1       Adjucts: stylet       Position: Right       Measured from: lips       Secured at (cm): 24       Bite block used: None    Post intubation assessment        Placement verified by: capnometry, equal breath sounds and chest rise        Number of attempts at approach: 1       Number of other approaches attempted: 0       Secured with: tape       Ease of procedure: easy       Dentition: Intact and Unchanged    Medication(s) Administered   Medication Administration Time: 12/1/2023 8:06 AM

## 2023-12-01 NOTE — ANESTHESIA PROCEDURE NOTES
Femoral Procedure Note    Pre-Procedure   Staff -        Anesthesiologist:  Deacon Murphy MD       Resident/Fellow: Franco Ortiz MD       Performed By: anesthesiologist       Location: pre-op       Procedure Start/Stop Times: 12/1/2023 6:50 AM and 12/1/2023 7:15 AM       Pre-Anesthestic Checklist: patient identified, IV checked, site marked, risks and benefits discussed, informed consent, monitors and equipment checked, pre-op evaluation, at physician/surgeon's request and post-op pain management  Timeout:       Correct Patient: Yes        Correct Procedure: Yes        Correct Site: Yes        Correct Position: Yes        Correct Laterality: Yes        Site Marked: Yes  Procedure Documentation  Procedure: Femoral       Diagnosis: POST OPERATIVE PAIN CONTROL       Laterality: left       Patient Position: supine       Patient Prep/Sterile Barriers: sterile gloves, mask, patient draped       Skin prep: Chloraprep       Needle Type: insulated       Needle Gauge: 17.        Needle Length (millimeters): 80           Catheter threaded easily.           Threaded 10 cm at skin.         Ultrasound guided       1. Ultrasound was used to identify targeted nerve, plexus, vascular marker, or fascial plane and place a needle adjacent to it in real-time.       2. Ultrasound was used to visualize the spread of anesthetic in close proximity to the above referenced structure.       3. A permanent image is entered into the patient's record.    Assessment/Narrative         The placement was negative for: blood aspirated, painful injection and site bleeding       Paresthesias: No.       Bolus given via catheter..        Secured via Tegaderm and Dermabond.        Insertion/Infusion Method: Continuous Infusion       Complications: none       Injection made incrementally with aspirations every 3 mL.    Medication(s) Administered   Bupivacaine 0.5% PF (Infiltration) - Infiltration   10 mL - 12/1/2023 6:50:00 AM  Medication  "Administration Time: 12/1/2023 6:50 AM     Comments:  2 attempts- on initial needle placement, some heme return but could not aspirate, catheter easily threaded, negative for aspiration. When attempted to inject, resistance met. Catheter withdrawn and noted to be kinked at the tip which was intact. On second attempt, negative needle aspiration and catheter threaded easily once again. Negative aspiration, and easy injection w/ fluid surrounding what is believed to be femoral nerve on ultrasound visualization. No hematoma or further issues. Patient tolerated procedure well. No immediate complications. Pt w/ sensory changes to ice and some motor change prior to OR. Images saved into epic. Assisted by Dr. Ortiz as above.       FOR [facility] ([facility]) ONLY:   Pain Team Contact information: please page the Pain Team Via [employer]. Search \"Pain\". During daytime hours, please page the attending first. At night please page the resident first.      "

## 2023-12-01 NOTE — BRIEF OP NOTE
[facility]    Brief Operative Note    Pre-operative diagnosis: Wound infection [T14.8XXA, L08.9]  PAD (peripheral artery disease) (H24) [I73.9]  Vasculopathy [I99.9]  Post-operative diagnosis Same as pre-operative diagnosis    Procedure: LEFT AMPUTATION, ABOVE KNEE, Left - Leg    Surgeon: Surgeon(s) and Role:     * Chuck Felix MBBS - Primary     * Shelly Carmona MD - Assisting  Anesthesia: General with Block   Estimated Blood Loss: 50 ml    Drains: None  Specimens:   ID Type Source Tests Collected by Time Destination   1 : Left above the knee amputation Amputation, Non-Traumatic Leg, Above Knee, Left SURGICAL PATHOLOGY EXAM Chuck Felix MBBS 12/1/2023  9:33 AM      Findings:   Healthy tissue above left knee   Complications: None.  Implants: * No implants in log *    Plan  - CLD, advanced diet as tolerated  - LR 50 ml/hr until adequate PO intake   - Okay to start ASA, continue to hold Eliquis   - CBC, BMP tomorrow   - Cont ancef for 2 more doses post-op

## 2023-12-01 NOTE — CONSULTS
PM&R faculty note:    A 73 year old man with multiple co-morbidities admitted for AKA.    Thank you for allowing us to participate in his care.     PM&R will evaluate the patient post op to optimize his amputation rehabilitation    .Ebony Monroy    Spasticity    [facility]

## 2023-12-01 NOTE — ANESTHESIA POSTPROCEDURE EVALUATION
Patient: Darian Engle    Procedure: Procedure(s):  LEFT AMPUTATION, ABOVE KNEE       Anesthesia Type:  General    Note:  Disposition: Admission; Inpatient   Postop Pain Control: Uneventful            Sign Out: Well controlled pain   PONV: No   Neuro/Psych: Uneventful            Sign Out: Acceptable/Baseline neuro status   Airway/Respiratory: Uneventful            Sign Out: Acceptable/Baseline resp. status   CV/Hemodynamics: Uneventful            Sign Out: Acceptable CV status; No obvious hypovolemia; No obvious fluid overload   Other NRE: NONE   DID A NON-ROUTINE EVENT OCCUR? No           Last vitals:  Vitals Value Taken Time   /62 12/01/23 1130   Temp 36.6  C (97.8  F) 12/01/23 1100   Pulse 60 12/01/23 1139   Resp 25 12/01/23 1139   SpO2 100 % 12/01/23 1139   Vitals shown include unfiled device data.    Electronically Signed By: Franco Ortiz MD  December 1, 2023  11:39 AM

## 2023-12-01 NOTE — PROGRESS NOTES
[facility]    Medicine Progress Note - Medicine Service, FATIMAH TEAM 3    Date of Admission:  11/17/2023    Assessment & Plan   Darian Engle is a 73 year old male admitted on 11/17/2023 with recurrent left foot cellulitis. On cefazolin IV. Planned AKA amputation tomorrow from vascular surgery due to non-healing wound with increased pain needs.     Changes and major things today:  - L AKA today  - Post op cares  - PM&R consult      # Recurrent left foot cellulitis  # S/p transmetatarsal amputation 2/2 wet gangrene (10/27/23) c/b staph aureus bacteremia   # PAD s/p left common femoral endarterectomy and balloon angioplasty with stent placement (10/24/23)  # S/p L AKA 12/1/2023   Pt presented with 2-3 day history of worsening foot pain with malaise and nausea. Foot appears slightly more swollen with some fluctuance and scant purulent discharge. Labs notable for elevated CRP, though trending down from last check on 11/15, and WBC 11.5, elevated from previous check. MRI on admission was negative for osteomyelitis. Vascular surgery was consulted and recommended broad spectrum abx and checking duplex ultrasound of both extremities. Pt has been on Cefazolin since 11/5/23 for staph aureus bacteremia which developed after the left TMA, following with infectious disease clinic. There are notes that he may have missed doses due to dementia impairing his ability to manage his own medications and his wife getting sick. Placed on broad spectrum until 11/20 at which point ID recommended transition back to cefazolin.   - Vancomycin (11/18 - 11/20)   - Cefepime (11/18 - 11/20)  - Cefazolin (11/20 - *)  - Vascular surgery following, recs appreciated  - L AKA 12/1  - Post op cares  - Rehab planning, PM&R consult   - Activity up to chair as tolerated, no need for bedrest (per vasc surg note)  - Pian control w/ PO + IV dilaudid PRN  - Will follow up with ID regarding finalized or further  abx plan    # Malnutrition  # Pancreatic Mass  # LE edema  Likely 2/2 to 3rd spacing with malnutrition rather than HF. Endorses poor apetite since admission and has BGs down to 65. ECHO 11/08 with EF 60-65% which is baseline for him. No clinical signs of HF. Per note from Dr. Patel Ambrose 09/12/23, pt had stable appearance of complex cystic mass in the uncinate process of the pancreas. The stability and negative prior FNA is reassuring. However, persistent enhancing mural thickening is concerning for malignant degeneration into pancreatic adenocarcinoma. Differential includes a complex side branch intraductal papillary mucinous neoplasm (IPMN) and serous cystic neoplasm. Recommended continued close imaging surveillance with follow-up MRI in 6 months. Per [facility] Dr. 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 FNA showed showed uncinate process cyst was negative for high-grade dysplasia and malignancy while pancreatic body cyst was non-diagnostic. Concluded stable IPMN with concerning nodularity, no symptoms, marginal surgical candidate. MRI in 6 months, eus in 12 months for surveillance. Prior biopsy dated 12/13/2022 showed no definitive high grade dysplasia of the pancreatic body cyst and low-grade mucinous neoplasia of the uncinate process of the pancreas. The pt was scheduled to follow up with Dr. Govind Ho (medical oncology/general surgery, see note from 01/09/2023) after March biopsy but uncertain if this occurred. Feeding tubes tried while inpatient, ultimately opted to switch back to oral intake with mirtazapine use as an appetite stimulant.  - NGT removed on 11/30  - mirtazapine 15mg at bedtime  - continue to monitor oral intake     # Chronic normocytic anemia  Hgb 7.2, MCV 97. Hgb baseline appears 7-8. Suspect anemia of chronic disease.   - Transfused 1 U pRBC pm 11/19 which stabilized Hgb to 8.4     # H/o HTN  PTA meds initially  held due to hypotension on admission.   - Restarted PTA amlodipine 11/19  - Restarted PTA carvedilol 11/18  - Restarted PTA losartan 11/21    - Holding PTA imdur     # Dementia  # Agitation  # Insomnia  Unclear patient's baseline. He is unable to answer many questions regarding timing of symptoms and medications. Wife, Violet, manages his medications and helps him make medical decisions.   - Continue PTA donepezil  - PRN olanzapine   - PRN melatonin  - CIWA protocol discontinued      Chronic  # T2DM: Last A1c 5.5% (10/17/23), hold PTA metformin  # CAD: Continue PTA atorvastatin, ASA  # TALA: CPAP discontinued due to non-use  # A Fib: Rate controlled, continue PTA apixaban    # GERD: PTA pantoprazole  # Anxiety: PTA sertraline          Diet: Snacks/Supplements Adult: Other; Ensure Enlive for Breakfast, Boost Soothe for Lunch.; With Meals  Adult Formula Drip Feeding: Continuous Osmolite 1.5; Nasogastric tube; Goal Rate: 55; mL/hr; Do not advance tube feeding rate unless K+ is = or > 3.0, Mg++ is = or > 1.5, and Phos is = or > 1.9  Advance Diet as Tolerated: Clear Liquid Diet    DVT Prophylaxis: DOAC (hold gris-op)  Mercado Catheter: Not present  Lines: PRESENT      PICC 11/11/23 Single Lumen Right Basilic Antibiotics. PICC was ready to use.-Site Assessment: WDL      Cardiac Monitoring: None  Code Status: Full Code      Clinically Significant Risk Factors            # Hypomagnesemia: Lowest Mg = 1.4 mg/dL in last 2 days, will replace as needed   # Hypoalbuminemia: Lowest albumin = 2.7 g/dL at 11/21/2023  5:24 AM, will monitor as appropriate  # Coagulation Defect: INR = 1.18 (Ref range: 0.85 - 1.15) and/or PTT = 37 Seconds (Ref range: 22 - 38 Seconds), will monitor for bleeding    # Hypertension: Noted on problem list     # Dementia: noted on problem list     # Severe Malnutrition: based on nutrition assessment    # Financial/Environmental Concerns: none         Disposition Plan      Expected Discharge Date: 12/06/2023       Destination: home with family;home with help/services  Discharge Comments: Dipo: TCU  Plan: 12/1 amputation            Pedro Mcadams, DO  Medicine Service, MAROON TEAM 3  [facility]  Securely message with [employer] (more info)  Text page via [employer] Paging/Directory   See signed in provider for up to date coverage information  ______________________________________________________________________    Interval History   Pt today is doing well after his procedure. He actually states his pain is better, it is only a dull ache for now. We spoke through a rehab evaluation and pain plan, that it is possible the pain will get worse when meds wear off and pt has PO and IV available, he expressed understanding.     Physical Exam   Vital Signs: Temp: 98.8  F (37.1  C) Temp src: Oral BP: (!) 183/64 (administered daily coreg post op) Pulse: 67   Resp: 17 SpO2: 100 % O2 Device: Nasal cannula Oxygen Delivery: 2 LPM  Weight: 135 lbs 12.8 oz    Gen: No acute distress, non-toxic, alert  CV: Regular rate, regular rhythm  Pulm: Clear to auscultate bilaterally, breathing non-labored  Abd: Non-tender, non-distended  Ext: Moves all extremities, non-cyanotic, L AKA covered in dry bandage  Psych: Normal mood and affect    Medical Decision Making           Data     I have personally reviewed the following data over the past 24 hrs:    N/A  \   8.9 (L)   / N/A     137 105 12.6 /  88   4.8 26 0.58 (L) \     Procal: N/A CRP: N/A Lactic Acid: 0.7       INR:  1.18 (H) PTT:  37   D-dimer:  N/A Fibrinogen:  542 (H)       Imaging results reviewed over the past 24 hrs:   No results found for this or any previous visit (from the past 24 hour(s)).

## 2023-12-02 NOTE — PROGRESS NOTES
"Re-evaluation s/p AKA:     12/02/23 1600   Appointment Info   Signing Clinician's Name / Credentials (OT) Nicole Connors, OTD, OTR/L   Rehab Comments (OT) L AKA- reevaluation   Living Environment   People in Home spouse   Current Living Arrangements house   Home Accessibility no concerns   Transportation Anticipated family or friend will provide   Living Environment Comments Pt lives in single level home with no BERNARDO. All needs met on main level. No accessibility concerns. Has shower/tub combo and walk in shower.   Self-Care   Usual Activity Tolerance moderate   Current Activity Tolerance poor   Equipment Currently Used at Home walker, standard;walker, rolling;wheelchair, manual;wheelchair, power;raised toilet seat;shower chair   Fall history within last six months yes   Number of times patient has fallen within last six months 10   Activity/Exercise/Self-Care Comment IND with all ADLs prior to initial admission. Mod I with ADLs since previous admission, using manual wheelchair for mobility.   Instrumental Activities of Daily Living (IADL)   IADL Comments Pt reports IND with IADLs at baseline and shares responsibilities with wife as needed.   General Information   Onset of Illness/Injury or Date of Surgery 11/17/23   Referring Physician Pedro Mcadams DO   Additional Occupational Profile Info/Pertinent History of Current Problem \"73 year old male with PMH of CLTI with tissue loss of LLE s/p revascularization attempts which ultimately failed, now s/p LLE AKA on 12/1 with Dr. Felix\"   Existing Precautions/Restrictions fall   Limitations/Impairments safety/cognitive   Left Upper Extremity (Weight-bearing Status) full weight-bearing (FWB)   Right Upper Extremity (Weight-bearing Status) full weight-bearing (FWB)   Right Lower Extremity (Weight-bearing Status) full weight-bearing (FWB)   Cognitive Status Examination   Cognitive Status Comments baseline dementia - appears at baseline on this date   Visual Perception "   Visual Impairment/Limitations WFL;corrective lenses full-time   Range of Motion Comprehensive   Comment, General Range of Motion BUE ROM WFL   Strength Comprehensive (MMT)   Comment, General Manual Muscle Testing (MMT) Assessment generalized weakness   Coordination   Upper Extremity Coordination No deficits were identified   Bed Mobility   Bed Mobility supine-sit   Supine-Sit [place] (Bed Mobility) minimum assist (75% patient effort)   Transfers   Transfers sit-stand transfer   Transfer Comments min Ax2   Balance   Balance Comments overall decreased balance   Upper Body Dressing Assessment/Training   Comment, (Upper Body Dressing) anticipate setup per clinical judgement   Lower Body Dressing Assessment/Training   Comment, (Lower Body Dressing) anticipate mod A per clinical judgement   Grooming Assessment/Training   Comment, (Grooming) anticipate setup per clinical judgement   Toileting   Comment, (Toileting) anticipate max A per clinical judgement   Clinical Impression   Criteria for Skilled Therapeutic Interventions Met (OT) Yes, treatment indicated   OT Diagnosis dec IND with I/ADLs, new L AKA impairing functional transfers   OT Problem List-Impairments impacting ADL problems related to;activity tolerance impaired;balance;mobility;strength   Assessment of Occupational Performance 3-5 Performance Deficits   Identified Performance Deficits dressing, bathing, toileting, transfers, g/h   Planned Therapy Interventions (OT) ADL retraining;IADL retraining;balance training;bed mobility training;ROM;stretching;strengthening;transfer training;home program guidelines;progressive activity/exercise   Clinical Decision Making Complexity (OT) problem focused assessment/low complexity   Risk & Benefits of therapy have been explained evaluation/treatment results reviewed;care plan/treatment goals reviewed;participants voiced agreement with care plan;participants included;patient;spouse/significant other   OT Total  Evaluation Time   OT Eval, Low Complexity Minutes (93303) 9   OT Goals   Therapy Frequency (OT) 5 times/week   OT Predicted Duration/Target Date for Goal Attainment 12/20/23   OT: Hygiene/Grooming while standing;supervision/stand-by assist   OT: Lower Body Dressing Minimal assist;using adaptive equipment   OT: Toilet Transfer/Toileting Minimal assist;toilet transfer;cleaning and garment management   OT: Goal 1 Pt will verbalize and demonstrate understanding of EC/WS and fatigue management techniques for application to ADLS/IADLS.   OT: Goal 2 Pt will verbalize and demonstrate independence with BUE AROM HEP in prep for transfers/ADLS.   Therapeutic Activities   Therapeutic Activity Minutes (98888) 26   Symptoms noted during/after treatment increased pain;significant change in vital signs   Treatment Detail/Skilled Intervention Pt sitting EOB upon completion of eval, endorsing dizziness, BP taken at 80's /30's on 2x attempts. returned pt to supine with min A and discussed with RN. Requested to return after some time for RN assessment. Upon return, facilitated sup>sit EOB to progress IND in prep for functional transfer, pt completing with SBA using bedrails. While seated, pt BP 100s/60s. STS from EOB with min Ax2, use of FWW. challenged pt to utilize heel-toe method for scooting toward HOB, pt somewhat able to complete however demonstrates ongoing weakness in BUE and difficulty bracing self throughout, req min-mod Ax2. Challenged pt to trial hopping to HOB, same difficulties noted. Challenged pt to complete transfer to recliner using hopping method, pt able to turn self approx FCI, req mod Ax2 & assist to bring chair closer to pt as pt demos fatigue and slight knee buckling. descent to chair with mod Ax2. BP continuing to be 100s/60s. Provided education on LLE ROM to prevent any hip contractures during recovery, left with PT at bedside & all needs met.   OT Discharge Planning   OT Plan trial various transfer  methods (lateral/slideboard/SPT), standing ADLs   OT Discharge Recommendation (DC Rec) Transitional Care Facility   OT Rationale for DC Rec Pt currently performing well below functional baseline, limited by pain, activity tolerance, strength, and cognition. Would recommend TCU at this time to increase ADL independence and safety.   OT Brief overview of current status min Ax2 STS   Total Session Time   Timed Code Treatment Minutes 26   Total Session Time (sum of timed and untimed services) 35

## 2023-12-02 NOTE — PROGRESS NOTES
[facility]    Progress Note - Medicine Service, MAROON TEAM 3       Date of Admission:  11/17/2023    Assessment & Plan   Darian Engle is a 73 year old male admitted on 11/17/2023 with recurrent left foot cellulitis. On cefazolin IV. Planned AKA amputation tomorrow from vascular surgery due to non-healing wound with increased pain needs.      Changes and major things today:  - POD1 after L AKA. Good pain control  - Discontinued PTA donepezil per wife request given recent communication with PCP  - Positive orthostatics, 0.5L NS administered and reduced coreg to 12.5mg BID  - Hold apixaban until Monday  - Likely complete Cefazolin on Monday when amputation site can be reassessed.    # Recurrent left foot cellulitis  # S/p transmetatarsal amputation 2/2 wet gangrene (10/27/23) c/b staph aureus bacteremia   # PAD s/p left common femoral endarterectomy and balloon angioplasty with stent placement (10/24/23)  # S/p L AKA 12/1/2023   Pt presented with 2-3 day history of worsening foot pain with malaise and nausea. Foot appears slightly more swollen with some fluctuance and scant purulent discharge. Labs notable for elevated CRP, though trending down from last check on 11/15, and WBC 11.5, elevated from previous check. MRI on admission was negative for osteomyelitis. Vascular surgery was consulted and recommended broad spectrum abx and checking duplex ultrasound of both extremities. Pt has been on Cefazolin since 11/5/23 for staph aureus bacteremia which developed after the left TMA, following with infectious disease clinic. There are notes that he may have missed doses due to dementia impairing his ability to manage his own medications and his wife getting sick. Placed on broad spectrum until 11/20 at which point ID recommended transition back to cefazolin. With increasing analgesia needs and evidence of poor wound healing underwent L AKA on 12/1.  - Vancomycin (11/18  - 11/20)   - Cefepime (11/18 - 11/20)  - Cefazolin (11/20 - *)  - Vascular surgery following, recs appreciated  - L AKA 12/1  - Post op cares  - Rehab planning, PM&R consult   - Activity up to chair as tolerated, no need for bedrest (per vasc surg note)  - Bupivacaine peripheral anesthesia plan in place  - Pain control w/ PO + IV dilaudid PRN  - Continue antibiotics until 12/4 and assess amputation site. If no markers of cellulitis, can complete cefazolin and  remove   the PICC line that day.     # Malnutrition  # Pancreatic Mass  # LE edema  Likely 2/2 to 3rd spacing with malnutrition rather than HF. Endorses poor apetite since admission and has BGs down to 65. ECHO 11/08 with EF 60-65% which is baseline for him. No clinical signs of HF. Per note from Dr. Patel Ambrose 09/12/23, pt had stable appearance of complex cystic mass in the uncinate process of the pancreas. The stability and negative prior FNA is reassuring. However, persistent enhancing mural thickening is concerning for malignant degeneration into pancreatic adenocarcinoma. Differential includes a complex side branch intraductal papillary mucinous neoplasm (IPMN) and serous cystic neoplasm. Recommended continued close imaging surveillance with follow-up MRI in 6 months. Per [facility] Dr. 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 FNA showed showed uncinate process cyst was negative for high-grade dysplasia and malignancy while pancreatic body cyst was non-diagnostic. Concluded stable IPMN with concerning nodularity, no symptoms, marginal surgical candidate. MRI in 6 months, eus in 12 months for surveillance. Prior biopsy dated 12/13/2022 showed no definitive high grade dysplasia of the pancreatic body cyst and low-grade mucinous neoplasia of the uncinate process of the pancreas. The pt was scheduled to follow up with Dr. Govind Ho (medical oncology/general surgery, see note from  01/09/2023) after March biopsy but uncertain if this occurred. Feeding tubes tried while inpatient, ultimately opted to switch back to oral intake with mirtazapine use as an appetite stimulant.  - NGT removed on 11/30  - mirtazapine 15mg at bedtime  - continue to monitor oral intake as pain level significantly decreased after amputation     # Chronic normocytic anemia  Hgb 7.2, MCV 97. Hgb baseline appears 7-8. Suspect anemia of chronic disease.   - Transfused 1 U pRBC pm 11/19 which stabilized Hgb to 8.4  - received additional two units before surgery for pre-op optimization     # H/o HTN  PTA meds initially held due to hypotension on admission.   - Restarted PTA amlodipine 11/19  - Restarted PTA carvedilol 11/18  - Restarted PTA losartan 11/21    - Holding PTA imdur     # Dementia  # Agitation  # Insomnia  Unclear patient's baseline. He is unable to answer many questions regarding timing of symptoms and medications. Wife, Violet, manages his medications and helps him make medical decisions.   - Continue PTA donepezil  - PRN olanzapine   - PRN melatonin  - CIWA protocol discontinued      Chronic  # T2DM: Last A1c 5.5% (10/17/23), hold PTA metformin  # CAD: Continue PTA atorvastatin, ASA  # TALA: CPAP discontinued due to non-use  # A Fib: Rate controlled, continue PTA apixaban    # GERD: PTA pantoprazole  # Anxiety: PTA sertraline        Diet: Snacks/Supplements Adult: Other; Ensure Enlive for Breakfast, Boost Soothe for Lunch.; With Meals  Advance Diet as Tolerated: Regular Diet Adult    DVT Prophylaxis: Low Risk/Ambulatory with no VTE prophylaxis indicated  Mercado Catheter: Not present  Fluids: NS fluid bolus  Lines: PRESENT      PICC 11/11/23 Single Lumen Right Basilic Antibiotics. PICC was ready to use.-Site Assessment: WDL      Cardiac Monitoring: None  Code Status: Full Code      Clinically Significant Risk Factors            # Hypomagnesemia: Lowest Mg = 1.3 mg/dL in last 2 days, will replace as needed   #  Hypoalbuminemia: Lowest albumin = 2.7 g/dL at 11/21/2023  5:24 AM, will monitor as appropriate  # Coagulation Defect: INR = 1.18 (Ref range: 0.85 - 1.15) and/or PTT = 37 Seconds (Ref range: 22 - 38 Seconds), will monitor for bleeding    # Hypertension: Noted on problem list     # Dementia: noted on problem list     # Severe Malnutrition: based on nutrition assessment    # Financial/Environmental Concerns: none         Disposition Plan     Expected Discharge Date: 12/06/2023      Destination: home with family;home with help/services  Discharge Comments: Dipo: TCU  Plan: 12/1 amputation        The patient's care was discussed with the Attending Physician, Dr. Mcadams .    Keerthi Aceves MD  Medicine Service, [address]  Securely message with [employer] (more info)  Text page via [facility] Paging/Directory   See signed in provider for up to date coverage information  ______________________________________________________________________    Interval History   No acute events overnight, yesterday's notes reviewed AM. Patient feeling well this morning, notes waxing and waning pain that is overall very well controlled. Discontinued home donepezil as requested. He is pleasant and cooperative with cares.    Physical Exam   Vital Signs: Temp: 98.6  F (37  C) Temp src: Oral BP: 105/41 Pulse: 59   Resp: 16 SpO2: 97 % O2 Device: None (Room air) Oxygen Delivery: 2 LPM  Weight: 129 lbs 13.62 oz    Gen: No acute distress, non-toxic, alert  CV: Regular rate, regular rhythm  Pulm: Clear to auscultate bilaterally, breathing non-labored  Abd: Non-tender, non-distended  Ext: Moves all extremities, non-cyanotic, L AKA covered in dry bandage with peripheral anesthetic pump intact and in place.  Psych: Normal mood and affect       Medical Decision Making       Please see A&P for additional details of medical decision making.      Data   ------------------------- PAST 24 HR DATA  REVIEWED -----------------------------------------------    I have personally reviewed the following data over the past 24 hrs:    7.6  \   8.3 (L)   / 289     135 103 10.5 /  83   5.2 26 0.59 (L) \       Imaging results reviewed over the past 24 hrs:   No results found for this or any previous visit (from the past 24 hour(s)).

## 2023-12-02 NOTE — PLAN OF CARE
Goal Outcome Evaluation:      Plan of Care Reviewed With: patient    Overall Patient Progress: improvingOverall Patient Progress: improving    Outcome Evaluation: Admitted for planned L AKA. VSS on RA, ex. HTN. L AKA completed today. Dressing WDL. Per vascular, do not change dressing until Monday 12/4 - will be changed by vascular. If dressing saturated, contact vascular. Pt c/o minimal pain. Administered oxycodone & tylenol. Ropivicaine block in LLE. Ran HIGH phos protocol, recheck in AM. Plan for PT/OT consult. PM&R consult placed. Plan to restart eliquis 12/4.

## 2023-12-02 NOTE — PROGRESS NOTES
[facility]  Vasc Surg Attending  Pt seen and examined 1830hrs on floor.    Postop Left AKA from this am.  Awoken from sleep.  Conversational  Stable vital signs    Left AKA dressing clean and dry    A/P  Doing well  May hep lock from once tolerating oral diet  Keep dressing on till Monday  Daily labs- cbc  MAY START ELOQUIS mONDAY SO LONG  as stable/ No bleeding issues

## 2023-12-02 NOTE — PLAN OF CARE
BP (!) 143/51 (BP Location: Left arm)   Pulse 65   Temp 98.9  F (37.2  C) (Oral)   Resp 18   Wt 61.6 kg (135 lb 12.8 oz)   SpO2 95%   BMI 21.27 kg/m      SHIFT: 0602-1675  ISOLATION: NA  VITALS: AVSS on RA  BG: NA  Recent Labs   Lab 12/01/23  1148 12/01/23  0937 12/01/23  0911 12/01/23  0618 12/01/23  0612 11/30/23  0541   GLC 88 82 88 80 86 140*   NEURO: A&O x2-3. Intermittent disorientation to situation, disoriented to time.  Diet: Snacks/Supplements Adult: Other; Ensure Enlive for Breakfast, Boost Soothe for Lunch.; With Meals  Advance Diet as Tolerated: Clear Liquid Diet    PAIN: Pain in leg managed with Oxycodone and tizanidine.  RESPIRATORY: WDL  CARDIAC: WDL  : Voiding w/o difficulty.  GI: LBM: 11/29.  TUBES: R DL PICC  MIVF/GTT/ABX: LR @ 50 mL/hr, Ropivicaine LLE nerve block @ 8 mL bolus/hr.  ASSIST: Ao2 w/ GB. Very unsteady  SAFETY: Bed alarm on.  SKIN: Leg amputation dressed, CDI. Vascular will check and change on Monday. Consult them if dressing becomes dirty or loose.  LABS:   Recent Labs   Lab Test 12/01/23  1800 12/01/23  0937 12/01/23  0612 11/30/23  1625 11/30/23  1354 11/30/23  0541 11/29/23  0536   POTASSIUM  --  4.8 5.2  --   --  5.3 4.9   PHOS  --   --  4.0  --   --  3.4 3.4   MAG  --   --  1.8  --   --  1.4* 1.7   HGB 8.8* 8.9*  --  7.7*   < > 7.0* 7.1*    < > = values in this interval not displayed.   PLAN: Continue to support during recovery of left AKA.

## 2023-12-02 NOTE — PROGRESS NOTES
12/02/23 1340   Appointment Info   Signing Clinician's Name / Credentials (PT) AWAIS Briggs   Student Supervision On-site supervision provided;Therapy services provided with the co-signing licensed therapist guiding and directing the services, and providing the skilled judgement and assessment throughout the session   Rehab Comments (PT) Monitor BP   Living Environment   People in Home spouse   Current Living Arrangements house   Transportation Anticipated family or friend will provide   Living Environment Comments Pt lives in single level home with no BERNARDO. All needs met on main level. No accessibility concerns. Has shower/tub combo and walk in shower.   Self-Care   Usual Activity Tolerance moderate   Current Activity Tolerance poor   Regular Exercise No   Equipment Currently Used at Home walker, standard;walker, rolling;wheelchair, manual;wheelchair, power;raised toilet seat;shower chair   Fall history within last six months yes   Number of times patient has fallen within last six months 10   Activity/Exercise/Self-Care Comment IND with all ADLs prior to initial admission. Mod I with ADLs since previous admission, using manual wheelchair for mobility.   General Information   Onset of Illness/Injury or Date of Surgery 11/17/23   Referring Physician Pedro Mcadams, DO   Patient/Family Therapy Goals Statement (PT) return home   Pertinent History of Current Problem (include personal factors and/or comorbidities that impact the POC) 73 year old male with PMH of CLTI with tissue loss of LLE s/p revascularization attempts which ultimately failed, now s/p LLE AKA on 12/1 with Dr. Felix. Has ongoing pain   Existing Precautions/Restrictions fall   Weight-Bearing Status - LUE full weight-bearing   Weight-Bearing Status - RUE full weight-bearing   Weight-Bearing Status - LLE nonweight-bearing   Weight-Bearing Status - RLE full weight-bearing   Cognition   Affect/Mental Status (Cognition) confused   Orientation  Status (Cognition) person;place;situation   Pain Assessment   Patient Currently in Pain Yes, see Vital Sign flowsheet   Integumentary/Edema   Integumentary/Edema Comments LLE bandaged, not visible   Posture    Posture Forward head position;Protracted shoulders   Range of Motion (ROM)   Range of Motion ROM is WFL   Strength (Manual Muscle Testing)   Strength (Manual Muscle Testing) strength is WFL   Strength Comments LLE NT but displaced against gravity mobility   Bed Mobility   Comment, (Bed Mobility) supine <> sit SBA with rail   Transfers   Comment, (Transfers) STS with CGA x 2, squat pivot with mod-maxA x 2 with FWW   Gait/Stairs (Locomotion)   Comment, (Gait/Stairs) anticipate maxA x 2 with FWW   Balance   Balance Comments Maintains midline sitting with SBA, standing balance with CGA x2   Sensory Examination   Sensory Perception patient reports no sensory changes   Sensory Perception Comments denies phantom sensations upon evaluation   Clinical Impression   Criteria for Skilled Therapeutic Intervention Yes, treatment indicated   PT Diagnosis (PT) impaired functional mobility   Influenced by the following impairments weakness, balance deficits, fatigue, pain, recent AKA   Functional limitations due to impairments bed mobility, transfers, gait   Clinical Presentation (PT Evaluation Complexity) evolving   Clinical Presentation Rationale clinical reasoning, chart review   Clinical Decision Making (Complexity) moderate complexity   Planned Therapy Interventions (PT) balance training;bed mobility training;gait training;home exercise program;motor coordination training;neuromuscular re-education;patient/family education;postural re-education;ROM (range of motion);stair training;strengthening;stretching;transfer training;progressive activity/exercise;risk factor education;home program guidelines   Risk & Benefits of therapy have been explained evaluation/treatment results reviewed;care plan/treatment goals  reviewed;risks/benefits reviewed;current/potential barriers reviewed;participants voiced agreement with care plan;participants included;patient;spouse/significant other   Clinical Impression Comments Pt is 73 y.o. male s/p L AKA presenting with increased pain, weakness, and fatigue. As a result, pt is limited with current functional mobility levels as pt requires A x 1 for bed mobility and A x 2 for transfers and pre-gait. Pt would benefit from skilled pt to improve impairments to increase safety and IND with functional mobility.   PT Total Evaluation Time   PT Eval, Re-eval Minutes (48157) 5   Physical Therapy Goals   PT Frequency 5x/week   PT Predicted Duration/Target Date for Goal Attainment 12/09/23   PT Goals Bed Mobility;Transfers;Wheelchair Mobility   PT: Bed Mobility Modified independent   PT: Transfers Supervision/stand-by assist;Minimal assist   PT: Gait Minimal assist;Standard walker;5 feet   PT: Wheelchair Mobility Greater than 500 feet;Caregiver SBA;manual wheelchair   PT Discharge Planning   PT Plan check on HEP, repetitive STS (downgrade to FWW w nursing), pre-gait as able/taking steps forward with recliner follow, supine strength/ROM   PT Discharge Recommendation (DC Rec) Transitional Care Facility;Acute Rehab Center-Motivated patient will benefit from intensive, interdisciplinary therapy.  Anticipate will be able to tolerate 3 hours of therapy per day   PT Rationale for DC Rec Pt with limited mobility following AKA, requiring A x1 bed mobility and A x 2 transfers. Anticipate will benefit from TCU following discharge to progress safety and performance of IND mobility.   PT Brief overview of current status CGA x 2 for STS, mod-maxA x 2 for squat pivot   PT Equipment Needed at Discharge gait belt;raised toilet seat;wheelchair;wheelchair cushion   Total Session Time   Total Session Time (sum of timed and untimed services) 5

## 2023-12-02 NOTE — PROGRESS NOTES
Vascular Surgery Progress Note  12/02/2023       Subjective:  Reports 9/10 pain, with minimal improvement with onq pumps.     Objective:  Temp:  [98.6  F (37  C)-98.9  F (37.2  C)] 98.6  F (37  C)  Pulse:  [59-70] 59  Resp:  [16-18] 16  BP: (105-146)/(41-53) 105/41  SpO2:  [95 %-97 %] 97 %    I/O last 3 completed shifts:  In: 1200 [I.V.:1200]  Out: 1450 [Urine:1450]      Gen: Awake, alert, NAD  CV: RRR per doppler pulse  Resp: NLB on RA  Incision: no strikethrough, bleeding on dressing L AKA stump.   Ext: WWP RLE, no edema.      Labs:  Recent Labs   Lab 12/02/23  0537 12/01/23  1800 12/01/23  0937 11/30/23  1354 11/30/23  0541 11/29/23  0536   WBC 7.6  --   --   --  7.8 8.7   HGB 8.3* 8.8* 8.9*   < > 7.0* 7.1*     --   --   --  342 341    < > = values in this interval not displayed.       Recent Labs   Lab 12/02/23  0537 12/01/23  1148 12/01/23  0937 12/01/23  0618 12/01/23  0612 11/30/23  0541     --  137  --  139 136   POTASSIUM 5.2  --  4.8  --  5.2 5.3   CHLORIDE 103  --   --   --  105 104   CO2 26  --   --   --  26 27   BUN 10.5  --   --   --  12.6 15.9   CR 0.59*  --   --   --  0.58* 0.56*   GLC 83 88 82   < > 86 140*   SHERYL 8.2*  --   --   --  9.0 8.4*   MAG 1.3*  --   --   --  1.8 1.4*   PHOS 3.9  --   --   --  4.0 3.4    < > = values in this interval not displayed.       Imaging:  Relevant imaging reviewed     Assessment/Plan:   73 year old male with PMH of CLTI with tissue loss of LLE s/p revascularization attempts which ultimately failed, now s/p LLE AKA on 12/1 with Dr. Feilx. Has ongoing pain     - Please hold apixaban until Monday   - Vascular to change dressing Monday  - Added toradol scheduled to pain regimen  - Recommend patient not take narcotics after 4pm to decrease risk of delirium and falls  - Continue abx at this time.      Seen and discussed with vascular surgery staff, who is in agreement with the above.  - - - - - - - - - - - - - - - - - -  Richar Batista, PGY-3  Vascular  Surgery Resident

## 2023-12-02 NOTE — OP NOTE
Date of surgery: 1 December 2023    Location: Operating room, [facility]    Surgeon: Dr Felix    Assistant: Dr Shelly Carmona    Anesthesiologist: Staff anesthesia    Anesthesia: General anesthesia and nerve block    Preop diagnosis:  Left leg atherosclerosis with gangrene  Status post left transmetatarsal amputation  Status post left common femoral endarterectomy and atherectomy of SFA/tibioperoneal trunk    Postop diagnosis: Same    Procedure: Left above-knee amputation    Indication: This is a 73-year-old gentleman who is currently in inpatient and 6 and half weeks ago underwent attempted limb salvage with left common femoral endarterectomy and atherectomy of left SFA and tibioperoneal trunk for gangrene in his left toes.  At my request he underwent a left transmetatarsal amputation soon after this due to my concerns for the durability of his endovascular intervention.  He was subsequently discharged and returned after self discharging from a nursing home and was noted to have a patchy area of ecchymosis on the dorsal surface of the foot which appeared consistent to me with direct trauma and a focal area.  His suture line appeared intact and was not dusky.  He had arrangements set up to be evaluated for hyperbaric oxygen therapy at [facility]; however after being discharged home with the patient's request he was unable to attend these appointments and has been readmitted to this admission.  He has had a good period of time to evaluate whether his TMA site would heal and without these adjunctive measures he over the last 2 weeks appears to have developed duskiness of the plantar surface and the suture line.  Given these new findings I advised the patient and his wife that the likelihood is that his TMA site would not heal and he therefore requires a major amputation.  Given his generalized weakness I did not feel that he would be an ambulatory candidate with a  below-knee amputation which may also have tenuous wound healing and I therefore advised him to undergo an above-knee amputation.  Consent was obtained from the patient and his wife and they were both in agreement with this.    Procedure:The patient was brought to the operating room and placed supine on the table.  The left foot dressing was not removed.  The left thigh and lower leg were then prepped and draped in sterile fashion with the left foot draped and a towel and secured with Coban .  Timeout was performed with the anesthetic and nursing staff.  IV antibiotics had been given for preop antibiotics.  1-2 handsbreadth proximal to the patella a fishmouth incision was marked on the skin.  #10 blade was used for incision down to the subcutaneous layer.  .  Further dissection was with electrocautery and Metzenbaum scissors.  The muscles were sharply transected with Bovie electrocautery.  Please note his poor musculature.  His femoral vein and superficial femoral artery were identified and dissected free.  The superficial femoral artery was divided between clamps and 0 silk suture ligature placed proximally.  0 silk was used to tie this off distally.  The femoral vein was divided between 0 silk ties.   The remainder of the leg was then transected with Bovie electrocautery.  Bovie was used to score the femur and a periosteal elevator used  to clear the femur.  This was sharply transected with the hand-held power saw..  The leg was then removed off the field.  The superior-anterior end of the femur was then cut at 45 degree beveled angle with the power saw.  The edges were smoothed with a rasp.  No bleeding was noted from the marrow.  Attention was then turned to hemostasis and Bovie electrocautery were used for the few remaining muscular bleeding points.  The open amputation site was then irrigated with copious amounts of saline.  Hemostasis was noted to be intact.  The fascia was closed using continuous 0 Vicryl.   The skin was closed using interrupted 3-0 nylon vertical mattress sutures and staples.  Xeroform, 4 x 4 pads, ABD pads, Kerlix and Ace wrap were then used to dress the incision.    The patient was extubated and transferred to the recovery room in stable condition.

## 2023-12-02 NOTE — PLAN OF CARE
Goal Outcome Evaluation:      Plan of Care Reviewed With: patient    Overall Patient Progress: improvingOverall Patient Progress: improving    Outcome Evaluation: Alert, disoriented to time and situation. C/o LE pain, prn oxy given with relief. L AKA dressing WDL. No acute changes overnight. Plan for next dressing change by vascular this Monday.

## 2023-12-03 NOTE — PLAN OF CARE
Goal Outcome Evaluation:      Plan of Care Reviewed With: patient    Overall Patient Progress: improvingOverall Patient Progress: improving    Outcome Evaluation: Admitted for planned L AKA. VSS on RA, ex. HTN. Disoriented to situation. Discontinued donepezil. Administered IV abx. Dressing WDL. Per vascular, do not change dressing until Monday 12/4 - will be changed by vascular. If dressing saturated, contact vascular. Pt c/o LLE pain. Administered oxycodone, IV dilaudid, IV toradol & tylenol. Ropivicaine block in LLE. Ran HIGH phos protocol, recheck in AM. PT/OT following. Orthostatics completed, positive. Administered 500mL bolus & decreased coreg dose. 1 emesis episode today, administered IV compazine. Plan to restart eliquis 12/4. Plan to complete IV abx Monday, when able to assess amputation site.

## 2023-12-03 NOTE — PROGRESS NOTES
Vascular Surgery Progress Note  12/03/2023       Subjective:  Reports pain much improved, reports no pain now. Doing well, no fever, chills, issues. Dressing did fall off last night, replaced by surg intern, picture reviewed, incision looks in tact.      Objective:  Temp:  [97.8  F (36.6  C)-99.1  F (37.3  C)] 98.8  F (37.1  C)  Pulse:  [62-75] 68  Resp:  [16] 16  BP: (102-151)/(39-55) 128/39  SpO2:  [98 %-99 %] 98 %    I/O last 3 completed shifts:  In: -   Out: 600 [Urine:600]      Gen: Awake, alert, NAD  Resp: NLB on RA  Incision: no strikethrough, bleeding on dressing L AKA stump.   Ext: WWP RLE, no edema.      Labs:  Recent Labs   Lab 12/03/23  0611 12/02/23  0537 12/01/23  1800 11/30/23  1354 11/30/23  0541   WBC 7.7 7.6  --   --  7.8   HGB 8.2* 8.3* 8.8*   < > 7.0*    289  --   --  342    < > = values in this interval not displayed.       Recent Labs   Lab 12/03/23  0611 12/02/23  0537 12/01/23  1148 12/01/23  0937 12/01/23  0618 12/01/23  0612    135  --  137  --  139   POTASSIUM 4.6 5.2  --  4.8  --  5.2   CHLORIDE 104 103  --   --   --  105   CO2 24 26  --   --   --  26   BUN 14.8 10.5  --   --   --  12.6   CR 0.62* 0.59*  --   --   --  0.58*   GLC 84 83 88 82   < > 86   SHERYL 8.2* 8.2*  --   --   --  9.0   MAG 1.9 1.3*  --   --   --  1.8   PHOS 3.9 3.9  --   --   --  4.0    < > = values in this interval not displayed.       Imaging:  Relevant imaging reviewed     Assessment/Plan:   73 year old male with PMH of CLTI with tissue loss of LLE s/p revascularization attempts which ultimately failed, now s/p LLE AKA on 12/1 with Dr. Felix. Has ongoing pain, improved today with toradol.     - Please hold apixaban until Monday   - Vascular to change dressing Monday  - ok for pause of onq pumps when anesthesia feels appropriate.   - Recommend patient not take narcotics after 4pm to decrease risk of delirium and falls  - Continue abx at this time.      Seen and discussed with vascular surgery staff, who  is in agreement with the above.  - - - - - - - - - - - - - - - - - -  Richar Batista, PGY-3  Vascular Surgery Resident

## 2023-12-03 NOTE — PROGRESS NOTES
Vascular MD returned page and stated she was unable to get a hold of vascular intern for dressing changed. Pt's L stump dressing changed per vascular MD's order.

## 2023-12-03 NOTE — PROGRESS NOTES
[facility]    Progress Note - Medicine Service, FATIMAH TEAM 3       Date of Admission:  11/17/2023    Assessment & Plan   Darian Engle is a 73 year old male admitted on 11/17/2023 with Left TMA that was gangrenous now 2 Days Post-Op s/o L AKA on Cefazolin and Nerve block pump. Dressings come down Monday per Vascular. Will attempt pause trail per vascular. Pending TCU placement    Summary for Today  - 2 Days Post-Op s/p L AKA. Good pain control  - Dressings fell off overnight and replaced by Vascular intern  - Hold apixaban until Monday  - Likely complete Cefazolin on Monday when amputation site can be reassessed  - Scheduled Toradol 15 mg Q6H for pain and Ropivacaine Nerve block Pump.  - Vascular to consider pause trial Monday for Ropivacaine nerve block pump; pump needs to be removed prior to TCU    # Recurrent left foot cellulitis  # S/p transmetatarsal amputation 2/2 wet gangrene (10/27/23) c/b staph aureus bacteremia   # PAD s/p left common femoral endarterectomy and balloon angioplasty with stent placement (10/24/23)  # S/p L AKA 12/1/2023   Pt presented with 2-3 day history of worsening foot pain with malaise and nausea. Foot appears slightly more swollen with some fluctuance and scant purulent discharge. Labs notable for elevated CRP, though trending down from last check on 11/15, and WBC 11.5, elevated from previous check. MRI on admission was negative for osteomyelitis. Vascular surgery was consulted and recommended broad spectrum abx and checking duplex ultrasound of both extremities. Pt has been on Cefazolin since 11/5/23 for staph aureus bacteremia which developed after the left TMA, following with infectious disease clinic. There are notes that he may have missed doses due to dementia impairing his ability to manage his own medications and his wife getting sick. Placed on broad spectrum until 11/20 at which point ID recommended transition back to  cefazolin. With increasing analgesia needs and evidence of poor wound healing underwent L AKA on 12/1.  - Vancomycin (11/18 - 11/20)   - Cefepime (11/18 - 11/20)  - Cefazolin (11/20 - *)  - Vascular surgery following, recs appreciated  - L AKA 12/1  - Post op cares  - Rehab planning, PM&R consult   - Activity up to chair as tolerated, no need for bedrest (per vasc surg note)  - Bupivacaine peripheral anesthesia plan in place  - Pain control w/ PO + IV dilaudid PRN  - Continue antibiotics until 12/4 and assess amputation site. If no markers of cellulitis, can complete cefazolin and  remove   the PICC line that day.     # Malnutrition  # Pancreatic Mass  # LE edema  Likely 2/2 to 3rd spacing with malnutrition rather than HF. Endorses poor apetite since admission and has BGs down to 65. ECHO 11/08 with EF 60-65% which is baseline for him. No clinical signs of HF. Per note from Dr. Patel Ambrose 09/12/23, pt had stable appearance of complex cystic mass in the uncinate process of the pancreas. The stability and negative prior FNA is reassuring. However, persistent enhancing mural thickening is concerning for malignant degeneration into pancreatic adenocarcinoma. Differential includes a complex side branch intraductal papillary mucinous neoplasm (IPMN) and serous cystic neoplasm. Recommended continued close imaging surveillance with follow-up MRI in 6 months. Per [facility] Dr. 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 FNA showed showed uncinate process cyst was negative for high-grade dysplasia and malignancy while pancreatic body cyst was non-diagnostic. Concluded stable IPMN with concerning nodularity, no symptoms, marginal surgical candidate. MRI in 6 months, eus in 12 months for surveillance. Prior biopsy dated 12/13/2022 showed no definitive high grade dysplasia of the pancreatic body cyst and low-grade mucinous neoplasia of the uncinate  process of the pancreas. The pt was scheduled to follow up with Dr. Govind Ho (medical oncology/general surgery, see note from 01/09/2023) after March biopsy but uncertain if this occurred. Feeding tubes tried while inpatient, ultimately opted to switch back to oral intake with mirtazapine use as an appetite stimulant.  - NGT removed on 11/30  - mirtazapine 15mg at bedtime  - continue to monitor oral intake as pain level significantly decreased after amputation     # Chronic normocytic anemia  Hgb 7.2, MCV 97. Hgb baseline appears 7-8. Suspect anemia of chronic disease.   - Transfused 1 U pRBC pm 11/19 which stabilized Hgb to 8.4  - received additional two units before surgery for pre-op optimization     # H/o HTN  PTA meds initially held due to hypotension on admission.   - Restarted PTA amlodipine 11/19  - Restarted PTA carvedilol 11/18  - Restarted PTA losartan 11/21    - Holding PTA imdur     # Dementia  # Agitation  # Insomnia  Unclear patient's baseline. He is unable to answer many questions regarding timing of symptoms and medications. Wife, Violet, manages his medications and helps him make medical decisions.   - Continue PTA donepezil  - PRN olanzapine   - PRN melatonin  - CIWA protocol discontinued      Chronic  # T2DM: Last A1c 5.5% (10/17/23), hold PTA metformin  # CAD: Continue PTA atorvastatin, ASA  # TALA: CPAP discontinued due to non-use  # A Fib: Rate controlled, continue PTA apixaban    # GERD: PTA pantoprazole  # Anxiety: PTA sertraline        Diet: Snacks/Supplements Adult: Other; Ensure Enlive for Breakfast, Boost Soothe for Lunch.; With Meals  Advance Diet as Tolerated: Regular Diet Adult    DVT Prophylaxis: Low Risk/Ambulatory with no VTE prophylaxis indicated  Mercado Catheter: Not present  Fluids: NS fluid bolus  Lines: PRESENT             Cardiac Monitoring: None  Code Status: Full Code      Clinically Significant Risk Factors            # Hypomagnesemia: Lowest Mg = 1.3 mg/dL in last 2 days,  will replace as needed   # Hypoalbuminemia: Lowest albumin = 2.7 g/dL at 11/21/2023  5:24 AM, will monitor as appropriate    # Coagulation Defect: INR = 1.18 (Ref range: 0.85 - 1.15) and/or PTT = 37 Seconds (Ref range: 22 - 38 Seconds), will monitor for bleeding    # Hypertension: Noted on problem list     # Dementia: noted on problem list     # Severe Malnutrition: based on nutrition assessment    # Financial/Environmental Concerns: none         Disposition Plan     Expected Discharge Date: 12/06/2023      Destination: home with family;home with help/services  Discharge Comments: Dipo: TCU  Plan: 12/1 amputation        The patient's care was discussed with the Attending Physician, Dr. Mcadams .    Nicolas Suggs MD PhD  Medicine Service, [address]  Securely message with [employer] (more info)  Text page via [employer] Paging/Directory   See signed in provider for up to date coverage information  ______________________________________________________________________    Interval History   NAEO  Dressings fell off and replaced by Vascular team  Dressing to come down on Monday  Patient was working with the physical therapy team on interview    Physical Exam   Vital Signs: Temp: 99.1  F (37.3  C) Temp src: Axillary BP: (!) 151/55 Pulse: 73   Resp: 16 SpO2: 99 % O2 Device: None (Room air)    Weight: 129 lbs 13.62 oz    Gen: No acute distress, non-toxic, alert  CV: Acyanotic, non-diaphoretic  Pulm: breathing non-labored  Ext: Moves all extremities, non-cyanotic, L AKA covered in dry bandage with peripheral anesthetic pump intact and in place.  Psych: Normal mood and affect       Medical Decision Making       Please see A&P for additional details of medical decision making.      Data   ------------------------- PAST 24 HR DATA REVIEWED -----------------------------------------------    I have personally reviewed the following data over the past 24 hrs:    7.7  \   8.2 (L)    / 291     136 104 14.8 /  84   4.6 24 0.62 (L) \       Imaging results reviewed over the past 24 hrs:   No results found for this or any previous visit (from the past 24 hour(s)).

## 2023-12-03 NOTE — PLAN OF CARE
Goal Outcome Evaluation:      Plan of Care Reviewed With: patient    Overall Patient Progress: improvingOverall Patient Progress: improving    1900-0730: Pt A&Ox3. VSS on RA. Pt denied N/V. Pt currently getting Ropivicaine block in LLE for pain. Pt rates pain at a 4/10. Dressing for L stump changed and CDI. Phos rechecks this AM.    Plan: Continue with plan of care. PT/OT recommending TCU.

## 2023-12-03 NOTE — PROGRESS NOTES
"Pain Service Progress Note  [facility]  Date: 12/03/2023       Patient Name: Darian Engle  MRN: [id number]  Age: 73 year old  Sex: male      Assessment:  73 year old with chronic limb threatening ischemia, s/p LLE AKA on 12/1.     Procedure: Left AKA    Date of Surgery: 12/1    Date of Catheter Placement: 12/1    Plan/Recommendations:  1. Regional Anesthesia/Analgesia  -Continuous Catheter Type/Site: left femoral catheter  Infusate: Ropivacaine 0.2%  Programmed Intermittent Bolus (PIB) at 8 mL Q60 min     Pain is well controlled this AM, and lifting leg without pain. Discussed that he may have increased pain when his single shot sciatic block wears off (Regional anesthesia team was limited in their ability to provide catheters, as patient is on cilostazol).    - Waiting to hear back from primary team regarding whether they would like a pause trial tomorrow, as femoral catheter will need to be removed before going to TCU.    Plan to maintain catheter, max of 7 days    2. Anticoagulation  -Please contact Inpatient Pain Service before ordering or making any anticoagulation changes     3. Multimodal Analgesia  - Per primary team    Pain Service will continue to follow.    Catherine Berger MD  12/03/2023     Overnight Events: No acute events    Tubes/Drains: Yes  PICC    Subjective:  Doing well\"  Nausea: No  Vomiting: No  Pruritus: No  Symptoms of LAST: No    Pain Location:  Left stump    Pain Intensity:    Pain at Rest: 0/10   Pain with Activity: 0/10  Comfort Goal: 0/10   Satisfied with your level of pain control: Yes    Diet: Snacks/Supplements Adult: Other; Ensure Enlive for Breakfast, Boost Soothe for Lunch.; With Meals  Advance Diet as Tolerated: Regular Diet Adult    Relevant Labs:  Recent Labs   Lab Test 12/02/23  0537 12/01/23  0612   INR  --  1.18*     --    PTT  --  37   BUN 10.5 12.6       Physical Exam:  Vitals: BP (!) 109/39 (BP Location: Left arm)   Pulse 62   Temp " 98.8  F (37.1  C) (Axillary)   Resp 16   Wt 60.1 kg (132 lb 7.9 oz)   SpO2 98%   BMI 20.75 kg/m      Physical Exam:   Orientation:  Alert, oriented, and in no acute distress: Yes  Sedation: No    Motor Examination:  5/5 Strength in lower extremities: Yes    Sensory Level:   Decrease in sensation: Yes    Catheter Site:   Catheter entry site is clean/dry/intact: Yes    Tender: No      Relevant Medications:  Current Pain Medications:  Medications related to Pain Management (From now, onward)      Start     Dose/Rate Route Frequency Ordered Stop    12/02/23 1030  ketorolac (TORADOL) injection 15 mg         15 mg Intravenous EVERY 6 HOURS 12/02/23 1015 12/05/23 1029    12/01/23 1130  ROPivacaine 0.2% in NS perineural infusion (simple)          Perineural Continuous Nerve Block 12/01/23 1110      12/01/23 0800  HYDROmorphone (PF) (DILAUDID) injection 0.5 mg         0.5 mg Intravenous EVERY 4 HOURS PRN 11/30/23 2223      11/27/23 1000  oxyCODONE (ROXICODONE) tablet 5-10 mg        Note to Pharmacy: PTA Sig:Take 1-2 tablets (5-10 mg) by mouth every 6 hours as needed for moderate pain      5-10 mg Oral EVERY 3 HOURS PRN 11/27/23 0954      11/25/23 0900  polyethylene glycol (MIRALAX) Packet 17 g        Note to Pharmacy: PTA Sig:Take 17 g by mouth daily      17 g Oral DAILY PRN 11/25/23 0853      11/20/23 1400  gabapentin (NEURONTIN) capsule 300 mg        Note to Pharmacy: PTA Sig:Take 1 capsule (100 mg) by mouth 3 times daily      300 mg Oral 3 TIMES DAILY 11/20/23 1206      11/19/23 0221  LORazepam (ATIVAN) injection 1-2 mg         1-2 mg Intravenous EVERY 30 MIN PRN 11/19/23 0222      11/18/23 0800  acetaminophen (TYLENOL) tablet 975 mg         975 mg Oral 3 TIMES DAILY 11/18/23 0346      11/18/23 0800  senna-docusate (SENOKOT-S/PERICOLACE) 8.6-50 MG per tablet 1 tablet        Note to Pharmacy: PTA Sig:Take 1 tablet by mouth 2 times daily While taking narcotic pain medications (oxycodone)      1 tablet Oral 2 TIMES DAILY  "11/18/23 0346      11/18/23 0800  aspirin (ASA) chewable tablet 81 mg         81 mg Oral DAILY 11/18/23 0346      11/18/23 0346  tiZANidine (ZANAFLEX) tablet 2 mg        Note to Pharmacy: JOSSY Sig:Take 1 tablet (2 mg) by mouth 3 times daily as needed for muscle spasms      2 mg Oral 3 TIMES DAILY PRN 11/18/23 0346      11/18/23 0346  lidocaine 1 % 0.1-1 mL         0.1-1 mL Other EVERY 1 HOUR PRN 11/18/23 0346      11/18/23 0346  lidocaine (LMX4) cream          Topical EVERY 1 HOUR PRN 11/18/23 0346      11/18/23 0346  bisacodyl (DULCOLAX) suppository 10 mg         10 mg Rectal DAILY PRN 11/18/23 0346              Primary Service Contacted with Recommendations? Yes      Please see A&P for additional details of medical decision making.  MANAGEMENT DISCUSSED with the following over the past 24 hours: Primary team, RN, patient   NOTE(S)/MEDICAL RECORDS REVIEWED over the past 24 hours: Primary team, RN  Tests REVIEWED in the past 24 hours:  - [facility]      Acute Inpatient Pain Service [employer]  Hours of pain coverage 24/7   Page via [employer]- Please Page the Pain Team Via [employer]: \"PAIN MANAGEMENT ACUTE INPATIENT/ [facility]\"            "

## 2023-12-03 NOTE — PROGRESS NOTES
Pain Service Progress Note  [facility]  Date: 12/02/2023       Patient Name: Darian Engle  MRN: [id number]  Age: 73 year old  Sex: male      Assessment:  73 year old with chronic limb threatening ischemia, s/p LLE AKA on 12/1.     Procedure: Left AKA    Date of Surgery: 12/1    Date of Catheter Placement: 12/1    Plan/Recommendations:  1. Regional Anesthesia/Analgesia  -Continuous Catheter Type/Site: left femoral catheter  Infusate: Ropivacaine 0.2%  Programmed Intermittent Bolus (PIB) at 8 mL Q60 min     Pain is well controlled this AM, and lifting leg without pain. Discussed that his single shot sciatic block may wear off later today/tomorrow, and he may have increased pain. Regional anesthesia team was limited in their ability to provide catheters, as patient is on cilostazol.    Plan to maintain catheter, max of 7 days    2. Anticoagulation  -Please contact Inpatient Pain Service before ordering or making any anticoagulation changes       3. Multimodal Analgesia  - Per primary team    Pain Service will continue to follow.    Catherine Berger MD  12/02/2023     Overnight Events: No acute events    Tubes/Drains: Yes  PICC    Subjective:  Doing fine   Nausea: No  Vomiting: No  Pruritus: No  Symptoms of LAST: No    Pain Location:  Left stump    Pain Intensity:    Pain at Rest: 0/10   Pain with Activity: 0/10  Comfort Goal: 0/10   Satisfied with your level of pain control: Yes    Diet: Snacks/Supplements Adult: Other; Ensure Enlive for Breakfast, Boost Soothe for Lunch.; With Meals  Advance Diet as Tolerated: Regular Diet Adult    Relevant Labs:  Recent Labs   Lab Test 12/02/23  0537 12/01/23  0612   INR  --  1.18*     --    PTT  --  37   BUN 10.5 12.6       Physical Exam:  Vitals: /42   Pulse 59   Temp 98.6  F (37  C) (Oral)   Resp 16   Wt 58.9 kg (129 lb 13.6 oz)   SpO2 97%   BMI 20.34 kg/m      Physical Exam:   Orientation:  Alert, oriented, and in no acute distress:  Yes  Sedation: No    Motor Examination:  5/5 Strength in lower extremities: Yes    Sensory Level:   Decrease in sensation: Yes    Catheter Site:   Catheter entry site is clean/dry/intact: Yes    Tender: No      Relevant Medications:  Current Pain Medications:  Medications related to Pain Management (From now, onward)      Start     Dose/Rate Route Frequency Ordered Stop    12/02/23 1030  ketorolac (TORADOL) injection 15 mg         15 mg Intravenous EVERY 6 HOURS 12/02/23 1015 12/05/23 1029    12/01/23 1130  ROPivacaine 0.2% in NS perineural infusion (simple)          Perineural Continuous Nerve Block 12/01/23 1110      12/01/23 0800  HYDROmorphone (PF) (DILAUDID) injection 0.5 mg         0.5 mg Intravenous EVERY 4 HOURS PRN 11/30/23 2223      11/27/23 1000  oxyCODONE (ROXICODONE) tablet 5-10 mg        Note to Pharmacy: PTA Sig:Take 1-2 tablets (5-10 mg) by mouth every 6 hours as needed for moderate pain      5-10 mg Oral EVERY 3 HOURS PRN 11/27/23 0954      11/25/23 0900  polyethylene glycol (MIRALAX) Packet 17 g        Note to Pharmacy: PTA Sig:Take 17 g by mouth daily      17 g Oral DAILY PRN 11/25/23 0853      11/20/23 1400  gabapentin (NEURONTIN) capsule 300 mg        Note to Pharmacy: PTA Sig:Take 1 capsule (100 mg) by mouth 3 times daily      300 mg Oral 3 TIMES DAILY 11/20/23 1206      11/19/23 0221  LORazepam (ATIVAN) injection 1-2 mg         1-2 mg Intravenous EVERY 30 MIN PRN 11/19/23 0222      11/18/23 0800  acetaminophen (TYLENOL) tablet 975 mg         975 mg Oral 3 TIMES DAILY 11/18/23 0346      11/18/23 0800  senna-docusate (SENOKOT-S/PERICOLACE) 8.6-50 MG per tablet 1 tablet        Note to Pharmacy: PTA Sig:Take 1 tablet by mouth 2 times daily While taking narcotic pain medications (oxycodone)      1 tablet Oral 2 TIMES DAILY 11/18/23 0346      11/18/23 0800  aspirin (ASA) chewable tablet 81 mg         81 mg Oral DAILY 11/18/23 0346      11/18/23 0346  tiZANidine (ZANAFLEX) tablet 2 mg        Note to  "Pharmacy: PTA Sig:Take 1 tablet (2 mg) by mouth 3 times daily as needed for muscle spasms      2 mg Oral 3 TIMES DAILY PRN 11/18/23 0346      11/18/23 0346  lidocaine 1 % 0.1-1 mL         0.1-1 mL Other EVERY 1 HOUR PRN 11/18/23 0346      11/18/23 0346  lidocaine (LMX4) cream          Topical EVERY 1 HOUR PRN 11/18/23 0346      11/18/23 0346  bisacodyl (DULCOLAX) suppository 10 mg         10 mg Rectal DAILY PRN 11/18/23 0346              Primary Service Contacted with Recommendations? No      Please see A&P for additional details of medical decision making.  40 MINUTES SPENT BY ME on the date of service doing chart review, history, exam, documentation & further activities per the note.      Acute Inpatient Pain Service [facility]  Hours of pain coverage 24/7   Page via [employer]- Please Page the Pain Team Via [employer]: \"PAIN MANAGEMENT ACUTE INPATIENT/ [facility]\"            "

## 2023-12-04 NOTE — PROGRESS NOTES
Vascular Surgery Progress Note  12/04/2023       Subjective:  Reports pain much improved, reports no pain now. Doing well, no fever, chills, issues. Dressing changed this morning, no drainage, without swelling, no hematoma. No signs or symptoms of infection.      Objective:  Temp:  [97.6  F (36.4  C)-98.8  F (37.1  C)] 97.6  F (36.4  C)  Pulse:  [62-75] 75  Resp:  [16-18] 18  BP: (102-154)/(39-55) 151/53  SpO2:  [98 %-99 %] 99 %    I/O last 3 completed shifts:  In: -   Out: 1225 [Urine:1225]      Gen: Awake, alert, NAD  Resp: NLB on RA  Incision: no strikethrough, bleeding on dressing L AKA stump.   Ext: WWP RLE, no edema.                Labs:  Recent Labs   Lab 12/04/23  0612 12/03/23  0611 12/02/23  0537   WBC 6.9 7.7 7.6   HGB 8.6* 8.2* 8.3*    291 289       Recent Labs   Lab 12/04/23  0612 12/03/23  0611 12/02/23  0537    136 135   POTASSIUM 4.4 4.6 5.2   CHLORIDE 108* 104 103   CO2 23 24 26   BUN 12.3 14.8 10.5   CR 0.59* 0.62* 0.59*   GLC 90 84 83   SHERYL 8.4* 8.2* 8.2*   MAG 1.5* 1.9 1.3*   PHOS 3.3 3.9 3.9       Imaging:  Relevant imaging reviewed     Assessment/Plan:   73 year old male with PMH of CLTI with tissue loss of LLE s/p revascularization attempts which ultimately failed, now s/p LLE AKA on 12/1 with Dr. Felix. Has ongoing pain, improved today with toradol.     - Resume apixaban today  - Vascular surgery to change dressing daily (xeroform, abd and kerlix)  - ok for pause of onq pumps when anesthesia feels appropriate.   - Recommend patient not take narcotics after 4pm to decrease risk of delirium and falls  - Antibiotics per ID recommendation  - OT/PT for discharge recs     Discussed pt history, exam, assessment and plan with Dr. Sanchez and Dr. Felix of the vascular surgery service, who are in agreement with the above.    Roxann Fontanez, [facility]  Vascular Surgery  Pager: [phone]  [email or web address]  Send message or 10 digit call back number Securely via [employer] with the  [employer]oc[employer] Web Console (learn more here)

## 2023-12-04 NOTE — PROVIDER NOTIFICATION
Pt unable to void. Bladder scan showed 544ml. Straight cath had 450ml of clear deanna urine. Team aware.

## 2023-12-04 NOTE — PLAN OF CARE
Goal Outcome Evaluation:    Temp: 98.3  F (36.8  C) Temp src: Axillary BP: 104/49 Pulse: 68   Resp: 18 SpO2: 98 % O2 Device: None (Room air)      A&Ox4, forgetful. Bed alarm on. AVSS. On RA. Ropivacaine nerve block stopped since 0755 today. Pain team said to keep all tubing connected so that we can restart infusion if patient's pain is not controlled with current pain meds regimen. Pt reports LLE pain 2-3/10. Oxycodone 10mg given this morning prior to patient working with PT. Patient pivot to the chair and commode with heavy assist of 2. He reports no pain this afternoon. He had 2 large loose BM's. LLE AKA stump ace wrapped dressing is intact. Right SL picc infusing LR at 50ml/hr

## 2023-12-04 NOTE — PLAN OF CARE
Goal Outcome Evaluation:      Plan of Care Reviewed With: patient, spouse    Overall Patient Progress: improvingOverall Patient Progress: improving    Outcome Evaluation: Admitted for planned L AKA. POD #2. VSS on RA, ex. HTN. Disoriented to situation & time. Administered IV abx. Dressing fell off today, changed dressing. Pt c/o LLE pain. Administered oxycodone, IV toradol & tylenol. Ropivicaine block in LLE, poss trial off tomorrow then removal if pain is tolerable. Phos 3.9, recheck tomorrow AM. PT/OT following. Plan to restart eliquis 12/4. Plan to complete IV abx Monday, when MDs able to assess amputation site.

## 2023-12-04 NOTE — CONSULTS
[facility]     PM&R CONSULT        Consulting Provider:   Reason for Consult: Assessment of rehabilitation   Location of Patient: 7520  Date of Encounter: 12/4/2023   Date of Admission: 11/17/2023        ASSESSMENT/PLAN:    Mr. 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 Pertinent past medical history includes left foot gangrene s/p transmetatarsal amputation (10/27/23) c/b staph aureus bacteremia, PVD s/p endovascular stent (10/2023), A Fib on Eliquis, HTN, T2DM, CAD s/p CAB (2004), TALA, dementia.    Physical Medicine and Rehabilitation have been consulted to evaluate patient for rehabilitation needs/discharge planning.    Potential inpatient rehabilitation diagnoses include: Impairments of mobility, impairment of gait, impairment of ADLs    Therapy recommendations by function:    # Mobility:   Patient with limited mobility following AKA, requiring a X1 bed mobility and Assist of 2 transfers.  Patient appears motivated and has the need to benefit from more intensive rehab at the ARU pending tolerance to therapy during LOS, patient may be more appropriate for TCU.  Per patient, appears to be tolerating current therapy sessions okay which may indicate that he is more appropriate for ARU.    # ADLs:   Patient currently performing well below functional baseline limited by pain, activity tolerance, strength and cognition.  OT Would recommend TCU at this time to increase ADL independence and safety.  Today, patient's pain at 2/10 after bupivacaine pump discontinued for approximately 3 hours.  Pain seems to be improving and may tolerate ARU.    # Speech, Language, Cognition:   At baseline    # Swallow:   At Baseline    # Support: Wife  is primary caregiver, available for 24/7 support    Potential limitations to rehab admission: Cognition, delirium, activity tolerance.     Given the recent major amputation and functional changes that go along with this, patient would certainly benefit from continued intensive therapy.  On reviewing therapy recommendations, some concern about patient's waxing/waning cognition and delirium as well as patient's ability to handle 3 hours of daily therapy.  As of today, patient showing improvements in pain control while ropivacaine pump has been paused and reporting fairly good participation in therapies.  In addition to this, patient had very negative experience during prior TCU stay so patient's and wife would rather discharged home in TCU but are open to ARU.  Additionally, patient recently discontinued nasal feeding tubes and opted for oral nutrition, however this morning patient with 0% eaten a breakfast and told MD that oral intake has been poor, soap in the setting of therapy, patient would benefit from close follow by dietitian and consider re-implementing nasal feeding tube if oral intake remains low..    Recommendations:   - Disposition: ARU  - Estimated length of stay:  2-3 weeks.  - Rehab prognosis:  Fair  -Additionally in the setting of AKA, consider 3 times daily prone positioning 15 minutes at a time.  As long as this is cleared precaution wise by his primary surgeons, this can be prophylactic against the development of hip flexion contractures in the residual limb.  -Agree with monitoring intake closely, consider reimplementation NG tube feeds if oral intake remains low.    Thank you for this interesting consult.     Patient was seen and discussed with staff attending, Dr. Cunningham.    Dominguez Barboza MD  PGY2 PM&R Resident   Physical Medicine & Rehabilitation  [facility]      HPI:    73-year-old male history of PAD, DMII, HTN, A-fib on apixaban, dementia, chronic anemia. He underwent  left  common femoral endarterectomy, drug-coated balloon angioplasty of left superficial femoral artery, filter/embolic protection device placed in left popliteal and peroneal arteries on 10/24 in the setting of left foot gangrene. Unfortunately, this progressed to transmetatarsal amputation on 10/27/2023.  He was able to ambulate by 10/30 and was discharged to home.  Patient presented to the ED on 11/05 with concern for left foot infection.  Patient was transferred to the  and his blood cultures came back positive for Staph aureus.  Patient was treated with IV antibiotics, transition to p.o., and planned to continue with hyperbaric oxygen [place] [facility] [place] on his residual limb; patient did not end up being seen for hyperbaric oxygen therapy.  However, patient presented to the hospital on 11/17 with several days of worsening foot pain.  Patient admitted and started on empiric IV antibiotics, initial blood cultures were negative.  Vascular surgery made a decision to proceed with AKA due to the extremely poor chances of limb salvage.  AKA was performed on 12/01 with no major complications.  Patient's pain has been controlled on Toradol, ropivacaine pump at left femoral catheter as well as as needed opioids (last IV hydromorphone on 12/02).  Patient does have a history of dementia and per nursing notes, it appears the patient has been consistently disoriented to situation/time.    Today, patient notes that he is feeling fairly good.  Ropivacaine pump has been off since approximately 8 AM and he reports that his leg pain is at a 2/10.  Patient reports that she has been transferring in his room from bed to commode and bed to recliner with assist.  Feels like he is approaching his baseline, but acknowledges that he is having a lot of trouble with his memory today.    Patient's wife notes that patient, prior to this admission, had stated a TCU for rehab and had an awful experience, was not allowed to go to the  bathroom, often gets soiled with feces, eventually residual limb got infected resulting in this current amputation.  Patient and wife would like to avoid TCU stay at any cost.  Wife notes that their home is accessible, they have an accessible shower, no stairs to enter and that prior to admission on 11/05, patient was using wheelchair and transferring and functioning well at home with assist of wife.        NURSING REPORT:       Goal Outcome Evaluation:       Plan of Care Reviewed With: patient     Overall Patient Progress: no changeOverall Patient Progress: no change     1900-0730: Pt A&Ox4 but can get confused at times. VSS on RA. Pt reports that he no longer feels pain in LLE. Dressing fell off again and was changed. Ropivacaine block running into LLE. No BM. Pt voided into urinal. R SL PICC running with LR at 50 ml/hr.      Plan: IV abx to be completed today. Team will re-evaluate. Planning to start Eliquis again. Team will also trial pt off of ropivacaine block today to see how he tolerates it. PT/OT recommending TCU.          CURRENT PRECAUTIONS/RESTRICTIONS:  Delirium precautions    DVT PPX:  PTA apixaban held    PREVIOUS LEVEL OF FUNCTION:  Heavy assist from spouse, wheelchair-bound in home    LIVING SITUATION/SUPPORT:  Patient lives with wife in a house with 0 BERNARDO    Support system includes  Wife    Work status: Retired        PAST MEDICAL HISTORY:  Past Medical History:   Diagnosis Date    Arthritis March 2018    joint pain both hands    Diabetes (H)     Heart disease 1991    [place]-angioplasty    Hypertension            CURRENT MEDICATIONS:  Current Facility-Administered Medications   Medication    acetaminophen (TYLENOL) tablet 975 mg    amLODIPine (NORVASC) tablet 10 mg    [Held by provider] apixaban ANTICOAGULANT (ELIQUIS) tablet 5 mg    artificial saliva (BIOTENE MT) solution 2 spray    aspirin (ASA) chewable tablet 81 mg    atorvastatin (LIPITOR) tablet 40 mg    bisacodyl (DULCOLAX) suppository 10  mg    calcium carbonate (TUMS) chewable tablet 1,000 mg    carvedilol (COREG) tablet 12.5 mg    ceFAZolin (ANCEF) 2 g in 100 mL D5W intermittent infusion    cilostazol (PLETAL) tablet 100 mg    cloNIDine (CATAPRES) tablet 0.1 mg    dextrose 10% infusion    glucose gel 15-30 g    Or    dextrose 50 % injection 25-50 mL    Or    glucagon injection 1 mg    famotidine (PEPCID) tablet 10 mg    flumazenil (ROMAZICON) injection 0.2 mg    folic acid (FOLVITE) tablet 1 mg    gabapentin (NEURONTIN) capsule 300 mg    HYDROmorphone (PF) (DILAUDID) injection 0.5 mg    [Held by provider] isosorbide mononitrate (IMDUR) 24 hr tablet 60 mg    ketorolac (TORADOL) injection 15 mg    lactated ringers infusion    lidocaine (LMX4) cream    lidocaine 1 % 0.1-1 mL    LORazepam (ATIVAN) injection 1-2 mg    losartan (COZAAR) tablet 50 mg    magnesium sulfate 4 g in 100 mL sterile water intermittent infusion    melatonin tablet 1 mg    mirtazapine (REMERON) tablet 15 mg    multivitamin w/minerals (THERA-VIT-M) tablet 1 tablet    naloxone (NARCAN) injection 0.2 mg    Or    naloxone (NARCAN) injection 0.4 mg    Or    naloxone (NARCAN) injection 0.2 mg    Or    naloxone (NARCAN) injection 0.4 mg    OLANZapine (zyPREXA) injection 2.5 mg    ondansetron (ZOFRAN ODT) ODT tab 4 mg    oxyCODONE (ROXICODONE) tablet 5-10 mg    oxymetazoline (AFRIN) 0.05 % spray 2 spray    pantoprazole (PROTONIX) EC tablet 40 mg    Patient is already receiving anticoagulation with heparin, enoxaparin (LOVENOX), warfarin (COUMADIN)  or other anticoagulant medication    polyethylene glycol (MIRALAX) Packet 17 g    prochlorperazine (COMPAZINE) injection 5 mg    Or    prochlorperazine (COMPAZINE) tablet 5 mg    Or    prochlorperazine (COMPAZINE) suppository 12.5 mg    QUEtiapine (SEROquel) tablet 25 mg    [Held by provider] ROPivacaine 0.2% in NS perineural infusion (simple)    senna-docusate (SENOKOT-S/PERICOLACE) 8.6-50 MG per tablet 1 tablet    sertraline (ZOLOFT) tablet  "25 mg    sodium chloride (PF) 0.9% PF flush 3 mL    sodium chloride (PF) 0.9% PF flush 3 mL    tiZANidine (ZANAFLEX) tablet 2 mg    traZODone (DESYREL) tablet 50 mg         EXAMINATION:  Vital signs:  Temp: 97.6  F (36.4  C) Temp src: Axillary BP: (!) 151/53 Pulse: 75   Resp: 18 SpO2: 99 % O2 Device: None (Room air) Oxygen Delivery: 2 LPM   Weight: 60.1 kg (132 lb 7.9 oz)  Estimated body mass index is 20.75 kg/m  as calculated from the following:    Height as of 11/16/23: 1.702 m (5' 7\").    Weight as of this encounter: 60.1 kg (132 lb 7.9 oz).    General: NAD, pleasant and cooperative   HEENT: ATNC, no discharge from nares or ears    Pulmonary: breathing comfortably on room air, no accessory muscle use   Cardiovascular: RRR, radial pulses 2+ b/l  Abdominal: soft, non-tender to palpation   Extremities: Warm and well perfused in bilateral upper extremities and RLE.  Left lower extremity s/p AKA wrapped in figure-of-eight bandage with appreciable fluctuance throughout residual limb, mildly tender  MSK/neuro:   Mental Status:  alert and oriented x3. Follows 1-2 step commands.    Cranial Nerves: grossly normal   2nd CN: Pupils equal, round, reactive to light and accomodation.  3rd,4th,and 6th CN:  H-test   5th CN: Facial sensation intact in V1, V2, and V3 to light touch.  7th CN: Face symmetric to cheek puff and smile.  8th CN: Intact to light finger rub in the left, not in the right  9th and 10th CN: Palate elevates symmetrically.  Non-hoarse voice.  11th CN: Sternocleidomastoids and trapezius symmetric and strong.  12th CN: Tongue midline and without fasciculations.   Sensory: Normal to light touch in bilateral upper and lower extremities    Strength: (0 to 5 scale)   Scored: _/5 Right Left   Shoulder Abd 5 5   Elbow Flexion 5 5   Elbow Extension 4 4   Wrist Extension 5 5   Hand Intrinsics 5 5    Strength 5 5   Hip Flexion 4 3   Knee Extension 5 -   Ankle Dorsiflexion 5 -     Reflexes:  Scored: _/4 Right Left " "  Biceps 2 2   Brachioradialis 2 2   Triceps 2 2   Patellar 2 -   Achilles 2                     -      Burks's test: negative bilaterally    Babinski reflex: downgoing in the right   Abnormal movements: None    Coordination: No dysmetria on finger to nose bilaterally   Speech: Fluent   Cognition: Appears intact but some latency and following commands, patient self describing that he has \"no memory\"   Gait: Not assessed  Skin: normal skin color, texture, turgor and no redness, warmth, or swelling, AKA site not undressed      LABS AND IMAGING:  Results for orders placed or performed during the hospital encounter of 11/17/23 (from the past 24 hour(s))   Basic metabolic panel   Result Value Ref Range    Sodium 139 135 - 145 mmol/L    Potassium 4.4 3.4 - 5.3 mmol/L    Chloride 108 (H) 98 - 107 mmol/L    Carbon Dioxide (CO2) 23 22 - 29 mmol/L    Anion Gap 8 7 - 15 mmol/L    Urea Nitrogen 12.3 8.0 - 23.0 mg/dL    Creatinine 0.59 (L) 0.67 - 1.17 mg/dL    GFR Estimate >90 >60 mL/min/1.73m2    Calcium 8.4 (L) 8.8 - 10.2 mg/dL    Glucose 90 70 - 99 mg/dL   CBC with platelets   Result Value Ref Range    WBC Count 6.9 4.0 - 11.0 10e3/uL    RBC Count 2.78 (L) 4.40 - 5.90 10e6/uL    Hemoglobin 8.6 (L) 13.3 - 17.7 g/dL    Hematocrit 27.4 (L) 40.0 - 53.0 %    MCV 99 78 - 100 fL    MCH 30.9 26.5 - 33.0 pg    MCHC 31.4 (L) 31.5 - 36.5 g/dL    RDW 13.4 10.0 - 15.0 %    Platelet Count 329 150 - 450 10e3/uL   Magnesium   Result Value Ref Range    Magnesium 1.5 (L) 1.7 - 2.3 mg/dL   Phosphorus   Result Value Ref Range    Phosphorus 3.3 2.5 - 4.5 mg/dL     No results found for this or any previous visit (from the past 24 hour(s)).      "

## 2023-12-04 NOTE — PROGRESS NOTES
[facility]    Progress Note - Medicine Service, MAREL[facility] TEAM 3       Date of Admission:  11/17/2023    Assessment & Plan   Darian Engle is a 73 year old male admitted on 11/17/2023 with Left TMA that was gangrenous now 2 Days Post-Op s/o L AKA on Cefazolin and Nerve block pump. Dressings come down Monday per Vascular. Will attempt pause trail per vascular. Pending TCU placement    Summary for Today  - Restarted PTA apixaban per vascular  - Trial of bupivacaine pump discontinuation  - Awaiting PMR recs for potential discharge as outpatient      # Recurrent left foot cellulitis  # S/p transmetatarsal amputation 2/2 wet gangrene (10/27/23) c/b staph aureus bacteremia   # PAD s/p left common femoral endarterectomy and balloon angioplasty with stent placement (10/24/23)  # S/p L AKA 12/1/2023   Pt presented with 2-3 day history of worsening foot pain with malaise and nausea. Foot appears slightly more swollen with some fluctuance and scant purulent discharge. Labs notable for elevated CRP, though trending down from last check on 11/15, and WBC 11.5, elevated from previous check. MRI on admission was negative for osteomyelitis. Vascular surgery was consulted and recommended broad spectrum abx and checking duplex ultrasound of both extremities. Pt has been on Cefazolin since 11/5/23 for staph aureus bacteremia which developed after the left TMA, following with infectious disease clinic. There are notes that he may have missed doses due to dementia impairing his ability to manage his own medications and his wife getting sick. Placed on broad spectrum until 11/20 at which point ID recommended transition back to cefazolin. With increasing analgesia needs and evidence of poor wound healing underwent L AKA on 12/1.  - Vancomycin (11/18 - 11/20)   - Cefepime (11/18 - 11/20)  - Cefazolin (11/20 - *)  - Vascular surgery following, recs appreciated  - L AKA 12/1  - Post op  cares  - Rehab planning, PM&R consult   - Activity up to chair as tolerated, no need for bedrest (per vasc surg note)  - Bupivacaine peripheral anesthesia in place  - Pain control w/ PO + IV dilaudid PRN  - Continue antibiotics until 12/4 and assess amputation site. If no markers of cellulitis, can complete cefazolin and  remove   the PICC line that day.     # Malnutrition  # Pancreatic Mass  # LE edema  Likely 2/2 to 3rd spacing with malnutrition rather than HF. Endorses poor apetite since admission and has BGs down to 65. ECHO 11/08 with EF 60-65% which is baseline for him. No clinical signs of HF. Per note from Dr. Patel Ambrose 09/12/23, pt had stable appearance of complex cystic mass in the uncinate process of the pancreas. The stability and negative prior FNA is reassuring. However, persistent enhancing mural thickening is concerning for malignant degeneration into pancreatic adenocarcinoma. Differential includes a complex side branch intraductal papillary mucinous neoplasm (IPMN) and serous cystic neoplasm. Recommended continued close imaging surveillance with follow-up MRI in 6 months. Per [facility] Dr. 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 FNA showed showed uncinate process cyst was negative for high-grade dysplasia and malignancy while pancreatic body cyst was non-diagnostic. Concluded stable IPMN with concerning nodularity, no symptoms, marginal surgical candidate. MRI in 6 months, eus in 12 months for surveillance. Prior biopsy dated 12/13/2022 showed no definitive high grade dysplasia of the pancreatic body cyst and low-grade mucinous neoplasia of the uncinate process of the pancreas. The pt was scheduled to follow up with Dr. Govind Ho (medical oncology/general surgery, see note from 01/09/2023) after March biopsy but uncertain if this occurred. Feeding tubes tried while inpatient, ultimately opted to switch back to oral  intake with mirtazapine use as an appetite stimulant.  - NGT removed on 11/30  - mirtazapine 15mg at bedtime  - continue to monitor oral intake as pain level decreased after amputation     # Chronic normocytic anemia  Hgb 7.2, MCV 97. Hgb baseline appears 7-8. Suspect anemia of chronic disease.   - Transfused 1 U pRBC pm 11/19 which stabilized Hgb to 8.4  - received additional two units before surgery for pre-op optimization     # H/o HTN  PTA meds initially held due to hypotension on admission.   - Restarted PTA amlodipine 11/19  - Restarted PTA carvedilol 11/18, adjusted dosing to 12.5mg BID  - Restarted PTA losartan 11/21    - Holding PTA imdur     # Dementia  # Agitation  # Insomnia  Unclear patient's baseline. He is unable to answer many questions regarding timing of symptoms and medications. Wife, Violet, manages his medications and helps him make medical decisions.   - PTA donepezil discontinued during the admission  - PRN olanzapine   - PRN melatonin  - CIWA protocol discontinued      Chronic  # T2DM: Last A1c 5.5% (10/17/23), hold PTA metformin  # CAD: Continue PTA atorvastatin, ASA  # TALA: CPAP discontinued due to non-use  # A Fib: Rate controlled, continue PTA apixaban    # GERD: PTA pantoprazole  # Anxiety: PTA sertraline        Diet: Snacks/Supplements Adult: Other; Ensure Enlive for Breakfast, Boost Soothe for Lunch.; With Meals  Advance Diet as Tolerated: Regular Diet Adult    DVT Prophylaxis: Low Risk/Ambulatory with no VTE prophylaxis indicated  Mercado Catheter: Not present  Fluids: NS fluid bolus  Lines: PRESENT      PICC 11/11/23 Single Lumen Right Basilic Antibiotics. PICC was ready to use.-Site Assessment: WDL      Cardiac Monitoring: None  Code Status: Full Code      Clinically Significant Risk Factors            # Hypomagnesemia: Lowest Mg = 1.5 mg/dL in last 2 days, will replace as needed   # Hypoalbuminemia: Lowest albumin = 2.7 g/dL at 11/21/2023  5:24 AM, will monitor as appropriate       #  Hypertension: Noted on problem list     # Dementia: noted on problem list     # Severe Malnutrition: based on nutrition assessment    # Financial/Environmental Concerns: none         Disposition Plan      Expected Discharge Date: 12/06/2023      Destination: home with family;home with help/services  Discharge Comments: Dipo: TCU vs ARU ([facility] following)  Plan: Ropivacaine Nerve block pump; Monday - complete Cefazolin & assess AKA site  Progress: POD#3: L- AKA   Delay: IV pain Rx        The patient's care was discussed with the Attending Physician, Dr. Mcadams .    KINGS GUERRERO  Medicine Service, [facility] TEAM [address] [place] [address]  Securely message with [employer] (more info)  Text page via [employer] Paging/Directory   See signed in provider for up to date coverage information  ______________________________________________________________________    Interval History       Physical Exam   Vital Signs: Temp: 98.3  F (36.8  C) Temp src: Axillary BP: 99/42 Pulse: 68   Resp: 18 SpO2: 98 % O2 Device: None (Room air)    Weight: 132 lbs 7.94 oz    Gen: No acute distress, non-toxic, alert  CV: Acyanotic, non-diaphoretic  Pulm: breathing non-labored  Ext: Moves all extremities, non-cyanotic, L AKA covered in dry bandage with peripheral anesthetic pump intact and in place.  Psych: Normal mood and affect       Medical Decision Making       Please see A&P for additional details of medical decision making.      Data   ------------------------- PAST 24 HR DATA REVIEWED -----------------------------------------------    I have personally reviewed the following data over the past 24 hrs:    6.9  \   8.6 (L)   / 329     139 108 (H) 12.3 /  90   4.4 23 0.59 (L) \       Imaging results reviewed over the past 24 hrs:   No results found for this or any previous visit (from the past 24 hour(s)).

## 2023-12-04 NOTE — PROGRESS NOTES
Pain Service Progress Note  [facility]  Date: 12/04/2023       Patient Name: Darian Engle  MRN: [id number]  Age: 73 year old  Sex: male      Assessment:  73 year old with chronic limb threatening ischemia, s/p LLE AKA on 12/1.     Procedure: Left AKA    Date of Surgery: 12/1    Date of Catheter Placement: 12/1    Plan/Recommendations:  1. Regional Anesthesia/Analgesia  -Continuous Catheter Type/Site: left femoral catheter  Infusate: Ropivacaine 0.2%  Programmed Intermittent Bolus (PIB) at 8 mL Q60 min     POD#3. Pain is well controlled. Catheter paused for trial this morning in anticipation for removal prior to transfer to TCU.    Plan to maintain catheter, max of 7 days    2. Anticoagulation  -Please contact Inpatient Pain Service before ordering or making any anticoagulation changes     3. Multimodal Analgesia  - Per primary team    Pain Service will continue to follow.    Alma Stark MD  Anesthesiology Resident, CA-1  12/04/2023     Overnight Events: No acute events    Tubes/Drains: Yes  PICC    Subjective:  Nausea: No  Vomiting: No  Pruritus: No  Symptoms of LAST: No    Pain Location:  Left stump    Pain Intensity:    Pain at Rest: 0/10   Pain with Activity: 2-3/10  Comfort Goal: 0/10   Satisfied with your level of pain control: Yes    Diet: Snacks/Supplements Adult: Other; Ensure Enlive for Breakfast, Boost Soothe for Lunch.; With Meals  Advance Diet as Tolerated: Regular Diet Adult    Relevant Labs:  Recent Labs   Lab Test 12/02/23  0537 12/01/23  0612   INR  --  1.18*     --    PTT  --  37   BUN 10.5 12.6       Physical Exam:  Vitals: BP (!) 150/59 (BP Location: Left arm)   Pulse 72   Temp 98.3  F (36.8  C) (Axillary)   Resp 18   Wt 60.1 kg (132 lb 7.9 oz)   SpO2 98%   BMI 20.75 kg/m      Physical Exam:   Orientation:  Alert, oriented, and in no acute distress: Yes  Sedation: No    Motor Examination:  5/5 Strength in lower extremities: Yes    Sensory Level:    Decrease in sensation: Yes    Catheter Site:   Catheter entry site is clean/dry/intact: Yes    Tender: No      Relevant Medications:  Current Pain Medications:  Medications related to Pain Management (From now, onward)      Start     Dose/Rate Route Frequency Ordered Stop    12/02/23 1030  ketorolac (TORADOL) injection 15 mg         15 mg Intravenous EVERY 6 HOURS 12/02/23 1015 12/05/23 1029    12/01/23 1130  ROPivacaine 0.2% in NS perineural infusion (simple)          Perineural Continuous Nerve Block 12/01/23 1110      12/01/23 0800  HYDROmorphone (PF) (DILAUDID) injection 0.5 mg         0.5 mg Intravenous EVERY 4 HOURS PRN 11/30/23 2223      11/27/23 1000  oxyCODONE (ROXICODONE) tablet 5-10 mg        Note to Pharmacy: PTA Sig:Take 1-2 tablets (5-10 mg) by mouth every 6 hours as needed for moderate pain      5-10 mg Oral EVERY 3 HOURS PRN 11/27/23 0954      11/25/23 0900  polyethylene glycol (MIRALAX) Packet 17 g        Note to Pharmacy: PTA Sig:Take 17 g by mouth daily      17 g Oral DAILY PRN 11/25/23 0853      11/20/23 1400  gabapentin (NEURONTIN) capsule 300 mg        Note to Pharmacy: PTA Sig:Take 1 capsule (100 mg) by mouth 3 times daily      300 mg Oral 3 TIMES DAILY 11/20/23 1206      11/19/23 0221  LORazepam (ATIVAN) injection 1-2 mg         1-2 mg Intravenous EVERY 30 MIN PRN 11/19/23 0222      11/18/23 0800  acetaminophen (TYLENOL) tablet 975 mg         975 mg Oral 3 TIMES DAILY 11/18/23 0346      11/18/23 0800  senna-docusate (SENOKOT-S/PERICOLACE) 8.6-50 MG per tablet 1 tablet        Note to Pharmacy: PTA Sig:Take 1 tablet by mouth 2 times daily While taking narcotic pain medications (oxycodone)      1 tablet Oral 2 TIMES DAILY 11/18/23 0346      11/18/23 0800  aspirin (ASA) chewable tablet 81 mg         81 mg Oral DAILY 11/18/23 0346      11/18/23 0346  tiZANidine (ZANAFLEX) tablet 2 mg        Note to Pharmacy: PTA Sig:Take 1 tablet (2 mg) by mouth 3 times daily as needed for muscle spasms      2  "mg Oral 3 TIMES DAILY PRN 11/18/23 0346      11/18/23 0346  lidocaine 1 % 0.1-1 mL         0.1-1 mL Other EVERY 1 HOUR PRN 11/18/23 0346      11/18/23 0346  lidocaine (LMX4) cream          Topical EVERY 1 HOUR PRN 11/18/23 0346      11/18/23 0346  bisacodyl (DULCOLAX) suppository 10 mg         10 mg Rectal DAILY PRN 11/18/23 0346              Primary Service Contacted with Recommendations? Yes      Please see A&P for additional details of medical decision making.      Acute Inpatient Pain Service [facility]  Hours of pain coverage 24/7   Page via App Partner- Please Page the Pain Team Via [facility]: \"PAIN MANAGEMENT ACUTE INPATIENT/ [facility]\"            "

## 2023-12-04 NOTE — PLAN OF CARE
Goal Outcome Evaluation:      Plan of Care Reviewed With: patient    Overall Patient Progress: no changeOverall Patient Progress: no change    1900-0730: Pt A&Ox4 but can get confused at times. VSS on RA. Pt reports that he no longer feels pain in LLE. Dressing fell off again and was changed. Ropivacaine block running into LLE. No BM. Pt voided into urinal. R SL PICC running with LR at 50 ml/hr.     Plan: IV abx to be completed today. Team will re-evaluate. Planning to start Eliquis again. Team will also trial pt off of ropivacaine block today to see how he tolerates it. PT/OT recommending TCU.

## 2023-12-05 NOTE — CONSULTS
Resolving ID consult order. Please refer to ID progress note, by Dr. Ventura for details.     Kasey Srivastava MD  Infectious Disease Staff Physician  Pager: [phone]  [place] [facility]   Date: 12/5/2023

## 2023-12-05 NOTE — PROGRESS NOTES
[facility] ID Service: Re-Consult Note      Patient:  Darian Engle, Date of birth 1950, Medical record number [id number]  Date of Visit:  December 5, 2023         Assessment and Recommendations:   ID Problem List:  Left foot post-surgical site infection - presumed MSSA cellulitis  S/p L AKA 12/1/2023 with Vascular Surgery  Complicated MSSA bacteremia  Blood culture 11/5/2023 in 1/4 bottles positive for MSSA  Blood cultures 11/6-11/13/2023 negative  Blood cultures 11/17/2023 negative    Recommendations:  Discontinue cefazolin 2g IV every 8 hours, has completed full 4 week course as originally planned through 12/3  Continue local post-surgical site care per vascular surgery    Discussion:    Darian Engle is a 73y M with pmhx PAD with recent L foot amputation 10/26/2023 and recent admission for post-surgical site infection, was started on antibiotics for MSSA bacteremia with a plan for a total four week course, two weeks of IV cefazolin (11/6-11/19) followed by two weeks of oral TMP/SMX (11/20-12/3). He presented 11/17 for concerns for L foot infection with acutely worsening pain and started on empiric vancomycin and IV cefepime. ID was consulted 11/20 for an antibiotic deescalation plan. Patient has remained afebrile thus far this admission, with CRP continuing to downtrend and WBC only mildly elevated on admission and since downtrending. Imaging thus far has been negative for osteomyelitis (XR L foot and MRI L foot), and blood cultures thus far have been negative aside from initial blood cultures 11/5 positive for staph aureus in 1/4 bottles. Therefore, there is low concern for new infection at this time so would recommend transitioning back to IV cefazolin. Currently there is no need for MRSA or pseudomonas coverage. Original plan had been to transition to oral TMP/SMX 11/20 however given concerns for compromised blood supply now confirmed by RHONDA US, will continue with IV antibiotics to  increase chances of distribution to foot. Plan to complete full 4 week course as originally planned with IV cefazolin through 12/3.     ID re-consulted 12/5/2023 for antibiotic management. In the interim patient has had LLE AKA with Vascular Surgery 12/1/2023. Stump appears to be healing well, remains afebrile with no leukocytosis. Reviewed the recent photo of the wound, the margins appear well approximated with good skin color, no surrounding erythema or discoloration, healing as anticipated. Surgical gross pathology report from 12/1/2023 shows clean resection margin that is significantly above the original infection line, therefore we can be confident that there is adequate source control with the amputation. Can discontinue the IV cefazolin today, no indication for further antibiotics. Can continue further post-surgical local wound care per vascular surgery team.     Recs were discussed with the ID attending Dr. Srivastava and primary team today. Don't hesitate to call with questions. We will sign off today.    Amber Ventura MD  PGY-1  Medicine-Pediatrics Resident  [facility]  Infectious Disease Team        Interval History:     No acute events overnight. Now POD4 from LLE AKA. Patient reports he is doing well, pain is well controlled ~2/10, just some mild soreness. Denies cough, abd pain, nausea. Normal urination and bowel movements.          Review of Systems:   Full 9 pt ROS obtained, pertinent positives and negatives as above.          Current Antimicrobials   Current:  - Cefazolin 2g IV every 8 hours (11/20- )    Prior:  - Cefepime 2g IV every 8 hours (11/17-11/20)  - Vancomycin IV (dosing per pharmacy) (11/17-11/20)  - Cefazolin 2g IV every 8 hours (11/6-11/17)         Physical Exam:   Ranges for vital signs:  Temp:  [98.4  F (36.9  C)-99.2  F (37.3  C)] 98.6  F (37  C)  Pulse:  [64-75] 64  Resp:  [14-16] 16  BP: ()/(42-60) 155/60  SpO2:  [98 %-100 %] 98 %    Intake/Output Summary (Last 24  hours) at 2023 0931  Last data filed at 2023 1848  Gross per 24 hour   Intake --   Output 1350 ml   Net -1350 ml     Exam:  GENERAL:  awake, alert, lying in bed in no acute distress.   ENT:  Head is normocephalic, atraumatic. Oropharynx is moist  LUNGS:  Clear to auscultation from anterior exam, no crackles or wheezes. Breathing comfortably on room air.  CARDIOVASCULAR:  Regular rate and rhythm with no murmurs  ABDOMEN:  Normal bowel sounds, soft, nontender, nondistended.  EXT: Extremities warm. LLE stump well bandaged with dressing c/d/i, see photo from vascular surgery note today. Nontender to palpation, no fluctuance. No edema in RLE.   SKIN:  No acute rashes  NEUROLOGIC:  Grossly nonfocal.         Laboratory Data:   Reviewed.  Pertinent for: WBC 6.2 (6.9), Cr 0.54 (0.59)    Culture data:   blood culture positive for staph aureus (MSSA) 1/2 bottles  - all blood cultures negative   blood culture negative     surgical gross pathology: smoothly transected margin, diseased tissue >17.5cm from resection margin         Imagin/5 XR L foot: ill-defined resection edge, osteomyelitis  not excluded   XR L foot: No evidence for osteomyelitis   MR L foot: no osteomyelitis, some soft tissue edema and enhancement at amputation site possible cellulitis, no fluid collection   US RHONDA: bilateral dorsalis pedis arteries not detected. Bilateral resting RHONDA not compressible. R occlusive flat first digit PPG. L first digit PPG not detected

## 2023-12-05 NOTE — PROGRESS NOTES
Vascular Surgery Progress Note  12/05/2023       Subjective:  Feeling well this morning, pain 1-2 with dressing changes. Dressing changed this morning, no drainage, without swelling, no hematoma. No signs or symptoms of infection.      Objective:  Temp:  [98.4  F (36.9  C)-99.2  F (37.3  C)] 98.6  F (37  C)  Pulse:  [67-75] 67  Resp:  [14-16] 16  BP: ()/(42-52) 114/49  SpO2:  [98 %-100 %] 98 %    I/O last 3 completed shifts:  In: 900 [P.O.:600; I.V.:300]  Out: 1550 [Urine:1550]      Gen: Awake, alert, NAD  Resp: NLB on RA  Incision: no strikethrough, bleeding on dressing L AKA stump.   Ext: WWP RLE, no edema.                  Labs:  Recent Labs   Lab 12/05/23  0607 12/04/23  0612 12/03/23  0611   WBC 6.2 6.9 7.7   HGB 7.8* 8.6* 8.2*    329 291       Recent Labs   Lab 12/05/23  0607 12/04/23  0612 12/03/23  0611    139 136   POTASSIUM 4.3 4.4 4.6   CHLORIDE 106 108* 104   CO2 25 23 24   BUN 13.0 12.3 14.8   CR 0.54* 0.59* 0.62*   GLC 95 90 84   SHERYL 8.1* 8.4* 8.2*   MAG 1.7 1.5* 1.9   PHOS 3.4 3.3 3.9       Imaging:  Relevant imaging reviewed     Assessment/Plan:   73 year old male with PMH of CLTI with tissue loss of LLE s/p revascularization attempts which ultimately failed, now s/p LLE AKA on 12/1 with Dr. Felix. Has ongoing pain, improved today with toradol.     - Apixaban daily  - Vascular surgery to change dressing daily (xeroform, abd and kerlix)  - Recommend patient not take narcotics after 4pm to decrease risk of delirium and falls  - Antibiotics per ID recommendation  - OT/PT for discharge recs, okay for ARU discharge from vascular surgery perspective     Discussed pt history, exam, assessment and plan with  Dr. Felix of the vascular surgery service, who are in agreement with the above.    Roxann Fontanez, [facility]  Vascular Surgery  Pager: [phone]  [email or web address]  Send message or 10 digit call back number Securely via [employer] with the [employer] Web Console (learn more here)

## 2023-12-05 NOTE — PROGRESS NOTES
[facility]    Progress Note - Medicine Service, FATIMAH TEAM 3       Date of Admission:  11/17/2023    Assessment & Plan   Darian Engle is a 73 year old male admitted on 11/17/2023 with Left TMA that was gangrenous now 4 Days Post-Op s/p L AKA on IV Cefazolin. Overall recovering well, pain well controlled and pending ARU placement. In the mean time will work on nutrition while patient is still with us.    Summary of Events  - ARU placement pending  - Ok for discharge from Vascular perspective  - Discontinued Femoral nerve block pump, pain is well controlled on orals  - Discontinued IV medications  - Discontinued fluids  - ID consult about final antibiotic plan (through 12/3 initially)  - Nutrition consult for poor oral intake   - Calories counts started 12/5    # Recurrent left foot cellulitis  # S/p transmetatarsal amputation 2/2 wet gangrene (10/27/23) c/b staph aureus bacteremia   # PAD s/p left common femoral endarterectomy and balloon angioplasty with stent placement (10/24/23)  # S/p L AKA 12/1/2023   Pt presented with 2-3 day history of worsening foot pain with malaise and nausea. Foot appears slightly more swollen with some fluctuance and scant purulent discharge. Labs notable for elevated CRP, though trending down from last check on 11/15, and WBC 11.5, elevated from previous check. MRI on admission was negative for osteomyelitis. Vascular surgery was consulted and recommended broad spectrum abx and checking duplex ultrasound of both extremities. Pt has been on Cefazolin since 11/5/23 for staph aureus bacteremia which developed after the left TMA, following with infectious disease clinic. There are notes that he may have missed doses due to dementia impairing his ability to manage his own medications and his wife getting sick. Placed on broad spectrum until 11/20 at which point ID recommended transition back to cefazolin. With increasing analgesia needs  and evidence of poor wound healing underwent L AKA on 12/1.  - Vascular surgery following  - s/p L AKA 12/1  - Activity up to chair as tolerated, no need for bedrest (per vasc surg note)  - vascular to change dressing daily  - Pain control w/ PO oxy  - Infectious disease ([facility]) with final antibiotic plan  - Vancomycin (11/18 - 11/20)   - Cefepime (11/18 - 11/20)  - Cefazolin (11/20 - *)    # Malnutrition  # Pancreatic Mass  # LE edema  Likely 2/2 to 3rd spacing with malnutrition rather than HF. Endorses poor apetite since admission and has BGs down to 65. ECHO 11/08 with EF 60-65% which is baseline for him. No clinical signs of HF. Per note from Dr. Patel Ambrose 09/12/23, pt had stable appearance of complex cystic mass in the uncinate process of the pancreas. The stability and negative prior FNA is reassuring. However, persistent enhancing mural thickening is concerning for malignant degeneration into pancreatic adenocarcinoma. Differential includes a complex side branch intraductal papillary mucinous neoplasm (IPMN) and serous cystic neoplasm. Recommended continued close imaging surveillance with follow-up MRI in 6 months. Per [facility] Dr. 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 FNA showed showed uncinate process cyst was negative for high-grade dysplasia and malignancy while pancreatic body cyst was non-diagnostic. Concluded stable IPMN with concerning nodularity, no symptoms, marginal surgical candidate. MRI in 6 months, eus in 12 months for surveillance. Prior biopsy dated 12/13/2022 showed no definitive high grade dysplasia of the pancreatic body cyst and low-grade mucinous neoplasia of the uncinate process of the pancreas. The pt was scheduled to follow up with Dr. Govind Ho (medical oncology/general surgery, see note from 01/09/2023) after March biopsy but uncertain if this occurred. Feeding tubes tried while inpatient,  ultimately opted to switch back to oral intake with mirtazapine use as an appetite stimulant. NGT removed on 11/30  - Nutrition consult  - Mirtazapine 15mg at bedtime  - Calorie count  - discontinue mIVF, strict I/Os    # Chronic normocytic anemia  Hgb 7.2, MCV 97. Hgb baseline appears 7-8. Suspect anemia of chronic disease. Transfused 1 U pRBC pm 11/19 which stabilized Hgb to 8.4  - Transfuse for Hb < 7     # H/o HTN  PTA meds initially held due to hypotension on admission.   - Restarted PTA amlodipine 11/19  - Restarted PTA carvedilol 11/18, adjusted dosing to 12.5mg BID  - Restarted PTA losartan 11/21    - Holding PTA imdur     # Dementia  # Agitation  # Insomnia  Unclear patient's baseline. He is unable to answer many questions regarding timing of symptoms and medications. Wife, Violet, manages his medications and helps him make medical decisions.   - PTA donepezil discontinued during the admission per PCP  - PRN olanzapine   - PRN melatonin  - Recommend patient not take narcotics after 4pm to decrease risk of delirium and falls     # Disposition  - PM&R with recommendation to ARU  - Care coordination in progress     Chronic  # T2DM: Last A1c 5.5% (10/17/23), hold PTA metformin  # CAD: Continue PTA atorvastatin, ASA  # TALA: CPAP discontinued due to non-use  # A Fib: Rate controlled, continue PTA apixaban    # GERD: PTA pantoprazole  # Anxiety: PTA sertraline        Diet: Snacks/Supplements Adult: Other; Ensure Enlive for Breakfast, Boost Soothe for Lunch.; With Meals  Advance Diet as Tolerated: Regular Diet Adult  Calorie Counts    DVT Prophylaxis: Apixaban  Mercado Catheter: Not present  Fluids: NS fluid bolus  Lines: PRESENT      PICC 11/11/23 Single Lumen Right Basilic Antibiotics. PICC was ready to use.-Site Assessment: WDL      Cardiac Monitoring: None  Code Status: Full Code      Clinically Significant Risk Factors            # Hypomagnesemia: Lowest Mg = 1.5 mg/dL in last 2 days, will replace as needed   #  Hypoalbuminemia: Lowest albumin = 2.7 g/dL at 11/21/2023  5:24 AM, will monitor as appropriate       # Hypertension: Noted on problem list     # Dementia: noted on problem list     # Severe Malnutrition: based on nutrition assessment    # Financial/Environmental Concerns: none         Disposition Plan      Expected Discharge Date: 12/08/2023      Destination: home with family;home with help/services  Discharge Comments: Dipo: ARU ([facility] following)  Plan: Vascular surg to change drsg-QDay  Progress: POD#4: L- AKA; Abx per ID        The patient's care was discussed with the Attending Physician, Dr. Ackerman .    Nicolas Suggs MD PhD  Medicine Service, [address]  Securely message with [employer] (more info)  Text page via [employer] Paging/Directory   See signed in provider for up to date coverage information  ______________________________________________________________________    Interval History   NAEO  Femoral nerve block pump removed  Patient states his pain is well controlled on oral oxycodone  Encouraging PO, patient reporting PO/appetite increasing  Ok with rehab placement    Physical Exam   Vital Signs: Temp: 98.6  F (37  C) Temp src: Oral BP: 118/45 Pulse: 64   Resp: 16 SpO2: 98 % O2 Device: None (Room air)    Weight: 130 lbs 1.14 oz    Gen: No acute distress, non-toxic, alert  CV: Acyanotic, non-diaphoretic  Pulm: breathing non-labored  Ext: L AKA covered in dry bandage, no seepage or staining of blood  Psych: Normal mood and affect    Medical Decision Making       Please see A&P for additional details of medical decision making.  MANAGEMENT DISCUSSED with the following over the past 24 hours: ID, Vascular, CM/SW   50 MINUTES SPENT BY ME on the date of service doing chart review, history, exam, documentation & further activities per the note.      Data   ------------------------- PAST 24 HR DATA REVIEWED -----------------------------------------------    I have  personally reviewed the following data over the past 24 hrs:    6.2  \   7.8 (L)   / 330     137 106 13.0 /  95   4.3 25 0.54 (L) \       Imaging results reviewed over the past 24 hrs:   No results found for this or any previous visit (from the past 24 hour(s)).

## 2023-12-05 NOTE — PLAN OF CARE
Goal Outcome Evaluation:      Plan of Care Reviewed With: patient    Overall Patient Progress: no changeOverall Patient Progress: no change    Outcome Evaluation: Dressing remains CDI on L AKA. Voiding spontaneously this shift, using urinal at bedside. VSS on RA. tolerating reg diet with poor appetite. Ropivacaine pump discontinued this shift. AOx4 although forgetful and requires repeated teaching. PICC infusing LR at 50ml/hr with ancef infusions. Per MD request pt did NOT receive any narcotics after 4pm. Will continue to monitor and follow POC.

## 2023-12-05 NOTE — PROGRESS NOTES
Pain Service Progress Note  [facility]  Date: 12/04/2023       Patient Name: Darian Engle  MRN: [id number]  Age: 73 year old  Sex: male      Assessment:  73 year old with chronic limb threatening ischemia, s/p LLE AKA on 12/1.     Procedure: Left AKA    Date of Surgery: 12/1    Date of Catheter Placement: 12/1    Plan/Recommendations:  1. Regional Anesthesia/Analgesia  -Continuous Catheter Type/Site: left femoral catheter  Infusate: Ropivacaine 0.2%  Programmed Intermittent Bolus (PIB) at 8 mL Q60 min     Catheter removed at 1910 after being held x12 hours. Pain level 1-2 while catheter held. Catheter removed with tip intact. RN notified.    2. Anticoagulation  -Please contact Inpatient Pain Service before ordering or making any anticoagulation changes     3. Multimodal Analgesia  - Per primary team    Pain Service will sign off.    Leslie Goldberg, MD   Anesthesiology Resident, CA-3  12/04/2023     Overnight Events: No acute events    Tubes/Drains: Yes  PICC    Subjective:  Nausea: No  Vomiting: No  Pruritus: No  Symptoms of LAST: No    Pain Location:  Left stump    Pain Intensity:    Pain at Rest: 0/10   Pain with Activity: 2-3/10  Comfort Goal: 0/10   Satisfied with your level of pain control: Yes    Diet: Snacks/Supplements Adult: Other; Ensure Enlive for Breakfast, Boost Soothe for Lunch.; With Meals  Advance Diet as Tolerated: Regular Diet Adult    Relevant Labs:  Recent Labs   Lab Test 12/02/23  0537 12/01/23  0612   INR  --  1.18*     --    PTT  --  37   BUN 10.5 12.6       Physical Exam:  Vitals: /43 (BP Location: Left arm)   Pulse 75   Temp 37.3  C (99.2  F) (Oral)   Resp 14   Wt 60.1 kg (132 lb 7.9 oz)   SpO2 100%   BMI 20.75 kg/m      Physical Exam:   Orientation:  Alert, oriented, and in no acute distress: Yes  Sedation: No    Catheter Site:   Catheter entry site is clean/dry/intact: Yes    Tender: No      Relevant Medications:  Current Pain  Medications:  Medications related to Pain Management (From now, onward)      Start     Dose/Rate Route Frequency Ordered Stop    12/02/23 1030  ketorolac (TORADOL) injection 15 mg         15 mg Intravenous EVERY 6 HOURS 12/02/23 1015 12/05/23 1029    12/01/23 1130  ROPivacaine 0.2% in NS perineural infusion (simple)          Perineural Continuous Nerve Block 12/01/23 1110      12/01/23 0800  HYDROmorphone (PF) (DILAUDID) injection 0.5 mg         0.5 mg Intravenous EVERY 4 HOURS PRN 11/30/23 2223      11/27/23 1000  oxyCODONE (ROXICODONE) tablet 5-10 mg        Note to Pharmacy: PTA Sig:Take 1-2 tablets (5-10 mg) by mouth every 6 hours as needed for moderate pain      5-10 mg Oral EVERY 3 HOURS PRN 11/27/23 0954      11/25/23 0900  polyethylene glycol (MIRALAX) Packet 17 g        Note to Pharmacy: PTA Sig:Take 17 g by mouth daily      17 g Oral DAILY PRN 11/25/23 0853      11/20/23 1400  gabapentin (NEURONTIN) capsule 300 mg        Note to Pharmacy: PTA Sig:Take 1 capsule (100 mg) by mouth 3 times daily      300 mg Oral 3 TIMES DAILY 11/20/23 1206      11/19/23 0221  LORazepam (ATIVAN) injection 1-2 mg         1-2 mg Intravenous EVERY 30 MIN PRN 11/19/23 0222      11/18/23 0800  acetaminophen (TYLENOL) tablet 975 mg         975 mg Oral 3 TIMES DAILY 11/18/23 0346      11/18/23 0800  senna-docusate (SENOKOT-S/PERICOLACE) 8.6-50 MG per tablet 1 tablet        Note to Pharmacy: PTA Sig:Take 1 tablet by mouth 2 times daily While taking narcotic pain medications (oxycodone)      1 tablet Oral 2 TIMES DAILY 11/18/23 0346      11/18/23 0800  aspirin (ASA) chewable tablet 81 mg         81 mg Oral DAILY 11/18/23 0346      11/18/23 0346  tiZANidine (ZANAFLEX) tablet 2 mg        Note to Pharmacy: PTA Sig:Take 1 tablet (2 mg) by mouth 3 times daily as needed for muscle spasms      2 mg Oral 3 TIMES DAILY PRN 11/18/23 0346      11/18/23 0346  lidocaine 1 % 0.1-1 mL         0.1-1 mL Other EVERY 1 HOUR PRN 11/18/23 0346      11/18/23  "0346  lidocaine (LMX4) cream          Topical EVERY 1 HOUR PRN 11/18/23 0346      11/18/23 0346  bisacodyl (DULCOLAX) suppository 10 mg         10 mg Rectal DAILY PRN 11/18/23 0346              Primary Service Contacted with Recommendations? No      Please see A&P for additional details of medical decision making.      Acute Inpatient Pain Service [facility]  Hours of pain coverage 24/7   Page via Anki- Please Page the Pain Team Via Amcom: \"PAIN MANAGEMENT ACUTE INPATIENT/ [facility]\"            "

## 2023-12-06 NOTE — PROGRESS NOTES
Care Management Follow Up    Length of Stay (days): 18    Expected Discharge Date: 12/07/2023     Concerns to be Addressed: Discharge planning  Patient plan of care discussed at interdisciplinary rounds: Yes    Anticipated Discharge Disposition: TBD     Anticipated Discharge Services: TBD  Anticipated Discharge DME:  (wound care)    Patient/family educated on Medicare website which has current facility and service quality ratings: yes  Education Provided on the Discharge Plan:  yes  Patient/Family in Agreement with the Plan: TBD    Referrals Placed by CM/SW:  [facility] ARU  Private pay costs discussed: Not applicable    Additional Information:   met with patient and his wife Angelica at bedside to discuss ARU. Patient and his wife confirmed that they are not interested in TCU due to a bad prior experience and confirmed that someone has already spoken with them about ARU.  Patient then told SW that the only kind of services he will do is OP and patient is open to home care.  Patient and his wife reported that their home is 100% accessible, 1 level, and they have a commode, risers, walk in shower, etc.  Angelica stated she felt comfortable caring for patient if he were to discharge home and that she retired to do so.       updated [facility] 3 provider via [employer].  Care management will follow up with PT/OT and will continue to follow for a safe discharge plan.     SARAHI Fields, [facility]  [address]   [email or web address]  Office: [phone]   Pager: [phone]

## 2023-12-06 NOTE — PROGRESS NOTES
[facility]    Progress Note - Medicine Service, FATIMAH TEAM 3       Date of Admission:  11/17/2023    Assessment & Plan   Darian Engle is a 73 year old male admitted on 11/17/2023 with Left TMA that was gangrenous now 5 Days Post-Op s/p L AKA. Overall recovering well, pain well controlled and pending ARU placement.    Summary of Events  - ARU placement   - Ok for discharge from Vascular perspective  - Antibiotics finished, no ID follow up necessary  - Continue calorie counts  - Nicotine patch to help with appetite given recent smoking history  - Psychiatry consult  - Mag Ox 400 BID  - Coreg to 25 mg from 12 mg  - Continue calorie counts and encouraging diet    # Recurrent left foot cellulitis  # S/p transmetatarsal amputation 2/2 wet gangrene (10/27/23) c/b staph aureus bacteremia   # PAD s/p left common femoral endarterectomy and balloon angioplasty with stent placement (10/24/23)  # S/p L AKA 12/1/2023   Pt presented with 2-3 day history of worsening foot pain with malaise and nausea. Foot appears slightly more swollen with some fluctuance and scant purulent discharge. Labs notable for elevated CRP, though trending down from last check on 11/15, and WBC 11.5, elevated from previous check. MRI on admission was negative for osteomyelitis. Vascular surgery was consulted and recommended broad spectrum abx and checking duplex ultrasound of both extremities. Pt has been on Cefazolin since 11/5/23 for staph aureus bacteremia which developed after the left TMA, following with infectious disease clinic. There are notes that he may have missed doses due to dementia impairing his ability to manage his own medications and his wife getting sick. Placed on broad spectrum until 11/20 at which point ID recommended transition back to cefazolin. With increasing analgesia needs and evidence of poor wound healing underwent L AKA on 12/1. Finished a course of IV cefazolin.  -  Vascular surgery following  - s/p L AKA 12/1  - vascular to change dressing daily  - Pain control w/ PO oxy  - Infectious disease ([place]) with final antibiotic plan  - Vancomycin (11/18 - 11/20)   - Cefepime (11/18 - 11/20)  - Cefazolin (11/18 - 12/5)  [ ] Dispo: No ID outpatient f/up recommended at this time.    # Malnutrition  # Pancreatic Mass  # LE edema  Likely 2/2 to 3rd spacing with malnutrition rather than HF. Endorses poor apetite since admission and has BGs down to 65. ECHO 11/08 with EF 60-65% which is baseline for him. No clinical signs of HF. Per note from Dr. Patel Ambrose 09/12/23, pt had stable appearance of complex cystic mass in the uncinate process of the pancreas. The stability and negative prior FNA is reassuring. However, persistent enhancing mural thickening is concerning for malignant degeneration into pancreatic adenocarcinoma. Differential includes a complex side branch intraductal papillary mucinous neoplasm (IPMN) and serous cystic neoplasm. Recommended continued close imaging surveillance with follow-up MRI in 6 months. Per [facility] Dr. 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 FNA showed showed uncinate process cyst was negative for high-grade dysplasia and malignancy while pancreatic body cyst was non-diagnostic. Concluded stable IPMN with concerning nodularity, no symptoms, marginal surgical candidate. MRI in 6 months, eus in 12 months for surveillance. Prior biopsy dated 12/13/2022 showed no definitive high grade dysplasia of the pancreatic body cyst and low-grade mucinous neoplasia of the uncinate process of the pancreas. The pt was scheduled to follow up with Dr. Govind Ho (medical oncology/general surgery, see note from 01/09/2023) after March biopsy but uncertain if this occurred. Feeding tubes tried while inpatient, ultimately opted to switch back to oral intake with mirtazapine use as an appetite  stimulant. NGT removed on 11/30  - Nutrition consult  - Calorie count  - Strict I/O  - Mirtazapine 15mg at bedtime    # Chronic normocytic anemia  Hgb 7.2, MCV 97. Hgb baseline appears 7-8. Suspect anemia of chronic disease. Transfused 1 U pRBC pm 11/19 which stabilized Hgb to 8.4  - Transfuse for Hb < 7     # H/o HTN  PTA meds initially held due to hypotension on admission.   - PTA amlodipine 10 mg  - PTA carvedilol 25 BID  - PTA losartan 50 mg  - Holding PTA imdur 60 mg     # Dementia  # Agitation  # Insomnia  Unclear patient's baseline. He is unable to answer many questions regarding timing of symptoms and medications. Wife, Violet, manages his medications and helps him make medical decisions.   - PTA donepezil discontinued during the admission per PCP  - PRN Quetiapine 25 mg   - PRN melatonin  - Recommend patient not take narcotics after 4pm to decrease risk of delirium and falls     # Disposition  - PM&R with recommendation to ARU  - Care coordination in progress     Chronic  # T2DM: Last A1c 5.5% (10/17/23), hold PTA metformin  # CAD: Continue PTA atorvastatin, ASA  # TALA: CPAP discontinued due to non-use  # A Fib: Rate controlled, continue PTA apixaban    # GERD: PTA pantoprazole  # Anxiety: PTA sertraline        Diet: Snacks/Supplements Adult: Other; Ensure Enlive for Breakfast, Boost Soothe for Lunch.; With Meals  Advance Diet as Tolerated: Regular Diet Adult  Calorie Counts    DVT Prophylaxis: Apixaban  Mercado Catheter: Not present  Fluids: NS fluid bolus  Lines: PRESENT      PICC 11/11/23 Single Lumen Right Basilic Antibiotics. PICC was ready to use.-Site Assessment: WDL      Cardiac Monitoring: None  Code Status: Full Code      Clinically Significant Risk Factors            # Hypomagnesemia: Lowest Mg = 1.4 mg/dL in last 2 days, will replace as needed   # Hypoalbuminemia: Lowest albumin = 2.7 g/dL at 11/21/2023  5:24 AM, will monitor as appropriate       # Hypertension: Noted on problem list     # Dementia:  noted on problem list     # Severe Malnutrition: based on nutrition assessment    # Financial/Environmental Concerns: none         Disposition Plan      Expected Discharge Date: 12/08/2023      Destination: home with family;home with help/services  Discharge Comments: Dipo: ARU ([facility] following)  Plan: Vascular surg to change drsg-QDay; Psych cons  Progress: POD#5: L- AKA; Abx per ID        The patient's care was discussed with the Attending Physician, Dr. Ackerman .    Nicolas Suggs MD PhD  Medicine Service, [address]  Securely message with GigsTime (more info)  Text page via [facility] Paging/Directory   See signed in provider for up to date coverage information  ______________________________________________________________________    Interval History   NAEO  Patient states his pain is well controlled on oral oxycodone but would like to keep oxycodone as it helps with sleep. Encouraged to keep track of pain and how much oxycodone he is using.  Encouraging PO, patient reporting PO/appetite increasing.   Was per nursing somewhat emotional last night however patient does not remember. He is ok with talking to Behavioral team.  Ok with rehab placement    Physical Exam   Vital Signs: Temp: 98.1  F (36.7  C) Temp src: Oral BP: 129/50 Pulse: 73   Resp: 14 SpO2: 98 % O2 Device: None (Room air)    Weight: 130 lbs 1.14 oz    Gen: No acute distress, non-toxic, alert  CV: Acyanotic, non-diaphoretic  Pulm: breathing non-labored  Ext: L AKA covered in dry bandage, no seepage or staining of blood  Psych: Normal mood and affect    Medical Decision Making       Please see A&P for additional details of medical decision making.  MANAGEMENT DISCUSSED with the following over the past 24 hours: ID, Vascular, CM/SW   50 MINUTES SPENT BY ME on the date of service doing chart review, history, exam, documentation & further activities per the note.      Data   ------------------------- PAST  24 HR DATA REVIEWED -----------------------------------------------    I have personally reviewed the following data over the past 24 hrs:    5.3  \   8.0 (L)   / 364     140 109 (H) 12.5 /  87   4.3 24 0.52 (L) \       Imaging results reviewed over the past 24 hrs:   No results found for this or any previous visit (from the past 24 hour(s)).

## 2023-12-06 NOTE — PLAN OF CARE
Goal Outcome Evaluation:      Plan of Care Reviewed With: patient    Overall Patient Progress: no changeOverall Patient Progress: no change    Outcome Evaluation: Admitted for planned L AKA. VSS on RA, ex. HTN. Disoriented to situation & time. Pt appears very down and stated he is feeling depressed. Pt stated he is not Taoist at all. Pt may benefit from a health psych consult. Administered IV abx, then discontinued IV abx today. Pt c/o slight LLE pain. Administered tylenol & oxycodone. Patient care order placed for no narcotics after 4pm. Phos 3.4, recheck tomorrow AM. PT/OT following. Vascular changing dressing daily. Dez counts started. Poor appetite. Awaiting ARU placement.

## 2023-12-06 NOTE — PLAN OF CARE
"Goal Outcome Evaluation:      Plan of Care Reviewed With: patient    Overall Patient Progress: no change    Outcome Evaluation: Dressing remains CDI on L AKA. Pt stated feeling depressed denies SI. Writer had good conversation with pt about frustrations and pt would appreciate and benefit from a health psych consult. Voiding spontaneously this shift, using urinal at bedside with assist. VSS on RA ex. HTN this /56. Tolerating reg diet with very poor appetite--romain counts continued. AOx3, disoriented to time and remains forgetful with required repeated teaching. PICC infusing LR at 10ml/hr. Per MD request pt did NOT receive any narcotics after 4pm instead we utilized muscle relaxer and sleep meds to help with pain and sleep. However, Pt still did not sleep well this shift as was restless and \"felt jittery\" all night. Will continue to monitor and follow POC.      "

## 2023-12-06 NOTE — PROGRESS NOTES
Vascular Surgery Progress Note  12/06/2023       Subjective:  No acute events overnight, continues to do well, pain has significantly improved compared to preop.     Objective:  Temp:  [98.1  F (36.7  C)-98.5  F (36.9  C)] 98.1  F (36.7  C)  Pulse:  [58-74] 73  Resp:  [14-18] 14  BP: (103-155)/() 129/50  SpO2:  [98 %-99 %] 98 %    I/O last 3 completed shifts:  In: -   Out: 800 [Urine:800]      Gen: Awake, alert, NAD  Resp: NLB on RA  Incision: no strikethrough, bleeding on dressing L AKA stump.   Ext: WWP RLE, no edema.            Labs:  Recent Labs   Lab 12/06/23  0604 12/05/23  0607 12/04/23 0612   WBC 5.3 6.2 6.9   HGB 8.0* 7.8* 8.6*    330 329       Recent Labs   Lab 12/06/23  0604 12/05/23  0607 12/04/23  0612    137 139   POTASSIUM 4.3 4.3 4.4   CHLORIDE 109* 106 108*   CO2 24 25 23   BUN 12.5 13.0 12.3   CR 0.52* 0.54* 0.59*   GLC 87 95 90   SHERYL 8.3* 8.1* 8.4*   MAG 1.4* 1.7 1.5*   PHOS 3.6 3.4 3.3       Imaging:  Relevant imaging reviewed     Assessment/Plan:   73 year old male with PMH of CLTI with tissue loss of LLE s/p revascularization attempts which ultimately failed, now s/p LLE AKA on 12/1 with Dr. Felix.  Recovering well.  Pain significantly improved.    - Apixaban daily  - Vascular surgery to change dressing daily (xeroform, abd and kerlix)  - Recommend patient not take narcotics after 4pm to decrease risk of delirium and falls  - Antibiotics per ID recommendation  - OT/PT for discharge recs, okay for ARU discharge from vascular surgery perspective  - Orthotics consult for stump  and protector     Discussed pt history, exam, assessment and plan with  Dr. Felix of the vascular surgery service, who are in agreement with the above.    Roxann Fontanez, CNP  Vascular Surgery  Pager: [phone]  [email or web address]  Send message or 10 digit call back number Securely via Mod[employer] with the [employer] Web Console (learn more here)

## 2023-12-06 NOTE — PROGRESS NOTES
CLINICAL NUTRITION SERVICES - REASSESSMENT NOTE     Nutrition Prescription    RECOMMENDATIONS FOR MDs/PROVIDERS TO ORDER:  Patient with ongoing low po and appetite. Would recommend re-start of TF support. Discussed this with patient and spouse. For now, Goal for oral intake is 4 ensure plus + meals as able to avoid FT placement. Calorie count in progress.     Malnutrition Status:    Severe malnutrition in the context of chronic condition     Recommendations already ordered by Registered Dietitian (RD):  Ensure oral supplements 4 per day ( one with each meal trays + 1 at HS)  Upgraded diet to high kcal, high protein to optimize meal options     Future/Additional Recommendations:  PO improvement       Provider consult: Poor oral intake    EVALUATION OF THE PROGRESS TOWARD GOALS   Diet: Regular + Ensure Enlive @ breakfast, Boost Soothe @ Lunch, Magic Cup @ Dinner.        Nutrition support: was on Osmolite 1.5, 55 ml/hr on 11/22  NGT removed on 11/30       Intake:   Visited with patient today. Wife at bedside. Per spouse, patient with poor po intake. Had a bowl of cereal all day today. Having poor apetite since admission. Patient somewhat appeared confused during my visit today. Wanting to know if he is in a hotel and if he could go out for dinner with his wife. Per patient's wife, patient is utilizing all his ensure. Likes chocolate ensure and would drink up to 4 per day to optimize nutrition.     - On Remeron for appetite stimulant       NEW FINDINGS   Chart reviewed:   PMH: DM2, CAD, TALA, A.fib, GERD, Recurrent left foot cellulitis, S/p transmetatarsal amputation 2/2 wet gangrene (10/27/23) c/b staph aureus bacteremia. PAD s/p left common femoral endarterectomy and balloon angioplasty with stent placement (10/24/23)    Admitted on 11/17/2023 with Left TMA that was gangrenous now 5 Days Post-Op s/p L AKA 12/1/23. Overall recovering well, pain well controlled and pending ARU placement.    Wt trend:  59.6 kg (131 lb 6.4  oz) standing wt on  admit  59 kg (130 lb 1.1 oz) bed scale on  most recent wt  S/P AKA  --> wt post op was 57.8 kg on   Using 60 kg for nutrition calculations     Labs noted:  BUN:12.5, Cr: 0.52 (L), GFR:90  K+:4.3, Mg++:1.4 (L), phos:3.6  B    GI/stool:  Last BM x5 ()      MALNUTRITION  % Intake: </= 50% for >/= 5 days (severe)  % Weight Loss:  > 5% in 1 month (severe)   Subcutaneous Fat Loss: Global severe   Muscle Loss: Global severe   Fluid Accumulation/Edema: None noted  Malnutrition Diagnosis: Severe malnutrition in the context of chronic condition     Previous Goals   Total avg nutritional intake to meet a minimum of 30 kcal/kg and 1.2 g PRO/kg daily (per dosing wt 60 kg).   Evaluation: Not met    Previous Nutrition Diagnosis  Inadequate oral intake related to decreased appetite as evidenced by pt self report of decreased appetite impacting intake.    Evaluation: Declining      CURRENT NUTRITION DIAGNOSIS  Malnutrition related to ? AMS/ dementia/poor appetite as evidenced by minimal meal intake over the past week     INTERVENTIONS  Implementation  Medical food supplement therapy      Goals  Patient to consume % of nutritionally adequate meal trays TID, or the equivalent with supplements/snacks.      Monitoring/Evaluation  Progress toward goals will be monitored and evaluated per protocol.    Azeem Montalvo RD/JORGE ALBERTO  7C ([phone])/7D RD pager: [phone]  Weekend/Holiday RD pager: [phone]

## 2023-12-06 NOTE — PROGRESS NOTES
Calorie Count  Intake recorded for: 12/5  Total Kcals: 331 Total Protein: 11g  Kcals from Hospital Food: 0   Protein: 0g  Kcals from Outside Food (average):331 Protein: 11g  # Meals Ordered from Kitchen: 2 meals + food from outside the hospital   # Meals Recorded: 2 meals (First - 100% mashed potatoes, 1 bite of steak - from [employer])      (Second - 100% 1 slice pizza w/ sausage & veggies from [employer])  # Supplements Recorded: 0

## 2023-12-06 NOTE — CONSULTS
Health Psychology                        [facility], 3rd Floor  [address] [place] [address]    Confidential Summary of Inpatient Health Psychology Consultation*    Date of Service:  12/06/23    Referring Provider:  Raghu Red MD     Reason for Consultation:  Evaluation and treat as indicated for depression in the context of prolonged hospitalization    Sources of Information:  Information was obtained from a clinical interview with the patient and review of medical record.  He was seen with his wife, Violet, per his preference.      History of Present Illness:  Darian Engle is a 73 year old male  Medical record indicates that he is emotional regarding the prolonged hospitalization with medical complications, particularly displaying challenging emotions related to the situation last night.  He was agreeable to talking with behavioral health as a result.  On interview today when he presented to his room, he was calm and when asked about adjustment difficulties following medical challenges, he stated that he was feeling okay at this time but did have significant challenges with memory.  His wife stated that prior to his amputation he was having much difficulty with pain management that was greatly influencing his mood.  However, following amputation pain management has been greatly improved and not a major area of concern for him.  However they did indicate concerns related to postadmission placement, specifically they had major concerns with his treatment at a previous facility and how that was related to now cognitive impairment and are very concerned about his discharge to an ARU.  He and his wife stated that they both feel he would be best served at discharge to home with being able to attend rehab during the day rather than admission to a rehab facility.  He denied consistently depressed mood, anhedonia or worry.  Per wife's report and medical record it does appear  he has had adjustment challenges intermittently during his prolonged admission.  Regarding cognition, it appears that his baseline level of cognition was not clear but he is currently struggling with memory challenges.  For example, he is unable to recall the names of certain medications, which poses difficulty for him being able to ask for as needed medications if his wife is not present.  She stated that if he is not able to ask for these by name, they cannot be given without her presence to help assist with that.  They both stated that sleep disruption in the hospital setting has worsened insomnia.    Regarding adjustment amputation and prolonged hospitalization, he stated that he is accepting of what ever path necessary to manage his pain and get him closer to discharge from the hospital, and if amputation was the route to get there he is accepting of that.      Medical History:  See below lists for past medical history, past surgical history, and current medications    Past Medical History:   Diagnosis Date    Arthritis March 2018    joint pain both hands    Diabetes (H)     Heart disease 1991    [place]-angioplasty    Hypertension        Past Surgical History:   Procedure Laterality Date    AMPUTATE LEG ABOVE KNEE Left 12/1/2023    Procedure: LEFT AMPUTATION, ABOVE KNEE;  Surgeon: Chuck Felix MBBS;  Location: [facility]    AMPUTATE TOE(S) Left 10/26/2023    Procedure: Amputate toe(s), all transmetatarsal amputation;  Surgeon: Jerardo Thornton MD;  Location: [facility]    ANGIOGRAM Left 10/17/2023    Procedure: Left lower extremity angiogram via Left brachial artery approach;  Surgeon: Chuck Felix MBBS;  [facility]    ANGIOGRAM Left 10/24/2023    Procedure: ANGIOGRAM;  Surgeon: Chuck Felix MBBS;  [facility]    ANGIOPLASTY Left 10/24/2023    Procedure: ANGIOPLASTY, Left Lower Extremity atherectomy;  Surgeon: Chuck Felix MBBS;  Location[facility]    CARDIAC SURGERY  [address] [place] [address]     [facility]    ENDARTERECTOMY FEMORAL Left 10/24/2023    Procedure: ENDARTERECTOMY, FEMORAL;  Surgeon: Chuck Felix MBBS;  Location: [facility] OR    ENHANCE LASER REFRACTIVE BILATERAL EXISTING PT IN PARAMETERS  2000    EYE SURGERY  2000    [facility]    IR OR ANGIOGRAM  10/17/2023    IR OR ANGIOGRAM  10/24/2023    VASCULAR SURGERY  2017    [facility]       Current Facility-Administered Medications   Medication    acetaminophen (TYLENOL) tablet 975 mg    amLODIPine (NORVASC) tablet 10 mg    apixaban ANTICOAGULANT (ELIQUIS) tablet 5 mg    artificial saliva (BIOTENE MT) solution 2 spray    aspirin (ASA) chewable tablet 81 mg    atorvastatin (LIPITOR) tablet 40 mg    bisacodyl (DULCOLAX) suppository 10 mg    calcium carbonate (TUMS) chewable tablet 1,000 mg    carvedilol (COREG) tablet 25 mg    cilostazol (PLETAL) tablet 100 mg    cloNIDine (CATAPRES) tablet 0.1 mg    dextrose 10% infusion    glucose gel 15-30 g    Or    dextrose 50 % injection 25-50 mL    Or    glucagon injection 1 mg    famotidine (PEPCID) tablet 10 mg    folic acid (FOLVITE) tablet 1 mg    gabapentin (NEURONTIN) capsule 300 mg    [Held by provider] isosorbide mononitrate (IMDUR) 24 hr tablet 60 mg    lidocaine (LMX4) cream    lidocaine 1 % 0.1-1 mL    losartan (COZAAR) tablet 50 mg    magnesium oxide (MAG-OX) tablet 400 mg    melatonin tablet 1 mg    mirtazapine (REMERON) tablet 15 mg    multivitamin w/minerals (THERA-VIT-M) tablet 1 tablet    naloxone (NARCAN) injection 0.2 mg    Or    naloxone (NARCAN) injection 0.4 mg    Or    naloxone (NARCAN) injection 0.2 mg    Or    naloxone (NARCAN) injection 0.4 mg    nicotine (NICODERM CQ) 14 MG/24HR 24 hr patch 1 patch    nicotine (NICORETTE) gum 2 mg    nicotine Patch in Place    ondansetron (ZOFRAN ODT) ODT tab 4 mg    oxyCODONE (ROXICODONE) tablet 5-10 mg    pantoprazole (PROTONIX) EC tablet 40 mg    Patient is already receiving anticoagulation with heparin,  enoxaparin (LOVENOX), warfarin (COUMADIN)  or other anticoagulant medication    polyethylene glycol (MIRALAX) Packet 17 g    prochlorperazine (COMPAZINE) injection 5 mg    Or    prochlorperazine (COMPAZINE) tablet 5 mg    Or    prochlorperazine (COMPAZINE) suppository 12.5 mg    QUEtiapine (SEROquel) tablet 25 mg    senna-docusate (SENOKOT-S/PERICOLACE) 8.6-50 MG per tablet 1 tablet    sertraline (ZOLOFT) tablet 25 mg    sodium chloride (PF) 0.9% PF flush 3 mL    sodium chloride (PF) 0.9% PF flush 3 mL    tiZANidine (ZANAFLEX) tablet 2 mg    traZODone (DESYREL) tablet 50 mg         Psychiatric History:  Significant for neurocognitive decline, time of onset of these challenges is unclear.    Social History:  He is  to his wife Angelica  who describes being able to adequately care for him at their 1 level home.  However there is some discourse in the medical record with his wife speaking to social work at this hospital earlier in November related to her challenges with physically caring for him due to her own medical illness.  She described having support from her extended family but limited support otherwise.  The patient has adult children who are not helpful or closely connected to him.  Speech was within normal limits.  Thought content were logical and organized.    Mental Status/Interview:  He was sitting upright in a chair in his hospital room for this consultation.  He was alert and orientation was not assessed.  No current evidence of psychomotor agitation.  He did respond to questions about psychosocial functioning and was cooperative.  He was aware of cognitive impairment with significant difficulty in recall/memory, which was apparent during our conversation as he could not recall details of his medical history or medications.  Mood at this visit was described as improved and affect was calm.  No current suicidal ideation        Impression/plan  Mr Engle appears to be greatly benefited from a mood  and coping perspective having better pain management following amputation.  However ongoing cognitive impairment is likely impacting his insight and recall of adjustment challenges.  His wife who was present for this consultation did identify ongoing challenges with prolonged medical complications.  They both identify a strong desire for discharge to home with participation with rehab therapies on an outpatient basis, yet this is unclear if this is indicated or possible.  In order to assess resources that might assist with this, it is recommended that social work may be consulted to discuss resources or supports for home care.  Regarding assistance with coping, discussed a brief intervention with the both of them to use CBT strategies to help tolerate uncertainty and increase resilience during prolonged medical admission.  At this time, the patient does not identify any strong need for ongoing health psychology follow-up, but please continue to monitor coping and adjustment and reconsult our service as needed.    Diagnosis:  Adjustment disorder, with mixed anxiety and depressed mood  Dementia, unspecified       Noy Guzman, PhD,   Clinical Health Psychologist      *In accordance with the Rules of the [place] Board of [employer], it is noted that psychological descriptions and scientific procedures underlying psychological evaluations have limitations.  Absolute predictions cannot be made based on information in this report.    This note was completed using Dragon voice recognition software.  Although reviewed after completion, some word and grammatical errors may occur.

## 2023-12-07 NOTE — PLAN OF CARE
Goal Outcome Evaluation:      Plan of Care Reviewed With: patient    Overall Patient Progress: improving    Outcome Evaluation: Dressing remains CDI on L AKA. Voiding spontaneously, using urinal at bedside with assist. VSS on RA ex. HTN this /57. Tolerating diet with very poor appetite--romain counts continued. AOx3, disoriented to time and remains forgetful with dementia at baseline requiring repeated teaching. Utilized all sleeping meds to promote sleep including seroquel, increased dose melatonin, trazodone, and atarax. Pt did end up sleeping a lot better after meds but still woke a few times. Will continue to monitor and follow POC.

## 2023-12-07 NOTE — PLAN OF CARE
Goal Outcome Evaluation:      Plan of Care Reviewed With: patient, spouse    Overall Patient Progress: no changeOverall Patient Progress: no change    Outcome Evaluation: Admitted for planned L AKA. VSS on RA, ex. HTN. Disoriented to situation, time, & intermittently place. Pt c/o slight LLE pain. Administered tylenol. Patient care order for no narcotics after 4pm. Phos 3.6, recheck tomorrow AM. Mag 1.6 this AM. Admin PO mag. Recheck tomorrow. PT/OT following, now recommending home with home care. Dez counts. Dietary consulting d/t poor appetite.

## 2023-12-07 NOTE — PROGRESS NOTES
S: Pt seen at [facility] hosp room 7520 with wife and Phycologist in room talking with Wife regarding hospital stay and plans for future. O: I see the EPIC order for the LT AK Limb protector and  socks due to LT AKA. I can confirm the Dementia. A: He and his wife were given the AK protector and 2 size 3 AK  socks and donning tube for the  socks and an info pack for AK amputees. Instructions were given and seemed to be understood. P: FU PRN. G: The goal is to protect the LT AK residual limb and the  socks are for after his stitches are removed and the shrinkers are OK'd by the Physician.  Eduardo Evangelista , LPO.

## 2023-12-07 NOTE — PROGRESS NOTES
[facility]  Patient is currently open to home care services with [facility]. The patient is currently receiving RN PT services.  [facility]  and team have been notified of patient admission.  [facility] liaison will continue to follow patient during stay.  If appropriate provide orders to resume home care at time of discharge.

## 2023-12-07 NOTE — PROGRESS NOTES
Vascular Surgery Progress Note  12/07/2023       Subjective:  Patient would like to go home. Declined ARU.     Objective:  Temp:  [98  F (36.7  C)-98.2  F (36.8  C)] 98  F (36.7  C)  Pulse:  [65-73] 65  Resp:  [16] 16  BP: (109-171)/(45-64) 155/57  SpO2:  [99 %-100 %] 100 %    I/O last 3 completed shifts:  In: -   Out: 1500 [Urine:1500]      Gen: Awake, alert, NAD  Resp: NLB on RA  Incision: no strikethrough, bleeding on dressing L AKA stump.   Ext: WWP RLE, no edema.      Labs:  Recent Labs   Lab 12/07/23  0543 12/06/23  0604 12/05/23  0607   WBC 5.5 5.3 6.2   HGB 7.9* 8.0* 7.8*    364 330       Recent Labs   Lab 12/07/23  0543 12/06/23  1417 12/06/23  0604 12/05/23  0607     --  140 137   POTASSIUM 4.1  --  4.3 4.3   CHLORIDE 109*  --  109* 106   CO2 23  --  24 25   BUN 11.3  --  12.5 13.0   CR 0.53*  --  0.52* 0.54*   *  --  87 95   SHERYL 8.3*  --  8.3* 8.1*   MAG 1.6* 2.4* 1.4* 1.7   PHOS 3.8  --  3.6 3.4       Imaging:  Relevant imaging reviewed     Assessment/Plan:   73 year old male with PMH of CLTI with tissue loss of LLE s/p revascularization attempts which ultimately failed, now s/p LLE AKA on 12/1 with Dr. Felix.  Recovering well.  Pain significantly improved.    - Apixaban daily  - Recommend patient not take narcotics after 4pm to decrease risk of delirium and falls  - Antibiotics per ID recommendation  - OT/PT for discharge recs: if patient refuses discharge to ARU, he will need recovery and some type of rehabilitation with family support at home, PT/OT. Patient needs to learn how to transfer from bed to [facility] and vice verso, needs to learn how to shower now that he has LLE AKA, and all other safety and functional needs for performing ADLs, per OT/PT. At ARU, he would receive tailored intense therapy for rehabilitation, we will defer to PT/OT for recommendations and rehabilitation given potential discharge to home.  - Orthotics consult for stump  and protector  - Left Lower  Extremity amputation site wound care: change dressing every day. Clean incision with iodine/betadine, apply dry sterile dressing to incision followed by abd than followed by kerlix dressing wrap. After that, apply ACE wrap. Keep amputation site wrapped with dressing and clean at all times (avoid stool, urine, food staining in incision or dressing). Do not soak stump in bath tub. Protect the stump dressing while showering. Keep stump covered with clean and dry dressing at all times.   - Sutures/staples to remain in for 2 months from surgery. We will schedule follow up at weeks from discharge for wound assessment.    Addendum: Was contacted by primary team as wife is requesting referral to hyperbaric chamber therapy.  Given that patient underwent left lower extremity AKA, he does not have any further clinical indications for hyperbaric therapy.  This was discussed with attending, Dr. Felix.     Discussed pt history, exam, assessment and plan with  Dr. Felix of the vascular surgery service, who are in agreement with the above.    Roxann Fontanez, [facility]  Vascular Surgery  Pager: [phone]  [email or web address]  Send message or 10 digit call back number Securely via [employer] with the [employer] Web Console (learn more here)

## 2023-12-07 NOTE — PROGRESS NOTES
Care Management Discharge Note    Discharge Date: 12/07/2023  Discharge Disposition: Home  Discharge Services: Home Care  Discharge DME:  Orthotocs; wheelchair  Discharge Transportation: family or friend will provide  Private pay costs discussed: Not applicable  Does the patient's insurance plan have a 3 day qualifying hospital stay waiver?  No  PAS Confirmation Code:  n/a  Patient/family educated on Medicare website which has current facility and service quality ratings: no  Education Provided on the Discharge Plan:  yes  Persons Notified of Discharge Plans: pt; Attnd MD  Patient/Family in Agreement with the Plan: yes    Handoff Referral Completed: Yes IHO    ACCEPTED - [facility]  [address]; [address]  [place] [address]   Phone: [phone]  Fax: [phone]   [facility]; PT/OT  - Daily Drsg Change** --> paint w/ betadine, xeroform, Abd Pad, kerlix, ACE    Additional Information:  Pt cleared to discharge to home with home care; [facility] accepted service; Primary medicine team member reported during in-person rounding the need for orthotics to have seen the pt and nutrition's take on CalCount/PO intake. Plan for wife to be present with bedside RN's drsg changes, subsequent discharge is appropriate and safe. Discharge later Thursday evening; RNCC updated [facility] Liaison. CM team to conclude following upon departure from floor an facility.       Johnny Jaramillo RN BSN  7C RNCC  Phone: [phone]  Pager: [phone]    For Weekend & Holiday on call RN Care Coordinator:  (Tasks: Home care, home infusion, medical equipment, transportation, IMM & RANDLE forms, etc.)  [place] (0800 - 1630) Saturday and Sunday    Units: 5A, 5B, & 5C: [phone]    Units: 6B, 6C, 6D: [phone]    Units: 7A, 7B, 7C, 7D: [phone]    Units: 6A/ICU : [phone]    [facility] ([id number]) Saturday and Sunday    Units: 6 Med/Surg, 8A, 10 ICU, & Pediatric Units: [phone]    Units: 5 Ortho, 5Med/Surg & WB ED:  [phone]

## 2023-12-07 NOTE — PLAN OF CARE
Goal Outcome Evaluation:      Plan of Care Reviewed With: patient    Overall Patient Progress: no changeOverall Patient Progress: no change    Outcome Evaluation: Admitted for planned L AKA. VSS on RA, ex. HTN. Disoriented to situation, time, & intermittently place. Health psych consulted today. Orthotics consulted today. Pt c/o slight LLE pain. Administered tylenol & oxycodone. Patient care order for no narcotics after 4pm. Phos 3.6, recheck tomorrow AM. Mag 1.4 this AM, admin IV mag, recheck 2.4. Added PO mag as well. Recheck mag in AM. Removed PICC, per order. PT/OT following. Vascular changing dressing daily. Dez counts. Dietary consulting d/t poor appetite. Changed diet to high kcal/high protein diet. PM&R recommended ARU, but, per SW, pt & wife prefer home with home care & OP PT.

## 2023-12-07 NOTE — PROGRESS NOTES
Calorie Count  Intake recorded for: 12/6  Total Kcals: 773 Total Protein: 42g  Kcals from Hospital Food: 773  Protein: 42g  Kcals from Outside Food (average):0 Protein: 0g  # Meals Ordered from Kitchen: 3 meals + snack from nursing unit  # Meals Recorded: 2 meals (First - 100% 1 sausage lolly, whole milk, 50% coffee w/ cream & sugar)        (Second - 100% popsicle, corn flakes w/ milk - from nursing unit)   # Supplements Recorded: 50% 1 Ensure Max Protein, less than 25% 1 Ensure Enlive

## 2023-12-07 NOTE — PROGRESS NOTES
Care Management Follow Up        Length of Stay (days): 19     Expected Discharge Date: 12/07/2023     Concerns to be Addressed: Discharge planning  Patient plan of care discussed at interdisciplinary rounds: Yes     Anticipated Discharge Disposition: TBD     Anticipated Discharge Services: TBD  Anticipated Discharge DME:  (wound care)     Patient/family educated on Medicare website which has current facility and service quality ratings: yes  Education Provided on the Discharge Plan:  yes  Patient/Family in Agreement with the Plan: TBD     Referrals Placed by CM/SW:  [facility]  Private pay costs discussed: Not applicable     Additional Information:  CHW was advised by RNCC to send home care referral to our [place] hub. CHW sent referral requesting PTTrisha via Epic.    Holden Bey   [facility] Community Health Worker and Rhianna   [facility]

## 2023-12-08 NOTE — TELEPHONE ENCOUNTER
IFEOMA Yeager from [facility] is calling regarding an established patient with [facility].       Requesting orders from: Marcos Gonzalez  Provider is following patient: Yes  Is this a 60-day recertification request?  No    Orders Requested  Resume home care starting on Sunday. Patient was in hospital and just discharged to home yesterday. HC was just notified now this afternoon that patient was back home.  Admitted to [facility] on 11/17-12/7, above the knee amputation, left.        Information was gathered and will be sent to provider for review.  RN will contact Home Care with information after provider review.  Confirmed ok to leave a detailed message with call back.  Contact information confirmed and updated as needed.        Kayley Sanderson RN  Clinical Triage/Primary Care  [facility]

## 2023-12-08 NOTE — DISCHARGE SUMMARY
Order to discharge patient to home and he is eager to go. He is dressed in clothes from home and up in his home wheelchair. Wife seems confident that she can care for her  at home. States she has a commode for him at home. Home is on one level. One case of regular ensure provided to patient by the dietician. Doctor on call, Gosia finney sent a script to the [facility] in [place] for ensure. ( discharge pharmacy called to say they do not fill ensure). Writer reviewed all pages of discharge summary with the wife including medications and information on opioids and blood thinners. Reviewed plan for home care and follow up appointments. Wife is independent with dressing changes. Pt is alert and oriented to baseline. Wife has supplies for 2 days of dressings and personal belongings. Transport took patient to front door by wheelchair with gaitbelt at 1830.

## 2023-12-08 NOTE — PLAN OF CARE
Physical Therapy Discharge Summary    Reason for therapy discharge:    Discharged to home with home therapy.    Progress towards therapy goal(s). See goals on Care Plan in [facility] electronic health record for goal details.  Goals partially met.  Barriers to achieving goals:   discharge from facility.    Therapy recommendation(s):    Continued therapy is recommended.  Rationale/Recommendations:  Recommend HHPT to progress towards PLOF.

## 2023-12-08 NOTE — DISCHARGE SUMMARY
[facility]  Discharge Summary - Medicine & Pediatrics       Date of Admission:  11/17/2023  Date of Discharge:  12/7/2023  Discharging Provider: Danelle Ackerman MD  Discharge Service: Medicine Service, FATIMAH TEAM 3    Discharge Diagnoses   # Recurrent left foot cellulitis  # S/p transmetatarsal amputation 2/2 wet gangrene (10/27/23) c/b staph aureus bacteremia   # PAD s/p left common femoral endarterectomy and balloon angioplasty with stent placement (10/24/23)  # S/p L AKA   # Moderate to Severe Malnutrition  # Pancreatic Mass  # LE edema  # Chronic normocytic anemia   # H/o HTN   # Dementia  # Agitation  # Insomnia  # T2DM  # CAD  # TALA  # A Fib  # GERD  # Anxiety      Follow-ups Needed After Discharge   Follow-up Appointments     Adult [facility] Follow-up and recommended labs and tests      1. Follow up with primary care provider, Marcos Gonzalez, within 7 days   for hospital/surgery follow- up.  Follow up on nutrition intake, consider   checking weight, BP, CMP/Mg/Phos  2. Referral placed for outpatient nutrition, you will be called to   schedule  3. Sutures/staples to remain in for 2 months from surgery, wound care per   vascular surgery team reviewed w/ family.  4.Vascular surgery will call to arrange f/up visit/appointment     Appointments on [place] and/or [facility] (with [facility] or [facility]   provider or service). Call [phone] if you haven't heard regarding   these appointments within 7 days of discharge.         The patient will need outpatient follow-up with PCP and reschedule with his GI provider for pancreatic mass follow-up as outpatient.        Discharge Disposition   Discharged to home  Condition at discharge: Satisfactory    Hospital Course   Darian Engle was admitted on 11/17/2023 for dry gangrene. IV Cefazolin for infection treatment. S/p L.AKA 12/1/2023. Post-amputation hospital stay w/o complications except working on PO intake but  patient/family felt comfortable w/ discharge home on 12/7.     Details of hospital course as below:     # Recurrent left foot cellulitis  # S/p transmetatarsal amputation 2/2 wet gangrene (10/27/23) c/b staph aureus bacteremia   # PAD s/p left common femoral endarterectomy and balloon angioplasty with stent placement (10/24/23)  # S/p L AKA 12/1/2023   Pt presented with 2-3 day history of worsening foot pain with malaise and nausea. Foot appears slightly more swollen with some fluctuance and scant purulent discharge. Labs notable for elevated CRP, though trending down from last check on 11/15, and WBC 11.5, elevated from previous check. MRI on admission was negative for osteomyelitis. Vascular surgery was consulted and recommended broad spectrum abx and checking duplex ultrasound of both extremities. Pt has been on Cefazolin since 11/5/23 for staph aureus bacteremia which developed after the left TMA, following with infectious disease clinic. There are notes that he may have missed doses due to dementia impairing his ability to manage his own medications and his wife getting sick. Placed on broad spectrum until 11/20 at which point ID recommended transition back to cefazolin. Evaluated by orthopedics/podiatry and vascular while inpatient. Arterial Ultrasound with dopplers noted lack of flow in the lower extremity arteries. Very poor wound healing, patient requiring increasing doses of opioids for breakthrough pain. No potential to salvage the foot per orthopedics. Vascular deemed the patient to not be a revascularization candidate, and offered AKA which the patient agreed to. Underwent surgery on 12/1/23 with minimal blood loss. Dressing changes while inpatient by vascular. Stub appeared healthy and non-infected, safe to discontinue cefazolin and remove PICC line per ID. Evaluated by PMR and was recommended to transfer to ARU for rehab. Initially agreeable but ultimately opted to discharge home with home PT  acknowledging the risks of poor wound healing, infection, falls, malnutrition and dehydration and the increased risk for readmission.  - Wound dressing changes daily  - Follow-up with vascular surgery as outpatient  - Home Care referral, accepted by [facility]   - OP PT  - OP OT  - Wound Care nurse as outpatient    #Moderate to severe malnutrition  Patient having significantly decreased oral intake prior to admission with documented inadequate calories while inpatient. Trial of tube feeds for one week, however the patient did not tolerate them well and opted to discontinue. Not interested in PEG tube. Started mirtazapine 15mg at bedtime while inpatient. Caloric intake remained suboptimal, calorie counts were restarted. Emphasized to the patient and wife that poor oral intake puts him at high risk for poor wound healing and infection, emphasized the importance of oral intake monitoring and recommended higher level of care than home, TCU vs ARU. Patient and wife acknowledged these risks but elected to discharge home and monitor oral intake as outpatient.   - Nutrition referral   - Ensure prescription  - Home cares    #Hypomagnesemia  Magnesium persistently low throughout hospital stay. Provided IV magnesium sulfate for repletion.   - Discharge on magnesium oxide supplement.    # Pancreatic Mass   Per note from Dr. Patel Ambrose 09/12/23, pt had stable appearance of complex cystic mass in the uncinate process of the pancreas. The stability and negative prior FNA is reassuring. However, persistent enhancing mural thickening is concerning for malignant degeneration into pancreatic adenocarcinoma. Differential includes a complex side branch intraductal papillary mucinous neoplasm (IPMN) and serous cystic neoplasm. Recommended continued close imaging surveillance with follow-up MRI in 6 months. Per [facility] Dr. 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  FNA showed showed uncinate process cyst was negative for high-grade dysplasia and malignancy while pancreatic body cyst was non-diagnostic. Concluded stable IPMN with concerning nodularity, no symptoms, marginal surgical candidate. MRI in 6 months, eus in 12 months for surveillance. Prior biopsy dated 12/13/2022 showed no definitive high grade dysplasia of the pancreatic body cyst and low-grade mucinous neoplasia of the uncinate process of the pancreas. The pt was scheduled to follow up with Dr. Govind Ho after March biopsy but uncertain if this occurred.   - Follow-up with PCP and revisit heme-onc referral as outpatient    # LE edema   Noted while inpatient. No evidence of JVP, no increased oxygen needs. Spot diuresed with lasix. Did not require lasix at discharge. EF normal on inpatient TTE.    # Chronic normocytic anemia  Hgb 7.2, MCV 97. Hgb baseline appears 7-8. Suspect anemia of chronic disease. Transfused 1 U pRBC pm 11/19 which stabilized Hgb to 8.4. Slowly drifting down to 7, required two additional units of pRBC for pre-operative optimization. No other products required till discharge, no evidence of major bleeding.  - Transfuse for Hb < 7    # H/o HTN  PTA meds initially held due to hypotension on admission. Adjusted as needed given surgical events and oral intake.   - PTA amlodipine 10 mg  - PTA carvedilol 25 BID  - PTA losartan 50 mg  - Decrease imdur to 30mg daily    # Dementia  # Agitation  # Insomnia  Unclear patient's baseline. Overall easily re-oriented throughout hospital stay with infrequent use of neuroleptics. Was on donepezil that was discontinued during this admission per PCP.   - Outpatient follow-up  - Continue mirtazapine  - Continue trazodone    # T2DM: Last A1c 5.5% (10/17/23), contine PTA metformin  # CAD: Continue PTA atorvastatin, ASA  # TALA: CPAP discontinued due to non-use  # A Fib: Rate controlled, apixaban held as needed for procedures while inpatient. Restarted on 12/4 without  new evidence of bleeding.  # GERD: PTA pantoprazole  # Anxiety: PTA sertraline    Consultations This Hospital Stay   PHARMACY TO DOSE VANCO  PHARMACY TO DOSE VANCO  WOUND OSTOMY CONTINENCE NURSE  IP CONSULT  PHARMACY IP CONSULT  CARE MANAGEMENT / SOCIAL WORK IP CONSULT  INFECTIOUS DISEASE GENERAL ADULT IP CONSULT  PHYSICAL THERAPY ADULT IP CONSULT  OCCUPATIONAL THERAPY ADULT IP CONSULT  PODIATRY IP CONSULT  NUTRITION SERVICES ADULT IP CONSULT  NUTRITION SERVICES ADULT IP CONSULT  ORTHOSIS EXTREMITY LOWER REFERRAL IP CONSULT  PHARMACY IP CONSULT  ORTHOPAEDIC SURGERY ADULT/PEDS IP CONSULT  NURSING TO CONSULT FOR VASCULAR ACCESS CARE IP CONSULT  WOUND OSTOMY CONTINENCE NURSE  IP CONSULT  PHYSICAL MEDICINE & REHAB ASSESSMENT FOR REHAB PLACEMENT ADULT IP CONSULT  PHYSICAL THERAPY ADULT IP CONSULT  OCCUPATIONAL THERAPY ADULT IP CONSULT  PHYSICAL MEDICINE & REHAB ASSESSMENT FOR REHAB PLACEMENT ADULT IP CONSULT  INFECTIOUS DISEASE GENERAL ADULT IP CONSULT  NUTRITION SERVICES ADULT IP CONSULT  PSYCHOLOGY ADULT IP CONSULT  ORTHOSIS EXTREMITY LOWER REFERRAL IP CONSULT  ORTHOSIS EXTREMITY LOWER REFERRAL IP CONSULT    Code Status   Full Code       The patient was discussed with [facility]  [facility] 7[facility] MED SURG  [address] [address]  Phone: [phone]  ______________________________________________________________________    Physical Exam   Vital Signs: Temp: 98.5  F (36.9  C) Temp src: Oral BP: 129/49 Pulse: 66   Resp: 16 SpO2: 99 % O2 Device: None (Room air)    Weight: 132 lbs .89 oz  Gen: No acute distress, non-toxic, alert  CV: Acyanotic, non-diaphoretic  Pulm: breathing non-labored  Ext: L AKA covered in dry bandage, no seepage or staining of blood  Psych: Normal mood and affect      Primary Care Physician   Marcos Gonzalez    Discharge Orders      Nutrition Referral      Home Care Referral      Wound care and dressings    Home wound care  instructions:   Left Lower Extremity amputation site wound care: change dressing every day. Clean incision with iodine/betadine, apply dry sterile dressing to incision followed by abd than followed by kerlix dressing wrap. After that, apply ACE wrap. Keep amputation site wrapped with dressing and clean at all times (avoid stool, urine, food staining in incision or dressing). Do not soak stump in bath tub. Protect the stump dressing while showering. Keep stump covered with clean and dry dressing at all times.     Activity    Your activity upon discharge: activity as tolerated     Reason for your hospital stay    # Recurrent left foot cellulitis  # S/p transmetatarsal amputation 2/2 wet gangrene (10/27/23) c/b staph aureus bacteremia   # PAD s/p left common femoral endarterectomy and balloon angioplasty with stent placement (10/24/23)  # S/p L AKA 12/1/2023  # Malnutrition  # H/o HTN  # Dementia  # Insomnia  # T2DM  # CAD  # A.fib  # GERD  # Anxiety/Depression     Adult [facility] Follow-up and recommended labs and tests    1. Follow up with primary care provider, Marcos Gonzalez, within 7 days for hospital/surgery follow- up.  Follow up on nutrition intake, consider checking weight, BP, CMP/Mg/Phos  2. Referral placed for outpatient nutrition, you will be called to schedule  3. Sutures/staples to remain in for 2 months from surgery, wound care per vascular surgery team reviewed w/ family.  4.Vascular surgery will call to arrange f/up visit/appointment     Appointments on [place] and/or [facility] (with [facility] or [facility] provider or service). Call [phone] if you haven't heard regarding these appointments within 7 days of discharge.     Resume Home Care Services    [facility]  [address]; [address]  [place] [address]   Phone: [phone]  Fax: [phone]   [facility]; PT/OT  - Daily Drsg Change**   - See Wound Care - paint w/ betadine, xeroform, Abd Pad, kerlix, ACE     Orthosis Extremity Lower IP  Consult     Diet    Follow this diet upon discharge:       High Kcal/High Protein Diet, recommend Ensure Drinks 2-3 times per day       Significant Results and Procedures   Most Recent 3 CBC's:  Recent Labs   Lab Test 12/07/23  0543 12/06/23  0604 12/05/23  0607   WBC 5.5 5.3 6.2   HGB 7.9* 8.0* 7.8*   MCV 98 96 95    364 330     Most Recent 3 BMP's:  Recent Labs   Lab Test 12/07/23  0543 12/06/23  0604 12/05/23  0607    140 137   POTASSIUM 4.1 4.3 4.3   CHLORIDE 109* 109* 106   CO2 23 24 25   BUN 11.3 12.5 13.0   CR 0.53* 0.52* 0.54*   ANIONGAP 8 7 6*   SHERYL 8.3* 8.3* 8.1*   * 87 95     Most Recent 2 LFT's:  Recent Labs   Lab Test 11/15/23  1150 11/06/23  0554   AST 26 29   ALT 5 9   ALKPHOS 91 87   BILITOTAL 0.3 0.5     Most Recent 3 INR's:  Recent Labs   Lab Test 12/01/23  0612 11/05/23  2203 10/26/23  0439   INR 1.18* 1.68* 1.02   ,   Results for orders placed or performed during the hospital encounter of 11/17/23   Foot XR, G/E 3 views, left    Narrative    EXAM: XR FOOT LEFT G/E 3 VIEWS  LOCATION: [facility]  DATE: 11/17/2023    INDICATION: Eval for subq air or osteo  COMPARISON: 11/05/2023      Impression    IMPRESSION: Previous amputations through the mid shafts of the first through fifth metatarsals, unchanged. No evidence for destructive changes or radiographic evidence for osteomyelitis. Extensive vascular calcifications. Plantar calcaneal spur.   MR Foot Left w/o & w Contrast    Narrative    EXAM: MR FOOT LEFT W/O and W CONTRAST  LOCATION: [facility]  DATE: 11/18/2023    INDICATION: Amputation 2 weeks ago, eval for osteomyelitis  COMPARISON: Radiographs 11/17/2023  TECHNIQUE: Routine. Additional postgadolinium T1 sequences were obtained.  IV CONTRAST: 6 mL gadavist    FINDINGS:     JOINTS AND BONES:   -First through fifth transmetatarsal amputations. No marrow edema. Normal T1 marrow signal.      TENDONS:   -Postsurgical changes related to transmetatarsal amputation. No tenosynovitis.    LIGAMENTS:   -Lisfranc ligament: Intact. No subluxation.    MUSCLES AND SOFT TISSUES:   -Soft tissue edema and enhancement around the amputation site. No fluid collection. Mildly increased signal in the foot musculature.      Impression    IMPRESSION:  1.  No evidence for osteomyelitis.  2.  Soft tissue edema and enhancement at the amputation site which could indicate cellulitis. No fluid collection.   US RHONDA Doppler No Exercise    Narrative    RHONDA and 1st digit PPG 11/20/2023 9:21 AM    CLINICAL HISTORY: significant PVD. Left partial metatarsal amputation.  History of left superficial femoral artery stent.    COMPARISONS: 9/30/2023    REFERRING PROVIDER: ALY MICHELE    TECHNIQUE: Bilateral RHONDA and right 1st digit PPG obtained.    FINDINGS:  RIGHT:       Brachial: 144 mmHg       Ankle (PT): > 254 mmHg - non compressible        Ankle (DP): not detected          RHONDA: non compressible - unchanged         1st Digit PPG: flat - occlusive    LEFT:       Brachial: 157 mmHg       Ankle (PT): > 254 mmHg - non compressible        Ankle (DP): not detected          RHONDA: non compressible - unchanged         1st Digit PPG: not detected.      Impression    IMPRESSION:  1. Bilateral dorsalis pedis arteries were unable to be detected. Left  1st digit PPG unable to be detected.    1. RIGHT:       A. Resting RHONDA is non compressible, unchanged.       B. Occlusive flat 1st digit PPG    2. LEFT:       A. Resting RHONDA is non compressible, unchanged.    Guidelines:    RHONDA Diagnostic Criteria (Based on criteria published in Circulation  2011; 124: 0722-5094):    > 1.4: Non compressible    1.00 - 1.40: Normal    0.91 - 0.99: Borderline    At or below 0.90: Abnormal    JOSE BORRERO MD         SYSTEM ID:  [id number]   XR Chest Port 1 View    Narrative    Exam: XR CHEST PORT 1 VIEW, 11/20/2023 2:50 PM    Indication: PICC placement    Comparison:  11/11/2023    Findings:   Right upper shoulder a PICC tip at the mid SVC. Median sternotomy  wires and mediastinal surgical clips. Coronary artery stent. Stable  cardiomediastinal silhouette. The pulmonary vasculature is within  normal limits. No pleural effusion or pneumothorax. No focal airspace  opacity.      Impression    Impression: Right upper extremity PICC tip at the mid SVC.    SONYA SWARTZ DO         SYSTEM ID:  [id number]   XR Abdomen 1 View    Narrative    EXAMINATION:  XR ABDOMEN 1 VIEW 11/22/2023     COMPARISON: Abdominal radiograph 10/24/2023    HISTORY: NG tube placement.    FINDINGS: Single portable AP supine view of the upper abdomen. Enteric  tube tip and sidehole project over the stomach. Dilated loops of bowel  in the upper abdomen suggestive of ileus.. No portal venous gas.  The  lung bases are unremarkable.       Impression    IMPRESSION: Enteric tube tip and sidehole project over the stomach.    I have personally reviewed the examination and initial interpretation  and I agree with the findings.    JUVENAL YANG MD         SYSTEM ID:  [id number]   XR Abdomen Port 1 View    Narrative    EXAMINATION: XR ABDOMEN PORT 1 VIEW, 11/23/2023 12:21 PM    COMPARISON: 11/22/2023    HISTORY: NGT    FINDINGS: Nasogastric tube in the stomach. No air-filled dilated loops  of bowel. Surgical clips and sternotomy wires in the chest.      Impression    IMPRESSION: Nasogastric tube in stomach.      JUVENAL YANG MD         SYSTEM ID:  [id number]   XR Abdomen Port 1 View    Narrative    EXAM: XR ABDOMEN PORT 1 VIEW  11/26/2023 9:42 AM     HISTORY:  Confirm G Tube placement (landmark changed)       TECHNIQUE: Single frontal radiograph of the abdomen    COMPARISON:  11/23/2023    FINDINGS:   Single AP portable supine view of the abdomen. Enteric tube is in the  stomach..    Air-filled loops of bowel in the right and left upper quadrant. Right  upper quadrant with bowel measuring up to 5.4 cm. Left upper  quadrant  with bowel measuring up to 3.3 cm. No pneumatosis or portal venous  gas.      Impression    IMPRESSION:   1. Enteric tube tip and sidehole is in the stomach  2. Similar appearance of air-filled loops of bowel throughout the  upper abdomen which are nonspecific but can be seen with ileus, or  obstruction.    I have personally reviewed the examination and initial interpretation  and I agree with the findings.    MELVINA IBARRA MD         SYSTEM ID:  [id number]   XR Abdomen 1 View    Narrative    EXAMINATION:  XR ABDOMEN 1 VIEW 11/26/2023     COMPARISON: Same day at 0833 hours.    HISTORY: NG tube placement.    TECHNIQUE: Frontal supine view of the abdomen.    FINDINGS: Enteric tube tip and sidehole project over the left upper  quadrant in the expected area of the stomach, slightly retracted from  prior evaluation. No abnormally dilated loops of bowel, free air, or  pneumatosis in the visualized abdomen.. No portal venous gas.  The  lung bases are unremarkable. Partially visualized median sternotomy  wires are intact.      Impression    IMPRESSION: Enteric tube tip and sidehole project over the expected  location of the stomach. Non-obstructed bowel gas pattern.    I have personally reviewed the examination and initial interpretation  and I agree with the findings.    MERCEDES LEDBETTER DO         SYSTEM ID:  [id number]   XR Abdomen 1 View    Narrative    EXAM: Abdominal radiograph 11/27/2023 5:58 PM    HISTORY: 73 years Male NG tube placement.    COMPARISON: Abdominal radiograph 11/26/2023.    TECHNIQUE: Single frontal semiupright view(s) of the abdomen.    FINDINGS:  Gastric tube tip and sidehole projected over the gastric lumen,  minimally advanced compared to the previous exam.     Nonobstructive bowel gas pattern. No obvious intra-abdominal free air.  No pneumatosis or portal venous gas. No suspicious abdominal  calcifications. Bony structures are intact. Soft tissues are  unremarkable.    Postoperative changes of  the chest with fracture of the third most  superior sternotomy wire, stable. The partially visualized lungs are  clear. Right upper extremity PICC with distal tip terminating at the  cavoatrial junction. Cardiac stents are visualized. Atelectatic  basilar calcifications throughout the thoracic and abdominal aorta.  Surgical clip projecting over the left cardiophrenic region.      Impression    IMPRESSION: Enteric tube tip and sidehole projected over the gastric  lumen. Nonobstructive bowel gas pattern.    I have personally reviewed the examination and initial interpretation  and I agree with the findings.    MISBAH NUNES MD         SYSTEM ID:  [id number]       Discharge Medications   Current Discharge Medication List        START taking these medications    Details   folic acid (FOLVITE) 1 MG tablet Take 1 tablet (1 mg) by mouth daily  Qty: 30 tablet, Refills: 0    Associated Diagnoses: Severe malnutrition (H24)      mirtazapine (REMERON) 15 MG tablet Take 1 tablet (15 mg) by mouth at bedtime for 30 days  Qty: 30 tablet, Refills: 0    Associated Diagnoses: Severe malnutrition (H24)      nicotine (NICODERM CQ) 14 MG/24HR 24 hr patch Place 1 patch onto the skin every 24 hours  Qty: 28 patch, Refills: 0    Associated Diagnoses: Severe malnutrition (H24); Encounter for tobacco use cessation counseling      Nutritional Supplements (ENSURE ENLIVE) LIQD Take 1 Box by mouth 4 times daily  Qty: 948 mL, Refills: 0    Associated Diagnoses: PAD (peripheral artery disease) (H24); Vasculopathy; Claudication in peripheral vascular disease (H24); Unilateral AKA, left (H)           CONTINUE these medications which have CHANGED    Details   isosorbide mononitrate (IMDUR) 30 MG 24 hr tablet Take 1 tablet (30 mg) by mouth daily  Qty: 180 tablet, Refills: 1    Associated Diagnoses: CAD, multiple vessel      magnesium oxide 200 MG TABS Take 400 mg by mouth 2 times daily for 30 days  Qty: 60 tablet, Refills: 0    Associated Diagnoses:  Hypomagnesemia      oxyCODONE (ROXICODONE) 5 MG tablet Take 1 tablet (5 mg) by mouth daily as needed for moderate pain (For pain with dressing changes)  Qty: 5 tablet, Refills: 0    Associated Diagnoses: PAD (peripheral artery disease) (H24)           CONTINUE these medications which have NOT CHANGED    Details   acetaminophen (TYLENOL) 325 MG tablet Take 3 tablets (975 mg) by mouth every 8 hours Do this for the first few days postoperatively, as pain improves, can transition to taking 1-2 tabs q4-6 hours as needed.  Qty: 90 tablet, Refills: 0    Associated Diagnoses: PAD (peripheral artery disease) (H24)      amLODIPine (NORVASC) 10 MG tablet Take 1 tablet (10 mg) by mouth daily  Qty: 90 tablet, Refills: 0    Associated Diagnoses: Essential hypertension with goal blood pressure less than 140/90      apixaban ANTICOAGULANT (ELIQUIS ANTICOAGULANT) 5 MG tablet Take 1 tablet (5 mg) by mouth 2 times daily  Qty: 60 tablet, Refills: 2    Associated Diagnoses: PAF (paroxysmal atrial fibrillation) (H)      aspirin 81 MG tablet Take 81 mg by mouth daily  Qty: 30 tablet    Associated Diagnoses: CAD, multiple vessel      atorvastatin (LIPITOR) 40 MG tablet Take 1 tablet (40 mg) by mouth daily For cholesterol.  Qty: 90 tablet, Refills: 3    Associated Diagnoses: Hyperlipidemia LDL goal <100      carvedilol (COREG) 25 MG tablet Take 25 mg by mouth 2 times daily      cholecalciferol (SM VITAMIN D3) 50 MCG (2000 UT) CAPS Take 2 capsules by mouth daily      cilostazol (PLETAL) 100 MG tablet Take 100 mg by mouth 2 times daily      famotidine (PEPCID) 10 MG tablet Take 1 tablet (10 mg) by mouth 2 times daily  Qty: 30 tablet, Refills: 1    Associated Diagnoses: Vasculopathy      ferrous sulfate (FEROSUL) 325 (65 Fe) MG tablet Take 1 tablet (325 mg) by mouth daily (with breakfast) Iron supplement  Qty: 90 tablet, Refills: 0    Associated Diagnoses: Anemia, unspecified type      gabapentin (NEURONTIN) 100 MG capsule Take 1 capsule (100  mg) by mouth 3 times daily  Qty: 180 capsule, Refills: 1    Associated Diagnoses: PAD (peripheral artery disease) (H24)      losartan (COZAAR) 50 MG tablet Take 2 tablets (100 mg) by mouth daily  Qty: 90 tablet, Refills: 2    Associated Diagnoses: Essential hypertension with goal blood pressure less than 140/90      melatonin 1 MG TABS tablet Take 1 tablet (1 mg) by mouth nightly as needed for sleep  Qty: 30 tablet, Refills: 1    Associated Diagnoses: Vasculopathy      metFORMIN (GLUCOPHAGE XR) 500 MG 24 hr tablet TAKE 1 TABLET(500 MG) BY MOUTH TWICE DAILY WITH MEALS  Qty: 180 tablet, Refills: 0    Associated Diagnoses: Type 2 diabetes mellitus with complication, with long-term current use of insulin (H)      multivitamin (CENTRUM SILVER) tablet Take 1 tablet by mouth daily      Omega-3 Fatty Acids (FISH OIL OMEGA-3) 1000 MG CAPS Take 1,000 mg by mouth daily      omeprazole (PRILOSEC) 20 MG DR capsule Take 1 capsule (20 mg) by mouth daily  Qty: 90 capsule, Refills: 3    Associated Diagnoses: Gastroesophageal reflux disease without esophagitis      ondansetron (ZOFRAN ODT) 4 MG ODT tab Take 1 tablet (4 mg) by mouth every 6 hours as needed for nausea or vomiting  Qty: 30 tablet, Refills: 0    Associated Diagnoses: Vasculopathy      polyethylene glycol (MIRALAX) 17 GM/Dose powder Take 17 g by mouth daily  Qty: 510 g, Refills: 1    Associated Diagnoses: Vasculopathy      senna-docusate (SENOKOT-S/PERICOLACE) 8.6-50 MG tablet Take 1 tablet by mouth 2 times daily While taking narcotic pain medications (oxycodone)  Qty: 90 tablet, Refills: 0    Associated Diagnoses: PAD (peripheral artery disease) (H24)      sertraline (ZOLOFT) 25 MG tablet TAKE ONE TABLET BY MOUTH DAILY FOR DEPRESSION AND TO HELP APPETITE  Qty: 90 tablet, Refills: 1    Associated Diagnoses: Poor appetite; Other fatigue      tiZANidine (ZANAFLEX) 2 MG tablet Take 1 tablet (2 mg) by mouth 3 times daily as needed for muscle spasms  Qty: 90 tablet, Refills: 1     Associated Diagnoses: PAD (peripheral artery disease) (H24)      traZODone (DESYREL) 50 MG tablet Take 1 tablet (50 mg) by mouth at bedtime  Qty: 30 tablet, Refills: 0    Associated Diagnoses: Vasculopathy      ACCU-CHEK SMARTVIEW test strip TEST THREE TIMES DAILY OR AS DIRECTED  Qty: 300 strip, Refills: 3    Associated Diagnoses: Type 2 diabetes mellitus with complication, with long-term current use of insulin (H)      blood glucose monitoring (ACCU-CHEK FASTCLIX) lancets USE TO TEST THREE TIMES DAILY OR AS DIRECTED  Qty: 306 each, Refills: 3    Associated Diagnoses: Type 2 diabetes mellitus with complication, with long-term current use of insulin (H)           STOP taking these medications       ceFAZolin (ANCEF) intermittent infusion 2 g in 100 mL dextrose PRE-MIX Comments:   Reason for Stopping:         donepezil (ARICEPT) 10 MG tablet Comments:   Reason for Stopping:         naloxone (NARCAN) 4 MG/0.1ML nasal spray Comments:   Reason for Stopping:         nitroGLYcerin (NITROSTAT) 0.4 MG sublingual tablet Comments:   Reason for Stopping:         spironolactone (ALDACTONE) 25 MG tablet Comments:   Reason for Stopping:         sulfamethoxazole-trimethoprim (BACTRIM DS) 800-160 MG tablet Comments:   Reason for Stopping:             Allergies   Allergies   Allergen Reactions    Lidocaine Other (See Comments)     Lungs filled with fluid. ? Anaphylaxis    Lisinopril Cough     cough    Naltrexone Nausea and Vomiting

## 2023-12-08 NOTE — PLAN OF CARE
Occupational Therapy Discharge Summary    Reason for therapy discharge:    Discharged to home.    Progress towards therapy goal(s). See goals on Care Plan in [facility] electronic health record for goal details.  Goals partially met.  Barriers to achieving goals:   discharge from facility.    Therapy recommendation(s):    Continued therapy is recommended.  Rationale/Recommendations:   OT to continued to progress ADL and IADL independence and safety.

## 2023-12-08 NOTE — PROGRESS NOTES
Clinic Care Coordination Contact  [facility]: Post-Discharge Note  SITUATION                                                      Admission:    Admission Date: 11/17/23   Reason for Admission: Wound infection  Discharge:   Discharge Date: 12/07/23  Discharge Diagnosis: Wound infection    BACKGROUND                                                    Darian Engle was admitted on 11/17/2023 for dry gangrene. The following problems were addressed during his hospitalization:     # Recurrent left foot cellulitis  # S/p transmetatarsal amputation 2/2 wet gangrene (10/27/23) c/b staph aureus bacteremia   # PAD s/p left common femoral endarterectomy and balloon angioplasty with stent placement (10/24/23)  # S/p L AKA 12/1/2023   Pt presented with 2-3 day history of worsening foot pain with malaise and nausea. Foot appears slightly more swollen with some fluctuance and scant purulent discharge. Labs notable for elevated CRP, though trending down from last check on 11/15, and WBC 11.5, elevated from previous check. MRI on admission was negative for osteomyelitis. Vascular surgery was consulted and recommended broad spectrum abx and checking duplex ultrasound of both extremities. Pt has been on Cefazolin since 11/5/23 for staph aureus bacteremia which developed after the left TMA, following with infectious disease clinic. There are notes that he may have missed doses due to dementia impairing his ability to manage his own medications and his wife getting sick. Placed on broad spectrum until 11/20 at which point ID recommended transition back to cefazolin. Evaluated by orthopedics/podiatry and vascular while inpatient. Arterial Ultrasound with dopplers noted lack of flow in the lower extremity arteries. Very poor wound healing, patient requiring increasing doses of opioids for breakthrough pain. No potential to salvage the foot per orthopedics. Vascular deemed the patient to not be a revascularization candidate, and  offered AKA which the patient agreed to. Underwent surgery on 12/1/23 with minimal blood loss. Dressing changes while inpatient by vascular. Stub appeared healthy and non-infected, safe to discontinue cefazolin and remove PICC line per ID. Evaluated by PMR and was recommended to transfer to ARU for rehab. Initially agreeable but ultimately opted to discharge home with home PT acknowledging the risks of poor wound healing, infection, falls, malnutrition and dehydration and the increased risk for readmission.  - Wound dressing changes daily  - Follow-up with vascular surgery as outpatient  - Home Care referral, accepted by [facility]   - OP PT  - OP OT  - Wound Care nurse as outpatient     #Moderate to severe malnutrition  Patient having significantly decreased oral intake prior to admission with documented inadequate calories while inpatient. Trial of tube feeds for one week, however the patient did not tolerate them well and opted to discontinue. Not interested in PEG tube. Started mirtazapine 15mg at bedtime while inpatient. Caloric intake remained suboptimal, calorie counts were restarted. Emphasized to the patient and wife that poor oral intake puts him at high risk for poor wound healing and infection, emphasized the importance of oral intake monitoring and recommended higher level of care than home, TCU vs ARU. Patient and wife acknowledged these risks but elected to discharge home and monitor oral intake as outpatient.   - Nutrition referral   - Ensure prescription  - Home cares     #Hypomagnesemia  Magnesium persistently low throughout hospital stay. Provided IV magnesium sulfate for repletion.   - Discharge on magnesium oxide supplement.     # Pancreatic Mass   Per note from Dr. Patel Ambrose 09/12/23, pt had stable appearance of complex cystic mass in the uncinate process of the pancreas. The stability and negative prior FNA is reassuring. However, persistent enhancing mural thickening is concerning for malignant  degeneration into pancreatic adenocarcinoma. Differential includes a complex side branch intraductal papillary mucinous neoplasm (IPMN) and serous cystic neoplasm. Recommended continued close imaging surveillance with follow-up MRI in 6 months. Per [facility] Dr. 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 FNA showed showed uncinate process cyst was negative for high-grade dysplasia and malignancy while pancreatic body cyst was non-diagnostic. Concluded stable IPMN with concerning nodularity, no symptoms, marginal surgical candidate. MRI in 6 months, eus in 12 months for surveillance. Prior biopsy dated 12/13/2022 showed no definitive high grade dysplasia of the pancreatic body cyst and low-grade mucinous neoplasia of the uncinate process of the pancreas. The pt was scheduled to follow up with Dr. Govind Ho after March biopsy but uncertain if this occurred.   - Follow-up with PCP and revisit heme-onc referral as outpatient     # LE edema   Noted while inpatient. No evidence of JVP, no increased oxygen needs. Spot diuresed with lasix. Did not require lasix at discharge. EF normal on inpatient TTE.     # Chronic normocytic anemia  Hgb 7.2, MCV 97. Hgb baseline appears 7-8. Suspect anemia of chronic disease. Transfused 1 U pRBC pm 11/19 which stabilized Hgb to 8.4. Slowly drifting down to 7, required two additional units of pRBC for pre-operative optimization. No other products required till discharge, no evidence of major bleeding.  - Transfuse for Hb < 7     # H/o HTN  PTA meds initially held due to hypotension on admission. Adjusted as needed given surgical events and oral intake.   - PTA amlodipine 10 mg  - PTA carvedilol 25 BID  - PTA losartan 50 mg  - Decrease imdur to 30mg daily     # Dementia  # Agitation  # Insomnia  Unclear patient's baseline. Overall easily re-oriented throughout hospital stay with infrequent use of neuroleptics. Was on  donepezil that was discontinued during this admission per PCP.   - Outpatient follow-up  - Continue mirtazapine  - Continue trazodone     # T2DM: Last A1c 5.5% (10/17/23), contine PTA metformin  # CAD: Continue PTA atorvastatin, ASA  # TALA: CPAP discontinued due to non-use  # A Fib: Rate controlled, apixaban held as needed for procedures while inpatient. Restarted on 12/4 without new evidence of bleeding.  # GERD: PTA pantoprazole  # Anxiety: PTA sertraline       ASSESSMENT           Discharge Assessment  How are you doing now that you are home?: Patient shares that he is doing great. We reviewed wound dressing procedure together. Patient has not heard from [facility] yet but writer let him know they should be reaching out in the next few days. Patient is aware of upcoming appt's including hospital follow up. No other questions/concerns or needs at this time.  How are your symptoms? (Red Flag symptoms escalate to triage hotline per guidelines): Improved  Do you feel your condition is stable enough to be safe at home until your provider visit?: Yes  Does the patient have their discharge instructions? : Yes  Does the patient have questions regarding their discharge instructions? : No  Were you started on any new medications or were there changes to any of your previous medications? : Yes  Does the patient have all of their medications?: Yes  Do you have questions regarding any of your medications? : No  Do you have all of your needed medical supplies or equipment (DME)?  (i.e. oxygen tank, CPAP, cane, etc.): Yes  Discharge follow-up appointment scheduled within 14 calendar days? : Yes  Discharge Follow Up Appointment Date: 12/12/23  Discharge Follow Up Appointment Scheduled with?: Primary Care Provider    Post-op ([facility] Only)  If the patient had a surgery or procedure, do they have any questions for a nurse?: No    Post-op (Clinicians Only)  Did the patient have surgery or a procedure: Yes  Incision:  closed  Drainage: No  Fever: No  Chills: No  Redness: No  Warmth: No  Swelling: No  Incision site pain: No      PLAN                                                      Outpatient Plan:    Follow-up Appointments     Adult [facility] Follow-up and recommended labs and tests      1. Follow up with primary care provider, Marcos Gonzalez, within 7 days   for hospital/surgery follow- up.  Follow up on nutrition intake, consider   checking weight, BP, CMP/Mg/Phos  2. Referral placed for outpatient nutrition, you will be called to   schedule  3. Sutures/staples to remain in for 2 months from surgery, wound care per   vascular surgery team reviewed w/ family.  4.Vascular surgery will call to arrange f/up visit/appointment      Appointments on [place] and/or [facility] (with [facility] or [facility]   provider or service). Call [phone] if you haven't heard regarding   these appointments within 7 days of discharge.         The patient will need outpatient follow-up with PCP and reschedule with his GI provider for pancreatic mass follow-up as outpatient    Future Appointments   Date Time Provider Department Center   12/12/2023 11:30 AM [facility]   12/20/2023  4:30 PM Adebayo Rueda MD [facility]   1/17/2024  9:30 AM Schuyler Franco DO [facility]         For any urgent concerns, please contact our 24 hour nurse triage line: [phone] ([phone])         JUANA Ang

## 2023-12-11 NOTE — TELEPHONE ENCOUNTER
Home Care is calling regarding an established patient with [facility].        12/11/2023     9:15 AM   Home Care Information   Date of Home Care episode start 12/8/2023   Current following provider Dr. Gonzalez   Date provider agreed to follow 12/8/2023   Home Care agency [facility]     Requesting orders from: Marcos Gonzalez  Provider is following patient: Yes  Is this a 60-day recertification request?  No    Orders Requested    Skilled Nursing  Request for resumption in care.     Verbal orders given.  Home Care will send orders for provider to sign.    Nora Soriano RN

## 2023-12-12 NOTE — TELEPHONE ENCOUNTER
Left message on Janee DIA's vm with verbal orders as requested per Dr. Gonzalez.  Alla Duran BSN, RN     PULMONARY MEDICINE     Lilian Canas    : 1944     MRN:  [id number]   Date of Service: 23   Referring provider: Garrett Roger MD   PCP: Garrett Roger MD     Chief complaint: abnormal CT chest     HISTORY OF PRESENT ILLNESS      is a 79 year old female presenting in follow-up regarding the management of asthma and abnormal CT chest. She was last seen on 23.    She is s/p bronchoscopy 10/24/23 to MYLES pulmonary nodule.   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 Final cultures are pending.  Per notes, ID discussed with Dr. 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  She saw ID today and was given more information  Dr. Quintana recommends repeat imaging in 6 months.     Respiratory status is stable  She is using Breo 200-25 daily.  She needs albuterol HFA 1-2x per day.  She notes exertional dyspnea when cleaning home or gardening.  She notes cleaning solution is a main trigger for her.   She notes continued daily dry cough.   Denies wheezing, chest tightness.  She notes voice hoarseness.   Denies fevers, chills.  She notes episode of night sweats x1.   She reports a poor appetite and has lost 20 pounds over the last year.   Denies LE edema.     I have reviewed the patient's past medical history, past surgical history, social history, family history, medications, allergies and current vitals under [facility].     PAST MEDICAL, FAMILY AND SOCIAL HISTORY     Medications:  Current Outpatient Medications   Medication Sig Dispense Refill   • [START ON 11/15/2023] azithromycin (ZITHROMAX) 500 MG tablet Take 1 tablet by mouth 3 days a week. 36 tablet 3   • [START ON 11/15/2023] rifAMPin (RIFADIN) 300 MG capsule Take 2 capsules by mouth 3 days a week. 72 capsule 3   • [START ON 11/15/2023] ethambutol (MYAMBUTOL) 400 MG tablet Take 3 tablets by mouth 3 days a week. 108 tablet 3   • losartan (COZAAR) 50 MG tablet Take 1 tablet by mouth daily.  Indications: High Blood Pressure Disorder 90 tablet 1   • pravastatin (PRAVACHOL) 80 MG tablet TAKE 1/2 TABLET DAILY (40 mg, then 80 mg) ALTERNATING WITH 1 TABLET EVERY OTHER DAY 68 tablet 3   • albuterol 108 (90 Base) MCG/ACT inhaler USE 2 INHALATIONS BY MOUTH 4  TIMES DAILY AS NEEDED FOR  SHORTNESS OF BREATH 34 g 3   • fluticasone-vilanterol (Breo Ellipta) 200-25 MCG/ACT inhaler Inhale 1 puff into the lungs daily. 180 each 3   • ondansetron (ZOFRAN) 4 MG tablet Take 1 tablet by mouth every 8 hours as needed for Nausea. 2 tablet 0   • calcitonin (MIACALCIN) 200 UNIT/ACT nasal spray Spray 1 spray in alternating nostrils daily. Use 1 spray in alternating nostrils daily 11.1 mL 11   • EPINEPHrine 0.3 MG/0.3ML auto-injector Inject 0.3 mLs into the muscle 1 time as needed for Anaphylaxis. 2 each 3   • fluticasone (FLONASE) 50 MCG/ACT nasal spray Spray 2 sprays in each nostril daily. 3 Inhaler 3   • aspirin 81 MG tablet Take 81 mg by mouth daily.     • Multiple Vitamins-Minerals (MULTIVITAMIN PO) Take  by mouth daily.     • fish oil-omega-3 fatty acids 1000 MG CAPS Take  by mouth daily.       • ascorbic acid (VITAMIN C) 1000 MG tablet Take 1 tablet by mouth daily.       No current facility-administered medications for this visit.       Allergies:  ALLERGIES:   Allergen Reactions   • Bee Sting ANAPHYLAXIS   • Penicillins ANAPHYLAXIS   • Codeine HIVES and RASH   • Darvocet [Propoxyphene N-Apap] HIVES and RASH   • Sulfa Antibiotics HIVES       Past Medical  History/Surgeries:  Past Medical History:   Diagnosis Date   • Anemia    • Anxiety    • Arthritis    • Basal cell carcinoma    • Bronchitis    • Cerebral infarction (CMD)     TIA   • Colitis    • Colon polyps    • COPD (chronic obstructive pulmonary disease) (CMD)    • COVID     x 3   • Essential (primary) hypertension    • Extrinsic asthma without complication 11/30/2017   • High cholesterol    • Lung nodule    • Malignant neoplasm (CMD)     bcc of the face    •  Osteoporosis, unspecified 2011   • Pneumonia    • PONV (postoperative nausea and vomiting)    • Urinary tract infection        Past Surgical History:   Procedure Laterality Date   • Appendectomy     • Colonoscopy diagnostic  2007    Colonoscopy, Dx   • Conization cervix,knife/laser  1982   • Dexa bone density axial skeleton  2011   • Ectopic pregnancy surgery  1969   • Joint replacement Right 2015     total hip-anterior   • Occult blood test tube     • Open access colonoscopy N/A    • Pap smear,routine  2011   • Removal gallbladder  2004   • Service to gastroenterology      colonoscopy   • Skin biopsy     • Tonsillectomy     • Tubal ligation         Family History:  Family History   Problem Relation Age of Onset   • Cancer, Stomach Mother         stomach   • Osteoarthritis Mother    • Heart disease Mother    • Hypertension Mother    • Hyperlipidemia Mother    • Stroke Mother    • Cancer Father         head/neck    • Kidney disease Father    • Depression Father    • Diabetes Father    • Hypertension Father    • Cancer, Prostate Brother        Social History:  Social History     Tobacco Use   • Smoking status: Former     Current packs/day: 0.00     Average packs/day: 1 pack/day for 20.0 years (20.0 ttl pk-yrs)     Types: Cigarettes     Start date: 1965     Quit date: 1985     Years since quittin.8   • Smokeless tobacco: Never   Substance Use Topics   • Alcohol use: Yes     Alcohol/week: 1.0 standard drink of alcohol     Types: 1 Cans of beer per week     Comment: occassionally        REVIEW OF SYSTEMS     Review of Systems   Constitutional: Positive for appetite change and unexpected weight change. Negative for activity change, chills, diaphoresis, fatigue and fever.   HENT: Negative for congestion, postnasal drip, rhinorrhea, sinus pressure, sinus pain and voice change.    Respiratory: Positive for cough and shortness of breath. Negative for chest tightness  and wheezing.    Cardiovascular: Negative for leg swelling.   Gastrointestinal:        Denies GERD.   Psychiatric/Behavioral: Negative for decreased concentration, dysphoric mood and sleep disturbance. The patient is not nervous/anxious.        PHYSICAL EXAM     Vitals:  Visit Vitals  /80 Comment: repeat right arm   Pulse 63   Resp 16   Ht 5' 1\" (1.549 m)   Wt 54.1 kg (119 lb 3.2 oz)   SpO2 98%   BMI 22.52 kg/m²       Physical Exam  Vitals reviewed.   Constitutional:       General: She is not in acute distress.     Appearance: Normal appearance. She is not ill-appearing, toxic-appearing or diaphoretic.   HENT:      Head: Normocephalic and atraumatic.      Mouth/Throat:      Mouth: Mucous membranes are moist.      Pharynx: Oropharynx is clear. No oropharyngeal exudate.      Comments: No thrush     Neck: Normal range of motion and neck supple.   Cardiovascular:      Rate and Rhythm: Normal rate and regular rhythm.      Heart sounds: No murmur heard.  Pulmonary:      Effort: Pulmonary effort is normal. No respiratory distress.      Breath sounds: Normal breath sounds. No wheezing or rhonchi.   Musculoskeletal:         General: Normal range of motion.      Right lower leg: No edema.      Left lower leg: No edema.   Skin:     General: Skin is warm and dry.   Neurological:      General: No focal deficit present.      Mental Status: She is alert.   Psychiatric:         Mood and Affect: Mood normal.         Behavior: Behavior normal.         Thought Content: Thought content normal.         Judgment: Judgment normal.       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    PFT  PFT  23  FEV1/FVC: 0.69  FVC: 2.73 L or 116% predicted  FEV1: 1.88 L or 103% predicted  OOU72-97%: 68% predicted  T% predicted  RV: 82% predicted  DLCO: 74% predicted  Overall Impression     RT reports patient gave excellent effort and cooperation, tolerated testing well.   Resting room air oxygen saturation of 99%.   Spirometry has evidence for mild obstructive ventilatory defect without significant response to inhaled bronchodilator challenge.   Small airway obstruction was mild.   Lung volumes are without evidence for restriction, hyperinflation, or air trapping.   Diffusion capacity is mildly reduced.   Airway resistance is low.       Findings are consistent with obstructive lung disease, given reduction in diffusion capacity, would favor diagnosis of COPD with component of emphysema.  If patient has prior confirmed history of COPD, current spirometry would be consistent with gold stage I disease.  Clinical correlation is required.      Imaging  CT chest W contrast  23  IMPRESSION:      1.  There is a new mass, versus group of confluent nodules in the LEFT  upper lobe, measuring up to 4.0 cm in size.  Tissue sampling is recommended  for definitive diagnosis.  2.  Additional new solid nodule in the LEFT lower lobe measures 0.5 cm.  3.  Tiny clustered micronodules and scattered calcified granulomata are  noted.  4.  No adenopathy.     PET CT  23     IMPRESSION:      1. The 4 cm masslike opacity in the left upper lobe of the lung is FDG  avid.  Could be inflammatory or secondary to neoplasm.  Symmetrical  bilateral hilar lymph node uptake is suspected to be reactive.  The hilar  lymph nodes are not enlarged.     2.  Symmetrical bilateral adrenal activity.  The adrenal glands appear  stable compared with CT dated 10/1/2019.  Favored to be due to adenomatous  hyperplasia or physiologic.     3.  Additional ancillary findings as above.       CT chest WO  contrast  9/15/23  IMPRESSION:  1.  Overall stable appearance of a grouping of nodules in the left upper  lobe. The confluent area measures up to 4 cm. Individual nodules measure up  to 12 mm. Stable appearance of several additional subcentimeter nodules in  the left lower lobe.     2. Interval development of multiple new similar-appearing nodules most  notably in the anterior aspect of the left upper lobe with 2 dominant  nodules measuring 14 and 8 mm each. There is an additional cluster in the  medial aspect of the left upper lobe with the largest nodule measuring 6  mm. New solid left lower lobe pulmonary nodule measuring 4 mm. Recommend  surveillance/work-up as outlined.     3. Multiple small areas of reticular nodular densities are noted with  several new areas in the right middle lobe and bilateral lower lobes.  Findings may reflect infectious/inflammatory process.    ASSESSMENT/PLAN     Assessment -  Pulmonary nodules  - Incidentally noted nodule on CXR for pre-op for colonoscopy 5/8/23  - PET avid 20 x 15 x 21 mm MYLES nodule s/p bronchoscopy 6/15/23 with cytology revealing caseating granulomatous inflammation with negative cultures  - Repeat CT chest WO contrast 9/15/23 shows stable dominant nodule with new areas bilaterally up to 14 mm in size   - S/p bronchoscopy 10/24/23 with cytology revealing caseating necrosis with rare possible acid-fast bacilli     History of tobacco use - Quit in 1986  Secondhand smoke exposure  Personal history of skin cancer (unknown type)  Family history of various cancers, denies family history of lung cancer     Moderate persistent asthma / mild COPD based on recent PFT - Stable     History of pneumonia - Reports historical diagnosis of pneumonia on numerous occassions    Plan -  MAC treatment per ID  Follow cultures  CT chest WO contrast in 6 months  Continue Breo 200-25 daily. Rinse/gargle with use   Continue albuterol HFA Q4H PRN    Health maintenance - Recommend COVID-19  series, seasonal flu vaccine and Prevnar/Pneumovax per eligibility    Patient is aware to notify our office of any worsening in respiratory status or development of signs/symptoms of respiratory infection. To my knowledge all questions were answered. Patient expressed understanding.     Follow-up in 6 months, earlier as needed. Advised to call/message clinic in the interim with any questions or concerns.

## 2023-12-12 NOTE — TELEPHONE ENCOUNTER
Home Care is calling regarding an established patient with  [facility].        12/11/2023     9:15 AM   Home Care Information   Date of Home Care episode start 12/8/2023   Current following provider Dr. Gonzalez   Date provider agreed to follow 12/8/2023   Home Care agency [facility]     Requesting orders from: Marcos Gonzalez  Provider is following patient: Yes  Is this a 60-day recertification request?  No    Orders Requested    Skilled Nursing  Request for initial certification (first set of orders)   Frequency:  1x/wk for 8 wks and 3 prn visits for wound care, diabetes, pain, safety    Physical Therapy/ OT  Request for initial certification (first set of orders)  evaluate and treat    HHA (Home Health Aide)  Request for initial certification (first set of orders)   Frequency:  1x/wk for 8 wks for bathing      Information was gathered and will be sent to provider for review.  RN will contact Home Care with information after provider review.  Confirmed ok to leave a detailed message with call back.  Contact information confirmed and updated as needed.    Nora Soriano RN

## 2023-12-12 NOTE — PROGRESS NOTES
Subjective   Frandy is a 73 year old, presenting for the following health issues:  Hospital F/U  Follow-up severe PAD with recurent left foot cellulits with trandmetasal ampuation with wet grangrene.  And s/p left common femoral edarterectomy and shent placement.   Out patient nutritionist referral was placed and home care. Given remeron to helpful appetite/sleep. Ensure prescription done. Patient given 1 unit PRBCs.   Follow-up vascular surgery.   Appetite improving and taking ensure shake. No antibiotics. No fevers.   Home health coming. Sleep overall. Taking remeron.   No nausea, vomiting or diarrhea. No shortness of breath. Minimal pain.   Eats meat. No abdominal pain.   He has a past medical history of PMH  of CAD s/p CAB w/ LIMA and L GSV (2004), HTN, HDL, atrial fibrillation on AC, PAD, anemia, dry gangrene, benign cystic neoplasm of the pancreas, TALA not on CPAP, dementia and DM II       12/12/2023    11:12 AM   Additional Questions   Roomed by BELLA Go CMA   Accompanied by Wife       HPI         12/8/2023    11:04 AM   Post Discharge Outreach   Admission Date 11/17/2023   Reason for Admission Wound infection   Discharge Date 12/7/2023   Discharge Diagnosis Wound infection   How are you doing now that you are home? Patient shares that he is doing great. We reviewed wound dressing procedure together. Patient has not heard from [facility] yet but writer let him know they should be reaching out in the next few days. Patient is aware of upcoming appt's including hospital follow up. No other questions/concerns or needs at this time.   How are your symptoms? (Red Flag symptoms escalate to triage hotline per guidelines) Improved   Do you feel your condition is stable enough to be safe at home until your provider visit? Yes   Does the patient have their discharge instructions?  Yes   Does the patient have questions regarding their discharge instructions?  No   Were you started on any new medications or were there  changes to any of your previous medications?  Yes   Does the patient have all of their medications? Yes   Do you have questions regarding any of your medications?  No   Do you have all of your needed medical supplies or equipment (DME)?  (i.e. oxygen tank, CPAP, cane, etc.) Yes   Discharge follow-up appointment scheduled within 14 calendar days?  Yes   Discharge Follow Up Appointment Date 12/12/2023   Discharge Follow Up Appointment Scheduled with? Primary Care Provider     Hospital Follow-up Visit:    Hospital/Nursing Home/IP Rehab Facility: [facility]  Date of Admission: 11/17/2023  Date of Discharge: 12/7/2023  Reason(s) for Admission:   # Recurrent left foot cellulitis  # S/p transmetatarsal amputation 2/2 wet gangrene (10/27/23) c/b staph aureus bacteremia   # PAD s/p left common femoral endarterectomy and balloon angioplasty with stent placement (10/24/23)  # S/p L AKA   # Moderate to Severe Malnutrition  # Pancreatic Mass  # LE edema  # Chronic normocytic anemia   # H/o HTN   # Dementia  # Agitation  # Insomnia  # T2DM  # CAD  # TALA  # A Fib  # GERD  # Anxiety    Was your hospitalization related to COVID-19? No   Problems taking medications regularly:  None  Medication changes since discharge: None  Problems adhering to non-medication therapy:  None    Summary of hospitalization:  CareEverywhere information obtained and reviewed  Diagnostic Tests/Treatments reviewed.  Follow up needed: see above  Other Healthcare Providers Involved in Patient s Care:         Specialist appointment - see above  Update since discharge: improved.         Plan of care communicated with patient and family                 Objective    There were no vitals taken for this visit.  There is no height or weight on file to calculate BMI.  /59   Pulse (!) 41   Temp 98.4  F (36.9  C) (Oral)   Resp 20   SpO2 100%    Physical Exam   GENERAL: healthy, alert and no distress  EYES: Eyes  grossly normal to inspection, PERRL and conjunctivae and sclerae normal  NECK: no adenopathy, no asymmetry, masses, or scars and thyroid normal to palpation  RESP: lungs clear to auscultation - no rales, rhonchi or wheezes  CV: regular rate and rhythm, normal S1 S2, no S3 or S4, no murmur, click or rub, no peripheral edema and peripheral pulses strong  ABDOMEN: soft, nontender, no hepatosplenomegaly, no masses and bowel sounds normal  MS: no gross musculoskeletal defects noted, no edema  MS: insicion site clean mid thigh amputation.  SKIN: no suspicious lesions or rashes  PSYCH: affect normal/bright        ASSESSMENT / PLAN:  (I73.9) Claudication in peripheral vascular disease (H24)  (primary encounter diagnosis)  Comment: s/p amputatoin. Healing well  Plan: continue monitor. Handicap driving form done- patient doesn't drive.   Follow-up per vascular and push protein/fat in diet.   Home health coming out    (F03.90) Dementia without behavioral disturbance (H)  Comment: progressive.   Plan: discussed code status- they still want full code but not prolonged life saving measures. Should consider DNR/DNI and or hospice if worse with multiple medical issues.     (E11.8,  Z79.4) Type 2 diabetes mellitus with complication, with long-term current use of insulin (H)  Comment: stable  Plan: Recheck in 3 months      (D64.9) Anemia, unspecified type  Comment: likely ACD  Plan: continue meat and monitor. Recheck in 3 months      Marcos Gonzalez MD

## 2023-12-12 NOTE — TELEPHONE ENCOUNTER
----- Message from Linda Soto RN sent at 12/12/2023 11:05 AM CST -----  Coordinators, I do not see this has been scheduled. Can we please get him set up with Roxann? He should be seeing her next week please. Thank you.    Linda  ----- Message -----  From: Roxann Fontanez, ADIEL CNP  Sent: 12/7/2023   2:10 PM CST  To: Linda Soto RN; Kayley Davis LPN; #    Hi team,  Can we please schedule this patient to follow-up with me every 2 weeks for the next 2 months.  In person.  Wound assessment, LLE stump.  Thank you,  Roxann

## 2023-12-13 NOTE — PROGRESS NOTES
I spoke to Frandy's wife on the phone and she reported that she will not be using the Protector due to it being disassembled. I offered to have it re-assembled but she declined. She reports that he will keep his stitches in for two more months and then he is optimistic regarding getting a prosthesis. She asked if I would call back in two months for another follow up call when he will be getting the OK to use his shrinkers.  Electronically signed Eduardo Evangelista , LPO.

## 2023-12-17 NOTE — PROGRESS NOTES
[facility]  Division of Infectious Diseases and International Medicine  Department of Medicine    GENERAL INFECTIOUS DISEASE OUTPATIENT VISIT NOTE     Patient:  Darian Engle, Date of birth 1950, Medical record number [id number]  Date of Visit:  December 20, 2023  Referred by: No ref. provider found   Reason for Consult:  Follow-up foot infection           Assessment and Plan     1. Left foot post-surgical site infection - presumed MSSA cellulitis  1. 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 Complicated MSSA bacteremia (based on lack of left foot source control)  a. Blood culture 11/5/2023 in 1/4 bottles positive for MSSA  b. Blood cultures 11/6-11/13/2023 negative  c. Blood cultures 11/17/2023 negative  3. RLE endovascular stent    The patient is doing well after his definitive source control procedure (AKA) on 12/1. He received 4 weeks of therapy (IV cefazolin) for his complicated MSSA bacteremia which ended on 12/3. He had no signs of metastatic spread during his initial evaluation, including a negative TTE, and continues to have none based on his symptoms and exam today. His surgical site is healing well. His infection is resolved.     I encouraged him to stop smoking, to minimize his risk of progression of his PAD that could lead to arterial insufficiency ulcers and possibly infection. He remains in the pre-contemplative phase.    He can follow-up with ID as needed    Adebayo Rueda MD PharmD  Adult Infectious Disease Fellow PGY5  Pager: [phone]       History of Present Illness:   Darian Engle is a 73y M with pmhx PAD with recent L foot amputation 10/26/2023 and recent admission for post-surgical site infection, was started on antibiotics for MSSA bacteremia with a plan for a total four week course, two weeks of IV cefazolin (11/6-11/19) followed by two weeks of oral TMP/SMX (11/20-12/3), ultimately underwent AKA on 12/1/2023, so antibiotics were stopped  at that time since this was felt to be definitive source control    He saw vascular surgery 12/18 in follow-up who felt he was doing well. Noted some small wounds on the 2nd and 3rd toe of the right foot.     Today, the patient was seen with his wife present. He reports doing well. No concerns about the AKA stump site which they feel looks great. No fevers, chills, sweats, or new symptoms or concerns. Reports some small ulcers of the right toes that are healing well and not causing any symptoms. The patient continues to smoke and understands the risk, but notes that it is one of the few things that give him pleasure and he understands the risks.          Current Antimicrobials:     None        Microbiologic Data:     See problem list above         Other Medical History:     Attempt was made to collect past, family and social history during this encounter,  this information was reviewed with the patient and updated    Lidocaine, Lisinopril, and Naltrexone    Past Medical History  Past Medical History:   Diagnosis Date     Arthritis March 2018    joint pain both hands     Diabetes (H)      Heart disease 1991    [place]-angioplasty     Hypertension     PastSurgical History  Past Surgical History:   Procedure Laterality Date     AMPUTATE LEG ABOVE KNEE Left 12/1/2023    Procedure: LEFT AMPUTATION, ABOVE KNEE;  Surgeon: Chuck Felix MBBS;  Location: [facility]     AMPUTATE TOE(S) Left 10/26/2023    Procedure: Amputate toe(s), all transmetatarsal amputation;  Surgeon: Jerardo Thornton MD;  Location: [facility]     ANGIOGRAM Left 10/17/2023    Procedure: Left lower extremity angiogram via Left brachial artery approach;  Surgeon: Chuck Felix MBBS;  [facility]     ANGIOGRAM Left 10/24/2023    Procedure: ANGIOGRAM;  Surgeon: Chuck Felix MBBS;  [facility]: [facility]     ANGIOPLASTY Left 10/24/2023    Procedure: ANGIOPLASTY, Left Lower Extremity atherectomy;  Surgeon: Chuck Felix MBBS;  Location[facility]     CARDIAC  SURGERY  2004    [facility]     ENDARTERECTOMY FEMORAL Left 10/24/2023    Procedure: ENDARTERECTOMY, FEMORAL;  Surgeon: Chuck Felix MBBS;  Location: [facility] OR     ENHANCE LASER REFRACTIVE BILATERAL EXISTING PT IN PARAMETERS  2000     EYE SURGERY  2000    [facility]     IR OR ANGIOGRAM  10/17/2023     IR OR ANGIOGRAM  10/24/2023     [place]ASC[place]  2017    [facility]      Family History  Family History   Problem Relation Age of Onset     Glaucoma Mother      Macular Degeneration Mother      Macular Degeneration Other      Lung Cancer Brother     Social History   reports that he has been smoking cigarettes. He started smoking about 55 years ago. He has a 2.50 pack-year smoking history. He has quit using smokeless tobacco. He reports current alcohol use. He reports current drug use. Drug: Marijuana.   Notable Exposures  None Vaccination History:  Immunization History   Administered Date(s) Administered     COVID-19 MONOVALENT 12+ (Pfizer) 02/21/2021, 03/14/2021     COVID-19 Monovalent 18+ (Moderna) 02/07/2022     Flu, Unspecified 10/13/1997     Hepatitis B, Adult 01/28/2014, 12/03/2014, 10/08/2015     Influenza (H1N1) 01/12/2010     Influenza (High Dose) 3 valent vaccine 09/28/2019, 09/16/2020, 09/21/2021, 09/08/2022     Influenza (IIV3) PF 10/13/2004, 11/15/2006, 10/21/2008, 09/18/2009, 10/01/2010, 11/05/2010, 10/04/2011     Influenza (intradermal) 09/28/2019     Influenza Vaccine 18-64 (Flublok) 09/16/2020     Influenza Vaccine 65+ (Fluzone HD) 09/21/2021, 09/08/2022, 10/29/2023     Influenza Vaccine >6 months,quad, PF 01/07/2014, 12/03/2014, 10/08/2015, 01/27/2017     Influenza Vaccine, 6+MO IM (QUADRIVALENT W/PRESERVATIVES) 01/07/2014, 08/28/2017     Influenza, seasonal, injectable, PF 11/08/2012     Mantoux Tuberculin Skin Test 10/30/2023     Pneumo Conj 13-V (2010&after) 12/18/2017     Pneumococcal 20 valent Conjugate (Prevnar 20) 09/08/2022      Pneumococcal 23 valent 01/01/2006, 03/23/2010, 12/15/2015     TD,PF 7+ (Tenivac) 04/26/2006     TDAP Vaccine (Adacel) 01/14/2011, 09/16/2020     Td (Adult), Adsorbed 08/07/1995, 04/26/2006     Zoster recombinant adjuvanted (SHINGRIX) 08/02/2019, 09/16/2020     Zoster vaccine, live 01/07/2014             Physical Exam:     VITAL SIGNS:  BP 94/55   Pulse 81   Temp 98.1  F (36.7  C) (Oral)   Wt 54.2 kg (119 lb 8 oz)   SpO2 99%   BMI 18.72 kg/m      GENERAL APPEARANCE: Not in acute distress, well-appearing    PHYSICAL EXAM:  Eyes:     pupils equal and reactive, extraocular eye movements intact  Cardiovascular:    Inspection: No Cyanosis, JVD not elevated   Auscultation:  S1, S2 normal, regular rate and rhythm  Respiratory:     Inspection: Not in respiratory distress, Chest expansion symmetrical   Auscultation: 4 point auscultation done clear to auscultation bilaterally, no wheezes, no rales and no rhonchi  Gastrointestinal:      soft, non-tender; bowel sounds normal; no masses,  no organomegaly  Musculoskeletal:     Left AKA noted. Reviewed recent photos from surgery visit. Incision clean, dry, intact, no dehiscence.    Small shallow circular ulcers on the dorsum of two of the right toes. No drainage or surrounding erythema.   Shoulders, elbows, wrists, and left knee are all non-tender, not swollen, warm, or erythematous  Neurologic:     Higher Mental Function: Conversant, AOx4   Spinal Tenderness Nontender.   Motor: Moving all 4 limbs spontaneously   Sensory: crude touch intact in all 4 limbs  Psychiatric:     appropriate            Signature:     Adebayo Rueda MD   PGY5 Infectious Disease Fellow

## 2023-12-18 NOTE — NURSING NOTE
Chief Complaint   Patient presents with    Follow Up     Return vascular - post-inpatient follow up and wound check        Vitals were taken, medications reconciled.    Macarena Smallwood CMA  12:29 PM

## 2023-12-18 NOTE — PATIENT INSTRUCTIONS
Thank you so much for choosing us for your care. It was a pleasure to see you at the vascular clinic today.     Follow-up recommendations: We will see you back in 2 weeks, appointment has been made.      If you have any questions, please call our clinic at [phone]. We also encourage the use of [employer] to communicate with your HealthCare Provider.        If you have an urgent need after business hours (8:00 am to 4:30 pm) please call [phone], option 4, and ask for the vascular attending on call. For non-urgent after hours needs, please call the vascular nurse at [phone] and leave a detailed voicemail. For scheduling needs, please call our clinic directly at [phone].

## 2023-12-18 NOTE — PROGRESS NOTES
VASCULAR SURGERY PROGRESS NOTE    LOCATION: [facility] (formerly [facility])     Darian Engle  Medical Record #:  [id number]  YOB: 1950  Age:  73 year old     Date of Service: 12/18/2023    PRIMARY CARE PROVIDER: Marcos Gonzalez    Reason for visit: Wound check, status post AKA    IMPRESSION / RECOMMENDATION:   Darian Engle is a 73 year old male with PMH of CLTI with tissue loss of LLE s/p revascularization attempts which ultimately failed, now s/p LLE AKA on 12/1 with Dr. Felix. Recovering well. Pain significantly improved. Unfortunately patient continues to smoke at home despite nicotine patches prescribed at discharge.  Patient was advised that given his chronic PAD and continued smoking he is at high risk for developing limb ischemia on right. LLE AKA incision wound healing well. Staples intact, skin edges well-approximated. No drainage, no swelling.    - Given appropriate healing process as of now, recommend continued wound care: Remove old dressing, apply iodine at incision site, allow to dry, cover with 4 x 4 sterile dressing, followed by Kerlix dressing.  At the end, applied Ace wrap. This instructions were provided and demonstrated to wife who assists Mr. Engle at home.   - Recommend smoking cessation. Patient already has nicotine patches prescribed, Mrs. Engle will pick them up.   - Continue to protect RLE from injuries given now greater utilization of this limb secondary to amputation of left.  - Continue with medication regimen  - Patient has follow-up scheduled in vascular surgery clinic on 1/8/2024 for wound check  Mrs. Engle also noted the patient has 2 scabs on second and third toes of his right lower extremity. Patient has monophasic Doppler signal over DP, PT and peroneal. Small wounds on toes do not appear to be infected. We will continue to monitor for now.    All questions were answered and support provided. He has our contact information and knows to reach out with  any additional questions or concerns.     Roxann Fontanez CNP  Vascular Surgery  Pager: [phone]  [email or web address]  Send message or 10 digit call back number Securely via [employer] with the [employer] Web Console (learn more here)        HPI:  Darian Engle is a 73 year old male with past medical history significant for left femoral endarterectomy and profundoplasty and balloon angioplasty on 10/24/23 and left TMA amputation on 10/27/23 with Dr. Thornton complicated by recent admission for MSSA bacteremia with evidence of poor wound healing. With challenging social support, inability to access to hyperbaric oxygen therapy or received IV antibiotics at home. Previously declined TCU. Now status post left AKA on 12/1/2023. Postoperatively he recovered well, however upon return to home patient resumed smoking. Was seen by orthotics for stump  and protector, we will hold using for now given his recent surgery and continued need for healing of stump. His wife noted patient is set up for outpatient nursing which are visiting him at home, outpatient PT and OT.  Patient and his wife were asked if there is anything else we could do from a vascular surgery perspective to help and they feel they are well supported at home, per their statement.    REVIEW OF SYSTEMS:    A 12 point ROS was reviewed and is negative except for what is listed above in HPI.    PHH:    Past Medical History:   Diagnosis Date    Arthritis March 2018    joint pain both hands    Diabetes (H)     Heart disease 1991    [place]-angioplasty    Hypertension           Past Surgical History:   Procedure Laterality Date    AMPUTATE LEG ABOVE KNEE Left 12/1/2023    Procedure: LEFT AMPUTATION, ABOVE KNEE;  Surgeon: Chuck Felix MBBS;  [facility]    AMPUTATE TOE(S) Left 10/26/2023    Procedure: Amputate toe(s), all transmetatarsal amputation;  Surgeon: Jerardo Thornton MD;  [facility]    ANGIOGRAM Left 10/17/2023    Procedure: Left  lower extremity angiogram via Left brachial artery approach;  Surgeon: Chuck Felix MBBS;  [facility]    ANGIOGRAM Left 10/24/2023    Procedure: ANGIOGRAM;  Surgeon: Chuck Felix MBBS;  [facility]    ANGIOPLASTY Left 10/24/2023    Procedure: ANGIOPLASTY, Left Lower Extremity atherectomy;  Surgeon: Chuck Felix MBBS;  [facility]    CARDIAC SURGERY  2004    [facility]    ENDARTERECTOMY FEMORAL Left 10/24/2023    Procedure: ENDARTERECTOMY, FEMORAL;  Surgeon: Chuck Felix MBBS;  [facility]    ENHANCE LASER REFRACTIVE BILATERAL EXISTING PT IN PARAMETERS  2000    EYE SURGERY  2000    [facility]    IR OR ANGIOGRAM  10/17/2023    IR OR ANGIOGRAM  10/24/2023    VASCULAR SURGERY  2017    [facility]       ALLERGIES:  Lidocaine, Lisinopril, and Naltrexone    MEDS:    Current Outpatient Medications:     ACCU-CHEK SMARTVIEW test strip, TEST THREE TIMES DAILY OR AS DIRECTED, Disp: 300 strip, Rfl: 3    acetaminophen (TYLENOL) 325 MG tablet, Take 3 tablets (975 mg) by mouth every 8 hours Do this for the first few days postoperatively, as pain improves, can transition to taking 1-2 tabs q4-6 hours as needed., Disp: 90 tablet, Rfl: 0    amLODIPine (NORVASC) 10 MG tablet, Take 1 tablet (10 mg) by mouth daily, Disp: 90 tablet, Rfl: 0    apixaban ANTICOAGULANT (ELIQUIS ANTICOAGULANT) 5 MG tablet, Take 1 tablet (5 mg) by mouth 2 times daily, Disp: 60 tablet, Rfl: 2    aspirin 81 MG tablet, Take 81 mg by mouth daily, Disp: 30 tablet, Rfl:     atorvastatin (LIPITOR) 40 MG tablet, Take 1 tablet (40 mg) by mouth daily For cholesterol., Disp: 90 tablet, Rfl: 3    blood glucose monitoring (ACCU-CHEK FASTCLIX) lancets, USE TO TEST THREE TIMES DAILY OR AS DIRECTED, Disp: 306 each, Rfl: 3    carvedilol (COREG) 25 MG tablet, Take 25 mg by mouth 2 times daily, Disp: , Rfl:     cholecalciferol ( VITAMIN D3) 50 MCG (2000 UT) CAPS, Take 2 capsules by mouth  daily, Disp: , Rfl:     cilostazol (PLETAL) 100 MG tablet, Take 100 mg by mouth 2 times daily, Disp: , Rfl:     famotidine (PEPCID) 10 MG tablet, Take 1 tablet (10 mg) by mouth 2 times daily, Disp: 30 tablet, Rfl: 1    ferrous sulfate (FEROSUL) 325 (65 Fe) MG tablet, Take 1 tablet (325 mg) by mouth daily (with breakfast) Iron supplement, Disp: 90 tablet, Rfl: 0    folic acid (FOLVITE) 1 MG tablet, Take 1 tablet (1 mg) by mouth daily, Disp: 30 tablet, Rfl: 0    gabapentin (NEURONTIN) 100 MG capsule, Take 1 capsule (100 mg) by mouth 3 times daily (Patient taking differently: Take 100 mg by mouth 2 times daily), Disp: 180 capsule, Rfl: 1    isosorbide mononitrate (IMDUR) 30 MG 24 hr tablet, Take 1 tablet (30 mg) by mouth daily, Disp: 180 tablet, Rfl: 1    losartan (COZAAR) 50 MG tablet, Take 2 tablets (100 mg) by mouth daily, Disp: 90 tablet, Rfl: 2    magnesium oxide 200 MG TABS, Take 400 mg by mouth 2 times daily for 30 days, Disp: 60 tablet, Rfl: 0    melatonin 1 MG TABS tablet, Take 1 tablet (1 mg) by mouth nightly as needed for sleep, Disp: 30 tablet, Rfl: 1    metFORMIN (GLUCOPHAGE XR) 500 MG 24 hr tablet, TAKE 1 TABLET(500 MG) BY MOUTH TWICE DAILY WITH MEALS, Disp: 180 tablet, Rfl: 0    mirtazapine (REMERON) 15 MG tablet, Take 1 tablet (15 mg) by mouth at bedtime for 30 days, Disp: 30 tablet, Rfl: 0    multivitamin (CENTRUM SILVER) tablet, Take 1 tablet by mouth daily, Disp: , Rfl:     nicotine (NICODERM CQ) 14 MG/24HR 24 hr patch, Place 1 patch onto the skin every 24 hours, Disp: 28 patch, Rfl: 0    Nutritional Supplements (ENSURE ENLIVE) LIQD, Take 1 Box by mouth 4 times daily, Disp: 948 mL, Rfl: 0    Omega-3 Fatty Acids (FISH OIL OMEGA-3) 1000 MG CAPS, Take 1,000 mg by mouth daily, Disp: , Rfl:     ondansetron (ZOFRAN ODT) 4 MG ODT tab, Take 1 tablet (4 mg) by mouth every 6 hours as needed for nausea or vomiting, Disp: 30 tablet, Rfl: 0    oxyCODONE (ROXICODONE) 5 MG tablet, Take 1 tablet (5 mg) by mouth  daily as needed for moderate pain (For pain with dressing changes), Disp: 5 tablet, Rfl: 0    polyethylene glycol (MIRALAX) 17 GM/Dose powder, Take 17 g by mouth daily, Disp: 510 g, Rfl: 1    senna-docusate (SENOKOT-S/PERICOLACE) 8.6-50 MG tablet, Take 1 tablet by mouth 2 times daily While taking narcotic pain medications (oxycodone), Disp: 90 tablet, Rfl: 0    sertraline (ZOLOFT) 25 MG tablet, TAKE ONE TABLET BY MOUTH DAILY FOR DEPRESSION AND TO HELP APPETITE, Disp: 90 tablet, Rfl: 1    tiZANidine (ZANAFLEX) 2 MG tablet, Take 1 tablet (2 mg) by mouth 3 times daily as needed for muscle spasms, Disp: 90 tablet, Rfl: 1    traZODone (DESYREL) 50 MG tablet, Take 1 tablet (50 mg) by mouth at bedtime, Disp: 30 tablet, Rfl: 0    omeprazole (PRILOSEC) 20 MG DR capsule, Take 1 capsule (20 mg) by mouth daily (Patient not taking: Reported on 12/18/2023), Disp: 90 capsule, Rfl: 3    SOCIAL HABITS:    History   Smoking Status    Every Day    Packs/day: 0.50    Years: 5.00    Types: Cigarettes    Start date: 1/1/1968    Last attempt to quit: 7/15/2016   Smokeless Tobacco    Former     Social History    Substance and Sexual Activity      Alcohol use: Yes        Comment: binge, two months sober      History   Drug Use    Types: Marijuana     Comment: uses Marijuana for pain       FAMILY HISTORY:    Family History   Problem Relation Age of Onset    Glaucoma Mother     Macular Degeneration Mother     Macular Degeneration Other     Lung Cancer Brother        PE:  /51 (BP Location: Right arm, Patient Position: Sitting, Cuff Size: Adult Small)   Pulse 75   SpO2 98%   Wt Readings from Last 1 Encounters:   12/06/23 132 lb 0.9 oz     There is no height or weight on file to calculate BMI.    EXAM:  GENERAL: well-developed 73 year old male who appears his stated age  CARDIAC: normal   CHEST/LUNG: normal respiratory effort   MUSCULOSKELETAL: grossly normal and both lower extremities are intact, no lower extremity edema  NEUROLOGIC:  focally intact, alert and oriented x 3  PSYCH: appropriate affect  VASCULAR: Right DP and PT monophasic, right peroneal faint monophasic, warm. Scabbed wounds on 2nd and 3rd digit of right foot.    LLE stump: clean  Sutures from right groin removed. Well approximated

## 2023-12-18 NOTE — LETTER
12/18/2023       RE: Darian Engle  [address]   [place] [address]       Dear Colleague,    Thank you for referring your patient, Darian Engle, to the [facility] [place] at [facility]. Please see a copy of my visit note below.        VASCULAR SURGERY PROGRESS NOTE    LOCATION: [facility]     Darian Engle  Medical Record #:  [id number]  YOB: 1950  Age:  73 year old     Date of Service: 12/18/2023    PRIMARY CARE PROVIDER: Marcos Gonzalez    Reason for visit: Wound check, status post AKA    IMPRESSION / RECOMMENDATION:   Darian Engle is a 73 year old male with PMH of CLTI with tissue loss of LLE s/p revascularization attempts which ultimately failed, now s/p LLE AKA on 12/1 with Dr. Felix. Recovering well. Pain significantly improved. Unfortunately patient continues to smoke at home despite nicotine patches prescribed at discharge.  Patient was advised that given his chronic PAD and continued smoking he is at high risk for developing limb ischemia on right. LLE AKA incision wound healing well. Staples intact, skin edges well-approximated. No drainage, no swelling.    - Given appropriate healing process as of now, recommend continued wound care: Remove old dressing, apply iodine at incision site, allow to dry, cover with 4 x 4 sterile dressing, followed by Kerlix dressing.  At the end, applied Ace wrap. This instructions were provided and demonstrated to wife who assists Mr. Engle at home.   - Recommend smoking cessation. Patient already has nicotine patches prescribed, Mrs. Engle will pick them up.   - Continue to protect RLE from injuries given now greater utilization of this limb secondary to amputation of left.  - Continue with medication regimen  - Patient has follow-up scheduled in vascular surgery clinic on 1/8/2024 for wound check  Mrs. Engle also noted the patient has 2 scabs on  second and third toes of his right lower extremity. Patient has monophasic Doppler signal over DP, PT and peroneal. Small wounds on toes do not appear to be infected. We will continue to monitor for now.    All questions were answered and support provided. He has our contact information and knows to reach out with any additional questions or concerns.     Roxann Fontanez [facility]  Vascular Surgery  Pager: [phone]  [email or web address]  Send message or 10 digit call back number Securely via Bubbles with the Bubbles Web Console (learn more here)        HPI:  Darian Engle is a 73 year old male with past medical history significant for left femoral endarterectomy and profundoplasty and balloon angioplasty on 10/24/23 and left TMA amputation on 10/27/23 with Dr. Thornton complicated by recent admission for MSSA bacteremia with evidence of poor wound healing. With challenging social support, inability to access to hyperbaric oxygen therapy or received IV antibiotics at home. Previously declined TCU. Now status post left AKA on 12/1/2023. Postoperatively he recovered well, however upon return to home patient resumed smoking. Was seen by orthotics for stump  and protector, we will hold using for now given his recent surgery and continued need for healing of stump. His wife noted patient is set up for outpatient nursing which are visiting him at home, outpatient PT and OT.  Patient and his wife were asked if there is anything else we could do from a vascular surgery perspective to help and they feel they are well supported at home, per their statement.    REVIEW OF SYSTEMS:    A 12 point ROS was reviewed and is negative except for what is listed above in HPI.    PHH:    Past Medical History:   Diagnosis Date    Arthritis March 2018    joint pain both hands    Diabetes (H)     Heart disease 1991    [place]-angioplasty    Hypertension           Past Surgical History:   Procedure Laterality Date    AMPUTATE LEG  ABOVE KNEE Left 12/1/2023    Procedure: LEFT AMPUTATION, ABOVE KNEE;  Surgeon: Chuck Felix MBBS;  [facility]    AMPUTATE TOE(S) Left 10/26/2023    Procedure: Amputate toe(s), all transmetatarsal amputation;  Surgeon: Jerardo Thornton MD;  [facility]    ANGIOGRAM Left 10/17/2023    Procedure: Left lower extremity angiogram via Left brachial artery approach;  Surgeon: Chuck Felix MBBS;  [facility]    ANGIOGRAM Left 10/24/2023    Procedure: ANGIOGRAM;  Surgeon: Chuck Felix MBBS;  [facility]    ANGIOPLASTY Left 10/24/2023    Procedure: ANGIOPLASTY, Left Lower Extremity atherectomy;  Surgeon: Chuck Felix MBBS;  [facility]    CARDIAC SURGERY  [address] [place] [address]    ENDARTERECTOMY FEMORAL Left 10/24/2023    Procedure: ENDARTERECTOMY, FEMORAL;  Surgeon: Chuck Felix MBBS;  [facility]    ENHANCE LASER REFRACTIVE BILATERAL EXISTING PT IN PARAMETERS  2000    EYE SURGERY  2000    [facility]    IR OR ANGIOGRAM  10/17/2023    IR OR ANGIOGRAM  10/24/2023    VASCULAR SURGERY  2017    [facility]       ALLERGIES:  Lidocaine, Lisinopril, and Naltrexone    MEDS:    Current Outpatient Medications:     ACCU-CHEK SMARTVIEW test strip, TEST THREE TIMES DAILY OR AS DIRECTED, Disp: 300 strip, Rfl: 3    acetaminophen (TYLENOL) 325 MG tablet, Take 3 tablets (975 mg) by mouth every 8 hours Do this for the first few days postoperatively, as pain improves, can transition to taking 1-2 tabs q4-6 hours as needed., Disp: 90 tablet, Rfl: 0    amLODIPine (NORVASC) 10 MG tablet, Take 1 tablet (10 mg) by mouth daily, Disp: 90 tablet, Rfl: 0    apixaban ANTICOAGULANT (ELIQUIS ANTICOAGULANT) 5 MG tablet, Take 1 tablet (5 mg) by mouth 2 times daily, Disp: 60 tablet, Rfl: 2    aspirin 81 MG tablet, Take 81 mg by mouth daily, Disp: 30 tablet, Rfl:     atorvastatin (LIPITOR) 40 MG tablet, Take 1 tablet (40 mg) by mouth daily For cholesterol.,  Disp: 90 tablet, Rfl: 3    blood glucose monitoring (ACCU-CHEK FASTCLIX) lancets, USE TO TEST THREE TIMES DAILY OR AS DIRECTED, Disp: 306 each, Rfl: 3    carvedilol (COREG) 25 MG tablet, Take 25 mg by mouth 2 times daily, Disp: , Rfl:     cholecalciferol ( VITAMIN D3) 50 MCG (2000 UT) CAPS, Take 2 capsules by mouth daily, Disp: , Rfl:     cilostazol (PLETAL) 100 MG tablet, Take 100 mg by mouth 2 times daily, Disp: , Rfl:     famotidine (PEPCID) 10 MG tablet, Take 1 tablet (10 mg) by mouth 2 times daily, Disp: 30 tablet, Rfl: 1    ferrous sulfate (FEROSUL) 325 (65 Fe) MG tablet, Take 1 tablet (325 mg) by mouth daily (with breakfast) Iron supplement, Disp: 90 tablet, Rfl: 0    folic acid (FOLVITE) 1 MG tablet, Take 1 tablet (1 mg) by mouth daily, Disp: 30 tablet, Rfl: 0    gabapentin (NEURONTIN) 100 MG capsule, Take 1 capsule (100 mg) by mouth 3 times daily (Patient taking differently: Take 100 mg by mouth 2 times daily), Disp: 180 capsule, Rfl: 1    isosorbide mononitrate (IMDUR) 30 MG 24 hr tablet, Take 1 tablet (30 mg) by mouth daily, Disp: 180 tablet, Rfl: 1    losartan (COZAAR) 50 MG tablet, Take 2 tablets (100 mg) by mouth daily, Disp: 90 tablet, Rfl: 2    magnesium oxide 200 MG TABS, Take 400 mg by mouth 2 times daily for 30 days, Disp: 60 tablet, Rfl: 0    melatonin 1 MG TABS tablet, Take 1 tablet (1 mg) by mouth nightly as needed for sleep, Disp: 30 tablet, Rfl: 1    metFORMIN (GLUCOPHAGE XR) 500 MG 24 hr tablet, TAKE 1 TABLET(500 MG) BY MOUTH TWICE DAILY WITH MEALS, Disp: 180 tablet, Rfl: 0    mirtazapine (REMERON) 15 MG tablet, Take 1 tablet (15 mg) by mouth at bedtime for 30 days, Disp: 30 tablet, Rfl: 0    multivitamin (CENTRUM SILVER) tablet, Take 1 tablet by mouth daily, Disp: , Rfl:     nicotine (NICODERM CQ) 14 MG/24HR 24 hr patch, Place 1 patch onto the skin every 24 hours, Disp: 28 patch, Rfl: 0    Nutritional Supplements (ENSURE ENLIVE) LIQD, Take 1 Box by mouth 4 times daily, Disp: 948 mL, Rfl:  0    Omega-3 Fatty Acids (FISH OIL OMEGA-3) 1000 MG CAPS, Take 1,000 mg by mouth daily, Disp: , Rfl:     ondansetron (ZOFRAN ODT) 4 MG ODT tab, Take 1 tablet (4 mg) by mouth every 6 hours as needed for nausea or vomiting, Disp: 30 tablet, Rfl: 0    oxyCODONE (ROXICODONE) 5 MG tablet, Take 1 tablet (5 mg) by mouth daily as needed for moderate pain (For pain with dressing changes), Disp: 5 tablet, Rfl: 0    polyethylene glycol (MIRALAX) 17 GM/Dose powder, Take 17 g by mouth daily, Disp: 510 g, Rfl: 1    senna-docusate (SENOKOT-S/PERICOLACE) 8.6-50 MG tablet, Take 1 tablet by mouth 2 times daily While taking narcotic pain medications (oxycodone), Disp: 90 tablet, Rfl: 0    sertraline (ZOLOFT) 25 MG tablet, TAKE ONE TABLET BY MOUTH DAILY FOR DEPRESSION AND TO HELP APPETITE, Disp: 90 tablet, Rfl: 1    tiZANidine (ZANAFLEX) 2 MG tablet, Take 1 tablet (2 mg) by mouth 3 times daily as needed for muscle spasms, Disp: 90 tablet, Rfl: 1    traZODone (DESYREL) 50 MG tablet, Take 1 tablet (50 mg) by mouth at bedtime, Disp: 30 tablet, Rfl: 0    omeprazole (PRILOSEC) 20 MG DR capsule, Take 1 capsule (20 mg) by mouth daily (Patient not taking: Reported on 12/18/2023), Disp: 90 capsule, Rfl: 3    SOCIAL HABITS:    History   Smoking Status    Every Day    Packs/day: 0.50    Years: 5.00    Types: Cigarettes    Start date: 1/1/1968    Last attempt to quit: 7/15/2016   Smokeless Tobacco    Former     Social History    Substance and Sexual Activity      Alcohol use: Yes        Comment: binge, two months sober      History   Drug Use    Types: Marijuana     Comment: uses Marijuana for pain       FAMILY HISTORY:    Family History   Problem Relation Age of Onset    Glaucoma Mother     Macular Degeneration Mother     Macular Degeneration Other     Lung Cancer Brother        PE:  /51 (BP Location: Right arm, Patient Position: Sitting, Cuff Size: Adult Small)   Pulse 75   SpO2 98%   Wt Readings from Last 1 Encounters:   12/06/23 132 lb  0.9 oz     There is no height or weight on file to calculate BMI.    EXAM:  GENERAL: well-developed 73 year old male who appears his stated age  CARDIAC: normal   CHEST/LUNG: normal respiratory effort   MUSCULOSKELETAL: grossly normal and both lower extremities are intact, no lower extremity edema  NEUROLOGIC: focally intact, alert and oriented x 3  PSYCH: appropriate affect  VASCULAR: Right DP and PT monophasic, right peroneal faint monophasic, warm. Scabbed wounds on 2nd and 3rd digit of right foot.    LLE stump: clean  Sutures from right groin removed. Well approximated          Again, thank you for allowing me to participate in the care of your patient.      Sincerely,    ADIEL Bose CNP

## 2023-12-20 NOTE — NURSING NOTE
Chief Complaint   Patient presents with    RECHECK       BP 94/55   Pulse 81   Temp 98.1  F (36.7  C) (Oral)   Wt 54.2 kg (119 lb 8 oz)   SpO2 99%   BMI 18.72 kg/m      Ron Scott on 12/20/2023 at 4:01 PM

## 2023-12-20 NOTE — LETTER
12/20/2023      RE: Darian Engle  [address] [facility] [place] [address]       [facility]  Division of Infectious Diseases and International Medicine  Department of Medicine    GENERAL INFECTIOUS DISEASE OUTPATIENT VISIT NOTE     Patient:  Darian Engle, Date of birth 1950, Medical record number [id number]  Date of Visit:  December 20, 2023  Referred by: No ref. provider found   Reason for Consult:  Follow-up foot infection           Assessment and Plan     Left foot post-surgical site infection - presumed MSSA cellulitis  S/p L AKA 12/1/2023 with Vascular Surgery  Complicated MSSA bacteremia (based on lack of left foot source control)  Blood culture 11/5/2023 in 1/4 bottles positive for MSSA  Blood cultures 11/6-11/13/2023 negative  Blood cultures 11/17/2023 negative  3. RLE endovascular stent    The patient is doing well after his definitive source control procedure (AKA) on 12/1. He received 4 weeks of therapy (IV cefazolin) for his complicated MSSA bacteremia which ended on 12/3. He had no signs of metastatic spread during his initial evaluation, including a negative TTE, and continues to have none based on his symptoms and exam today. His surgical site is healing well. His infection is resolved.     I encouraged him to stop smoking, to minimize his risk of progression of his PAD that could lead to arterial insufficiency ulcers and possibly infection. He remains in the pre-contemplative phase.    He can follow-up with ID as needed    Adebayo Rueda MD PharmD  Adult Infectious Disease Fellow PGY5  Pager: [phone]       History of Present Illness:   Darian Engle is a 73y M with pmhx PAD with recent L foot amputation 10/26/2023 and recent admission for post-surgical site infection, was started on antibiotics for MSSA bacteremia with a plan for a total four week course, two weeks of IV cefazolin (11/6-11/19) followed by two weeks of oral TMP/SMX (11/20-12/3),  ultimately underwent AKA on 12/1/2023, so antibiotics were stopped at that time since this was felt to be definitive source control    He saw vascular surgery 12/18 in follow-up who felt he was doing well. Noted some small wounds on the 2nd and 3rd toe of the right foot.     Today, the patient was seen with his wife present. He reports doing well. No concerns about the AKA stump site which they feel looks great. No fevers, chills, sweats, or new symptoms or concerns. Reports some small ulcers of the right toes that are healing well and not causing any symptoms. The patient continues to smoke and understands the risk, but notes that it is one of the few things that give him pleasure and he understands the risks.          Current Antimicrobials:     None        Microbiologic Data:     See problem list above         Other Medical History:     Attempt was made to collect past, family and social history during this encounter,  this information was reviewed with the patient and updated    Lidocaine, Lisinopril, and Naltrexone    Past Medical History  Past Medical History:   Diagnosis Date     Arthritis March 2018    joint pain both hands     Diabetes (H)      Heart disease 1991    [place]-angioplasty     Hypertension     PastSurgical History  Past Surgical History:   Procedure Laterality Date     AMPUTATE LEG ABOVE KNEE Left 12/1/2023    Procedure: LEFT AMPUTATION, ABOVE KNEE;  Surgeon: Chuck Felix MBBS;  [facility]     AMPUTATE TOE(S) Left 10/26/2023    Procedure: Amputate toe(s), all transmetatarsal amputation;  Surgeon: Jerardo Thornton MD;  [facility]     ANGIOGRAM Left 10/17/2023    Procedure: Left lower extremity angiogram via Left brachial artery approach;  Surgeon: Chuck Felix MBBS;  [facility]     ANGIOGRAM Left 10/24/2023    Procedure: ANGIOGRAM;  Surgeon: Chuck Felix MBBS;  [facility]     ANGIOPLASTY Left 10/24/2023    Procedure: ANGIOPLASTY, Left Lower Extremity  atherectomy;  Surgeon: Chuck Fleix MBBS;  [facility]     CARDIAC SURGERY  2004    [facility] [place]     ENDARTERECTOMY FEMORAL Left 10/24/2023    Procedure: ENDARTERECTOMY, FEMORAL;  Surgeon: Chuck Felix MBBS;  [facility]     ENHANCE LASER REFRACTIVE BILATERAL EXISTING PT IN PARAMETERS  2000     EYE SURGERY  2000    [facility]     IR OR ANGIOGRAM  10/17/2023     IR OR ANGIOGRAM  10/24/2023     VASCULAR SURGERY  2017    [facility]      Family History  Family History   Problem Relation Age of Onset     Glaucoma Mother      Macular Degeneration Mother      Macular Degeneration Other      Lung Cancer Brother     Social History   reports that he has been smoking cigarettes. He started smoking about 55 years ago. He has a 2.50 pack-year smoking history. He has quit using smokeless tobacco. He reports current alcohol use. He reports current drug use. Drug: Marijuana.   Notable Exposures  None Vaccination History:  Immunization History   Administered Date(s) Administered     COVID-19 MONOVALENT 12+ (Pfizer) 02/21/2021, 03/14/2021     COVID-19 Monovalent 18+ (Moderna) 02/07/2022     Flu, Unspecified 10/13/1997     Hepatitis B, Adult 01/28/2014, 12/03/2014, 10/08/2015     Influenza (H1N1) 01/12/2010     Influenza (High Dose) 3 valent vaccine 09/28/2019, 09/16/2020, 09/21/2021, 09/08/2022     Influenza (IIV3) PF 10/13/2004, 11/15/2006, 10/21/2008, 09/18/2009, 10/01/2010, 11/05/2010, 10/04/2011     Influenza (intradermal) 09/28/2019     Influenza Vaccine 18-64 (Flublok) 09/16/2020     Influenza Vaccine 65+ (Fluzone HD) 09/21/2021, 09/08/2022, 10/29/2023     Influenza Vaccine >6 months,quad, PF 01/07/2014, 12/03/2014, 10/08/2015, 01/27/2017     Influenza Vaccine, 6+MO IM (QUADRIVALENT W/PRESERVATIVES) 01/07/2014, 08/28/2017     Influenza, seasonal, injectable, PF 11/08/2012     Mantoux Tuberculin Skin Test 10/30/2023     Pneumo Conj 13-V (2010&after)  12/18/2017     Pneumococcal 20 valent Conjugate (Prevnar 20) 09/08/2022     Pneumococcal 23 valent 01/01/2006, 03/23/2010, 12/15/2015     TD,PF 7+ (Tenivac) 04/26/2006     TDAP Vaccine (Adacel) 01/14/2011, 09/16/2020     Td (Adult), Adsorbed 08/07/1995, 04/26/2006     Zoster recombinant adjuvanted (SHINGRIX) 08/02/2019, 09/16/2020     Zoster vaccine, live 01/07/2014             Physical Exam:     VITAL SIGNS:  BP 94/55   Pulse 81   Temp 98.1  F (36.7  C) (Oral)   Wt 54.2 kg (119 lb 8 oz)   SpO2 99%   BMI 18.72 kg/m      GENERAL APPEARANCE: Not in acute distress, well-appearing    PHYSICAL EXAM:  Eyes:     pupils equal and reactive, extraocular eye movements intact  Cardiovascular:    Inspection: No Cyanosis, JVD not elevated   Auscultation:  S1, S2 normal, regular rate and rhythm  Respiratory:     Inspection: Not in respiratory distress, Chest expansion symmetrical   Auscultation: 4 point auscultation done clear to auscultation bilaterally, no wheezes, no rales and no rhonchi  Gastrointestinal:      soft, non-tender; bowel sounds normal; no masses,  no organomegaly  Musculoskeletal:     Left AKA noted. Reviewed recent photos from surgery visit. Incision clean, dry, intact, no dehiscence.    Small shallow circular ulcers on the dorsum of two of the right toes. No drainage or surrounding erythema.   Shoulders, elbows, wrists, and left knee are all non-tender, not swollen, warm, or erythematous  Neurologic:     Higher Mental Function: Conversant, AOx4   Spinal Tenderness Nontender.   Motor: Moving all 4 limbs spontaneously   Sensory: crude touch intact in all 4 limbs  Psychiatric:     appropriate            Signature:     Adebayo Rueda MD   PGY5 Infectious Disease Fellow    Infectious Disease Clinic Staff Note: Mr. Engle was seen, examined, and the case was discussed with Dr. Rueda, ID Fellow -- I agree with his interval history and examination, assessment and plan in this pos-hospitalization outpatient  ID Progress note. This note reflects my observations and opinions and the plan outlined fully reflects my approach. I have reviewed the available history, radiology, laboratory results, and reports with the Fellow.    Adebayo Rueda MD

## 2023-12-21 NOTE — TELEPHONE ENCOUNTER
Home Care is calling regarding an established patient with  [email or web address].        12/11/2023     9:15 AM   Home Care Information   Date of Home Care episode start 12/8/2023   Current following provider Dr. Gonzalez   Date provider agreed to follow 12/8/2023   Home Care agency [facility]     Requesting orders from: Marcos Gonzalez  Provider is following patient: Yes  Is this a 60-day recertification request?  No    Orders Requested    Physical Therapy  Request for initial certification (first set of orders)   Frequency:  1x/wk for 4 wks      Information was gathered and will be sent to provider for review.  RN will contact Home Care with information after provider review.  Confirmed ok to leave a detailed message with call back.  Contact information confirmed and updated as needed.    Kristina M Kjellberg, RN

## 2023-12-26 NOTE — PROGRESS NOTES
Infectious Disease Clinic Staff Note: Mr. Engle was seen, examined, and the case was discussed with Dr. Rueda, ID Fellow -- I agree with his interval history and examination, assessment and plan in this pos-hospitalization outpatient ID Progress note. This note reflects my observations and opinions and the plan outlined fully reflects my approach. I have reviewed the available history, radiology, laboratory results, and reports with the Fellow.

## 2023-12-29 NOTE — PROVIDER NOTIFICATION
Dr. Mesa notified that pt's blood glucose was 161 in PACU    Dr. Waite notified pt's blood glucose is 161. Would you like me to treat in PACU? Also, pt's art-line and cuff are not correlating very well and pt is on Nicardipine gtt. Art-line waveform is dampened and has some whip. Are you okay with me titrating Nicardipine gtt off cuff pressures? Art line pressures are 110's/40's, Cuff pressures are consistently 130's/50's on bilateral arms    Per Dr. Waite, blood glucose does not need to be treated in PACU and okay to titrate Nicardipine gtt off cuff pressures    
Kadie, R 7B 32 and spouse are threating to leave if pt. pain is not properly managed; pt. is currently on 5mg Oxycodone, both think that it is not effective. They want to talk with the team. Thanks, Roico # [phone].   
Vascular Surgery Resident Dr. Norman notified that pt's ptt was 173  
N/A

## 2024-01-01 ENCOUNTER — OFFICE VISIT (OUTPATIENT)
Dept: VASCULAR SURGERY | Facility: CLINIC | Age: 74
End: 2024-01-01
Payer: COMMERCIAL

## 2024-01-01 ENCOUNTER — MEDICAL CORRESPONDENCE (OUTPATIENT)
Dept: HEALTH INFORMATION MANAGEMENT | Facility: CLINIC | Age: 74
End: 2024-01-01

## 2024-01-01 ENCOUNTER — TRANSFERRED RECORDS (OUTPATIENT)
Dept: MULTI SPECIALTY CLINIC | Facility: CLINIC | Age: 74
End: 2024-01-01

## 2024-01-01 ENCOUNTER — VIRTUAL VISIT (OUTPATIENT)
Dept: ONCOLOGY | Facility: CLINIC | Age: 74
End: 2024-01-01
Attending: INTERNAL MEDICINE
Payer: COMMERCIAL

## 2024-01-01 ENCOUNTER — VIRTUAL VISIT (OUTPATIENT)
Dept: FAMILY MEDICINE | Facility: CLINIC | Age: 74
End: 2024-01-01
Payer: COMMERCIAL

## 2024-01-01 ENCOUNTER — MEDICAL CORRESPONDENCE (OUTPATIENT)
Dept: HEALTH INFORMATION MANAGEMENT | Facility: CLINIC | Age: 74
End: 2024-01-01
Payer: COMMERCIAL

## 2024-01-01 ENCOUNTER — TELEPHONE (OUTPATIENT)
Dept: FAMILY MEDICINE | Facility: CLINIC | Age: 74
End: 2024-01-01
Payer: COMMERCIAL

## 2024-01-01 ENCOUNTER — OFFICE VISIT (OUTPATIENT)
Dept: FAMILY MEDICINE | Facility: CLINIC | Age: 74
End: 2024-01-01
Payer: OTHER MISCELLANEOUS

## 2024-01-01 ENCOUNTER — MYC MEDICAL ADVICE (OUTPATIENT)
Dept: FAMILY MEDICINE | Facility: CLINIC | Age: 74
End: 2024-01-01
Payer: COMMERCIAL

## 2024-01-01 ENCOUNTER — DOCUMENTATION ONLY (OUTPATIENT)
Dept: PHYSICAL MEDICINE AND REHAB | Facility: CLINIC | Age: 74
End: 2024-01-01
Payer: COMMERCIAL

## 2024-01-01 ENCOUNTER — PATIENT OUTREACH (OUTPATIENT)
Dept: CARE COORDINATION | Facility: CLINIC | Age: 74
End: 2024-01-01

## 2024-01-01 ENCOUNTER — TELEPHONE (OUTPATIENT)
Dept: FAMILY MEDICINE | Facility: CLINIC | Age: 74
End: 2024-01-01
Payer: OTHER MISCELLANEOUS

## 2024-01-01 ENCOUNTER — OFFICE VISIT (OUTPATIENT)
Dept: FAMILY MEDICINE | Facility: CLINIC | Age: 74
End: 2024-01-01
Payer: MEDICARE

## 2024-01-01 ENCOUNTER — E-VISIT (OUTPATIENT)
Dept: FAMILY MEDICINE | Facility: CLINIC | Age: 74
End: 2024-01-01
Payer: COMMERCIAL

## 2024-01-01 ENCOUNTER — TELEPHONE (OUTPATIENT)
Dept: CARDIOLOGY | Facility: CLINIC | Age: 74
End: 2024-01-01
Payer: COMMERCIAL

## 2024-01-01 ENCOUNTER — MEDICAL CORRESPONDENCE (OUTPATIENT)
Dept: HEALTH INFORMATION MANAGEMENT | Facility: CLINIC | Age: 74
End: 2024-01-01
Payer: OTHER MISCELLANEOUS

## 2024-01-01 ENCOUNTER — HEALTH MAINTENANCE LETTER (OUTPATIENT)
Age: 74
End: 2024-01-01

## 2024-01-01 ENCOUNTER — OFFICE VISIT (OUTPATIENT)
Dept: FAMILY MEDICINE | Facility: CLINIC | Age: 74
End: 2024-01-01
Payer: COMMERCIAL

## 2024-01-01 ENCOUNTER — TELEPHONE (OUTPATIENT)
Dept: FAMILY MEDICINE | Facility: CLINIC | Age: 74
End: 2024-01-01

## 2024-01-01 ENCOUNTER — DOCUMENTATION ONLY (OUTPATIENT)
Dept: ORTHOPEDICS | Facility: CLINIC | Age: 74
End: 2024-01-01
Payer: COMMERCIAL

## 2024-01-01 ENCOUNTER — MYC MEDICAL ADVICE (OUTPATIENT)
Dept: PHYSICAL MEDICINE AND REHAB | Facility: CLINIC | Age: 74
End: 2024-01-01
Payer: COMMERCIAL

## 2024-01-01 ENCOUNTER — PATIENT OUTREACH (OUTPATIENT)
Dept: FAMILY MEDICINE | Facility: CLINIC | Age: 74
End: 2024-01-01
Payer: COMMERCIAL

## 2024-01-01 ENCOUNTER — TELEPHONE (OUTPATIENT)
Dept: VASCULAR SURGERY | Facility: CLINIC | Age: 74
End: 2024-01-01

## 2024-01-01 ENCOUNTER — OFFICE VISIT (OUTPATIENT)
Dept: PHYSICAL MEDICINE AND REHAB | Facility: CLINIC | Age: 74
End: 2024-01-01
Payer: COMMERCIAL

## 2024-01-01 ENCOUNTER — LAB (OUTPATIENT)
Dept: LAB | Facility: CLINIC | Age: 74
End: 2024-01-01
Payer: COMMERCIAL

## 2024-01-01 VITALS
SYSTOLIC BLOOD PRESSURE: 100 MMHG | SYSTOLIC BLOOD PRESSURE: 93 MMHG | DIASTOLIC BLOOD PRESSURE: 54 MMHG | OXYGEN SATURATION: 99 % | HEART RATE: 64 BPM | OXYGEN SATURATION: 100 % | DIASTOLIC BLOOD PRESSURE: 57 MMHG | HEART RATE: 77 BPM

## 2024-01-01 VITALS
HEART RATE: 118 BPM | DIASTOLIC BLOOD PRESSURE: 78 MMHG | WEIGHT: 179.2 LBS | SYSTOLIC BLOOD PRESSURE: 118 MMHG | BODY MASS INDEX: 28.06 KG/M2 | OXYGEN SATURATION: 99 % | TEMPERATURE: 97.2 F

## 2024-01-01 VITALS
HEART RATE: 71 BPM | DIASTOLIC BLOOD PRESSURE: 68 MMHG | WEIGHT: 129.8 LBS | HEIGHT: 67 IN | BODY MASS INDEX: 20.37 KG/M2 | OXYGEN SATURATION: 100 % | RESPIRATION RATE: 22 BRPM | SYSTOLIC BLOOD PRESSURE: 120 MMHG | TEMPERATURE: 97 F

## 2024-01-01 VITALS
BODY MASS INDEX: 24.13 KG/M2 | OXYGEN SATURATION: 99 % | SYSTOLIC BLOOD PRESSURE: 138 MMHG | TEMPERATURE: 96.9 F | WEIGHT: 154.1 LBS | HEART RATE: 121 BPM | DIASTOLIC BLOOD PRESSURE: 87 MMHG

## 2024-01-01 DIAGNOSIS — E11.9 TYPE 2 DIABETES MELLITUS WITHOUT RETINOPATHY (H): ICD-10-CM

## 2024-01-01 DIAGNOSIS — I73.9 PAD (PERIPHERAL ARTERY DISEASE) (H): ICD-10-CM

## 2024-01-01 DIAGNOSIS — F03.C18 SEVERE DEMENTIA WITH OTHER BEHAVIORAL DISTURBANCE, UNSPECIFIED DEMENTIA TYPE (H): ICD-10-CM

## 2024-01-01 DIAGNOSIS — I50.9 CONGESTIVE HEART FAILURE, UNSPECIFIED HF CHRONICITY, UNSPECIFIED HEART FAILURE TYPE (H): ICD-10-CM

## 2024-01-01 DIAGNOSIS — Z89.612 LEFT ABOVE-KNEE AMPUTEE (H): Primary | ICD-10-CM

## 2024-01-01 DIAGNOSIS — F03.B18 MODERATE DEMENTIA WITH OTHER BEHAVIORAL DISTURBANCE, UNSPECIFIED DEMENTIA TYPE (H): Primary | ICD-10-CM

## 2024-01-01 DIAGNOSIS — I25.10 CAD, MULTIPLE VESSEL: ICD-10-CM

## 2024-01-01 DIAGNOSIS — E43 SEVERE MALNUTRITION (H): ICD-10-CM

## 2024-01-01 DIAGNOSIS — Z89.612 HX OF AKA (ABOVE KNEE AMPUTATION), LEFT (H): Primary | ICD-10-CM

## 2024-01-01 DIAGNOSIS — S88.919A AMPUTATION OF LEG (H): Primary | ICD-10-CM

## 2024-01-01 DIAGNOSIS — Z01.818 PRE-OPERATIVE GENERAL PHYSICAL EXAMINATION: Primary | ICD-10-CM

## 2024-01-01 DIAGNOSIS — F17.200 TOBACCO USE DISORDER: ICD-10-CM

## 2024-01-01 DIAGNOSIS — Z79.4 TYPE 2 DIABETES MELLITUS WITH COMPLICATION, WITH LONG-TERM CURRENT USE OF INSULIN (H): ICD-10-CM

## 2024-01-01 DIAGNOSIS — I73.9 CLAUDICATION IN PERIPHERAL VASCULAR DISEASE (H): ICD-10-CM

## 2024-01-01 DIAGNOSIS — R60.0 PERIPHERAL EDEMA: Primary | ICD-10-CM

## 2024-01-01 DIAGNOSIS — G47.00 INSOMNIA, UNSPECIFIED TYPE: ICD-10-CM

## 2024-01-01 DIAGNOSIS — Z89.612 S/P AKA (ABOVE KNEE AMPUTATION), LEFT (H): Primary | ICD-10-CM

## 2024-01-01 DIAGNOSIS — I99.9 VASCULOPATHY: ICD-10-CM

## 2024-01-01 DIAGNOSIS — L08.9 WOUND INFECTION: Primary | ICD-10-CM

## 2024-01-01 DIAGNOSIS — E44.0 MODERATE PROTEIN-CALORIE MALNUTRITION (H): ICD-10-CM

## 2024-01-01 DIAGNOSIS — L97.512 DIABETIC ULCER OF RIGHT FOOT ASSOCIATED WITH TYPE 2 DIABETES MELLITUS, WITH FAT LAYER EXPOSED, UNSPECIFIED PART OF FOOT (H): ICD-10-CM

## 2024-01-01 DIAGNOSIS — E11.8 TYPE 2 DIABETES MELLITUS WITH COMPLICATION, WITH LONG-TERM CURRENT USE OF INSULIN (H): ICD-10-CM

## 2024-01-01 DIAGNOSIS — T14.8XXA WOUND INFECTION: Primary | ICD-10-CM

## 2024-01-01 DIAGNOSIS — G47.09 OTHER INSOMNIA: ICD-10-CM

## 2024-01-01 DIAGNOSIS — E11.621 DIABETIC ULCER OF RIGHT FOOT ASSOCIATED WITH TYPE 2 DIABETES MELLITUS, WITH FAT LAYER EXPOSED, UNSPECIFIED PART OF FOOT (H): ICD-10-CM

## 2024-01-01 DIAGNOSIS — I10 ESSENTIAL HYPERTENSION WITH GOAL BLOOD PRESSURE LESS THAN 140/90: ICD-10-CM

## 2024-01-01 DIAGNOSIS — K86.89 PANCREATIC MASS: ICD-10-CM

## 2024-01-01 DIAGNOSIS — Z89.612 ACQUIRED ABSENCE OF LEFT LEG ABOVE KNEE (H): Primary | ICD-10-CM

## 2024-01-01 DIAGNOSIS — Z53.9 DIAGNOSIS NOT YET DEFINED: Primary | ICD-10-CM

## 2024-01-01 LAB
ALBUMIN SERPL BCG-MCNC: 3.3 G/DL (ref 3.5–5.2)
ALBUMIN SERPL BCG-MCNC: 3.8 G/DL (ref 3.5–5.2)
ALP SERPL-CCNC: 147 U/L (ref 40–150)
ALT SERPL W P-5'-P-CCNC: 33 U/L (ref 0–70)
ANION GAP SERPL CALCULATED.3IONS-SCNC: 12 MMOL/L (ref 7–15)
ANION GAP SERPL CALCULATED.3IONS-SCNC: 13 MMOL/L (ref 7–15)
AST SERPL W P-5'-P-CCNC: 33 U/L (ref 0–45)
BILIRUB SERPL-MCNC: 1.1 MG/DL
BUN SERPL-MCNC: 10.3 MG/DL (ref 8–23)
BUN SERPL-MCNC: 15.1 MG/DL (ref 8–23)
CALCIUM SERPL-MCNC: 8.6 MG/DL (ref 8.8–10.2)
CALCIUM SERPL-MCNC: 9.3 MG/DL (ref 8.8–10.2)
CHLORIDE SERPL-SCNC: 104 MMOL/L (ref 98–107)
CHLORIDE SERPL-SCNC: 106 MMOL/L (ref 98–107)
CHOLEST SERPL-MCNC: 147 MG/DL
CREAT SERPL-MCNC: 0.63 MG/DL (ref 0.67–1.17)
CREAT SERPL-MCNC: 0.82 MG/DL (ref 0.67–1.17)
CREAT UR-MCNC: 18.2 MG/DL
DEPRECATED HCO3 PLAS-SCNC: 21 MMOL/L (ref 22–29)
DEPRECATED HCO3 PLAS-SCNC: 23 MMOL/L (ref 22–29)
EGFRCR SERPLBLD CKD-EPI 2021: >90 ML/MIN/1.73M2
EGFRCR SERPLBLD CKD-EPI 2021: >90 ML/MIN/1.73M2
FASTING STATUS PATIENT QL REPORTED: YES
GLUCOSE SERPL-MCNC: 113 MG/DL (ref 70–99)
GLUCOSE SERPL-MCNC: 113 MG/DL (ref 70–99)
HBA1C MFR BLD: 5.2 % (ref 0–5.6)
HBA1C MFR BLD: 6.1 % (ref 0–5.6)
HDLC SERPL-MCNC: 54 MG/DL
LDLC SERPL CALC-MCNC: 77 MG/DL
MICROALBUMIN UR-MCNC: 859 MG/L
MICROALBUMIN/CREAT UR: 4719.78 MG/G CR (ref 0–17)
NONHDLC SERPL-MCNC: 93 MG/DL
PHOSPHATE SERPL-MCNC: 3.9 MG/DL (ref 2.5–4.5)
POTASSIUM SERPL-SCNC: 4.4 MMOL/L (ref 3.4–5.3)
POTASSIUM SERPL-SCNC: 4.9 MMOL/L (ref 3.4–5.3)
PROT SERPL-MCNC: 6.3 G/DL (ref 6.4–8.3)
RETINOPATHY: NORMAL
SODIUM SERPL-SCNC: 138 MMOL/L (ref 135–145)
SODIUM SERPL-SCNC: 141 MMOL/L (ref 135–145)
TRIGL SERPL-MCNC: 82 MG/DL

## 2024-01-01 PROCEDURE — G2211 COMPLEX E/M VISIT ADD ON: HCPCS | Performed by: FAMILY MEDICINE

## 2024-01-01 PROCEDURE — 36415 COLL VENOUS BLD VENIPUNCTURE: CPT | Performed by: FAMILY MEDICINE

## 2024-01-01 PROCEDURE — 97802 MEDICAL NUTRITION INDIV IN: CPT | Mod: 95 | Performed by: DIETITIAN, REGISTERED

## 2024-01-01 PROCEDURE — 36415 COLL VENOUS BLD VENIPUNCTURE: CPT

## 2024-01-01 PROCEDURE — 83036 HEMOGLOBIN GLYCOSYLATED A1C: CPT | Performed by: FAMILY MEDICINE

## 2024-01-01 PROCEDURE — 99214 OFFICE O/P EST MOD 30 MIN: CPT | Mod: GV | Performed by: FAMILY MEDICINE

## 2024-01-01 PROCEDURE — 82043 UR ALBUMIN QUANTITATIVE: CPT

## 2024-01-01 PROCEDURE — 80053 COMPREHEN METABOLIC PANEL: CPT | Performed by: FAMILY MEDICINE

## 2024-01-01 PROCEDURE — G0180 MD CERTIFICATION HHA PATIENT: HCPCS | Performed by: FAMILY MEDICINE

## 2024-01-01 PROCEDURE — 99205 OFFICE O/P NEW HI 60 MIN: CPT | Performed by: STUDENT IN AN ORGANIZED HEALTH CARE EDUCATION/TRAINING PROGRAM

## 2024-01-01 PROCEDURE — 99024 POSTOP FOLLOW-UP VISIT: CPT | Performed by: NURSE PRACTITIONER

## 2024-01-01 PROCEDURE — 80061 LIPID PANEL: CPT

## 2024-01-01 PROCEDURE — 82570 ASSAY OF URINE CREATININE: CPT

## 2024-01-01 PROCEDURE — 80069 RENAL FUNCTION PANEL: CPT | Performed by: FAMILY MEDICINE

## 2024-01-01 PROCEDURE — 99214 OFFICE O/P EST MOD 30 MIN: CPT | Mod: 24 | Performed by: FAMILY MEDICINE

## 2024-01-01 PROCEDURE — G2211 COMPLEX E/M VISIT ADD ON: HCPCS | Mod: GV | Performed by: FAMILY MEDICINE

## 2024-01-01 PROCEDURE — 99422 OL DIG E/M SVC 11-20 MIN: CPT | Performed by: FAMILY MEDICINE

## 2024-01-01 PROCEDURE — 99214 OFFICE O/P EST MOD 30 MIN: CPT | Performed by: FAMILY MEDICINE

## 2024-01-01 PROCEDURE — 99214 OFFICE O/P EST MOD 30 MIN: CPT | Mod: 95 | Performed by: FAMILY MEDICINE

## 2024-01-01 RX ORDER — HYDROXYZINE PAMOATE 25 MG/1
CAPSULE ORAL
Qty: 60 CAPSULE | Refills: 2 | Status: SHIPPED | OUTPATIENT
Start: 2024-01-01

## 2024-01-01 RX ORDER — SULFAMETHOXAZOLE/TRIMETHOPRIM 800-160 MG
1 TABLET ORAL 2 TIMES DAILY
Qty: 20 TABLET | Refills: 1 | Status: SHIPPED | OUTPATIENT
Start: 2024-01-01

## 2024-01-01 RX ORDER — RESPIRATORY SYNCYTIAL VIRUS VACCINE 120MCG/0.5
0.5 KIT INTRAMUSCULAR ONCE
Qty: 1 EACH | Refills: 0 | Status: CANCELLED | OUTPATIENT
Start: 2024-01-01 | End: 2024-01-01

## 2024-01-01 RX ORDER — FUROSEMIDE 20 MG
20 TABLET ORAL 2 TIMES DAILY PRN
Qty: 60 TABLET | Refills: 2 | Status: SHIPPED | OUTPATIENT
Start: 2024-01-01

## 2024-01-01 RX ORDER — DIAZEPAM 2 MG
2 TABLET ORAL
Qty: 20 TABLET | Refills: 1 | Status: SHIPPED | OUTPATIENT
Start: 2024-01-01

## 2024-01-01 RX ORDER — CEPHALEXIN 500 MG/1
500 CAPSULE ORAL 3 TIMES DAILY
Qty: 30 CAPSULE | Refills: 1 | Status: SHIPPED | OUTPATIENT
Start: 2024-01-01 | End: 2024-01-01

## 2024-01-01 ASSESSMENT — PAIN SCALES - GENERAL
PAINLEVEL: NO PAIN (0)

## 2024-01-05 NOTE — PATIENT INSTRUCTIONS
Anita Wise and Angelica,     I have attached the resources that I referred to during our conversation (see your email):   --Sources of protein  --High calorie/high protein recipes  --Soft/moist sources of protein  --High calorie/high protein beverages  --Tips to increase calories in your diet    Here are your nutrition goals:  --Calories: 2100/day  --Protein: 90 grams/day  --Vitamin C: 500mg/day for one month  --Zinc: 30mg/day for one month    Please reach out to me with any questions.     Be well,     Grace Saucedo RD, , LD  Board Certified Specialist in Oncology Nutrition  [facility]  Oncology Services  [email or web address]   Office: [phone]  Fax: [phone]

## 2024-01-05 NOTE — TELEPHONE ENCOUNTER
Called and spoke with Casandra home care nurse. Verbal approval given for requested orders, per Dr. Gonzalez.  Casandra noted and agreed, no further requests at this time.      Kayley Sanderson RN  Clinical Triage/Primary Care  [facility]

## 2024-01-05 NOTE — PROGRESS NOTES
"The patient has been notified of the following:      \"We have found that certain health care needs can be provided without the need for a face to face visit.  This service lets us provide the care you need with a phone conversation.       I will have full access to your [place] medical record during this entire phone call.   I will be taking notes for your medical record.      Since this is like an office visit, we will bill your insurance company for this service.       There are potential benefits and risks of telephone visits (e.g. limits to patient confidentiality) that differ from in-person visits.?  Confidentiality still applies for telephone services, and nobody will record the visit.  It is important to be in a quiet, private space that is free of distractions (including cell phone or other devices) during the visit.??      If during the course of the call I believe a telephone visit is not appropriate, you will not be charged for this service\"     Consent has been obtained for this service by care team member: Yes       CLINICAL NUTRITION SERVICES - ASSESSMENT NOTE    Darian Engle 73 year old referred for MNT related to malnutrition and diabetes     Time Spent: 30 minutes  Visit Type: phone call  Pt accompanied by: his spouse, Angelica  Referring Physician: Dr. Danelle Ackerman 12/7/23  S88.919A (ICD-10-CM) - Amputation of leg (H)   E11.9 (ICD-10-CM) - Type 2 diabetes mellitus without retinopathy (H)   E43 (ICD-10-CM) - Severe malnutrition (H24)       NUTRITION HISTORY  Factors affecting nutrition intake include:none at this time  Current diet/appetite: general diet/good appetite and intake per patient report      Frandy tells me that his intake has been good.  He denies barriers to eating at this time.   He has been eating 2-3 meals/day and drinking one Ensure shake every other day.  He is not sure which type of Ensure it is.   He has been adding extra butter or oil to his soup and other foods for additional " calories.    He has also been eating a lot of nuts and fruit.     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    Hypertension        Current Medications    Current Outpatient Medications:     ACCU-CHEK SMARTVIEW test strip, TEST THREE TIMES DAILY OR AS DIRECTED, Disp: 300 strip, Rfl: 3    acetaminophen (TYLENOL) 325 MG tablet, Take 3 tablets (975 mg) by mouth every 8 hours Do this for the first few days postoperatively, as pain improves, can transition to taking 1-2 tabs q4-6 hours as needed., Disp: 90 tablet, Rfl: 0    amLODIPine (NORVASC) 10 MG tablet, Take 1 tablet (10 mg) by mouth daily, Disp: 90 tablet, Rfl: 0    apixaban ANTICOAGULANT (ELIQUIS ANTICOAGULANT) 5 MG tablet, Take 1 tablet (5 mg) by mouth 2 times daily, Disp: 60 tablet, Rfl: 2    aspirin 81 MG tablet, Take 81 mg by mouth daily, Disp: 30 tablet, Rfl:     atorvastatin (LIPITOR) 40 MG tablet, Take 1 tablet (40 mg) by mouth daily For cholesterol., Disp: 90 tablet, Rfl: 3    blood glucose monitoring (ACCU-CHEK FASTCLIX) lancets, USE TO TEST THREE TIMES DAILY OR AS DIRECTED, Disp: 306 each, Rfl: 3    carvedilol (COREG) 25 MG tablet, Take 25 mg by mouth 2 times daily, Disp: , Rfl:     cholecalciferol ( VITAMIN D3) 50 MCG (2000 UT) CAPS, Take 2 capsules by mouth daily, Disp: , Rfl:     cilostazol (PLETAL) 100 MG tablet, Take 100 mg by mouth 2 times daily, Disp: , Rfl:     famotidine (PEPCID) 10 MG tablet, Take 1 tablet (10 mg) by mouth 2 times daily, Disp: 30 tablet, Rfl: 1    ferrous sulfate (FEROSUL) 325 (65 Fe) MG tablet, Take 1 tablet (325 mg) by mouth daily (with breakfast) Iron supplement, Disp: 90  tablet, Rfl: 0    folic acid (FOLVITE) 1 MG tablet, TAKE 1 TABLET BY MOUTH EVERY DAY, Disp: 90 tablet, Rfl: 3    gabapentin (NEURONTIN) 100 MG capsule, Take 1 capsule (100 mg) by mouth 3 times daily (Patient taking differently: Take 100 mg by mouth 2 times daily), Disp: 180 capsule, Rfl: 1    isosorbide mononitrate (IMDUR) 30 MG 24 hr tablet, Take 1 tablet (30 mg) by mouth daily, Disp: 180 tablet, Rfl: 1    losartan (COZAAR) 50 MG tablet, Take 2 tablets (100 mg) by mouth daily, Disp: 90 tablet, Rfl: 2    magnesium oxide 200 MG TABS, Take 400 mg by mouth 2 times daily for 30 days, Disp: 60 tablet, Rfl: 0    melatonin 1 MG TABS tablet, Take 1 tablet (1 mg) by mouth nightly as needed for sleep, Disp: 30 tablet, Rfl: 1    metFORMIN (GLUCOPHAGE XR) 500 MG 24 hr tablet, TAKE 1 TABLET(500 MG) BY MOUTH TWICE DAILY WITH MEALS, Disp: 180 tablet, Rfl: 0    mirtazapine (REMERON) 15 MG tablet, Take 1 tablet (15 mg) by mouth at bedtime for 30 days, Disp: 30 tablet, Rfl: 0    multivitamin (CENTRUM SILVER) tablet, Take 1 tablet by mouth daily, Disp: , Rfl:     nicotine (NICODERM CQ) 14 MG/24HR 24 hr patch, Place 1 patch onto the skin every 24 hours, Disp: 28 patch, Rfl: 0    Nutritional Supplements (ENSURE ENLIVE) LIQD, Take 1 Box by mouth 4 times daily, Disp: 948 mL, Rfl: 0    Omega-3 Fatty Acids (FISH OIL OMEGA-3) 1000 MG CAPS, Take 1,000 mg by mouth daily, Disp: , Rfl:     omeprazole (PRILOSEC) 20 MG DR capsule, Take 1 capsule (20 mg) by mouth daily, Disp: 90 capsule, Rfl: 3    ondansetron (ZOFRAN ODT) 4 MG ODT tab, Take 1 tablet (4 mg) by mouth every 6 hours as needed for nausea or vomiting, Disp: 30 tablet, Rfl: 0    oxyCODONE (ROXICODONE) 5 MG tablet, Take 1 tablet (5 mg) by mouth daily as needed for moderate pain (For pain with dressing changes), Disp: 5 tablet, Rfl: 0    polyethylene glycol (MIRALAX) 17 GM/Dose powder, Take 17 g by mouth daily, Disp: 510 g, Rfl: 1    senna-docusate (SENOKOT-S/PERICOLACE) 8.6-50 MG tablet,  Take 1 tablet by mouth 2 times daily While taking narcotic pain medications (oxycodone), Disp: 90 tablet, Rfl: 0    sertraline (ZOLOFT) 25 MG tablet, TAKE ONE TABLET BY MOUTH DAILY FOR DEPRESSION AND TO HELP APPETITE, Disp: 90 tablet, Rfl: 1    tiZANidine (ZANAFLEX) 2 MG tablet, Take 1 tablet (2 mg) by mouth 3 times daily as needed for muscle spasms, Disp: 90 tablet, Rfl: 1    traZODone (DESYREL) 50 MG tablet, Take 1 tablet (50 mg) by mouth at bedtime, Disp: 30 tablet, Rfl: 0    Medications have been reviewed.    Pertinent lab results:  Labs:  Electrolytes  Potassium (mmol/L)   Date Value   12/07/2023 4.1   12/06/2023 4.3   12/05/2023 4.3   02/21/2023 4.1   05/13/2022 5.1   09/21/2021 4.5   09/02/2020 4.0   08/02/2019 4.8   11/26/2018 4.4     Potassium POCT (mmol/L)   Date Value   12/01/2023 4.8   10/24/2023 4.1   10/24/2023 4.2     Phosphorus (mg/dL)   Date Value   12/07/2023 3.8   12/06/2023 3.6   12/05/2023 3.4   12/04/2023 3.3   12/03/2023 3.9   09/02/2020 3.6    Blood Glucose  Glucose (mg/dL)   Date Value   12/07/2023 104 (H)   12/06/2023 87   12/05/2023 95   12/04/2023 90   12/03/2023 84   02/21/2023 96   05/13/2022 164 (H)   09/02/2020 157 (H)   08/02/2019 231 (H)   11/26/2018 163 (H)   12/18/2017 163 (H)   02/03/2017 83     GLUCOSE BY METER POCT (mg/dL)   Date Value   12/01/2023 88   12/01/2023 88   12/01/2023 80   11/29/2023 158 (H)   11/23/2023 122 (H)     Glucose Whole Blood POCT (mg/dL)   Date Value   12/01/2023 82     Hemoglobin A1C (%)   Date Value   10/17/2023 5.5   06/19/2023 5.9 (H)   02/21/2023 5.5   09/08/2022 5.8 (H)   05/13/2022 7.5 (H)   04/09/2021 7.0 (H)   01/04/2021 8.1 (H)   09/02/2020 8.2 (H)   12/03/2019 8.6 (H)   08/02/2019 9.5 (H)    Inflammatory Markers  WBC (10e9/L)   Date Value   06/03/2021 7.1   02/03/2017 9.8     WBC Count (10e3/uL)   Date Value   12/07/2023 5.5   12/06/2023 5.3   12/05/2023 6.2     Albumin (g/dL)   Date Value   11/21/2023 2.7 (L)   11/15/2023 2.6 (L)   11/06/2023  "3.1 (L)   02/21/2023 3.4   05/13/2022 3.2 (L)   09/02/2020 3.7      Magnesium (mg/dL)   Date Value   12/07/2023 1.6 (L)   12/06/2023 2.4 (H)   12/06/2023 1.4 (L)   06/03/2021 1.8     Sodium (mmol/L)   Date Value   12/07/2023 140   12/06/2023 140   12/05/2023 137   09/02/2020 141   08/02/2019 141   11/26/2018 141    Renal  Urea Nitrogen (mg/dL)   Date Value   12/07/2023 11.3   12/06/2023 12.5   12/05/2023 13.0   02/21/2023 12   05/13/2022 31 (H)   09/02/2020 15   08/02/2019 26   11/26/2018 16     Creatinine (mg/dL)   Date Value   12/07/2023 0.53 (L)   12/06/2023 0.52 (L)   12/05/2023 0.54 (L)   09/02/2020 0.92   08/02/2019 0.99   11/26/2018 0.87     Additional  Triglycerides (mg/dL)   Date Value   07/13/2021 90   09/02/2020 93   08/02/2019 79   04/03/2018 89     Ketones Urine (mg/dL)   Date Value   11/09/2023 20 (A)   08/02/2019 Negative          Treatment Plan:  Oncology History    No history exists.   Treatment plan has been reviewed.    ANTHROPOMETRICS  Height: 67\"  Weight: 132 lbs/59kg  BMI: 18  Weight Status:  Underweight BMI <18.5  IBW: 148 lbs (89%)  Weight History: down down 28 lb (20%) x past 2 months  Wt Readings from Last 10 Encounters:   12/20/23 54.2 kg (119 lb 8 oz)   12/06/23 59.9 kg (132 lb 0.9 oz)   11/16/23 60.3 kg (133 lb)   11/10/23 63 kg (138 lb 14.4 oz)   10/30/23 66.7 kg (147 lb 0.8 oz)   07/31/23 68.5 kg (151 lb)   06/19/23 68.7 kg (151 lb 8 oz)   02/21/23 69.9 kg (154 lb 3.2 oz)   12/01/22 69.4 kg (153 lb)   09/08/22 67.9 kg (149 lb 12.8 oz)     Dosing Weight: 59kg (from 12/6)    Medications/vitamins/minerals/herbals:   Reviewed - MVI, Omega 3, Vit D3      NUTRITION FOCUSED PHYSICAL ASSESSMENT FOR DIAGNOSING MALNUTRITION:  Consult for education only      ASSESSED NUTRITION NEEDS:  Estimated Energy Needs: 2100 kcals (35 Kcal/Kg)  Justification: repletion  Estimated Protein Needs:  grams protein (1.5-2 g pro/Kg)  Justification: Repletion and wound healing  Estimated Fluid Needs: 2000  mL "   Justification: maintenance    MALNUTRITION:  % Weight Loss:  > 7.5% in 3 months (severe malnutrition)  % Intake:  </= 50% for >/= 1 month (severe malnutrition)  Subcutaneous Fat Loss:  None observed  Muscle Loss:  None observed  Fluid Retention:  None noted    Malnutrition Diagnosis: Severe malnutrition  In Context of:  Chronic illness or disease    NUTRITION DIAGNOSIS:  Inadequate oral intake related to decreased ability to consume sufficient energy due to previous poor appetite as evidenced by 28 lb wt loss x past 2 months,  dietary intake 50% estimated needs.      INTERVENTIONS  Provided written & verbal education:     - Discussed nutrition and hydration needs. Encouraged pt to aim for at least 2100kcal and 90g protein, 8 cups non-caffeine containing beverages (water/electrolytes) daily.    - Discussed strategies to help fortify meals and snacks. Encouraged to focus on small, frequent meals.    - Discussed sources of protein. Encouraged to have a protein source with each meal and snack.  Reviewed importance of protein to aid in wound healing.  Reviewed vitamin/minerals. Suggested adding vitamin C and zinc for 1 month to aid in wound healing.   - Discussed Nutrition shake options:   Boost Very High Calorie (VHC): 8 oz 530 calories, 22g protein  Boost Plus:  8 oz 350 calories, 15g protein  Ensure Plus: 8 oz 350 calories, 15g protein  Ensure Complete: 11 oz 350 calories, 30g protein  Glucerna shake: 8 oz 200 calories, 11g protein (option for diabetes)  Encouraged utilizing these ONS in home made shakes/smoothies to prevent flavor fatigue.        Provided pt with corresponding education materials/handouts on:  --Sources of protein  --High calorie/high protein recipes  --Soft/moist sources of protein  --High calorie/high protein beverages  --Tips to increase calories in your diet    Pt verbalize understanding of materials provided during consult.   Patient Understanding: good  Expected patient engagement:  good    Goals  --Calories: 2100/day  --Protein: 90 grams/day  --Vitamin C: 500mg/day for one month  --Zinc: 30mg/day for one month  --Weight gain towards  lbs as a ble     Follow-Up Plans: Pt has RD contact information for questions.      Grace Saucedo RD, , LD  [facility]  [phone]

## 2024-01-05 NOTE — TELEPHONE ENCOUNTER
Home Care is calling regarding an established patient with  [facility] [place].        12/11/2023     9:15 AM   Home Care Information   Date of Home Care episode start 12/8/2023   Current following provider Dr. Gonzalez   Date provider agreed to follow 12/8/2023   Home Care agency [facility]     Requesting orders from: Marcos Gonzalez  Provider is following patient: Yes  Is this a 60-day recertification request?  No    Orders Requested    Skilled Nursing  Request for resumption in care To start back on the 9th. Will call with other orders.           Information was gathered and will be sent to provider for review.  RN will contact Home Care with information after provider review.  Confirmed ok to leave a detailed message with call back.  Contact information confirmed and updated as needed.    Tomi Luna RN

## 2024-01-05 NOTE — LETTER
"    1/5/2024         RE: Darian Engle  [address] [place] [facility] [place] [place] [address]        Dear Colleague,    Thank you for referring your patient, Darian Engle, to the [facility]. Please see a copy of my visit note below.    The patient has been notified of the following:      \"We have found that certain health care needs can be provided without the need for a face to face visit.  This service lets us provide the care you need with a phone conversation.       I will have full access to your [facility] medical record during this entire phone call.   I will be taking notes for your medical record.      Since this is like an office visit, we will bill your insurance company for this service.       There are potential benefits and risks of telephone visits (e.g. limits to patient confidentiality) that differ from in-person visits.?  Confidentiality still applies for telephone services, and nobody will record the visit.  It is important to be in a quiet, private space that is free of distractions (including cell phone or other devices) during the visit.??      If during the course of the call I believe a telephone visit is not appropriate, you will not be charged for this service\"     Consent has been obtained for this service by care team member: Yes       CLINICAL NUTRITION SERVICES - ASSESSMENT NOTE    Darian Engle 73 year old referred for MNT related to malnutrition and diabetes     Time Spent: 30 minutes  Visit Type: phone call  Pt accompanied by: his spouse, Angelica  Referring Physician: Dr. Danelle Ackerman 12/7/23  S88.919A (ICD-10-CM) - Amputation of leg (H)   E11.9 (ICD-10-CM) - Type 2 diabetes mellitus without retinopathy (H)   E43 (ICD-10-CM) - Severe malnutrition (H24)       NUTRITION HISTORY  Factors affecting nutrition intake include:none at this time  Current diet/appetite: general diet/good appetite and intake per patient report      Frandy tells me that his intake has been " good.  He denies barriers to eating at this time.   He has been eating 2-3 meals/day and drinking one Ensure shake every other day.  He is not sure which type of Ensure it is.   He has been adding extra butter or oil to his soup and other foods for additional calories.    He has also been eating a lot of nuts and fruit.     Diet Recall  Breakfast 1 cup coffee w/ cream sugar, 1-2 eggs w/ hashbrowns   Lunch Chicken noodle soup   Dinner Frozen dinner - lasagna, ravioli    Snacks Apples, oranges   Beverages Water, Ensure 2-3 times a week      Patient Medical History  Past Medical History:   Diagnosis Date    Arthritis March 2018    joint pain both hands    Diabetes (H)     Heart disease 1991    [place]-angioplasty    Hypertension        Current Medications    Current Outpatient Medications:     ACCU-CHEK SMARTVIEW test strip, TEST THREE TIMES DAILY OR AS DIRECTED, Disp: 300 strip, Rfl: 3    acetaminophen (TYLENOL) 325 MG tablet, Take 3 tablets (975 mg) by mouth every 8 hours Do this for the first few days postoperatively, as pain improves, can transition to taking 1-2 tabs q4-6 hours as needed., Disp: 90 tablet, Rfl: 0    amLODIPine (NORVASC) 10 MG tablet, Take 1 tablet (10 mg) by mouth daily, Disp: 90 tablet, Rfl: 0    apixaban ANTICOAGULANT (ELIQUIS ANTICOAGULANT) 5 MG tablet, Take 1 tablet (5 mg) by mouth 2 times daily, Disp: 60 tablet, Rfl: 2    aspirin 81 MG tablet, Take 81 mg by mouth daily, Disp: 30 tablet, Rfl:     atorvastatin (LIPITOR) 40 MG tablet, Take 1 tablet (40 mg) by mouth daily For cholesterol., Disp: 90 tablet, Rfl: 3    blood glucose monitoring (ACCU-CHEK FASTCLIX) lancets, USE TO TEST THREE TIMES DAILY OR AS DIRECTED, Disp: 306 each, Rfl: 3    carvedilol (COREG) 25 MG tablet, Take 25 mg by mouth 2 times daily, Disp: , Rfl:     cholecalciferol (SM VITAMIN D3) 50 MCG (2000 UT) CAPS, Take 2 capsules by mouth daily, Disp: , Rfl:     cilostazol (PLETAL) 100 MG tablet, Take 100 mg by mouth 2 times daily,  Disp: , Rfl:     famotidine (PEPCID) 10 MG tablet, Take 1 tablet (10 mg) by mouth 2 times daily, Disp: 30 tablet, Rfl: 1    ferrous sulfate (FEROSUL) 325 (65 Fe) MG tablet, Take 1 tablet (325 mg) by mouth daily (with breakfast) Iron supplement, Disp: 90 tablet, Rfl: 0    folic acid (FOLVITE) 1 MG tablet, TAKE 1 TABLET BY MOUTH EVERY DAY, Disp: 90 tablet, Rfl: 3    gabapentin (NEURONTIN) 100 MG capsule, Take 1 capsule (100 mg) by mouth 3 times daily (Patient taking differently: Take 100 mg by mouth 2 times daily), Disp: 180 capsule, Rfl: 1    isosorbide mononitrate (IMDUR) 30 MG 24 hr tablet, Take 1 tablet (30 mg) by mouth daily, Disp: 180 tablet, Rfl: 1    losartan (COZAAR) 50 MG tablet, Take 2 tablets (100 mg) by mouth daily, Disp: 90 tablet, Rfl: 2    magnesium oxide 200 MG TABS, Take 400 mg by mouth 2 times daily for 30 days, Disp: 60 tablet, Rfl: 0    melatonin 1 MG TABS tablet, Take 1 tablet (1 mg) by mouth nightly as needed for sleep, Disp: 30 tablet, Rfl: 1    metFORMIN (GLUCOPHAGE XR) 500 MG 24 hr tablet, TAKE 1 TABLET(500 MG) BY MOUTH TWICE DAILY WITH MEALS, Disp: 180 tablet, Rfl: 0    mirtazapine (REMERON) 15 MG tablet, Take 1 tablet (15 mg) by mouth at bedtime for 30 days, Disp: 30 tablet, Rfl: 0    multivitamin (CENTRUM SILVER) tablet, Take 1 tablet by mouth daily, Disp: , Rfl:     nicotine (NICODERM CQ) 14 MG/24HR 24 hr patch, Place 1 patch onto the skin every 24 hours, Disp: 28 patch, Rfl: 0    Nutritional Supplements (ENSURE ENLIVE) LIQD, Take 1 Box by mouth 4 times daily, Disp: 948 mL, Rfl: 0    Omega-3 Fatty Acids (FISH OIL OMEGA-3) 1000 MG CAPS, Take 1,000 mg by mouth daily, Disp: , Rfl:     omeprazole (PRILOSEC) 20 MG DR capsule, Take 1 capsule (20 mg) by mouth daily, Disp: 90 capsule, Rfl: 3    ondansetron (ZOFRAN ODT) 4 MG ODT tab, Take 1 tablet (4 mg) by mouth every 6 hours as needed for nausea or vomiting, Disp: 30 tablet, Rfl: 0    oxyCODONE (ROXICODONE) 5 MG tablet, Take 1 tablet (5 mg) by  mouth daily as needed for moderate pain (For pain with dressing changes), Disp: 5 tablet, Rfl: 0    polyethylene glycol (MIRALAX) 17 GM/Dose powder, Take 17 g by mouth daily, Disp: 510 g, Rfl: 1    senna-docusate (SENOKOT-S/PERICOLACE) 8.6-50 MG tablet, Take 1 tablet by mouth 2 times daily While taking narcotic pain medications (oxycodone), Disp: 90 tablet, Rfl: 0    sertraline (ZOLOFT) 25 MG tablet, TAKE ONE TABLET BY MOUTH DAILY FOR DEPRESSION AND TO HELP APPETITE, Disp: 90 tablet, Rfl: 1    tiZANidine (ZANAFLEX) 2 MG tablet, Take 1 tablet (2 mg) by mouth 3 times daily as needed for muscle spasms, Disp: 90 tablet, Rfl: 1    traZODone (DESYREL) 50 MG tablet, Take 1 tablet (50 mg) by mouth at bedtime, Disp: 30 tablet, Rfl: 0    Medications have been reviewed.    Pertinent lab results:  Labs:  Electrolytes  Potassium (mmol/L)   Date Value   12/07/2023 4.1   12/06/2023 4.3   12/05/2023 4.3   02/21/2023 4.1   05/13/2022 5.1   09/21/2021 4.5   09/02/2020 4.0   08/02/2019 4.8   11/26/2018 4.4     Potassium POCT (mmol/L)   Date Value   12/01/2023 4.8   10/24/2023 4.1   10/24/2023 4.2     Phosphorus (mg/dL)   Date Value   12/07/2023 3.8   12/06/2023 3.6   12/05/2023 3.4   12/04/2023 3.3   12/03/2023 3.9   09/02/2020 3.6    Blood Glucose  Glucose (mg/dL)   Date Value   12/07/2023 104 (H)   12/06/2023 87   12/05/2023 95   12/04/2023 90   12/03/2023 84   02/21/2023 96   05/13/2022 164 (H)   09/02/2020 157 (H)   08/02/2019 231 (H)   11/26/2018 163 (H)   12/18/2017 163 (H)   02/03/2017 83     GLUCOSE BY METER POCT (mg/dL)   Date Value   12/01/2023 88   12/01/2023 88   12/01/2023 80   11/29/2023 158 (H)   11/23/2023 122 (H)     Glucose Whole Blood POCT (mg/dL)   Date Value   12/01/2023 82     Hemoglobin A1C (%)   Date Value   10/17/2023 5.5   06/19/2023 5.9 (H)   02/21/2023 5.5   09/08/2022 5.8 (H)   05/13/2022 7.5 (H)   04/09/2021 7.0 (H)   01/04/2021 8.1 (H)   09/02/2020 8.2 (H)   12/03/2019 8.6 (H)   08/02/2019 9.5 (H)     "Inflammatory Markers  WBC (10e9/L)   Date Value   06/03/2021 7.1   02/03/2017 9.8     WBC Count (10e3/uL)   Date Value   12/07/2023 5.5   12/06/2023 5.3   12/05/2023 6.2     Albumin (g/dL)   Date Value   11/21/2023 2.7 (L)   11/15/2023 2.6 (L)   11/06/2023 3.1 (L)   02/21/2023 3.4   05/13/2022 3.2 (L)   09/02/2020 3.7      Magnesium (mg/dL)   Date Value   12/07/2023 1.6 (L)   12/06/2023 2.4 (H)   12/06/2023 1.4 (L)   06/03/2021 1.8     Sodium (mmol/L)   Date Value   12/07/2023 140   12/06/2023 140   12/05/2023 137   09/02/2020 141   08/02/2019 141   11/26/2018 141    Renal  Urea Nitrogen (mg/dL)   Date Value   12/07/2023 11.3   12/06/2023 12.5   12/05/2023 13.0   02/21/2023 12   05/13/2022 31 (H)   09/02/2020 15   08/02/2019 26   11/26/2018 16     Creatinine (mg/dL)   Date Value   12/07/2023 0.53 (L)   12/06/2023 0.52 (L)   12/05/2023 0.54 (L)   09/02/2020 0.92   08/02/2019 0.99   11/26/2018 0.87     Additional  Triglycerides (mg/dL)   Date Value   07/13/2021 90   09/02/2020 93   08/02/2019 79   04/03/2018 89     Ketones Urine (mg/dL)   Date Value   11/09/2023 20 (A)   08/02/2019 Negative          Treatment Plan:  Oncology History    No history exists.   Treatment plan has been reviewed.    ANTHROPOMETRICS  Height: 67\"  Weight: 132 lbs/59kg  BMI: 18  Weight Status:  Underweight BMI <18.5  IBW: 148 lbs (89%)  Weight History: down down 28 lb (20%) x past 2 months  Wt Readings from Last 10 Encounters:   12/20/23 54.2 kg (119 lb 8 oz)   12/06/23 59.9 kg (132 lb 0.9 oz)   11/16/23 60.3 kg (133 lb)   11/10/23 63 kg (138 lb 14.4 oz)   10/30/23 66.7 kg (147 lb 0.8 oz)   07/31/23 68.5 kg (151 lb)   06/19/23 68.7 kg (151 lb 8 oz)   02/21/23 69.9 kg (154 lb 3.2 oz)   12/01/22 69.4 kg (153 lb)   09/08/22 67.9 kg (149 lb 12.8 oz)     Dosing Weight: 59kg (from 12/6)    Medications/vitamins/minerals/herbals:   Reviewed - MVI, Omega 3, Vit D3      NUTRITION FOCUSED PHYSICAL ASSESSMENT FOR DIAGNOSING MALNUTRITION:  Consult for " education only      ASSESSED NUTRITION NEEDS:  Estimated Energy Needs: 2100 kcals (35 Kcal/Kg)  Justification: repletion  Estimated Protein Needs:  grams protein (1.5-2 g pro/Kg)  Justification: Repletion and wound healing  Estimated Fluid Needs: 2000  mL   Justification: maintenance    MALNUTRITION:  % Weight Loss:  > 7.5% in 3 months (severe malnutrition)  % Intake:  </= 50% for >/= 1 month (severe malnutrition)  Subcutaneous Fat Loss:  None observed  Muscle Loss:  None observed  Fluid Retention:  None noted    Malnutrition Diagnosis: Severe malnutrition  In Context of:  Chronic illness or disease    NUTRITION DIAGNOSIS:  Inadequate oral intake related to decreased ability to consume sufficient energy due to previous poor appetite as evidenced by 28 lb wt loss x past 2 months,  dietary intake 50% estimated needs.      INTERVENTIONS  Provided written & verbal education:     - Discussed nutrition and hydration needs. Encouraged pt to aim for at least 2100kcal and 90g protein, 8 cups non-caffeine containing beverages (water/electrolytes) daily.    - Discussed strategies to help fortify meals and snacks. Encouraged to focus on small, frequent meals.    - Discussed sources of protein. Encouraged to have a protein source with each meal and snack.  Reviewed importance of protein to aid in wound healing.  Reviewed vitamin/minerals. Suggested adding vitamin C and zinc for 1 month to aid in wound healing.   - Discussed Nutrition shake options:   Boost Very High Calorie (VHC): 8 oz 530 calories, 22g protein  Boost Plus:  8 oz 350 calories, 15g protein  Ensure Plus: 8 oz 350 calories, 15g protein  Ensure Complete: 11 oz 350 calories, 30g protein  Glucerna shake: 8 oz 200 calories, 11g protein (option for diabetes)  Encouraged utilizing these ONS in home made shakes/smoothies to prevent flavor fatigue.        Provided pt with corresponding education materials/handouts on:  --Sources of protein  --High calorie/high  protein recipes  --Soft/moist sources of protein  --High calorie/high protein beverages  --Tips to increase calories in your diet    Pt verbalize understanding of materials provided during consult.   Patient Understanding: good  Expected patient engagement: good    Goals  --Calories: 2100/day  --Protein: 90 grams/day  --Vitamin C: 500mg/day for one month  --Zinc: 30mg/day for one month  --Weight gain towards  lbs as a ble     Follow-Up Plans: Pt has RD contact information for questions.      Grace Saucedo RD, , LD  [facility]  [phone]

## 2024-01-08 NOTE — PATIENT INSTRUCTIONS
Thank you so much for choosing us for your care. It was a pleasure to see you at the vascular clinic today.     You have follow up scheduled for 1/22/24         If you have any questions, please contact our clinic directly at [phone] and ask for the nurse. We also encourage the use of Kodkod to communicate with your HealthCare Provider.        If you have an urgent need after business hours (8:00 am to 4:30 pm) please call [phone], option 4, and ask for the vascular attending on call. For non-urgent after hours needs, please call the vascular nurse at [phone] and leave a detailed voicemail. For scheduling needs, please call our clinic directly at [phone].

## 2024-01-08 NOTE — PROGRESS NOTES
VASCULAR SURGERY PROGRESS NOTE    LOCATION: [facility] (formerly [facility])[3]     Darian Engle  Medical Record #:  [id number]  YOB: 1950  Age:  73 year old     Date of Service: 1/8/2024    PRIMARY CARE PROVIDER: Marcos Gonzalez    Reason for visit: Wound check     IMPRESSION / RECOMMENDATION:   Darian Engle is a very pleasant 73-year-old gentleman who is status post LLE AKA on 12/1/23 with Dr. Felix. Presents to clinic assisted by wife. Unfortunately patient continues to smoke, however compliant with medications per wife. LLE incision is healing well. No signs or symptoms of infection. Sutures removed during this visit, as well as part of the jim. Per Dr. Felix, sutures and staples to stay in for 2 to 3 months, however given almost ingrown sutures, and good evidence of wound healing, sutures and part of staples were removed during this visit. Plan to remove the rest of the staples at our next visit scheduled for January 22, 2024. At that time we will also send another orthotics referral. Continue with Eliquis, aspirin and statin as you are. Protect right foot, especially given ongoing diabetes and smoking, small wounds to be painted with iodine.    During this visit, discussed with patient and his wife the importance of protecting the right lower extremity, smoking cessation, the implications of having PAD with ongoing smoking and high risk for advanced arterial disease in the remaining limb which would complicate his ability to ambulate. Smoking cessation discussed extensively, including its impact on arterial vessels as well as healing process. Patient noted they will think about it.    All questions were answered and support provided. He has our contact information and knows to reach out with any additional questions or concerns.       Roxann Fontanez, CNP  Vascular Surgery  Pager: [phone]  [email or web address]  Send message or 10 digit call back number Securely via [employer]val[employer] with  the Vocera Web Console (learn more here)        HPI:  Darian Engle is a 73 year old male with past medical history significant for PAD, longstanding smoking, EtOH abuse, HTN, HLD, T2DM, and heart disease. Unfortunately continues with smoking, EtOH use and his wife noted that they lied to nurses about monitoring blood sugars, which they have not been doing. His wife noted that Mr. Engle does not consume protein drinks/ensure as recommended. Home care is due to resume starting tomorrow or by the end of the week. Patient has been trying to perform activities of daily living at home, was provided physical therapy exercises to continue with rehabilitation process. Denies fevers, chills, abdominal discomfort, nausea or vomiting.      REVIEW OF SYSTEMS:    A 12 point ROS was reviewed and is negative except for what is listed above in HPI.    PHH:    Past Medical History:   Diagnosis Date    Arthritis March 2018    joint pain both hands    Diabetes (H)     Heart disease 1991    [place]-angioplasty    Hypertension           Past Surgical History:   Procedure Laterality Date    AMPUTATE LEG ABOVE KNEE Left 12/1/2023    Procedure: LEFT AMPUTATION, ABOVE KNEE;  Surgeon: Chuck Felix MBBS;  Location: [facility] OR    AMPUTATE TOE(S) Left 10/26/2023    Procedure: Amputate toe(s), all transmetatarsal amputation;  Surgeon: Jerardo Thornton MD;  Location: [facility] OR    ANGIOGRAM Left 10/17/2023    Procedure: Left lower extremity angiogram via Left brachial artery approach;  Surgeon: Chuck Felix MBBS;  Location: [facility] OR    ANGIOGRAM Left 10/24/2023    Procedure: ANGIOGRAM;  Surgeon: Chuck Felix MBBS;  Location: [facility] OR    ANGIOPLASTY Left 10/24/2023    Procedure: ANGIOPLASTY, Left Lower Extremity atherectomy;  Surgeon: Chuck Felix MBBS;  Location: [facility] OR    CARDIAC SURGERY  [address] [place] [address]    ENDARTERECTOMY FEMORAL Left 10/24/2023    Procedure: ENDARTERECTOMY, FEMORAL;  Surgeon:  Chuck Felix MBBS;  Location: [facility] OR    ENHANCE LASER REFRACTIVE BILATERAL EXISTING PT IN PARAMETERS  2000    EYE SURGERY  2000    [facility]    IR OR ANGIOGRAM  10/17/2023    IR OR ANGIOGRAM  10/24/2023    [facility]       ALLERGIES:  Lidocaine, Lisinopril, and Naltrexone    MEDS:    Current Outpatient Medications:     acetaminophen (TYLENOL) 325 MG tablet, Take 3 tablets (975 mg) by mouth every 8 hours Do this for the first few days postoperatively, as pain improves, can transition to taking 1-2 tabs q4-6 hours as needed., Disp: 90 tablet, Rfl: 0    amLODIPine (NORVASC) 10 MG tablet, Take 1 tablet (10 mg) by mouth daily, Disp: 90 tablet, Rfl: 0    apixaban ANTICOAGULANT (ELIQUIS ANTICOAGULANT) 5 MG tablet, Take 1 tablet (5 mg) by mouth 2 times daily, Disp: 60 tablet, Rfl: 2    aspirin 81 MG tablet, Take 81 mg by mouth daily, Disp: 30 tablet, Rfl:     atorvastatin (LIPITOR) 40 MG tablet, Take 1 tablet (40 mg) by mouth daily For cholesterol., Disp: 90 tablet, Rfl: 3    carvedilol (COREG) 25 MG tablet, Take 25 mg by mouth 2 times daily, Disp: , Rfl:     cholecalciferol ( VITAMIN D3) 50 MCG (2000 UT) CAPS, Take 2 capsules by mouth daily, Disp: , Rfl:     ferrous sulfate (FEROSUL) 325 (65 Fe) MG tablet, Take 1 tablet (325 mg) by mouth daily (with breakfast) Iron supplement, Disp: 90 tablet, Rfl: 0    folic acid (FOLVITE) 1 MG tablet, TAKE 1 TABLET BY MOUTH EVERY DAY, Disp: 90 tablet, Rfl: 3    gabapentin (NEURONTIN) 100 MG capsule, Take 1 capsule (100 mg) by mouth 3 times daily (Patient taking differently: Take 100 mg by mouth 2 times daily), Disp: 180 capsule, Rfl: 1    isosorbide mononitrate (IMDUR) 30 MG 24 hr tablet, Take 1 tablet (30 mg) by mouth daily, Disp: 180 tablet, Rfl: 1    losartan (COZAAR) 50 MG tablet, Take 2 tablets (100 mg) by mouth daily, Disp: 90 tablet, Rfl: 2    melatonin 1 MG TABS tablet, Take 1 tablet (1 mg) by mouth nightly as needed for  sleep, Disp: 30 tablet, Rfl: 1    metFORMIN (GLUCOPHAGE XR) 500 MG 24 hr tablet, TAKE 1 TABLET(500 MG) BY MOUTH TWICE DAILY WITH MEALS, Disp: 180 tablet, Rfl: 0    multivitamin (CENTRUM SILVER) tablet, Take 1 tablet by mouth daily, Disp: , Rfl:     Nutritional Supplements (ENSURE ENLIVE) LIQD, Take 1 Box by mouth 4 times daily, Disp: 948 mL, Rfl: 0    Omega-3 Fatty Acids (FISH OIL OMEGA-3) 1000 MG CAPS, Take 1,000 mg by mouth daily, Disp: , Rfl:     omeprazole (PRILOSEC) 20 MG DR capsule, Take 1 capsule (20 mg) by mouth daily, Disp: 90 capsule, Rfl: 3    ondansetron (ZOFRAN ODT) 4 MG ODT tab, Take 1 tablet (4 mg) by mouth every 6 hours as needed for nausea or vomiting, Disp: 30 tablet, Rfl: 0    oxyCODONE (ROXICODONE) 5 MG tablet, Take 1 tablet (5 mg) by mouth daily as needed for moderate pain (For pain with dressing changes), Disp: 5 tablet, Rfl: 0    polyethylene glycol (MIRALAX) 17 GM/Dose powder, Take 17 g by mouth daily, Disp: 510 g, Rfl: 1    senna-docusate (SENOKOT-S/PERICOLACE) 8.6-50 MG tablet, Take 1 tablet by mouth 2 times daily While taking narcotic pain medications (oxycodone), Disp: 90 tablet, Rfl: 0    sertraline (ZOLOFT) 25 MG tablet, TAKE ONE TABLET BY MOUTH DAILY FOR DEPRESSION AND TO HELP APPETITE, Disp: 90 tablet, Rfl: 1    tiZANidine (ZANAFLEX) 2 MG tablet, Take 1 tablet (2 mg) by mouth 3 times daily as needed for muscle spasms, Disp: 90 tablet, Rfl: 1    traZODone (DESYREL) 50 MG tablet, Take 1 tablet (50 mg) by mouth at bedtime, Disp: 30 tablet, Rfl: 0    ACCU-CHEK SMARTVIEW test strip, TEST THREE TIMES DAILY OR AS DIRECTED (Patient not taking: Reported on 1/8/2024), Disp: 300 strip, Rfl: 3    blood glucose monitoring (ACCU-CHEK FASTCLIX) lancets, USE TO TEST THREE TIMES DAILY OR AS DIRECTED (Patient not taking: Reported on 1/8/2024), Disp: 306 each, Rfl: 3    cilostazol (PLETAL) 100 MG tablet, Take 100 mg by mouth 2 times daily, Disp: , Rfl:     famotidine (PEPCID) 10 MG tablet, Take 1 tablet  (10 mg) by mouth 2 times daily (Patient not taking: Reported on 1/8/2024), Disp: 30 tablet, Rfl: 1    mirtazapine (REMERON) 15 MG tablet, Take 1 tablet (15 mg) by mouth at bedtime for 30 days, Disp: 30 tablet, Rfl: 0    nicotine (NICODERM CQ) 14 MG/24HR 24 hr patch, Place 1 patch onto the skin every 24 hours (Patient not taking: Reported on 1/8/2024), Disp: 28 patch, Rfl: 0    SOCIAL HABITS:    History   Smoking Status    Every Day    Packs/day: 0.50    Years: 5.00    Types: Cigarettes    Start date: 1/1/1968    Last attempt to quit: 7/15/2016   Smokeless Tobacco    Former     Social History    Substance and Sexual Activity      Alcohol use: Yes        Comment: binge, two months sober      History   Drug Use    Types: Marijuana     Comment: uses Marijuana for pain       FAMILY HISTORY:    Family History   Problem Relation Age of Onset    Glaucoma Mother     Macular Degeneration Mother     Macular Degeneration Other     Lung Cancer Brother        PE:  BP 93/54 (BP Location: Right arm, Patient Position: Sitting, Cuff Size: Adult Regular)   Pulse 77   SpO2 100%   Wt Readings from Last 1 Encounters:   12/20/23 119 lb 8 oz     There is no height or weight on file to calculate BMI.    EXAM:  GENERAL: well-developed 73 year old male who appears his stated age  CARDIAC: normal   CHEST/LUNG: normal respiratory effort   MUSCULOSKELETAL: grossly normal and both lower extremities are intact, no lower extremity edema  NEUROLOGIC: focally intact, alert and oriented x 3  PSYCH: appropriate affect  VASCULAR: Right foot with Doppler DP and PT, no edema.  Motor and sensory function intact.  Warm, appears well-perfused.

## 2024-01-08 NOTE — LETTER
1/8/2024       RE: Darian Engle  [address]   [place] [address]     Dear Colleague,    Thank you for referring your patient, Darian Engle, to the [facility] VASCULAR CLINIC [place] at [facility]. Please see a copy of my visit note below.        VASCULAR SURGERY PROGRESS NOTE    LOCATION: 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 Felix.  Patient continues to smoke, compliant with medications per wife.  LLE incision is healing well.  No signs or symptoms of infection.  Sutures removed during this visit, as well as part of jim. Per Dr. Felix, sutures and staples to stay in for 2 to 3 months, however given almost ingrown sutures, and good evidence of wound healing, sutures and part of staples were removed during this visit. Plan to remove the rest of the staples at our next visit scheduled for January 22, 2024. At that time we will also send another orthotics referral.  Continue with Eliquis, aspirin and statin as you are. Protect right foot, especially given ongoing diabetes and smoking, small wounds to be painted with iodine.    During this visit, discussed with patient and his wife the importance of protecting the right lower extremity, smoking cessation, the implications of having PAD with ongoing smoking and high risk for advanced arterial disease in the remaining limb which would complicate his ability to ambulate. Smoking cessation discussed extensively, including its impact on arterial vessels as well as healing process. Patient noted they will think about it.    All questions were answered and support provided. He has our  contact information and knows to reach out with any additional questions or concerns.       Roxann Fontanez CNP  Vascular Surgery  Pager: [phone]  [email or web address]  Send message or 10 digit call back number Securely via [employer] with the Vocera Web Console (learn more here)        HPI:  Darian Engle is a 73 year old male with past medical history significant for PAD, longstanding smoking, EtOH abuse, HTN, HLD, T2DM, and heart disease. Unfortunately continues with smoking, EtOH use and his wife noted that they lied to nurses about monitoring blood sugars, which they have not been doing. His wife noted that Mr. Engle does not consume protein drinks/ensure as recommended. Home care is due to resume starting tomorrow or by the end of the week. Patient has been trying to perform activities of daily living at home, was provided physical therapy exercises to continue with rehabilitation process. Denies fevers, chills, abdominal discomfort, nausea or vomiting.      REVIEW OF SYSTEMS:    A 12 point ROS was reviewed and is negative except for what is listed above in HPI.    PHH:    Past Medical History:   Diagnosis Date    Arthritis March 2018    joint pain both hands    Diabetes (H)     Heart disease 1991    [place]-angioplasty    Hypertension           Past Surgical History:   Procedure Laterality Date    AMPUTATE LEG ABOVE KNEE Left 12/1/2023    Procedure: LEFT AMPUTATION, ABOVE KNEE;  Surgeon: Chuck Felix MBBS;  [facility]    AMPUTATE TOE(S) Left 10/26/2023    Procedure: Amputate toe(s), all transmetatarsal amputation;  Surgeon: Jerardo Thornton MD;  [facility]    ANGIOGRAM Left 10/17/2023    Procedure: Left lower extremity angiogram via Left brachial artery approach;  Surgeon: Chuck Felix MBBS;  [facility]    ANGIOGRAM Left 10/24/2023    Procedure: ANGIOGRAM;  Surgeon: Chuck Felix MBBS;  [facility]    ANGIOPLASTY Left 10/24/2023    Procedure: ANGIOPLASTY, Left Lower  Extremity atherectomy;  Surgeon: Chuck Felix MBBS;  [facility]    CARDIAC SURGERY  2004    [facility]    ENDARTERECTOMY FEMORAL Left 10/24/2023    Procedure: ENDARTERECTOMY, FEMORAL;  Surgeon: Chuck Felix MBBS;  [facility]    ENHANCE LASER REFRACTIVE BILATERAL EXISTING PT IN PARAMETERS  2000    EYE SURGERY  2000    [facility]    IR OR ANGIOGRAM  10/17/2023    IR OR ANGIOGRAM  10/24/2023    VASCULAR SURGERY  2017    [facility]       ALLERGIES:  Lidocaine, Lisinopril, and Naltrexone    MEDS:    Current Outpatient Medications:     acetaminophen (TYLENOL) 325 MG tablet, Take 3 tablets (975 mg) by mouth every 8 hours Do this for the first few days postoperatively, as pain improves, can transition to taking 1-2 tabs q4-6 hours as needed., Disp: 90 tablet, Rfl: 0    amLODIPine (NORVASC) 10 MG tablet, Take 1 tablet (10 mg) by mouth daily, Disp: 90 tablet, Rfl: 0    apixaban ANTICOAGULANT (ELIQUIS ANTICOAGULANT) 5 MG tablet, Take 1 tablet (5 mg) by mouth 2 times daily, Disp: 60 tablet, Rfl: 2    aspirin 81 MG tablet, Take 81 mg by mouth daily, Disp: 30 tablet, Rfl:     atorvastatin (LIPITOR) 40 MG tablet, Take 1 tablet (40 mg) by mouth daily For cholesterol., Disp: 90 tablet, Rfl: 3    carvedilol (COREG) 25 MG tablet, Take 25 mg by mouth 2 times daily, Disp: , Rfl:     cholecalciferol ( VITAMIN D3) 50 MCG (2000 UT) CAPS, Take 2 capsules by mouth daily, Disp: , Rfl:     ferrous sulfate (FEROSUL) 325 (65 Fe) MG tablet, Take 1 tablet (325 mg) by mouth daily (with breakfast) Iron supplement, Disp: 90 tablet, Rfl: 0    folic acid (FOLVITE) 1 MG tablet, TAKE 1 TABLET BY MOUTH EVERY DAY, Disp: 90 tablet, Rfl: 3    gabapentin (NEURONTIN) 100 MG capsule, Take 1 capsule (100 mg) by mouth 3 times daily (Patient taking differently: Take 100 mg by mouth 2 times daily), Disp: 180 capsule, Rfl: 1    isosorbide mononitrate (IMDUR) 30 MG 24 hr tablet, Take 1  tablet (30 mg) by mouth daily, Disp: 180 tablet, Rfl: 1    losartan (COZAAR) 50 MG tablet, Take 2 tablets (100 mg) by mouth daily, Disp: 90 tablet, Rfl: 2    melatonin 1 MG TABS tablet, Take 1 tablet (1 mg) by mouth nightly as needed for sleep, Disp: 30 tablet, Rfl: 1    metFORMIN (GLUCOPHAGE XR) 500 MG 24 hr tablet, TAKE 1 TABLET(500 MG) BY MOUTH TWICE DAILY WITH MEALS, Disp: 180 tablet, Rfl: 0    multivitamin (CENTRUM SILVER) tablet, Take 1 tablet by mouth daily, Disp: , Rfl:     Nutritional Supplements (ENSURE ENLIVE) LIQD, Take 1 Box by mouth 4 times daily, Disp: 948 mL, Rfl: 0    Omega-3 Fatty Acids (FISH OIL OMEGA-3) 1000 MG CAPS, Take 1,000 mg by mouth daily, Disp: , Rfl:     omeprazole (PRILOSEC) 20 MG DR capsule, Take 1 capsule (20 mg) by mouth daily, Disp: 90 capsule, Rfl: 3    ondansetron (ZOFRAN ODT) 4 MG ODT tab, Take 1 tablet (4 mg) by mouth every 6 hours as needed for nausea or vomiting, Disp: 30 tablet, Rfl: 0    oxyCODONE (ROXICODONE) 5 MG tablet, Take 1 tablet (5 mg) by mouth daily as needed for moderate pain (For pain with dressing changes), Disp: 5 tablet, Rfl: 0    polyethylene glycol (MIRALAX) 17 GM/Dose powder, Take 17 g by mouth daily, Disp: 510 g, Rfl: 1    senna-docusate (SENOKOT-S/PERICOLACE) 8.6-50 MG tablet, Take 1 tablet by mouth 2 times daily While taking narcotic pain medications (oxycodone), Disp: 90 tablet, Rfl: 0    sertraline (ZOLOFT) 25 MG tablet, TAKE ONE TABLET BY MOUTH DAILY FOR DEPRESSION AND TO HELP APPETITE, Disp: 90 tablet, Rfl: 1    tiZANidine (ZANAFLEX) 2 MG tablet, Take 1 tablet (2 mg) by mouth 3 times daily as needed for muscle spasms, Disp: 90 tablet, Rfl: 1    traZODone (DESYREL) 50 MG tablet, Take 1 tablet (50 mg) by mouth at bedtime, Disp: 30 tablet, Rfl: 0    ACCU-CHEK SMARTVIEW test strip, TEST THREE TIMES DAILY OR AS DIRECTED (Patient not taking: Reported on 1/8/2024), Disp: 300 strip, Rfl: 3    blood glucose monitoring (ACCU-CHEK FASTCLIX) lancets, USE TO TEST  THREE TIMES DAILY OR AS DIRECTED (Patient not taking: Reported on 1/8/2024), Disp: 306 each, Rfl: 3    cilostazol (PLETAL) 100 MG tablet, Take 100 mg by mouth 2 times daily, Disp: , Rfl:     famotidine (PEPCID) 10 MG tablet, Take 1 tablet (10 mg) by mouth 2 times daily (Patient not taking: Reported on 1/8/2024), Disp: 30 tablet, Rfl: 1    mirtazapine (REMERON) 15 MG tablet, Take 1 tablet (15 mg) by mouth at bedtime for 30 days, Disp: 30 tablet, Rfl: 0    nicotine (NICODERM CQ) 14 MG/24HR 24 hr patch, Place 1 patch onto the skin every 24 hours (Patient not taking: Reported on 1/8/2024), Disp: 28 patch, Rfl: 0    SOCIAL HABITS:    History   Smoking Status    Every Day    Packs/day: 0.50    Years: 5.00    Types: Cigarettes    Start date: 1/1/1968    Last attempt to quit: 7/15/2016   Smokeless Tobacco    Former     Social History    Substance and Sexual Activity      Alcohol use: Yes        Comment: binge, two months sober      History   Drug Use    Types: Marijuana     Comment: uses Marijuana for pain       FAMILY HISTORY:    Family History   Problem Relation Age of Onset    Glaucoma Mother     Macular Degeneration Mother     Macular Degeneration Other     Lung Cancer Brother        PE:  BP 93/54 (BP Location: Right arm, Patient Position: Sitting, Cuff Size: Adult Regular)   Pulse 77   SpO2 100%   Wt Readings from Last 1 Encounters:   12/20/23 119 lb 8 oz     There is no height or weight on file to calculate BMI.    EXAM:  GENERAL: well-developed 73 year old male who appears his stated age  CARDIAC: normal   CHEST/LUNG: normal respiratory effort   MUSCULOSKELETAL: grossly normal and both lower extremities are intact, no lower extremity edema  NEUROLOGIC: focally intact, alert and oriented x 3  PSYCH: appropriate affect  VASCULAR: Right foot with Doppler DP and PT, no edema.  Motor and sensory function intact.  Warm, appears well-perfused.        Again, thank you for allowing me to participate in the care of your  patient.      Sincerely,    ADIEL Bose CNP

## 2024-01-10 NOTE — TELEPHONE ENCOUNTER
"Returned call to number provided for [facility] home care. No name is noted in message as to who to call back to and voicemail was not identified, only noted \"this [employer] prescriber is not available, please leave a message\". Unable to leave message or any details regarding this patient since no identifying information was given.    Instructed for caller to return call back to [place] [facility] and ask to speak with nursing team.    If home care calls back, please send to RN team at [facility].        Kayley Sanderson RN  Clinical Triage/Primary Care  [facility]    "

## 2024-01-10 NOTE — TELEPHONE ENCOUNTER
Home Care is calling regarding an established patient with  [facility]        12/11/2023     9:15 AM   Home Care Information   Date of Home Care episode start 12/8/2023   Current following provider Dr. Gonzalez   Date provider agreed to follow 12/8/2023   Home Care agency [facility]     Requesting orders from: Marcos Gonzalez  Provider is following patient: Yes  Is this a 60-day recertification request?  Yes    Orders Requested    Skilled Nursing  Request for recertification   Frequency:  1x/wk for 10 wks    Physical Therapy  Request for  PT eval and treat      Reporting Med interaction:  Famotidine and tizanidine   Eliquis and  omeprazole,       Information was gathered and will be sent to provider for review.  RN will contact Home Care with information after provider review.  Confirmed ok to leave a detailed message with call back.  Contact information confirmed and updated as needed.    Diane Rush RN

## 2024-01-11 NOTE — TELEPHONE ENCOUNTER
Leonardo, RN from [facility], calling back re: mutual patient for HHC orders. RN states she's having trouble with her phone, apologized for the confusion. Writer relayed provider verbal approval for requested HHC orders, RN verbalized understanding. No further questions or concerns.      THANH Avilez, RN  [facility] Primary Care [facility]

## 2024-01-11 NOTE — TELEPHONE ENCOUNTER
"Attempted to return call to [facility] home care staff. No name is noted in message as to who to call back to, and voicemail does not identify the owner of the voicemail box. Voicemail greeting states, \"This [employer] prescriber is not available, please leave a message.\" No PHI regarding this patient since no identifying information was given, only indicated call is regarding a pt named \"Anna FAM\"     Instructed for caller to return call back to [place] clinic and ask to speak with nursing team. Thank you. Safia Noriega R.N.       "

## 2024-01-11 NOTE — TELEPHONE ENCOUNTER
"Attempted to return call to [facility] home care staff. No name is noted in message as to who to call back to, and voicemail does not identify the owner of the voicemail box. Voicemail greeting states, \"This [employer] prescriber is not available, please leave a message.\" No PHI regarding this patient since no identifying information was given, only indicated call is regarding a pt named \"Darian.\"     Instructed for caller to return call back to [place] clinic and ask to speak with nursing team.     If home care calls back, please send to RN team at [place] [facility].     THANH Max, RN  "

## 2024-01-19 NOTE — TELEPHONE ENCOUNTER
Reason for Call:  Form, our goal is to have forms completed with 72 hours, however, some forms may require a visit or additional information.    Type of letter, form or note:  Home Health Certification    Who is the form from?: Home care    Where did the form come from: form was faxed in    What clinic location was the form placed at?: [place]    Where the form was placed: Given to physician    What number is listed as a contact on the form?: [phone]       Additional comments: Placed in your basket    Call taken on 1/19/2024 at 6:54 AM by Mari Price MA

## 2024-01-22 NOTE — LETTER
1/22/2024       RE: Darian Engle  [address]   [place] [address]       Dear Colleague,    Thank you for referring your patient, Darian Engle, to the [facility] VASCULAR CLINIC [place] at [facility]. Please see a copy of my visit note below.        VASCULAR SURGERY PROGRESS NOTE    LOCATION:  [facility]     Darian Engle  Medical Record #:  [id number]  YOB: 1950  Age:  73 year old     Date of Service: 1/22/2024    PRIMARY CARE PROVIDER: Marcos Gonzalez    Reason for visit: Wound care    IMPRESSION / RECOMMENDATION:   Darian Engle is a 73-year-old gentleman who is s/p LLE AKA on 12/1/23 with Dr. Felix. Incision has been healing well, sutures removed at last visit and all staples today. Incision without signs or symptoms of infection, no swelling no drainage. During this visit I also placed a referral to orthotics (although patient was already referred while inpatient), printing the contact information for the orthotics clinic and provided guidance on reaching out to them. His wife, Angelica Engle verbalized clear understanding on reaching out to orthotics as patient's wound has almost completely healed and is ready for follow-up with them.     From a vascular surgery perspective, wound has now complete healed, we will follow-up on as-needed basis. His wife, Angelica, verbalized clear understanding of signs and symptoms of infection of LLE as well as ability to reach out to us should any new concerns arise.    Roxann Fontanez, CNP  Vascular Surgery  Pager: [phone]  [email or web address]  Send message or 10 digit call back number Securely via [employer] with the [employer] Web Console (learn more here)      HPI:  Darian Engle is a 73 year old male with past medical history significant for PAD, EtOH abuse, current smoker, dementia, HTN, type 2 diabetes, heart disease. Given extensive PAD, now status post left  AKA, patient has been consistently advised on being careful with his RLE given it is his only limb for ambulation. Patient was also advised on smoking cessation numerous times, the impact of smoking on PAD especially given single limb (RLE) and high risk of worsening PAD on RLE to the extent of losing the limb.I called Mr. Engle this evening again and spoke with him and his wife on smoking cessation as well as impact of smoking on already present arterial disease as well as high risk for limb loss on the right. Offered help with smoking cessation options should he be ready. Both of them verbalized clear understanding on the risk and noted that they will try to get up and walk more often as well as try to quit smoking.     REVIEW OF SYSTEMS:    A 12 point ROS was reviewed and is negative except for what is listed above in HPI.    PHH:    Past Medical History:   Diagnosis Date    Arthritis March 2018    joint pain both hands    Diabetes (H)     Heart disease 1991    [place]-angioplasty    Hypertension           Past Surgical History:   Procedure Laterality Date    AMPUTATE LEG ABOVE KNEE Left 12/1/2023    Procedure: LEFT AMPUTATION, ABOVE KNEE;  Surgeon: Chuck Felix MBBS;  [facility]    AMPUTATE TOE(S) Left 10/26/2023    Procedure: Amputate toe(s), all transmetatarsal amputation;  Surgeon: Jerardo Thornton MD;  [facility]    ANGIOGRAM Left 10/17/2023    Procedure: Left lower extremity angiogram via Left brachial artery approach;  Surgeon: Chuck Felix MBBS;  [facility]    ANGIOGRAM Left 10/24/2023    Procedure: ANGIOGRAM;  Surgeon: Chuck Felix MBBS;  [facility]    ANGIOPLASTY Left 10/24/2023    Procedure: ANGIOPLASTY, Left Lower Extremity atherectomy;  Surgeon: Chuck Felix MBBS;  [facility]    CARDIAC SURGERY  [address] [place] [address]    ENDARTERECTOMY FEMORAL Left 10/24/2023    Procedure: ENDARTERECTOMY, FEMORAL;  Surgeon: Elvira  MARÍA ELENA Woods;  [facility]    ENHANCE LASER REFRACTIVE BILATERAL EXISTING PT IN PARAMETERS  2000    EYE SURGERY  2000    [facility]    IR OR ANGIOGRAM  10/17/2023    IR OR ANGIOGRAM  10/24/2023    VASCULAR SURGERY  2017    [facility]       ALLERGIES:  Lidocaine, Lisinopril, and Naltrexone    MEDS:    Current Outpatient Medications:     amLODIPine (NORVASC) 10 MG tablet, Take 1 tablet (10 mg) by mouth daily, Disp: 90 tablet, Rfl: 0    apixaban ANTICOAGULANT (ELIQUIS ANTICOAGULANT) 5 MG tablet, Take 1 tablet (5 mg) by mouth 2 times daily, Disp: 60 tablet, Rfl: 2    aspirin 81 MG tablet, Take 81 mg by mouth daily, Disp: 30 tablet, Rfl:     atorvastatin (LIPITOR) 40 MG tablet, Take 1 tablet (40 mg) by mouth daily For cholesterol., Disp: 90 tablet, Rfl: 3    carvedilol (COREG) 25 MG tablet, Take 25 mg by mouth 2 times daily, Disp: , Rfl:     cholecalciferol ( VITAMIN D3) 50 MCG (2000 UT) CAPS, Take 2 capsules by mouth daily, Disp: , Rfl:     cilostazol (PLETAL) 100 MG tablet, Take 100 mg by mouth 2 times daily, Disp: , Rfl:     ferrous sulfate (FEROSUL) 325 (65 Fe) MG tablet, Take 1 tablet (325 mg) by mouth daily (with breakfast) Iron supplement, Disp: 90 tablet, Rfl: 0    folic acid (FOLVITE) 1 MG tablet, TAKE 1 TABLET BY MOUTH EVERY DAY, Disp: 90 tablet, Rfl: 3    gabapentin (NEURONTIN) 100 MG capsule, Take 1 capsule (100 mg) by mouth 3 times daily (Patient taking differently: Take 100 mg by mouth 2 times daily), Disp: 180 capsule, Rfl: 1    isosorbide mononitrate (IMDUR) 30 MG 24 hr tablet, Take 1 tablet (30 mg) by mouth daily, Disp: 180 tablet, Rfl: 1    losartan (COZAAR) 50 MG tablet, Take 2 tablets (100 mg) by mouth daily, Disp: 90 tablet, Rfl: 2    melatonin 1 MG TABS tablet, Take 1 tablet (1 mg) by mouth nightly as needed for sleep, Disp: 30 tablet, Rfl: 1    metFORMIN (GLUCOPHAGE XR) 500 MG 24 hr tablet, TAKE 1 TABLET(500 MG) BY MOUTH TWICE DAILY WITH MEALS, Disp: 180 tablet, Rfl:  0    mirtazapine (REMERON) 15 MG tablet, Take 1 tablet (15 mg) by mouth at bedtime for 30 days, Disp: 30 tablet, Rfl: 0    multivitamin (CENTRUM SILVER) tablet, Take 1 tablet by mouth daily, Disp: , Rfl:     Nutritional Supplements (ENSURE ENLIVE) LIQD, Take 1 Box by mouth 4 times daily, Disp: 948 mL, Rfl: 0    Omega-3 Fatty Acids (FISH OIL OMEGA-3) 1000 MG CAPS, Take 1,000 mg by mouth daily, Disp: , Rfl:     omeprazole (PRILOSEC) 20 MG DR capsule, Take 1 capsule (20 mg) by mouth daily, Disp: 90 capsule, Rfl: 3    ondansetron (ZOFRAN ODT) 4 MG ODT tab, Take 1 tablet (4 mg) by mouth every 6 hours as needed for nausea or vomiting, Disp: 30 tablet, Rfl: 0    polyethylene glycol (MIRALAX) 17 GM/Dose powder, Take 17 g by mouth daily, Disp: 510 g, Rfl: 1    sertraline (ZOLOFT) 25 MG tablet, TAKE ONE TABLET BY MOUTH DAILY FOR DEPRESSION AND TO HELP APPETITE, Disp: 90 tablet, Rfl: 1    tiZANidine (ZANAFLEX) 2 MG tablet, Take 1 tablet (2 mg) by mouth 3 times daily as needed for muscle spasms, Disp: 90 tablet, Rfl: 1    traZODone (DESYREL) 50 MG tablet, Take 1 tablet (50 mg) by mouth at bedtime, Disp: 30 tablet, Rfl: 0    ACCU-CHEK SMARTVIEW test strip, TEST THREE TIMES DAILY OR AS DIRECTED (Patient not taking: Reported on 1/8/2024), Disp: 300 strip, Rfl: 3    acetaminophen (TYLENOL) 325 MG tablet, Take 3 tablets (975 mg) by mouth every 8 hours Do this for the first few days postoperatively, as pain improves, can transition to taking 1-2 tabs q4-6 hours as needed. (Patient not taking: Reported on 1/22/2024), Disp: 90 tablet, Rfl: 0    blood glucose monitoring (ACCU-CHEK FASTCLIX) lancets, USE TO TEST THREE TIMES DAILY OR AS DIRECTED (Patient not taking: Reported on 1/8/2024), Disp: 306 each, Rfl: 3    famotidine (PEPCID) 10 MG tablet, Take 1 tablet (10 mg) by mouth 2 times daily (Patient not taking: Reported on 1/8/2024), Disp: 30 tablet, Rfl: 1    nicotine (NICODERM CQ) 14 MG/24HR 24 hr patch, Place 1 patch onto the skin  every 24 hours (Patient not taking: Reported on 1/8/2024), Disp: 28 patch, Rfl: 0    oxyCODONE (ROXICODONE) 5 MG tablet, Take 1 tablet (5 mg) by mouth daily as needed for moderate pain (For pain with dressing changes) (Patient not taking: Reported on 1/22/2024), Disp: 5 tablet, Rfl: 0    senna-docusate (SENOKOT-S/PERICOLACE) 8.6-50 MG tablet, Take 1 tablet by mouth 2 times daily While taking narcotic pain medications (oxycodone) (Patient not taking: Reported on 1/22/2024), Disp: 90 tablet, Rfl: 0    SOCIAL HABITS:    History   Smoking Status    Every Day    Packs/day: 0.50    Years: 5.00    Types: Cigarettes    Start date: 1/1/1968    Last attempt to quit: 7/15/2016   Smokeless Tobacco    Former     Social History    Substance and Sexual Activity      Alcohol use: Yes        Comment: binge, two months sober      History   Drug Use    Types: Marijuana     Comment: uses Marijuana for pain       FAMILY HISTORY:    Family History   Problem Relation Age of Onset    Glaucoma Mother     Macular Degeneration Mother     Macular Degeneration Other     Lung Cancer Brother        PE:  /57 (BP Location: Left arm, Patient Position: Sitting, Cuff Size: Adult Regular)   Pulse 64   SpO2 99%   Wt Readings from Last 1 Encounters:   12/20/23 119 lb 8 oz     There is no height or weight on file to calculate BMI.    EXAM:  GENERAL: well-developed 73 year old male who appears his stated age  CARDIAC: normal   CHEST/LUNG: normal respiratory effort   MUSCULOSKELETAL: grossly normal and both lower extremities are intact, no lower extremity edema  NEUROLOGIC: focally intact, alert and oriented x 3  PSYCH: appropriate affect  VASCULAR:           Again, thank you for allowing me to participate in the care of your patient.      Sincerely,    ADIEL Bose CNP

## 2024-01-22 NOTE — PROGRESS NOTES
VASCULAR SURGERY PROGRESS NOTE    LOCATION:  [facility]     Darian Engle  Medical Record #:  [id number]  YOB: 1950  Age:  73 year old     Date of Service: 1/22/2024    PRIMARY CARE PROVIDER: Marcos Gonzalez    Reason for visit: Wound care    IMPRESSION / RECOMMENDATION:   Darian Engle is a 73-year-old gentleman who is s/p LLE AKA on 12/1/23 with Dr. Felix. Incision has been healing well, sutures removed at last visit and all staples today. Incision without signs or symptoms of infection, no swelling no drainage. During this visit I also placed a referral to orthotics (although patient was already referred while inpatient), printing the contact information for the orthotics clinic and provided guidance on reaching out to them. His wife, Angelica Engle verbalized clear understanding on reaching out to orthotics as patient's wound has almost completely healed and is ready for follow-up with them.     From a vascular surgery perspective, wound has now complete healed. We discussed during this appointment his noncompressible ABIs on right. Patient had a wound on right foot which has now healed. His wife, Angelica, verbalized clear understanding of signs and symptoms of infection of LLE as well as ability to reach out to us should any new concerns arise.    Follow-up in months with arterial studies on right given ongoing PAD.    Roxann Fontanez, [facility]  Vascular Surgery  Pager: [phone]  [email or web address]  Send message or 10 digit call back number Securely via [employer]oda with the [employer] Web Console (learn more here)      HPI:  Darian Engle is a 73 year old male with past medical history significant for PAD, EtOH abuse, current smoker, dementia, HTN, type 2 diabetes, heart disease. Given extensive PAD, now status post left AKA, patient has been consistently advised on being careful with his RLE given it is his only limb for ambulation. Patient was also advised on smoking  cessation numerous times, the impact of smoking on PAD especially given single limb (RLE) and high risk of worsening PAD on RLE to the extent of losing the limb.I called Mr. Engle this evening again and spoke with him and his wife on smoking cessation as well as impact of smoking on already present arterial disease as well as high risk for limb loss on the right. Offered help with smoking cessation options should he be ready. Both of them verbalized clear understanding on the risk and noted that they will try to get up and walk more often as well as try to quit smoking.     REVIEW OF SYSTEMS:    A 12 point ROS was reviewed and is negative except for what is listed above in HPI.    PHH:    Past Medical History:   Diagnosis Date    Arthritis March 2018    joint pain both hands    Diabetes (H)     Heart disease 1991    [place]-angioplasty    Hypertension           Past Surgical History:   Procedure Laterality Date    AMPUTATE LEG ABOVE KNEE Left 12/1/2023    Procedure: LEFT AMPUTATION, ABOVE KNEE;  Surgeon: Chuck Felix MBBS;  Location: [facility] OR    AMPUTATE TOE(S) Left 10/26/2023    Procedure: Amputate toe(s), all transmetatarsal amputation;  Surgeon: Jerardo Thornton MD;  Location: [facility] OR    ANGIOGRAM Left 10/17/2023    Procedure: Left lower extremity angiogram via Left brachial artery approach;  Surgeon: Chuck Felix MBBS;  Location: [facility]    ANGIOGRAM Left 10/24/2023    Procedure: ANGIOGRAM;  Surgeon: Chuck Felix MBBS;  Location: [facility]    ANGIOPLASTY Left 10/24/2023    Procedure: ANGIOPLASTY, Left Lower Extremity atherectomy;  Surgeon: Chuck Felix MBBS;  Location: [facility] OR    CARDIAC SURGERY  [address] [place] [address]    ENDARTERECTOMY FEMORAL Left 10/24/2023    Procedure: ENDARTERECTOMY, FEMORAL;  Surgeon: Chuck Felix MBBS;  Location: [facility] OR    ENHANCE LASER REFRACTIVE BILATERAL EXISTING PT IN PARAMETERS  2000    EYE SURGERY  2000    [facility]    IR OR  ANGIOGRAM  10/17/2023    IR OR ANGIOGRAM  10/24/2023    VASCULAR SURGERY  2017    [facility]       ALLERGIES:  Lidocaine, Lisinopril, and Naltrexone    MEDS:    Current Outpatient Medications:     amLODIPine (NORVASC) 10 MG tablet, Take 1 tablet (10 mg) by mouth daily, Disp: 90 tablet, Rfl: 0    apixaban ANTICOAGULANT (ELIQUIS ANTICOAGULANT) 5 MG tablet, Take 1 tablet (5 mg) by mouth 2 times daily, Disp: 60 tablet, Rfl: 2    aspirin 81 MG tablet, Take 81 mg by mouth daily, Disp: 30 tablet, Rfl:     atorvastatin (LIPITOR) 40 MG tablet, Take 1 tablet (40 mg) by mouth daily For cholesterol., Disp: 90 tablet, Rfl: 3    carvedilol (COREG) 25 MG tablet, Take 25 mg by mouth 2 times daily, Disp: , Rfl:     cholecalciferol ( VITAMIN D3) 50 MCG (2000 UT) CAPS, Take 2 capsules by mouth daily, Disp: , Rfl:     cilostazol (PLETAL) 100 MG tablet, Take 100 mg by mouth 2 times daily, Disp: , Rfl:     ferrous sulfate (FEROSUL) 325 (65 Fe) MG tablet, Take 1 tablet (325 mg) by mouth daily (with breakfast) Iron supplement, Disp: 90 tablet, Rfl: 0    folic acid (FOLVITE) 1 MG tablet, TAKE 1 TABLET BY MOUTH EVERY DAY, Disp: 90 tablet, Rfl: 3    gabapentin (NEURONTIN) 100 MG capsule, Take 1 capsule (100 mg) by mouth 3 times daily (Patient taking differently: Take 100 mg by mouth 2 times daily), Disp: 180 capsule, Rfl: 1    isosorbide mononitrate (IMDUR) 30 MG 24 hr tablet, Take 1 tablet (30 mg) by mouth daily, Disp: 180 tablet, Rfl: 1    losartan (COZAAR) 50 MG tablet, Take 2 tablets (100 mg) by mouth daily, Disp: 90 tablet, Rfl: 2    melatonin 1 MG TABS tablet, Take 1 tablet (1 mg) by mouth nightly as needed for sleep, Disp: 30 tablet, Rfl: 1    metFORMIN (GLUCOPHAGE XR) 500 MG 24 hr tablet, TAKE 1 TABLET(500 MG) BY MOUTH TWICE DAILY WITH MEALS, Disp: 180 tablet, Rfl: 0    mirtazapine (REMERON) 15 MG tablet, Take 1 tablet (15 mg) by mouth at bedtime for 30 days, Disp: 30 tablet, Rfl: 0    multivitamin (CENTRUM SILVER)  tablet, Take 1 tablet by mouth daily, Disp: , Rfl:     Nutritional Supplements (ENSURE ENLIVE) LIQD, Take 1 Box by mouth 4 times daily, Disp: 948 mL, Rfl: 0    Omega-3 Fatty Acids (FISH OIL OMEGA-3) 1000 MG CAPS, Take 1,000 mg by mouth daily, Disp: , Rfl:     omeprazole (PRILOSEC) 20 MG DR capsule, Take 1 capsule (20 mg) by mouth daily, Disp: 90 capsule, Rfl: 3    ondansetron (ZOFRAN ODT) 4 MG ODT tab, Take 1 tablet (4 mg) by mouth every 6 hours as needed for nausea or vomiting, Disp: 30 tablet, Rfl: 0    polyethylene glycol (MIRALAX) 17 GM/Dose powder, Take 17 g by mouth daily, Disp: 510 g, Rfl: 1    sertraline (ZOLOFT) 25 MG tablet, TAKE ONE TABLET BY MOUTH DAILY FOR DEPRESSION AND TO HELP APPETITE, Disp: 90 tablet, Rfl: 1    tiZANidine (ZANAFLEX) 2 MG tablet, Take 1 tablet (2 mg) by mouth 3 times daily as needed for muscle spasms, Disp: 90 tablet, Rfl: 1    traZODone (DESYREL) 50 MG tablet, Take 1 tablet (50 mg) by mouth at bedtime, Disp: 30 tablet, Rfl: 0    ACCU-CHEK SMARTVIEW test strip, TEST THREE TIMES DAILY OR AS DIRECTED (Patient not taking: Reported on 1/8/2024), Disp: 300 strip, Rfl: 3    acetaminophen (TYLENOL) 325 MG tablet, Take 3 tablets (975 mg) by mouth every 8 hours Do this for the first few days postoperatively, as pain improves, can transition to taking 1-2 tabs q4-6 hours as needed. (Patient not taking: Reported on 1/22/2024), Disp: 90 tablet, Rfl: 0    blood glucose monitoring (ACCU-CHEK FASTCLIX) lancets, USE TO TEST THREE TIMES DAILY OR AS DIRECTED (Patient not taking: Reported on 1/8/2024), Disp: 306 each, Rfl: 3    famotidine (PEPCID) 10 MG tablet, Take 1 tablet (10 mg) by mouth 2 times daily (Patient not taking: Reported on 1/8/2024), Disp: 30 tablet, Rfl: 1    nicotine (NICODERM CQ) 14 MG/24HR 24 hr patch, Place 1 patch onto the skin every 24 hours (Patient not taking: Reported on 1/8/2024), Disp: 28 patch, Rfl: 0    oxyCODONE (ROXICODONE) 5 MG tablet, Take 1 tablet (5 mg) by mouth daily  as needed for moderate pain (For pain with dressing changes) (Patient not taking: Reported on 1/22/2024), Disp: 5 tablet, Rfl: 0    senna-docusate (SENOKOT-S/PERICOLACE) 8.6-50 MG tablet, Take 1 tablet by mouth 2 times daily While taking narcotic pain medications (oxycodone) (Patient not taking: Reported on 1/22/2024), Disp: 90 tablet, Rfl: 0    SOCIAL HABITS:    History   Smoking Status    Every Day    Packs/day: 0.50    Years: 5.00    Types: Cigarettes    Start date: 1/1/1968    Last attempt to quit: 7/15/2016   Smokeless Tobacco    Former     Social History    Substance and Sexual Activity      Alcohol use: Yes        Comment: binge, two months sober      History   Drug Use    Types: Marijuana     Comment: uses Marijuana for pain       FAMILY HISTORY:    Family History   Problem Relation Age of Onset    Glaucoma Mother     Macular Degeneration Mother     Macular Degeneration Other     Lung Cancer Brother        PE:  /57 (BP Location: Left arm, Patient Position: Sitting, Cuff Size: Adult Regular)   Pulse 64   SpO2 99%   Wt Readings from Last 1 Encounters:   12/20/23 119 lb 8 oz     There is no height or weight on file to calculate BMI.    EXAM:  GENERAL: well-developed 73 year old male who appears his stated age  CARDIAC: normal   CHEST/LUNG: normal respiratory effort   MUSCULOSKELETAL: grossly normal and both lower extremities are intact, no lower extremity edema  NEUROLOGIC: focally intact, alert and oriented x 3  PSYCH: appropriate affect

## 2024-01-22 NOTE — ADDENDUM NOTE
Addendum  created 01/22/24 0908 by Behrens, Christopher J, MD    Attestation recorded in Intraprocedure, Flowsheet accepted, Intraprocedure Attestations filed       There are no Wet Read(s) to document. There is 1 Wet Read(s) to document.

## 2024-01-22 NOTE — NURSING NOTE
Chief Complaint   Patient presents with    Follow Up     S/P amputation. Stump check.       Medications and allergies reconciled.  Vitals collected.    Kayley Davis LPN

## 2024-01-22 NOTE — PATIENT INSTRUCTIONS
Thank you so much for choosing us for your care. It was a pleasure to see you at the vascular clinic today.     Follow-up recommendations: In 6 months with arterial studies and clinic appointment.     If you have any questions, please contact our clinic directly at [phone] and ask for the nurse. We also encourage the use of [employer]ala to communicate with your HealthCare Provider.        If you have an urgent need after business hours (8:00 am to 4:30 pm) please call [phone], option 4, and ask for the vascular attending on call. For non-urgent after hours needs, please call the vascular nurse at [phone] and leave a detailed voicemail. For scheduling needs, please call our clinic directly at [phone].

## 2024-02-01 PROBLEM — E44.0 MODERATE PROTEIN-CALORIE MALNUTRITION (H): Status: ACTIVE | Noted: 2024-01-01

## 2024-02-01 NOTE — PATIENT INSTRUCTIONS
We will order a prosthesis for your left leg with the expectation that you'll be able to walk in the household and for short community distances.  You will see me in 3 months so we can discuss therapy progress and needs including nutrition, pain, strength, and weight issues that may arise as you test your limits.    You can use an ace wrap to help secure the  which you should be wearing as much as possible but it should be snug, never tight.    I provided you with additional smoking cessation counseling.  You are always free when you're ready to talk to us about any help we can provide to help you stop smoking to reduce the risk of another amputation, especially to your right leg which is already having issues with blood supply and nerve health due to your diabetes and smoking.    Amputation Resources:     Wiggle Your Toes  [email or web address] (Facebook group as well)   Main PH: [phone]  , Charly Roman PH: [phone], is available by phone/text 7 days/week (recommend you contact [place] directly)     &    Amputation Coalition   www.amputee-[email or web address]   PH: [phone]  National agency where you can find support groups and find local resources.     Support Groups:   For more information, contact Natalya Go PH: [phone], Email: [email or web address]     [facility] Diabetic Amputee Community   Mondays on Zoom @ 7-8:00 pm central time   Come talk about issues related to living with a chronic disease as well as navigating limb loss.     [facility] Bilateral Above the Knee   2nd Monday of the month on Zoom @ 10-11:00 am central   There is much to learn and know about thriving with multiple amputations-- join the learning and discussion.     [facility] Grief--A NON Therapeutic Look at Grief   1st and 3rd Tuesdays on Zoom @ 10-11:00 am central   Grief is normal learn how to thrive with limb loss-- join the learning and discussion.     [facility] Amputee Community   Tuesdays on Zoom @ 7-8:00 pm  central time    Come learn from various professions related to thriving with limb loss.     Balanced and Beyond-- for new amputees   (pre-surgery and beyond)   Wednesdays on Zoom @ 10-11:00 am central   Come discuss issues related to the first years with prosthetics.

## 2024-02-01 NOTE — LETTER
2024       RE: Darian Engle  [address]   [place] [address]       Dear Colleague,    Thank you for referring your patient, Darian Engle, to the [facility] PHYSICAL MEDICINE AND REHABILITATION CLINIC [place] at [facility]. Please see a copy of my visit note below.           PM&R Clinic Note     Patient Name: Darian Engle : 1950 Medical Record: [id number]     Chief Complaint: Requires new prosthesis    Level of Amputation:  Left TFA    : Eduardo Evangelista    Date of Surgery: 23    Etiology: Dry gangrene         History of Present Illness:     Darian Engle is a 73 year old male with a PMH of dementia, DM2, CAD, atrial fibrillation, malnutrition, PAD s/p left TFA on 23 secondary to complications of peripheral ischemia.    Prior to his amputation, he stopped driving 2 years ago due to his dementia.  He wasn't walking much, walking maybe 20 steps a day and mostly being sedentary.    He had his first amputation on 10/25/23 for amputation of left toes.  This was complicated by dry gangrene. He was admitted for the amputation on 23 and discharged on 23 with outpatient PT, OT and wound care after declining an ARU stay.  His hospital stay was complicated by insufficient oral intake despite education with poor tolerance of tube feeding - he was referred to nutrition on discharge.    He's been attending outpatient physical therapy once every two weeks, mostly for HEP programming.  He is able to stand on his right leg for 2-3 minutes before getting tired.  He is able to perform all transfers, including car transfers, independently.    He has a 2WW, a 4WW with a seat, and an electric walker, and a wheelchair but is just using his wheelchair.    His  contacted him on 23: he hasn't been using his limb protector because it was disassembled and he declined reassembly, with plans to discuss  use on follow  up.  On vascular surgery follow up, smoking cessation continued to be encouraged, though he remained precontemplative.  He was found to have small ulcers on his right toes.  Per vascular surgery, wound completely healed on 1/22/24.  He had the staples removed.    He eats 5 cans of soup a day.  His weight is steady at about 119lbs.  His wife thinks he is eating more than last year.  We had a discussion about protein intake and energy requirements which was well received.    Past Medical History:   Diagnosis Date    Arthritis March 2018    joint pain both hands    Diabetes (H)     Heart disease 1991    [place]-angioplasty    Hypertension      Past Surgical History:   Procedure Laterality Date    AMPUTATE LEG ABOVE KNEE Left 12/1/2023    Procedure: LEFT AMPUTATION, ABOVE KNEE;  Surgeon: Chuck Felix MBBS;  [facility]    AMPUTATE TOE(S) Left 10/26/2023    Procedure: Amputate toe(s), all transmetatarsal amputation;  Surgeon: Jerardo Thornton MD;  [facility]    ANGIOGRAM Left 10/17/2023    Procedure: Left lower extremity angiogram via Left brachial artery approach;  Surgeon: Chuck Felix MBBS;  [facility]    ANGIOGRAM Left 10/24/2023    Procedure: ANGIOGRAM;  Surgeon: Chuck Felix MBBS;  [facility]    ANGIOPLASTY Left 10/24/2023    Procedure: ANGIOPLASTY, Left Lower Extremity atherectomy;  Surgeon: Chuck Felix MBBS;  [facility]    CARDIAC SURGERY  2004    [facility]    ENDARTERECTOMY FEMORAL Left 10/24/2023    Procedure: ENDARTERECTOMY, FEMORAL;  Surgeon: Chuck Felix MBBS;  [facility]    ENHANCE LASER REFRACTIVE BILATERAL EXISTING PT IN PARAMETERS  2000    EYE SURGERY  2000    [facility]    IR OR ANGIOGRAM  10/17/2023    IR OR ANGIOGRAM  10/24/2023    VASCULAR SURGERY  2017    [facility]       Social History     Tobacco Use    Smoking status: Every Day     Packs/day: 0.50     Years: 5.00     Additional pack  years: 0.00     Total pack years: 2.50     Types: Cigarettes     Start date: 1968     Last attempt to quit: 7/15/2016     Years since quittin.5     Passive exposure: Never    Smokeless tobacco: Former   Substance Use Topics    Alcohol use: Yes     Comment: binge, two months sober     Family History   Problem Relation Age of Onset    Glaucoma Mother     Macular Degeneration Mother     Macular Degeneration Other     Lung Cancer Brother             Medications:     Current Outpatient Medications   Medication Sig Dispense Refill    ACCU-CHEK SMARTVIEW test strip TEST THREE TIMES DAILY OR AS DIRECTED (Patient not taking: Reported on 2024) 300 strip 3    acetaminophen (TYLENOL) 325 MG tablet Take 3 tablets (975 mg) by mouth every 8 hours Do this for the first few days postoperatively, as pain improves, can transition to taking 1-2 tabs q4-6 hours as needed. (Patient not taking: Reported on 2024) 90 tablet 0    amLODIPine (NORVASC) 10 MG tablet Take 1 tablet (10 mg) by mouth daily 90 tablet 0    apixaban ANTICOAGULANT (ELIQUIS ANTICOAGULANT) 5 MG tablet Take 1 tablet (5 mg) by mouth 2 times daily 60 tablet 2    aspirin 81 MG tablet Take 81 mg by mouth daily 30 tablet     atorvastatin (LIPITOR) 40 MG tablet Take 1 tablet (40 mg) by mouth daily For cholesterol. 90 tablet 3    blood glucose monitoring (ACCU-CHEK FASTCLIX) lancets USE TO TEST THREE TIMES DAILY OR AS DIRECTED (Patient not taking: Reported on 2024) 306 each 3    carvedilol (COREG) 25 MG tablet Take 25 mg by mouth 2 times daily      cholecalciferol ( VITAMIN D3) 50 MCG ( UT) CAPS Take 2 capsules by mouth daily      cilostazol (PLETAL) 100 MG tablet Take 100 mg by mouth 2 times daily      famotidine (PEPCID) 10 MG tablet Take 1 tablet (10 mg) by mouth 2 times daily (Patient not taking: Reported on 2024) 30 tablet 1    ferrous sulfate (FEROSUL) 325 (65 Fe) MG tablet Take 1 tablet (325 mg) by mouth daily (with breakfast) Iron  supplement 90 tablet 0    folic acid (FOLVITE) 1 MG tablet TAKE 1 TABLET BY MOUTH EVERY DAY 90 tablet 3    gabapentin (NEURONTIN) 100 MG capsule Take 1 capsule (100 mg) by mouth 3 times daily (Patient taking differently: Take 100 mg by mouth 2 times daily) 180 capsule 1    isosorbide mononitrate (IMDUR) 30 MG 24 hr tablet Take 1 tablet (30 mg) by mouth daily 180 tablet 1    losartan (COZAAR) 50 MG tablet Take 2 tablets (100 mg) by mouth daily 90 tablet 2    melatonin 1 MG TABS tablet Take 1 tablet (1 mg) by mouth nightly as needed for sleep 30 tablet 1    metFORMIN (GLUCOPHAGE XR) 500 MG 24 hr tablet TAKE 1 TABLET(500 MG) BY MOUTH TWICE DAILY WITH MEALS 180 tablet 0    mirtazapine (REMERON) 15 MG tablet Take 1 tablet (15 mg) by mouth at bedtime for 30 days 30 tablet 0    multivitamin (CENTRUM SILVER) tablet Take 1 tablet by mouth daily      nicotine (NICODERM CQ) 14 MG/24HR 24 hr patch Place 1 patch onto the skin every 24 hours (Patient not taking: Reported on 1/8/2024) 28 patch 0    Nutritional Supplements (ENSURE ENLIVE) LIQD Take 1 Box by mouth 4 times daily 948 mL 0    Omega-3 Fatty Acids (FISH OIL OMEGA-3) 1000 MG CAPS Take 1,000 mg by mouth daily      omeprazole (PRILOSEC) 20 MG DR capsule Take 1 capsule (20 mg) by mouth daily 90 capsule 3    ondansetron (ZOFRAN ODT) 4 MG ODT tab Take 1 tablet (4 mg) by mouth every 6 hours as needed for nausea or vomiting 30 tablet 0    oxyCODONE (ROXICODONE) 5 MG tablet Take 1 tablet (5 mg) by mouth daily as needed for moderate pain (For pain with dressing changes) (Patient not taking: Reported on 1/22/2024) 5 tablet 0    polyethylene glycol (MIRALAX) 17 GM/Dose powder Take 17 g by mouth daily 510 g 1    senna-docusate (SENOKOT-S/PERICOLACE) 8.6-50 MG tablet Take 1 tablet by mouth 2 times daily While taking narcotic pain medications (oxycodone) (Patient not taking: Reported on 1/22/2024) 90 tablet 0    sertraline (ZOLOFT) 25 MG tablet TAKE ONE TABLET BY MOUTH DAILY FOR  "DEPRESSION AND TO HELP APPETITE 90 tablet 1    tiZANidine (ZANAFLEX) 2 MG tablet Take 1 tablet (2 mg) by mouth 3 times daily as needed for muscle spasms 90 tablet 1    traZODone (DESYREL) 50 MG tablet Take 1 tablet (50 mg) by mouth at bedtime 30 tablet 0            Allergies:     Allergies   Allergen Reactions    Lidocaine Other (See Comments)     Lungs filled with fluid. ? Anaphylaxis    Lisinopril Cough     cough    Naltrexone Nausea and Vomiting              ROS:     A focused ROS is negative other than the symptoms noted above in the HPI.           Physical Examination:     VITAL SIGNS: There were no vitals taken for this visit.  BMI: Estimated body mass index is 18.72 kg/m  as calculated from the following:    Height as of 11/16/23: 1.702 m (5' 7\").    Weight as of 12/20/23: 54.2 kg (119 lb 8 oz).    Strength:   Hip adduction Hip Flexion Knee Extension A. Plantarflex A. Dorsiflex   R 4 4 5 5 5   L 5 5 NA NA NA   Sensation:  Sensation to light touch intact in left residual limb.  Decreased sensation in right leg below ankle.  Residual left limb: Left TFA skin intact, some tenderness with resisted range of motion in all directions when palpating femur.  No erythema.  No signs of contractures.         Labs:     Lipids  Recent Labs   Lab Test 07/13/21  0830 09/02/20  0825 08/02/19  0824 04/03/18  0710   CHOL 114 166 103 82   LDL 60 120* 57 37   HDL 36* 27* 30* 27*   TRIG 90 93 79 89     HgbA1c  Recent Labs   Lab Test 10/17/23  1455 06/19/23  1343 02/21/23  1112 09/08/22  1045   A1C 5.5 5.9* 5.5 5.8*            Assessment/Plan:     Darian Engle is a 73 year old male with a relevant PMH of tobacco use disorder, PAD, DM2, and malnutrition who had a left TFA on 12/1/23.    #Left TFA  -------------  Medical Necessity for Prosthesis    Darian Engle is a 73 year old male with with good lower extremity strength and function after having a left transfemoral amputation.  He does have a sedentary lifestyle, but this " is likely to be reversible due to good insight and social support with therapy as he is not significantly limited by his DM2, PAD, and malnutrition comorbidities.  He sustained a left TFA on 12/1/2023 due to dry gangrene.  His post operative course has been uneventful, and his incision is healed.   His prognosis for prosthesis use is excellent.    He is currently utilizing a wheelchair for community locomition and can stand with a walker or other stable support for 3 minutes.  He is able to perform all transfers independently.    His functional level prior to amputation was K2  His expected functional potential: K2, limited community ambulation on level ground    He has expressed a desire to ambulate with a prosthesis, is motivated to go through the fitting process, and will participate in prosthesis training.  The residual limb is in good condition and the incision is well healed with no open wound, drainage, erythema, swelling or odor.   I am recommending a new lower extremity prosthesis be fabricated.   -------------  - ordered left TFA prosthesis, K2  - will wait on therapy until next visit    #Protein-calorie malnutrition  Will likely improve appetite with increased activity.  - Provided education regarding protein and fiber needs and discussed several sources of protein    #Follow up  - in 3 months  - assess prosthesis fitting and need for therapy      I spent a total of 50 minutes face-to-face with Darian Engle during today's office visit. Over 50% of this time was spent counseling the patient and/or coordinating care. See note for details.     10 minutes spent on the date of the encounter doing chart review, history and exam, documentation and further activities as noted above.      Again, thank you for allowing me to participate in the care of your patient.      Sincerely,    José Antonio Camarillo MD

## 2024-02-01 NOTE — PROGRESS NOTES
PM&R Clinic Note     Patient Name: Darian Engle : 1950 Medical Record: [id number]     Chief Complaint: Requires new prosthesis    Level of Amputation:  Left TFA    : Eduardo Evangelista    Date of Surgery: 23    Etiology: Dry gangrene         History of Present Illness:     Darian Engle is a 73 year old male with a PMH of dementia, DM2, CAD, atrial fibrillation, malnutrition, PAD s/p left TFA on 23 secondary to complications of peripheral ischemia.    Prior to his amputation, he stopped driving 2 years ago due to his dementia.  He wasn't walking much, walking maybe 20 steps a day and mostly being sedentary.    He had his first amputation on 10/25/23 for amputation of left toes.  This was complicated by dry gangrene. He was admitted for the amputation on 23 and discharged on 23 with outpatient PT, OT and wound care after declining an ARU stay.  His hospital stay was complicated by insufficient oral intake despite education with poor tolerance of tube feeding - he was referred to nutrition on discharge.    He's been attending outpatient physical therapy once every two weeks, mostly for HEP programming.  He is able to stand on his right leg for 2-3 minutes before getting tired.  He is able to perform all transfers, including car transfers, independently.    He has a 2WW, a 4WW with a seat, and an electric walker, and a wheelchair but is just using his wheelchair.    His  contacted him on 23: he hasn't been using his limb protector because it was disassembled and he declined reassembly, with plans to discuss  use on follow up.  On vascular surgery follow up, smoking cessation continued to be encouraged, though he remained precontemplative.  He was found to have small ulcers on his right toes.  Per vascular surgery, wound completely healed on 24.  He had the staples removed.    He eats 5 cans of soup a day.  His weight is steady at about 119lbs.  His wife  thinks he is eating more than last year.  We had a discussion about protein intake and energy requirements which was well received.    Past Medical History:   Diagnosis Date    Arthritis 2018    joint pain both hands    Diabetes (H)     Heart disease     [place]-angioplasty    Hypertension      Past Surgical History:   Procedure Laterality Date    AMPUTATE LEG ABOVE KNEE Left 2023    Procedure: LEFT AMPUTATION, ABOVE KNEE;  Surgeon: Chuck Felix MBBS;  [facility]    AMPUTATE TOE(S) Left 10/26/2023    Procedure: Amputate toe(s), all transmetatarsal amputation;  Surgeon: Jerardo Thornton MD;  [facility]    ANGIOGRAM Left 10/17/2023    Procedure: Left lower extremity angiogram via Left brachial artery approach;  Surgeon: Chuck Felix MBBS;  [facility]    ANGIOGRAM Left 10/24/2023    Procedure: ANGIOGRAM;  Surgeon: Chuck Felix MBBS;  [facility]    ANGIOPLASTY Left 10/24/2023    Procedure: ANGIOPLASTY, Left Lower Extremity atherectomy;  Surgeon: Chuck Felix MBBS;  [facility]    CARDIAC SURGERY  [address] [place] [address]    ENDARTERECTOMY FEMORAL Left 10/24/2023    Procedure: ENDARTERECTOMY, FEMORAL;  Surgeon: Chuck Felix MBBS;  [facility]    ENHANCE LASER REFRACTIVE BILATERAL EXISTING PT IN PARAMETERS      EYE SURGERY      [facility]    IR OR ANGIOGRAM  10/17/2023    IR OR ANGIOGRAM  10/24/2023    VASCULAR SURGERY  2017    [facility]       Social History     Tobacco Use    Smoking status: Every Day     Packs/day: 0.50     Years: 5.00     Additional pack years: 0.00     Total pack years: 2.50     Types: Cigarettes     Start date: 1968     Last attempt to quit: 7/15/2016     Years since quittin.5     Passive exposure: Never    Smokeless tobacco: Former   Substance Use Topics    Alcohol use: Yes     Comment: binge, two months sober     Family History   Problem Relation Age of Onset     Glaucoma Mother     Macular Degeneration Mother     Macular Degeneration Other     Lung Cancer Brother             Medications:     Current Outpatient Medications   Medication Sig Dispense Refill    ACCU-CHEK SMARTVIEW test strip TEST THREE TIMES DAILY OR AS DIRECTED (Patient not taking: Reported on 1/8/2024) 300 strip 3    acetaminophen (TYLENOL) 325 MG tablet Take 3 tablets (975 mg) by mouth every 8 hours Do this for the first few days postoperatively, as pain improves, can transition to taking 1-2 tabs q4-6 hours as needed. (Patient not taking: Reported on 1/22/2024) 90 tablet 0    amLODIPine (NORVASC) 10 MG tablet Take 1 tablet (10 mg) by mouth daily 90 tablet 0    apixaban ANTICOAGULANT (ELIQUIS ANTICOAGULANT) 5 MG tablet Take 1 tablet (5 mg) by mouth 2 times daily 60 tablet 2    aspirin 81 MG tablet Take 81 mg by mouth daily 30 tablet     atorvastatin (LIPITOR) 40 MG tablet Take 1 tablet (40 mg) by mouth daily For cholesterol. 90 tablet 3    blood glucose monitoring (ACCU-CHEK FASTCLIX) lancets USE TO TEST THREE TIMES DAILY OR AS DIRECTED (Patient not taking: Reported on 1/8/2024) 306 each 3    carvedilol (COREG) 25 MG tablet Take 25 mg by mouth 2 times daily      cholecalciferol ( VITAMIN D3) 50 MCG (2000 UT) CAPS Take 2 capsules by mouth daily      cilostazol (PLETAL) 100 MG tablet Take 100 mg by mouth 2 times daily      famotidine (PEPCID) 10 MG tablet Take 1 tablet (10 mg) by mouth 2 times daily (Patient not taking: Reported on 1/8/2024) 30 tablet 1    ferrous sulfate (FEROSUL) 325 (65 Fe) MG tablet Take 1 tablet (325 mg) by mouth daily (with breakfast) Iron supplement 90 tablet 0    folic acid (FOLVITE) 1 MG tablet TAKE 1 TABLET BY MOUTH EVERY DAY 90 tablet 3    gabapentin (NEURONTIN) 100 MG capsule Take 1 capsule (100 mg) by mouth 3 times daily (Patient taking differently: Take 100 mg by mouth 2 times daily) 180 capsule 1    isosorbide mononitrate (IMDUR) 30 MG 24 hr tablet Take 1 tablet (30 mg) by  mouth daily 180 tablet 1    losartan (COZAAR) 50 MG tablet Take 2 tablets (100 mg) by mouth daily 90 tablet 2    melatonin 1 MG TABS tablet Take 1 tablet (1 mg) by mouth nightly as needed for sleep 30 tablet 1    metFORMIN (GLUCOPHAGE XR) 500 MG 24 hr tablet TAKE 1 TABLET(500 MG) BY MOUTH TWICE DAILY WITH MEALS 180 tablet 0    mirtazapine (REMERON) 15 MG tablet Take 1 tablet (15 mg) by mouth at bedtime for 30 days 30 tablet 0    multivitamin (CENTRUM SILVER) tablet Take 1 tablet by mouth daily      nicotine (NICODERM CQ) 14 MG/24HR 24 hr patch Place 1 patch onto the skin every 24 hours (Patient not taking: Reported on 1/8/2024) 28 patch 0    Nutritional Supplements (ENSURE ENLIVE) LIQD Take 1 Box by mouth 4 times daily 948 mL 0    Omega-3 Fatty Acids (FISH OIL OMEGA-3) 1000 MG CAPS Take 1,000 mg by mouth daily      omeprazole (PRILOSEC) 20 MG DR capsule Take 1 capsule (20 mg) by mouth daily 90 capsule 3    ondansetron (ZOFRAN ODT) 4 MG ODT tab Take 1 tablet (4 mg) by mouth every 6 hours as needed for nausea or vomiting 30 tablet 0    oxyCODONE (ROXICODONE) 5 MG tablet Take 1 tablet (5 mg) by mouth daily as needed for moderate pain (For pain with dressing changes) (Patient not taking: Reported on 1/22/2024) 5 tablet 0    polyethylene glycol (MIRALAX) 17 GM/Dose powder Take 17 g by mouth daily 510 g 1    senna-docusate (SENOKOT-S/PERICOLACE) 8.6-50 MG tablet Take 1 tablet by mouth 2 times daily While taking narcotic pain medications (oxycodone) (Patient not taking: Reported on 1/22/2024) 90 tablet 0    sertraline (ZOLOFT) 25 MG tablet TAKE ONE TABLET BY MOUTH DAILY FOR DEPRESSION AND TO HELP APPETITE 90 tablet 1    tiZANidine (ZANAFLEX) 2 MG tablet Take 1 tablet (2 mg) by mouth 3 times daily as needed for muscle spasms 90 tablet 1    traZODone (DESYREL) 50 MG tablet Take 1 tablet (50 mg) by mouth at bedtime 30 tablet 0            Allergies:     Allergies   Allergen Reactions    Lidocaine Other (See Comments)     Lungs  "filled with fluid. ? Anaphylaxis    Lisinopril Cough     cough    Naltrexone Nausea and Vomiting              ROS:     A focused ROS is negative other than the symptoms noted above in the HPI.           Physical Examination:     VITAL SIGNS: There were no vitals taken for this visit.  BMI: Estimated body mass index is 18.72 kg/m  as calculated from the following:    Height as of 11/16/23: 1.702 m (5' 7\").    Weight as of 12/20/23: 54.2 kg (119 lb 8 oz).    Strength:   Hip adduction Hip Flexion Knee Extension A. Plantarflex A. Dorsiflex   R 4 4 5 5 5   L 5 5 NA NA NA   Sensation:  Sensation to light touch intact in left residual limb.  Decreased sensation in right leg below ankle.  Residual left limb: Left TFA skin intact, some tenderness with resisted range of motion in all directions when palpating femur.  No erythema.  No signs of contractures.         Labs:     Lipids  Recent Labs   Lab Test 07/13/21  0830 09/02/20  0825 08/02/19  0824 04/03/18  0710   CHOL 114 166 103 82   LDL 60 120* 57 37   HDL 36* 27* 30* 27*   TRIG 90 93 79 89     HgbA1c  Recent Labs   Lab Test 10/17/23  1455 06/19/23  1343 02/21/23  1112 09/08/22  1045   A1C 5.5 5.9* 5.5 5.8*            Assessment/Plan:     Darian Engle is a 73 year old male with a relevant PMH of tobacco use disorder, PAD, DM2, and malnutrition who had a left TFA on 12/1/23.    #Left TFA  -------------  Medical Necessity for Prosthesis    Darian Engle is a 73 year old male with with good lower extremity strength and function after having a left transfemoral amputation.  He does have a sedentary lifestyle, but this is likely to be reversible due to good insight and social support with therapy as he is not significantly limited by his DM2, PAD, and malnutrition comorbidities.  He sustained a left TFA on 12/1/2023 due to dry gangrene.  His post operative course has been uneventful, and his incision is healed.   His prognosis for prosthesis use is excellent.    He is " currently utilizing a wheelchair for community locomition and can stand with a walker or other stable support for 3 minutes.  He is able to perform all transfers independently.    His functional level prior to amputation was K2  His expected functional potential: K2, limited community ambulation on level ground    He has expressed a desire to ambulate with a prosthesis, is motivated to go through the fitting process, and will participate in prosthesis training.  The residual limb is in good condition and the incision is well healed with no open wound, drainage, erythema, swelling or odor.   I am recommending a new lower extremity prosthesis be fabricated.   -------------  - ordered left TFA prosthesis, K2  - will wait on therapy until next visit    #Protein-calorie malnutrition  Will likely improve appetite with increased activity.  - Provided education regarding protein and fiber needs and discussed several sources of protein    #Follow up  - in 3 months  - assess prosthesis fitting and need for therapy    José Antonio Camarillo MD  Physical Medicine & Rehabilitation    I spent a total of 50 minutes face-to-face with Darian Engle during today's office visit. Over 50% of this time was spent counseling the patient and/or coordinating care. See note for details.     10 minutes spent on the date of the encounter doing chart review, history and exam, documentation and further activities as noted above.

## 2024-02-07 NOTE — TELEPHONE ENCOUNTER
We can discuss at follow-up or do a telephone visit before that. Does patient know the name of the medication?

## 2024-02-12 NOTE — TELEPHONE ENCOUNTER
LVM for pt, and pt's spouse to call back for scheduling a video visit with Dr pak at [facility] (video visit) also sent my-chart message.

## 2024-02-21 NOTE — TELEPHONE ENCOUNTER
Mds recommendations given to [facility].    Dina Lin, RN  Cardiology Care Coordinator  [facility]  [phone] option 1

## 2024-02-21 NOTE — TELEPHONE ENCOUNTER
Dr. Mcdaniel, this patient has not seen you in 2 years, follow-up as needed.  Eliquis Rx is under your name.  Hx of PAfib and CVA.  mN is requesting holding orders.  Please advise.    Dina Lin RN  Cardiology Care Coordinator  [facility]  [phone] option 1

## 2024-02-21 NOTE — TELEPHONE ENCOUNTER
Health Call Center    Phone Message    May a detailed message be left on voicemail: yes     Reason for Call: Other: Kimberly from [facility] called requesting a 3 day hold on Frandy's Eliquis, bridging as needed, prior to his EUS Upper on March 13th. If unable to do a full 3 day hold, [facility] is requesting a creatinine lab within 90 days of the procedure. Please reach out to [facility] to provide approval. Thank you!     Action Taken: Other: Cardiology    Travel Screening: Not Applicable    Thank you!  [facility]

## 2024-02-21 NOTE — TELEPHONE ENCOUNTER
Can hold Eliquis for 3 days prior to procedure.  Patient should start Eliquis back after the procedure ideally on the same day of the procedure.  DR BOYD

## 2024-02-27 NOTE — PROGRESS NOTES
Preoperative Evaluation  [facility]  [address] [facility] [address]  Phone: [phone]  Primary Provider: Stan Shell  Pre-op Performing Provider: STAN SHELL  Feb 27, 2024       Frandy is a 73 year old, presenting for the following:  Pre-Op Exam  Pancreatic mass. Appetite improving and 10 lbs weight gain. Here with wife.   Slepp and emotionally doing ok.   No sugars <50 or LOSS OF CONSCIENCE. New artificial limb tomorrow. No chest pain or shortness of breath. No nausea, vomiting or diarrhea or black/bloody stools. No abdominal pain. No urine changes acutally.    Off insulin.   Will  hold eliquis  3 days before.     History CAD s/p CAB w/ LIMA and L GSV (2004), HTN, HDL, atrial fibrillation on AC, PAD, anemia, dry gangrene, benign cystic neoplasm of the pancreas, severe PVD, TALA not on CPAP, dementia and DM II       2/27/2024    10:46 AM   Additional Questions   Roomed by Zayra   Accompanied by Wife     Surgical Information  Surgery/Procedure: Endoscopic Ultrasound  Surgery Location: [facility]  Surgeon: Chris Reveles MD  Surgery Date: 03/13/2024  Time of Surgery: TBD  Where patient plans to recover: At home with family  Fax number for surgical facility: [phone]      ASSESSMENT / PLAN:  (Z01.818) Pre-operative general physical examination  (primary encounter diagnosis)  Comment: generally normal exam. Encouraged by weight gain  Plan: ok for surgery. Hold asa/nsaids/ALCOHOL one week before and eliquis 3 days before    (K86.89) Pancreatic mass  Plan: per GI. Weight gain is encouraging    (E11.9) Type 2 diabetes mellitus without retinopathy (H)  Plan: Hemoglobin A1c, Renal panel        Await labs and continue metformin. Increase dosage if needed.   Recheck in 6 months      (I25.10) CAD, multiple vessel  Comment: stable  Plan: chest pain or shortness of breath to er. Recheck in 6 months        Subjective       HPI related to upcoming procedure:  pancreatic mass        2/27/2024    10:21 AM   Preop Questions   1. Have you ever had a heart attack or stroke? UNKNOWN -    2. Have you ever had surgery on your heart or blood vessels, such as a stent placement, a coronary artery bypass, or surgery on an artery in your head, neck, heart, or legs? YES -    3. Do you have chest pain with activity? No   4. Do you have a history of  heart failure? YES -    5. Do you currently have a cold, bronchitis or symptoms of other infection? No   6. Do you have a cough, shortness of breath, or wheezing? No   7. Do you or anyone in your family have previous history of blood clots? No   8. Do you or does anyone in your family have a serious bleeding problem such as prolonged bleeding following surgeries or cuts? No   9. Have you ever had problems with anemia or been told to take iron pills? No   10. Have you had any abnormal blood loss such as black, tarry or bloody stools? No   11. Have you ever had a blood transfusion? YES -    11a. Have you ever had a transfusion reaction? No   12. Are you willing to have a blood transfusion if it is medically needed before, during, or after your surgery? Yes   13. Have you or any of your relatives ever had problems with anesthesia? UNKNOWN -    14. Do you have sleep apnea, excessive snoring or daytime drowsiness? YES -    14a. Do you have a CPAP machine? Yes   15. Do you have any artifical heart valves or other implanted medical devices like a pacemaker, defibrillator, or continuous glucose monitor? No   16. Do you have artificial joints? No   17. Are you allergic to latex? No       Health Care Directive  Patient has a Health Care Directive on file      Preoperative Review of    reviewed - controlled substances reflected in medication list.          Patient Active Problem List    Diagnosis Date Noted    Moderate protein-calorie malnutrition (H24) 02/01/2024     Priority: Medium    Wound infection 11/18/2023     Priority: Medium     Vasculopathy 11/05/2023     Priority: Medium    Dry gangrene (H) 10/17/2023     Priority: Medium    Cyst of pancreas 06/19/2023     Priority: Medium    Dementia without behavioral disturbance (H) 02/21/2023     Priority: Medium    PAF (paroxysmal atrial fibrillation) (H) 02/21/2023     Priority: Medium    Orthostatic hypotension 05/23/2022     Priority: Medium    Syncope and collapse 05/22/2022     Priority: Medium    Angina pectoris (H24) 05/13/2022     Priority: Medium    Obstructive sleep apnea syndrome 01/20/2021     Priority: Medium     Seen at [facility]; diagnosed with moderate TALA by Dr. Herve Louise; Sept 2019.      Type 2 diabetes mellitus without retinopathy (H) 06/27/2018     Priority: Medium    Bilateral incipient cataracts 06/27/2018     Priority: Medium    PAD (peripheral artery disease) (H24) 12/18/2017     Priority: Medium    Claudication in peripheral vascular disease (H24) 07/20/2017     Priority: Medium    Acute ST elevation myocardial infarction (STEMI) due to occlusion of right coronary artery (H) 05/08/2017     Priority: Medium    CAD, multiple vessel 01/27/2017     Priority: Medium    Essential hypertension with goal blood pressure less than 140/90 01/27/2017     Priority: Medium    Type 2 diabetes mellitus with complication, with long-term current use of insulin (H) 01/27/2017     Priority: Medium    Hyperlipidemia LDL goal <100 01/27/2017     Priority: Medium    Gastroesophageal reflux disease without esophagitis 01/27/2017     Priority: Medium    Callahan's esophagus 09/12/2016     Priority: Medium    Binge eating disorder 02/12/2016     Priority: Medium     Formatting of this note might be different from the original.  [place] intake 2/2016      Periodic limb movement disorder (PLMD) 12/03/2014     Priority: Medium     Formatting of this note might be different from the original.  Per [facility]  PLMs and may benefit from further evaluation with blood tests for Ferritin  and Iron levels.      Abnormal electrocardiography 05/16/2014     Priority: Medium    Dyslipidemia 03/21/2011     Priority: Medium     Formatting of this note might be different from the original.  05/07/13, Lipids:  TC 99, TG 89, LDL 58, HDL 23      Alcohol abuse 11/13/2007     Priority: Medium     Formatting of this note might be different from the original.  Previous treatment  ; Alcohol Abuse, episodic      Microalbuminuria 02/07/2007     Priority: Medium    Postsurgical aortocoronary bypass status 04/23/2004     Priority: Medium     Formatting of this note might be different from the original.  4/04 Dr. [facility]  ; AORTOCORONARY BYPASS STATUS(aka CABG)      Reflux esophagitis 09/17/2003     Priority: Medium      Past Medical History:   Diagnosis Date    Arthritis March 2018    joint pain both hands    Diabetes (H)     Heart disease 1991    [place]-angioplasty    Hypertension      Past Surgical History:   Procedure Laterality Date    AMPUTATE LEG ABOVE KNEE Left 12/1/2023    Procedure: LEFT AMPUTATION, ABOVE KNEE;  Surgeon: Chuck Felix MBBS;  Location: [facility] OR    AMPUTATE TOE(S) Left 10/26/2023    Procedure: Amputate toe(s), all transmetatarsal amputation;  Surgeon: Jerardo Thornton MD;  Location: [facility] OR    ANGIOGRAM Left 10/17/2023    Procedure: Left lower extremity angiogram via Left brachial artery approach;  Surgeon: Chuck Felix MBBS;  Location: [facility]    ANGIOGRAM Left 10/24/2023    Procedure: ANGIOGRAM;  Surgeon: Chuck Felix MBBS;  Location: [facility]    ANGIOPLASTY Left 10/24/2023    Procedure: ANGIOPLASTY, Left Lower Extremity atherectomy;  Surgeon: Chuck Felix MBBS;  Location: [facility] OR    CARDIAC SURGERY  [address] [place] [address]    ENDARTERECTOMY FEMORAL Left 10/24/2023    Procedure: ENDARTERECTOMY, FEMORAL;  Surgeon: Chuck Felix MBBS;  Location: [facility] OR    ENHANCE LASER REFRACTIVE BILATERAL EXISTING PT IN PARAMETERS  2000    EYE SURGERY  2000     [facility]    IR OR ANGIOGRAM  10/17/2023    IR OR ANGIOGRAM  10/24/2023    VASCULAR SURGERY  2017    [facility]     Current Outpatient Medications   Medication Sig Dispense Refill    amLODIPine (NORVASC) 10 MG tablet Take 1 tablet (10 mg) by mouth daily 90 tablet 0    apixaban ANTICOAGULANT (ELIQUIS ANTICOAGULANT) 5 MG tablet Take 1 tablet (5 mg) by mouth 2 times daily 60 tablet 2    aspirin 81 MG tablet Take 81 mg by mouth daily 30 tablet     atorvastatin (LIPITOR) 40 MG tablet Take 1 tablet (40 mg) by mouth daily For cholesterol. 90 tablet 3    carvedilol (COREG) 25 MG tablet Take 25 mg by mouth 2 times daily      cholecalciferol ( VITAMIN D3) 50 MCG (2000 UT) CAPS Take 2 capsules by mouth daily      cilostazol (PLETAL) 100 MG tablet Take 100 mg by mouth 2 times daily      ferrous sulfate (FEROSUL) 325 (65 Fe) MG tablet Take 1 tablet (325 mg) by mouth daily (with breakfast) Iron supplement 90 tablet 0    folic acid (FOLVITE) 1 MG tablet TAKE 1 TABLET BY MOUTH EVERY DAY 90 tablet 3    gabapentin (NEURONTIN) 100 MG capsule Take 1 capsule (100 mg) by mouth 3 times daily (Patient taking differently: Take 100 mg by mouth 2 times daily) 180 capsule 1    isosorbide mononitrate (IMDUR) 30 MG 24 hr tablet Take 1 tablet (30 mg) by mouth daily 180 tablet 1    losartan (COZAAR) 50 MG tablet Take 2 tablets (100 mg) by mouth daily 90 tablet 2    melatonin 1 MG TABS tablet Take 1 tablet (1 mg) by mouth nightly as needed for sleep 30 tablet 1    metFORMIN (GLUCOPHAGE XR) 500 MG 24 hr tablet TAKE 1 TABLET(500 MG) BY MOUTH TWICE DAILY WITH MEALS 180 tablet 0    multivitamin (CENTRUM SILVER) tablet Take 1 tablet by mouth daily      Nutritional Supplements (ENSURE ENLIVE) LIQD Take 1 Box by mouth 4 times daily 948 mL 0    Omega-3 Fatty Acids (FISH OIL OMEGA-3) 1000 MG CAPS Take 1,000 mg by mouth daily      omeprazole (PRILOSEC) 20 MG DR capsule Take 1 capsule (20 mg) by mouth daily 90 capsule 3     ondansetron (ZOFRAN ODT) 4 MG ODT tab Take 1 tablet (4 mg) by mouth every 6 hours as needed for nausea or vomiting 30 tablet 0    polyethylene glycol (MIRALAX) 17 GM/Dose powder Take 17 g by mouth daily 510 g 1    sertraline (ZOLOFT) 25 MG tablet TAKE ONE TABLET BY MOUTH DAILY FOR DEPRESSION AND TO HELP APPETITE 90 tablet 1    tiZANidine (ZANAFLEX) 2 MG tablet Take 1 tablet (2 mg) by mouth 3 times daily as needed for muscle spasms 90 tablet 1    traZODone (DESYREL) 50 MG tablet Take 1 tablet (50 mg) by mouth at bedtime 30 tablet 0    ACCU-CHEK SMARTVIEW test strip TEST THREE TIMES DAILY OR AS DIRECTED (Patient not taking: Reported on 1/8/2024) 300 strip 3    acetaminophen (TYLENOL) 325 MG tablet Take 3 tablets (975 mg) by mouth every 8 hours Do this for the first few days postoperatively, as pain improves, can transition to taking 1-2 tabs q4-6 hours as needed. (Patient not taking: Reported on 1/22/2024) 90 tablet 0    blood glucose monitoring (ACCU-CHEK FASTCLIX) lancets USE TO TEST THREE TIMES DAILY OR AS DIRECTED (Patient not taking: Reported on 1/8/2024) 306 each 3    famotidine (PEPCID) 10 MG tablet Take 1 tablet (10 mg) by mouth 2 times daily (Patient not taking: Reported on 1/8/2024) 30 tablet 1    mirtazapine (REMERON) 15 MG tablet Take 1 tablet (15 mg) by mouth at bedtime for 30 days 30 tablet 0    nicotine (NICODERM CQ) 14 MG/24HR 24 hr patch Place 1 patch onto the skin every 24 hours (Patient not taking: Reported on 1/8/2024) 28 patch 0    oxyCODONE (ROXICODONE) 5 MG tablet Take 1 tablet (5 mg) by mouth daily as needed for moderate pain (For pain with dressing changes) (Patient not taking: Reported on 1/22/2024) 5 tablet 0    senna-docusate (SENOKOT-S/PERICOLACE) 8.6-50 MG tablet Take 1 tablet by mouth 2 times daily While taking narcotic pain medications (oxycodone) (Patient not taking: Reported on 1/22/2024) 90 tablet 0       Allergies   Allergen Reactions    Lidocaine Other (See Comments)     Lungs  "filled with fluid. ? Anaphylaxis    Lisinopril Cough     cough    Naltrexone Nausea and Vomiting        Social History     Tobacco Use    Smoking status: Every Day     Packs/day: 0.50     Years: 5.00     Additional pack years: 0.00     Total pack years: 2.50     Types: Cigarettes     Start date: 1968     Last attempt to quit: 7/15/2016     Years since quittin.6     Passive exposure: Never    Smokeless tobacco: Former   Substance Use Topics    Alcohol use: Yes     Comment: binge, two months sober     History   Drug Use    Types: Marijuana     Comment: uses Marijuana for pain         Review of Systems      Objective    /68   Pulse 71   Temp 97  F (36.1  C) (Tympanic)   Resp 22   Ht 1.702 m (5' 7.01\")   Wt 58.9 kg (129 lb 12.8 oz)   SpO2 100%   BMI 20.32 kg/m     Estimated body mass index is 20.32 kg/m  as calculated from the following:    Height as of this encounter: 1.702 m (5' 7.01\").    Weight as of this encounter: 58.9 kg (129 lb 12.8 oz).  Physical Exam  GENERAL: alert and no distress  EYES: Eyes grossly normal to inspection, PERRL and conjunctivae and sclerae normal  NECK: no adenopathy, no asymmetry, masses, or scars  RESP: lungs clear to auscultation - no rales, rhonchi or wheezes  CV: regular rate and rhythm, normal S1 S2, no S3 or S4, no murmur, click or rub, no peripheral edema   ABDOMEN: soft, nontender, no hepatosplenomegaly, no masses and bowel sounds normal  PSYCH: mentation appears normal, affect normal/bright    Recent Labs   Lab Test 23  0543 23  0604 23  0937 23  0612 23  0554 23  2203 10/18/23  0541 10/17/23  1455 10/17/23  1131 23  1343   HGB 7.9* 8.0*   < >  --    < > 14.5   < >  --    < >  --     364   < >  --    < > 248   < >  --    < >  --    INR  --   --   --  1.18*  --  1.68*   < >  --   --   --     140   < > 139   < > 137   < >  --    < >  --    POTASSIUM 4.1 4.3   < > 5.2   < > 4.4   < >  --    < >  --    CR 0.53* " 0.52*   < > 0.58*   < > 0.89   < > 0.65*   < >  --    A1C  --   --   --   --   --   --   --  5.5  --  5.9*    < > = values in this interval not displayed.        Diagnostics  Renal panel/hgba1c  See EKG/echo from fall 2023.    Revised Cardiac Risk Index (RCRI)  The patient has the following serious cardiovascular risks for perioperative complications:   - Coronary Artery Disease (MI, positive stress test, angina, Qs on EKG) = 1 point   - Diabetes Mellitus (on Insulin) = 1 point     RCRI Interpretation: 1 point: Class II (low risk - 0.9% complication rate)         Signed Electronically by: Marcos Gonzalez MD  Copy of this evaluation report is provided to requesting physician.

## 2024-03-06 NOTE — PROGRESS NOTES
S: Pt seen at [facility] with his wife and eager to get the initial prosthesis and supplies as listed on the delivery slip. O: I see the prosthesis fit well with 0 ply sock today and the height and alignment was good. Instructions were given and seemed to be understood. P: FU PRN. G: The goal was accomplished.   Electronically signed Eduardo Evangelista CPO, LPO.

## 2024-03-18 NOTE — TELEPHONE ENCOUNTER
Patient Quality Outreach    Patient is due for the following:   Diabetes -  Eye Exam  Colon Cancer Screening  Physical Annual Wellness Visit      Topic Date Due    COVID-19 Vaccine (4 - 2023-24 season) 09/01/2023       Next Steps:   Schedule a Annual Wellness Visit, Diabetic eye exam  & Colon cancer screen    Type of outreach:    Sent [employer] message.      Questions for provider review:    None           Ami Go CMA

## 2024-04-24 NOTE — TELEPHONE ENCOUNTER
Called and spoke to [facility]. They have the order, they are working on in now, and will reach out to the family.     The phone number on the referral is wrong, they had me call, [phone].Mari Price [facility]

## 2024-04-24 NOTE — TELEPHONE ENCOUNTER
Can we make sure I placed the right hospice order and can we get the wife the # to have if they don't call. Thanks. Marcos Gonzalez MD

## 2024-04-26 NOTE — TELEPHONE ENCOUNTER
All I have is a video visit today sorry I don't think that will work for his serious symptoms. Can see if another provider has opening or urgent care or er. Marcos Gonzalez MD

## 2024-05-03 NOTE — TELEPHONE ENCOUNTER
Reason for Call:  Form, our goal is to have forms completed with 72 hours, however, some forms may require a visit or additional information.    Type of letter, form or note:  Home Health Certification    Who is the form from?: Home care    Where did the form come from: form was faxed in    What clinic location was the form placed at?: [place]    Where the form was placed: Given to physician    What number is listed as a contact on the form?: [facility] phone # [phone]       Additional comments: [facility] forms placed in providers folder to complete    Call taken on 5/3/2024 at 9:09 AM by Kika Ortzi

## 2024-05-10 NOTE — PROGRESS NOTES
ASSESSMENT / PLAN:  (R60.0) Peripheral edema  (primary encounter diagnosis)  Comment: needs help  Plan: Comprehensive metabolic panel, furosemide         (LASIX) 20 MG tablet        Prn lasix. More potassium in diet. Continue monitor/elevate.     (E11.9) Type 2 diabetes mellitus without retinopathy (H)  Plan: Comprehensive metabolic panel, Hemoglobin A1c        Avoid labs. Continue diet.     (I10) Essential hypertension with goal blood pressure less than 140/90  Comment: a little high  Plan: Comprehensive metabolic panel        lower    (I73.9) Claudication in peripheral vascular disease (H24)  Comment: severe  Plan: on hospice. Likely will loss right leg too. Currently comfortable    (F03.C18) Severe dementia with other behavioral disturbance, unspecified dementia type (H)  Comment: worsening with age but good support from wife  Plan: continue meds.      (G47.09) Other insomnia  Comment: needs help  Plan: hydrOXYzine lizzie (VISTARIL) 25 MG capsule        Reveiwed risks and side effects of medication  Thc products and over the counter med discussed. Outside/sunlight time should help    (E11.621,  L97.512) Diabetic ulcer of right foot associated with type 2 diabetes mellitus, with fat layer exposed, unspecified part of foot (H)    Plan: cephALEXin (KEFLEX) 500 MG capsule        Add bactrim? If not improving. Continue cares      The longitudinal plan of care for the diagnosis(es)/condition(s) as documented were addressed during this visit. Due to the added complexity in care, I will continue to support Frandy in the subsequent management and with ongoing continuity of care.      Subjective   Frandy is a 74 year old, presenting for the following health issues:  In hospice now. Leg wound and edema issues.   History CAD s/p CAB w/ LIMA and L GSV (2004), HTN, HDL, atrial fibrillation on AC, PAD, anemia, dry gangrene, benign cystic neoplasm of the pancreas, severe PVD, TALA not on CPAP, dementia and DM II   Home RN once/week.   No  pain currently and not taking meds. On marijauna - smoking - friends. Some loose stools. Darker urine and limited urine output. Water 2 glasses/day. Milk and juice. No fiber supplements. Taking MVI. Watching a lot tv. Needs outside.   No fevers or chills. Breathing overall stable. No cough.   No ALCOHOL. Sores right leg. S/p amputation of left.   Betadine and antibiotic ointment but no oral antibiotic. Changing dressing daily.   Wound Check and Edema      5/10/2024    11:36 AM   Additional Questions   Roomed by BELLA Go CMA     Via the [employer] Maintenance questionnaire, the patient has reported the following services have been completed -Eye Exam, this information has been sent to the abstraction team.  Wound Check    History of Present Illness       Reason for visit:  Fluids draining from leg  Symptom onset:  3-7 days ago  Symptoms include:  Clear fluids draining from leg  Symptom intensity:  Severe  Symptom progression:  Worsening  Had these symptoms before:  No  What makes it worse:  No  What makes it better:  No    He eats 2-3 servings of fruits and vegetables daily.He consumes 2 sweetened beverage(s) daily.He exercises with enough effort to increase his heart rate 9 or less minutes per day.  He exercises with enough effort to increase his heart rate 3 or less days per week. He is missing 2 dose(s) of medications per week.  He is not taking prescribed medications regularly due to remembering to take.           Objective    /87   Pulse (!) 121   Temp 96.9  F (36.1  C) (Oral)   Wt 69.9 kg (154 lb 1.6 oz)   SpO2 99%   BMI 24.13 kg/m     Physical Exam   GENERAL: alert and no distress  NECK: no adenopathy, no asymmetry, masses, or scars  RESP: lungs clear to auscultation - no rales, rhonchi or wheezes  CV: regular rate and rhythm, normal S1 S2, no S3 or S4, no murmur, click or rub, no peripheral edema   ABDOMEN: soft, nontender, no hepatosplenomegaly, no masses and bowel sounds normal  MS: +1 edema right  leg  SKIN: multiple shallow oozing ulcerations  PSYCH: cheerful and cooperative. Poor historian. Here with wife            Signed Electronically by: Marcos Gonzalez MD

## 2024-05-23 NOTE — PROGRESS NOTES
ASSESSMENT / PLAN:  (I99.9) Vasculopathy  Comment: progressive. In hospice. Patient non-compliant with meds. Will likely lose leg and has worsening dementia  Plan: continue hospice cares/keflex/lasix and increase protein in diet - shakes/protein powder/meat.     The longitudinal plan of care for the diagnosis(es)/condition(s) as documented were addressed during this visit. Due to the added complexity in care, I will continue to support Frandy in the subsequent management and with ongoing continuity of care.    (G47.09) Other insomnia  Comment: needs help  Plan: diazepam (VALIUM) 2 MG tablet        Prn vistaril or over the counter sleep aides too. Xanax helpful in past. Avoid with narcotics. Reveiwed risks and side effects of medication  e      Subjective   Frandy is a 74 year old, presenting for the following health issues:  Oozing leg.   Patient given keflex 2 weeks ago for diabetic ulcers, prn lasix and vistaril for sleep. Cmp/hgba1c ok but lower protein/albumin.   In hospice now. Leg wound and edema issues.   History CAD s/p CAB w/ LIMA and L GSV (2004), HTN, HDL, atrial fibrillation on AC, PAD, anemia, dry gangrene, benign cystic neoplasm of the pancreas, severe PVD, TALA not on CPAP, dementia and DM II   Home RN once/week  Missing pills  - including vistaril and lasix. Has alprazam.   Leg about same. Ensure shakes at home.   No shortness of breath. Anxiety worse. Sore on right side. No cough. No fevers. No nausea, vomiting or diarrhea.      Leg Problem (Right leg oozing) and Diabetes      5/23/2024     2:21 PM   Additional Questions   Roomed by BELLA Go CMA   Accompanied by Wife     History of Present Illness       Diabetes:   He presents for follow up of diabetes.    He is not checking blood glucose.        He is concerned about frequent infections.   He is having redness, sores, or blisters on feet and none of these symptoms. He is not experiencing numbness or burning in feet, excessive thirst, blurry vision, weight  changes or redness, sores or blisters on feet.           Vascular Disease:  He presents for follow up of vascular disease.     He never takes nitroglycerin. He takes daily aspirin.    He eats 2-3 servings of fruits and vegetables daily.He consumes 4 sweetened beverage(s) daily.He exercises with enough effort to increase his heart rate 9 or less minutes per day.  He exercises with enough effort to increase his heart rate 3 or less days per week. He is missing 2 dose(s) of medications per week.  He is not taking prescribed medications regularly due to remembering to take.           Objective    /78   Pulse 118   Wt 81.3 kg (179 lb 3.2 oz)   SpO2 99%   BMI 28.06 kg/m    Body mass index is 28.06 kg/m .  Physical Exam   GENERAL: alert and no distress  EYES: Eyes grossly normal to inspection, PERRL and conjunctivae and sclerae normal  NECK: no adenopathy, no asymmetry, masses, or scars  RESP: lungs clear to auscultation - no rales, rhonchi or wheezes  CV: regular rate and rhythm, normal S1 S2, no S3 or S4, no murmur, click or rub, no peripheral edema   MS: edema right LOWER EXTREMETIES and relatively cold foot with multiple small ulcerations.   PSYCH: mildly anxious. Poor historian            Signed Electronically by: Marcos Gonzalez MD

## 2024-06-04 NOTE — PROGRESS NOTES
Frandy is a 74 year old who is being evaluated via a billable video visit.          ASSESSMENT / PLAN:  (T14.8XXA,  L08.9) Wound infection  (primary encounter diagnosis)  Comment: worse. Likely need amputation to cure  Plan: sulfamethoxazole-trimethoprim (BACTRIM DS)         800-160 MG tablet        On hospice and history dementia. Wife requesting additional antibiotic.  History non-compliance with taking pills. Continue monitor and push protein in diet. No pain. Reveiwed risks and side effects of medication  Call/email with questions/concerns      (I99.9) Vasculopathy    Plan: sulfamethoxazole-trimethoprim (BACTRIM DS)         800-160 MG tablet        Severe.     (G47.00) Insomnia, unspecified type  Comment: valium helpful  Plan: prn.       Subjective   Frandy is a 74 year old, presenting for the following health issues:  Follow-up - given valium prn sleep and taking keflex/lasix. Advised increase protein in diet.   Valium helpful for sleep. One pill doing ok.   Eating meat.   In hospice now. Leg wound and edema issues.   History CAD s/p CAB w/ LIMA and L GSV (2004), HTN, HDL, atrial fibrillation on AC, PAD, anemia, dry gangrene, benign cystic neoplasm of the pancreas, severe PVD, TALA not on CPAP, dementia and DM II   Home RN once/week - back tomorrow.  Worsening infection.  No pain.   Recheck Medication      6/4/2024     1:56 PM   Additional Questions   Roomed by BELLA Go CMA   Accompanied by Wife     Video Start Time: 2:14 PM    History of Present Illness       He eats 2-3 servings of fruits and vegetables daily.He consumes 3 sweetened beverage(s) daily.He exercises with enough effort to increase his heart rate 9 or less minutes per day.  He exercises with enough effort to increase his heart rate 3 or less days per week. He is missing 3 dose(s) of medications per week.  He is not taking prescribed medications regularly due to remembering to take.                   Objective           Vitals:  No vitals were obtained  today due to virtual visit.    Physical Exam   GENERAL: alert and no distress  EYES: Eyes grossly normal to inspection.  No discharge or erythema, or obvious scleral/conjunctival abnormalities.  RESP: No audible wheeze, cough, or visible cyanosis.    SKIN: grayish discoloration and diffuse small ulcerations on toes/foot  PSYCH:cheerful.           Video-Visit Details    Type of service:  Video Visit   Video End Time:2:30 PM  Originating Location (pt. Location): Home    Distant Location (provider location):  On-site  Platform used for Video Visit: Juan  Signed Electronically by: Marcos Gonzalez MD

## 2024-12-05 NOTE — PROGRESS NOTES
Therapy: IV abx     Insurance: [employer]     Pt does not have IV ABX coverage in the home with their [employer] Medicare plan. Drug would be billed to the part D (patient responsible for copay per dispense) and supplies will be self pay. Based on Cefazolin 2gm q8h, total cost is approximately $31.75/day for drug and supplies.     Nursing is covered if patient is homebound (outside agency would be utilized as I is not Medicare contracted). If not homebound there is no coverage and I can see patient if patient agrees to self pay $90 per visit.    In reference to admission on 11/5/23 to check IV abx coverage     Please contact Intake with any questions, [phone] or In Basket pool,  [facility] ([phone]).   
done

## 2025-01-09 NOTE — PLAN OF CARE
Assessment & Plan:    Acute tonsillitis, unspecified etiology  -     POCT Strep A, Molecular  -     CULTURE, RESPIRATORY  - THROAT    POC strep and throat culture performed. Will call with results.  Advised to continue symptomatic care with chloraseptic spray and throat lozenges.     Class 3 severe obesity due to excess calories with serious comorbidity and body mass index (BMI) of 40.0 to 44.9 in adult    History of prediabetes  -     Comprehensive Metabolic Panel; Future; Expected date: 01/09/2025  -     Hemoglobin A1C; Future; Expected date: 01/09/2025    A1c now in normal range.  Continue GLP-1 agonist.   Repeat labs in 6 mo.    Hyperlipidemia, unspecified hyperlipidemia type  -     Comprehensive Metabolic Panel; Future; Expected date: 01/09/2025  -     Lipid Panel; Future; Expected date: 01/09/2025    Controlled. Continue current therapy.     Anemia, unspecified type  -     CBC Auto Differential; Future; Expected date: 01/09/2025    Mild decline in the hemoglobin. Advised iron supplementation.    History of Graves' disease  Postablative hypothyroidism  Euthyrox recently increased to 150 mg by Endo. Thyroid studies being monitored.     Advised influenza and COVID vaccines when he is feeling better.     Follow-up: Follow up in about 6 months (around 7/9/2025).  ______________________________________________________________________    Chief Complaint  Chief Complaint   Patient presents with    Health Maintenance       HPI  Francisca Rm is a 26 y.o. male with medical diagnoses as listed in the medical history and problem list that presents to the office with his mother and brother to follow up on his chronic conditions. They arrived 15 minutes after his appointment time. His mother states that he has been c/o sore throat and left otalgia since the weekend. He is feeling better but symptoms persist.     Most recent pertinent workup:     Last CBC Results:   Lab Results   Component Value Date    WBC 4.85  Goal Outcome Evaluation:    Time    /44 (BP Location: Left arm, Cuff Size: Adult Small)   Pulse 67   Temp 98  F (36.7  C) (Oral)   Resp 18   Wt 61.6 kg (135 lb 12.8 oz)   SpO2 99%   BMI 21.27 kg/m      Reason for admission: recurrent Left Foot Cellulitis  Activity: A2 with jem steady. Pt is able to Pivot to the commode by the bed. Chair alarm on  Pain: 10/10 left foot pain  Neuro: Alert and oriented, intermittently forgetful.  Cardiac: WNL, denies chest pain  Respiratory: denies SOB, no distress observed, on RA.  GI/: LBM 11/29, soft brown x 3 Voids using the urinal  Diet: Regular, Tube feeding @45ml per hours  Lines: single lumen Picc  Wounds: left foot wounds, poor healing d/t malnutrition  Labs/imaging:       New changes this shift: pt is on Enteric precaution because he had 3 loose stool. C'diff sample needs to be collected.     Plan: medically ready to discharge, awaiting TCU placement      Continue to monitor and follow POC     01/08/2025    HGB 13.8 (L) 01/08/2025    HCT 41.8 01/08/2025     01/08/2025       Last CMP Results  Lab Results   Component Value Date     01/08/2025    K 4.0 01/08/2025     01/08/2025    CO2 25 01/08/2025    BUN 10 01/08/2025    CREATININE 1.3 01/08/2025    CALCIUM 9.1 01/08/2025    ALBUMIN 3.8 01/08/2025    AST 15 01/08/2025    ALT 21 01/08/2025       Last Lipids  Lab Results   Component Value Date    CHOL 85 (L) 01/08/2025    TRIG 79 01/08/2025    HDL 33 (L) 01/08/2025    LDLCALC 36.2 (L) 01/08/2025       Last A1C  Lab Results   Component Value Date    HGBA1C 5.5 01/08/2025         Health Maintenance         Date Due Completion Date    Influenza Vaccine (1) 09/01/2024 11/27/2023    COVID-19 Vaccine (5 - 2024-25 season) 09/01/2024 10/19/2022    Hemoglobin A1c (Prediabetes) 01/08/2026 1/8/2025    TETANUS VACCINE 12/07/2031 12/7/2021    RSV Vaccine (Age 60+ and Pregnant patients) (1 - 1-dose 75+ series) 10/10/2075 ---              PAST MEDICAL HISTORY:  Past Medical History:   Diagnosis Date    Autism spectrum disorder     Bipolar disorder     Hyperlipidemia     Postablative hypothyroidism     Schizophrenia        PAST SURGICAL HISTORY:  History reviewed. No pertinent surgical history.    SOCIAL HISTORY:  Social History     Socioeconomic History    Marital status: Single   Tobacco Use    Smoking status: Every Day    Smokeless tobacco: Never    Tobacco comments:     Smokes 3 - 4 cigars almost daily   Substance and Sexual Activity    Alcohol use: Not Currently    Drug use: Not Currently    Sexual activity: Not Currently     Social Drivers of Health     Financial Resource Strain: Low Risk  (1/8/2024)    Overall Financial Resource Strain (CARDIA)     Difficulty of Paying Living Expenses: Not hard at all   Food Insecurity: No Food Insecurity (1/8/2024)    Hunger Vital Sign     Worried About Running Out of Food in the Last Year: Never true     Ran Out of Food in the Last Year: Never true    Transportation Needs: No Transportation Needs (1/8/2024)    PRAPARE - Transportation     Lack of Transportation (Medical): No     Lack of Transportation (Non-Medical): No   Physical Activity: Insufficiently Active (1/8/2024)    Exercise Vital Sign     Days of Exercise per Week: 2 days     Minutes of Exercise per Session: 20 min   Stress: No Stress Concern Present (1/8/2024)    [employer] - Occupational Stress Questionnaire     Feeling of Stress : Only a little   Housing Stability: Unknown (1/8/2024)    Housing Stability Vital Sign     Unable to Pay for Housing in the Last Year: No     Unstable Housing in the Last Year: No       FAMILY HISTORY:  Family History   Problem Relation Name Age of Onset    No Known Problems Mother      No Known Problems Father      No Known Problems Sister      No Known Problems Brother      No Known Problems Maternal Aunt      No Known Problems Maternal Uncle      No Known Problems Paternal Aunt      No Known Problems Paternal Uncle      No Known Problems Maternal Grandmother      No Known Problems Maternal Grandfather      No Known Problems Paternal Grandmother      No Known Problems Paternal Grandfather      No Known Problems Other         ALLERGIES AND MEDICATIONS: updated and reviewed.  Review of patient's allergies indicates:  No Known Allergies  Current Outpatient Medications   Medication Sig Dispense Refill    blood sugar diagnostic Strp To check BG daily, to use with insurance preferred meter 200 each 11    blood-glucose meter kit To check BG daily, to use with insurance preferred meter 1 each 0    cetirizine (ZYRTEC) 10 MG tablet Take 10 mg by mouth.      cloZAPine (CLOZARIL) 100 MG Tab Take 3 tablets by mouth every morning and 3 tablets every evening.      divalproex (DEPAKOTE) 500 MG TbEC Take by mouth.      EScitalopram oxalate (LEXAPRO) 10 MG tablet Take 15 mg by mouth once daily.      EUTHYROX 150 mcg tablet Take 1 tablet (150 mcg total) by  "mouth before breakfast. 90 tablet 3    lancets Misc To check BG daily, to use with insurance preferred meter 200 each 11    metFORMIN (GLUCOPHAGE-XR) 500 MG ER 24hr tablet Take 1 tablet (500 mg total) by mouth 2 (two) times daily with meals. 180 tablet 3    pravastatin (PRAVACHOL) 20 MG tablet Take 1 tablet (20 mg total) by mouth once daily. 90 tablet 3    propranoloL (INDERAL) 10 MG tablet TAKE 2 TABLETS BY MOUTH TWICE A  tablet 10    risperiDONE microspheres (RISPERDAL CONSTA) 37.5 mg/2 mL injection Inject 37.5 mg into the muscle every 14 (fourteen) days.      tirzepatide (MOUNJARO) 5 mg/0.5 mL PnIj Inject 5 mg into the skin every 7 days. 2 mL 11     No current facility-administered medications for this visit.         ROS  Review of Systems   Constitutional:  Negative for fever.   HENT:  Positive for ear pain and sore throat.            Physical Exam  Vitals:    01/09/25 1107   BP: 122/68   Pulse: (!) 113   Temp: 98.9 °F (37.2 °C)   TempSrc: Oral   SpO2: 96%   Weight: 107.6 kg (237 lb 1.7 oz)   Height: 5' 4" (1.626 m)    Body mass index is 40.7 kg/m².  Weight: 107.6 kg (237 lb 1.7 oz)   Height: 5' 4" (162.6 cm)   Physical Exam  Constitutional:       General: He is not in acute distress.     Appearance: He is obese.   HENT:      Head: Normocephalic and atraumatic.      Right Ear: Tympanic membrane and ear canal normal.      Left Ear: Tympanic membrane and ear canal normal.      Nose: Nose normal.      Mouth/Throat:      Mouth: Mucous membranes are moist.      Pharynx: Posterior oropharyngeal erythema present.      Tonsils: No tonsillar exudate. 3+ on the right. 3+ on the left.   Eyes:      Conjunctiva/sclera: Conjunctivae normal.   Neck:      Thyroid: No thyromegaly.   Cardiovascular:      Rate and Rhythm: Regular rhythm. Tachycardia present.      Pulses: Normal pulses.      Heart sounds: Normal heart sounds.   Pulmonary:      Effort: Pulmonary effort is normal. No respiratory distress.      Breath sounds: " Normal breath sounds.   Musculoskeletal:      Cervical back: Neck supple.   Lymphadenopathy:      Cervical: No cervical adenopathy.   Skin:     General: Skin is warm and dry.      Findings: No rash.   Neurological:      General: No focal deficit present.      Mental Status: He is alert and oriented to person, place, and time.   Psychiatric:         Mood and Affect: Mood normal.         Behavior: Behavior normal.         Thought Content: Thought content normal.

## 2025-03-28 NOTE — LETTER
8/30/2019         RE: Darian Engle  [address] [place] [address]        Dear Colleague,    Thank you for referring your patient, Darian Engle, to the [facility]. Please see a copy of my visit note below.    Chief Complaint   Patient presents with     Diabetic Eye Exam     Accompanied by self  Lab Results   Component Value Date    A1C 9.5 08/02/2019    A1C 7.6 11/26/2018    A1C 7.8 06/07/2018    A1C 7.8 04/03/2018    A1C 8.3 12/18/2017       Last Eye Exam: 1 year ago  Dilated Previously: Yes    What are you currently using to see?  Glasses-1 year old    Distance Vision Acuity: Satisfied with vision    Near Vision Acuity: Satisfied with vision while reading  with glasses    Eye Comfort: good  Do you use eye drops? 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       OBJECTIVE: See Ophthalmology exam    ASSESSMENT:    ICD-10-CM    1. Encounter for examination of eyes and vision with abnormal findings Z01.01 EYE EXAM (SIMPLE-NONBILLABLE)   2. Type 2 diabetes mellitus without retinopathy (H) E11.9 EYE EXAM (SIMPLE-NONBILLABLE)   3. Nuclear age-related cataract, both eyes H25.13 EYE EXAM (SIMPLE-NONBILLABLE)   4. Anisometropia H52.31 EYE EXAM (SIMPLE-NONBILLABLE)     REFRACTION   5. Regular astigmatism of both eyes H52.223 EYE EXAM (SIMPLE-NONBILLABLE)     REFRACTION   6. Presbyopia H52.4 EYE EXAM (SIMPLE-NONBILLABLE)     REFRACTION      PLAN:    Darian Engle aware  eye exam results will be sent to [facility]  Patient Instructions   Patient educated on importance of good blood sugar control.  Letter sent to primary care provider with diabetic eye exam report.     You have the start of mild cataracts.  You may notice some blurred vision or glare with night driving.  It is important that you wear good sunglasses to protect your eyes from the ultraviolet light from the  Mohs Case Number: [id number] Date Of Previous Biopsy (Optional): 07/07/23 anselmo Farmer was advised of today's exam findings.  Optional to fill new glasses prescription, minimal change  Copy of glasses Rx provided today.  Return in 1 year for eye exam, or sooner if needed.    The effects of the dilating drops last for 4- 6 hours.  You will be more sensitive to light and vision will be blurry up close.  Mydriatic sunglasses were given if needed.    Tomi Muñiz O.D.  [address]  [address]    [phone]             Again, thank you for allowing me to participate in the care of your patient.        Sincerely,        Tomi Muñiz, OD     Previous Accession (Optional): [id number] Biopsy Photograph Reviewed: Yes Consent Type: Consent 1 (Standard) Eye Shield Used: No Initial Size Of Lesion: 0.6 X Size Of Lesion In Cm (Optional): 1 Number Of Stages: 2 Primary Defect Length In Cm (Final Defect Size - Required For Flaps/Grafts): 1.2 Primary Defect Width In Cm (Final Defect Size - Required For Flaps/Grafts): 1.5 Primary Defect Depth In Cm (Optional But Required For Some Insurers): 0 Repair Type: Referred to plastics for closure Which Instrument Did You Use For Dermabrasion?: Wire Brush Which Eyelid Repair Cpt Are You Using?: 71920 Oculoplastic Surgeon Procedure Text (A): After obtaining clear surgical margins the patient was sent to oculoplastics for surgical repair.  The patient understands they will receive post-surgical care and follow-up from the referring physician's office. Otolaryngologist Procedure Text (A): After obtaining clear surgical margins the patient was sent to otolaryngology for surgical repair.  The patient understands they will receive post-surgical care and follow-up from the referring physician's office. Plastic Surgeon Procedure Text (A): After obtaining clear surgical margins the patient was sent to plastics for surgical repair.  The patient understands they will receive post-surgical care and follow-up from the referring physician's office. Mid-Level Procedure Text (A): After obtaining clear surgical margins the patient was sent to a mid-level provider for surgical repair.  The patient understands they will receive post-surgical care and follow-up from the mid-level provider. Provider Procedure Text (A): After obtaining clear surgical margins the defect was repaired by another provider. Asc Procedure Text (A): After obtaining clear surgical margins the patient was sent to an ASC for surgical repair.  The patient understands they will receive post-surgical care and follow-up from the ASC physician. Deep Sutures: 5-0 Monocryl Epidermal Sutures: 5-0 Fast Absorbing Gut Suturegard Retention Suture: 2-0 Nylon Retention Suture Bite Size: 3 mm Length To Time In Minutes Device Was In Place: 10 Undermining Type: Entire Wound Debridement Text: The wound edges were debrided prior to proceeding with the closure to facilitate wound healing. Helical Rim Text: The closure involved the helical rim. Vermilion Border Text: The closure involved the vermilion border. Nostril Rim Text: The closure involved the nostril rim. Retention Suture Text: Retention sutures were placed to support the closure and prevent dehiscence. Location Indication Override (Is Already Calculated Based On Selected Body Location): Area H Area H Indication Text: Tumors in this location are included in Area H (eyelids, eyebrows, nose, lips, chin, ear, pre-auricular, post-auricular, temple, genitalia, hands, feet, ankles and areola).  Tissue conservation is critical in these anatomic locations. Area M Indication Text: Tumors in this location are included in Area M (cheek, forehead, scalp, neck, jawline and pretibial skin).  Mohs surgery is indicated for tumors in these anatomic locations. Area L Indication Text: Tumors in this location are included in Area L (trunk and extremities).  Mohs surgery is indicated for larger tumors, or tumors with aggressive histologic features, in these anatomic locations. Depth Of Tumor Invasion (For Histology): dermis Perineural Invasion (For Histology - Be Specific If Possible): absent Presence Of Scar Tissue (For Histology): present Special Stains Stage 1 - Results: Base On Clearance Noted Above Histology Selection Override (Optional- Will Default To Parent Diagnosis If N/A): Basal Cell Carcinoma Stage 2: Additional Anesthesia Type: 1% lidocaine with epinephrine Staging Info: By selecting yes to the question above you will include information on AJCC 8 tumor staging in your Mohs note. Information on tumor staging will be automatically added for SCCs on the head and neck. AJCC 8 includes tumor size, tumor depth, perineural involvement and bone invasion. Tumor Depth: Less than 6mm from granular layer and no invasion beyond the subcutaneous fat Was The Patient On Physician Recommended Anticoagulation Therapy?: Please Select the Appropriate Response Medical Necessity Statement: Based on the clinical exam, the pathology, the patient's preference and my medical judgement, Mohs surgery is the most appropriate treatment for this cancer compared to other treatments. Alternatives Discussed Intro (Do Not Add Period): I discussed alternative treatments to Mohs surgery and specifically discussed the risks and benefits of Consent 1/Introductory Paragraph: The rationale for Mohs was explained to the patient and consent was obtained. The risks, benefits and alternatives to therapy were discussed in detail. Specifically, the risks of infection, scarring, pain, bleeding, prolonged wound healing, incomplete removal, allergy to anesthesia, nerve injury, recurrence and the possible need of delayed or additional reconstruction were addressed. Prior to the procedure, the treatment site was clearly identified and confirmed by the patient. All components of Universal Protocol/PAUSE Rule completed. Consent 2/Introductory Paragraph: Mohs surgery was explained to the patient and consent was obtained. The risks, benefits and alternatives to therapy were discussed in detail. Specifically, the risks of infection, scarring, bleeding, prolonged wound healing, incomplete removal, allergy to anesthesia, nerve injury and recurrence were addressed. Prior to the procedure, the treatment site was clearly identified and confirmed by the patient. All components of Universal Protocol/PAUSE Rule completed. Consent 3/Introductory Paragraph: I gave the patient a chance to ask questions they had about the procedure.  Following this I explained the Mohs procedure and consent was obtained. The risks, benefits and alternatives to therapy were discussed in detail. Specifically, the risks of infection, scarring, bleeding, prolonged wound healing, incomplete removal, allergy to anesthesia, nerve injury and recurrence were addressed. Prior to the procedure, the treatment site was clearly identified and confirmed by the patient. All components of Universal Protocol/PAUSE Rule completed. Consent (Temporal Branch)/Introductory Paragraph: The rationale for Mohs was explained to the patient and consent was obtained. The risks, benefits and alternatives to therapy were discussed in detail. Specifically, the risks of damage to the temporal branch of the facial nerve, infection, scarring, pain, bleeding, prolonged wound healing, incomplete removal, allergy to anesthesia, nerve injury, recurrence and the possible need of delayed or additional reconstruction were addressed.  Prior to the procedure, the treatment site was clearly identified and confirmed by the patient. All components of Universal Protocol/PAUSE Rule completed. Consent (Marginal Mandibular)/Introductory Paragraph: The rationale for Mohs was explained to the patient and consent was obtained. The risks, benefits and alternatives to therapy were discussed in detail. Specifically, the risks of damage to the marginal mandibular branch of the facial nerve, infection, scarring, bleeding, prolonged wound healing, incomplete removal, allergy to anesthesia, and recurrence were addressed. Prior to the procedure, the treatment site was clearly identified and confirmed by the patient. All components of Universal Protocol/PAUSE Rule completed. Consent (Spinal Accessory)/Introductory Paragraph: The rationale for Mohs was explained to the patient and consent was obtained. The risks, benefits and alternatives to therapy were discussed in detail. Specifically, the risks of damage to the spinal accessory nerve, infection, scarring, bleeding, prolonged wound healing, incomplete removal, allergy to anesthesia, and recurrence were addressed. Prior to the procedure, the treatment site was clearly identified and confirmed by the patient. All components of Universal Protocol/PAUSE Rule completed. Consent (Near Eyelid Margin)/Introductory Paragraph: The rationale for Mohs was explained to the patient and consent was obtained. The risks, benefits and alternatives to therapy were discussed in detail. Specifically, the risks of ectropion or eyelid deformity, infection, scarring, bleeding, prolonged wound healing, incomplete removal, allergy to anesthesia, nerve injury and recurrence were addressed. Prior to the procedure, the treatment site was clearly identified and confirmed by the patient. All components of Universal Protocol/PAUSE Rule completed. Consent (Ear)/Introductory Paragraph: The rationale for Mohs was explained to the patient and consent was obtained. The risks, benefits and alternatives to therapy were discussed in detail. Specifically, the risks of ear deformity, infection, scarring, bleeding, prolonged wound healing, incomplete removal, allergy to anesthesia, nerve injury and recurrence were addressed. Prior to the procedure, the treatment site was clearly identified and confirmed by the patient. All components of Universal Protocol/PAUSE Rule completed. Consent (Nose)/Introductory Paragraph: The rationale for Mohs was explained to the patient and consent was obtained. The risks, benefits and alternatives to therapy were discussed in detail. Specifically, the risks of nasal deformity, changes in the flow of air through the nose, infection, scarring, bleeding, prolonged wound healing, incomplete removal, allergy to anesthesia, nerve injury and recurrence were addressed. Prior to the procedure, the treatment site was clearly identified and confirmed by the patient. All components of Universal Protocol/PAUSE Rule completed. Consent (Lip)/Introductory Paragraph: The rationale for Mohs was explained to the patient and consent was obtained. The risks, benefits and alternatives to therapy were discussed in detail. Specifically, the risks of lip deformity, changes in the oral aperture, infection, scarring, bleeding, prolonged wound healing, incomplete removal, allergy to anesthesia, nerve injury and recurrence were addressed. Prior to the procedure, the treatment site was clearly identified and confirmed by the patient. All components of Universal Protocol/PAUSE Rule completed. Consent (Scalp)/Introductory Paragraph: The rationale for Mohs was explained to the patient and consent was obtained. The risks, benefits and alternatives to therapy were discussed in detail. Specifically, the risks of changes in hair growth pattern secondary to repair, infection, scarring, bleeding, prolonged wound healing, incomplete removal, allergy to anesthesia, nerve injury and recurrence were addressed. Prior to the procedure, the treatment site was clearly identified and confirmed by the patient. All components of Universal Protocol/PAUSE Rule completed. Detail Level: Detailed Postop Diagnosis: same Surgeon: Lucy Montes MD Anesthesia Type: 1% lidocaine with 1:100,000 epinephrine and a 1:10 solution of 8.4% sodium bicarbonate Anesthesia Volume In Cc: 3 Hemostasis: Electrocautery Estimated Blood Loss (Cc): minimal Brow Lift Text: A midfrontal incision was made medially to the defect to allow access to the tissues just superior to the left eyebrow. Following careful dissection inferiorly in a supraperiosteal plane to the level of the left eyebrow, several 3-0 monocryl sutures were used to resuspend the eyebrow orbicularis oculi muscular unit to the superior frontal bone periosteum. This resulted in an appropriate reapproximation of static eyebrow symmetry and correction of the left brow ptosis. Epidermal Closure: running Suturegard Intro: Intraoperative tissue expansion was performed, utilizing the SUTUREGARD device, in order to reduce wound tension. Suturegard Body: The suture ends were repeatedly re-tightened and re-clamped to achieve the desired tissue expansion. Hemigard Intro: Due to skin fragility and wound tension, it was decided to use HEMIGARD adhesive retention suture devices to permit a linear closure. The skin was cleaned and dried for a 6cm distance away from the wound. Excessive hair, if present, was removed to allow for adhesion. Hemigard Postcare Instructions: The HEMIGARD strips are to remain completely dry for at least 5-7 days. Donor Site Anesthesia Type: same as repair anesthesia Epidermal Closure Graft Donor Site (Optional): simple interrupted Graft Donor Site Bandage (Optional-Leave Blank If You Don't Want In Note): Steri-strips and a pressure bandage were applied to the donor site. Closure 2 Information: This tab is for additional flaps and grafts, including complex repair and grafts and complex repair and flaps. You can also specify a different location for the additional defect, if the location is the same you do not need to select a new one. We will insert the automated text for the repair you select below just as we do for solitary flaps and grafts. Please note that at this time if you select a location with a different insurance zone you will need to override the ICD10 and CPT if appropriate. Closure 3 Information: This tab is for additional flaps and grafts above and beyond our usual structured repairs.  Please note if you enter information here it will not currently bill and you will need to add the billing information manually. Dressing: pressure dressing Wound Care (No Sutures): Petrolatum Dressing (No Sutures): dry sterile dressing Unna Boot Text: An Unna boot was placed to help immobilize the limb and facilitate more rapid healing. Home Suture Removal Text: Patient was provided instructions on removing sutures and will remove their sutures at home.  If they have any questions or difficulties they will call the office. Post-Care Instructions: Should the patient develop any fevers, chills, bleeding, severe pain patient will contact the office immediately. Pain Refusal Text: I offered to prescribe pain medication but the patient refused to take this medication. Mauc Instructions: By selecting yes to the question below the MAUC number will be added into the note.  This will be calculated automatically based on the diagnosis chosen, the size entered, the body zone selected (H,M,L) and the specific indications you chose. You will also have the option to override the Mohs AUC if you disagree with the automatically calculated number and this option is found in the Case Summary tab. Where Do You Want The Question To Include Opioid Counseling Located?: Case Summary Tab Eye Protection Verbiage: Before proceeding with the stage, a plastic scleral shield was inserted. The globe was anesthetized with a few drops of 1% lidocaine with 1:100,000 epinephrine. Then, an appropriate sized scleral shield was chosen and coated with lacrilube ointment. The shield was gently inserted and left in place for the duration of each stage. After the stage was completed, the shield was gently removed. Mohs Method Verbiage: The operative site was curetted for margins and a beveled incision following the standard Mohs approach was done.  The specimen was harvested as a microscopic controlled layer and a map of the extirpated tissue was made for orientation. Surgeon/Pathologist Verbiage (Will Incorporate Name Of Surgeon From Intro If Not Blank): operated in two distinct and integrated capacities as the surgeon and pathologist. Mohs Histo Method Verbiage: Each section was then chromacoded and processed in the Mohs lab using the Mohs protocol and submitted for horizontal frozen section. Subsequent Stages Histo Method Verbiage: Using a similar technique to that described above, a thin layer of tissue was removed from all areas where tumor was visible on the previous stage.  The tissue was again oriented, mapped, dyed, and processed as above. Mohs Rapid Report Verbiage: The area of clinically evident tumor was marked with skin marking ink and appropriately hatched.  The initial incision was made following the Mohs approach through the skin.  The specimen was taken to the lab, divided into the necessary number of pieces, chromacoded and processed according to the Mohs protocol.  This was repeated in successive stages until a tumor free defect was achieved. Complex Repair Preamble Text (Leave Blank If You Do Not Want): Extensive wide undermining was performed. Intermediate Repair Preamble Text (Leave Blank If You Do Not Want): Wide undermining was performed with blunt dissection. Crescentic Intermediate Repair Preamble Text (Leave Blank If You Do Not Want): Undermining was performed with blunt dissection. Graft Cartilage Fenestration Text: The cartilage was fenestrated with a 2mm punch biopsy to help facilitate graft survival and healing. Non-Graft Cartilage Fenestration Text: The cartilage was fenestrated with a 2mm punch biopsy to help facilitate healing. Secondary Intention Text (Leave Blank If You Do Not Want): The defect will heal with secondary intention. No Repair - Repaired With Adjacent Surgical Defect Text (Leave Blank If You Do Not Want): After obtaining clear surgical margins the defect was repaired concurrently with another surgical defect which was in close approximation. Referred To Mid-Level For Closure Text (Leave Blank If You Do Not Want): After obtaining clear surgical margins the patient was sent to Xavi Cruz PA-C for surgical repair.  The patient understands they will receive post-surgical care and follow-up from the mid-level provider. Referred To Plastics For Closure Text (Leave Blank If You Do Not Want): After obtaining clear surgical margins the patient was sent to plastics for surgical repair.  The patient understands they will receive post-surgical care and follow-up from Dr. Juan. Adjacent Tissue Transfer Text: The defect edges were debeveled with a #15 scalpel blade.  Given the location of the defect and the proximity to free margins an adjacent tissue transfer was deemed most appropriate.  Using a sterile surgical marker, an appropriate flap was drawn incorporating the defect and placing the expected incisions within the relaxed skin tension lines where possible.    The area thus outlined was incised deep to adipose tissue with a #15 scalpel blade.  The skin margins were undermined to an appropriate distance in all directions utilizing iris scissors. Advancement Flap (Single) Text: The defect edges were debeveled with a #15 scalpel blade.  Given the location of the defect and the proximity to free margins a single advancement flap was deemed most appropriate.  Using a sterile surgical marker, an appropriate advancement flap was drawn incorporating the defect and placing the expected incisions within the relaxed skin tension lines where possible.    The area thus outlined was incised deep to adipose tissue with a #15 scalpel blade.  The skin margins were undermined to an appropriate distance in all directions utilizing iris scissors. Advancement Flap (Double) Text: The defect edges were debeveled with a #15 scalpel blade.  Given the location of the defect and the proximity to free margins a double advancement flap was deemed most appropriate.  Using a sterile surgical marker, the appropriate advancement flaps were drawn incorporating the defect and placing the expected incisions within the relaxed skin tension lines where possible.    The area thus outlined was incised deep to adipose tissue with a #15 scalpel blade.  The skin margins were undermined to an appropriate distance in all directions utilizing iris scissors. Advancement-Rotation Flap Text: The defect edges were debeveled with a #15 scalpel blade.  Given the location of the defect, shape of the defect and the proximity to free margins an advancement-rotation flap was deemed most appropriate.  Using a sterile surgical marker, an appropriate flap was drawn incorporating the defect and placing the expected incisions within the relaxed skin tension lines where possible. The area thus outlined was incised deep to adipose tissue with a #15 scalpel blade.  The skin margins were undermined to an appropriate distance in all directions utilizing iris scissors. Alar Island Pedicle Flap Text: The defect edges were debeveled with a #15 scalpel blade.  Given the location of the defect, shape of the defect and the proximity to the alar rim an island pedicle advancement flap was deemed most appropriate.  Using a sterile surgical marker, an appropriate advancement flap was drawn incorporating the defect, outlining the appropriate donor tissue and placing the expected incisions within the nasal ala running parallel to the alar rim. The area thus outlined was incised with a #15 scalpel blade.  The skin margins were undermined minimally to an appropriate distance in all directions around the primary defect and laterally outward around the island pedicle utilizing iris scissors.  There was minimal undermining beneath the pedicle flap. A-T Advancement Flap Text: The defect edges were debeveled with a #15 scalpel blade.  Given the location of the defect, shape of the defect and the proximity to free margins an A-T advancement flap was deemed most appropriate.  Using a sterile surgical marker, an appropriate advancement flap was drawn incorporating the defect and placing the expected incisions within the relaxed skin tension lines where possible.    The area thus outlined was incised deep to adipose tissue with a #15 scalpel blade.  The skin margins were undermined to an appropriate distance in all directions utilizing iris scissors. Banner Transposition Flap Text: The defect edges were debeveled with a #15 scalpel blade.  Given the location of the defect and the proximity to free margins a Banner transposition flap was deemed most appropriate.  Using a sterile surgical marker, an appropriate flap drawn around the defect. The area thus outlined was incised deep to adipose tissue with a #15 scalpel blade.  The skin margins were undermined to an appropriate distance in all directions utilizing iris scissors. Bilateral Helical Rim Advancement Flap Text: The defect edges were debeveled with a #15 blade scalpel.  Given the location of the defect and the proximity to free margins (helical rim) a bilateral helical rim advancement flap was deemed most appropriate.  Using a sterile surgical marker, the appropriate advancement flaps were drawn incorporating the defect and placing the expected incisions between the helical rim and antihelix where possible.  The area thus outlined was incised through and through with a #15 scalpel blade.  With a skin hook and iris scissors, the flaps were gently and sharply undermined and freed up. Bilateral Rotation Flap Text: The defect edges were debeveled with a #15 scalpel blade. Given the location of the defect, shape of the defect and the proximity to free margins a bilateral rotation flap was deemed most appropriate. Using a sterile surgical marker, an appropriate rotation flap was drawn incorporating the defect and placing the expected incisions within the relaxed skin tension lines where possible. The area thus outlined was incised deep to adipose tissue with a #15 scalpel blade. The skin margins were undermined to an appropriate distance in all directions utilizing iris scissors. Following this, the designed flap was carried over into the primary defect and sutured into place. Bilobed Flap Text: The defect edges were debeveled with a #15 scalpel blade.  Given the location of the defect and the proximity to free margins a bilobe flap was deemed most appropriate.  Using a sterile surgical marker, an appropriate bilobe flap drawn around the defect.    The area thus outlined was incised deep to adipose tissue with a #15 scalpel blade.  The skin margins were undermined to an appropriate distance in all directions utilizing iris scissors. Bilobed Transposition Flap Text: The defect edges were debeveled with a #15 scalpel blade.  Given the location of the defect and the proximity to free margins a bilobed transposition flap was deemed most appropriate.  Using a sterile surgical marker, an appropriate bilobe flap drawn around the defect.    The area thus outlined was incised deep to adipose tissue with a #15 scalpel blade.  The skin margins were undermined to an appropriate distance in all directions utilizing iris scissors. Bi-Rhombic Flap Text: The defect edges were debeveled with a #15 scalpel blade.  Given the location of the defect and the proximity to free margins a bi-rhombic flap was deemed most appropriate.  Using a sterile surgical marker, an appropriate rhombic flap was drawn incorporating the defect. The area thus outlined was incised deep to adipose tissue with a #15 scalpel blade.  The skin margins were undermined to an appropriate distance in all directions utilizing iris scissors. Burow's Advancement Flap Text: The defect edges were debeveled with a #15 scalpel blade.  Given the location of the defect and the proximity to free margins a Burow's advancement flap was deemed most appropriate.  Using a sterile surgical marker, the appropriate advancement flap was drawn incorporating the defect and placing the expected incisions within the relaxed skin tension lines where possible.    The area thus outlined was incised deep to adipose tissue with a #15 scalpel blade.  The skin margins were undermined to an appropriate distance in all directions utilizing iris scissors. Chonodrocutaneous Helical Advancement Flap Text: The defect edges were debeveled with a #15 scalpel blade.  Given the location of the defect and the proximity to free margins a chondrocutaneous helical advancement flap was deemed most appropriate.  Using a sterile surgical marker, the appropriate advancement flap was drawn incorporating the defect and placing the expected incisions within the relaxed skin tension lines where possible.    The area thus outlined was incised deep to adipose tissue with a #15 scalpel blade.  The skin margins were undermined to an appropriate distance in all directions utilizing iris scissors. Crescentic Advancement Flap Text: The defect edges were debeveled with a #15 scalpel blade.  Given the location of the defect and the proximity to free margins a crescentic advancement flap was deemed most appropriate.  Using a sterile surgical marker, the appropriate advancement flap was drawn incorporating the defect and placing the expected incisions within the relaxed skin tension lines where possible.    The area thus outlined was incised deep to adipose tissue with a #15 scalpel blade.  The skin margins were undermined to an appropriate distance in all directions utilizing iris scissors. Dorsal Nasal Flap Text: The defect edges were debeveled with a #15 scalpel blade.  Given the location of the defect and the proximity to free margins a dorsal nasal flap was deemed most appropriate.  Using a sterile surgical marker, an appropriate dorsal nasal flap was drawn around the defect.    The area thus outlined was incised deep to adipose tissue with a #15 scalpel blade.  The skin margins were undermined to an appropriate distance in all directions utilizing iris scissors. Double Island Pedicle Flap Text: The defect edges were debeveled with a #15 scalpel blade.  Given the location of the defect, shape of the defect and the proximity to free margins a double island pedicle advancement flap was deemed most appropriate.  Using a sterile surgical marker, an appropriate advancement flap was drawn incorporating the defect, outlining the appropriate donor tissue and placing the expected incisions within the relaxed skin tension lines where possible.    The area thus outlined was incised deep to adipose tissue with a #15 scalpel blade.  The skin margins were undermined to an appropriate distance in all directions around the primary defect and laterally outward around the island pedicle utilizing iris scissors.  There was minimal undermining beneath the pedicle flap. Double O-Z Flap Text: The defect edges were debeveled with a #15 scalpel blade.  Given the location of the defect, shape of the defect and the proximity to free margins a Double O-Z flap was deemed most appropriate.  Using a sterile surgical marker, an appropriate transposition flap was drawn incorporating the defect and placing the expected incisions within the relaxed skin tension lines where possible. The area thus outlined was incised deep to adipose tissue with a #15 scalpel blade.  The skin margins were undermined to an appropriate distance in all directions utilizing iris scissors. Double O-Z Plasty Text: The defect edges were debeveled with a #15 scalpel blade.  Given the location of the defect, shape of the defect and the proximity to free margins a Double O-Z plasty (double transposition flap) was deemed most appropriate.  Using a sterile surgical marker, the appropriate transposition flaps were drawn incorporating the defect and placing the expected incisions within the relaxed skin tension lines where possible. The area thus outlined was incised deep to adipose tissue with a #15 scalpel blade.  The skin margins were undermined to an appropriate distance in all directions utilizing iris scissors.  Hemostasis was achieved with electrocautery.  The flaps were then transposed into place, one clockwise and the other counterclockwise, and anchored with interrupted buried subcutaneous sutures. Double Z Plasty Text: The lesion was extirpated to the level of the fat with a #15 scalpel blade. Given the location of the defect, shape of the defect and the proximity to free margins a double Z-plasty was deemed most appropriate for repair. Using a sterile surgical marker, the appropriate transposition arms of the double Z-plasty were drawn incorporating the defect and placing the expected incisions within the relaxed skin tension lines where possible. The area thus outlined was incised deep to adipose tissue with a #15 scalpel blade. The skin margins were undermined to an appropriate distance in all directions utilizing iris scissors. The opposing transposition arms were then transposed and carried over into place in opposite direction and anchored with interrupted buried subcutaneous sutures. Ear Star Wedge Flap Text: The defect edges were debeveled with a #15 blade scalpel.  Given the location of the defect and the proximity to free margins (helical rim) an ear star wedge flap was deemed most appropriate.  Using a sterile surgical marker, the appropriate flap was drawn incorporating the defect and placing the expected incisions between the helical rim and antihelix where possible.  The area thus outlined was incised through and through with a #15 scalpel blade. Flip-Flop Flap Text: The defect edges were debeveled with a #15 blade scalpel.  Given the location of the defect and the proximity to free margins a flip-flop flap was deemed most appropriate. Using a sterile surgical marker, the appropriate flap was drawn incorporating the defect and placing the expected incisions between the helical rim and antihelix where possible.  The area thus outlined was incised through and through with a #15 scalpel blade. Following this, the designed flap was carried over into the primary defect and sutured into place. Hatchet Flap Text: The defect edges were debeveled with a #15 scalpel blade.  Given the location of the defect, shape of the defect and the proximity to free margins a hatchet flap was deemed most appropriate.  Using a sterile surgical marker, an appropriate hatchet flap was drawn incorporating the defect and placing the expected incisions within the relaxed skin tension lines where possible.    The area thus outlined was incised deep to adipose tissue with a #15 scalpel blade.  The skin margins were undermined to an appropriate distance in all directions utilizing iris scissors. Helical Rim Advancement Flap Text: The defect edges were debeveled with a #15 blade scalpel.  Given the location of the defect and the proximity to free margins (helical rim) a double helical rim advancement flap was deemed most appropriate.  Using a sterile surgical marker, the appropriate advancement flaps were drawn incorporating the defect and placing the expected incisions between the helical rim and antihelix where possible.  The area thus outlined was incised through and through with a #15 scalpel blade.  With a skin hook and iris scissors, the flaps were gently and sharply undermined and freed up. H Plasty Text: Given the location of the defect, shape of the defect and the proximity to free margins a H-plasty was deemed most appropriate for repair.  Using a sterile surgical marker, the appropriate advancement arms of the H-plasty were drawn incorporating the defect and placing the expected incisions within the relaxed skin tension lines where possible. The area thus outlined was incised deep to adipose tissue with a #15 scalpel blade. The skin margins were undermined to an appropriate distance in all directions utilizing iris scissors.  The opposing advancement arms were then advanced into place in opposite direction and anchored with interrupted buried subcutaneous sutures. Island Pedicle Flap Text: The defect edges were debeveled with a #15 scalpel blade.  Given the location of the defect, shape of the defect and the proximity to free margins an island pedicle advancement flap was deemed most appropriate.  Using a sterile surgical marker, an appropriate advancement flap was drawn incorporating the defect, outlining the appropriate donor tissue and placing the expected incisions within the relaxed skin tension lines where possible.    The area thus outlined was incised deep to adipose tissue with a #15 scalpel blade.  The skin margins were undermined to an appropriate distance in all directions around the primary defect and laterally outward around the island pedicle utilizing iris scissors.  There was minimal undermining beneath the pedicle flap. Island Pedicle Flap With Canthal Suspension Text: The defect edges were debeveled with a #15 scalpel blade.  Given the location of the defect, shape of the defect and the proximity to free margins an island pedicle advancement flap was deemed most appropriate.  Using a sterile surgical marker, an appropriate advancement flap was drawn incorporating the defect, outlining the appropriate donor tissue and placing the expected incisions within the relaxed skin tension lines where possible. The area thus outlined was incised deep to adipose tissue with a #15 scalpel blade.  The skin margins were undermined to an appropriate distance in all directions around the primary defect and laterally outward around the island pedicle utilizing iris scissors.  There was minimal undermining beneath the pedicle flap. A suspension suture was placed in the canthal tendon to prevent tension and prevent ectropion. Island Pedicle Flap-Requiring Vessel Identification Text: The defect edges were debeveled with a #15 scalpel blade.  Given the location of the defect, shape of the defect and the proximity to free margins an island pedicle advancement flap was deemed most appropriate.  Using a sterile surgical marker, an appropriate advancement flap was drawn, based on the axial vessel mentioned above, incorporating the defect, outlining the appropriate donor tissue and placing the expected incisions within the relaxed skin tension lines where possible.    The area thus outlined was incised deep to adipose tissue with a #15 scalpel blade.  The skin margins were undermined to an appropriate distance in all directions around the primary defect and laterally outward around the island pedicle utilizing iris scissors.  There was minimal undermining beneath the pedicle flap. Keystone Flap Text: The defect edges were debeveled with a #15 scalpel blade.  Given the location of the defect, shape of the defect a keystone flap was deemed most appropriate.  Using a sterile surgical marker, an appropriate keystone flap was drawn incorporating the defect, outlining the appropriate donor tissue and placing the expected incisions within the relaxed skin tension lines where possible. The area thus outlined was incised deep to adipose tissue with a #15 scalpel blade.  The skin margins were undermined to an appropriate distance in all directions around the primary defect and laterally outward around the flap utilizing iris scissors. Melolabial Transposition Flap Text: The defect edges were debeveled with a #15 scalpel blade.  Given the location of the defect and the proximity to free margins a melolabial flap was deemed most appropriate.  Using a sterile surgical marker, an appropriate melolabial transposition flap was drawn incorporating the defect.    The area thus outlined was incised deep to adipose tissue with a #15 scalpel blade.  The skin margins were undermined to an appropriate distance in all directions utilizing iris scissors. Mercedes Flap Text: The defect edges were debeveled with a #15 scalpel blade.  Given the location of the defect, shape of the defect and the proximity to free margins a Mercedes flap was deemed most appropriate.  Using a sterile surgical marker, an appropriate advancement flap was drawn incorporating the defect and placing the expected incisions within the relaxed skin tension lines where possible. The area thus outlined was incised deep to adipose tissue with a #15 scalpel blade.  The skin margins were undermined to an appropriate distance in all directions utilizing iris scissors. Modified Advancement Flap Text: The defect edges were debeveled with a #15 scalpel blade.  Given the location of the defect, shape of the defect and the proximity to free margins a modified advancement flap was deemed most appropriate.  Using a sterile surgical marker, an appropriate advancement flap was drawn incorporating the defect and placing the expected incisions within the relaxed skin tension lines where possible.    The area thus outlined was incised deep to adipose tissue with a #15 scalpel blade.  The skin margins were undermined to an appropriate distance in all directions utilizing iris scissors. Mucosal Advancement Flap Text: Given the location of the defect, shape of the defect and the proximity to free margins a mucosal advancement flap was deemed most appropriate. Incisions were made with a 15 blade scalpel in the appropriate fashion along the cutaneous vermilion border and the mucosal lip. The remaining actinically damaged mucosal tissue was excised.  The mucosal advancement flap was then elevated to the gingival sulcus with care taken to preserve the neurovascular structures and advanced into the primary defect. Care was taken to ensure that precise realignment of the vermilion border was achieved. Muscle Hinge Flap Text: The defect edges were debeveled with a #15 scalpel blade.  Given the size, depth and location of the defect and the proximity to free margins a muscle hinge flap was deemed most appropriate.  Using a sterile surgical marker, an appropriate hinge flap was drawn incorporating the defect. The area thus outlined was incised with a #15 scalpel blade.  The skin margins were undermined to an appropriate distance in all directions utilizing iris scissors. Mustarde Flap Text: The defect edges were debeveled with a #15 scalpel blade.  Given the size, depth and location of the defect and the proximity to free margins a Mustarde flap was deemed most appropriate.  Using a sterile surgical marker, an appropriate flap was drawn incorporating the defect. The area thus outlined was incised with a #15 scalpel blade.  The skin margins were undermined to an appropriate distance in all directions utilizing iris scissors. Nasal Turnover Hinge Flap Text: The defect edges were debeveled with a #15 scalpel blade.  Given the size, depth, location of the defect and the defect being full thickness a nasal turnover hinge flap was deemed most appropriate.  Using a sterile surgical marker, an appropriate hinge flap was drawn incorporating the defect. The area thus outlined was incised with a #15 scalpel blade. The flap was designed to recreate the nasal mucosal lining and the alar rim. The skin margins were undermined to an appropriate distance in all directions utilizing iris scissors. Nasalis-Muscle-Based Myocutaneous Island Pedicle Flap Text: Using a #15 blade, an incision was made around the donor flap to the level of the nasalis muscle. Wide lateral undermining was then performed in both the subcutaneous plane above the nasalis muscle, and in a submuscular plane just above periosteum. This allowed the formation of a free nasalis muscle axial pedicle (based on the angular artery) which was still attached to the actual cutaneous flap, increasing its mobility and vascular viability. Hemostasis was obtained with pinpoint electrocoagulation. The flap was mobilized into position and the pivotal anchor points positioned and stabilized with buried interrupted sutures. Subcutaneous and dermal tissues were closed in a multilayered fashion with sutures. Tissue redundancies were excised, and the epidermal edges were apposed without significant tension and sutured with sutures. Nasalis Myocutaneous Flap Text: Using a #15 blade, an incision was made around the donor flap to the level of the nasalis muscle. Wide lateral undermining was then performed in both the subcutaneous plane above the nasalis muscle, and in a submuscular plane just above periosteum. This allowed the formation of a free nasalis muscle axial pedicle which was still attached to the actual cutaneous flap, increasing its mobility and vascular viability. Hemostasis was obtained with pinpoint electrocoagulation. The flap was mobilized into position and the pivotal anchor points positioned and stabilized with buried interrupted sutures. Subcutaneous and dermal tissues were closed in a multilayered fashion with sutures. Tissue redundancies were excised, and the epidermal edges were apposed without significant tension and sutured with sutures. Nasolabial Transposition Flap Text: The defect edges were debeveled with a #15 scalpel blade.  Given the size, depth and location of the defect and the proximity to free margins a nasolabial transposition flap was deemed most appropriate. Using a sterile surgical marker, an appropriate flap was drawn incorporating the defect. The area thus outlined was incised with a #15 scalpel blade. The skin margins were undermined to an appropriate distance in all directions utilizing iris scissors. Following this, the designed flap was carried into the primary defect and sutured into place. Orbicularis Oris Muscle Flap Text: The defect edges were debeveled with a #15 scalpel blade.  Given that the defect affected the competency of the oral sphincter an obicularis oris muscle flap was deemed most appropriate to restore this competency and normal muscle function.  Using a sterile surgical marker, an appropriate flap was drawn incorporating the defect. The area thus outlined was incised with a #15 scalpel blade. O-T Advancement Flap Text: The defect edges were debeveled with a #15 scalpel blade.  Given the location of the defect, shape of the defect and the proximity to free margins an O-T advancement flap was deemed most appropriate.  Using a sterile surgical marker, an appropriate advancement flap was drawn incorporating the defect and placing the expected incisions within the relaxed skin tension lines where possible.    The area thus outlined was incised deep to adipose tissue with a #15 scalpel blade.  The skin margins were undermined to an appropriate distance in all directions utilizing iris scissors. O-T Plasty Text: The defect edges were debeveled with a #15 scalpel blade.  Given the location of the defect, shape of the defect and the proximity to free margins an O-T plasty was deemed most appropriate.  Using a sterile surgical marker, an appropriate O-T plasty was drawn incorporating the defect and placing the expected incisions within the relaxed skin tension lines where possible.    The area thus outlined was incised deep to adipose tissue with a #15 scalpel blade.  The skin margins were undermined to an appropriate distance in all directions utilizing iris scissors. O-L Flap Text: The defect edges were debeveled with a #15 scalpel blade.  Given the location of the defect, shape of the defect and the proximity to free margins an O-L flap was deemed most appropriate.  Using a sterile surgical marker, an appropriate advancement flap was drawn incorporating the defect and placing the expected incisions within the relaxed skin tension lines where possible.    The area thus outlined was incised deep to adipose tissue with a #15 scalpel blade.  The skin margins were undermined to an appropriate distance in all directions utilizing iris scissors. O-Z Flap Text: The defect edges were debeveled with a #15 scalpel blade.  Given the location of the defect, shape of the defect and the proximity to free margins an O-Z flap was deemed most appropriate.  Using a sterile surgical marker, an appropriate transposition flap was drawn incorporating the defect and placing the expected incisions within the relaxed skin tension lines where possible. The area thus outlined was incised deep to adipose tissue with a #15 scalpel blade.  The skin margins were undermined to an appropriate distance in all directions utilizing iris scissors. O-Z Plasty Text: The defect edges were debeveled with a #15 scalpel blade.  Given the location of the defect, shape of the defect and the proximity to free margins an O-Z plasty (double transposition flap) was deemed most appropriate.  Using a sterile surgical marker, the appropriate transposition flaps were drawn incorporating the defect and placing the expected incisions within the relaxed skin tension lines where possible.    The area thus outlined was incised deep to adipose tissue with a #15 scalpel blade.  The skin margins were undermined to an appropriate distance in all directions utilizing iris scissors.  Hemostasis was achieved with electrocautery.  The flaps were then transposed into place, one clockwise and the other counterclockwise, and anchored with interrupted buried subcutaneous sutures. Peng Advancement Flap Text: The defect edges were debeveled with a #15 scalpel blade.  Given the location of the defect, shape of the defect and the proximity to free margins a Peng advancement flap was deemed most appropriate.  Using a sterile surgical marker, an appropriate advancement flap was drawn incorporating the defect and placing the expected incisions within the relaxed skin tension lines where possible. The area thus outlined was incised deep to adipose tissue with a #15 scalpel blade.  The skin margins were undermined to an appropriate distance in all directions utilizing iris scissors. Rectangular Flap Text: The defect edges were debeveled with a #15 scalpel blade. Given the location of the defect and the proximity to free margins a rectangular flap was deemed most appropriate. Using a sterile surgical marker, an appropriate rectangular flap was drawn incorporating the defect. The area thus outlined was incised deep to adipose tissue with a #15 scalpel blade. The skin margins were undermined to an appropriate distance in all directions utilizing iris scissors. Following this, the designed flap was carried over into the primary defect and sutured into place. Rhombic Flap Text: The defect edges were debeveled with a #15 scalpel blade.  Given the location of the defect and the proximity to free margins a rhombic flap was deemed most appropriate.  Using a sterile surgical marker, an appropriate rhombic flap was drawn incorporating the defect.    The area thus outlined was incised deep to adipose tissue with a #15 scalpel blade.  The skin margins were undermined to an appropriate distance in all directions utilizing iris scissors. Rhomboid Transposition Flap Text: The defect edges were debeveled with a #15 scalpel blade.  Given the location of the defect and the proximity to free margins a rhomboid transposition flap was deemed most appropriate.  Using a sterile surgical marker, an appropriate rhomboid flap was drawn incorporating the defect.    The area thus outlined was incised deep to adipose tissue with a #15 scalpel blade.  The skin margins were undermined to an appropriate distance in all directions utilizing iris scissors. Rotation Flap Text: The defect edges were debeveled with a #15 scalpel blade.  Given the location of the defect, shape of the defect and the proximity to free margins a rotation flap was deemed most appropriate.  Using a sterile surgical marker, an appropriate rotation flap was drawn incorporating the defect and placing the expected incisions within the relaxed skin tension lines where possible.    The area thus outlined was incised deep to adipose tissue with a #15 scalpel blade.  The skin margins were undermined to an appropriate distance in all directions utilizing iris scissors. Spiral Flap Text: The defect edges were debeveled with a #15 scalpel blade.  Given the location of the defect, shape of the defect and the proximity to free margins a spiral flap was deemed most appropriate.  Using a sterile surgical marker, an appropriate rotation flap was drawn incorporating the defect and placing the expected incisions within the relaxed skin tension lines where possible. The area thus outlined was incised deep to adipose tissue with a #15 scalpel blade.  The skin margins were undermined to an appropriate distance in all directions utilizing iris scissors. Staged Advancement Flap Text: The defect edges were debeveled with a #15 scalpel blade.  Given the location of the defect, shape of the defect and the proximity to free margins a staged advancement flap was deemed most appropriate.  Using a sterile surgical marker, an appropriate advancement flap was drawn incorporating the defect and placing the expected incisions within the relaxed skin tension lines where possible. The area thus outlined was incised deep to adipose tissue with a #15 scalpel blade.  The skin margins were undermined to an appropriate distance in all directions utilizing iris scissors. Star Wedge Flap Text: The defect edges were debeveled with a #15 scalpel blade.  Given the location of the defect, shape of the defect and the proximity to free margins a star wedge flap was deemed most appropriate.  Using a sterile surgical marker, an appropriate rotation flap was drawn incorporating the defect and placing the expected incisions within the relaxed skin tension lines where possible. The area thus outlined was incised deep to adipose tissue with a #15 scalpel blade.  The skin margins were undermined to an appropriate distance in all directions utilizing iris scissors. Transposition Flap Text: The defect edges were debeveled with a #15 scalpel blade.  Given the location of the defect and the proximity to free margins a transposition flap was deemed most appropriate.  Using a sterile surgical marker, an appropriate transposition flap was drawn incorporating the defect.    The area thus outlined was incised deep to adipose tissue with a #15 scalpel blade.  The skin margins were undermined to an appropriate distance in all directions utilizing iris scissors. Trilobed Flap Text: The defect edges were debeveled with a #15 scalpel blade.  Given the location of the defect and the proximity to free margins a trilobed flap was deemed most appropriate.  Using a sterile surgical marker, an appropriate trilobed flap drawn around the defect.    The area thus outlined was incised deep to adipose tissue with a #15 scalpel blade.  The skin margins were undermined to an appropriate distance in all directions utilizing iris scissors. V-Y Flap Text: The defect edges were debeveled with a #15 scalpel blade.  Given the location of the defect, shape of the defect and the proximity to free margins a V-Y flap was deemed most appropriate.  Using a sterile surgical marker, an appropriate advancement flap was drawn incorporating the defect and placing the expected incisions within the relaxed skin tension lines where possible.    The area thus outlined was incised deep to adipose tissue with a #15 scalpel blade.  The skin margins were undermined to an appropriate distance in all directions utilizing iris scissors. V-Y Plasty Text: The defect edges were debeveled with a #15 scalpel blade.  Given the location of the defect, shape of the defect and the proximity to free margins an V-Y advancement flap was deemed most appropriate.  Using a sterile surgical marker, an appropriate advancement flap was drawn incorporating the defect and placing the expected incisions within the relaxed skin tension lines where possible.    The area thus outlined was incised deep to adipose tissue with a #15 scalpel blade.  The skin margins were undermined to an appropriate distance in all directions utilizing iris scissors. W Plasty Text: The lesion was extirpated to the level of the fat with a #15 scalpel blade.  Given the location of the defect, shape of the defect and the proximity to free margins a W-plasty was deemed most appropriate for repair.  Using a sterile surgical marker, the appropriate transposition arms of the W-plasty were drawn incorporating the defect and placing the expected incisions within the relaxed skin tension lines where possible.    The area thus outlined was incised deep to adipose tissue with a #15 scalpel blade.  The skin margins were undermined to an appropriate distance in all directions utilizing iris scissors.  The opposing transposition arms were then transposed into place in opposite direction and anchored with interrupted buried subcutaneous sutures. Z Plasty Text: The lesion was extirpated to the level of the fat with a #15 scalpel blade.  Given the location of the defect, shape of the defect and the proximity to free margins a Z-plasty was deemed most appropriate for repair.  Using a sterile surgical marker, the appropriate transposition arms of the Z-plasty were drawn incorporating the defect and placing the expected incisions within the relaxed skin tension lines where possible.    The area thus outlined was incised deep to adipose tissue with a #15 scalpel blade.  The skin margins were undermined to an appropriate distance in all directions utilizing iris scissors.  The opposing transposition arms were then transposed into place in opposite direction and anchored with interrupted buried subcutaneous sutures. Zygomaticofacial Flap Text: Given the location of the defect, shape of the defect and the proximity to free margins a zygomaticofacial flap was deemed most appropriate for repair.  Using a sterile surgical marker, the appropriate flap was drawn incorporating the defect and placing the expected incisions within the relaxed skin tension lines where possible. The area thus outlined was incised deep to adipose tissue with a #15 scalpel blade with preservation of a vascular pedicle.  The skin margins were undermined to an appropriate distance in all directions utilizing iris scissors.  The flap was then placed into the defect and anchored with interrupted buried subcutaneous sutures. Abbe Flap (Lower To Upper Lip) Text: The defect of the upper lip was assessed and measured.  Given the location and size of the defect, an Abbe flap was deemed most appropriate.  Using a sterile surgical marker, an appropriate Abbe flap was measured and drawn on the lower lip. Local anesthesia was then infiltrated. A scalpel was then used to incise the upper lip through and through the skin, vermilion, muscle and mucosa, leaving the flap pedicled on the opposite side.  The flap was then rotated and transferred to the lower lip defect.  The flap was then sutured into place with a three layer technique, closing the orbicularis oris muscle layer with subcutaneous buried sutures, followed by a mucosal layer and an epidermal layer. Abbe Flap (Upper To Lower Lip) Text: The defect of the lower lip was assessed and measured.  Given the location and size of the defect, an Abbe flap was deemed most appropriate.  Using a sterile surgical marker, an appropriate Abbe flap was measured and drawn on the upper lip. Local anesthesia was then infiltrated.  A scalpel was then used to incise the upper lip through and through the skin, vermilion, muscle and mucosa, leaving the flap pedicled on the opposite side.  The flap was then rotated and transferred to the lower lip defect.  The flap was then sutured into place with a three layer technique, closing the orbicularis oris muscle layer with subcutaneous buried sutures, followed by a mucosal layer and an epidermal layer. Cheek Interpolation Flap Text: A decision was made to reconstruct the defect utilizing an interpolation axial flap and a staged reconstruction.  A telfa template was made of the defect.  This telfa template was then used to outline the Cheek Interpolation flap.  The donor area for the pedicle flap was then injected with anesthesia.  The flap was excised through the skin and subcutaneous tissue down to the layer of the underlying musculature.  The interpolation flap was carefully excised within this deep plane to maintain its blood supply.  The edges of the donor site were undermined.   The donor site was closed in a primary fashion.  The pedicle was then rotated into position and sutured.  Once the tube was sutured into place, adequate blood supply was confirmed with blanching and refill.  The pedicle was then wrapped with xeroform gauze and dressed appropriately with a telfa and gauze bandage to ensure continued blood supply and protect the attached pedicle. Cheek-To-Nose Interpolation Flap Text: A decision was made to reconstruct the defect utilizing an interpolation axial flap and a staged reconstruction.  A telfa template was made of the defect.  This telfa template was then used to outline the Cheek-To-Nose Interpolation flap.  The donor area for the pedicle flap was then injected with anesthesia.  The flap was excised through the skin and subcutaneous tissue down to the layer of the underlying musculature.  The interpolation flap was carefully excised within this deep plane to maintain its blood supply.  The edges of the donor site were undermined.   The donor site was closed in a primary fashion.  The pedicle was then rotated into position and sutured.  Once the tube was sutured into place, adequate blood supply was confirmed with blanching and refill.  The pedicle was then wrapped with xeroform gauze and dressed appropriately with a telfa and gauze bandage to ensure continued blood supply and protect the attached pedicle. Estlander Flap (Lower To Upper Lip) Text: The defect of the lower lip was assessed and measured.  Given the location and size of the defect, an Estlander flap was deemed most appropriate.  Using a sterile surgical marker, an appropriate Estlander flap was measured and drawn on the upper lip. Local anesthesia was then infiltrated. A scalpel was then used to incise the lateral aspect of the flap, through skin, muscle and mucosa, leaving the flap pedicled medially.  The flap was then rotated and positioned to fill the lower lip defect.  The flap was then sutured into place with a three layer technique, closing the orbicularis oris muscle layer with subcutaneous buried sutures, followed by a mucosal layer and an epidermal layer. Interpolation Flap Text: A decision was made to reconstruct the defect utilizing an interpolation axial flap and a staged reconstruction.  A telfa template was made of the defect.  This telfa template was then used to outline the interpolation flap.  The donor area for the pedicle flap was then injected with anesthesia.  The flap was excised through the skin and subcutaneous tissue down to the layer of the underlying musculature.  The interpolation flap was carefully excised within this deep plane to maintain its blood supply.  The edges of the donor site were undermined.   The donor site was closed in a primary fashion.  The pedicle was then rotated into position and sutured.  Once the tube was sutured into place, adequate blood supply was confirmed with blanching and refill.  The pedicle was then wrapped with xeroform gauze and dressed appropriately with a telfa and gauze bandage to ensure continued blood supply and protect the attached pedicle. Melolabial Interpolation Flap Text: A decision was made to reconstruct the defect utilizing an interpolation axial flap and a staged reconstruction.  A telfa template was made of the defect.  This telfa template was then used to outline the melolabial interpolation flap.  The donor area for the pedicle flap was then injected with anesthesia.  The flap was excised through the skin and subcutaneous tissue down to the layer of the underlying musculature.  The pedicle flap was carefully excised within this deep plane to maintain its blood supply.  The edges of the donor site were undermined.   The donor site was closed in a primary fashion.  The pedicle was then rotated into position and sutured.  Once the tube was sutured into place, adequate blood supply was confirmed with blanching and refill.  The pedicle was then wrapped with xeroform gauze and dressed appropriately with a telfa and gauze bandage to ensure continued blood supply and protect the attached pedicle. Mastoid Interpolation Flap Text: A decision was made to reconstruct the defect utilizing an interpolation axial flap and a staged reconstruction.  A telfa template was made of the defect.  This telfa template was then used to outline the mastoid interpolation flap.  The donor area for the pedicle flap was then injected with anesthesia.  The flap was excised through the skin and subcutaneous tissue down to the layer of the underlying musculature.  The pedicle flap was carefully excised within this deep plane to maintain its blood supply.  The edges of the donor site were undermined.   The donor site was closed in a primary fashion.  The pedicle was then rotated into position and sutured.  Once the tube was sutured into place, adequate blood supply was confirmed with blanching and refill.  The pedicle was then wrapped with xeroform gauze and dressed appropriately with a telfa and gauze bandage to ensure continued blood supply and protect the attached pedicle. Paramedian Forehead Flap Text: A decision was made to reconstruct the defect utilizing an interpolation axial flap and a staged reconstruction.  A telfa template was made of the defect.  This telfa template was then used to outline the paramedian forehead pedicle flap.  The donor area for the pedicle flap was then injected with anesthesia.  The flap was excised through the skin and subcutaneous tissue down to the layer of the underlying musculature.  The pedicle flap was carefully excised within this deep plane to maintain its blood supply.  The edges of the donor site were undermined.   The donor site was closed in a primary fashion.  The pedicle was then rotated into position and sutured.  Once the tube was sutured into place, adequate blood supply was confirmed with blanching and refill.  The pedicle was then wrapped with xeroform gauze and dressed appropriately with a telfa and gauze bandage to ensure continued blood supply and protect the attached pedicle. Posterior Auricular Interpolation Flap Text: A decision was made to reconstruct the defect utilizing an interpolation axial flap and a staged reconstruction.  A telfa template was made of the defect.  This telfa template was then used to outline the posterior auricular interpolation flap.  The donor area for the pedicle flap was then injected with anesthesia.  The flap was excised through the skin and subcutaneous tissue down to the layer of the underlying musculature.  The pedicle flap was carefully excised within this deep plane to maintain its blood supply.  The edges of the donor site were undermined.   The donor site was closed in a primary fashion.  The pedicle was then rotated into position and sutured.  Once the tube was sutured into place, adequate blood supply was confirmed with blanching and refill.  The pedicle was then wrapped with xeroform gauze and dressed appropriately with a telfa and gauze bandage to ensure continued blood supply and protect the attached pedicle. Cheiloplasty (Complex) Text: A decision was made to reconstruct the defect with a  cheiloplasty.  The defect was undermined extensively.  Additional obicularis oris muscle was excised with a 15 blade scalpel.  The defect was converted into a full thickness wedge to facilite a better cosmetic result.  Small vessels were then tied off with 5-0 monocyrl. The obicularis oris, superficial fascia, adipose and dermis were then reapproximated.  After the deeper layers were approximated the epidermis was reapproximated with particular care given to realign the vermilion border. Cheiloplasty (Less Than 50%) Text: A decision was made to reconstruct the defect with a  cheiloplasty.  The defect was undermined extensively.  Additional obicularis oris muscle was excised with a 15 blade scalpel.  The defect was converted into a full thickness wedge, of less than 50% of the vertical height of the lip, to facilite a better cosmetic result.  Small vessels were then tied off with 5-0 monocyrl. The obicularis oris, superficial fascia, adipose and dermis were then reapproximated.  After the deeper layers were approximated the epidermis was reapproximated with particular care given to realign the vermilion border. Ear Wedge Repair Text: A wedge excision was completed by carrying down an excision through the full thickness of the ear and cartilage with an inward facing Burow's triangle. The wound was then closed in a layered fashion. Full Thickness Lip Wedge Repair (Flap) Text: Given the location of the defect and the proximity to free margins a full thickness wedge repair was deemed most appropriate.  Using a sterile surgical marker, the appropriate repair was drawn incorporating the defect and placing the expected incisions perpendicular to the vermilion border.  The vermilion border was also meticulously outlined to ensure appropriate reapproximation during the repair.  The area thus outlined was incised through and through with a #15 scalpel blade.  The muscularis and dermis were reaproximated with deep sutures following hemostasis. Care was taken to realign the vermilion border before proceeding with the superficial closure.  Once the vermilion was realigned the superfical and mucosal closure was finished. Burow's Graft Text: The defect edges were debeveled with a #15 scalpel blade.  Given the location of the defect, shape of the defect, the proximity to free margins and the presence of a standing cone deformity a Burow's skin graft was deemed most appropriate. The standing cone was removed and this tissue was then trimmed to the shape of the primary defect. The adipose tissue was also removed until only dermis and epidermis were left.  The skin margins of the secondary defect were undermined to an appropriate distance in all directions utilizing iris scissors.  The secondary defect was closed with interrupted buried subcutaneous sutures.  The skin edges were then re-apposed with running  sutures.  The skin graft was then placed in the primary defect and oriented appropriately. Cartilage Graft Text: The defect edges were debeveled with a #15 scalpel blade.  Given the location of the defect, shape of the defect, the fact the defect involved a full thickness cartilage defect a cartilage graft was deemed most appropriate.  An appropriate donor site was identified, cleansed, and anesthetized. The cartilage graft was then harvested and transferred to the recipient site, oriented appropriately and then sutured into place.  The secondary defect was then repaired using a primary closure. Composite Graft Text: The defect edges were debeveled with a #15 scalpel blade.  Given the location of the defect, shape of the defect, the proximity to free margins and the fact the defect was full thickness a composite graft was deemed most appropriate.  The defect was outline and then transferred to the donor site.  A full thickness graft was then excised from the donor site. The graft was then placed in the primary defect, oriented appropriately and then sutured into place.  The secondary defect was then repaired using a primary closure. Epidermal Autograft Text: The defect edges were debeveled with a #15 scalpel blade.  Given the location of the defect, shape of the defect and the proximity to free margins an epidermal autograft was deemed most appropriate.  Using a sterile surgical marker, the primary defect shape was transferred to the donor site. The epidermal graft was then harvested.  The skin graft was then placed in the primary defect and oriented appropriately. Dermal Autograft Text: The defect edges were debeveled with a #15 scalpel blade.  Given the location of the defect, shape of the defect and the proximity to free margins a dermal autograft was deemed most appropriate.  Using a sterile surgical marker, the primary defect shape was transferred to the donor site. The area thus outlined was incised deep to adipose tissue with a #15 scalpel blade.  The harvested graft was then trimmed of adipose and epidermal tissue until only dermis was left.  The skin graft was then placed in the primary defect and oriented appropriately. Ftsg Text: The defect edges were debeveled with a #15 scalpel blade.  Given the location of the defect, shape of the defect and the proximity to free margins a full thickness skin graft was deemed most appropriate.  Using a sterile surgical marker, the primary defect shape was transferred to the donor site. The area thus outlined was incised deep to adipose tissue with a #15 scalpel blade.  The harvested graft was then trimmed of adipose tissue until only dermis and epidermis was left.  The skin margins of the secondary defect were undermined to an appropriate distance in all directions utilizing iris scissors.  The secondary defect was closed with interrupted buried subcutaneous sutures.  The skin edges were then re-apposed with running  sutures.  The skin graft was then placed in the primary defect and oriented appropriately. Pinch Graft Text: The defect edges were debeveled with a #15 scalpel blade. Given the location of the defect, shape of the defect and the proximity to free margins a pinch graft was deemed most appropriate. Using a sterile surgical marker, the primary defect shape was transferred to the donor site. The area thus outlined was incised deep to adipose tissue with a #15 scalpel blade.  The harvested graft was then trimmed of adipose tissue until only dermis and epidermis was left. The skin margins of the secondary defect were undermined to an appropriate distance in all directions utilizing iris scissors.  The secondary defect was closed with interrupted buried subcutaneous sutures.  The skin edges were then re-apposed with running  sutures.  The skin graft was then placed in the primary defect and oriented appropriately. Skin Substitute Text: The defect edges were debeveled with a #15 scalpel blade.  Given the location of the defect, shape of the defect and the proximity to free margins a skin substitute graft was deemed most appropriate.  The graft material was trimmed to fit the size of the defect. The graft was then placed in the primary defect and oriented appropriately. Split-Thickness Skin Graft Text: The defect edges were debeveled with a #15 scalpel blade.  Given the location of the defect, shape of the defect and the proximity to free margins a split thickness skin graft was deemed most appropriate.  Using a sterile surgical marker, the primary defect shape was transferred to the donor site. The split thickness graft was then harvested.  The skin graft was then placed in the primary defect and oriented appropriately. Tissue Cultured Epidermal Autograft Text: The defect edges were debeveled with a #15 scalpel blade.  Given the location of the defect, shape of the defect and the proximity to free margins a tissue cultured epidermal autograft was deemed most appropriate.  The graft was then trimmed to fit the size of the defect.  The graft was then placed in the primary defect and oriented appropriately. Xenograft Text: The defect edges were debeveled with a #15 scalpel blade.  Given the location of the defect, shape of the defect and the proximity to free margins a xenograft was deemed most appropriate.  The graft was then trimmed to fit the size of the defect.  The graft was then placed in the primary defect and oriented appropriately. Complex Repair And Flap Additional Text (Will Appearing After The Standard Complex Repair Text): The complex repair was not sufficient to completely close the primary defect. The remaining additional defect was repaired with the flap mentioned below. Complex Repair And Graft Additional Text (Will Appearing After The Standard Complex Repair Text): The complex repair was not sufficient to completely close the primary defect. The remaining additional defect was repaired with the graft mentioned below. Eyelid Full Thickness Repair - 96656: The eyelid defect was full thickness which required a wedge repair of the eyelid. Special care was taken to ensure that the eyelid margin was realligned when placing sutures. Eyelid Partial Thickness Repair - [id number]: The eyelid defect was partial thickness which required a wedge repair of the eyelid. Special care was taken to ensure that the eyelid margin was realligned when placing sutures. Intermediate Repair And Flap Additional Text (Will Appearing After The Standard Complex Repair Text): The intermediate repair was not sufficient to completely close the primary defect. The remaining additional defect was repaired with the flap mentioned below. Intermediate Repair And Graft Additional Text (Will Appearing After The Standard Complex Repair Text): The intermediate repair was not sufficient to completely close the primary defect. The remaining additional defect was repaired with the graft mentioned below. Localized Dermabrasion With 15 Blade Text: The patient was draped in routine manner.  Localized dermabrasion using a 15 blade was performed in routine manner to papillary dermis. This spot dermabrasion is being performed to complete skin cancer reconstruction. It also will eliminate the other sun damaged precancerous cells that are known to be part of the regional effect of a lifetime's worth of sun exposure. This localized dermabrasion is therapeutic and should not be considered cosmetic in any regard. Localized Dermabrasion With Sand Papertext: The patient was draped in routine manner.  Localized dermabrasion using sterile sand paper was performed in routine manner to papillary dermis. This spot dermabrasion is being performed to complete skin cancer reconstruction. It also will eliminate the other sun damaged precancerous cells that are known to be part of the regional effect of a lifetime's worth of sun exposure. This localized dermabrasion is therapeutic and should not be considered cosmetic in any regard. Localized Dermabrasion With Wire Brush Text: The patient was draped in routine manner.  Localized dermabrasion using 3 x 17 mm wire brush was performed in routine manner to papillary dermis. This spot dermabrasion is being performed to complete skin cancer reconstruction. It also will eliminate the other sun damaged precancerous cells that are known to be part of the regional effect of a lifetime's worth of sun exposure. This localized dermabrasion is therapeutic and should not be considered cosmetic in any regard. Purse String (Simple) Text: Given the location of the defect and the characteristics of the surrounding skin a purse string closure was deemed most appropriate.  Undermining was performed circumfirentially around the surgical defect.  A purse string suture was then placed and tightened. Purse String (Intermediate) Text: Given the location of the defect and the characteristics of the surrounding skin a purse string intermediate closure was deemed most appropriate.  Undermining was performed circumfirentially around the surgical defect.  A purse string suture was then placed and tightened. Partial Purse String (Simple) Text: Given the location of the defect and the characteristics of the surrounding skin a simple purse string closure was deemed most appropriate.  Undermining was performed circumfirentially around the surgical defect.  A purse string suture was then placed and tightened. Wound tension only allowed a partial closure of the circular defect. Partial Purse String (Intermediate) Text: Given the location of the defect and the characteristics of the surrounding skin an intermediate purse string closure was deemed most appropriate.  Undermining was performed circumfirentially around the surgical defect.  A purse string suture was then placed and tightened. Wound tension only allowed a partial closure of the circular defect. Tarsorrhaphy Text: A tarsorrhaphy was performed using Frost sutures. Manual Repair Warning Statement: We plan on removing the manually selected variable below in favor of our much easier automatic structured text blocks found in the previous tab. We decided to do this to help make the flow better and give you the full power of structured data. Manual selection is never going to be ideal in our platform and I would encourage you to avoid using manual selection from this point on, especially since I will be sunsetting this feature. It is important that you do one of two things with the customized text below. First, you can save all of the text in a word file so you can have it for future reference. Second, transfer the text to the appropriate area in the Library tab. Lastly, if there is a flap or graft type which we do not have you need to let us know right away so I can add it in before the variable is hidden. No need to panic, we plan to give you roughly 6 months to make the change. Same Histology In Subsequent Stages Text: The pattern and morphology of the tumor is as described in the first stage. No Residual Tumor Seen Histology Text: There were no malignant cells seen in the sections examined. Inflammation Suggestive Of Cancer Camouflage Histology Text: There was a dense lymphocytic infiltrate which prevented adequate histologic evaluation of adjacent structures. Bcc Histology Text: There were numerous aggregates of basaloid cells. Bcc Infiltrative Histology Text: There were numerous aggregates of basaloid cells demonstrating an infiltrative pattern. Mart-1 - Positive Histology Text: MART-1 staining demonstrates areas of higher density and clustering of melanocytes with Pagetoid spread upwards within the epidermis. The surgical margins are positive for tumor cells. Mart-1 - Negative Histology Text: MART-1 staining demonstrates a normal density and pattern of melanocytes along the dermal-epidermal junction. The surgical margins are negative for tumor cells. Information: Selecting Yes will display possible errors in your note based on the variables you have selected. This validation is only offered as a suggestion for you. PLEASE NOTE THAT THE VALIDATION TEXT WILL BE REMOVED WHEN YOU FINALIZE YOUR NOTE. IF YOU WANT TO FAX A PRELIMINARY NOTE YOU WILL NEED TO TOGGLE THIS TO 'NO' IF YOU DO NOT WANT IT IN YOUR FAXED NOTE. Bill 59 Modifier?: No - Continue to Bill 79 Modifier

## 2025-06-29 NOTE — PROVIDER NOTIFICATION
Notified [facility] to confirm placement of NGT.X-ray completed. Awaiting verification to start feedings   Appropriate

## (undated) DEVICE — DRSG KERLIX 4 1/2"X4YDS ROLL 6715

## (undated) DEVICE — SOL WATER IRRIG 1000ML BOTTLE 2F7114

## (undated) DEVICE — SU SILK 3-0 TIE 12X30" A304H

## (undated) DEVICE — SU SILK 0 SH 30" K834H

## (undated) DEVICE — PITCHER STERILE 1000ML  SSK9004A

## (undated) DEVICE — Device

## (undated) DEVICE — DRAPE SHEET REV FOLD 3/4 9349

## (undated) DEVICE — LINEN TOWEL PACK X5 5464

## (undated) DEVICE — LINEN TOWEL PACK X30 5481

## (undated) DEVICE — SU PROLENE 5-0 C-1DA 36" 8720H

## (undated) DEVICE — BLADE CLIPPER SGL USE 9680

## (undated) DEVICE — GEL ULTRASOUND AQUASONIC 20GM 01-01

## (undated) DEVICE — SU PROLENE 5-0 24" BV-1 9702H

## (undated) DEVICE — SU ETHILON 3-0 PS-1 18" 1663H

## (undated) DEVICE — CLIP HORIZON MED BLUE 002200

## (undated) DEVICE — SU SILK 0 TIE 6X30" A306H

## (undated) DEVICE — SPONGE RAY-TEC 4X8" 7318

## (undated) DEVICE — CAST PLASTER SPLINT 4X15" 7394

## (undated) DEVICE — CAST PADDING 4" COTTON WEBRIL STERILE 9084S

## (undated) DEVICE — COVER CAMERA VIDEO LASER 9X96" 04-CC227

## (undated) DEVICE — CAST PADDING 4" COTTON SPECIALIST 100 UNSTERILE 7054

## (undated) DEVICE — SU UMBILICAL TAPE .125X30" U11T

## (undated) DEVICE — SU SILK 2-0 TIE 12X30" A305H

## (undated) DEVICE — SU VICRYL 2-0 CT-2 27" UND J269H

## (undated) DEVICE — GLOVE BIOGEL PI MICRO INDICATOR UNDERGLOVE SZ 7.0 48970

## (undated) DEVICE — CATH EP SPECTRANETICS QUICK CROSS 4.8-3.7FRX90CM 518-036

## (undated) DEVICE — IMM LEG ELEVATOR 79-90191

## (undated) DEVICE — TAPE MEASURE VASCULAR 30CM GLOW N TELL  1100-01

## (undated) DEVICE — SU PROLENE 6-0 BV-1DA 18" 8709H

## (undated) DEVICE — GOWN IMPERVIOUS BREATHABLE SMART XLG 89045

## (undated) DEVICE — ESU PENCIL SMOKE EVAC W/ROCKER SWITCH 0703-047-000

## (undated) DEVICE — SOL NACL 0.9% IRRIG 1000ML BOTTLE 2F7124

## (undated) DEVICE — DRSG ABDOMINAL 07 1/2X8" 7197D

## (undated) DEVICE — SUCTION TIP YANKAUER W/O VENT K86

## (undated) DEVICE — SU SILK 4-0 TIE 12X30" A303H

## (undated) DEVICE — INTRO SHEATH BRITE TIP 6FRX11CM 401-611M

## (undated) DEVICE — GUIDEWIRE 0.018"X300CM STEERABLE V-18 CONTROL M001468540

## (undated) DEVICE — GLIDEWIRE TERUMO RADIOFOCUS .035X260CM ANG EXCHANGE GR3509

## (undated) DEVICE — ESU GROUND PAD ADULT W/CORD E7507

## (undated) DEVICE — BNDG ELASTIC 4" DBL LENGTH UNSTERILE 6611-14

## (undated) DEVICE — PREP CHLORAPREP CLEAR 3ML 930400

## (undated) DEVICE — CLIP HORIZON SM RED WIDE SLOT 001201

## (undated) DEVICE — SURGICEL HEMOSTAT 4X8" 1952

## (undated) DEVICE — PACK LOWER EXTREMITY RIVERSIDE SOP32LEFSX

## (undated) DEVICE — LINEN TOWEL PACK X6 WHITE 5487

## (undated) DEVICE — STPL SKIN 35W ROTATING HEAD PRW35

## (undated) DEVICE — SU SILK 3-0 SH CR 8X18" C013D

## (undated) DEVICE — STPL SKIN PROXIMATE 35 WIDE PMW35

## (undated) DEVICE — INTRO SHEATH BRITE TIP 5FRX11CM 401-511M

## (undated) DEVICE — BLADE SAW SAGITTAL STRK MICRO 9.0X25X0.38MM 2296-003-511

## (undated) DEVICE — BASIN SET SINGLE STERILE 13752-624

## (undated) DEVICE — DRSG STERI STRIP 1/2X4" R1547

## (undated) DEVICE — TUBING PRESSURE MONITOR M/F CONNECTOR 48" 50P148

## (undated) DEVICE — PAD CHUX UNDERPAD 23X24" 7136

## (undated) DEVICE — DRSG GAUZE 4X4" TRAY 6939

## (undated) DEVICE — DRAPE SHEET MED 44X70" 9355

## (undated) DEVICE — SU VICRYL 3-0 SH 27" UND J416H

## (undated) DEVICE — SU ETHILON 4-0 PC-3 18" 1864G

## (undated) DEVICE — KIT MICRO-INTRODUCER STIFFEN 4FR 7274V

## (undated) DEVICE — STRAP UNIVERSAL POSITIONING 2-PIECE 4X47X76" 91-287

## (undated) DEVICE — DRAPE U SPLIT 74X120" 29440

## (undated) DEVICE — WIPES FOLEY CARE SURESTEP PROVON DFC100

## (undated) DEVICE — DECANTER VIAL 2006S

## (undated) DEVICE — SUCTION MANIFOLD NEPTUNE 2 SYS 4 PORT 0702-020-000

## (undated) DEVICE — DRSG TEGADERM 4X4 3/4" 1626W

## (undated) DEVICE — SU VICRYL 0 CT-1 27" UND J260H

## (undated) DEVICE — BLADE SAW SAGITTAL STRK MED WIDE 25X73X0.89MM 2108-105-000

## (undated) DEVICE — VESSEL LOOPS RED MINI 31145710

## (undated) DEVICE — ESU ELEC BLADE 2.75" COATED/INSULATED E1455

## (undated) DEVICE — PREP CHLORAPREP 26ML TINTED HI-LITE ORANGE 930815

## (undated) DEVICE — SPONGE LAP 18X18" X8435

## (undated) DEVICE — SUCTION TIP YANKAUER STR K87

## (undated) DEVICE — VESSEL LOOPS YELLOW MAXI 31145694

## (undated) DEVICE — CATH ANGIO ANG GLIDE 5FRX100CM CG508

## (undated) DEVICE — GLOVE BIOGEL PI MICRO INDICATOR UNDERGLOVE SZ 8.0 48980

## (undated) DEVICE — BNDG ELASTIC 4"X5YDS STERILE 6611-4S

## (undated) DEVICE — RX SURGIFLO HEMOSTATIC MATRIX W/THROMBIN 8ML 2994

## (undated) DEVICE — DRAPE ISOLATION BAG 1003

## (undated) DEVICE — COVER EQUIP BAG W/BAND 36X28" LF 01-3628

## (undated) DEVICE — INFLATION DEVICE ENCORE  26 PRESSURE GAUGE M001151050

## (undated) DEVICE — DRSG XEROFORM 5X9" CUR253590W

## (undated) DEVICE — COVER ULTRASOUND PROBE W/GEL FLEXI-FEEL 6"X58" LF  25-FF658

## (undated) DEVICE — CATH TRAY FOLEY SURESTEP 16FR W/URNE MTR STLK LATEX A303316A

## (undated) DEVICE — BNDG ELASTIC 6"X5YDS STERILE 6611-6S

## (undated) DEVICE — PREP SKIN SCRUB TRAY 4461A

## (undated) DEVICE — GUIDEWIRE TERUMO .035X260CM EXCHANGE ANG GS3509

## (undated) DEVICE — VESSEL LOOPS DEV-O-LOOP WHITE MINI 31145728

## (undated) DEVICE — DRSG TEGADERM 2 3/8X2 3/4" 1624W

## (undated) DEVICE — SYR 30ML LL W/O NDL 302832

## (undated) DEVICE — SOL WATER IRRIG 1000ML BOTTLE 07139-09

## (undated) DEVICE — SUTURE BOOTS 051003PBX

## (undated) DEVICE — DECANTER BAG 2002S

## (undated) DEVICE — SU VICRYL 2-0 SH 27" UND J417H

## (undated) DEVICE — CLIP HORIZON LG ORANGE 004200

## (undated) DEVICE — SU VICRYL 2-0 CT-1 27" UND J259H

## (undated) DEVICE — GLOVE BIOGEL PI MICRO SZ 7.5 48575

## (undated) DEVICE — DRAPE IOBAN INCISE 23X17" 6650EZ

## (undated) RX ORDER — HYDROMORPHONE HCL IN WATER/PF 6 MG/30 ML
PATIENT CONTROLLED ANALGESIA SYRINGE INTRAVENOUS
Status: DISPENSED
Start: 2023-01-01

## (undated) RX ORDER — CEFAZOLIN SODIUM/WATER 2 G/20 ML
SYRINGE (ML) INTRAVENOUS
Status: DISPENSED
Start: 2023-01-01

## (undated) RX ORDER — FENTANYL CITRATE 50 UG/ML
INJECTION, SOLUTION INTRAMUSCULAR; INTRAVENOUS
Status: DISPENSED
Start: 2023-01-01

## (undated) RX ORDER — ONDANSETRON 2 MG/ML
INJECTION INTRAMUSCULAR; INTRAVENOUS
Status: DISPENSED
Start: 2023-01-01

## (undated) RX ORDER — HYDRALAZINE HYDROCHLORIDE 20 MG/ML
INJECTION INTRAMUSCULAR; INTRAVENOUS
Status: DISPENSED
Start: 2023-01-01

## (undated) RX ORDER — HYDROMORPHONE HYDROCHLORIDE 1 MG/ML
INJECTION, SOLUTION INTRAMUSCULAR; INTRAVENOUS; SUBCUTANEOUS
Status: DISPENSED
Start: 2023-01-01

## (undated) RX ORDER — CEFAZOLIN SODIUM 1 G/3ML
INJECTION, POWDER, FOR SOLUTION INTRAMUSCULAR; INTRAVENOUS
Status: DISPENSED
Start: 2023-01-01

## (undated) RX ORDER — REGADENOSON 0.08 MG/ML
INJECTION, SOLUTION INTRAVENOUS
Status: DISPENSED
Start: 2023-01-01

## (undated) RX ORDER — ACETAMINOPHEN 325 MG/1
TABLET ORAL
Status: DISPENSED
Start: 2023-01-01

## (undated) RX ORDER — HEPARIN SODIUM 1000 [USP'U]/ML
INJECTION, SOLUTION INTRAVENOUS; SUBCUTANEOUS
Status: DISPENSED
Start: 2023-01-01

## (undated) RX ORDER — IODIXANOL 320 MG/ML
INJECTION, SOLUTION INTRAVASCULAR
Status: DISPENSED
Start: 2023-01-01

## (undated) RX ORDER — EPHEDRINE SULFATE 50 MG/ML
INJECTION, SOLUTION INTRAMUSCULAR; INTRAVENOUS; SUBCUTANEOUS
Status: DISPENSED
Start: 2023-01-01

## (undated) RX ORDER — FENTANYL CITRATE-0.9 % NACL/PF 10 MCG/ML
PLASTIC BAG, INJECTION (ML) INTRAVENOUS
Status: DISPENSED
Start: 2023-01-01

## (undated) RX ORDER — PROPOFOL 10 MG/ML
INJECTION, EMULSION INTRAVENOUS
Status: DISPENSED
Start: 2023-01-01

## (undated) RX ORDER — LABETALOL HYDROCHLORIDE 5 MG/ML
INJECTION, SOLUTION INTRAVENOUS
Status: DISPENSED
Start: 2023-01-01

## (undated) RX ORDER — FENTANYL CITRATE 50 UG/ML
INJECTION, SOLUTION INTRAMUSCULAR; INTRAVENOUS
Status: DISPENSED
Start: 2018-04-19

## (undated) RX ORDER — IOPAMIDOL 755 MG/ML
INJECTION, SOLUTION INTRAVASCULAR
Status: DISPENSED
Start: 2023-01-01

## (undated) RX ORDER — ESMOLOL HYDROCHLORIDE 10 MG/ML
INJECTION INTRAVENOUS
Status: DISPENSED
Start: 2023-01-01

## (undated) RX ORDER — LIDOCAINE HYDROCHLORIDE 10 MG/ML
INJECTION, SOLUTION EPIDURAL; INFILTRATION; INTRACAUDAL; PERINEURAL
Status: DISPENSED
Start: 2023-01-01

## (undated) RX ORDER — AMINOPHYLLINE 25 MG/ML
INJECTION, SOLUTION INTRAVENOUS
Status: DISPENSED
Start: 2023-01-01

## (undated) RX ORDER — OXYCODONE HYDROCHLORIDE 10 MG/1
TABLET ORAL
Status: DISPENSED
Start: 2023-01-01

## (undated) RX ORDER — HEPARIN SODIUM 5000 [USP'U]/.5ML
INJECTION, SOLUTION INTRAVENOUS; SUBCUTANEOUS
Status: DISPENSED
Start: 2023-01-01

## (undated) RX ORDER — BUPIVACAINE HYDROCHLORIDE 5 MG/ML
INJECTION, SOLUTION EPIDURAL; INTRACAUDAL
Status: DISPENSED
Start: 2023-01-01